# Patient Record
Sex: MALE | Race: WHITE | NOT HISPANIC OR LATINO | Employment: OTHER | URBAN - METROPOLITAN AREA
[De-identification: names, ages, dates, MRNs, and addresses within clinical notes are randomized per-mention and may not be internally consistent; named-entity substitution may affect disease eponyms.]

---

## 2017-01-01 ENCOUNTER — APPOINTMENT (INPATIENT)
Dept: RADIOLOGY | Facility: HOSPITAL | Age: 67
DRG: 853 | End: 2017-01-01
Attending: SURGERY
Payer: COMMERCIAL

## 2017-01-01 ENCOUNTER — APPOINTMENT (INPATIENT)
Dept: NON INVASIVE DIAGNOSTICS | Facility: HOSPITAL | Age: 67
DRG: 853 | End: 2017-01-01
Payer: COMMERCIAL

## 2017-01-01 PROCEDURE — 36147: CPT

## 2017-01-01 PROCEDURE — 36148: CPT

## 2017-01-01 PROCEDURE — C1894 INTRO/SHEATH, NON-LASER: HCPCS

## 2017-01-01 PROCEDURE — C1769 GUIDE WIRE: HCPCS

## 2017-01-01 PROCEDURE — 93306 TTE W/DOPPLER COMPLETE: CPT

## 2017-01-01 PROCEDURE — C1757 CATH, THROMBECTOMY/EMBOLECT: HCPCS

## 2017-01-01 PROCEDURE — 36870: CPT

## 2017-01-02 ENCOUNTER — APPOINTMENT (INPATIENT)
Dept: DIALYSIS | Facility: HOSPITAL | Age: 67
DRG: 853 | End: 2017-01-02
Attending: INTERNAL MEDICINE
Payer: COMMERCIAL

## 2017-01-04 ENCOUNTER — APPOINTMENT (INPATIENT)
Dept: DIALYSIS | Facility: HOSPITAL | Age: 67
DRG: 853 | End: 2017-01-04
Payer: COMMERCIAL

## 2017-01-20 ENCOUNTER — GENERIC CONVERSION - ENCOUNTER (OUTPATIENT)
Dept: OTHER | Facility: OTHER | Age: 67
End: 2017-01-20

## 2017-02-08 ENCOUNTER — GENERIC CONVERSION - ENCOUNTER (OUTPATIENT)
Dept: OTHER | Facility: OTHER | Age: 67
End: 2017-02-08

## 2017-02-09 ENCOUNTER — GENERIC CONVERSION - ENCOUNTER (OUTPATIENT)
Dept: OTHER | Facility: OTHER | Age: 67
End: 2017-02-09

## 2017-02-11 ENCOUNTER — APPOINTMENT (EMERGENCY)
Dept: CT IMAGING | Facility: HOSPITAL | Age: 67
End: 2017-02-11
Payer: COMMERCIAL

## 2017-02-11 ENCOUNTER — APPOINTMENT (EMERGENCY)
Dept: RADIOLOGY | Facility: HOSPITAL | Age: 67
End: 2017-02-11
Payer: COMMERCIAL

## 2017-02-11 ENCOUNTER — HOSPITAL ENCOUNTER (EMERGENCY)
Facility: HOSPITAL | Age: 67
Discharge: HOME | End: 2017-02-12
Attending: EMERGENCY MEDICINE
Payer: COMMERCIAL

## 2017-02-11 DIAGNOSIS — S20.229A BACK CONTUSION: Primary | ICD-10-CM

## 2017-02-11 PROCEDURE — 72125 CT NECK SPINE W/O DYE: CPT

## 2017-02-11 PROCEDURE — 72070 X-RAY EXAM THORAC SPINE 2VWS: CPT

## 2017-02-11 PROCEDURE — 70450 CT HEAD/BRAIN W/O DYE: CPT

## 2017-02-11 RX ORDER — ACETAMINOPHEN 325 MG/1
650 TABLET ORAL ONCE
Status: COMPLETED | OUTPATIENT
Start: 2017-02-11 | End: 2017-02-11

## 2017-02-11 RX ORDER — LEVOFLOXACIN 500 MG/1
500 TABLET, FILM COATED ORAL DAILY
COMMUNITY
End: 2017-02-18

## 2017-02-11 RX ORDER — GINSENG 100 MG
1 CAPSULE ORAL ONCE
Status: COMPLETED | OUTPATIENT
Start: 2017-02-11 | End: 2017-02-11

## 2017-02-11 RX ADMIN — ACETAMINOPHEN 650 MG: 325 TABLET ORAL at 23:05

## 2017-02-11 RX ADMIN — BACITRACIN ZINC 1 SMALL APPLICATION: 500 OINTMENT TOPICAL at 23:06

## 2017-02-12 VITALS
TEMPERATURE: 97.6 F | HEART RATE: 82 BPM | DIASTOLIC BLOOD PRESSURE: 86 MMHG | RESPIRATION RATE: 14 BRPM | WEIGHT: 150 LBS | BODY MASS INDEX: 24.21 KG/M2 | OXYGEN SATURATION: 96 % | SYSTOLIC BLOOD PRESSURE: 158 MMHG

## 2017-02-12 PROCEDURE — 99284 EMERGENCY DEPT VISIT MOD MDM: CPT

## 2017-02-18 ENCOUNTER — HOSPITAL ENCOUNTER (INPATIENT)
Facility: HOSPITAL | Age: 67
LOS: 6 days | Discharge: HOME HEALTH | DRG: 871 | End: 2017-02-24
Attending: EMERGENCY MEDICINE | Admitting: INTERNAL MEDICINE
Payer: COMMERCIAL

## 2017-02-18 ENCOUNTER — APPOINTMENT (EMERGENCY)
Dept: RADIOLOGY | Facility: HOSPITAL | Age: 67
DRG: 871 | End: 2017-02-18
Payer: COMMERCIAL

## 2017-02-18 ENCOUNTER — APPOINTMENT (INPATIENT)
Dept: CT IMAGING | Facility: HOSPITAL | Age: 67
DRG: 871 | End: 2017-02-18
Payer: COMMERCIAL

## 2017-02-18 DIAGNOSIS — Z86.19 H/O CLOSTRIDIUM DIFFICILE INFECTION: ICD-10-CM

## 2017-02-18 DIAGNOSIS — A41.9 SEPSIS, DUE TO UNSPECIFIED ORGANISM: Primary | ICD-10-CM

## 2017-02-18 DIAGNOSIS — L03.115 CELLULITIS OF RIGHT LOWER EXTREMITY: ICD-10-CM

## 2017-02-18 DIAGNOSIS — N18.6 ESRD (END STAGE RENAL DISEASE) (HCC): ICD-10-CM

## 2017-02-18 DIAGNOSIS — L97.513 FOOT ULCER, RIGHT, WITH NECROSIS OF MUSCLE (HCC): ICD-10-CM

## 2017-02-18 DIAGNOSIS — Z99.2 ESRD (END STAGE RENAL DISEASE) ON DIALYSIS (HCC): ICD-10-CM

## 2017-02-18 DIAGNOSIS — E10.21 TYPE 1 DIABETES MELLITUS WITH NEPHROPATHY (HCC): ICD-10-CM

## 2017-02-18 DIAGNOSIS — I48.91 ATRIAL FIBRILLATION (HCC): ICD-10-CM

## 2017-02-18 DIAGNOSIS — E10.621 DIABETIC ULCER OF RIGHT FOOT ASSOCIATED WITH TYPE 1 DIABETES MELLITUS (HCC): ICD-10-CM

## 2017-02-18 DIAGNOSIS — L97.519 DIABETIC ULCER OF RIGHT FOOT ASSOCIATED WITH TYPE 1 DIABETES MELLITUS (HCC): ICD-10-CM

## 2017-02-18 DIAGNOSIS — N18.6 ESRD (END STAGE RENAL DISEASE) ON DIALYSIS (HCC): ICD-10-CM

## 2017-02-18 DIAGNOSIS — L03.115 CELLULITIS OF RIGHT LEG: ICD-10-CM

## 2017-02-18 DIAGNOSIS — J18.9 PNEUMONIA OF RIGHT LOWER LOBE DUE TO INFECTIOUS ORGANISM: ICD-10-CM

## 2017-02-18 DIAGNOSIS — A04.72 C. DIFFICILE COLITIS: ICD-10-CM

## 2017-02-18 DIAGNOSIS — L03.90 CELLULITIS: ICD-10-CM

## 2017-02-18 DIAGNOSIS — J18.9 RIGHT LOWER LOBE PNEUMONIA: ICD-10-CM

## 2017-02-18 PROBLEM — E11.9 T2DM (TYPE 2 DIABETES MELLITUS) (HCC): Status: ACTIVE | Noted: 2017-02-18

## 2017-02-18 PROBLEM — L97.509 FOOT ULCER (HCC): Status: ACTIVE | Noted: 2017-02-18

## 2017-02-18 PROBLEM — G93.40 ACUTE ENCEPHALOPATHY: Status: ACTIVE | Noted: 2017-02-18

## 2017-02-18 PROBLEM — R53.83 LETHARGY: Status: ACTIVE | Noted: 2017-02-18

## 2017-02-18 LAB
ALBUMIN SERPL BCP-MCNC: 2.3 G/DL (ref 3.5–5)
ALP SERPL-CCNC: 240 U/L (ref 46–116)
ALT SERPL W P-5'-P-CCNC: 24 U/L (ref 12–78)
ANION GAP SERPL CALCULATED.3IONS-SCNC: 6 MMOL/L (ref 4–13)
APTT PPP: 65 SECONDS (ref 24–36)
AST SERPL W P-5'-P-CCNC: 33 U/L (ref 5–45)
BASOPHILS # BLD AUTO: 0.03 THOUSANDS/ΜL (ref 0–0.1)
BASOPHILS NFR BLD AUTO: 0 % (ref 0–1)
BILIRUB SERPL-MCNC: 0.8 MG/DL (ref 0.2–1)
BUN SERPL-MCNC: 31 MG/DL (ref 5–25)
CALCIUM SERPL-MCNC: 8.1 MG/DL (ref 8.3–10.1)
CHLORIDE SERPL-SCNC: 92 MMOL/L (ref 100–108)
CO2 SERPL-SCNC: 31 MMOL/L (ref 21–32)
CREAT SERPL-MCNC: 4.01 MG/DL (ref 0.6–1.3)
EOSINOPHIL # BLD AUTO: 0.08 THOUSAND/ΜL (ref 0–0.61)
EOSINOPHIL NFR BLD AUTO: 1 % (ref 0–6)
ERYTHROCYTE [DISTWIDTH] IN BLOOD BY AUTOMATED COUNT: 16.4 % (ref 11.6–15.1)
GFR SERPL CREATININE-BSD FRML MDRD: 15.1 ML/MIN/1.73SQ M
GLUCOSE SERPL-MCNC: 231 MG/DL (ref 65–140)
GLUCOSE SERPL-MCNC: 383 MG/DL (ref 65–140)
GLUCOSE SERPL-MCNC: 383 MG/DL (ref 65–140)
HCT VFR BLD AUTO: 33.6 % (ref 36.5–49.3)
HGB BLD-MCNC: 10.6 G/DL (ref 12–17)
INR PPP: 3.11 (ref 0.86–1.16)
LACTATE SERPL-SCNC: 1.9 MMOL/L (ref 0.5–2)
LYMPHOCYTES # BLD AUTO: 0.79 THOUSANDS/ΜL (ref 0.6–4.47)
LYMPHOCYTES NFR BLD AUTO: 6 % (ref 14–44)
MAGNESIUM SERPL-MCNC: 1.9 MG/DL (ref 1.6–2.6)
MCH RBC QN AUTO: 30.3 PG (ref 26.8–34.3)
MCHC RBC AUTO-ENTMCNC: 31.5 G/DL (ref 31.4–37.4)
MCV RBC AUTO: 96 FL (ref 82–98)
MONOCYTES # BLD AUTO: 1.43 THOUSAND/ΜL (ref 0.17–1.22)
MONOCYTES NFR BLD AUTO: 11 % (ref 4–12)
NEUTROPHILS # BLD AUTO: 11.07 THOUSANDS/ΜL (ref 1.85–7.62)
NEUTS SEG NFR BLD AUTO: 82 % (ref 43–75)
PLATELET # BLD AUTO: 203 THOUSANDS/UL (ref 149–390)
PMV BLD AUTO: 10.4 FL (ref 8.9–12.7)
POTASSIUM SERPL-SCNC: 4.6 MMOL/L (ref 3.5–5.3)
PROT SERPL-MCNC: 7.3 G/DL (ref 6.4–8.2)
PROTHROMBIN TIME: 30.7 SECONDS (ref 12–14.3)
RBC # BLD AUTO: 3.5 MILLION/UL (ref 3.88–5.62)
SODIUM SERPL-SCNC: 129 MMOL/L (ref 136–145)
TSH SERPL DL<=0.05 MIU/L-ACNC: 1.6 UIU/ML (ref 0.36–3.74)
WBC # BLD AUTO: 13.4 THOUSAND/UL (ref 4.31–10.16)

## 2017-02-18 PROCEDURE — 85610 PROTHROMBIN TIME: CPT | Performed by: EMERGENCY MEDICINE

## 2017-02-18 PROCEDURE — 87147 CULTURE TYPE IMMUNOLOGIC: CPT | Performed by: EMERGENCY MEDICINE

## 2017-02-18 PROCEDURE — 93005 ELECTROCARDIOGRAM TRACING: CPT | Performed by: INTERNAL MEDICINE

## 2017-02-18 PROCEDURE — 85025 COMPLETE CBC W/AUTO DIFF WBC: CPT | Performed by: EMERGENCY MEDICINE

## 2017-02-18 PROCEDURE — 80053 COMPREHEN METABOLIC PANEL: CPT | Performed by: EMERGENCY MEDICINE

## 2017-02-18 PROCEDURE — 36415 COLL VENOUS BLD VENIPUNCTURE: CPT | Performed by: EMERGENCY MEDICINE

## 2017-02-18 PROCEDURE — 87205 SMEAR GRAM STAIN: CPT | Performed by: EMERGENCY MEDICINE

## 2017-02-18 PROCEDURE — A9270 NON-COVERED ITEM OR SERVICE: HCPCS | Performed by: INTERNAL MEDICINE

## 2017-02-18 PROCEDURE — 84443 ASSAY THYROID STIM HORMONE: CPT | Performed by: INTERNAL MEDICINE

## 2017-02-18 PROCEDURE — A9270 NON-COVERED ITEM OR SERVICE: HCPCS | Performed by: EMERGENCY MEDICINE

## 2017-02-18 PROCEDURE — 94760 N-INVAS EAR/PLS OXIMETRY 1: CPT

## 2017-02-18 PROCEDURE — 85730 THROMBOPLASTIN TIME PARTIAL: CPT | Performed by: EMERGENCY MEDICINE

## 2017-02-18 PROCEDURE — 99285 EMERGENCY DEPT VISIT HI MDM: CPT

## 2017-02-18 PROCEDURE — 82948 REAGENT STRIP/BLOOD GLUCOSE: CPT

## 2017-02-18 PROCEDURE — 87070 CULTURE OTHR SPECIMN AEROBIC: CPT | Performed by: EMERGENCY MEDICINE

## 2017-02-18 PROCEDURE — 87040 BLOOD CULTURE FOR BACTERIA: CPT | Performed by: EMERGENCY MEDICINE

## 2017-02-18 PROCEDURE — 87186 SC STD MICRODIL/AGAR DIL: CPT | Performed by: EMERGENCY MEDICINE

## 2017-02-18 PROCEDURE — 71010 HB CHEST X-RAY 1 VIEW FRONTAL (PORTABLE): CPT

## 2017-02-18 PROCEDURE — 93005 ELECTROCARDIOGRAM TRACING: CPT | Performed by: EMERGENCY MEDICINE

## 2017-02-18 PROCEDURE — 83735 ASSAY OF MAGNESIUM: CPT | Performed by: EMERGENCY MEDICINE

## 2017-02-18 PROCEDURE — 70450 CT HEAD/BRAIN W/O DYE: CPT

## 2017-02-18 PROCEDURE — 94664 DEMO&/EVAL PT USE INHALER: CPT

## 2017-02-18 PROCEDURE — 83605 ASSAY OF LACTIC ACID: CPT | Performed by: EMERGENCY MEDICINE

## 2017-02-18 PROCEDURE — 96365 THER/PROPH/DIAG IV INF INIT: CPT

## 2017-02-18 RX ORDER — ONDANSETRON 2 MG/ML
4 INJECTION INTRAMUSCULAR; INTRAVENOUS EVERY 6 HOURS PRN
Status: DISCONTINUED | OUTPATIENT
Start: 2017-02-18 | End: 2017-02-24 | Stop reason: HOSPADM

## 2017-02-18 RX ORDER — HEPARIN SODIUM 5000 [USP'U]/ML
5000 INJECTION, SOLUTION INTRAVENOUS; SUBCUTANEOUS EVERY 8 HOURS SCHEDULED
Status: DISCONTINUED | OUTPATIENT
Start: 2017-02-18 | End: 2017-02-18

## 2017-02-18 RX ORDER — WARFARIN SODIUM 2.5 MG/1
5 TABLET ORAL
Status: DISCONTINUED | OUTPATIENT
Start: 2017-02-18 | End: 2017-02-18

## 2017-02-18 RX ORDER — LEVOTHYROXINE SODIUM 0.15 MG/1
150 TABLET ORAL EVERY OTHER DAY
Status: DISCONTINUED | OUTPATIENT
Start: 2017-02-18 | End: 2017-02-24 | Stop reason: HOSPADM

## 2017-02-18 RX ORDER — LEVOTHYROXINE SODIUM 175 UG/1
175 TABLET ORAL EVERY OTHER DAY
Status: DISCONTINUED | OUTPATIENT
Start: 2017-02-18 | End: 2017-02-18

## 2017-02-18 RX ORDER — PANTOPRAZOLE SODIUM 40 MG/1
40 TABLET, DELAYED RELEASE ORAL
Status: DISCONTINUED | OUTPATIENT
Start: 2017-02-19 | End: 2017-02-24 | Stop reason: HOSPADM

## 2017-02-18 RX ORDER — VANCOMYCIN HYDROCHLORIDE 1 G/200ML
15 INJECTION, SOLUTION INTRAVENOUS ONCE
Status: COMPLETED | OUTPATIENT
Start: 2017-02-18 | End: 2017-02-18

## 2017-02-18 RX ORDER — ACETAMINOPHEN 325 MG/1
650 TABLET ORAL ONCE
Status: COMPLETED | OUTPATIENT
Start: 2017-02-18 | End: 2017-02-18

## 2017-02-18 RX ORDER — LEVOTHYROXINE SODIUM 175 UG/1
175 TABLET ORAL EVERY OTHER DAY
Status: DISCONTINUED | OUTPATIENT
Start: 2017-02-19 | End: 2017-02-24 | Stop reason: HOSPADM

## 2017-02-18 RX ORDER — TRAMADOL HYDROCHLORIDE 50 MG/1
50 TABLET ORAL ONCE
Status: DISCONTINUED | OUTPATIENT
Start: 2017-02-18 | End: 2017-02-24 | Stop reason: HOSPADM

## 2017-02-18 RX ORDER — LISINOPRIL 5 MG/1
7.5 TABLET ORAL DAILY
COMMUNITY
End: 2017-04-21 | Stop reason: HOSPADM

## 2017-02-18 RX ORDER — GABAPENTIN 100 MG/1
100 CAPSULE ORAL 2 TIMES DAILY
Status: DISCONTINUED | OUTPATIENT
Start: 2017-02-18 | End: 2017-02-24 | Stop reason: HOSPADM

## 2017-02-18 RX ORDER — METOPROLOL TARTRATE 100 MG/1
100 TABLET ORAL 2 TIMES DAILY
Status: DISCONTINUED | OUTPATIENT
Start: 2017-02-18 | End: 2017-02-24 | Stop reason: HOSPADM

## 2017-02-18 RX ADMIN — VANCOMYCIN HYDROCHLORIDE 1000 MG: 1 INJECTION, SOLUTION INTRAVENOUS at 12:56

## 2017-02-18 RX ADMIN — METOPROLOL TARTRATE 100 MG: 100 TABLET ORAL at 18:13

## 2017-02-18 RX ADMIN — SODIUM CHLORIDE 500 ML: 0.9 INJECTION, SOLUTION INTRAVENOUS at 12:56

## 2017-02-18 RX ADMIN — PIPERACILLIN SODIUM,TAZOBACTAM SODIUM 2.25 G: 2; .25 INJECTION, POWDER, FOR SOLUTION INTRAVENOUS at 20:26

## 2017-02-18 RX ADMIN — INSULIN DETEMIR 10 UNITS: 100 INJECTION, SOLUTION SUBCUTANEOUS at 20:22

## 2017-02-18 RX ADMIN — GABAPENTIN 100 MG: 100 CAPSULE ORAL at 18:13

## 2017-02-18 RX ADMIN — INSULIN LISPRO 4 UNITS: 100 INJECTION, SOLUTION INTRAVENOUS; SUBCUTANEOUS at 20:23

## 2017-02-18 RX ADMIN — CEFEPIME HYDROCHLORIDE 1000 MG: 1 INJECTION, POWDER, FOR SOLUTION INTRAMUSCULAR; INTRAVENOUS at 13:00

## 2017-02-18 RX ADMIN — INSULIN LISPRO 3 UNITS: 100 INJECTION, SOLUTION INTRAVENOUS; SUBCUTANEOUS at 20:22

## 2017-02-18 RX ADMIN — ACETAMINOPHEN 650 MG: 325 TABLET ORAL at 12:51

## 2017-02-18 RX ADMIN — INSULIN LISPRO 4 UNITS: 100 INJECTION, SOLUTION INTRAVENOUS; SUBCUTANEOUS at 21:46

## 2017-02-19 LAB
ALBUMIN SERPL BCP-MCNC: 2 G/DL (ref 3.5–5)
ALP SERPL-CCNC: 211 U/L (ref 46–116)
ALT SERPL W P-5'-P-CCNC: 21 U/L (ref 12–78)
ANION GAP SERPL CALCULATED.3IONS-SCNC: 8 MMOL/L (ref 4–13)
AST SERPL W P-5'-P-CCNC: 38 U/L (ref 5–45)
BASOPHILS # BLD AUTO: 0.04 THOUSANDS/ΜL (ref 0–0.1)
BASOPHILS NFR BLD AUTO: 0 % (ref 0–1)
BILIRUB SERPL-MCNC: 0.6 MG/DL (ref 0.2–1)
BUN SERPL-MCNC: 40 MG/DL (ref 5–25)
CALCIUM SERPL-MCNC: 7.8 MG/DL (ref 8.3–10.1)
CHLORIDE SERPL-SCNC: 93 MMOL/L (ref 100–108)
CO2 SERPL-SCNC: 30 MMOL/L (ref 21–32)
CREAT SERPL-MCNC: 5.04 MG/DL (ref 0.6–1.3)
EOSINOPHIL # BLD AUTO: 0.21 THOUSAND/ΜL (ref 0–0.61)
EOSINOPHIL NFR BLD AUTO: 2 % (ref 0–6)
ERYTHROCYTE [DISTWIDTH] IN BLOOD BY AUTOMATED COUNT: 16.4 % (ref 11.6–15.1)
GFR SERPL CREATININE-BSD FRML MDRD: 11.6 ML/MIN/1.73SQ M
GLUCOSE SERPL-MCNC: 111 MG/DL (ref 65–140)
GLUCOSE SERPL-MCNC: 116 MG/DL (ref 65–140)
GLUCOSE SERPL-MCNC: 158 MG/DL (ref 65–140)
GLUCOSE SERPL-MCNC: 257 MG/DL (ref 65–140)
GLUCOSE SERPL-MCNC: 407 MG/DL (ref 65–140)
GLUCOSE SERPL-MCNC: 41 MG/DL (ref 65–140)
GLUCOSE SERPL-MCNC: 52 MG/DL (ref 65–140)
GLUCOSE SERPL-MCNC: 71 MG/DL (ref 65–140)
GLUCOSE SERPL-MCNC: 88 MG/DL (ref 65–140)
HCT VFR BLD AUTO: 33.5 % (ref 36.5–49.3)
HGB BLD-MCNC: 10.3 G/DL (ref 12–17)
LYMPHOCYTES # BLD AUTO: 1.04 THOUSANDS/ΜL (ref 0.6–4.47)
LYMPHOCYTES NFR BLD AUTO: 10 % (ref 14–44)
MCH RBC QN AUTO: 29.5 PG (ref 26.8–34.3)
MCHC RBC AUTO-ENTMCNC: 30.7 G/DL (ref 31.4–37.4)
MCV RBC AUTO: 96 FL (ref 82–98)
MONOCYTES # BLD AUTO: 0.87 THOUSAND/ΜL (ref 0.17–1.22)
MONOCYTES NFR BLD AUTO: 9 % (ref 4–12)
NEUTROPHILS # BLD AUTO: 7.87 THOUSANDS/ΜL (ref 1.85–7.62)
NEUTS SEG NFR BLD AUTO: 79 % (ref 43–75)
PLATELET # BLD AUTO: 203 THOUSANDS/UL (ref 149–390)
PMV BLD AUTO: 10.2 FL (ref 8.9–12.7)
POTASSIUM SERPL-SCNC: 4.3 MMOL/L (ref 3.5–5.3)
PROT SERPL-MCNC: 6.6 G/DL (ref 6.4–8.2)
RBC # BLD AUTO: 3.49 MILLION/UL (ref 3.88–5.62)
SODIUM SERPL-SCNC: 131 MMOL/L (ref 136–145)
WBC # BLD AUTO: 10.03 THOUSAND/UL (ref 4.31–10.16)

## 2017-02-19 PROCEDURE — A9270 NON-COVERED ITEM OR SERVICE: HCPCS | Performed by: INTERNAL MEDICINE

## 2017-02-19 PROCEDURE — 92610 EVALUATE SWALLOWING FUNCTION: CPT

## 2017-02-19 PROCEDURE — 80053 COMPREHEN METABOLIC PANEL: CPT | Performed by: INTERNAL MEDICINE

## 2017-02-19 PROCEDURE — 82948 REAGENT STRIP/BLOOD GLUCOSE: CPT

## 2017-02-19 PROCEDURE — 85025 COMPLETE CBC W/AUTO DIFF WBC: CPT | Performed by: INTERNAL MEDICINE

## 2017-02-19 RX ORDER — DEXTROSE MONOHYDRATE 25 G/50ML
INJECTION, SOLUTION INTRAVENOUS
Status: COMPLETED
Start: 2017-02-19 | End: 2017-02-19

## 2017-02-19 RX ADMIN — METOPROLOL TARTRATE 100 MG: 100 TABLET ORAL at 18:55

## 2017-02-19 RX ADMIN — LISINOPRIL 7.5 MG: 2.5 TABLET ORAL at 08:02

## 2017-02-19 RX ADMIN — DEXTROSE MONOHYDRATE 50 ML: 25 INJECTION, SOLUTION INTRAVENOUS at 11:07

## 2017-02-19 RX ADMIN — INSULIN DETEMIR 10 UNITS: 100 INJECTION, SOLUTION SUBCUTANEOUS at 05:15

## 2017-02-19 RX ADMIN — PIPERACILLIN SODIUM,TAZOBACTAM SODIUM 2.25 G: 2; .25 INJECTION, POWDER, FOR SOLUTION INTRAVENOUS at 05:14

## 2017-02-19 RX ADMIN — LEVOTHYROXINE SODIUM 175 MCG: 175 TABLET ORAL at 05:16

## 2017-02-19 RX ADMIN — CEFEPIME HYDROCHLORIDE 1000 MG: 1 INJECTION, POWDER, FOR SOLUTION INTRAMUSCULAR; INTRAVENOUS at 13:07

## 2017-02-19 RX ADMIN — PANTOPRAZOLE SODIUM 40 MG: 40 TABLET, DELAYED RELEASE ORAL at 05:20

## 2017-02-19 RX ADMIN — GABAPENTIN 100 MG: 100 CAPSULE ORAL at 18:56

## 2017-02-19 RX ADMIN — METOPROLOL TARTRATE 100 MG: 100 TABLET ORAL at 08:02

## 2017-02-19 RX ADMIN — GABAPENTIN 100 MG: 100 CAPSULE ORAL at 08:02

## 2017-02-19 RX ADMIN — DEXTROSE MONOHYDRATE 25 ML: 25 INJECTION, SOLUTION INTRAVENOUS at 08:03

## 2017-02-20 ENCOUNTER — APPOINTMENT (INPATIENT)
Dept: MRI IMAGING | Facility: HOSPITAL | Age: 67
DRG: 871 | End: 2017-02-20
Payer: COMMERCIAL

## 2017-02-20 ENCOUNTER — APPOINTMENT (INPATIENT)
Dept: DIALYSIS | Facility: HOSPITAL | Age: 67
DRG: 871 | End: 2017-02-20
Payer: COMMERCIAL

## 2017-02-20 ENCOUNTER — APPOINTMENT (INPATIENT)
Dept: RADIOLOGY | Facility: HOSPITAL | Age: 67
DRG: 871 | End: 2017-02-20
Payer: COMMERCIAL

## 2017-02-20 PROBLEM — J18.9 RIGHT LOWER LOBE PNEUMONIA: Status: ACTIVE | Noted: 2017-02-20

## 2017-02-20 LAB
ANION GAP SERPL CALCULATED.3IONS-SCNC: 12 MMOL/L (ref 4–13)
ATRIAL RATE: 78 BPM
BACTERIA WND AEROBE CULT: ABNORMAL
BACTERIA WND AEROBE CULT: ABNORMAL
BUN SERPL-MCNC: 54 MG/DL (ref 5–25)
CALCIUM SERPL-MCNC: 7.8 MG/DL (ref 8.3–10.1)
CHLORIDE SERPL-SCNC: 90 MMOL/L (ref 100–108)
CO2 SERPL-SCNC: 26 MMOL/L (ref 21–32)
CREAT SERPL-MCNC: 6.41 MG/DL (ref 0.6–1.3)
ERYTHROCYTE [DISTWIDTH] IN BLOOD BY AUTOMATED COUNT: 16 % (ref 11.6–15.1)
GFR SERPL CREATININE-BSD FRML MDRD: 8.8 ML/MIN/1.73SQ M
GLUCOSE SERPL-MCNC: 132 MG/DL (ref 65–140)
GLUCOSE SERPL-MCNC: 150 MG/DL (ref 65–140)
GLUCOSE SERPL-MCNC: 150 MG/DL (ref 65–140)
GLUCOSE SERPL-MCNC: 252 MG/DL (ref 65–140)
GLUCOSE SERPL-MCNC: 308 MG/DL (ref 65–140)
GLUCOSE SERPL-MCNC: 310 MG/DL (ref 65–140)
GRAM STN SPEC: ABNORMAL
GRAM STN SPEC: ABNORMAL
HCT VFR BLD AUTO: 31.6 % (ref 36.5–49.3)
HGB BLD-MCNC: 10 G/DL (ref 12–17)
INR PPP: 2.53 (ref 0.86–1.16)
MCH RBC QN AUTO: 29.9 PG (ref 26.8–34.3)
MCHC RBC AUTO-ENTMCNC: 31.6 G/DL (ref 31.4–37.4)
MCV RBC AUTO: 95 FL (ref 82–98)
P AXIS: 92 DEGREES
PLATELET # BLD AUTO: 191 THOUSANDS/UL (ref 149–390)
PMV BLD AUTO: 10.3 FL (ref 8.9–12.7)
POTASSIUM SERPL-SCNC: 4.6 MMOL/L (ref 3.5–5.3)
PR INTERVAL: 154 MS
PROTHROMBIN TIME: 26.3 SECONDS (ref 12–14.3)
QRS AXIS: -46 DEGREES
QRSD INTERVAL: 102 MS
QT INTERVAL: 386 MS
QTC INTERVAL: 440 MS
RBC # BLD AUTO: 3.34 MILLION/UL (ref 3.88–5.62)
SODIUM SERPL-SCNC: 128 MMOL/L (ref 136–145)
T WAVE AXIS: 95 DEGREES
VENTRICULAR RATE: 78 BPM
WBC # BLD AUTO: 11.9 THOUSAND/UL (ref 4.31–10.16)

## 2017-02-20 PROCEDURE — A9270 NON-COVERED ITEM OR SERVICE: HCPCS | Performed by: INTERNAL MEDICINE

## 2017-02-20 PROCEDURE — 85027 COMPLETE CBC AUTOMATED: CPT | Performed by: PHYSICIAN ASSISTANT

## 2017-02-20 PROCEDURE — 73630 X-RAY EXAM OF FOOT: CPT

## 2017-02-20 PROCEDURE — 80048 BASIC METABOLIC PNL TOTAL CA: CPT | Performed by: PHYSICIAN ASSISTANT

## 2017-02-20 PROCEDURE — 82948 REAGENT STRIP/BLOOD GLUCOSE: CPT

## 2017-02-20 PROCEDURE — 5A1D60Z PERFORMANCE OF URINARY FILTRATION, MULTIPLE: ICD-10-PCS | Performed by: INTERNAL MEDICINE

## 2017-02-20 PROCEDURE — 73718 MRI LOWER EXTREMITY W/O DYE: CPT

## 2017-02-20 PROCEDURE — 85610 PROTHROMBIN TIME: CPT | Performed by: NURSE PRACTITIONER

## 2017-02-20 RX ORDER — VANCOMYCIN HYDROCHLORIDE 1 G/200ML
15 INJECTION, SOLUTION INTRAVENOUS ONCE
Status: COMPLETED | OUTPATIENT
Start: 2017-02-20 | End: 2017-02-20

## 2017-02-20 RX ADMIN — INSULIN LISPRO 3 UNITS: 100 INJECTION, SOLUTION INTRAVENOUS; SUBCUTANEOUS at 08:08

## 2017-02-20 RX ADMIN — PANTOPRAZOLE SODIUM 40 MG: 40 TABLET, DELAYED RELEASE ORAL at 06:23

## 2017-02-20 RX ADMIN — INSULIN LISPRO 3 UNITS: 100 INJECTION, SOLUTION INTRAVENOUS; SUBCUTANEOUS at 17:39

## 2017-02-20 RX ADMIN — CEFEPIME 2000 MG: 2 INJECTION, POWDER, FOR SOLUTION INTRAVENOUS at 12:10

## 2017-02-20 RX ADMIN — INSULIN LISPRO 1 UNITS: 100 INJECTION, SOLUTION INTRAVENOUS; SUBCUTANEOUS at 14:08

## 2017-02-20 RX ADMIN — INSULIN DETEMIR 5 UNITS: 100 INJECTION, SOLUTION SUBCUTANEOUS at 19:15

## 2017-02-20 RX ADMIN — INSULIN LISPRO 1 UNITS: 100 INJECTION, SOLUTION INTRAVENOUS; SUBCUTANEOUS at 17:39

## 2017-02-20 RX ADMIN — INSULIN LISPRO 3 UNITS: 100 INJECTION, SOLUTION INTRAVENOUS; SUBCUTANEOUS at 14:07

## 2017-02-20 RX ADMIN — LISINOPRIL 7.5 MG: 2.5 TABLET ORAL at 14:07

## 2017-02-20 RX ADMIN — GABAPENTIN 100 MG: 100 CAPSULE ORAL at 17:37

## 2017-02-20 RX ADMIN — VANCOMYCIN HYDROCHLORIDE 1000 MG: 1 INJECTION, SOLUTION INTRAVENOUS at 13:12

## 2017-02-20 RX ADMIN — GABAPENTIN 100 MG: 100 CAPSULE ORAL at 08:07

## 2017-02-20 RX ADMIN — METOPROLOL TARTRATE 100 MG: 100 TABLET ORAL at 17:37

## 2017-02-20 RX ADMIN — INSULIN DETEMIR 10 UNITS: 100 INJECTION, SOLUTION SUBCUTANEOUS at 06:24

## 2017-02-20 RX ADMIN — LEVOTHYROXINE SODIUM 150 MCG: 150 TABLET ORAL at 06:23

## 2017-02-21 ENCOUNTER — APPOINTMENT (INPATIENT)
Dept: RADIOLOGY | Facility: HOSPITAL | Age: 67
DRG: 871 | End: 2017-02-21
Payer: COMMERCIAL

## 2017-02-21 LAB
GLUCOSE SERPL-MCNC: 200 MG/DL (ref 65–140)
GLUCOSE SERPL-MCNC: 201 MG/DL (ref 65–140)
GLUCOSE SERPL-MCNC: 340 MG/DL (ref 65–140)
GLUCOSE SERPL-MCNC: 54 MG/DL (ref 65–140)
GLUCOSE SERPL-MCNC: 76 MG/DL (ref 65–140)
GLUCOSE SERPL-MCNC: 95 MG/DL (ref 65–140)
INR PPP: 1.71 (ref 0.86–1.16)
PHOSPHATE SERPL-MCNC: 4.5 MG/DL (ref 2.3–4.1)
PROTHROMBIN TIME: 19.6 SECONDS (ref 12–14.3)
VANCOMYCIN SERPL-MCNC: 19.6 UG/ML

## 2017-02-21 PROCEDURE — 36246 INS CATH ABD/L-EXT ART 2ND: CPT

## 2017-02-21 PROCEDURE — G8979 MOBILITY GOAL STATUS: HCPCS

## 2017-02-21 PROCEDURE — A9270 NON-COVERED ITEM OR SERVICE: HCPCS | Performed by: INTERNAL MEDICINE

## 2017-02-21 PROCEDURE — 85610 PROTHROMBIN TIME: CPT | Performed by: NURSE PRACTITIONER

## 2017-02-21 PROCEDURE — C1769 GUIDE WIRE: HCPCS

## 2017-02-21 PROCEDURE — 75710 ARTERY X-RAYS ARM/LEG: CPT

## 2017-02-21 PROCEDURE — 99152 MOD SED SAME PHYS/QHP 5/>YRS: CPT

## 2017-02-21 PROCEDURE — 75625 CONTRAST EXAM ABDOMINL AORTA: CPT

## 2017-02-21 PROCEDURE — C1760 CLOSURE DEV, VASC: HCPCS

## 2017-02-21 PROCEDURE — 80202 ASSAY OF VANCOMYCIN: CPT | Performed by: INTERNAL MEDICINE

## 2017-02-21 PROCEDURE — 97163 PT EVAL HIGH COMPLEX 45 MIN: CPT

## 2017-02-21 PROCEDURE — 82948 REAGENT STRIP/BLOOD GLUCOSE: CPT

## 2017-02-21 PROCEDURE — 76937 US GUIDE VASCULAR ACCESS: CPT

## 2017-02-21 PROCEDURE — 99153 MOD SED SAME PHYS/QHP EA: CPT

## 2017-02-21 PROCEDURE — C1894 INTRO/SHEATH, NON-LASER: HCPCS

## 2017-02-21 PROCEDURE — 87493 C DIFF AMPLIFIED PROBE: CPT | Performed by: INTERNAL MEDICINE

## 2017-02-21 PROCEDURE — 84100 ASSAY OF PHOSPHORUS: CPT | Performed by: INTERNAL MEDICINE

## 2017-02-21 PROCEDURE — G8978 MOBILITY CURRENT STATUS: HCPCS

## 2017-02-21 PROCEDURE — B410YZZ FLUOROSCOPY OF ABDOMINAL AORTA USING OTHER CONTRAST: ICD-10-PCS | Performed by: RADIOLOGY

## 2017-02-21 PROCEDURE — B41FYZZ FLUOROSCOPY OF RIGHT LOWER EXTREMITY ARTERIES USING OTHER CONTRAST: ICD-10-PCS | Performed by: RADIOLOGY

## 2017-02-21 RX ORDER — MIDAZOLAM HYDROCHLORIDE 1 MG/ML
INJECTION INTRAMUSCULAR; INTRAVENOUS CODE/TRAUMA/SEDATION MEDICATION
Status: COMPLETED | OUTPATIENT
Start: 2017-02-21 | End: 2017-02-21

## 2017-02-21 RX ORDER — FENTANYL CITRATE 50 UG/ML
INJECTION, SOLUTION INTRAMUSCULAR; INTRAVENOUS CODE/TRAUMA/SEDATION MEDICATION
Status: COMPLETED | OUTPATIENT
Start: 2017-02-21 | End: 2017-02-21

## 2017-02-21 RX ADMIN — GABAPENTIN 100 MG: 100 CAPSULE ORAL at 18:54

## 2017-02-21 RX ADMIN — MIDAZOLAM HYDROCHLORIDE 1 MG: 1 INJECTION, SOLUTION INTRAMUSCULAR; INTRAVENOUS at 15:48

## 2017-02-21 RX ADMIN — INSULIN DETEMIR 5 UNITS: 100 INJECTION, SOLUTION SUBCUTANEOUS at 19:05

## 2017-02-21 RX ADMIN — METOPROLOL TARTRATE 100 MG: 100 TABLET ORAL at 18:54

## 2017-02-21 RX ADMIN — FENTANYL CITRATE 50 MCG: 50 INJECTION, SOLUTION INTRAMUSCULAR; INTRAVENOUS at 15:48

## 2017-02-21 RX ADMIN — INSULIN LISPRO 3 UNITS: 100 INJECTION, SOLUTION INTRAVENOUS; SUBCUTANEOUS at 21:50

## 2017-02-21 RX ADMIN — INSULIN DETEMIR 10 UNITS: 100 INJECTION, SOLUTION SUBCUTANEOUS at 10:19

## 2017-02-21 RX ADMIN — IODIXANOL 63 ML: 320 INJECTION, SOLUTION INTRAVASCULAR at 16:32

## 2017-02-22 ENCOUNTER — APPOINTMENT (INPATIENT)
Dept: DIALYSIS | Facility: HOSPITAL | Age: 67
DRG: 871 | End: 2017-02-22
Attending: INTERNAL MEDICINE
Payer: COMMERCIAL

## 2017-02-22 PROBLEM — A04.72 C. DIFFICILE COLITIS: Status: ACTIVE | Noted: 2017-02-22

## 2017-02-22 LAB
ANION GAP SERPL CALCULATED.3IONS-SCNC: 10 MMOL/L (ref 4–13)
BUN SERPL-MCNC: 45 MG/DL (ref 5–25)
C DIFF TOX GENS STL QL NAA+PROBE: ABNORMAL
CALCIUM SERPL-MCNC: 8.2 MG/DL (ref 8.3–10.1)
CHLORIDE SERPL-SCNC: 94 MMOL/L (ref 100–108)
CO2 SERPL-SCNC: 26 MMOL/L (ref 21–32)
CREAT SERPL-MCNC: 5.58 MG/DL (ref 0.6–1.3)
ERYTHROCYTE [DISTWIDTH] IN BLOOD BY AUTOMATED COUNT: 15.7 % (ref 11.6–15.1)
GFR SERPL CREATININE-BSD FRML MDRD: 10.3 ML/MIN/1.73SQ M
GLUCOSE SERPL-MCNC: 131 MG/DL (ref 65–140)
GLUCOSE SERPL-MCNC: 192 MG/DL (ref 65–140)
GLUCOSE SERPL-MCNC: 198 MG/DL (ref 65–140)
GLUCOSE SERPL-MCNC: 234 MG/DL (ref 65–140)
GLUCOSE SERPL-MCNC: 300 MG/DL (ref 65–140)
HCT VFR BLD AUTO: 29.9 % (ref 36.5–49.3)
HGB BLD-MCNC: 9.5 G/DL (ref 12–17)
MCH RBC QN AUTO: 30.2 PG (ref 26.8–34.3)
MCHC RBC AUTO-ENTMCNC: 31.8 G/DL (ref 31.4–37.4)
MCV RBC AUTO: 95 FL (ref 82–98)
PLATELET # BLD AUTO: 223 THOUSANDS/UL (ref 149–390)
PMV BLD AUTO: 10.3 FL (ref 8.9–12.7)
POTASSIUM SERPL-SCNC: 3.9 MMOL/L (ref 3.5–5.3)
RBC # BLD AUTO: 3.15 MILLION/UL (ref 3.88–5.62)
SODIUM SERPL-SCNC: 130 MMOL/L (ref 136–145)
WBC # BLD AUTO: 9.54 THOUSAND/UL (ref 4.31–10.16)

## 2017-02-22 PROCEDURE — A9270 NON-COVERED ITEM OR SERVICE: HCPCS | Performed by: INTERNAL MEDICINE

## 2017-02-22 PROCEDURE — 82948 REAGENT STRIP/BLOOD GLUCOSE: CPT

## 2017-02-22 PROCEDURE — 80048 BASIC METABOLIC PNL TOTAL CA: CPT | Performed by: INTERNAL MEDICINE

## 2017-02-22 PROCEDURE — A9270 NON-COVERED ITEM OR SERVICE: HCPCS | Performed by: PHYSICIAN ASSISTANT

## 2017-02-22 PROCEDURE — 85027 COMPLETE CBC AUTOMATED: CPT | Performed by: INTERNAL MEDICINE

## 2017-02-22 RX ORDER — METRONIDAZOLE 500 MG/1
500 TABLET ORAL EVERY 8 HOURS SCHEDULED
Status: DISCONTINUED | OUTPATIENT
Start: 2017-02-22 | End: 2017-02-22

## 2017-02-22 RX ORDER — WARFARIN SODIUM 5 MG/1
5 TABLET ORAL
Status: DISCONTINUED | OUTPATIENT
Start: 2017-02-22 | End: 2017-02-24 | Stop reason: HOSPADM

## 2017-02-22 RX ADMIN — INSULIN LISPRO 2 UNITS: 100 INJECTION, SOLUTION INTRAVENOUS; SUBCUTANEOUS at 12:05

## 2017-02-22 RX ADMIN — LEVOTHYROXINE SODIUM 150 MCG: 150 TABLET ORAL at 06:32

## 2017-02-22 RX ADMIN — INSULIN DETEMIR 5 UNITS: 100 INJECTION, SOLUTION SUBCUTANEOUS at 22:58

## 2017-02-22 RX ADMIN — INSULIN LISPRO 3 UNITS: 100 INJECTION, SOLUTION INTRAVENOUS; SUBCUTANEOUS at 22:59

## 2017-02-22 RX ADMIN — CEFEPIME 2000 MG: 2 INJECTION, POWDER, FOR SOLUTION INTRAVENOUS at 18:16

## 2017-02-22 RX ADMIN — VANCOMYCIN HYDROCHLORIDE 125 MG: 500 INJECTION, POWDER, LYOPHILIZED, FOR SOLUTION INTRAVENOUS at 12:05

## 2017-02-22 RX ADMIN — VANCOMYCIN HYDROCHLORIDE 125 MG: 500 INJECTION, POWDER, LYOPHILIZED, FOR SOLUTION INTRAVENOUS at 18:01

## 2017-02-22 RX ADMIN — PANTOPRAZOLE SODIUM 40 MG: 40 TABLET, DELAYED RELEASE ORAL at 06:32

## 2017-02-22 RX ADMIN — WARFARIN SODIUM 5 MG: 5 TABLET ORAL at 18:01

## 2017-02-22 RX ADMIN — INSULIN DETEMIR 10 UNITS: 100 INJECTION, SOLUTION SUBCUTANEOUS at 06:31

## 2017-02-22 RX ADMIN — GABAPENTIN 100 MG: 100 CAPSULE ORAL at 18:01

## 2017-02-22 RX ADMIN — VANCOMYCIN HYDROCHLORIDE 125 MG: 500 INJECTION, POWDER, LYOPHILIZED, FOR SOLUTION INTRAVENOUS at 23:06

## 2017-02-22 RX ADMIN — INSULIN LISPRO 1 UNITS: 100 INJECTION, SOLUTION INTRAVENOUS; SUBCUTANEOUS at 07:42

## 2017-02-22 RX ADMIN — INSULIN LISPRO 3 UNITS: 100 INJECTION, SOLUTION INTRAVENOUS; SUBCUTANEOUS at 07:42

## 2017-02-22 RX ADMIN — VANCOMYCIN HYDROCHLORIDE 750 MG: 750 INJECTION, SOLUTION INTRAVENOUS at 17:01

## 2017-02-22 RX ADMIN — INSULIN LISPRO 3 UNITS: 100 INJECTION, SOLUTION INTRAVENOUS; SUBCUTANEOUS at 12:08

## 2017-02-22 RX ADMIN — GABAPENTIN 100 MG: 100 CAPSULE ORAL at 12:04

## 2017-02-23 LAB
ANION GAP SERPL CALCULATED.3IONS-SCNC: 5 MMOL/L (ref 4–13)
BACTERIA BLD CULT: NORMAL
BACTERIA BLD CULT: NORMAL
BUN SERPL-MCNC: 20 MG/DL (ref 5–25)
CALCIUM SERPL-MCNC: 7.9 MG/DL (ref 8.3–10.1)
CHLORIDE SERPL-SCNC: 95 MMOL/L (ref 100–108)
CO2 SERPL-SCNC: 30 MMOL/L (ref 21–32)
CREAT SERPL-MCNC: 3.56 MG/DL (ref 0.6–1.3)
ERYTHROCYTE [DISTWIDTH] IN BLOOD BY AUTOMATED COUNT: 15.4 % (ref 11.6–15.1)
GFR SERPL CREATININE-BSD FRML MDRD: 17.4 ML/MIN/1.73SQ M
GLUCOSE SERPL-MCNC: 222 MG/DL (ref 65–140)
GLUCOSE SERPL-MCNC: 254 MG/DL (ref 65–140)
GLUCOSE SERPL-MCNC: 280 MG/DL (ref 65–140)
GLUCOSE SERPL-MCNC: 292 MG/DL (ref 65–140)
GLUCOSE SERPL-MCNC: 309 MG/DL (ref 65–140)
GLUCOSE SERPL-MCNC: 76 MG/DL (ref 65–140)
HCT VFR BLD AUTO: 31.4 % (ref 36.5–49.3)
HGB BLD-MCNC: 9.8 G/DL (ref 12–17)
INR PPP: 1.41 (ref 0.86–1.16)
MCH RBC QN AUTO: 29.7 PG (ref 26.8–34.3)
MCHC RBC AUTO-ENTMCNC: 31.2 G/DL (ref 31.4–37.4)
MCV RBC AUTO: 95 FL (ref 82–98)
PLATELET # BLD AUTO: 212 THOUSANDS/UL (ref 149–390)
PMV BLD AUTO: 10 FL (ref 8.9–12.7)
POTASSIUM SERPL-SCNC: 4 MMOL/L (ref 3.5–5.3)
PROTHROMBIN TIME: 16.9 SECONDS (ref 12–14.3)
RBC # BLD AUTO: 3.3 MILLION/UL (ref 3.88–5.62)
SODIUM SERPL-SCNC: 130 MMOL/L (ref 136–145)
WBC # BLD AUTO: 10.93 THOUSAND/UL (ref 4.31–10.16)

## 2017-02-23 PROCEDURE — 82948 REAGENT STRIP/BLOOD GLUCOSE: CPT

## 2017-02-23 PROCEDURE — A9270 NON-COVERED ITEM OR SERVICE: HCPCS | Performed by: INTERNAL MEDICINE

## 2017-02-23 PROCEDURE — 85610 PROTHROMBIN TIME: CPT | Performed by: PHYSICIAN ASSISTANT

## 2017-02-23 PROCEDURE — 85027 COMPLETE CBC AUTOMATED: CPT | Performed by: PHYSICIAN ASSISTANT

## 2017-02-23 PROCEDURE — A9270 NON-COVERED ITEM OR SERVICE: HCPCS | Performed by: PHYSICIAN ASSISTANT

## 2017-02-23 PROCEDURE — 80048 BASIC METABOLIC PNL TOTAL CA: CPT | Performed by: PHYSICIAN ASSISTANT

## 2017-02-23 RX ORDER — DEXTROSE MONOHYDRATE 25 G/50ML
INJECTION, SOLUTION INTRAVENOUS
Status: COMPLETED
Start: 2017-02-23 | End: 2017-02-24

## 2017-02-23 RX ADMIN — INSULIN LISPRO 3 UNITS: 100 INJECTION, SOLUTION INTRAVENOUS; SUBCUTANEOUS at 08:43

## 2017-02-23 RX ADMIN — WARFARIN SODIUM 5 MG: 5 TABLET ORAL at 17:28

## 2017-02-23 RX ADMIN — METOPROLOL TARTRATE 100 MG: 100 TABLET ORAL at 08:47

## 2017-02-23 RX ADMIN — INSULIN LISPRO 3 UNITS: 100 INJECTION, SOLUTION INTRAVENOUS; SUBCUTANEOUS at 18:32

## 2017-02-23 RX ADMIN — INSULIN LISPRO 1 UNITS: 100 INJECTION, SOLUTION INTRAVENOUS; SUBCUTANEOUS at 08:44

## 2017-02-23 RX ADMIN — INSULIN DETEMIR 7 UNITS: 100 INJECTION, SOLUTION SUBCUTANEOUS at 21:53

## 2017-02-23 RX ADMIN — LEVOTHYROXINE SODIUM 175 MCG: 175 TABLET ORAL at 08:50

## 2017-02-23 RX ADMIN — INSULIN LISPRO 2 UNITS: 100 INJECTION, SOLUTION INTRAVENOUS; SUBCUTANEOUS at 16:48

## 2017-02-23 RX ADMIN — VANCOMYCIN HYDROCHLORIDE 125 MG: 500 INJECTION, POWDER, LYOPHILIZED, FOR SOLUTION INTRAVENOUS at 05:56

## 2017-02-23 RX ADMIN — INSULIN LISPRO 3 UNITS: 100 INJECTION, SOLUTION INTRAVENOUS; SUBCUTANEOUS at 14:05

## 2017-02-23 RX ADMIN — LEVOTHYROXINE SODIUM 150 MCG: 150 TABLET ORAL at 05:56

## 2017-02-23 RX ADMIN — VANCOMYCIN HYDROCHLORIDE 125 MG: 500 INJECTION, POWDER, LYOPHILIZED, FOR SOLUTION INTRAVENOUS at 17:28

## 2017-02-23 RX ADMIN — LISINOPRIL 7.5 MG: 2.5 TABLET ORAL at 08:48

## 2017-02-23 RX ADMIN — GABAPENTIN 100 MG: 100 CAPSULE ORAL at 08:49

## 2017-02-23 RX ADMIN — INSULIN DETEMIR 10 UNITS: 100 INJECTION, SOLUTION SUBCUTANEOUS at 08:50

## 2017-02-23 RX ADMIN — PANTOPRAZOLE SODIUM 40 MG: 40 TABLET, DELAYED RELEASE ORAL at 06:03

## 2017-02-23 RX ADMIN — METOPROLOL TARTRATE 100 MG: 100 TABLET ORAL at 17:28

## 2017-02-23 RX ADMIN — GABAPENTIN 100 MG: 100 CAPSULE ORAL at 17:28

## 2017-02-23 RX ADMIN — VANCOMYCIN HYDROCHLORIDE 125 MG: 500 INJECTION, POWDER, LYOPHILIZED, FOR SOLUTION INTRAVENOUS at 12:14

## 2017-02-24 ENCOUNTER — APPOINTMENT (INPATIENT)
Dept: CT IMAGING | Facility: HOSPITAL | Age: 67
DRG: 871 | End: 2017-02-24
Payer: COMMERCIAL

## 2017-02-24 ENCOUNTER — APPOINTMENT (INPATIENT)
Dept: DIALYSIS | Facility: HOSPITAL | Age: 67
DRG: 871 | End: 2017-02-24
Attending: INTERNAL MEDICINE
Payer: COMMERCIAL

## 2017-02-24 VITALS
WEIGHT: 149.91 LBS | HEART RATE: 76 BPM | SYSTOLIC BLOOD PRESSURE: 126 MMHG | DIASTOLIC BLOOD PRESSURE: 69 MMHG | RESPIRATION RATE: 18 BRPM | TEMPERATURE: 98.3 F | OXYGEN SATURATION: 94 % | BODY MASS INDEX: 24.09 KG/M2 | HEIGHT: 66 IN

## 2017-02-24 LAB
ALBUMIN SERPL BCP-MCNC: 1.8 G/DL (ref 3.5–5)
ALP SERPL-CCNC: 165 U/L (ref 46–116)
ALT SERPL W P-5'-P-CCNC: 15 U/L (ref 12–78)
AMMONIA PLAS-SCNC: <10 UMOL/L (ref 11–35)
ANION GAP SERPL CALCULATED.3IONS-SCNC: 10 MMOL/L (ref 4–13)
ANION GAP SERPL CALCULATED.3IONS-SCNC: 7 MMOL/L (ref 4–13)
ARTERIAL PATENCY WRIST A: ABNORMAL
AST SERPL W P-5'-P-CCNC: 13 U/L (ref 5–45)
ATRIAL RATE: 89 BPM
BASE EXCESS BLDA CALC-SCNC: 2 MMOL/L (ref -2–3)
BILIRUB SERPL-MCNC: 0.5 MG/DL (ref 0.2–1)
BUN SERPL-MCNC: 27 MG/DL (ref 5–25)
BUN SERPL-MCNC: 33 MG/DL (ref 5–25)
CA-I BLD-SCNC: 1.15 MMOL/L (ref 1.12–1.32)
CALCIUM SERPL-MCNC: 8.3 MG/DL (ref 8.3–10.1)
CALCIUM SERPL-MCNC: 8.5 MG/DL (ref 8.3–10.1)
CHLORIDE SERPL-SCNC: 92 MMOL/L (ref 100–108)
CHLORIDE SERPL-SCNC: 95 MMOL/L (ref 100–108)
CO2 SERPL-SCNC: 25 MMOL/L (ref 21–32)
CO2 SERPL-SCNC: 27 MMOL/L (ref 21–32)
CREAT SERPL-MCNC: 4.29 MG/DL (ref 0.6–1.3)
CREAT SERPL-MCNC: 4.96 MG/DL (ref 0.6–1.3)
ERYTHROCYTE [DISTWIDTH] IN BLOOD BY AUTOMATED COUNT: 15.3 % (ref 11.6–15.1)
FIO2 GAS DIL.REBREATH: 21 L
GFR SERPL CREATININE-BSD FRML MDRD: 11.9 ML/MIN/1.73SQ M
GFR SERPL CREATININE-BSD FRML MDRD: 14 ML/MIN/1.73SQ M
GLUCOSE SERPL-MCNC: 113 MG/DL (ref 65–140)
GLUCOSE SERPL-MCNC: 127 MG/DL (ref 65–140)
GLUCOSE SERPL-MCNC: 158 MG/DL (ref 65–140)
GLUCOSE SERPL-MCNC: 226 MG/DL (ref 65–140)
GLUCOSE SERPL-MCNC: 268 MG/DL (ref 65–140)
GLUCOSE SERPL-MCNC: 306 MG/DL (ref 65–140)
GLUCOSE SERPL-MCNC: 36 MG/DL (ref 65–140)
GLUCOSE SERPL-MCNC: 48 MG/DL (ref 65–140)
GLUCOSE SERPL-MCNC: 92 MG/DL (ref 65–140)
HCO3 BLDA-SCNC: 27.3 MMOL/L (ref 22–28)
HCT VFR BLD AUTO: 28 % (ref 36.5–49.3)
HCT VFR BLD CALC: 28 % (ref 36.5–49.3)
HGB BLD-MCNC: 8.9 G/DL (ref 12–17)
HGB BLDA-MCNC: 9.5 G/DL (ref 12–17)
INR PPP: 1.5 (ref 0.86–1.16)
INR PPP: 1.51 (ref 0.86–1.16)
MAGNESIUM SERPL-MCNC: 1.7 MG/DL (ref 1.6–2.6)
MCH RBC QN AUTO: 30.5 PG (ref 26.8–34.3)
MCHC RBC AUTO-ENTMCNC: 31.8 G/DL (ref 31.4–37.4)
MCV RBC AUTO: 96 FL (ref 82–98)
P AXIS: 90 DEGREES
PCO2 BLD: 29 MMOL/L (ref 21–32)
PCO2 BLD: 42.9 MM HG (ref 36–44)
PH BLD: 7.41 [PH] (ref 7.35–7.45)
PLATELET # BLD AUTO: 218 THOUSANDS/UL (ref 149–390)
PMV BLD AUTO: 9.6 FL (ref 8.9–12.7)
PO2 BLD: 62 MM HG (ref 75–129)
POTASSIUM BLD-SCNC: 3.6 MMOL/L (ref 3.5–5.3)
POTASSIUM SERPL-SCNC: 3.8 MMOL/L (ref 3.5–5.3)
POTASSIUM SERPL-SCNC: 4.2 MMOL/L (ref 3.5–5.3)
PR INTERVAL: 168 MS
PROT SERPL-MCNC: 6.5 G/DL (ref 6.4–8.2)
PROTHROMBIN TIME: 17.7 SECONDS (ref 12–14.3)
PROTHROMBIN TIME: 17.8 SECONDS (ref 12–14.3)
QRS AXIS: 93 DEGREES
QRSD INTERVAL: 102 MS
QT INTERVAL: 394 MS
QTC INTERVAL: 479 MS
RBC # BLD AUTO: 2.92 MILLION/UL (ref 3.88–5.62)
SAMPLE SITE: ABNORMAL
SAO2 % BLD FROM PO2: 91 % (ref 95–98)
SODIUM BLD-SCNC: 129 MMOL/L (ref 136–145)
SODIUM SERPL-SCNC: 127 MMOL/L (ref 136–145)
SODIUM SERPL-SCNC: 129 MMOL/L (ref 136–145)
SPECIMEN SOURCE: ABNORMAL
T WAVE AXIS: 100 DEGREES
VENTRICULAR RATE: 89 BPM
WBC # BLD AUTO: 10.28 THOUSAND/UL (ref 4.31–10.16)

## 2017-02-24 PROCEDURE — 97167 OT EVAL HIGH COMPLEX 60 MIN: CPT

## 2017-02-24 PROCEDURE — 82140 ASSAY OF AMMONIA: CPT | Performed by: NURSE PRACTITIONER

## 2017-02-24 PROCEDURE — A9270 NON-COVERED ITEM OR SERVICE: HCPCS | Performed by: INTERNAL MEDICINE

## 2017-02-24 PROCEDURE — 80048 BASIC METABOLIC PNL TOTAL CA: CPT | Performed by: PHYSICIAN ASSISTANT

## 2017-02-24 PROCEDURE — 84132 ASSAY OF SERUM POTASSIUM: CPT

## 2017-02-24 PROCEDURE — 82803 BLOOD GASES ANY COMBINATION: CPT

## 2017-02-24 PROCEDURE — G8987 SELF CARE CURRENT STATUS: HCPCS

## 2017-02-24 PROCEDURE — 82947 ASSAY GLUCOSE BLOOD QUANT: CPT

## 2017-02-24 PROCEDURE — 85027 COMPLETE CBC AUTOMATED: CPT | Performed by: NURSE PRACTITIONER

## 2017-02-24 PROCEDURE — 82330 ASSAY OF CALCIUM: CPT

## 2017-02-24 PROCEDURE — 85610 PROTHROMBIN TIME: CPT | Performed by: NURSE PRACTITIONER

## 2017-02-24 PROCEDURE — 80053 COMPREHEN METABOLIC PANEL: CPT | Performed by: NURSE PRACTITIONER

## 2017-02-24 PROCEDURE — 85014 HEMATOCRIT: CPT

## 2017-02-24 PROCEDURE — A9270 NON-COVERED ITEM OR SERVICE: HCPCS | Performed by: PHYSICIAN ASSISTANT

## 2017-02-24 PROCEDURE — 82948 REAGENT STRIP/BLOOD GLUCOSE: CPT

## 2017-02-24 PROCEDURE — 70450 CT HEAD/BRAIN W/O DYE: CPT

## 2017-02-24 PROCEDURE — 85610 PROTHROMBIN TIME: CPT | Performed by: PHYSICIAN ASSISTANT

## 2017-02-24 PROCEDURE — 83735 ASSAY OF MAGNESIUM: CPT | Performed by: PHYSICIAN ASSISTANT

## 2017-02-24 PROCEDURE — 84295 ASSAY OF SERUM SODIUM: CPT

## 2017-02-24 PROCEDURE — G8988 SELF CARE GOAL STATUS: HCPCS

## 2017-02-24 RX ORDER — DEXTROSE MONOHYDRATE 25 G/50ML
25 INJECTION, SOLUTION INTRAVENOUS ONCE
Status: COMPLETED | OUTPATIENT
Start: 2017-02-24 | End: 2017-02-24

## 2017-02-24 RX ORDER — DOXYCYCLINE 100 MG/1
100 CAPSULE ORAL 2 TIMES DAILY
Qty: 10 CAPSULE | Refills: 0 | Status: SHIPPED | OUTPATIENT
Start: 2017-02-24 | End: 2017-03-01

## 2017-02-24 RX ORDER — VANCOMYCIN HYDROCHLORIDE 125 MG/1
125 CAPSULE ORAL 4 TIMES DAILY
Qty: 44 CAPSULE | Refills: 0 | Status: SHIPPED | OUTPATIENT
Start: 2017-02-24 | End: 2017-03-07

## 2017-02-24 RX ADMIN — VANCOMYCIN HYDROCHLORIDE 125 MG: 500 INJECTION, POWDER, LYOPHILIZED, FOR SOLUTION INTRAVENOUS at 12:10

## 2017-02-24 RX ADMIN — INSULIN LISPRO 3 UNITS: 100 INJECTION, SOLUTION INTRAVENOUS; SUBCUTANEOUS at 18:16

## 2017-02-24 RX ADMIN — LISINOPRIL 7.5 MG: 2.5 TABLET ORAL at 09:06

## 2017-02-24 RX ADMIN — LEVOTHYROXINE SODIUM 150 MCG: 150 TABLET ORAL at 06:42

## 2017-02-24 RX ADMIN — WARFARIN SODIUM 5 MG: 5 TABLET ORAL at 18:15

## 2017-02-24 RX ADMIN — INSULIN LISPRO 3 UNITS: 100 INJECTION, SOLUTION INTRAVENOUS; SUBCUTANEOUS at 12:10

## 2017-02-24 RX ADMIN — DEXTROSE MONOHYDRATE 25 ML: 500 INJECTION PARENTERAL at 02:16

## 2017-02-24 RX ADMIN — VANCOMYCIN HYDROCHLORIDE 125 MG: 500 INJECTION, POWDER, LYOPHILIZED, FOR SOLUTION INTRAVENOUS at 06:42

## 2017-02-24 RX ADMIN — METOPROLOL TARTRATE 100 MG: 100 TABLET ORAL at 09:06

## 2017-02-24 RX ADMIN — VANCOMYCIN HYDROCHLORIDE 125 MG: 500 INJECTION, POWDER, LYOPHILIZED, FOR SOLUTION INTRAVENOUS at 02:16

## 2017-02-24 RX ADMIN — PANTOPRAZOLE SODIUM 40 MG: 40 TABLET, DELAYED RELEASE ORAL at 06:42

## 2017-02-24 RX ADMIN — INSULIN LISPRO 2 UNITS: 100 INJECTION, SOLUTION INTRAVENOUS; SUBCUTANEOUS at 18:16

## 2017-02-24 RX ADMIN — GABAPENTIN 100 MG: 100 CAPSULE ORAL at 18:15

## 2017-02-24 RX ADMIN — METOPROLOL TARTRATE 100 MG: 100 TABLET ORAL at 18:15

## 2017-02-24 RX ADMIN — CEFEPIME 2000 MG: 2 INJECTION, POWDER, FOR SOLUTION INTRAVENOUS at 17:57

## 2017-02-24 RX ADMIN — DEXTROSE MONOHYDRATE 25 ML: 25 INJECTION, SOLUTION INTRAVENOUS at 02:16

## 2017-02-24 RX ADMIN — GABAPENTIN 100 MG: 100 CAPSULE ORAL at 09:07

## 2017-02-24 RX ADMIN — INSULIN LISPRO 2 UNITS: 100 INJECTION, SOLUTION INTRAVENOUS; SUBCUTANEOUS at 12:10

## 2017-03-31 ENCOUNTER — APPOINTMENT (EMERGENCY)
Dept: RADIOLOGY | Facility: HOSPITAL | Age: 67
DRG: 166 | End: 2017-03-31
Payer: COMMERCIAL

## 2017-03-31 ENCOUNTER — HOSPITAL ENCOUNTER (INPATIENT)
Facility: HOSPITAL | Age: 67
LOS: 2 days | DRG: 166 | End: 2017-04-02
Attending: EMERGENCY MEDICINE | Admitting: HOSPITALIST
Payer: COMMERCIAL

## 2017-03-31 ENCOUNTER — APPOINTMENT (EMERGENCY)
Dept: CT IMAGING | Facility: HOSPITAL | Age: 67
DRG: 166 | End: 2017-03-31
Payer: COMMERCIAL

## 2017-03-31 DIAGNOSIS — A41.9 SEPSIS, DUE TO UNSPECIFIED ORGANISM: ICD-10-CM

## 2017-03-31 DIAGNOSIS — R41.82 ALTERED MENTAL STATUS, UNSPECIFIED ALTERED MENTAL STATUS TYPE: ICD-10-CM

## 2017-03-31 DIAGNOSIS — L97.519 DIABETIC ULCER OF RIGHT FOOT ASSOCIATED WITH TYPE 1 DIABETES MELLITUS (HCC): ICD-10-CM

## 2017-03-31 DIAGNOSIS — J18.9 PNEUMONIA OF RIGHT LOWER LOBE DUE TO INFECTIOUS ORGANISM: Primary | ICD-10-CM

## 2017-03-31 DIAGNOSIS — L97.529 BILATERAL DIABETIC FOOT ULCER ASSOCIATED WITH SECONDARY DIABETES MELLITUS (HCC): ICD-10-CM

## 2017-03-31 DIAGNOSIS — E08.621 BILATERAL DIABETIC FOOT ULCER ASSOCIATED WITH SECONDARY DIABETES MELLITUS (HCC): ICD-10-CM

## 2017-03-31 DIAGNOSIS — N18.6 ESRD (END STAGE RENAL DISEASE) (HCC): ICD-10-CM

## 2017-03-31 DIAGNOSIS — L97.519 BILATERAL DIABETIC FOOT ULCER ASSOCIATED WITH SECONDARY DIABETES MELLITUS (HCC): ICD-10-CM

## 2017-03-31 DIAGNOSIS — I46.9 CARDIAC ARREST (HCC): ICD-10-CM

## 2017-03-31 DIAGNOSIS — J96.01 ACUTE RESPIRATORY FAILURE WITH HYPOXIA (HCC): ICD-10-CM

## 2017-03-31 DIAGNOSIS — G93.40 ACUTE ENCEPHALOPATHY: ICD-10-CM

## 2017-03-31 DIAGNOSIS — J18.9 HCAP (HEALTHCARE-ASSOCIATED PNEUMONIA): ICD-10-CM

## 2017-03-31 DIAGNOSIS — E10.621 DIABETIC ULCER OF RIGHT FOOT ASSOCIATED WITH TYPE 1 DIABETES MELLITUS (HCC): ICD-10-CM

## 2017-03-31 LAB
ALBUMIN SERPL BCP-MCNC: 2.4 G/DL (ref 3.5–5)
ALP SERPL-CCNC: 193 U/L (ref 46–116)
ALT SERPL W P-5'-P-CCNC: 19 U/L (ref 12–78)
ANION GAP SERPL CALCULATED.3IONS-SCNC: 12 MMOL/L (ref 4–13)
APTT PPP: 42 SECONDS (ref 24–36)
AST SERPL W P-5'-P-CCNC: 24 U/L (ref 5–45)
ATRIAL RATE: 88 BPM
BASOPHILS # BLD MANUAL: 0 THOUSAND/UL (ref 0–0.1)
BASOPHILS NFR MAR MANUAL: 0 % (ref 0–1)
BILIRUB SERPL-MCNC: 0.7 MG/DL (ref 0.2–1)
BUN SERPL-MCNC: 15 MG/DL (ref 5–25)
CALCIUM SERPL-MCNC: 9.5 MG/DL (ref 8.3–10.1)
CHLORIDE SERPL-SCNC: 100 MMOL/L (ref 100–108)
CO2 SERPL-SCNC: 25 MMOL/L (ref 21–32)
CREAT SERPL-MCNC: 3.21 MG/DL (ref 0.6–1.3)
EOSINOPHIL # BLD MANUAL: 0 THOUSAND/UL (ref 0–0.4)
EOSINOPHIL NFR BLD MANUAL: 0 % (ref 0–6)
ERYTHROCYTE [DISTWIDTH] IN BLOOD BY AUTOMATED COUNT: 16 % (ref 11.6–15.1)
GFR SERPL CREATININE-BSD FRML MDRD: 19.6 ML/MIN/1.73SQ M
GLUCOSE SERPL-MCNC: 227 MG/DL (ref 65–140)
GLUCOSE SERPL-MCNC: 238 MG/DL (ref 65–140)
HCT VFR BLD AUTO: 37.6 % (ref 36.5–49.3)
HGB BLD-MCNC: 11.8 G/DL (ref 12–17)
INR PPP: 2.28 (ref 0.86–1.16)
LACTATE SERPL-SCNC: 1.5 MMOL/L (ref 0.5–2)
LYMPHOCYTES # BLD AUTO: 0.22 THOUSAND/UL (ref 0.6–4.47)
LYMPHOCYTES # BLD AUTO: 2 % (ref 14–44)
MCH RBC QN AUTO: 30.6 PG (ref 26.8–34.3)
MCHC RBC AUTO-ENTMCNC: 31.4 G/DL (ref 31.4–37.4)
MCV RBC AUTO: 98 FL (ref 82–98)
MONOCYTES # BLD AUTO: 0.22 THOUSAND/UL (ref 0–1.22)
MONOCYTES NFR BLD: 2 % (ref 4–12)
NEUTROPHILS # BLD MANUAL: 10.48 THOUSAND/UL (ref 1.85–7.62)
NEUTS BAND NFR BLD MANUAL: 7 % (ref 0–8)
NEUTS SEG NFR BLD AUTO: 89 % (ref 43–75)
NT-PROBNP SERPL-MCNC: ABNORMAL PG/ML
P AXIS: 106 DEGREES
PLATELET # BLD AUTO: 226 THOUSANDS/UL (ref 149–390)
PLATELET BLD QL SMEAR: ADEQUATE
PMV BLD AUTO: 10 FL (ref 8.9–12.7)
POTASSIUM SERPL-SCNC: 4.9 MMOL/L (ref 3.5–5.3)
PR INTERVAL: 154 MS
PROT SERPL-MCNC: 8.3 G/DL (ref 6.4–8.2)
PROTHROMBIN TIME: 24.3 SECONDS (ref 12–14.3)
QRS AXIS: -45 DEGREES
QRSD INTERVAL: 94 MS
QT INTERVAL: 346 MS
QTC INTERVAL: 418 MS
RBC # BLD AUTO: 3.85 MILLION/UL (ref 3.88–5.62)
SODIUM SERPL-SCNC: 137 MMOL/L (ref 136–145)
T WAVE AXIS: 94 DEGREES
TOTAL CELLS COUNTED SPEC: 100
TROPONIN I SERPL-MCNC: <0.02 NG/ML
TSH SERPL DL<=0.05 MIU/L-ACNC: 4.9 UIU/ML (ref 0.36–3.74)
VENTRICULAR RATE: 88 BPM
WBC # BLD AUTO: 10.92 THOUSAND/UL (ref 4.31–10.16)

## 2017-03-31 PROCEDURE — 96365 THER/PROPH/DIAG IV INF INIT: CPT

## 2017-03-31 PROCEDURE — 85610 PROTHROMBIN TIME: CPT | Performed by: EMERGENCY MEDICINE

## 2017-03-31 PROCEDURE — 82948 REAGENT STRIP/BLOOD GLUCOSE: CPT

## 2017-03-31 PROCEDURE — 84443 ASSAY THYROID STIM HORMONE: CPT | Performed by: EMERGENCY MEDICINE

## 2017-03-31 PROCEDURE — A9270 NON-COVERED ITEM OR SERVICE: HCPCS | Performed by: EMERGENCY MEDICINE

## 2017-03-31 PROCEDURE — 93005 ELECTROCARDIOGRAM TRACING: CPT | Performed by: EMERGENCY MEDICINE

## 2017-03-31 PROCEDURE — 83880 ASSAY OF NATRIURETIC PEPTIDE: CPT | Performed by: EMERGENCY MEDICINE

## 2017-03-31 PROCEDURE — 80053 COMPREHEN METABOLIC PANEL: CPT | Performed by: EMERGENCY MEDICINE

## 2017-03-31 PROCEDURE — 87077 CULTURE AEROBIC IDENTIFY: CPT | Performed by: EMERGENCY MEDICINE

## 2017-03-31 PROCEDURE — 84484 ASSAY OF TROPONIN QUANT: CPT | Performed by: EMERGENCY MEDICINE

## 2017-03-31 PROCEDURE — 36415 COLL VENOUS BLD VENIPUNCTURE: CPT | Performed by: EMERGENCY MEDICINE

## 2017-03-31 PROCEDURE — 87147 CULTURE TYPE IMMUNOLOGIC: CPT | Performed by: EMERGENCY MEDICINE

## 2017-03-31 PROCEDURE — 87040 BLOOD CULTURE FOR BACTERIA: CPT | Performed by: EMERGENCY MEDICINE

## 2017-03-31 PROCEDURE — 71010 HB CHEST X-RAY 1 VIEW FRONTAL (PORTABLE): CPT

## 2017-03-31 PROCEDURE — 85730 THROMBOPLASTIN TIME PARTIAL: CPT | Performed by: EMERGENCY MEDICINE

## 2017-03-31 PROCEDURE — 70450 CT HEAD/BRAIN W/O DYE: CPT

## 2017-03-31 PROCEDURE — 85027 COMPLETE CBC AUTOMATED: CPT | Performed by: EMERGENCY MEDICINE

## 2017-03-31 PROCEDURE — 99285 EMERGENCY DEPT VISIT HI MDM: CPT

## 2017-03-31 PROCEDURE — 83605 ASSAY OF LACTIC ACID: CPT | Performed by: EMERGENCY MEDICINE

## 2017-03-31 PROCEDURE — 85007 BL SMEAR W/DIFF WBC COUNT: CPT | Performed by: EMERGENCY MEDICINE

## 2017-03-31 PROCEDURE — 87186 SC STD MICRODIL/AGAR DIL: CPT | Performed by: EMERGENCY MEDICINE

## 2017-03-31 RX ORDER — LEVALBUTEROL 1.25 MG/.5ML
1.25 SOLUTION, CONCENTRATE RESPIRATORY (INHALATION)
Status: DISCONTINUED | OUTPATIENT
Start: 2017-04-01 | End: 2017-04-01

## 2017-03-31 RX ORDER — GUAIFENESIN 600 MG
600 TABLET, EXTENDED RELEASE 12 HR ORAL EVERY 12 HOURS SCHEDULED
Status: DISCONTINUED | OUTPATIENT
Start: 2017-04-01 | End: 2017-04-02

## 2017-03-31 RX ORDER — ACETAMINOPHEN 325 MG/1
650 TABLET ORAL EVERY 6 HOURS PRN
Status: DISCONTINUED | OUTPATIENT
Start: 2017-03-31 | End: 2017-04-02

## 2017-03-31 RX ORDER — WARFARIN SODIUM 7.5 MG/1
7.5 TABLET ORAL
COMMUNITY
End: 2017-04-21 | Stop reason: HOSPADM

## 2017-03-31 RX ORDER — VANCOMYCIN HYDROCHLORIDE 1 G/200ML
15 INJECTION, SOLUTION INTRAVENOUS ONCE
Status: COMPLETED | OUTPATIENT
Start: 2017-03-31 | End: 2017-03-31

## 2017-03-31 RX ORDER — ONDANSETRON HYDROCHLORIDE 8 MG/1
TABLET, FILM COATED ORAL EVERY 8 HOURS PRN
COMMUNITY
End: 2017-11-27

## 2017-03-31 RX ORDER — CHOLECALCIFEROL (VITAMIN D3) 50 MCG
2000 TABLET ORAL DAILY
COMMUNITY
End: 2017-04-21 | Stop reason: HOSPADM

## 2017-03-31 RX ORDER — CITALOPRAM 10 MG/1
10 TABLET ORAL DAILY
Status: ON HOLD | COMMUNITY
End: 2018-02-09

## 2017-03-31 RX ORDER — ACETAMINOPHEN 650 MG/1
650 SUPPOSITORY RECTAL ONCE
Status: COMPLETED | OUTPATIENT
Start: 2017-03-31 | End: 2017-03-31

## 2017-03-31 RX ORDER — CINACALCET 30 MG/1
30 TABLET, FILM COATED ORAL DAILY
COMMUNITY
End: 2017-04-21 | Stop reason: HOSPADM

## 2017-03-31 RX ADMIN — ACETAMINOPHEN 650 MG: 650 SUPPOSITORY RECTAL at 23:15

## 2017-03-31 RX ADMIN — CEFEPIME HYDROCHLORIDE 1000 MG: 1 INJECTION, POWDER, FOR SOLUTION INTRAMUSCULAR; INTRAVENOUS at 21:35

## 2017-03-31 RX ADMIN — VANCOMYCIN HYDROCHLORIDE 1000 MG: 1 INJECTION, SOLUTION INTRAVENOUS at 22:13

## 2017-04-01 ENCOUNTER — APPOINTMENT (INPATIENT)
Dept: RADIOLOGY | Facility: HOSPITAL | Age: 67
DRG: 166 | End: 2017-04-01
Payer: COMMERCIAL

## 2017-04-01 LAB
ALBUMIN SERPL BCP-MCNC: 1.7 G/DL (ref 3.5–5)
ALP SERPL-CCNC: 216 U/L (ref 46–116)
ALT SERPL W P-5'-P-CCNC: 17 U/L (ref 12–78)
ANION GAP SERPL CALCULATED.3IONS-SCNC: 10 MMOL/L (ref 4–13)
ANISOCYTOSIS BLD QL SMEAR: PRESENT
AST SERPL W P-5'-P-CCNC: 34 U/L (ref 5–45)
BASOPHILS # BLD MANUAL: 0 THOUSAND/UL (ref 0–0.1)
BASOPHILS NFR MAR MANUAL: 0 % (ref 0–1)
BILIRUB SERPL-MCNC: 0.6 MG/DL (ref 0.2–1)
BUN SERPL-MCNC: 19 MG/DL (ref 5–25)
CALCIUM SERPL-MCNC: 7.5 MG/DL (ref 8.3–10.1)
CHLORIDE SERPL-SCNC: 105 MMOL/L (ref 100–108)
CO2 SERPL-SCNC: 23 MMOL/L (ref 21–32)
CREAT SERPL-MCNC: 3.22 MG/DL (ref 0.6–1.3)
EOSINOPHIL # BLD MANUAL: 0.11 THOUSAND/UL (ref 0–0.4)
EOSINOPHIL NFR BLD MANUAL: 1 % (ref 0–6)
ERYTHROCYTE [DISTWIDTH] IN BLOOD BY AUTOMATED COUNT: 16 % (ref 11.6–15.1)
EST. AVERAGE GLUCOSE BLD GHB EST-MCNC: 220 MG/DL
FLUAV AG SPEC QL: NORMAL
FLUBV AG SPEC QL: NORMAL
GFR SERPL CREATININE-BSD FRML MDRD: 19.5 ML/MIN/1.73SQ M
GLUCOSE SERPL-MCNC: 147 MG/DL (ref 65–140)
GLUCOSE SERPL-MCNC: 197 MG/DL (ref 65–140)
GLUCOSE SERPL-MCNC: 253 MG/DL (ref 65–140)
GLUCOSE SERPL-MCNC: 269 MG/DL (ref 65–140)
GLUCOSE SERPL-MCNC: 273 MG/DL (ref 65–140)
GLUCOSE SERPL-MCNC: 293 MG/DL (ref 65–140)
HBA1C MFR BLD: 9.3 % (ref 4.2–6.3)
HCT VFR BLD AUTO: 33 % (ref 36.5–49.3)
HGB BLD-MCNC: 10.1 G/DL (ref 12–17)
INR PPP: 2.74 (ref 0.86–1.16)
LYMPHOCYTES # BLD AUTO: 0.46 THOUSAND/UL (ref 0.6–4.47)
LYMPHOCYTES # BLD AUTO: 4 % (ref 14–44)
MAGNESIUM SERPL-MCNC: 1.6 MG/DL (ref 1.6–2.6)
MCH RBC QN AUTO: 30.2 PG (ref 26.8–34.3)
MCHC RBC AUTO-ENTMCNC: 30.6 G/DL (ref 31.4–37.4)
MCV RBC AUTO: 99 FL (ref 82–98)
MONOCYTES # BLD AUTO: 0.46 THOUSAND/UL (ref 0–1.22)
MONOCYTES NFR BLD: 4 % (ref 4–12)
NEUTROPHILS # BLD MANUAL: 10.38 THOUSAND/UL (ref 1.85–7.62)
NEUTS SEG NFR BLD AUTO: 91 % (ref 43–75)
PHOSPHATE SERPL-MCNC: 3.8 MG/DL (ref 2.3–4.1)
PLATELET # BLD AUTO: 184 THOUSANDS/UL (ref 149–390)
PLATELET BLD QL SMEAR: ADEQUATE
PMV BLD AUTO: 10.2 FL (ref 8.9–12.7)
POTASSIUM SERPL-SCNC: 4.4 MMOL/L (ref 3.5–5.3)
PROT SERPL-MCNC: 6.1 G/DL (ref 6.4–8.2)
PROTHROMBIN TIME: 27.9 SECONDS (ref 12–14.3)
RBC # BLD AUTO: 3.34 MILLION/UL (ref 3.88–5.62)
RSV B RNA SPEC QL NAA+PROBE: NORMAL
SODIUM SERPL-SCNC: 138 MMOL/L (ref 136–145)
T4 FREE SERPL-MCNC: 1.13 NG/DL (ref 0.76–1.46)
TOTAL CELLS COUNTED SPEC: 100
WBC # BLD AUTO: 11.41 THOUSAND/UL (ref 4.31–10.16)

## 2017-04-01 PROCEDURE — A9270 NON-COVERED ITEM OR SERVICE: HCPCS | Performed by: PHYSICIAN ASSISTANT

## 2017-04-01 PROCEDURE — 87186 SC STD MICRODIL/AGAR DIL: CPT | Performed by: PODIATRIST

## 2017-04-01 PROCEDURE — 94664 DEMO&/EVAL PT USE INHALER: CPT

## 2017-04-01 PROCEDURE — 85007 BL SMEAR W/DIFF WBC COUNT: CPT | Performed by: PHYSICIAN ASSISTANT

## 2017-04-01 PROCEDURE — 87205 SMEAR GRAM STAIN: CPT | Performed by: PODIATRIST

## 2017-04-01 PROCEDURE — 87205 SMEAR GRAM STAIN: CPT | Performed by: HOSPITALIST

## 2017-04-01 PROCEDURE — 85027 COMPLETE CBC AUTOMATED: CPT | Performed by: PHYSICIAN ASSISTANT

## 2017-04-01 PROCEDURE — 83735 ASSAY OF MAGNESIUM: CPT | Performed by: PHYSICIAN ASSISTANT

## 2017-04-01 PROCEDURE — 84439 ASSAY OF FREE THYROXINE: CPT | Performed by: PHYSICIAN ASSISTANT

## 2017-04-01 PROCEDURE — 87798 DETECT AGENT NOS DNA AMP: CPT | Performed by: PHYSICIAN ASSISTANT

## 2017-04-01 PROCEDURE — 87147 CULTURE TYPE IMMUNOLOGIC: CPT | Performed by: PODIATRIST

## 2017-04-01 PROCEDURE — 73630 X-RAY EXAM OF FOOT: CPT

## 2017-04-01 PROCEDURE — 87070 CULTURE OTHR SPECIMN AEROBIC: CPT | Performed by: PODIATRIST

## 2017-04-01 PROCEDURE — 94760 N-INVAS EAR/PLS OXIMETRY 1: CPT

## 2017-04-01 PROCEDURE — 87077 CULTURE AEROBIC IDENTIFY: CPT | Performed by: PODIATRIST

## 2017-04-01 PROCEDURE — 94640 AIRWAY INHALATION TREATMENT: CPT

## 2017-04-01 PROCEDURE — 83036 HEMOGLOBIN GLYCOSYLATED A1C: CPT | Performed by: PHYSICIAN ASSISTANT

## 2017-04-01 PROCEDURE — 0JBQ0ZZ EXCISION OF RIGHT FOOT SUBCUTANEOUS TISSUE AND FASCIA, OPEN APPROACH: ICD-10-PCS | Performed by: PODIATRIST

## 2017-04-01 PROCEDURE — 82948 REAGENT STRIP/BLOOD GLUCOSE: CPT

## 2017-04-01 PROCEDURE — 84100 ASSAY OF PHOSPHORUS: CPT | Performed by: PHYSICIAN ASSISTANT

## 2017-04-01 PROCEDURE — 0JBR0ZZ EXCISION OF LEFT FOOT SUBCUTANEOUS TISSUE AND FASCIA, OPEN APPROACH: ICD-10-PCS | Performed by: PODIATRIST

## 2017-04-01 PROCEDURE — 80053 COMPREHEN METABOLIC PANEL: CPT | Performed by: PHYSICIAN ASSISTANT

## 2017-04-01 PROCEDURE — 87070 CULTURE OTHR SPECIMN AEROBIC: CPT | Performed by: HOSPITALIST

## 2017-04-01 PROCEDURE — 85610 PROTHROMBIN TIME: CPT | Performed by: PHYSICIAN ASSISTANT

## 2017-04-01 RX ORDER — CINACALCET 30 MG/1
30 TABLET, FILM COATED ORAL DAILY
Status: DISCONTINUED | OUTPATIENT
Start: 2017-04-01 | End: 2017-04-02

## 2017-04-01 RX ORDER — ACETAMINOPHEN 650 MG/1
325 SUPPOSITORY RECTAL EVERY 4 HOURS PRN
Status: DISCONTINUED | OUTPATIENT
Start: 2017-04-01 | End: 2017-04-02 | Stop reason: HOSPADM

## 2017-04-01 RX ORDER — WARFARIN SODIUM 7.5 MG/1
7.5 TABLET ORAL
Status: DISCONTINUED | OUTPATIENT
Start: 2017-04-01 | End: 2017-04-02

## 2017-04-01 RX ORDER — ALBUTEROL SULFATE 2.5 MG/3ML
2.5 SOLUTION RESPIRATORY (INHALATION) EVERY 4 HOURS PRN
Status: DISCONTINUED | OUTPATIENT
Start: 2017-04-01 | End: 2017-04-02 | Stop reason: HOSPADM

## 2017-04-01 RX ORDER — CITALOPRAM 20 MG/1
10 TABLET ORAL DAILY
Status: DISCONTINUED | OUTPATIENT
Start: 2017-04-01 | End: 2017-04-02

## 2017-04-01 RX ORDER — CHOLECALCIFEROL (VITAMIN D3) 10 MCG
1 TABLET ORAL
Status: DISCONTINUED | OUTPATIENT
Start: 2017-04-01 | End: 2017-04-02

## 2017-04-01 RX ORDER — ONDANSETRON 2 MG/ML
4 INJECTION INTRAMUSCULAR; INTRAVENOUS EVERY 6 HOURS PRN
Status: DISCONTINUED | OUTPATIENT
Start: 2017-04-01 | End: 2017-04-02

## 2017-04-01 RX ORDER — SODIUM CHLORIDE 9 MG/ML
50 INJECTION, SOLUTION INTRAVENOUS CONTINUOUS
Status: DISPENSED | OUTPATIENT
Start: 2017-04-01 | End: 2017-04-01

## 2017-04-01 RX ORDER — MAGNESIUM HYDROXIDE/ALUMINUM HYDROXICE/SIMETHICONE 120; 1200; 1200 MG/30ML; MG/30ML; MG/30ML
30 SUSPENSION ORAL EVERY 6 HOURS PRN
Status: DISCONTINUED | OUTPATIENT
Start: 2017-04-01 | End: 2017-04-02

## 2017-04-01 RX ORDER — PANTOPRAZOLE SODIUM 40 MG/1
40 TABLET, DELAYED RELEASE ORAL
Status: DISCONTINUED | OUTPATIENT
Start: 2017-04-01 | End: 2017-04-02

## 2017-04-01 RX ORDER — GABAPENTIN 100 MG/1
100 CAPSULE ORAL 2 TIMES DAILY
Status: DISCONTINUED | OUTPATIENT
Start: 2017-04-01 | End: 2017-04-02

## 2017-04-01 RX ORDER — SODIUM CHLORIDE 9 MG/ML
50 INJECTION, SOLUTION INTRAVENOUS CONTINUOUS
Status: DISCONTINUED | OUTPATIENT
Start: 2017-04-01 | End: 2017-04-02

## 2017-04-01 RX ORDER — WARFARIN SODIUM 5 MG/1
5 TABLET ORAL
Status: DISCONTINUED | OUTPATIENT
Start: 2017-04-02 | End: 2017-04-02

## 2017-04-01 RX ORDER — ASCORBIC ACID, THIAMINE MONONITRATE,RIBOFLAVIN, NIACINAMIDE, PYRIDOXINE HYDROCHLORIDE, FOLIC ACID, CYANOCOBALAMIN, BIOTIN, CALCIUM PANTOTHENATE, 100; 1.5; 1.7; 20; 10; 1; 6000; 150000; 5 MG/1; MG/1; MG/1; MG/1; MG/1; MG/1; UG/1; UG/1; MG/1
1 CAPSULE, LIQUID FILLED ORAL DAILY
Status: DISCONTINUED | OUTPATIENT
Start: 2017-04-01 | End: 2017-04-01 | Stop reason: SDUPTHER

## 2017-04-01 RX ORDER — CALCITRIOL 0.25 UG/1
0.25 CAPSULE, LIQUID FILLED ORAL
Status: DISCONTINUED | OUTPATIENT
Start: 2017-04-01 | End: 2017-04-02

## 2017-04-01 RX ORDER — MELATONIN
2000 DAILY
Status: DISCONTINUED | OUTPATIENT
Start: 2017-04-01 | End: 2017-04-02

## 2017-04-01 RX ORDER — METOPROLOL TARTRATE 100 MG/1
100 TABLET ORAL 2 TIMES DAILY
Status: DISCONTINUED | OUTPATIENT
Start: 2017-04-01 | End: 2017-04-02

## 2017-04-01 RX ORDER — LEVOTHYROXINE SODIUM 175 UG/1
175 TABLET ORAL EVERY OTHER DAY
Status: DISCONTINUED | OUTPATIENT
Start: 2017-04-01 | End: 2017-04-02 | Stop reason: HOSPADM

## 2017-04-01 RX ADMIN — Medication 1 CAPSULE: at 18:01

## 2017-04-01 RX ADMIN — WARFARIN SODIUM 7.5 MG: 7.5 TABLET ORAL at 18:01

## 2017-04-01 RX ADMIN — CHOLECALCIFEROL TAB 25 MCG (1000 UNIT) 2000 UNITS: 25 TAB at 09:58

## 2017-04-01 RX ADMIN — ACETAMINOPHEN 650 MG: 325 TABLET ORAL at 22:31

## 2017-04-01 RX ADMIN — GUAIFENESIN 600 MG: 600 TABLET, EXTENDED RELEASE ORAL at 09:58

## 2017-04-01 RX ADMIN — METOPROLOL TARTRATE 100 MG: 100 TABLET ORAL at 09:59

## 2017-04-01 RX ADMIN — GUAIFENESIN 600 MG: 600 TABLET, EXTENDED RELEASE ORAL at 03:16

## 2017-04-01 RX ADMIN — SODIUM CHLORIDE 50 ML/HR: 0.9 INJECTION, SOLUTION INTRAVENOUS at 15:58

## 2017-04-01 RX ADMIN — CITALOPRAM HYDROBROMIDE 10 MG: 20 TABLET ORAL at 09:58

## 2017-04-01 RX ADMIN — SODIUM CHLORIDE 50 ML/HR: 0.9 INJECTION, SOLUTION INTRAVENOUS at 22:30

## 2017-04-01 RX ADMIN — INSULIN LISPRO 3 UNITS: 100 INJECTION, SOLUTION INTRAVENOUS; SUBCUTANEOUS at 18:03

## 2017-04-01 RX ADMIN — ALBUTEROL SULFATE 2.5 MG: 2.5 SOLUTION RESPIRATORY (INHALATION) at 16:22

## 2017-04-01 RX ADMIN — INSULIN DETEMIR 4 UNITS: 100 INJECTION, SOLUTION SUBCUTANEOUS at 09:59

## 2017-04-01 RX ADMIN — GABAPENTIN 100 MG: 100 CAPSULE ORAL at 09:59

## 2017-04-01 RX ADMIN — ANORECTAL OINTMENT 1 APPLICATION: 15.7; .44; 24; 20.6 OINTMENT TOPICAL at 10:02

## 2017-04-01 RX ADMIN — SODIUM CHLORIDE 50 ML/HR: 0.9 INJECTION, SOLUTION INTRAVENOUS at 01:39

## 2017-04-01 RX ADMIN — LEVOTHYROXINE SODIUM 175 MCG: 175 TABLET ORAL at 06:05

## 2017-04-01 RX ADMIN — LISINOPRIL 7.5 MG: 2.5 TABLET ORAL at 09:58

## 2017-04-01 RX ADMIN — CINACALCET HYDROCHLORIDE 30 MG: 30 TABLET, COATED ORAL at 09:59

## 2017-04-01 RX ADMIN — CALCIUM ACETATE 667 MG: 667 CAPSULE ORAL at 18:00

## 2017-04-01 RX ADMIN — ANORECTAL OINTMENT 1 APPLICATION: 15.7; .44; 24; 20.6 OINTMENT TOPICAL at 22:00

## 2017-04-01 RX ADMIN — INSULIN LISPRO 2 UNITS: 100 INJECTION, SOLUTION INTRAVENOUS; SUBCUTANEOUS at 02:10

## 2017-04-01 RX ADMIN — CALCIUM ACETATE 667 MG: 667 CAPSULE ORAL at 09:58

## 2017-04-01 RX ADMIN — CEFEPIME 2000 MG: 2 INJECTION, POWDER, FOR SOLUTION INTRAVENOUS at 22:01

## 2017-04-01 RX ADMIN — GABAPENTIN 100 MG: 100 CAPSULE ORAL at 18:01

## 2017-04-01 RX ADMIN — CEFEPIME 2000 MG: 2 INJECTION, POWDER, FOR SOLUTION INTRAVENOUS at 11:08

## 2017-04-01 RX ADMIN — INSULIN LISPRO 3 UNITS: 100 INJECTION, SOLUTION INTRAVENOUS; SUBCUTANEOUS at 09:43

## 2017-04-01 RX ADMIN — AZITHROMYCIN MONOHYDRATE 500 MG: 500 INJECTION, POWDER, LYOPHILIZED, FOR SOLUTION INTRAVENOUS at 01:58

## 2017-04-01 RX ADMIN — METOPROLOL TARTRATE 100 MG: 100 TABLET ORAL at 18:01

## 2017-04-01 RX ADMIN — GUAIFENESIN 600 MG: 600 TABLET, EXTENDED RELEASE ORAL at 22:00

## 2017-04-01 RX ADMIN — ANORECTAL OINTMENT 1 APPLICATION: 15.7; .44; 24; 20.6 OINTMENT TOPICAL at 18:11

## 2017-04-01 RX ADMIN — INSULIN DETEMIR 4 UNITS: 100 INJECTION, SOLUTION SUBCUTANEOUS at 18:00

## 2017-04-01 RX ADMIN — PANTOPRAZOLE SODIUM 40 MG: 40 TABLET, DELAYED RELEASE ORAL at 06:05

## 2017-04-01 RX ADMIN — INSULIN LISPRO 2 UNITS: 100 INJECTION, SOLUTION INTRAVENOUS; SUBCUTANEOUS at 09:43

## 2017-04-01 NOTE — RESPIRATORY THERAPY NOTE
Assessed the pt per respiratory protocol  Upon assessment pt had an SPO2 of 98% on 3L NC with bilaterally diminished breath sounds  Pt has no pulmonary hx and does not currently take any at home respiratory medications  Discontinue respiratory protocol

## 2017-04-01 NOTE — SEPSIS NOTE
Sepsis Note   Vera Serna 61 y o  male MRN: [id number]  Unit/Bed#: [facility] 10 Encounter: [id number]    Initial Sepsis Assessment    Is the patient's history suggestive of a new infection?:  Yes[LD1 1]   Suspected source of infection:  pneumonia[LD1 1]   Are two or more of the following signs & symptoms of infection both present and new to the patient?:  Yes[LD1 1]   SIRS Criteria:  hyperthermia, altered mental status[LD1 1]   If the answer is yes to both questions, suspicion of infection is present:     Obtain: Coagulation profile, lactic acid, blood cultures, CBC with differential, CMP:     At the physician's discretion obtain: UA, chest x-ray, amylase, lipase, ABG, CRP, CT scan:     Are any of the following organ dysfunction criteria present within 6 hours of suspected infection and SIRS criteria that are NOT considered to be chronic conditions?:  Yes[LD1 1]   Organ dysfunction:  creatinine >2 0mg/dL[LD1 1]   If suspicion of infection is present AND organ dysfunction is present, the patient meets the criteria for SEVERE SEPSIS:     Time of presentation of severe sepsis:  3/31/2017 2134[LD1 1]   Tissue hypoperfusion persists in the hour after crystalloid fluid administration, evidenced, by either:     OR   If severe sepsis is present AND tissue hypoperfusion perists in the hour after fluid resuscitation or lactate > 4, the patient meets criteria for SEPTIC SHOCK:        Attribution     LD1  110 [facility] 03/31/17 21:34

## 2017-04-01 NOTE — ED PROVIDER NOTES
History  Chief Complaint   Patient presents with    Altered Mental Status     Pt coming from home  D/c today for [facility]  Per family, pt has diabetes and is on dialysis  Has been more confused and weak today since being discharged  Family states he was c/o SOB  HPI Comments: The patient has a pmhx of esrd on HD m/w/f, DM, htn, afib on coumadin  He was admitted this week to [facility] with right leg cellulitis  She states he did receive iv abx, vanco, but was not discharged home with any medications  He did have dialysis today before dc home  Since he was home, he c/o sob, weakness and was unable to ambulate with his cane  No fever noted at home  No fall, he is on coumadin  He does not make urine  Mild cough  No chest pain  Patient is a 61 y o  male presenting with altered mental status  History provided by:  Spouse and patient  Altered Mental Status   Presenting symptoms: confusion    Associated symptoms: fever and rash    Associated symptoms: no abdominal pain, no headaches, no palpitations and no vomiting        Prior to Admission Medications   Prescriptions Last Dose Informant Patient Reported? Taking? Eluxadoline (VIBERZI PO)   Yes Yes   Sig: Take 100 mg by mouth 2 (two) times a day     b complex-vitamin C-folic acid (RENAL) 1 mg   Yes No   Sig: Take by mouth daily       calcitriol (ROCALTROL) 0 25 mcg capsule   No No   Sig: Take 1 capsule by mouth After Dialysis (secondary hyperparathyroidsim) for up to 30 days   calcium acetate (PHOSLO) 667 mg capsule   Yes Yes   Sig: Take 667 mg by mouth 3 (three) times a day with meals   cinacalcet (SENSIPAR) 30 mg tablet   Yes Yes   Sig: Take 30 mg by mouth daily   citalopram (CeleXA) 10 mg tablet   Yes Yes   Sig: Take 10 mg by mouth daily   diphenoxylate-atropine (LOMOTIL) 2 5-0 025 mg per tablet   Yes No   Sig: Take 1 tablet by mouth 4 (four) times a day   gabapentin (NEURONTIN) 100 mg capsule   No Yes   Sig: Take 1 capsule by mouth 2 (two) times a day for 30 days   Patient taking differently: Take 100 mg by mouth 3 (three) times a day     insulin detemir (LEVEMIR) 100 units/mL subcutaneous injection   No Yes   Sig: Inject 10 Units under the skin daily for 30 days   insulin lispro (HumaLOG) 100 units/mL injection   No Yes   Sig: Inject 3 Units under the skin 3 (three) times a day with meals for 30 days   levothyroxine 150 mcg tablet   Yes Yes   Sig: Take 150 mcg by mouth every other day  levothyroxine 175 mcg tablet   Yes Yes   Sig: Take 175 mcg by mouth every other day  lisinopril (ZESTRIL) 5 mg tablet   Yes Yes   Sig: Take 7 5 mg by mouth daily   menthol-zinc oxide (CALMOSEPTINE) 0 44-20 6 % OINT   No No   Sig: Apply 1 application topically 3 (three) times a day for 30 days   metoprolol tartrate (LOPRESSOR) 25 mg tablet   Yes Yes   Sig: Take 100 mg by mouth 2 (two) times a day  ondansetron (ZOFRAN) 8 mg tablet   Yes Yes   Sig: Take by mouth every 8 (eight) hours as needed for nausea or vomiting   pantoprazole (PROTONIX) 40 mg tablet   Yes Yes   Sig: Take 40 mg by mouth daily  warfarin (COUMADIN) 5 mg tablet   Yes Yes   Sig: Take 5 mg by mouth daily        Facility-Administered Medications: None       Past Medical History:   Diagnosis Date    Diabetes mellitus     Diarrhea     ESRD (end stage renal disease) on dialysis     Hypertension     Hypothyroidism     Neuropathy     Right lower lobe pneumonia 2/20/2017    Thyroid disease     underactive       Past Surgical History:   Procedure Laterality Date    CATARACT EXTRACTION      CHG GI ENDOSCOPIC ULTRASOUND N/A 3/10/2016    Procedure: LINEAR ENDOSCOPIC U/S;  Surgeon: Nadia Mckeon MD;  Location: [facility]; Service: Gastroenterology    CHOLECYSTECTOMY      TOE AMPUTATION  2000       Family History   Problem Relation Age of Onset    Diabetes Father     Cancer Other     Lupus Mother      I have reviewed and agree with the history as documented      Social History   Substance Use Topics    Smoking status: Current Every Day Smoker     Packs/day: 1 00     Years: 46 00     Types: Cigarettes     Start date: 5    Smokeless tobacco: Never Used    Alcohol use No        Review of Systems   Constitutional: Positive for fatigue and fever  Negative for chills  Respiratory: Positive for cough and shortness of breath  Cardiovascular: Negative for chest pain and palpitations  Gastrointestinal: Negative for abdominal pain and vomiting  Musculoskeletal: Negative for back pain and myalgias  Skin: Positive for rash and wound  Neurological: Negative for dizziness and headaches  Psychiatric/Behavioral: Positive for confusion  All other systems reviewed and are negative  Physical Exam    ED Triage Vitals   Temperature Pulse Respirations Blood Pressure SpO2   03/31/17 2028 03/31/17 2028 03/31/17 2028 03/31/17 2028 03/31/17 2028   98 1 °F (36 7 °C) 88 12 154/78 85 %      Temp Source Heart Rate Source Patient Position BP Location FiO2 (%)   03/31/17 2028 03/31/17 2028 03/31/17 2028 03/31/17 2028 --   Oral Monitor Lying Left arm       Pain Score       03/31/17 2028       No Pain           Physical Exam   Constitutional: He is oriented to person, place, and time  He appears well-developed and well-nourished  HENT:   Head: Normocephalic and atraumatic  Mouth/Throat: Oropharynx is clear and moist    Eyes: Conjunctivae and EOM are normal  Pupils are equal, round, and reactive to light  Neck: Normal range of motion  Neck supple  Cardiovascular: Normal rate, regular rhythm, normal heart sounds and intact distal pulses  Pulmonary/Chest: Effort normal    rll diminished BS   Abdominal: Soft  Bowel sounds are normal  There is no tenderness  Musculoskeletal: He exhibits no edema  Generally weak, chronic wounds on b/l feet  None with purulent drainage  Blister on left lower leg  Mild erythema right lower leg   Right upper arm dialysis fistula with bruit and thrill   Lymphadenopathy:     He has no cervical adenopathy  Neurological: He is alert and oriented to person, place, and time  Tired, but alert and answers questions appropriately  GCS = 15   Skin: Skin is warm and dry  Psychiatric: He has a normal mood and affect  Nursing note and vitals reviewed  ED Medications  Medications   cefepime (MAXIPIME) 1,000 mg in dextrose 5 % 50 mL IVPB (1,000 mg Intravenous New Bag 3/31/17 6815)   vancomycin (VANCOCIN) IVPB (premix) 1,000 mg (not administered)       Diagnostic Studies  Labs Reviewed   CBC AND DIFFERENTIAL - Abnormal        Result Value Ref Range Status    WBC 10 92 (*) 4 31 - 10 16 Thousand/uL Final    RBC 3 85 (*) 3 88 - 5 62 Million/uL Final    Hemoglobin 11 8 (*) 12 0 - 17 0 g/dL Final    RDW 16 0 (*) 11 6 - 15 1 % Final    Hematocrit 37 6  36 5 - 49 3 % Final    MCV 98  82 - 98 fL Final    MCH 30 6  26 8 - 34 3 pg Final    MCHC 31 4  31 4 - 37 4 g/dL Final    MPV 10 0  8 9 - 12 7 fL Final    Platelets 423  167 - 390 Thousands/uL Final    Narrative: This is an appended report  These results have been appended to a previously verified report  PROTIME-INR - Abnormal     Protime 24 3 (*) 12 0 - 14 3 seconds Final    INR 2 28 (*) 0 86 - 1 16 Final   APTT - Abnormal     PTT 42 (*) 24 - 36 seconds Final    Narrative: Therapeutic Heparin Range = 60-90 seconds   COMPREHENSIVE METABOLIC PANEL - Abnormal     Creatinine 3 21 (*) 0 60 - 1 30 mg/dL Final    Comment: Standardized to IDMS reference method    Glucose 227 (*) 65 - 140 mg/dL Final    Comment: If the patient is fasting, the ADA then defines impaired fasting glucose as > 100 mg/dL and diabetes as > or equal to 123 mg/dL      Alkaline Phosphatase 193 (*) 46 - 116 U/L Final    Total Protein 8 3 (*) 6 4 - 8 2 g/dL Final    Albumin 2 4 (*) 3 5 - 5 0 g/dL Final    Sodium 137  136 - 145 mmol/L Final    Potassium 4 9  3 5 - 5 3 mmol/L Final    Chloride 100  100 - 108 mmol/L Final    CO2 25  21 - 32 mmol/L Final    Anion Gap 12  4 - 13 mmol/L Final    BUN 15  5 - 25 mg/dL Final    Calcium 9 5  8 3 - 10 1 mg/dL Final    AST 24  5 - 45 U/L Final    ALT 19  12 - 78 U/L Final    Total Bilirubin 0 70  0 20 - 1 00 mg/dL Final    eGFR 19 6  ml/min/1 73sq m Final    Narrative:     National Kidney Disease Education Program recommendations are as follows:  GFR calculation is accurate only with a steady state creatinine  Chronic Kidney disease less than 60 ml/min/1 73 sq  meters  Kidney failure less than 15 ml/min/1 73 sq  meters  TSH, 3RD GENERATION - Abnormal     TSH 3RD GENERATON 4 902 (*) 0 358 - 3 740 uIU/mL Final    Narrative:     Patients undergoing fluorescein dye angiography may retain small amounts of fluorescein in the body for 48-72 hours post procedure  Samples containing fluorescein can produce falsely depressed TSH values  If the patient had this procedure,a specimen should be resubmitted post fluorescein clearance  POCT GLUCOSE - Abnormal     POC Glucose 238 (*) 65 - 140 mg/dl Final   MANUAL DIFFERENTIAL(PHLEBS DO NOT ORDER) - Abnormal     Segmented % 89 (*) 43 - 75 % Final    Lymphocytes % 2 (*) 14 - 44 % Final    Monocytes % 2 (*) 4 - 12 % Final    Absolute Neutrophils 10 48 (*) 1 85 - 7 62 Thousand/uL Final    Lymphocytes Absolute 0 22 (*) 0 60 - 4 47 Thousand/uL Final    Bands % 7  0 - 8 % Final    Eosinophils % 0  0 - 6 % Final    Basophils % 0  0 - 1 % Final    Monocytes Absolute 0 22  0 00 - 1 22 Thousand/uL Final    Eosinophils Absolute 0 00  0 00 - 0 40 Thousand/uL Final    Basophils Absolute 0 00  0 00 - 0 10 Thousand/uL Final    Total Counted 100   Final    Platelet Estimate Adequate  Adequate Final   LACTIC ACID, PLASMA - Normal    LACTIC ACID 1 5  0 5 - 2 0 mmol/L Final    Narrative:     Result may be elevated if tourniquet was used during collection     TROPONIN I - Normal    Troponin I <0 02  <0 04 ng/mL Final    Narrative:     [employer] Chemistry analyzer 99% cutoff is > 0 04 ng/mL in network labs    o cTnI 99% cutoff is useful only when applied to patients in the clinical setting of myocardial ischemia  o cTnI 99% cutoff should be interpreted in the context of clinical history, ECG findings and possibly cardiac imaging to establish correct diagnosis  o cTnI 99% cutoff may be suggestive but clearly not indicative of a coronary event without the clinical setting of myocardial ischemia  BLOOD CULTURE   BLOOD CULTURE   NT-BNP PRO (BRAIN NATRIURETIC PEPTIDE)       CT head without contrast   Final Result      No acute intracranial abnormality  Microangiopathic changes  Workstation performed: [id number]         XR chest 1 view portable    (Results Pending)       Procedures  ECG 12 Lead Documentation  Date/Time: 3/31/2017 9:37 PM  Performed by: Carlene Jo  Authorized by: Carlene Jo     ECG reviewed by me, the ED Provider: yes    Patient location:  ED  Comments:      Hr = 88  Nsr, left anterior fascicular block, no st chagnes       CriticalCare Time  Performed by: Carlene Jo  Authorized by: Carlene Jo     Critical care provider statement:     Critical care time (minutes):  30    Critical care time was exclusive of:  Separately billable procedures and treating other patients    Critical care was necessary to treat or prevent imminent or life-threatening deterioration of the following conditions:  Circulatory failure, renal failure, sepsis and respiratory failure    Critical care was time spent personally by me on the following activities:  Blood draw for specimens, obtaining history from patient or surrogate, development of treatment plan with patient or surrogate, discussions with consultants, evaluation of patient's response to treatment, examination of patient, interpretation of cardiac output measurements, ordering and performing treatments and interventions, ordering and review of laboratory studies, ordering and review of radiographic studies and re-evaluation of patient's condition    I assumed direction of critical care for this patient from another provider in my specialty: no            Phone Contacts  ED Phone Contact    ED Course  ED Course                 Initial Sepsis Assessment    Is the patient's history suggestive of a new infection?:  Yes[LD1 1]   Suspected source of infection:  pneumonia[LD1 1]   Are two or more of the following signs & symptoms of infection both present and new to the patient?:  Yes[LD1 1]   SIRS Criteria:  hyperthermia, altered mental status[LD1 1]   If the answer is yes to both questions, suspicion of infection is present:     Obtain: Coagulation profile, lactic acid, blood cultures, CBC with differential, CMP:     At the physician's discretion obtain: UA, chest x-ray, amylase, lipase, ABG, CRP, CT scan:     Are any of the following organ dysfunction criteria present within 6 hours of suspected infection and SIRS criteria that are NOT considered to be chronic conditions?:  Yes[LD1 1]   Organ dysfunction:  creatinine >2 0mg/dL[LD1 1]   If suspicion of infection is present AND organ dysfunction is present, the patient meets the criteria for SEVERE SEPSIS:     Time of presentation of severe sepsis:  3/31/2017 2134[LD1 1]   Tissue hypoperfusion persists in the hour after crystalloid fluid administration, evidenced, by either:     OR   Was hypotension present within one hour of the conclusion of crystalloid fluid administration?:  No[LD1 2]   If severe sepsis is present AND tissue hypoperfusion perists in the hour after fluid resuscitation or lactate > 4, the patient meets criteria for SEPTIC SHOCK:        Attribution     LD1  1 Roopa Strickland, DO 03/31/17 21:34    LD1 2 Roopa Strickland, DO 03/31/17 21:35            +right lower lobe pna noted on cxr  Hypoxia improved with oxygen  Concern for sepsis, broad spectrum abx given  Plan to admit to [facility]         [facility]  Number of Diagnoses or Management Options  Altered mental status, unspecified altered mental status type:   ESRD (end stage renal disease):   Pneumonia of right lower lobe due to infectious organism:   Sepsis, due to unspecified organism:   Diagnosis management comments: Ddx: cellulitis, fever, infection, sepsis, pneumonia, confusion, encephalopathy, renal failure, chf, pericardial effusion    The patient presented with a condition in which there was a high probability of imminent or life-threatening deterioration, and critical care services (excluding separately billable procedures) totalled 30-74 minutes (30 mins)  Disposition  Final diagnoses:   Pneumonia of right lower lobe due to infectious organism   Altered mental status, unspecified altered mental status type   ESRD (end stage renal disease)   Sepsis, due to unspecified organism     ED Disposition     ED Disposition Condition Comment    Admit  Case was discussed with devon and the patient's admission status was agreed to be Admission Status: inpatient status to the service of Dr Vi Penn   Follow-up Information     None        Patient's Medications   Discharge Prescriptions    No medications on file     No discharge procedures on file      ED Provider  Electronically Signed by       Jeannette Tierney DO  03/31/17 2154

## 2017-04-01 NOTE — ED NOTES
Mouth care completed with wet wash cloth and sponges  Patient able to speak a little more coherently at this time       [id number] [facility] 28, RN  03/31/17 4682

## 2017-04-01 NOTE — CONSULTS
Consult - Podiatry   Jackson Farfan 61 y o  male MRN: [id number]  Unit/Bed#: -01 Encounter: [id number]    Assessment/Plan     Assessment:  1  Right 1st ray Darby 3 ulcer secondary to #3,4,5  Suspect chronic osteomyelitis  2  Left foot darby 3 ulcer secondary to 3,4,5, suspect chronic osteomyelitis  3  DM neuropathy  4  PVD  5  ESRD  6  Left leg bulla- drained  Plan:  1  Patients B/L foot wounds have deteriorated since previous hospital stay however there is no severe infection  Small amount of purulence was expressed from the right foot which was cultured  He has history of MRSA and would expect this culture to be MRSA as well  2  Wounds were excisionally debrided to subQ with #15 blade and forcep with removal of necrotic and nonviable soft tissue  Wounds are 50% fibrotic 30% red/granular and 20% necrotic  No deep abscess discovered however both wound probe to bone  Wound #1 right foot 5 x 4 x 0 5cm  Wound #2 left foot 5 x 5 x 0 3cm  Total of 20 square cm were debrided today  Wounds were dressed with maxsorbAG, DSD    3  I spoke very frankly with patient and his wife  He has been off his feet since his last stay and the wounds continue to deteriorate  I have low expectations that these wounds will heal given his fragile DM, ESRD and PVD  I did order new XRays however I am holding any advanced imaging as currently they do not wish for surgery  I feel the best course of action is palliative care to his feet, betadyne dressing  If the feet were to become infected, I would recommend BKA  4  F/U wound culture, XRays  Will follow closely while in house  He sees a wound doctor at [facility] which he can follow with as outpatient  History of Present Illness     HPI:  Jackson Farfan is a 61 y o  male who presents with CAP  Podiatry consulted for foot wound care  He was inhouse last February with the same wounds  He has been going to [facility] wound care with [facility] at home   According to his wife, the wounds have not gotten any better  He has not been walking and they are being cared for very closely without any improvement  Of note, Kt Tirado is currently very tired and somnolent  His wife provided history       Consults  Review of Systems   Constitutional: tired, weak  HENT: Negative  Eyes: Negative  Respiratory: cough  Cardiovascular: Negative  Gastrointestinal: Negative  Musculoskeletal: negative   Skin:foot wounds  Neurological: neuropathy  Psych: negative      Historical Information   Past Medical History:   Diagnosis Date    Diabetes mellitus     Diarrhea     ESRD (end stage renal disease) on dialysis     Hypertension     Hypothyroidism     Neuropathy     Right lower lobe pneumonia 2/20/2017    Thyroid disease     underactive     Past Surgical History:   Procedure Laterality Date    CATARACT EXTRACTION      CHG GI ENDOSCOPIC ULTRASOUND N/A 3/10/2016    Procedure: LINEAR ENDOSCOPIC U/S;  Surgeon: Keyonna Zambrano MD;  Location: [facility];   Service: Gastroenterology    CHOLECYSTECTOMY      TOE AMPUTATION  2000     Social History   History   Alcohol Use No     History   Drug Use No     History   Smoking Status    Current Every Day Smoker    Packs/day: 1 00    Years: 46 00    Types: Cigarettes    Start date: 1970   Smokeless Tobacco    Never Used     Family History:   Family History   Problem Relation Age of Onset    Diabetes Father     Cancer Other     Lupus Mother        Meds/Allergies   Prescriptions Prior to Admission   Medication    calcium acetate (PHOSLO) 667 mg capsule    Cholecalciferol (VITAMIN D) 2000 units tablet    cinacalcet (SENSIPAR) 30 mg tablet    citalopram (CeleXA) 10 mg tablet    Eluxadoline (VIBERZI PO)    gabapentin (NEURONTIN) 100 mg capsule    insulin detemir (LEVEMIR) 100 units/mL subcutaneous injection    insulin lispro (HumaLOG) 100 units/mL injection    levothyroxine 175 mcg tablet    lisinopril (ZESTRIL) 5 mg tablet    metoprolol tartrate (LOPRESSOR) 25 mg tablet    ondansetron (ZOFRAN) 8 mg tablet    pantoprazole (PROTONIX) 40 mg tablet    warfarin (COUMADIN) 5 mg tablet    warfarin (COUMADIN) 7 5 mg tablet    b complex-vitamin C-folic acid (RENAL) 1 mg    calcitriol (ROCALTROL) 0 25 mcg capsule    diphenoxylate-atropine (LOMOTIL) 2 5-0 025 mg per tablet    menthol-zinc oxide (CALMOSEPTINE) 0 44-20 6 % OINT     No Known Allergies    Objective   First Vitals:   Blood Pressure: 154/78 (03/31/17 2028)  Pulse: 88 (03/31/17 2028)  Temperature: 98 1 °F (36 7 °C) (Simultaneous filing  User may not have seen previous data ) (03/31/17 2028)  Temp Source: Oral (Simultaneous filing  User may not have seen previous data ) (03/31/17 2028)  Respirations: 12 (03/31/17 2028)  Weight - Scale: 64 2 kg (141 lb 8 6 oz) (03/31/17 2028)  SpO2: (!) 85 % (03/31/17 2028)    Current Vitals:   Blood Pressure: 146/76 (04/01/17 0716)  Pulse: 64 (04/01/17 0716)  Temperature: 98 1 °F (36 7 °C) (04/01/17 0607)  Temp Source: Oral (04/01/17 8141)  Respirations: 12 (04/01/17 0716)  Weight - Scale: 64 2 kg (141 lb 8 6 oz) (03/31/17 2028)  SpO2: 95 % (04/01/17 0716)        /76  Pulse 64  Temp 98 1 °F (36 7 °C) (Oral)   Resp 12  Wt 64 2 kg (141 lb 8 6 oz)  SpO2 95%  BMI 22 84 kg/m2  Physical Exam:  General:    Somnolent, weak, gaunt   Head:    Normocephalic, without obvious abnormality, atraumatic   Eyes:    EOM's intact            Nose:   Moist mucous membranes, no drainage or sinus tenderness   Throat:   No tenderness, no exudates   Neck:   Supple, symmetrical, trachea midline, no JVD   Back:     Symmetric, no CVA tenderness   Lungs:     Diminished breath sounds   Chest wall:    No tenderness or deformity   Heart:    Regular rate and rhythm, S1 and S2 normal   Abdomen:     Soft, non-tender, bowel sounds active all four quadrants           Extremities:   Nonpalpable DP right, faint PT  Faint left pedal pulses  No erythema or edema  Left leg drained bulla with serous drainage  Right sub metatarsal 1 ulcer with connecting sinus from plantar to medial wounds  Mild purulent drainage  No malodor  Positive probe to bone  Left sub5 metatarsal ulcer, 100% black  Eschar debrided off to nonviable fibrous slough and bone  No purulence or malodor noted  Neurologic:   CNII-XII intact       Lab Results:   Admission on 03/31/2017   Component Date Value    POC Glucose 03/31/2017 238*    WBC 03/31/2017 10 92*    RBC 03/31/2017 3 85*    Hemoglobin 03/31/2017 11 8*    Hematocrit 03/31/2017 37 6     MCV 03/31/2017 98     MCH 03/31/2017 30 6     MCHC 03/31/2017 31 4     RDW 03/31/2017 16 0*    MPV 03/31/2017 10 0     Platelets 99/82/8255 226     Protime 03/31/2017 24 3*    INR 03/31/2017 2 28*    PTT 03/31/2017 42*    Sodium 03/31/2017 137     Potassium 03/31/2017 4 9     Chloride 03/31/2017 100     CO2 03/31/2017 25     Anion Gap 03/31/2017 12     BUN 03/31/2017 15     Creatinine 03/31/2017 3 21*    Glucose 03/31/2017 227*    Calcium 03/31/2017 9 5     AST 03/31/2017 24     ALT 03/31/2017 19     Alkaline Phosphatase 03/31/2017 193*    Total Protein 03/31/2017 8 3*    Albumin 03/31/2017 2 4*    Total Bilirubin 03/31/2017 0 70     eGFR 03/31/2017 19 6     TSH 3RD GENERATON 03/31/2017 4 902*    LACTIC ACID 03/31/2017 1 5     Troponin I 03/31/2017 <0 02     NT-proBNP 03/31/2017 797243*    Ventricular Rate 03/31/2017 88     Atrial Rate 03/31/2017 88     IL Interval 03/31/2017 154     QRSD Interval 03/31/2017 94     QT Interval 03/31/2017 346     QTC Interval 03/31/2017 418     P Axis 03/31/2017 106     QRS Axis 03/31/2017 -45     T Wave Axis 03/31/2017 94     Segmented % 03/31/2017 89*    Bands % 03/31/2017 7     Lymphocytes % 03/31/2017 2*    Monocytes % 03/31/2017 2*    Eosinophils % 03/31/2017 0     Basophils % 03/31/2017 0     Absolute Neutrophils 03/31/2017 10 48*    Lymphocytes Absolute 03/31/2017 0 22*    Monocytes Absolute 03/31/2017 0 22     Eosinophils Absolute 03/31/2017 0 00     Basophils Absolute 03/31/2017 0 00     Total Counted 03/31/2017 100     Platelet Estimate 83/40/9247 Adequate     POC Glucose 04/01/2017 269*    Sodium 04/01/2017 138     Potassium 04/01/2017 4 4     Chloride 04/01/2017 105     CO2 04/01/2017 23     Anion Gap 04/01/2017 10     BUN 04/01/2017 19     Creatinine 04/01/2017 3 22*    Glucose 04/01/2017 273*    Calcium 04/01/2017 7 5*    AST 04/01/2017 34     ALT 04/01/2017 17     Alkaline Phosphatase 04/01/2017 216*    Total Protein 04/01/2017 6 1*    Albumin 04/01/2017 1 7*    Total Bilirubin 04/01/2017 0 60     eGFR 04/01/2017 19 5     WBC 04/01/2017 11 41*    RBC 04/01/2017 3 34*    Hemoglobin 04/01/2017 10 1*    Hematocrit 04/01/2017 33 0*    MCV 04/01/2017 99*    MCH 04/01/2017 30 2     MCHC 04/01/2017 30 6*    RDW 04/01/2017 16 0*    MPV 04/01/2017 10 2     Platelets 46/78/9461 184     Magnesium 04/01/2017 1 6     Phosphorus 04/01/2017 3 8     Protime 04/01/2017 27 9*    INR 04/01/2017 2 74*    POC Glucose 04/01/2017 253*    Segmented % 04/01/2017 91*    Lymphocytes % 04/01/2017 4*    Monocytes % 04/01/2017 4     Eosinophils % 04/01/2017 1     Basophils % 04/01/2017 0     Absolute Neutrophils 04/01/2017 10 38*    Lymphocytes Absolute 04/01/2017 0 46*    Monocytes Absolute 04/01/2017 0 46     Eosinophils Absolute 04/01/2017 0 11     Basophils Absolute 04/01/2017 0 00     Total Counted 04/01/2017 100     Anisocytosis 04/01/2017 Present     Platelet Estimate 29/68/0075 Adequate      Imaging: I have personally reviewed pertinent films in PACS  EKG, Pathology, and Other Studies: I have personally reviewed pertinent reports  Angiogram 2/21/2017:  IMPRESSION:  Impression:   1  Widely patent inflow, outflow vessels and patent three-vessel runoff to the right foot  2  The dorsalis pedis artery is occluded in the forefoot   However the plantar artery and most of the pedal loop are patent  3  No intervention was performed  Code Status: Level 1 - Full Code  Advance Directive and Living Will:      Power of :    POLST:        Portions of the record may have been created with voice recognition software  Occasional wrong word or "sound a like" substitutions may have occurred due to the inherent limitations of voice recognition software  Read the chart carefully and recognize, using context, where substitutions have occurred

## 2017-04-01 NOTE — PROGRESS NOTES
[facility] Progress Note  Patient: Jocelyn Franklin 61 y o  male   MRN: [id number]  PCP: Lucien Ferris DO  Unit/Bed#: -Yuli Encounter: [id number]  Date Of Visit: 04/01/17    Assessment:    Principal Problem:    HCAP (healthcare-associated pneumonia)  Active Problems:    Hypertension    ESRD (end stage renal disease) on dialysis    Sepsis    Diabetic ulcer of right foot associated with type 1 diabetes mellitus    Acute encephalopathy    Atrial fibrillation      Plan:    · Sepsis likely due to HCAP (healthcare-associated pneumonia)- e/b tachycardia, fever, hypoxia 85% on room air;   ¨ chest x-ray  RLL opacity  ¨ Cont cefepime and azithromycin (received 1 dose of vanco)  ¨ Respiratory protocol, incentive spirometer, continue oxygen nasal cannula attempt to wean to goal oxygen saturation of greater than 92%   ¨ blood culture: pending  ¨ sputum culture: pending  ¨ strep/Legionella urine  ¨ influenza PCR pending  ¨ Continue supportive treatment   ¨ Xopenex   ¨ Tylenol  · Acute encephalopathy- lethargy, but oriented x3 (suspect due above)  ¨ head CT- negative for acute intracranial pathologies   ¨ telemetry  · Diabetic ulcer of right foot associated with type 1 diabetes mellitus-   ¨ recently hospitalized for bilateral lower extremity diabetic ulcerations seen by wound care  ¨ follow-up with outpatient wound care specialists at [facility] podiatry consulted  appreciate recommendations  ¨ XR pending  ¨ Recommending palliative care to feet  ¨ Patient not interested in surgery at this time   ¨ continue home regimen  Lantus 4 units b i d ,lispro 3 units TID with meals, hypoglycemic protocol, insulin sliding scale, Accu checks Q ID  ¨ recheck hemoglobin A-1 C  · Atrial fibrillation-controlled rate and irregular rhythm  ¨ continue home medication   ¨ warfarin 7 5 mg Thursdays and Saturdays,   ¨ Warfarin 5 mg Sunday, Monday, Tuesday, Wednesday, Friday  ¨ recheck INR in the a m    · Hypertension: stable  ¨ continue home medication  lisinopril, metoprolol  · ESRD (end stage renal disease) on dialysis Monday, Wednesday, Friday   ¨ hemodialysis yesterday following discharge at [facility]  ¨ Out patient [facility]  BUN/creatinine stable 15/3 21  ¨ will need follow up with outpatient nephrologist  Consider nephrology consultation if patient is still hospitalized by Monday  Recheck BMP  · Goals of Care:  ¨ Discussed pt's gradual decline at length with pt's wife  She feels he is not getting any quality of life from his frequent hospitalizations with infections, frequent episodes of confusion and encephalopathy  ¨ They interested in speaking with palliative care about possible options given he is ESRD  VTE Pharmacologic Prophylaxis:   Pharmacologic: Warfarin (Coumadin)  Mechanical VTE Prophylaxis in Place: No    Patient Centered Rounds: I have performed bedside rounds with nursing staff today  Discussions with Specialists or Other Care Team Provider: none    Education and Discussions with Family / Patient: wife    Time Spent for Care: 30 minutes  More than 50% of total time spent on counseling and coordination of care as described above  Current Length of Stay: 1 day(s)    Current Patient Status: Inpatient   Certification Statement: The patient will continue to require additional inpatient hospital stay due to HCAP in pt with ESRD    Discharge Plan: TBD based on clinical course; no clear discharge plan as of yet  Code Status: Level 1 - Full Code      Subjective:   Pt was sleeping  No discomfort noted  No respiratory distress  Discussed pt's overall condition and general decline with wife at bedside  No     Objective:     Vitals:   Temp (24hrs), Av 4 °F (37 4 °C), Min:98 1 °F (36 7 °C), Max:102 1 °F (38 9 °C)    HR:  [64-91] 64  Resp:  [12-19] 12  BP: (146-180)/() 146/76  SpO2:  [85 %-100 %] 95 %  Body mass index is 22 84 kg/(m^2)       Input and Output Summary (last 24 hours):     No intake or output data in the 24 hours ending 04/01/17 1152    Physical Exam:     Physical Exam   Constitutional: No distress  Chronically ill appearing male; resting comfortably   Cardiovascular: Normal rate and regular rhythm  No murmur heard  Pulmonary/Chest: Effort normal and breath sounds normal  No respiratory distress  He has no wheezes  Abdominal: Soft  Bowel sounds are normal  He exhibits no distension  There is no tenderness  There is no rebound and no guarding  Neurological:   sleeping   Skin: Skin is warm and dry  He is not diaphoretic  No erythema  Nursing note and vitals reviewed  Additional Data:     Labs:      Results from last 7 days  Lab Units 04/01/17  0535   WBC Thousand/uL 11 41*   HEMOGLOBIN g/dL 10 1*   HEMATOCRIT % 33 0*   PLATELETS Thousands/uL 184   LYMPHO PCT % 4*   MONO PCT MAN % 4   EOSINO PCT MANUAL % 1       Results from last 7 days  Lab Units 04/01/17  0535   SODIUM mmol/L 138   POTASSIUM mmol/L 4 4   CHLORIDE mmol/L 105   CO2 mmol/L 23   BUN mg/dL 19   CREATININE mg/dL 3 22*   CALCIUM mg/dL 7 5*   TOTAL PROTEIN g/dL 6 1*   BILIRUBIN TOTAL mg/dL 0 60   ALK PHOS U/L 216*   ALT U/L 17   AST U/L 34   GLUCOSE RANDOM mg/dL 273*       Results from last 7 days  Lab Units 04/01/17  0535   INR  2 74*       * I Have Reviewed All Lab Data Listed Above  * Additional Pertinent Lab Tests Reviewed:  All Labs Within Last 24 Hours Reviewed    Imaging:    Imaging Reports Reviewed Today Include: CXR   Imaging Personally Reviewed by Myself Includes:  CXR with increased opacification in R base c/w infiltrate    Recent Cultures (last 7 days):           Last 24 Hours Medication List:     cefepime 2,000 mg Intravenous Q12H   And      azithromycin 500 mg Intravenous Q24H   b complex-vitamin C-folic acid 1 capsule Oral Daily With Dinner   calcium acetate 667 mg Oral TID With Meals   cholecalciferol 2,000 Units Oral Daily   cinacalcet 30 mg Oral Daily   citalopram 10 mg Oral Daily   Eluxadoline 100 mg Oral BID   gabapentin 100 mg Oral BID   guaiFENesin 600 mg Oral Q12H CELIA   insulin detemir 4 Units Subcutaneous BID   insulin lispro 1-5 Units Subcutaneous 4 times day   insulin lispro 1-5 Units Subcutaneous HS   insulin lispro 3 Units Subcutaneous TID With Meals   levothyroxine 175 mcg Oral Every Other Day   lisinopril 7 5 mg Oral Daily   menthol-zinc oxide 1 application Topical TID   metoprolol tartrate 100 mg Oral BID   pantoprazole 40 mg Oral Early Morning   [START ON 4/2/2017] warfarin 5 mg Oral Once per day on Sun Mon Tue Wed Fri   warfarin 7 5 mg Oral Once per day on Thu Sat        Today, Patient Was Seen By: Lauren Kumar MD    ** Please Note: Dragon 360 Dictation voice to text software may have been used in the creation of this document   **     [email or web address]

## 2017-04-01 NOTE — PROGRESS NOTES
RN received a call from lab Nellie Pallas)  Pt has 1 set of positive blood cx's (Gram positive cocci in pairs and chains)  RN paged SLIM Theora Collet) re: situation

## 2017-04-01 NOTE — H&P
History and Physical - [facility]    Patient Information: Jocelyn Franklin 61 y o  male MRN: [id number]  Unit/Bed#: MI Encounter: [id number]  Admitting Physician: Sohail Mi PA-C  PCP: Lucien Ferris DO  Date of Admission:  03/31/17    Assessment/Plan:    Hospital Problem List:     Principal Problem:    HCAP (healthcare-associated pneumonia)  Active Problems:    Hypertension    ESRD (end stage renal disease) on dialysis    Sepsis    Diabetic ulcer of right foot associated with type 1 diabetes mellitus    Acute encephalopathy    Atrial fibrillation      Plan for the Primary Problem(s):  · Sepsis likely due to HCAP (healthcare-associated pneumonia)-upon admission tachycardia 91, febrile rectal temperature 102 1, hypoxic 85% on room air; worsening shortness of breath with increase oxygen use currently on 4 L nasal cannula oxygen saturation at 97% without cough  · chest x-ray  preliminary read right lower lobe consolidation, recent hospitalization at [facility] for possible bilateral lower extremity cellulitis ruled to be chronic ulcerations of bilateral extremity  · start cefepime and azithromycin  given cefepime in one dose of vancomycin in the ED  · Respiratory protocol, incentive spirometer, continue oxygen nasal cannula attempt to wean to goal oxygen saturation of greater than 92%   · blood culture, sputum culture, strep/Legionella urine, influenza PCR pending  · Continue supportive treatment  IVF normal saline at 75 ML/HR ,Xopenex, Tylenol,     ·   Acute encephalopathy-lethargy, alert and oriented X3 slow to response I could due to sepsis secondary to healthcare associated pneumonia  · head CT- negative for acute intracranial pathologies   · telemetry, neurochecks    Plan for Additional Problems:   ·  Diabetic ulcer of right foot associated with type 1 diabetes mellitus-most recent hemoglobin A-1 C 10 2 December 2016 mild erythema and edema right worse than left recently hospitalized for bilateral lower extremity diabetic ulcerations seen by wound care  · follow-up with outpatient wound care specialists at [facility]   · podiatry consulted  appreciate recommendations  Continue local wound care, serial exams  · continue home regimen  Lantus 4 units b i d ,lispro 3 units TID with meals, hypoglycemic protocol, insulin sliding scale, Accu checks Q ID  · recheck hemoglobin A-1 C    ·   Atrial fibrillation-controlled rate and irregular rhythm  · continue home medication  warfarin 75 mg Thursdays and Saturdays, 5 mg Sunday, Monday, Tuesday, Wednesday, Friday  · recheck INR in the a m  ·   Hypertension-stable BPs initially elevated 180/101 is now resolved  · continue home medication  lisinopril, metoprolol  ·   ESRD (end stage renal disease) on dialysis Monday, Wednesday, Friday  hemodialysis today following discharge at [facility], [facility]  Out patient [employer]  BUN/creatinine stable 15/3 21  · will need follow up with outpatient nephrologist  Consider nephrology consultation if patient is still hospitalized by Monday  Recheck BMP    VTE Prophylaxis: Warfarin (Coumadin)  / sequential compression device   Code Status: full code  discussed with patient and patient's wife via phone  POLST: POLST information provided but not completed    Anticipated Length of Stay:  Patient will be admitted on an Inpatient basis with an anticipated length of stay of  at least 2 midnights  Justification for Hospital Stay: sepsis likely due to Hospital acquired pneumonia    Total Time for Visit, including Counseling / Coordination of Care: 1 hour  Greater than 50% of this total time spent on direct patient counseling and coordination of care      Chief Complaint:   "I couldn't breathe very well"    History of Present Illness:    Yoli Manriquez is a 61 y o  male past medical history of type II diabetes mellitus, ES RD on hemodialysis Monday, Wednesday, Friday, hypertension, chronic atrial fibrillation on warfarin, chronic bilateral diabetic ulcerations lower extremities who presents with worsening shortness of breath and confusion earlier upon admission  Patient was a relatively poor historian given slight confusion and lethargy alert to touch orientated to self, location and time slowly responding   Following sequence of events were obtained by medical record review and patient's wife via phone POA  Patient was recently discharge from [facility] for a 3 day stay for possible bilateral lower extremity cellulitis given one dose of vancomycin upon admission and was evaluated by infectious disease and podiatry who recommended his continuation of IV antibiotic therapy given bilateral lower extremity wounds or chronic and no active acute infection  Patient was then deemed medically appropriate for hemodialysis prior to discharge and was sent home without changes to home medication and follow up with Dr Camacho  Patient attempted to eat dinner and was unable to appropriately hold utensils with incoherent speech with accessory must use noted  Patient's wife called EMS who brought patient to the emergency department for further evaluation of confusion and shortness of breath  Patient's wife denies recent illness, cough, chest pain, nausea vomiting or diarrhea changes to bowel movements or changes to urination  In the ED, patient was found to be hypoxic at 85% on room air placed on 4 L nasal cannula with a oxygen saturation at 97%, febrile at 102 1 with a mild leukocytosis at 10 92 with a stable BUN/creatinine and 15/3 21 TSH was slightly elevated at 4 902 and pro-BNP was severely elevated out 138,898  Chest x-ray noted preliminary reading of right is similar consolidation with head CT negative at acute intracranial pathologies   Patient was started on cefepime and vancomycin in the ED and was admitted to the medical surgical floor for sepsis likely due to hospital acquired pneumonia and acute encephalopathy  Review of Systems:    Review of Systems   Constitutional: Positive for activity change, appetite change, fatigue and fever  Negative for chills, diaphoresis and unexpected weight change  HENT: Negative for congestion, ear discharge, ear pain, nosebleeds, postnasal drip, rhinorrhea, sinus pressure, sore throat, tinnitus and trouble swallowing  Eyes: Negative for photophobia, redness and visual disturbance  Respiratory: Positive for chest tightness and shortness of breath  Negative for apnea, cough, choking, wheezing and stridor  Gastrointestinal: Negative  Endocrine: Negative for heat intolerance, polydipsia, polyphagia and polyuria  Genitourinary: Negative for dysuria, genital sores and urgency  Musculoskeletal: Negative for joint swelling, myalgias and neck pain  Skin: Positive for pallor  Negative for color change  Neurological: Positive for dizziness, weakness and light-headedness  Negative for tremors, seizures, syncope, facial asymmetry, speech difficulty, numbness and headaches  Psychiatric/Behavioral: Positive for confusion  The patient is not nervous/anxious  Past Medical and Surgical History:     Past Medical History:   Diagnosis Date    Diabetes mellitus     Diarrhea     ESRD (end stage renal disease) on dialysis     Hypertension     Hypothyroidism     Neuropathy     Right lower lobe pneumonia 2/20/2017    Thyroid disease     underactive       Past Surgical History:   Procedure Laterality Date    CATARACT EXTRACTION      CHG GI ENDOSCOPIC ULTRASOUND N/A 3/10/2016    Procedure: LINEAR ENDOSCOPIC U/S;  Surgeon: Edy Valdez MD;  Location: [facility]; Service: Gastroenterology    CHOLECYSTECTOMY      TOE AMPUTATION  2000       Meds/Allergies:    Prior to Admission medications    Medication Sig Start Date End Date Taking?  Authorizing Provider   calcium acetate (PHOSLO) 667 mg capsule Take 667 mg by mouth 3 (three) times a day with meals   Yes Historical Provider, MD   Cholecalciferol (VITAMIN D) 2000 units tablet Take 2,000 Units by mouth daily   Yes Historical Provider, MD   cinacalcet (SENSIPAR) 30 mg tablet Take 30 mg by mouth daily   Yes Historical Provider, MD   citalopram (CeleXA) 10 mg tablet Take 10 mg by mouth daily   Yes Historical Provider, MD   Eluxadoline (VIBERZI PO) Take 100 mg by mouth 2 (two) times a day     Yes Historical Provider, MD   gabapentin (NEURONTIN) 100 mg capsule Take 1 capsule by mouth 2 (two) times a day for 30 days  Patient taking differently: Take 100 mg by mouth 3 (three) times a day   12/16/16 3/31/17 Yes Lars Andrade MD   insulin detemir (LEVEMIR) 100 units/mL subcutaneous injection Inject 10 Units under the skin daily for 30 days  Patient taking differently: Inject 4 Units under the skin 2 (two) times a day   2/24/17 3/31/17 Yes ALIE Paige   insulin lispro (HumaLOG) 100 units/mL injection Inject 3 Units under the skin 3 (three) times a day with meals for 30 days 12/16/16 3/31/17 Yes Lars Andrade MD   levothyroxine 175 mcg tablet Take 175 mcg by mouth every other day  Yes Historical Provider, MD   lisinopril (ZESTRIL) 5 mg tablet Take 7 5 mg by mouth daily   Yes Historical Provider, MD   metoprolol tartrate (LOPRESSOR) 25 mg tablet Take 100 mg by mouth 2 (two) times a day  Yes Historical Provider, MD   ondansetron (ZOFRAN) 8 mg tablet Take by mouth every 8 (eight) hours as needed for nausea or vomiting   Yes Historical Provider, MD   pantoprazole (PROTONIX) 40 mg tablet Take 40 mg by mouth daily  Yes Historical Provider, MD   warfarin (COUMADIN) 5 mg tablet Take 5 mg by mouth daily Please give Sunday, Monday, Tuesday, Wednesday, Friday    Yes Historical Provider, MD   warfarin (COUMADIN) 7 5 mg tablet Take 7 5 mg by mouth daily Please give Thursdays and Saturdays   Yes Historical Provider, MD   levothyroxine 150 mcg tablet Take 150 mcg by mouth every other day  3/31/17 Yes Historical Provider, MD gonzalez complex-vitamin C-folic acid (RENAL) 1 mg Take by mouth daily  Historical Provider, MD   calcitriol (ROCALTROL) 0 25 mcg capsule Take 1 capsule by mouth After Dialysis (secondary hyperparathyroidsim) for up to 30 days 1/4/17 2/3/17  Stefania , MD   diphenoxylate-atropine (LOMOTIL) 2 5-0 025 mg per tablet Take 1 tablet by mouth 4 (four) times a day    Historical Provider, MD   menthol-zinc oxide (CALMOSEPTINE) 0 44-20 6 % OINT Apply 1 application topically 3 (three) times a day for 30 days 12/16/16 1/15/17  Lynn Mcgrath MD     I have reviewed home medications with a medical source (PCP, Pharmacy, other)  Allergies: No Known Allergies    Social History:     Marital Status: /Civil Union   Occupation: unemployed  Patient Pre-hospital Living Situation: lives with wife at home nursing staff comes by 2  2 times a week for wound care  Patient Pre-hospital Level of Mobility: wheelchair/bed bound due to bilateral lower extremity diabetic ulcerations previously ambulatory with rolling walker  Patient Pre-hospital Diet Restrictions: none  Substance Use History:   History   Alcohol Use No     History   Smoking Status    Current Every Day Smoker    Packs/day: 1 00    Years: 46 00    Types: Cigarettes    Start date: 5   Smokeless Tobacco    Never Used     History   Drug Use No       Family History:    Family History   Problem Relation Age of Onset    Diabetes Father     Cancer Other     Lupus Mother        Physical Exam:     Vitals:   Blood Pressure: 166/90 (03/31/17 2319)  Pulse: 90 (03/31/17 2319)  Temperature: (!) 102 1 °F (38 9 °C) (03/31/17 2054)  Temp Source: Rectal (03/31/17 2054)  Respirations: 14 (03/31/17 2319)  Weight - Scale: 64 2 kg (141 lb 8 6 oz) (03/31/17 2028)  SpO2: 97 % (03/31/17 2319)    Physical Exam   Constitutional: He is oriented to person, place, and time  He appears well-developed  No distress     Lying in bed and no acute distress lethargic/frail, alert and oriented X3   HENT:   Head: Normocephalic and atraumatic  Dry mucous membranes   Eyes: Conjunctivae and EOM are normal  Pupils are equal, round, and reactive to light  Right eye exhibits no discharge  Left eye exhibits no discharge  Neck: Normal range of motion  Neck supple  No JVD present  No tracheal deviation present  No thyromegaly present  Cardiovascular: Normal rate and regular rhythm  Exam reveals no gallop and no friction rub  No murmur heard  DP pulses present bilaterally, no bilateral lower extremity edema noted   Pulmonary/Chest: Effort normal  No stridor  No respiratory distress  He has wheezes  He has rales  He exhibits no tenderness  Diminished breath sounds at the base bilaterally, right worse than left, respiratory wheezes with moderate crackles   Abdominal: Soft  Bowel sounds are normal  He exhibits no distension and no mass  There is no tenderness  There is no rebound  Musculoskeletal: He exhibits edema and deformity  He exhibits no tenderness  Lymphadenopathy:     He has no cervical adenopathy  Neurological: He is alert and oriented to person, place, and time  He displays normal reflexes  No cranial nerve deficit  Coordination normal    Slow to response follows commands with ease alert to touch   Skin: Skin is warm  Rash noted  He is not diaphoretic  There is erythema  No pallor  Psychiatric: His behavior is normal  Thought content normal    Vitals reviewed  Additional Data:     Lab Results: I have personally reviewed pertinent reports          Results from last 7 days  Lab Units 03/31/17 2055   WBC Thousand/uL 10 92*   HEMOGLOBIN g/dL 11 8*   HEMATOCRIT % 37 6   PLATELETS Thousands/uL 226   LYMPHO PCT % 2*   MONO PCT MAN % 2*   EOSINO PCT MANUAL % 0       Results from last 7 days  Lab Units 03/31/17 2054   SODIUM mmol/L 137   POTASSIUM mmol/L 4 9   CHLORIDE mmol/L 100   CO2 mmol/L 25   BUN mg/dL 15   CREATININE mg/dL 3 21*   CALCIUM mg/dL 9  5   TOTAL PROTEIN g/dL 8 3*   BILIRUBIN TOTAL mg/dL 0 70   ALK PHOS U/L 193*   ALT U/L 19   AST U/L 24   GLUCOSE RANDOM mg/dL 227*       Results from last 7 days  Lab Units 03/31/17 2054   INR  2 28*       Imaging: I have personally reviewed pertinent films in PACS    Ct Head Without Contrast    Result Date: 3/31/2017  Narrative: CT BRAIN - WITHOUT CONTRAST INDICATION:  Altered mental status COMPARISON:  February 24, 2017 TECHNIQUE:  CT examination of the brain was performed  In addition to axial images, coronal reformatted images were created and submitted for interpretation  Radiation dose length product (DLP) for this visit:  1225 mGy-cm   This examination, like all CT scans performed in the [facility], was performed utilizing techniques to minimize radiation dose exposure, including the use of iterative reconstruction and automated exposure control  IMAGE QUALITY:  Diagnostic  FINDINGS:  PARENCHYMA:  Decreased attenuation is noted in the supratentorial white matter demonstrating an appearance most consistent with mild microangiopathic change  No intracranial mass, mass effect or midline shift  No CT signs of acute infarction  There is no parenchymal hemorrhage  VENTRICLES AND EXTRA-AXIAL SPACES:  Enlargement of ventricles and extra-axial CSF spaces consistent with cerebral and cerebellar atrophy  VISUALIZED ORBITS AND PARANASAL SINUSES:  Unremarkable  CALVARIUM AND EXTRACRANIAL SOFT TISSUES:   Normal      Impression: No acute intracranial abnormality  Microangiopathic changes  Workstation performed: [id number]     Radiology Results    Result Date: 3/25/2017  Narrative: Ordered by an unspecified provider  EKG, Pathology, and Other Studies Reviewed on Admission:   · EKG: sinus rhythm HR T wave inversion and lateral leads, HR 88    [employer] Records Reviewed: Yes     ** Please Note: Dragon 360 Dictation voice to text software may have been used in the creation of this document  **

## 2017-04-01 NOTE — PLAN OF CARE
Problem: Potential for Falls  Goal: Patient will remain free of falls  INTERVENTIONS:  - Assess patient frequently for physical needs  - Identify cognitive and physical deficits and behaviors that affect risk of falls    - [place] fall precautions as indicated by assessment   - Educate patient/family on patient safety including physical limitations  - Instruct patient to call for assistance with activity based on assessment  - Modify environment to reduce risk of injury  - Consider OT/PT consult to assist with strengthening/mobility   Outcome: Progressing    Problem: Prexisting or High Potential for Compromised Skin Integrity  Goal: Skin integrity is maintained or improved  INTERVENTIONS:  - Identify patients at risk for skin breakdown  - Assess and monitor skin integrity  - Assess and monitor nutrition and hydration status  - Monitor labs (i e  albumin)  - Assess for incontinence   - Turn and reposition patient  - Assist with mobility/ambulation  - Relieve pressure over bony prominences  - Avoid friction and shearing  - Provide appropriate hygiene as needed including keeping skin clean and dry  - Evaluate need for skin moisturizer/barrier cream  - Collaborate with interdisciplinary team (i e  Nutrition, Rehabilitation, etc )   - Patient/family teaching   Outcome: Progressing

## 2017-04-01 NOTE — PROGRESS NOTES
Patient asleep in bed  No visible signs of distress noted at this time  Bed low and locked; side rails up; call bell within reach  Will continue to monitor

## 2017-04-02 ENCOUNTER — HOSPITAL ENCOUNTER (INPATIENT)
Facility: HOSPITAL | Age: 67
LOS: 19 days | Discharge: NURSING, REHAB OR LONG-TERM CARE | DRG: 853 | End: 2017-04-21
Attending: ANESTHESIOLOGY | Admitting: ANESTHESIOLOGY
Payer: COMMERCIAL

## 2017-04-02 ENCOUNTER — APPOINTMENT (INPATIENT)
Dept: RADIOLOGY | Facility: HOSPITAL | Age: 67
DRG: 853 | End: 2017-04-02
Payer: COMMERCIAL

## 2017-04-02 ENCOUNTER — APPOINTMENT (INPATIENT)
Dept: RADIOLOGY | Facility: HOSPITAL | Age: 67
DRG: 166 | End: 2017-04-02
Payer: COMMERCIAL

## 2017-04-02 ENCOUNTER — APPOINTMENT (INPATIENT)
Dept: ULTRASOUND IMAGING | Facility: HOSPITAL | Age: 67
DRG: 166 | End: 2017-04-02
Payer: COMMERCIAL

## 2017-04-02 ENCOUNTER — APPOINTMENT (INPATIENT)
Dept: NEUROLOGY | Facility: AMBULATORY SURGERY CENTER | Age: 67
DRG: 853 | End: 2017-04-02
Payer: COMMERCIAL

## 2017-04-02 ENCOUNTER — GENERIC CONVERSION - ENCOUNTER (OUTPATIENT)
Dept: OTHER | Facility: OTHER | Age: 67
End: 2017-04-02

## 2017-04-02 ENCOUNTER — APPOINTMENT (INPATIENT)
Dept: CT IMAGING | Facility: HOSPITAL | Age: 67
DRG: 166 | End: 2017-04-02
Payer: COMMERCIAL

## 2017-04-02 VITALS
SYSTOLIC BLOOD PRESSURE: 142 MMHG | HEART RATE: 68 BPM | WEIGHT: 141.54 LBS | DIASTOLIC BLOOD PRESSURE: 75 MMHG | TEMPERATURE: 96.4 F | RESPIRATION RATE: 12 BRPM | OXYGEN SATURATION: 99 % | BODY MASS INDEX: 22.84 KG/M2

## 2017-04-02 DIAGNOSIS — R13.10 DYSPHAGIA, UNSPECIFIED TYPE: ICD-10-CM

## 2017-04-02 DIAGNOSIS — J18.9 HCAP (HEALTHCARE-ASSOCIATED PNEUMONIA): ICD-10-CM

## 2017-04-02 DIAGNOSIS — E10.621 DIABETIC ULCER OF RIGHT FOOT ASSOCIATED WITH TYPE 1 DIABETES MELLITUS (HCC): Primary | ICD-10-CM

## 2017-04-02 DIAGNOSIS — L97.513 FOOT ULCER, RIGHT, WITH NECROSIS OF MUSCLE (HCC): ICD-10-CM

## 2017-04-02 DIAGNOSIS — G93.40 ACUTE ENCEPHALOPATHY: ICD-10-CM

## 2017-04-02 DIAGNOSIS — J96.01 ACUTE RESPIRATORY FAILURE WITH HYPOXIA (HCC): ICD-10-CM

## 2017-04-02 DIAGNOSIS — R41.82 ALTERED MENTAL STATUS, UNSPECIFIED ALTERED MENTAL STATUS TYPE: ICD-10-CM

## 2017-04-02 DIAGNOSIS — J18.9 PNEUMONIA OF RIGHT LOWER LOBE DUE TO INFECTIOUS ORGANISM: ICD-10-CM

## 2017-04-02 DIAGNOSIS — L03.115 CELLULITIS OF RIGHT LEG: ICD-10-CM

## 2017-04-02 DIAGNOSIS — N18.6 ESRD (END STAGE RENAL DISEASE) ON DIALYSIS (HCC): ICD-10-CM

## 2017-04-02 DIAGNOSIS — N18.6 ESRD (END STAGE RENAL DISEASE) (HCC): ICD-10-CM

## 2017-04-02 DIAGNOSIS — A41.9 SEPSIS, DUE TO UNSPECIFIED ORGANISM: ICD-10-CM

## 2017-04-02 DIAGNOSIS — Z99.2 ESRD (END STAGE RENAL DISEASE) ON DIALYSIS (HCC): ICD-10-CM

## 2017-04-02 DIAGNOSIS — L97.519 DIABETIC ULCER OF RIGHT FOOT ASSOCIATED WITH TYPE 1 DIABETES MELLITUS (HCC): Primary | ICD-10-CM

## 2017-04-02 DIAGNOSIS — E10.21 TYPE 1 DIABETES MELLITUS WITH NEPHROPATHY (HCC): ICD-10-CM

## 2017-04-02 PROBLEM — I46.9 CARDIAC ARREST (HCC): Status: ACTIVE | Noted: 2017-04-02

## 2017-04-02 PROBLEM — R78.81 BACTEREMIA: Status: ACTIVE | Noted: 2017-04-02

## 2017-04-02 LAB
ALBUMIN SERPL BCP-MCNC: 1.6 G/DL (ref 3.5–5)
ALBUMIN SERPL BCP-MCNC: 1.7 G/DL (ref 3.5–5)
ALP SERPL-CCNC: 228 U/L (ref 46–116)
ALP SERPL-CCNC: 253 U/L (ref 46–116)
ALT SERPL W P-5'-P-CCNC: 18 U/L (ref 12–78)
ALT SERPL W P-5'-P-CCNC: 20 U/L (ref 12–78)
AMMONIA PLAS-SCNC: 29 UMOL/L (ref 11–35)
ANION GAP SERPL CALCULATED.3IONS-SCNC: 10 MMOL/L (ref 4–13)
ANION GAP SERPL CALCULATED.3IONS-SCNC: 12 MMOL/L (ref 4–13)
ARTERIAL PATENCY WRIST A: ABNORMAL
ARTERIAL PATENCY WRIST A: YES
AST SERPL W P-5'-P-CCNC: 28 U/L (ref 5–45)
AST SERPL W P-5'-P-CCNC: 38 U/L (ref 5–45)
ATRIAL RATE: 68 BPM
ATRIAL RATE: 70 BPM
ATRIAL RATE: 78 BPM
BASE EXCESS BLDA CALC-SCNC: -1 MMOL/L (ref -2–3)
BASE EXCESS BLDA CALC-SCNC: -1.3 MMOL/L
BASE EXCESS BLDA CALC-SCNC: -6 MMOL/L (ref -2–3)
BASOPHILS # BLD AUTO: 0.02 THOUSANDS/ΜL (ref 0–0.1)
BASOPHILS # BLD AUTO: 0.02 THOUSANDS/ΜL (ref 0–0.1)
BASOPHILS NFR BLD AUTO: 0 % (ref 0–1)
BASOPHILS NFR BLD AUTO: 0 % (ref 0–1)
BILIRUB SERPL-MCNC: 0.6 MG/DL (ref 0.2–1)
BILIRUB SERPL-MCNC: 1.27 MG/DL (ref 0.2–1)
BUN SERPL-MCNC: 28 MG/DL (ref 5–25)
BUN SERPL-MCNC: 34 MG/DL (ref 5–25)
C DIFF TOX GENS STL QL NAA+PROBE: ABNORMAL
CA-I BLD-SCNC: 1.19 MMOL/L (ref 1.12–1.32)
CA-I BLD-SCNC: 1.21 MMOL/L (ref 1.12–1.32)
CALCIUM SERPL-MCNC: 8 MG/DL (ref 8.3–10.1)
CALCIUM SERPL-MCNC: 8.1 MG/DL (ref 8.3–10.1)
CHLORIDE SERPL-SCNC: 104 MMOL/L (ref 100–108)
CHLORIDE SERPL-SCNC: 106 MMOL/L (ref 100–108)
CO2 SERPL-SCNC: 21 MMOL/L (ref 21–32)
CO2 SERPL-SCNC: 24 MMOL/L (ref 21–32)
CREAT SERPL-MCNC: 4.67 MG/DL (ref 0.6–1.3)
CREAT SERPL-MCNC: 5.06 MG/DL (ref 0.6–1.3)
EOSINOPHIL # BLD AUTO: 0.1 THOUSAND/ΜL (ref 0–0.61)
EOSINOPHIL # BLD AUTO: 0.13 THOUSAND/ΜL (ref 0–0.61)
EOSINOPHIL NFR BLD AUTO: 1 % (ref 0–6)
EOSINOPHIL NFR BLD AUTO: 1 % (ref 0–6)
ERYTHROCYTE [DISTWIDTH] IN BLOOD BY AUTOMATED COUNT: 15.9 % (ref 11.6–15.1)
ERYTHROCYTE [DISTWIDTH] IN BLOOD BY AUTOMATED COUNT: 16.1 % (ref 11.6–15.1)
FIO2 GAS DIL.REBREATH: 60 L
GFR SERPL CREATININE-BSD FRML MDRD: 11.6 ML/MIN/1.73SQ M
GFR SERPL CREATININE-BSD FRML MDRD: 12.7 ML/MIN/1.73SQ M
GLUCOSE SERPL-MCNC: 100 MG/DL (ref 65–140)
GLUCOSE SERPL-MCNC: 100 MG/DL (ref 65–140)
GLUCOSE SERPL-MCNC: 102 MG/DL (ref 65–140)
GLUCOSE SERPL-MCNC: 114 MG/DL (ref 65–140)
GLUCOSE SERPL-MCNC: 117 MG/DL (ref 65–140)
GLUCOSE SERPL-MCNC: 121 MG/DL (ref 65–140)
GLUCOSE SERPL-MCNC: 122 MG/DL (ref 65–140)
GLUCOSE SERPL-MCNC: 123 MG/DL (ref 65–140)
GLUCOSE SERPL-MCNC: 124 MG/DL (ref 65–140)
GLUCOSE SERPL-MCNC: 131 MG/DL (ref 65–140)
GLUCOSE SERPL-MCNC: 138 MG/DL (ref 65–140)
GLUCOSE SERPL-MCNC: 143 MG/DL (ref 65–140)
GLUCOSE SERPL-MCNC: 181 MG/DL (ref 65–140)
GLUCOSE SERPL-MCNC: 30 MG/DL (ref 65–140)
GLUCOSE SERPL-MCNC: 43 MG/DL (ref 65–140)
GLUCOSE SERPL-MCNC: 78 MG/DL (ref 65–140)
GLUCOSE SERPL-MCNC: 79 MG/DL (ref 65–140)
GLUCOSE SERPL-MCNC: 79 MG/DL (ref 65–140)
GLUCOSE SERPL-MCNC: 85 MG/DL (ref 65–140)
GLUCOSE SERPL-MCNC: 85 MG/DL (ref 65–140)
HCO3 BLDA-SCNC: 20.4 MMOL/L (ref 22–28)
HCO3 BLDA-SCNC: 23.6 MMOL/L (ref 22–28)
HCO3 BLDA-SCNC: 24.1 MMOL/L (ref 22–28)
HCT VFR BLD AUTO: 32.2 % (ref 36.5–49.3)
HCT VFR BLD AUTO: 33.5 % (ref 36.5–49.3)
HCT VFR BLD CALC: 30 % (ref 36.5–49.3)
HCT VFR BLD CALC: 32 % (ref 36.5–49.3)
HGB BLD-MCNC: 10 G/DL (ref 12–17)
HGB BLD-MCNC: 10.2 G/DL (ref 12–17)
HGB BLDA-MCNC: 10.2 G/DL (ref 12–17)
HGB BLDA-MCNC: 10.9 G/DL (ref 12–17)
HOROWITZ INDEX BLDA+IHG-RTO: 40 MM[HG]
INR PPP: 4.15 (ref 0.86–1.16)
LACTATE SERPL-SCNC: 3.1 MMOL/L (ref 0.5–2)
LYMPHOCYTES # BLD AUTO: 0.65 THOUSANDS/ΜL (ref 0.6–4.47)
LYMPHOCYTES # BLD AUTO: 1.32 THOUSANDS/ΜL (ref 0.6–4.47)
LYMPHOCYTES NFR BLD AUTO: 11 % (ref 14–44)
LYMPHOCYTES NFR BLD AUTO: 7 % (ref 14–44)
MAGNESIUM SERPL-MCNC: 2 MG/DL (ref 1.6–2.6)
MCH RBC QN AUTO: 30.1 PG (ref 26.8–34.3)
MCH RBC QN AUTO: 30.6 PG (ref 26.8–34.3)
MCHC RBC AUTO-ENTMCNC: 29.9 G/DL (ref 31.4–37.4)
MCHC RBC AUTO-ENTMCNC: 31.7 G/DL (ref 31.4–37.4)
MCV RBC AUTO: 101 FL (ref 82–98)
MCV RBC AUTO: 97 FL (ref 82–98)
MONOCYTES # BLD AUTO: 0.52 THOUSAND/ΜL (ref 0.17–1.22)
MONOCYTES # BLD AUTO: 0.68 THOUSAND/ΜL (ref 0.17–1.22)
MONOCYTES NFR BLD AUTO: 5 % (ref 4–12)
MONOCYTES NFR BLD AUTO: 6 % (ref 4–12)
NEUTROPHILS # BLD AUTO: 10.47 THOUSANDS/ΜL (ref 1.85–7.62)
NEUTROPHILS # BLD AUTO: 8.21 THOUSANDS/ΜL (ref 1.85–7.62)
NEUTS SEG NFR BLD AUTO: 83 % (ref 43–75)
NEUTS SEG NFR BLD AUTO: 86 % (ref 43–75)
NRBC BLD AUTO-RTO: 0 /100 WBCS
O2 CT BLDA-SCNC: 14.5 ML/DL (ref 16–23)
OXYHGB MFR BLDA: 96.8 % (ref 94–97)
P AXIS: 67 DEGREES
P AXIS: 73 DEGREES
P AXIS: 73 DEGREES
PCO2 BLD: 22 MMOL/L (ref 21–32)
PCO2 BLD: 25 MMOL/L (ref 21–32)
PCO2 BLD: 42.3 MM HG (ref 36–44)
PCO2 BLD: 42.6 MM HG (ref 36–44)
PCO2 BLDA: 40.1 MM HG (ref 36–44)
PEEP RESPIRATORY: 5 CM[H2O]
PH BLD: 7.29 [PH] (ref 7.35–7.45)
PH BLD: 7.36 [PH] (ref 7.35–7.45)
PH BLDA: 7.39 [PH] (ref 7.35–7.45)
PHOSPHATE SERPL-MCNC: 4.1 MG/DL (ref 2.3–4.1)
PLATELET # BLD AUTO: 190 THOUSANDS/UL (ref 149–390)
PLATELET # BLD AUTO: 198 THOUSANDS/UL (ref 149–390)
PMV BLD AUTO: 10.2 FL (ref 8.9–12.7)
PMV BLD AUTO: 10.3 FL (ref 8.9–12.7)
PO2 BLD: 160 MM HG (ref 75–129)
PO2 BLD: 276 MM HG (ref 75–129)
PO2 BLDA: 111.1 MM HG (ref 75–129)
POTASSIUM BLD-SCNC: 3.9 MMOL/L (ref 3.5–5.3)
POTASSIUM BLD-SCNC: 4 MMOL/L (ref 3.5–5.3)
POTASSIUM SERPL-SCNC: 4.2 MMOL/L (ref 3.5–5.3)
POTASSIUM SERPL-SCNC: 5 MMOL/L (ref 3.5–5.3)
PR INTERVAL: 150 MS
PR INTERVAL: 156 MS
PR INTERVAL: 158 MS
PROT SERPL-MCNC: 6.5 G/DL (ref 6.4–8.2)
PROT SERPL-MCNC: 6.5 G/DL (ref 6.4–8.2)
PROTHROMBIN TIME: 38.2 SECONDS (ref 12–14.3)
QRS AXIS: -23 DEGREES
QRS AXIS: 2 DEGREES
QRS AXIS: 7 DEGREES
QRSD INTERVAL: 100 MS
QRSD INTERVAL: 100 MS
QRSD INTERVAL: 106 MS
QT INTERVAL: 425 MS
QT INTERVAL: 434 MS
QT INTERVAL: 450 MS
QTC INTERVAL: 459 MS
QTC INTERVAL: 479 MS
QTC INTERVAL: 494 MS
RBC # BLD AUTO: 3.32 MILLION/UL (ref 3.88–5.62)
RBC # BLD AUTO: 3.33 MILLION/UL (ref 3.88–5.62)
SAMPLE SITE: 4
SAO2 % BLD FROM PO2: 100 % (ref 95–98)
SAO2 % BLD FROM PO2: 99 % (ref 95–98)
SODIUM BLD-SCNC: 136 MMOL/L (ref 136–145)
SODIUM BLD-SCNC: 137 MMOL/L (ref 136–145)
SODIUM SERPL-SCNC: 137 MMOL/L (ref 136–145)
SODIUM SERPL-SCNC: 140 MMOL/L (ref 136–145)
SPECIMEN SOURCE: ABNORMAL
T WAVE AXIS: 101 DEGREES
T WAVE AXIS: 87 DEGREES
T WAVE AXIS: 97 DEGREES
TROPONIN I SERPL-MCNC: 0.02 NG/ML
TROPONIN I SERPL-MCNC: 0.04 NG/ML
TROPONIN I SERPL-MCNC: 0.04 NG/ML
VANCOMYCIN SERPL-MCNC: 18.9 UG/ML
VENT AC: 14
VENT- AC: AC
VENTRICULAR RATE: 68 BPM
VENTRICULAR RATE: 70 BPM
VENTRICULAR RATE: 78 BPM
VT SETTING VENT: 375 ML
WBC # BLD AUTO: 12.59 THOUSAND/UL (ref 4.31–10.16)
WBC # BLD AUTO: 9.54 THOUSAND/UL (ref 4.31–10.16)

## 2017-04-02 PROCEDURE — A9270 NON-COVERED ITEM OR SERVICE: HCPCS | Performed by: PHYSICIAN ASSISTANT

## 2017-04-02 PROCEDURE — 5A12012 PERFORMANCE OF CARDIAC OUTPUT, SINGLE, MANUAL: ICD-10-PCS | Performed by: INTERNAL MEDICINE

## 2017-04-02 PROCEDURE — 82948 REAGENT STRIP/BLOOD GLUCOSE: CPT

## 2017-04-02 PROCEDURE — 83605 ASSAY OF LACTIC ACID: CPT | Performed by: EMERGENCY MEDICINE

## 2017-04-02 PROCEDURE — 83605 ASSAY OF LACTIC ACID: CPT | Performed by: PHYSICIAN ASSISTANT

## 2017-04-02 PROCEDURE — 87206 SMEAR FLUORESCENT/ACID STAI: CPT | Performed by: INTERNAL MEDICINE

## 2017-04-02 PROCEDURE — 84295 ASSAY OF SERUM SODIUM: CPT

## 2017-04-02 PROCEDURE — 87102 FUNGUS ISOLATION CULTURE: CPT | Performed by: INTERNAL MEDICINE

## 2017-04-02 PROCEDURE — 82805 BLOOD GASES W/O2 SATURATION: CPT | Performed by: EMERGENCY MEDICINE

## 2017-04-02 PROCEDURE — 5A1955Z RESPIRATORY VENTILATION, GREATER THAN 96 CONSECUTIVE HOURS: ICD-10-PCS | Performed by: ANESTHESIOLOGY

## 2017-04-02 PROCEDURE — 80053 COMPREHEN METABOLIC PANEL: CPT | Performed by: EMERGENCY MEDICINE

## 2017-04-02 PROCEDURE — 82330 ASSAY OF CALCIUM: CPT

## 2017-04-02 PROCEDURE — 71010 HB CHEST X-RAY 1 VIEW FRONTAL (PORTABLE): CPT

## 2017-04-02 PROCEDURE — 82140 ASSAY OF AMMONIA: CPT | Performed by: EMERGENCY MEDICINE

## 2017-04-02 PROCEDURE — 82803 BLOOD GASES ANY COMBINATION: CPT

## 2017-04-02 PROCEDURE — 87252 VIRUS INOCULATION TISSUE: CPT | Performed by: INTERNAL MEDICINE

## 2017-04-02 PROCEDURE — 92950 HEART/LUNG RESUSCITATION CPR: CPT

## 2017-04-02 PROCEDURE — 94760 N-INVAS EAR/PLS OXIMETRY 1: CPT

## 2017-04-02 PROCEDURE — 85025 COMPLETE CBC W/AUTO DIFF WBC: CPT | Performed by: PHYSICIAN ASSISTANT

## 2017-04-02 PROCEDURE — 84484 ASSAY OF TROPONIN QUANT: CPT | Performed by: PHYSICIAN ASSISTANT

## 2017-04-02 PROCEDURE — 88305 TISSUE EXAM BY PATHOLOGIST: CPT | Performed by: INTERNAL MEDICINE

## 2017-04-02 PROCEDURE — 0BH17EZ INSERTION OF ENDOTRACHEAL AIRWAY INTO TRACHEA, VIA NATURAL OR ARTIFICIAL OPENING: ICD-10-PCS | Performed by: INTERNAL MEDICINE

## 2017-04-02 PROCEDURE — 84100 ASSAY OF PHOSPHORUS: CPT | Performed by: PHYSICIAN ASSISTANT

## 2017-04-02 PROCEDURE — A9270 NON-COVERED ITEM OR SERVICE: HCPCS | Performed by: INTERNAL MEDICINE

## 2017-04-02 PROCEDURE — 85014 HEMATOCRIT: CPT

## 2017-04-02 PROCEDURE — 93005 ELECTROCARDIOGRAM TRACING: CPT

## 2017-04-02 PROCEDURE — 83735 ASSAY OF MAGNESIUM: CPT | Performed by: PHYSICIAN ASSISTANT

## 2017-04-02 PROCEDURE — 84132 ASSAY OF SERUM POTASSIUM: CPT

## 2017-04-02 PROCEDURE — 87106 FUNGI IDENTIFICATION YEAST: CPT | Performed by: INTERNAL MEDICINE

## 2017-04-02 PROCEDURE — 82947 ASSAY GLUCOSE BLOOD QUANT: CPT

## 2017-04-02 PROCEDURE — 87116 MYCOBACTERIA CULTURE: CPT | Performed by: INTERNAL MEDICINE

## 2017-04-02 PROCEDURE — 36600 WITHDRAWAL OF ARTERIAL BLOOD: CPT

## 2017-04-02 PROCEDURE — 87493 C DIFF AMPLIFIED PROBE: CPT | Performed by: PHYSICIAN ASSISTANT

## 2017-04-02 PROCEDURE — 94002 VENT MGMT INPAT INIT DAY: CPT

## 2017-04-02 PROCEDURE — 85025 COMPLETE CBC W/AUTO DIFF WBC: CPT | Performed by: EMERGENCY MEDICINE

## 2017-04-02 PROCEDURE — C9113 INJ PANTOPRAZOLE SODIUM, VIA: HCPCS | Performed by: PHYSICIAN ASSISTANT

## 2017-04-02 PROCEDURE — 87015 SPECIMEN INFECT AGNT CONCNTJ: CPT | Performed by: INTERNAL MEDICINE

## 2017-04-02 PROCEDURE — 70450 CT HEAD/BRAIN W/O DYE: CPT

## 2017-04-02 PROCEDURE — 95951 HB EEG MONITORING/VIDEORECORD: CPT

## 2017-04-02 PROCEDURE — 93923 UPR/LXTR ART STDY 3+ LVLS: CPT

## 2017-04-02 PROCEDURE — 02HV33Z INSERTION OF INFUSION DEVICE INTO SUPERIOR VENA CAVA, PERCUTANEOUS APPROACH: ICD-10-PCS | Performed by: ANESTHESIOLOGY

## 2017-04-02 PROCEDURE — 0BB68ZX EXCISION OF RIGHT LOWER LOBE BRONCHUS, VIA NATURAL OR ARTIFICIAL OPENING ENDOSCOPIC, DIAGNOSTIC: ICD-10-PCS | Performed by: INTERNAL MEDICINE

## 2017-04-02 PROCEDURE — 88112 CYTOPATH CELL ENHANCE TECH: CPT | Performed by: INTERNAL MEDICINE

## 2017-04-02 PROCEDURE — 5A1945Z RESPIRATORY VENTILATION, 24-96 CONSECUTIVE HOURS: ICD-10-PCS | Performed by: INTERNAL MEDICINE

## 2017-04-02 PROCEDURE — 80202 ASSAY OF VANCOMYCIN: CPT | Performed by: PHYSICIAN ASSISTANT

## 2017-04-02 PROCEDURE — 85610 PROTHROMBIN TIME: CPT | Performed by: PHYSICIAN ASSISTANT

## 2017-04-02 PROCEDURE — 89051 BODY FLUID CELL COUNT: CPT | Performed by: INTERNAL MEDICINE

## 2017-04-02 PROCEDURE — 80053 COMPREHEN METABOLIC PANEL: CPT | Performed by: PHYSICIAN ASSISTANT

## 2017-04-02 PROCEDURE — 87070 CULTURE OTHR SPECIMN AEROBIC: CPT | Performed by: INTERNAL MEDICINE

## 2017-04-02 RX ORDER — PANTOPRAZOLE SODIUM 40 MG/1
40 INJECTION, POWDER, FOR SOLUTION INTRAVENOUS
Status: CANCELLED | OUTPATIENT
Start: 2017-04-03

## 2017-04-02 RX ORDER — CHLORHEXIDINE GLUCONATE 0.12 MG/ML
15 RINSE ORAL EVERY 12 HOURS SCHEDULED
Status: DISCONTINUED | OUTPATIENT
Start: 2017-04-02 | End: 2017-04-02 | Stop reason: HOSPADM

## 2017-04-02 RX ORDER — PANTOPRAZOLE SODIUM 40 MG/1
40 INJECTION, POWDER, FOR SOLUTION INTRAVENOUS
Status: DISCONTINUED | OUTPATIENT
Start: 2017-04-02 | End: 2017-04-02 | Stop reason: HOSPADM

## 2017-04-02 RX ORDER — LORAZEPAM 2 MG/ML
1 INJECTION INTRAMUSCULAR ONCE
Status: COMPLETED | OUTPATIENT
Start: 2017-04-02 | End: 2017-04-02

## 2017-04-02 RX ORDER — LEVOTHYROXINE SODIUM 175 UG/1
175 TABLET ORAL EVERY OTHER DAY
Status: CANCELLED | OUTPATIENT
Start: 2017-04-03

## 2017-04-02 RX ORDER — LORAZEPAM 2 MG/ML
INJECTION INTRAMUSCULAR
Status: COMPLETED
Start: 2017-04-02 | End: 2017-04-02

## 2017-04-02 RX ORDER — LIDOCAINE HYDROCHLORIDE 40 MG/ML
5 INJECTION, SOLUTION RETROBULBAR; TOPICAL ONCE
Status: DISCONTINUED | OUTPATIENT
Start: 2017-04-02 | End: 2017-04-02 | Stop reason: HOSPADM

## 2017-04-02 RX ORDER — SODIUM CHLORIDE 9 MG/ML
60 INJECTION, SOLUTION INTRAVENOUS CONTINUOUS
Status: DISCONTINUED | OUTPATIENT
Start: 2017-04-02 | End: 2017-04-03

## 2017-04-02 RX ORDER — FENTANYL CITRATE 50 UG/ML
50 INJECTION, SOLUTION INTRAMUSCULAR; INTRAVENOUS ONCE
Status: COMPLETED | OUTPATIENT
Start: 2017-04-02 | End: 2017-04-02

## 2017-04-02 RX ORDER — DEXTROSE MONOHYDRATE 25 G/50ML
INJECTION, SOLUTION INTRAVENOUS
Status: COMPLETED
Start: 2017-04-02 | End: 2017-04-02

## 2017-04-02 RX ORDER — LEVETIRACETAM 500 MG/1
500 TABLET ORAL EVERY 12 HOURS SCHEDULED
Status: DISCONTINUED | OUTPATIENT
Start: 2017-04-02 | End: 2017-04-02 | Stop reason: HOSPADM

## 2017-04-02 RX ORDER — FENTANYL CITRATE 50 UG/ML
INJECTION, SOLUTION INTRAMUSCULAR; INTRAVENOUS
Status: COMPLETED
Start: 2017-04-02 | End: 2017-04-02

## 2017-04-02 RX ORDER — ATROPINE SULFATE 1 MG/ML
INJECTION, SOLUTION INTRAMUSCULAR; INTRAVENOUS; SUBCUTANEOUS
Status: DISCONTINUED
Start: 2017-04-02 | End: 2017-04-02 | Stop reason: HOSPADM

## 2017-04-02 RX ORDER — LEVETIRACETAM 500 MG/1
500 TABLET ORAL EVERY 12 HOURS SCHEDULED
Status: CANCELLED | OUTPATIENT
Start: 2017-04-02

## 2017-04-02 RX ORDER — LORAZEPAM 2 MG/ML
INJECTION INTRAMUSCULAR
Status: DISCONTINUED
Start: 2017-04-02 | End: 2017-04-02 | Stop reason: HOSPADM

## 2017-04-02 RX ORDER — LEVETIRACETAM 500 MG/1
500 TABLET ORAL EVERY 12 HOURS SCHEDULED
Status: DISCONTINUED | OUTPATIENT
Start: 2017-04-02 | End: 2017-04-03

## 2017-04-02 RX ORDER — PANTOPRAZOLE SODIUM 40 MG/1
40 INJECTION, POWDER, FOR SOLUTION INTRAVENOUS
Status: DISCONTINUED | OUTPATIENT
Start: 2017-04-03 | End: 2017-04-21 | Stop reason: HOSPADM

## 2017-04-02 RX ORDER — ACETAMINOPHEN 650 MG/1
325 SUPPOSITORY RECTAL EVERY 4 HOURS PRN
Status: CANCELLED | OUTPATIENT
Start: 2017-04-02

## 2017-04-02 RX ORDER — ALBUTEROL SULFATE 2.5 MG/3ML
2.5 SOLUTION RESPIRATORY (INHALATION) EVERY 4 HOURS PRN
Status: DISCONTINUED | OUTPATIENT
Start: 2017-04-02 | End: 2017-04-18

## 2017-04-02 RX ORDER — DEXTROSE MONOHYDRATE 25 G/50ML
50 INJECTION, SOLUTION INTRAVENOUS ONCE
Status: DISCONTINUED | OUTPATIENT
Start: 2017-04-02 | End: 2017-04-02

## 2017-04-02 RX ORDER — ACETAMINOPHEN 650 MG/1
325 SUPPOSITORY RECTAL EVERY 4 HOURS PRN
Status: DISCONTINUED | OUTPATIENT
Start: 2017-04-02 | End: 2017-04-15

## 2017-04-02 RX ORDER — CHLORHEXIDINE GLUCONATE 0.12 MG/ML
15 RINSE ORAL EVERY 12 HOURS SCHEDULED
Status: DISCONTINUED | OUTPATIENT
Start: 2017-04-02 | End: 2017-04-21 | Stop reason: HOSPADM

## 2017-04-02 RX ORDER — CHLORHEXIDINE GLUCONATE 0.12 MG/ML
15 RINSE ORAL EVERY 12 HOURS SCHEDULED
Status: CANCELLED | OUTPATIENT
Start: 2017-04-02

## 2017-04-02 RX ORDER — LIDOCAINE HYDROCHLORIDE 10 MG/ML
INJECTION, SOLUTION EPIDURAL; INFILTRATION; INTRACAUDAL; PERINEURAL
Status: COMPLETED
Start: 2017-04-02 | End: 2017-04-02

## 2017-04-02 RX ORDER — ALBUTEROL SULFATE 2.5 MG/3ML
2.5 SOLUTION RESPIRATORY (INHALATION) EVERY 4 HOURS PRN
Status: CANCELLED | OUTPATIENT
Start: 2017-04-02

## 2017-04-02 RX ADMIN — LORAZEPAM 1 MG: 2 INJECTION, SOLUTION INTRAMUSCULAR; INTRAVENOUS at 15:16

## 2017-04-02 RX ADMIN — DEXTROSE MONOHYDRATE 50 ML: 25 INJECTION, SOLUTION INTRAVENOUS at 07:17

## 2017-04-02 RX ADMIN — AZITHROMYCIN MONOHYDRATE 500 MG: 500 INJECTION, POWDER, LYOPHILIZED, FOR SOLUTION INTRAVENOUS at 00:47

## 2017-04-02 RX ADMIN — FENTANYL CITRATE 50 MCG: 50 INJECTION INTRAMUSCULAR; INTRAVENOUS at 23:55

## 2017-04-02 RX ADMIN — ANORECTAL OINTMENT 1 APPLICATION: 15.7; .44; 24; 20.6 OINTMENT TOPICAL at 20:52

## 2017-04-02 RX ADMIN — SODIUM CHLORIDE 60 ML/HR: 0.9 INJECTION, SOLUTION INTRAVENOUS at 18:44

## 2017-04-02 RX ADMIN — PANTOPRAZOLE SODIUM 40 MG: 40 INJECTION, POWDER, FOR SOLUTION INTRAVENOUS at 09:49

## 2017-04-02 RX ADMIN — LIDOCAINE HYDROCHLORIDE 2.5 ML: 20 JELLY TOPICAL at 11:55

## 2017-04-02 RX ADMIN — CALCIUM ACETATE 667 MG: 667 CAPSULE ORAL at 09:48

## 2017-04-02 RX ADMIN — DEXTROSE MONOHYDRATE 50 ML: 25 INJECTION, SOLUTION INTRAVENOUS at 02:09

## 2017-04-02 RX ADMIN — CEFEPIME 2000 MG: 2 INJECTION, POWDER, FOR SOLUTION INTRAVENOUS at 09:49

## 2017-04-02 RX ADMIN — LORAZEPAM 1 MG: 2 INJECTION INTRAMUSCULAR at 15:16

## 2017-04-02 RX ADMIN — ANORECTAL OINTMENT 1 APPLICATION: 15.7; .44; 24; 20.6 OINTMENT TOPICAL at 09:50

## 2017-04-02 RX ADMIN — CHLORHEXIDINE GLUCONATE 15 ML: 1.2 RINSE ORAL at 20:51

## 2017-04-02 RX ADMIN — CHLORHEXIDINE GLUCONATE 15 ML: 1.2 RINSE ORAL at 09:48

## 2017-04-02 RX ADMIN — LEVETIRACETAM 1000 MG: 100 INJECTION, SOLUTION INTRAVENOUS at 12:56

## 2017-04-02 RX ADMIN — FENTANYL CITRATE 50 MCG: 50 INJECTION, SOLUTION INTRAMUSCULAR; INTRAVENOUS at 23:55

## 2017-04-02 RX ADMIN — LIDOCAINE HYDROCHLORIDE 100 MG: 10 INJECTION, SOLUTION EPIDURAL; INFILTRATION; INTRACAUDAL; PERINEURAL at 11:55

## 2017-04-02 RX ADMIN — LEVETIRACETAM 500 MG: 500 TABLET ORAL at 20:51

## 2017-04-02 NOTE — PROCEDURES
Bronchoscopy  Date/Time: 4/2/2017 12:02 PM  Performed by: Barb England by: Sophie Reinoso     Patient location:  ED  Consent:     Consent obtained:  Written    Consent given by:  Spouse    Alternatives discussed:  No treatment, delayed treatment and observation  Universal protocol:     Procedure explained and questions answered to patient or proxy's satisfaction: yes      Relevant documents present and verified: yes      Test results available and properly labeled: yes      Imaging studies available: yes      Patient identity confirmed:  Arm band and provided demographic data  Indications:     Procedure Purpose: diagnostic      Indications: pneumonia/infiltrate    Anesthesia (see MAR for exact dosages):      Anesthesia method:  None  Airway:     Airway:  Minimal bloody yellow secretions throughout right lower lobe washings

## 2017-04-02 NOTE — CONSULTS
Consultation - Neurology   Natty Menon 61 y o  male MRN: [id number]  Unit/Bed#:  Encounter: [id number]      Assessment/Plan   Assessment/Plan:  60 yo man with PEA arrest and subsequent seizure-like activity  - Currently comatose with component of sedation   - Planning to transfer to \[facility]\"" for long term VEEG monitoring   - Pt loaded with Keppra 1 gram  Recommend 500 mg q12, with additional 500 mg after HD   - MRI would be helpful at some point, however bed is available at [place] and need to r/u subclinical SE takes priority over imaging at this time  - ativan IV 1-2mg for seizure-like activity until VEEG up and running  History of Present Illness     Physician Requesting Consult: Felipe Villa MD    HPI:(as per chart verbatim, and staff) HPI: Natty Menon is a 61y o  year old male with ESRD, hypothyroid, HTN, DM, recurrent PNA and recent C  Diff colitis  With admitted to the hospital for pneumonia treated with vancomycin cefepime and azithromycin, per family was today more confused and lethargic, was barely talking to them  Around 9pm 4/01 Accucheck was 178  Around 2 am 4/02 Accucheck was 30  -patient received dextrose  Around 3 am, RN found patient gurgling and foaming at the mouth, but without any clear seizure-like activity  Shortly after, while still in the room the RN noticed the patient became apneic, with bradycardic electrical activity however no pulses (PEA arrest)  Pt was subsequently intubated 7 CPR started  - ROSC was obtained after 2 rounds of CPR & epi ( estimate < 5min)  - 4-4:30 am, he was noted to be  Clenching on ET tube with eye deviation upward --> pt got 2 mg ativan  - During Broncoscopy, again pt eyes opened and deviated upward  Blood cultures showed gram-positive cocci   Patient is bacteremic         Inpatient consult to Neurology  Consult performed by: Gloria Torres ordered by: Arley Corona          Review of Systems   Unable to perform ROS: Patient unresponsive       Historical Information   Past Medical History:   Diagnosis Date    Diabetes mellitus     Diarrhea     ESRD (end stage renal disease) on dialysis     Hypertension     Hypothyroidism     Neuropathy     Right lower lobe pneumonia 2/20/2017    Thyroid disease     underactive     Past Surgical History:   Procedure Laterality Date    CATARACT EXTRACTION      CHG GI ENDOSCOPIC ULTRASOUND N/A 3/10/2016    Procedure: LINEAR ENDOSCOPIC U/S;  Surgeon: Teri Raya MD;  Location:  GI LAB;   Service: Gastroenterology    CHOLECYSTECTOMY      TOE AMPUTATION  2000     Social History   History   Alcohol Use No     History   Drug Use No     History   Smoking Status    Current Every Day Smoker    Packs/day: 1 00    Years: 46 00    Types: Cigarettes    Start date: 1970   Smokeless Tobacco    Never Used     Family History:   Family History   Problem Relation Age of Onset    Diabetes Father     Cancer Other     Lupus Mother        Meds/Allergies   current meds:   Current Facility-Administered Medications   Medication Dose Route Frequency    acetaminophen (TYLENOL) rectal suppository 325 mg  325 mg Rectal Q4H PRN    albuterol inhalation solution 2 5 mg  2 5 mg Nebulization Q4H PRN    atropine 1 mg/mL injection **AcuDose Override Pull**        calcium acetate (PHOSLO) capsule 667 mg  667 mg Oral TID With Meals    [START ON 4/3/2017] cefepime (MAXIPIME) 1,000 mg in dextrose 5 % 50 mL IVPB  1,000 mg Intravenous Q24H    chlorhexidine (PERIDEX) 0 12 % oral rinse 15 mL  15 mL Swish & Spit Q12H [facility]    DAPTOmycin (CUBICIN) 650 mg in sodium chloride 0 9 % 50 mL IVPB  10 mg/kg Intravenous Q48H    levETIRAcetam (KEPPRA) tablet 500 mg  500 mg Oral Q12H [facility]    levothyroxine tablet 175 mcg  175 mcg Oral Every Other Day    lidocaine (XYLOCAINE,URO-JET) 2 % topical gel **AcuDose Override Pull**        lidocaine HCl (PF) (XYLOCAINE) 4 % injection 5 mL  5 mL Inhalation Once    LORazepam (ATIVAN) 2 mg/mL injection **AcuDose Override Pull**        menthol-zinc oxide (CALMOSEPTINE) 0 44-20 6 % ointment 1 application  1 application Topical TID    pantoprazole (PROTONIX) injection 40 mg  40 mg Intravenous Q24H CELIA       No Known Allergies    Objective   Vitals:Blood pressure 142/76, pulse 68, temperature (!) 96 4 °F (35 8 °C), temperature source Axillary, resp  rate 12, weight 64 2 kg (141 lb 8 6 oz), SpO2 100 %  ,Body mass index is 22 84 kg/(m^2)  Intake/Output Summary (Last 24 hours) at 04/02/17 1506  Last data filed at 04/02/17 0342   Gross per 24 hour   Intake              420 ml   Output                0 ml   Net              420 ml       Invasive Devices: Invasive Devices     Peripheral Intravenous Line            Peripheral IV 04/01/17 Left Arm 1 day    Peripheral IV 04/02/17 Left;Upper Arm less than 1 day          Line            Hemodialysis AV Fistula  Right Forearm -- days    Hemodialysis AV Fistula 12/30/16 Right Forearm 93 days          Drain            NG/OG Tube Orogastric Center mouth less than 1 day          Airway            ETT  Hi-Lo; Cuffed 7 5 mm less than 1 day                Physical Exam   Constitutional: Vital signs are normal  He appears well-developed and well-nourished  Afebrile  HENT:   Head: Normocephalic and atraumatic  Cardiovascular: Normal rate, regular rhythm and normal heart sounds  No murmur heard  Pulmonary/Chest:   VDRF     Neurologic Exam     Mental Status        -Comatose  - no spont motor       Cranial Nerves        -Conjugate gaze deviation upward and slightly to the right (15 degrees from vertical)    -Face appears symmetric with respect to motor  Motor Exam        - no spont motor  - normal bulk throughout  - slightly increased tone throughout (symmetric)     Sensory Exam        No acknowledgment of noxious Stim in any extremity        Gait, Coordination, and Reflexes        - DTR's in all four extremities are intact and symmetric   - Toes neutral bilaterally  Lab Results: I have personally reviewed pertinent reports  Imaging Studies: I have personally reviewed pertinent films in PACS  EKG, Pathology, and Other Studies: I have personally reviewed pertinent reports  VTE Prophylaxis: Sequential compression device (Venodyne)      Counseling/Coordination of Care  Total time spent today 25 minutes  Greater than 50% of total time was spent with the patient and / or family counseling and / or coordination of care

## 2017-04-02 NOTE — PROCEDURES
Intubation  Date/Time: 4/2/2017 4:56 AM  Performed by: Luiza Hernandez by: Kaya Rayo     Patient location:  Bedside  Other Assisting Provider: Yes (comment)    Consent:     Consent obtained:  Emergent situation  Universal protocol:     Procedure explained and questions answered to patient or proxy's satisfaction: no      Relevant documents present and verified: no      Test results available and properly labeled: no      Imaging studies available: no      Required blood products, implants, devices, and special equipment available: no      Site/side marked: no      Immediately prior to procedure, a time out was called: no      Patient identity confirmed:  Hospital-assigned identification number, arm band and anonymous protocol, patient vented/unresponsive  Pre-procedure details:     Patient status:  Unresponsive    Mallampati score:  3    Pretreatment medications:  None    Paralytics:  None  Indications:     Indications for intubation: respiratory failure    Procedure details:     Preoxygenation:  Bag valve mask    CPR in progress: yes      Intubation method:  Oral    Oral intubation technique:  Direct    Laryngoscope blade: Mac 4    Tube size (mm):  7 5    Tube type:  Cuffed    Number of attempts:  1    Ventilation between attempts: no      Cricoid pressure: yes      Tube visualized through cords: yes    Placement assessment:     ETT to lip:  23    Tube secured with:  ETT harper    Breath sounds:  Equal    Placement verification: chest rise, condensation, CXR verification, equal breath sounds, ETCO2 detector and tube exhalation      CXR findings:  ETT in proper place  Post-procedure details:     Patient tolerance of procedure:   Tolerated well, no immediate complications

## 2017-04-02 NOTE — PROGRESS NOTES
Appears to be a respiratory arrest, on arrival CPR was in progress, monitor showed heart rate of 30 with no pulse and PE A arrest  Patient had's large amount of foamy sputum in his airways and was not breathing  Started immediate back valve ventilation, intubated with 7 5 tube, received 2 rounds of epi and had return of circulation  Patient claimed down on his tube, gave 2 mg of IV Ativan  After return of circulation blood pressure was 150/80, heart rate was 130s  Patient has been in poor health last couple month, multiple admission for recurrent pneumonias, uncontrolled type I diabetic, end-stage renal disease on dialysis, multiple ulcers and areas of nonhealing wounds to his bilateral lower extremities  Patient was released from [facility] for pneumonia 3  3 hours later admitted to [address] [place] [address] 2 days ago  Historical Information   Past Medical History:   Diagnosis Date    Diabetes mellitus     Diarrhea     ESRD (end stage renal disease) on dialysis     Hypertension     Hypothyroidism     Neuropathy     Right lower lobe pneumonia 2/20/2017    Thyroid disease     underactive     Past Surgical History:   Procedure Laterality Date    CATARACT EXTRACTION      CHG GI ENDOSCOPIC ULTRASOUND N/A 3/10/2016    Procedure: LINEAR ENDOSCOPIC U/S;  Surgeon: Demetris Rose MD;  [facility];   Service: Gastroenterology    CHOLECYSTECTOMY      TOE AMPUTATION  2000     Social History   History   Alcohol Use No     History   Drug Use No     History   Smoking Status    Current Every Day Smoker    Packs/day: 1 00    Years: 46 00    Types: Cigarettes    Start date: 5   Smokeless Tobacco    Never Used     Family History: non-contributory    Meds/Allergies   all current active meds have been reviewed  Vitals:   Blood Pressure: 154/92 (04/02/17 0346), Pulse: 90 (04/02/17 0346), Temperature: (!) 96 9 °F (36 1 °C) (04/02/17 0346), Temp Source: Axillary (04/02/17 0346), Respirations: 18 (04/02/17 0346) Height and Weight Weight - Scale: 64 2 kg (141 lb 8 6 oz) (03/31/17 2028), Weight Method: Bed scale (03/31/17 2028), IBW: -88 kg (03/31/17 2028)  No Known Allergies    Review of Systems unable to pain patient is unresponsive     PHYSICAL EXAM (Assessment): /92  Pulse 90  Temp (!) 96 9 °F (36 1 °C) (Axillary)   Resp 18  Wt 64 2 kg (141 lb 8 6 oz)  SpO2 100%  BMI 22 84 kg/m2    Hemodynamic Monitoring: N/A    General Appearance:    intubated , unresponsive    Head:    Normocephalic, without obvious abnormality, atraumatic     Eyes:  bilateral eyes approximately 2 mm nonreactive due to eye surgery   Ears:    Normal TM's and external ear canals, both ears   Neck:   Supple, symmetrical, trachea midline,   bruit or JVD   Lungs:   diminishedo auscultation bilaterally right greater than left mild rhonchi in the right, respirations unlabored   Chest wall:    No  deformity   Heart:    Regular rate and rhythm, S1 and S2 normal, no murmur, rub   or gallop   Abdomen:    soft nondistended   Genitalia:    Normal male without lesion, discharge or tenderness   Extremities:   Extremities normal, atraumatic,   Pulses:   2+ and symmetric all extremities   Skin:   Skin color, pale          Intake/Output Summary (Last 24 hours) at 04/02/17 0502  Last data filed at 04/01/17 1838   Gross per 24 hour   Intake              395 ml   Output                0 ml   Net              395 ml       Invasive Devices     Peripheral Intravenous Line            Peripheral IV 04/01/17 Left Arm less than 1 day          Line            Hemodialysis AV Fistula  Right Forearm -- days    Hemodialysis AV Fistula 12/30/16 Right Forearm 93 days          Airway            ETT  Hi-Lo; Cuffed 7 5 mm less than 1 day                DIAGNOSTIC DATA:    Lab Results: I have personally reviewed pertinent lab results  Imaging Studies: I have personally reviewed pertinent reports        EKG, Pathology, and Other Studies: I have personally reviewed pertinent reports  OUTCOME:   Transferred to Critical Care Unit  Code Status: Level 1 - Full Code  Advance Directive and Living Will:     Power of :    Counseling / Coordination of Care  Total Critical Care time spent 60 minutes excluding procedures, teaching and family updates

## 2017-04-02 NOTE — DISCHARGE SUMMARY
Discharge Summary - Elaina Ji 61 y o  male MRN: [id number]    Unit/Bed#:  Encounter: [id number]    Admission Date: 3/31/2017     Admitting Diagnosis:  1  Acute Encephalopathy  2  HCAP  3  Bacteremia  4  ESRD  5  DM 1  6  Atrial Fibrillation    HPI:  Mr Kapil Santoyo is a 61year old male with an extensive past medical history of end-stage renal disease on dialysis Monday, Wednesday and Friday, diabetes mellitus, atrial fibrillation on Coumadin, bilateral lower extremity diabetic ulcers, as well as most recently C  difficile colitis with completed treatment course as well as recurrent pneumonia  Patient presented to [address] [place] [address] emergency department on March 31  He presented at that time via EMS and wife whom reports that the patient has been more confused as well as short of breath  Patient had a recent hospital admission at [facility] for 3 days being discharged this past Friday secondarily to the possibility of lower extremity cellulitis  Patient during that visit was provided one dose of vancomycin upon admission and was evaluated by podiatry as well as infectious disease  According to the patient's wife they did not believe this was an acute infection, therefore antibiotics were discontinued and patient was discharged to home on Friday  Patient was found to be hypoxic 85% on room air and was subsequently placed on 4L via NC  He was febrile @ 102 1 with a mild leukocytosis with a normal lactate  Patient was placed on cefepime as well as vancomycin  Additional work up in our ED included [address] for Encephalopathy, which was negative, Chest Xray revealed RLL Opacification / infiltrate  Patient was admitted to [place] [address]  In review of chart, noted patient provided Levemir at 1800 hours  Accu-checks were being obtained  Repeat accu-check at 2058 hours revealed 147mg/dl  Upon subsequent recheck at 0158 blood sugar was noted to be 30mg/dl    Patient was provided 1amp of D50 at that time  Accu-check was repeated a short time later revealing 181mg/dl  Subsequently CODE BLUE called @ 2291  Nursing reported in room patient "foaming at the mouth, and subsequently stopped breathing"  CODE was called at that time  Upon arrival of the [facility] [place], patient noted to be in PEA arrest and CPR was initiated  Repeat accu-check at this time was 78 mg/dl  Patient was provided Epi X2 with ROSC within 5 mins of initial resuscitation  Patient intubated during event as noted thick copious secretions, however no vomit  Patient transferred to the ICU  Within the ICU patient completely clenched on ETT without noted rigidity or focal seizure activity  Patient was provided 2mg of Ativan IV  Repeat Head CT requested as patient with noted INR of 4 15  Repeat head CT Negative  Patient placed on q1h accu-checks with additional episode of hypoglycemia for which he was provided an additional AMP of D50  Patient continued to remain off of sedation, hemodynamically stable  GCS 3 at 0620 upon my initial assessment in taking over patient care  Later in the am, patient would open eyes to loud verbal stimuli as well as follow simple commands, such as "squeezing hand"  Additionally:  Blood culture data reveals Enterococcus as of 4/2 on cultures obtained on admission - ID (Dr Lisa Andrea) consulted and tailored antibiotic regimen to include cefepime / Daptomycin  Later, CC Attending performing bedside bronch, no sedation and noted patients eyes rolled up towards head with some rigidity which appeared consistent with seizure  Episode lasted approximately 1min and ceased without medication  Neurology consulted, discussion via telephone, felt patient would benefit from EMU as well as addition of [address]  Keppra 1g initial load provided and subsequently ordered 500mg IV BID  Transfer to [facility] arranged by Dr Za Young, communication with Dr Ke Sanchez and PACS    Patient will be transferred to the ICU at [facility]  Neurology arrived to evaluate patient prior to transfer  While evaluating, patient clamped on ETT with conjugate gaze deviation and slightly to the right  Additional 1mg ativan provided  Family updates provided frequently  Although they were considering Palliative care on Monday, they would like to remain Level 1 Full Code and continue with current treatment course  Procedures Performed:   Orders Placed This Encounter   Procedures    Bronchoscopy    Critical Care    ED ECG Documentation Only   [facility] Course:   SEE ABOVE    Significant Findings, Care, Treatment and Services Provided:   SEE ABOVE    Complications: As noted above    Discharge Diagnosis:   1  S/P Cardiac Arrest with ROSC  2  Acute Encephalopathy  3  Bacteremia  4  HCAP  5  Hypoglycemia  6  Probable Seizures  7  ESRD  8  DM 1  9  Atrial Fibrillation    Condition at Discharge: critical     Discharge instructions/Information to patient and family:   See after visit summary for information provided to patient and family  Provisions for Follow-Up Care:  See after visit summary for information related to follow-up care and any pertinent home health orders  Disposition: Transfer to ICU at [facility] for EMU    Planned Readmission: Yes     Discharge Statement   I spent 45 minutes discharging the patient  This time was spent on the day of discharge  I had direct contact with the patient on the day of discharge  Additional documentation is required if more than 30 minutes were spent on discharge  Additional time communicating with family, consulted services, as well as transfer teams  Discharge Medications:  See after visit summary for reconciled discharge medications provided to patient and family

## 2017-04-03 ENCOUNTER — APPOINTMENT (INPATIENT)
Dept: NEUROLOGY | Facility: AMBULATORY SURGERY CENTER | Age: 67
DRG: 853 | End: 2017-04-03
Payer: COMMERCIAL

## 2017-04-03 ENCOUNTER — GENERIC CONVERSION - ENCOUNTER (OUTPATIENT)
Dept: OTHER | Facility: OTHER | Age: 67
End: 2017-04-03

## 2017-04-03 ENCOUNTER — APPOINTMENT (INPATIENT)
Dept: RADIOLOGY | Facility: HOSPITAL | Age: 67
DRG: 853 | End: 2017-04-03
Payer: COMMERCIAL

## 2017-04-03 ENCOUNTER — APPOINTMENT (INPATIENT)
Dept: NON INVASIVE DIAGNOSTICS | Facility: HOSPITAL | Age: 67
DRG: 853 | End: 2017-04-03
Payer: COMMERCIAL

## 2017-04-03 ENCOUNTER — APPOINTMENT (INPATIENT)
Dept: DIALYSIS | Facility: HOSPITAL | Age: 67
DRG: 853 | End: 2017-04-03
Payer: COMMERCIAL

## 2017-04-03 LAB
ANION GAP SERPL CALCULATED.3IONS-SCNC: 8 MMOL/L (ref 4–13)
BACTERIA BLD CULT: ABNORMAL
BACTERIA BLD CULT: ABNORMAL
BACTERIA BLD CULT: NORMAL
BASOPHILS # BLD AUTO: 0.05 THOUSANDS/ΜL (ref 0–0.1)
BASOPHILS NFR BLD AUTO: 1 % (ref 0–1)
BUN SERPL-MCNC: 38 MG/DL (ref 5–25)
CALCIUM SERPL-MCNC: 8.1 MG/DL (ref 8.3–10.1)
CHLORIDE SERPL-SCNC: 106 MMOL/L (ref 100–108)
CO2 SERPL-SCNC: 24 MMOL/L (ref 21–32)
CREAT SERPL-MCNC: 5.37 MG/DL (ref 0.6–1.3)
EOSINOPHIL # BLD AUTO: 0.31 THOUSAND/ΜL (ref 0–0.61)
EOSINOPHIL NFR BLD AUTO: 3 % (ref 0–6)
ERYTHROCYTE [DISTWIDTH] IN BLOOD BY AUTOMATED COUNT: 16.1 % (ref 11.6–15.1)
GFR SERPL CREATININE-BSD FRML MDRD: 10.8 ML/MIN/1.73SQ M
GLUCOSE SERPL-MCNC: 108 MG/DL (ref 65–140)
GLUCOSE SERPL-MCNC: 109 MG/DL (ref 65–140)
GLUCOSE SERPL-MCNC: 115 MG/DL (ref 65–140)
GLUCOSE SERPL-MCNC: 121 MG/DL (ref 65–140)
GLUCOSE SERPL-MCNC: 121 MG/DL (ref 65–140)
GLUCOSE SERPL-MCNC: 128 MG/DL (ref 65–140)
GLUCOSE SERPL-MCNC: 142 MG/DL (ref 65–140)
GLUCOSE SERPL-MCNC: 143 MG/DL (ref 65–140)
GLUCOSE SERPL-MCNC: 149 MG/DL (ref 65–140)
GLUCOSE SERPL-MCNC: 183 MG/DL (ref 65–140)
GLUCOSE SERPL-MCNC: 186 MG/DL (ref 65–140)
GLUCOSE SERPL-MCNC: 212 MG/DL (ref 65–140)
GLUCOSE SERPL-MCNC: 218 MG/DL (ref 65–140)
GLUCOSE SERPL-MCNC: 236 MG/DL (ref 65–140)
GLUCOSE SERPL-MCNC: 282 MG/DL (ref 65–140)
GLUCOSE SERPL-MCNC: 79 MG/DL (ref 65–140)
GRAM STN SPEC: ABNORMAL
GRAM STN SPEC: ABNORMAL
GRAM STN SPEC: NORMAL
HCT VFR BLD AUTO: 31.1 % (ref 36.5–49.3)
HGB BLD-MCNC: 9.8 G/DL (ref 12–17)
INR PPP: 8.15 (ref 0.86–1.16)
INR PPP: 8.24 (ref 0.86–1.16)
LACTATE SERPL-SCNC: 0.8 MMOL/L (ref 0.5–2)
LACTATE SERPL-SCNC: 0.8 MMOL/L (ref 0.5–2)
LYMPHOCYTES # BLD AUTO: 0.72 THOUSANDS/ΜL (ref 0.6–4.47)
LYMPHOCYTES NFR BLD AUTO: 7 % (ref 14–44)
MAGNESIUM SERPL-MCNC: 1.9 MG/DL (ref 1.6–2.6)
MCH RBC QN AUTO: 30.6 PG (ref 26.8–34.3)
MCHC RBC AUTO-ENTMCNC: 31.5 G/DL (ref 31.4–37.4)
MCV RBC AUTO: 97 FL (ref 82–98)
MONOCYTES # BLD AUTO: 0.51 THOUSAND/ΜL (ref 0.17–1.22)
MONOCYTES NFR BLD AUTO: 5 % (ref 4–12)
NEUTROPHILS # BLD AUTO: 8.67 THOUSANDS/ΜL (ref 1.85–7.62)
NEUTS SEG NFR BLD AUTO: 84 % (ref 43–75)
NRBC BLD AUTO-RTO: 0 /100 WBCS
P JIROVECII AG SPEC QL IF: NORMAL
PHOSPHATE SERPL-MCNC: 3.5 MG/DL (ref 2.3–4.1)
PLATELET # BLD AUTO: 209 THOUSANDS/UL (ref 149–390)
PMV BLD AUTO: 10.3 FL (ref 8.9–12.7)
POTASSIUM SERPL-SCNC: 4.5 MMOL/L (ref 3.5–5.3)
PROTHROMBIN TIME: 65.2 SECONDS (ref 12–14.3)
PROTHROMBIN TIME: 65.7 SECONDS (ref 12–14.3)
RBC # BLD AUTO: 3.2 MILLION/UL (ref 3.88–5.62)
SODIUM SERPL-SCNC: 138 MMOL/L (ref 136–145)
TSH SERPL DL<=0.05 MIU/L-ACNC: 4.87 UIU/ML (ref 0.36–3.74)
WBC # BLD AUTO: 10.27 THOUSAND/UL (ref 4.31–10.16)

## 2017-04-03 PROCEDURE — 82948 REAGENT STRIP/BLOOD GLUCOSE: CPT

## 2017-04-03 PROCEDURE — 83735 ASSAY OF MAGNESIUM: CPT | Performed by: EMERGENCY MEDICINE

## 2017-04-03 PROCEDURE — 93306 TTE W/DOPPLER COMPLETE: CPT

## 2017-04-03 PROCEDURE — A9270 NON-COVERED ITEM OR SERVICE: HCPCS | Performed by: PHYSICIAN ASSISTANT

## 2017-04-03 PROCEDURE — A9270 NON-COVERED ITEM OR SERVICE: HCPCS | Performed by: EMERGENCY MEDICINE

## 2017-04-03 PROCEDURE — A9270 NON-COVERED ITEM OR SERVICE: HCPCS | Performed by: INTERNAL MEDICINE

## 2017-04-03 PROCEDURE — 83605 ASSAY OF LACTIC ACID: CPT | Performed by: EMERGENCY MEDICINE

## 2017-04-03 PROCEDURE — 84100 ASSAY OF PHOSPHORUS: CPT | Performed by: EMERGENCY MEDICINE

## 2017-04-03 PROCEDURE — 5A1D60Z PERFORMANCE OF URINARY FILTRATION, MULTIPLE: ICD-10-PCS | Performed by: INTERNAL MEDICINE

## 2017-04-03 PROCEDURE — 95951 HB EEG MONITORING/VIDEORECORD: CPT

## 2017-04-03 PROCEDURE — 85025 COMPLETE CBC W/AUTO DIFF WBC: CPT | Performed by: EMERGENCY MEDICINE

## 2017-04-03 PROCEDURE — 84443 ASSAY THYROID STIM HORMONE: CPT | Performed by: EMERGENCY MEDICINE

## 2017-04-03 PROCEDURE — 93923 UPR/LXTR ART STDY 3+ LVLS: CPT

## 2017-04-03 PROCEDURE — C9113 INJ PANTOPRAZOLE SODIUM, VIA: HCPCS | Performed by: PHYSICIAN ASSISTANT

## 2017-04-03 PROCEDURE — 80048 BASIC METABOLIC PNL TOTAL CA: CPT | Performed by: EMERGENCY MEDICINE

## 2017-04-03 PROCEDURE — 85610 PROTHROMBIN TIME: CPT | Performed by: EMERGENCY MEDICINE

## 2017-04-03 PROCEDURE — 93925 LOWER EXTREMITY STUDY: CPT

## 2017-04-03 PROCEDURE — 94003 VENT MGMT INPAT SUBQ DAY: CPT

## 2017-04-03 PROCEDURE — 0HBMXZZ EXCISION OF RIGHT FOOT SKIN, EXTERNAL APPROACH: ICD-10-PCS | Performed by: PODIATRIST

## 2017-04-03 PROCEDURE — 94760 N-INVAS EAR/PLS OXIMETRY 1: CPT

## 2017-04-03 RX ORDER — LEVETIRACETAM 500 MG/1
500 TABLET ORAL DAILY
Status: DISCONTINUED | OUTPATIENT
Start: 2017-04-04 | End: 2017-04-06

## 2017-04-03 RX ORDER — CHOLECALCIFEROL (VITAMIN D3) 10 MCG
1 TABLET ORAL DAILY
Status: DISCONTINUED | OUTPATIENT
Start: 2017-04-03 | End: 2017-04-15

## 2017-04-03 RX ORDER — LEVETIRACETAM 500 MG/1
500 TABLET ORAL DAILY
Status: DISCONTINUED | OUTPATIENT
Start: 2017-04-03 | End: 2017-04-03

## 2017-04-03 RX ADMIN — ANORECTAL OINTMENT 1 APPLICATION: 15.7; .44; 24; 20.6 OINTMENT TOPICAL at 09:50

## 2017-04-03 RX ADMIN — DAPTOMYCIN 650 MG: 500 INJECTION, POWDER, LYOPHILIZED, FOR SOLUTION INTRAVENOUS at 13:33

## 2017-04-03 RX ADMIN — SODIUM CHLORIDE 60 ML/HR: 0.9 INJECTION, SOLUTION INTRAVENOUS at 06:20

## 2017-04-03 RX ADMIN — CEFEPIME HYDROCHLORIDE 1000 MG: 1 INJECTION, POWDER, FOR SOLUTION INTRAMUSCULAR; INTRAVENOUS at 17:59

## 2017-04-03 RX ADMIN — SODIUM CHLORIDE 3 UNITS/HR: 9 INJECTION, SOLUTION INTRAVENOUS at 12:23

## 2017-04-03 RX ADMIN — VANCOMYCIN HYDROCHLORIDE 125 MG: 500 INJECTION, POWDER, LYOPHILIZED, FOR SOLUTION INTRAVENOUS at 18:00

## 2017-04-03 RX ADMIN — CHLORHEXIDINE GLUCONATE 15 ML: 1.2 RINSE ORAL at 21:52

## 2017-04-03 RX ADMIN — Medication 1 CAPSULE: at 15:28

## 2017-04-03 RX ADMIN — PANTOPRAZOLE SODIUM 40 MG: 40 INJECTION, POWDER, FOR SOLUTION INTRAVENOUS at 09:48

## 2017-04-03 RX ADMIN — ANORECTAL OINTMENT 1 APPLICATION: 15.7; .44; 24; 20.6 OINTMENT TOPICAL at 21:52

## 2017-04-03 RX ADMIN — LEVOTHYROXINE SODIUM 175 MCG: 125 TABLET ORAL at 05:57

## 2017-04-03 RX ADMIN — LEVETIRACETAM 500 MG: 500 TABLET ORAL at 09:49

## 2017-04-03 RX ADMIN — CHLORHEXIDINE GLUCONATE 15 ML: 1.2 RINSE ORAL at 09:48

## 2017-04-03 RX ADMIN — ANORECTAL OINTMENT 1 APPLICATION: 15.7; .44; 24; 20.6 OINTMENT TOPICAL at 16:06

## 2017-04-03 NOTE — CASE MANAGEMENT
Initial Clinical Review    Admission: Date/Time/Statement: 3/31/17 @ 2155     Orders Placed This Encounter   Procedures    Inpatient Admission (expected length of stay for this patient is greater than two midnights)     Standing Status:   Standing     Number of Occurrences:   1     Order Specific Question:   Admitting Physician     Answer:   Alondra Bartlett [[id number]]     Order Specific Question:   Level of Care     Answer:   Med Surg [16]     Order Specific Question:   Estimated length of stay     Answer:   More than 2 Midnights     Order Specific Question:   Certification     Answer:   I certify that inpatient services are medically necessary for this patient for a duration of greater than two midnights  See H&P and MD Progress Notes for additional information about the patient's course of treatment  ED: Date/Time/Mode of Arrival:   ED Arrival Information     Expected Arrival Acuity Means of Arrival Escorted By Service Admission Type    - 3/31/2017 20:18 Emergent Wheelchair Spouse Pulmonology Emergency    Arrival Complaint    SOB          Chief Complaint:   Chief Complaint   Patient presents with    Altered Mental Status     Pt coming from home  D/c today for [facility]  Per family, pt has diabetes and is on dialysis  Has been more confused and weak today since being discharged  Family states he was c/o SOB  History of Illness: The patient has a pmhx of esrd on HD m/w/f, DM, htn, afib on coumadin  He was admitted this week to [facility] with right leg cellulitis  She states he did receive iv abx, vanco, but was not discharged home with any medications  He did have dialysis today before dc home  Since he was home, he c/o sob, weakness and was unable to ambulate with his cane  No fever noted at home  No fall, he is on coumadin  He does not make urine  Mild cough  No chest pain       In the ED, patient was found to be hypoxic at 85% on room air placed on 4 L nasal cannula with a oxygen saturation at 97%, febrile at 102 1 with a mild leukocytosis at 10 92 with a stable BUN/creatinine and 15/3 21 TSH was slightly elevated at 4 902 and pro-BNP was severely elevated out 138,898  Chest x-ray noted preliminary reading of right is similar consolidation with head CT negative at acute intracranial pathologies  Patient was started on cefepime and vancomycin in the ED and was admitted to the medical surgical floor for sepsis likely due to hospital acquired pneumonia and acute encephalopathy            ED Vital Signs:   ED Triage Vitals   Temperature Pulse Respirations Blood Pressure SpO2   03/31/17 2028 03/31/17 2028 03/31/17 2028 03/31/17 2028 03/31/17 2028   98 1 °F (36 7 °C) 88 12 154/78 85 %      Temp Source Heart Rate Source Patient Position BP Location FiO2 (%)   03/31/17 2028 03/31/17 2028 03/31/17 2028 03/31/17 2028 04/02/17 0402   Oral Monitor Lying Left arm 100      Pain Score       03/31/17 2028       No Pain        Wt Readings from Last 1 Encounters:   04/02/17 64 2 kg (141 lb 8 6 oz)       Vital Signs (abnormal):   03/31/17 2226  --  91  95 %  19   180/101   03/31/17 2136  --  91  96 %  18   180/101   03/31/17 2054   102 1 °F (38 9 °C)  --  --  --  --         Abnormal Labs/Diagnostic Test Results: NT-pro BNP 370191, WBC 10 92, Hemoglobin 11 8, Creat 3 21, Alk Phos 193, Total Protein 8 3, Albumin 2 4, PT/INR 24 3/2 28    CXR: Increased right basal opacity suspicious for infiltrate and pleural effusion         ED Treatment:   Medication Administration from 03/31/2017 2018 to 03/31/2017 2356       Date/Time Order Dose Route Action Action by Comments     03/31/2017 2212 cefepime (MAXIPIME) 1,000 mg in dextrose 5 % 50 mL IVPB 0 mg Intravenous Stopped Clint Salas RN      03/31/2017 2135 cefepime (MAXIPIME) 1,000 mg in dextrose 5 % 50 mL IVPB 1,000 mg Intravenous Anthony 37 Clint Salas RN      03/31/2017 2318 vancomycin (VANCOCIN) IVPB (premix) 1,000 mg 0 mg/kg Intravenous Stopped Bev Ecodionte, RN 03/31/2017 2213 vancomycin (VANCOCIN) IVPB (premix) 1,000 mg 1,000 mg Intravenous Jeannget 37 Rey Hernandez RN      03/31/2017 2315 acetaminophen (TYLENOL) rectal suppository 650 mg 650 mg Rectal Given Tita Mcdowell RN for fever          Past Medical/Surgical History:    Active Ambulatory Problems     Diagnosis Date Noted    Hypertension     Type 1 diabetes mellitus with nephropathy     ESRD (end stage renal disease) on dialysis     Hypothyroidism     Neuropathy     Sepsis 09/10/2016    Encephalopathy 09/10/2016    H/O Clostridium difficile infection 09/13/2016    Cellulitis of left thigh 11/10/2016    Cellulitis of right lower extremity 12/11/2016    Hyperglycemia 12/11/2016    Leg pain 12/11/2016    Stage 2 skin ulcer of sacral region 12/12/2016    Hyponatremia 12/12/2016    Tobacco abuse 12/12/2016    Ambulatory dysfunction 12/12/2016    Diabetic ulcer of right foot associated with type 1 diabetes mellitus 12/12/2016    Hemoptysis 12/13/2016    Acute respiratory failure with hypoxia 12/13/2016    Bursitis of left elbow 12/30/2016    Hypomagnesemia 12/30/2016    Lactic acid acidosis 12/30/2016    Acute encephalopathy 02/18/2017    Lethargy 02/18/2017    Cellulitis of right leg 02/18/2017    Atrial fibrillation 02/18/2017    Foot ulcer 02/18/2017    HCAP (healthcare-associated pneumonia) 02/20/2017    C  difficile colitis 02/22/2017     Resolved Ambulatory Problems     Diagnosis Date Noted    No Resolved Ambulatory Problems     Past Medical History:   Diagnosis Date    Diabetes mellitus     Diarrhea     ESRD (end stage renal disease) on dialysis     Hypertension     Hypothyroidism     Neuropathy     Right lower lobe pneumonia 2/20/2017    Thyroid disease        Admitting Diagnosis: Altered mental status [R41 82]  ESRD (end stage renal disease) [N18 6]  Diabetic ulcer of right foot associated with type 1 diabetes mellitus [E10 621, L97 519]  Bilateral diabetic foot ulcer associated with secondary diabetes mellitus [E08 621, L97 529, L97 519]  Sepsis, due to unspecified organism [A41 9]  Altered mental status, unspecified altered mental status type [R41 82]  Pneumonia of right lower lobe due to infectious organism [J18 1]    Age/Sex: 61 y o  male    Assessment/Plan:   Hospital Problem List:      Principal Problem:  HCAP (healthcare-associated pneumonia)  Active Problems:  Hypertension  ESRD (end stage renal disease) on dialysis  Sepsis  Diabetic ulcer of right foot associated with type 1 diabetes mellitus  Acute encephalopathy  Atrial fibrillation        Plan for the Primary Problem(s):  · Sepsis likely due to HCAP (healthcare-associated pneumonia)-upon admission tachycardia 91, febrile rectal temperature 102 1, hypoxic 85% on room air; worsening shortness of breath with increase oxygen use currently on 4 L nasal cannula oxygen saturation at 97% without cough  ¨ chest x-ray  preliminary read right lower lobe consolidation, recent hospitalization at [facility] for possible bilateral lower extremity cellulitis ruled to be chronic ulcerations of bilateral extremity  ¨ start cefepime and azithromycin  given cefepime in one dose of vancomycin in the ED  ¨ Respiratory protocol, incentive spirometer, continue oxygen nasal cannula attempt to wean to goal oxygen saturation of greater than 92%   ¨ blood culture, sputum culture, strep/Legionella urine, influenza PCR pending  ¨ Continue supportive treatment  IVF normal saline at 75 ML/HR ,Xopenex, Tylenol,      · Acute encephalopathy-lethargy, alert and oriented X3 slow to response I could due to sepsis secondary to healthcare associated pneumonia  ¨ head CT- negative for acute intracranial pathologies   ¨ telemetry, neurochecks     Plan for Additional Problems:   · Diabetic ulcer of right foot associated with type 1 diabetes mellitus-most recent hemoglobin A-1 C 10 2 December 2016 mild erythema and edema right worse than left recently hospitalized for bilateral lower extremity diabetic ulcerations seen by wound care  ¨ follow-up with outpatient wound care specialists at [facility] podiatry consulted  appreciate recommendations  Continue local wound care, serial exams  ¨ continue home regimen  Lantus 4 units b i d ,lispro 3 units TID with meals, hypoglycemic protocol, insulin sliding scale, Accu checks Q ID  ¨ recheck hemoglobin A-1 C     · Atrial fibrillation-controlled rate and irregular rhythm  ¨ continue home medication  warfarin 75 mg Thursdays and Saturdays, 5 mg Sunday, Monday, Tuesday, Wednesday, Friday  ¨ recheck INR in the a m      · Hypertension-stable BPs initially elevated 180/101 is now resolved  ¨ continue home medication  lisinopril, metoprolol  · ESRD (end stage renal disease) on dialysis Monday, Wednesday, Friday  hemodialysis today following discharge at [facility]  Out patient [employer]  BUN/creatinine stable 15/3 21  ¨ will need follow up with outpatient nephrologist  Consider nephrology consultation if patient is still hospitalized by Monday  Recheck BMP     VTE Prophylaxis: Warfarin (Coumadin) / sequential compression device   Code Status: full code  discussed with patient and patient's wife via phone  POLST: POLST information provided but not completed     Anticipated Length of Stay: Patient will be admitted on an Inpatient basis with an anticipated length of stay of at least 2 midnights  Justification for Hospital Stay: sepsis likely due to Hospital acquired pneumonia     Admission Orders:    Michelle Bolton PA-C Physician Assistant Addendum Critical Care/ICU Discharge Summaries Date of Service: 4/2/2017  2:11 PM         []Hide copied text  []Sarah for attribution information     Discharge Summary - Kim Mehta 61 y o  male MRN: [id number]     Unit/Bed#:  Encounter: [id number]     Admission Date: 3/31/2017      Admitting Diagnosis:  1   Acute Encephalopathy  2  HCAP  3  Bacteremia  4  ESRD  5  DM 1  6  Atrial Fibrillation     HPI:  Mr Shawnee Delvalel is a 61year old male with an extensive past medical history of end-stage renal disease on dialysis Monday, Wednesday and Friday, diabetes mellitus, atrial fibrillation on Coumadin, bilateral lower extremity diabetic ulcers, as well as most recently C  difficile colitis with completed treatment course as well as recurrent pneumonia  Patient presented to [facility] emergency department on March 31  He presented at that time via EMS and wife whom reports that the patient has been more confused as well as short of breath       Patient had a recent hospital admission at [facility] for 3 days being discharged this past Friday secondarily to the possibility of lower extremity cellulitis  Patient during that visit was provided one dose of vancomycin upon admission and was evaluated by podiatry as well as infectious disease  According to the patient's wife they did not believe this was an acute infection, therefore antibiotics were discontinued and patient was discharged to home on Friday      Patient was found to be hypoxic 85% on room air and was subsequently placed on 4L via NC  He was febrile @ 102 1 with a mild leukocytosis with a normal lactate  Patient was placed on cefepime as well as vancomycin  Additional work up in our ED included [address] for Encephalopathy, which was negative, Chest Xray revealed RLL Opacification / infiltrate  Patient was admitted to [place] [address]       In review of chart, noted patient provided Levemir at 1800 hours  Accu-checks were being obtained  Repeat accu-check at 2058 hours revealed 147mg/dl  Upon subsequent recheck at 0158 blood sugar was noted to be 30mg/dl  Patient was provided 1amp of D50 at that time  Accu-check was repeated a short time later revealing 181mg/dl       Subsequently CODE BLUE called @ 0316   Nursing reported in room patient "foaming at the mouth, and subsequently stopped breathing" CODE was called at that time  Upon arrival of the [facility], patient noted to be in PEA arrest and CPR was initiated  Repeat accu-check at this time was 78 mg/dl  Patient was provided Epi X2 with ROSC within 5 mins of initial resuscitation  Patient intubated during event as noted thick copious secretions, however no vomit       Patient transferred to the ICU  Within the ICU patient completely clenched on ETT without noted rigidity or focal seizure activity  Patient was provided 2mg of Ativan IV  Repeat Head CT requested as patient with noted INR of 4 15  Repeat head CT Negative  Patient placed on q1h accu-checks with additional episode of hypoglycemia for which he was provided an additional AMP of D50       Patient continued to remain off of sedation, hemodynamically stable  GCS 3 at 0620 upon my initial assessment in taking over patient care  Later in the am, patient would open eyes to loud verbal stimuli as well as follow simple commands, such as "squeezing hand"       Additionally: Blood culture data reveals Enterococcus as of 4/2 on cultures obtained on admission - ID (Dr Aamir Farfan) consulted and tailored antibiotic regimen to include cefepime / Daptomycin       Later, CC Attending performing bedside bronch, no sedation and noted patients eyes rolled up towards head with some rigidity which appeared consistent with seizure  Episode lasted approximately 1min and ceased without medication  Neurology consulted, discussion via telephone, felt patient would benefit from EMU as well as addition of [address]  Keppra 1g initial load provided and subsequently ordered 500mg IV BID       Transfer to \[facility]\"" arranged by Dr Matheus Rossi, communication with Dr Aleksandr Quan and PACS  Patient will be transferred to the ICU at [facility]      Neurology arrived to evaluate patient prior to transfer  While evaluating, patient clamped on ETT with conjugate gaze deviation and slightly to the right   Additional 1mg ativan provided       Family updates provided frequently  Although they were considering Palliative care on Monday, they would like to remain Level 1 Full Code and continue with current treatment course             Procedures Performed:       Orders Placed This Encounter   Procedures    Bronchoscopy    Critical Care    ED ECG Documentation Only    INTUBATION       Complications: As noted above     Discharge Diagnosis:   1  S/P Cardiac Arrest with ROSC  2  Acute Encephalopathy  3  Bacteremia  4  HCAP  5  Hypoglycemia  6  Probable Seizures  7  ESRD  8  DM 1  9  Atrial Fibrillation     Condition at Discharge: critical      Discharge instructions/Information to patient and family:   See after visit summary for information provided to patient and family       Provisions for Follow-Up Care:  See after visit summary for information related to follow-up care and any pertinent home health orders       [facility]     Planned Readmission: Yes      Discharge Statement   I spent 45 minutes discharging the patient  This time was spent on the day of discharge  I had direct contact with the patient on the day of discharge  Additional documentation is required if more than 30 minutes were spent on discharge   Additional time communicating with family, consulted services, as well as transfer teams      Discharge Medications:  See after visit summary for reconciled discharge medications provided to patient and family                     Cosigned by: Dorothea Cain DO at 4/2/2017  4:28 PM   Revision History       Date/Time User Provider Type Action     4/2/2017  4:28 PM Dorothea Cain DO Physician Cosign     4/2/2017  3:56 PM Kristin Garcia PA-C Physician Assistant Addend     4/2/2017  3:55 PM Kristin Garcia PA-C Physician Assistant Sign     View Details Report

## 2017-04-04 ENCOUNTER — APPOINTMENT (INPATIENT)
Dept: RADIOLOGY | Facility: HOSPITAL | Age: 67
DRG: 853 | End: 2017-04-04
Payer: COMMERCIAL

## 2017-04-04 ENCOUNTER — GENERIC CONVERSION - ENCOUNTER (OUTPATIENT)
Dept: OTHER | Facility: OTHER | Age: 67
End: 2017-04-04

## 2017-04-04 ENCOUNTER — APPOINTMENT (INPATIENT)
Dept: NEUROLOGY | Facility: AMBULATORY SURGERY CENTER | Age: 67
DRG: 853 | End: 2017-04-04
Payer: COMMERCIAL

## 2017-04-04 LAB
ANION GAP SERPL CALCULATED.3IONS-SCNC: 7 MMOL/L (ref 4–13)
BACTERIA SPT RESP CULT: NORMAL
BACTERIA SPT RESP CULT: NORMAL
BACTERIA WND AEROBE CULT: ABNORMAL
BACTERIA WND AEROBE CULT: ABNORMAL
BUN SERPL-MCNC: 30 MG/DL (ref 5–25)
CA-I BLD-SCNC: 1.14 MMOL/L (ref 1.12–1.32)
CALCIUM SERPL-MCNC: 8.5 MG/DL (ref 8.3–10.1)
CHLORIDE SERPL-SCNC: 105 MMOL/L (ref 100–108)
CO2 SERPL-SCNC: 28 MMOL/L (ref 21–32)
CREAT SERPL-MCNC: 4.58 MG/DL (ref 0.6–1.3)
ERYTHROCYTE [DISTWIDTH] IN BLOOD BY AUTOMATED COUNT: 15.9 % (ref 11.6–15.1)
GFR SERPL CREATININE-BSD FRML MDRD: 13 ML/MIN/1.73SQ M
GLUCOSE SERPL-MCNC: 132 MG/DL (ref 65–140)
GLUCOSE SERPL-MCNC: 144 MG/DL (ref 65–140)
GLUCOSE SERPL-MCNC: 148 MG/DL (ref 65–140)
GLUCOSE SERPL-MCNC: 151 MG/DL (ref 65–140)
GLUCOSE SERPL-MCNC: 154 MG/DL (ref 65–140)
GLUCOSE SERPL-MCNC: 158 MG/DL (ref 65–140)
GLUCOSE SERPL-MCNC: 162 MG/DL (ref 65–140)
GLUCOSE SERPL-MCNC: 168 MG/DL (ref 65–140)
GLUCOSE SERPL-MCNC: 168 MG/DL (ref 65–140)
GLUCOSE SERPL-MCNC: 169 MG/DL (ref 65–140)
GLUCOSE SERPL-MCNC: 173 MG/DL (ref 65–140)
GLUCOSE SERPL-MCNC: 176 MG/DL (ref 65–140)
GLUCOSE SERPL-MCNC: 196 MG/DL (ref 65–140)
GLUCOSE SERPL-MCNC: 215 MG/DL (ref 65–140)
GLUCOSE SERPL-MCNC: 385 MG/DL (ref 65–140)
GLUCOSE SERPL-MCNC: 83 MG/DL (ref 65–140)
GLUCOSE SERPL-MCNC: 91 MG/DL (ref 65–140)
GRAM STN SPEC: ABNORMAL
GRAM STN SPEC: NORMAL
HCT VFR BLD AUTO: 32.7 % (ref 36.5–49.3)
HGB BLD-MCNC: 10.3 G/DL (ref 12–17)
INR PPP: 6.4 (ref 0.86–1.16)
MAGNESIUM SERPL-MCNC: 2.1 MG/DL (ref 1.6–2.6)
MCH RBC QN AUTO: 30.3 PG (ref 26.8–34.3)
MCHC RBC AUTO-ENTMCNC: 31.5 G/DL (ref 31.4–37.4)
MCV RBC AUTO: 96 FL (ref 82–98)
P JIROVECII AG SPEC QL IF: NORMAL
PHOSPHATE SERPL-MCNC: 1.7 MG/DL (ref 2.3–4.1)
PLATELET # BLD AUTO: 236 THOUSANDS/UL (ref 149–390)
PMV BLD AUTO: 10.4 FL (ref 8.9–12.7)
POTASSIUM SERPL-SCNC: 3.4 MMOL/L (ref 3.5–5.3)
PROTHROMBIN TIME: 54.3 SECONDS (ref 12–14.3)
RBC # BLD AUTO: 3.4 MILLION/UL (ref 3.88–5.62)
SODIUM SERPL-SCNC: 140 MMOL/L (ref 136–145)
WBC # BLD AUTO: 13.94 THOUSAND/UL (ref 4.31–10.16)

## 2017-04-04 PROCEDURE — 84100 ASSAY OF PHOSPHORUS: CPT | Performed by: FAMILY MEDICINE

## 2017-04-04 PROCEDURE — 85610 PROTHROMBIN TIME: CPT | Performed by: EMERGENCY MEDICINE

## 2017-04-04 PROCEDURE — 95951 HB EEG MONITORING/VIDEORECORD: CPT

## 2017-04-04 PROCEDURE — 80048 BASIC METABOLIC PNL TOTAL CA: CPT | Performed by: FAMILY MEDICINE

## 2017-04-04 PROCEDURE — A9270 NON-COVERED ITEM OR SERVICE: HCPCS | Performed by: INTERNAL MEDICINE

## 2017-04-04 PROCEDURE — 82330 ASSAY OF CALCIUM: CPT | Performed by: FAMILY MEDICINE

## 2017-04-04 PROCEDURE — 82948 REAGENT STRIP/BLOOD GLUCOSE: CPT

## 2017-04-04 PROCEDURE — A9270 NON-COVERED ITEM OR SERVICE: HCPCS | Performed by: FAMILY MEDICINE

## 2017-04-04 PROCEDURE — A9270 NON-COVERED ITEM OR SERVICE: HCPCS | Performed by: EMERGENCY MEDICINE

## 2017-04-04 PROCEDURE — C9113 INJ PANTOPRAZOLE SODIUM, VIA: HCPCS | Performed by: PHYSICIAN ASSISTANT

## 2017-04-04 PROCEDURE — A9270 NON-COVERED ITEM OR SERVICE: HCPCS | Performed by: PHYSICIAN ASSISTANT

## 2017-04-04 PROCEDURE — 94003 VENT MGMT INPAT SUBQ DAY: CPT

## 2017-04-04 PROCEDURE — 94760 N-INVAS EAR/PLS OXIMETRY 1: CPT

## 2017-04-04 PROCEDURE — 70551 MRI BRAIN STEM W/O DYE: CPT

## 2017-04-04 PROCEDURE — 83735 ASSAY OF MAGNESIUM: CPT | Performed by: FAMILY MEDICINE

## 2017-04-04 PROCEDURE — 85027 COMPLETE CBC AUTOMATED: CPT | Performed by: FAMILY MEDICINE

## 2017-04-04 RX ORDER — FENTANYL CITRATE 50 UG/ML
25 INJECTION, SOLUTION INTRAMUSCULAR; INTRAVENOUS EVERY 2 HOUR PRN
Status: DISCONTINUED | OUTPATIENT
Start: 2017-04-04 | End: 2017-04-05

## 2017-04-04 RX ORDER — MIDAZOLAM HYDROCHLORIDE 1 MG/ML
1 INJECTION INTRAMUSCULAR; INTRAVENOUS ONCE
Status: COMPLETED | OUTPATIENT
Start: 2017-04-04 | End: 2017-04-04

## 2017-04-04 RX ORDER — FENTANYL CITRATE 50 UG/ML
50 INJECTION, SOLUTION INTRAMUSCULAR; INTRAVENOUS ONCE
Status: COMPLETED | OUTPATIENT
Start: 2017-04-04 | End: 2017-04-04

## 2017-04-04 RX ADMIN — FENTANYL CITRATE 25 MCG: 50 INJECTION INTRAMUSCULAR; INTRAVENOUS at 17:28

## 2017-04-04 RX ADMIN — DEXMEDETOMIDINE HYDROCHLORIDE 0.3 MCG/KG/HR: 100 INJECTION, SOLUTION INTRAVENOUS at 12:32

## 2017-04-04 RX ADMIN — ANORECTAL OINTMENT 1 APPLICATION: 15.7; .44; 24; 20.6 OINTMENT TOPICAL at 22:39

## 2017-04-04 RX ADMIN — FENTANYL CITRATE 25 MCG: 50 INJECTION INTRAMUSCULAR; INTRAVENOUS at 23:55

## 2017-04-04 RX ADMIN — FENTANYL CITRATE 50 MCG: 50 INJECTION INTRAMUSCULAR; INTRAVENOUS at 20:53

## 2017-04-04 RX ADMIN — CHLORHEXIDINE GLUCONATE 15 ML: 1.2 RINSE ORAL at 09:17

## 2017-04-04 RX ADMIN — ANORECTAL OINTMENT 1 APPLICATION: 15.7; .44; 24; 20.6 OINTMENT TOPICAL at 16:28

## 2017-04-04 RX ADMIN — PANTOPRAZOLE SODIUM 40 MG: 40 INJECTION, POWDER, FOR SOLUTION INTRAVENOUS at 09:18

## 2017-04-04 RX ADMIN — Medication 1 CAPSULE: at 10:14

## 2017-04-04 RX ADMIN — VANCOMYCIN HYDROCHLORIDE 125 MG: 500 INJECTION, POWDER, LYOPHILIZED, FOR SOLUTION INTRAVENOUS at 13:13

## 2017-04-04 RX ADMIN — CHLORHEXIDINE GLUCONATE 15 ML: 1.2 RINSE ORAL at 22:38

## 2017-04-04 RX ADMIN — FENTANYL CITRATE 25 MCG: 50 INJECTION INTRAMUSCULAR; INTRAVENOUS at 19:56

## 2017-04-04 RX ADMIN — CEFEPIME HYDROCHLORIDE 1000 MG: 1 INJECTION, POWDER, FOR SOLUTION INTRAMUSCULAR; INTRAVENOUS at 16:27

## 2017-04-04 RX ADMIN — LEVETIRACETAM 500 MG: 500 TABLET ORAL at 16:01

## 2017-04-04 RX ADMIN — FENTANYL CITRATE 25 MCG: 50 INJECTION INTRAMUSCULAR; INTRAVENOUS at 13:43

## 2017-04-04 RX ADMIN — VANCOMYCIN HYDROCHLORIDE 125 MG: 500 INJECTION, POWDER, LYOPHILIZED, FOR SOLUTION INTRAVENOUS at 06:01

## 2017-04-04 RX ADMIN — VANCOMYCIN HYDROCHLORIDE 125 MG: 500 INJECTION, POWDER, LYOPHILIZED, FOR SOLUTION INTRAVENOUS at 18:33

## 2017-04-04 RX ADMIN — ANORECTAL OINTMENT 1 APPLICATION: 15.7; .44; 24; 20.6 OINTMENT TOPICAL at 09:19

## 2017-04-04 RX ADMIN — POTASSIUM PHOSPHATE, MONOBASIC AND POTASSIUM PHOSPHATE, DIBASIC 21 MMOL: 224; 236 INJECTION, SOLUTION INTRAVENOUS at 09:18

## 2017-04-04 RX ADMIN — FENTANYL CITRATE 25 MCG: 50 INJECTION INTRAMUSCULAR; INTRAVENOUS at 20:50

## 2017-04-04 RX ADMIN — MIDAZOLAM HYDROCHLORIDE 1 MG: 1 INJECTION, SOLUTION INTRAMUSCULAR; INTRAVENOUS at 20:53

## 2017-04-04 RX ADMIN — VANCOMYCIN HYDROCHLORIDE 125 MG: 500 INJECTION, POWDER, LYOPHILIZED, FOR SOLUTION INTRAVENOUS at 00:03

## 2017-04-04 RX ADMIN — FENTANYL CITRATE 25 MCG: 50 INJECTION INTRAMUSCULAR; INTRAVENOUS at 10:32

## 2017-04-05 ENCOUNTER — GENERIC CONVERSION - ENCOUNTER (OUTPATIENT)
Dept: OTHER | Facility: OTHER | Age: 67
End: 2017-04-05

## 2017-04-05 ENCOUNTER — APPOINTMENT (INPATIENT)
Dept: DIALYSIS | Facility: HOSPITAL | Age: 67
DRG: 853 | End: 2017-04-05
Attending: INTERNAL MEDICINE
Payer: COMMERCIAL

## 2017-04-05 LAB
ABO GROUP BLD: NORMAL
ANION GAP SERPL CALCULATED.3IONS-SCNC: 8 MMOL/L (ref 4–13)
BACTERIA BRONCH AEROBE CULT: NO GROWTH
BACTERIA BRONCH AEROBE CULT: NORMAL
BASOPHILS # BLD AUTO: 0.03 THOUSANDS/ΜL (ref 0–0.1)
BASOPHILS NFR BLD AUTO: 0 % (ref 0–1)
BLD GP AB SCN SERPL QL: NEGATIVE
BUN SERPL-MCNC: 42 MG/DL (ref 5–25)
CALCIUM SERPL-MCNC: 8.5 MG/DL (ref 8.3–10.1)
CHLORIDE SERPL-SCNC: 106 MMOL/L (ref 100–108)
CO2 SERPL-SCNC: 28 MMOL/L (ref 21–32)
CREAT SERPL-MCNC: 5.35 MG/DL (ref 0.6–1.3)
EOSINOPHIL # BLD AUTO: 0.39 THOUSAND/ΜL (ref 0–0.61)
EOSINOPHIL NFR BLD AUTO: 3 % (ref 0–6)
ERYTHROCYTE [DISTWIDTH] IN BLOOD BY AUTOMATED COUNT: 15.9 % (ref 11.6–15.1)
GFR SERPL CREATININE-BSD FRML MDRD: 10.9 ML/MIN/1.73SQ M
GLUCOSE SERPL-MCNC: 117 MG/DL (ref 65–140)
GLUCOSE SERPL-MCNC: 118 MG/DL (ref 65–140)
GLUCOSE SERPL-MCNC: 123 MG/DL (ref 65–140)
GLUCOSE SERPL-MCNC: 128 MG/DL (ref 65–140)
GLUCOSE SERPL-MCNC: 129 MG/DL (ref 65–140)
GLUCOSE SERPL-MCNC: 130 MG/DL (ref 65–140)
GLUCOSE SERPL-MCNC: 144 MG/DL (ref 65–140)
GLUCOSE SERPL-MCNC: 144 MG/DL (ref 65–140)
GLUCOSE SERPL-MCNC: 188 MG/DL (ref 65–140)
GLUCOSE SERPL-MCNC: 190 MG/DL (ref 65–140)
GLUCOSE SERPL-MCNC: 227 MG/DL (ref 65–140)
GLUCOSE SERPL-MCNC: 81 MG/DL (ref 65–140)
GLUCOSE SERPL-MCNC: 89 MG/DL (ref 65–140)
GRAM STN SPEC: NORMAL
HCT VFR BLD AUTO: 30.8 % (ref 36.5–49.3)
HGB BLD-MCNC: 9.7 G/DL (ref 12–17)
INR PPP: 3.75 (ref 0.86–1.16)
LYMPHOCYTES # BLD AUTO: 0.6 THOUSANDS/ΜL (ref 0.6–4.47)
LYMPHOCYTES NFR BLD AUTO: 5 % (ref 14–44)
MCH RBC QN AUTO: 30.5 PG (ref 26.8–34.3)
MCHC RBC AUTO-ENTMCNC: 31.5 G/DL (ref 31.4–37.4)
MCV RBC AUTO: 97 FL (ref 82–98)
MONOCYTES # BLD AUTO: 0.76 THOUSAND/ΜL (ref 0.17–1.22)
MONOCYTES NFR BLD AUTO: 6 % (ref 4–12)
NEUTROPHILS # BLD AUTO: 10.58 THOUSANDS/ΜL (ref 1.85–7.62)
NEUTS SEG NFR BLD AUTO: 86 % (ref 43–75)
NRBC BLD AUTO-RTO: 0 /100 WBCS
PLATELET # BLD AUTO: 198 THOUSANDS/UL (ref 149–390)
PMV BLD AUTO: 10.3 FL (ref 8.9–12.7)
POTASSIUM SERPL-SCNC: 3.6 MMOL/L (ref 3.5–5.3)
PROTHROMBIN TIME: 36.2 SECONDS (ref 12–14.3)
RBC # BLD AUTO: 3.18 MILLION/UL (ref 3.88–5.62)
RH BLD: POSITIVE
SODIUM SERPL-SCNC: 142 MMOL/L (ref 136–145)
WBC # BLD AUTO: 12.38 THOUSAND/UL (ref 4.31–10.16)

## 2017-04-05 PROCEDURE — 80048 BASIC METABOLIC PNL TOTAL CA: CPT | Performed by: NURSE PRACTITIONER

## 2017-04-05 PROCEDURE — 86850 RBC ANTIBODY SCREEN: CPT | Performed by: SURGERY

## 2017-04-05 PROCEDURE — 86901 BLOOD TYPING SEROLOGIC RH(D): CPT | Performed by: SURGERY

## 2017-04-05 PROCEDURE — 94760 N-INVAS EAR/PLS OXIMETRY 1: CPT

## 2017-04-05 PROCEDURE — 82948 REAGENT STRIP/BLOOD GLUCOSE: CPT

## 2017-04-05 PROCEDURE — 85610 PROTHROMBIN TIME: CPT | Performed by: NURSE PRACTITIONER

## 2017-04-05 PROCEDURE — 87040 BLOOD CULTURE FOR BACTERIA: CPT | Performed by: INTERNAL MEDICINE

## 2017-04-05 PROCEDURE — A9270 NON-COVERED ITEM OR SERVICE: HCPCS | Performed by: PHYSICIAN ASSISTANT

## 2017-04-05 PROCEDURE — 94003 VENT MGMT INPAT SUBQ DAY: CPT

## 2017-04-05 PROCEDURE — 86900 BLOOD TYPING SEROLOGIC ABO: CPT | Performed by: SURGERY

## 2017-04-05 PROCEDURE — 85025 COMPLETE CBC W/AUTO DIFF WBC: CPT | Performed by: NURSE PRACTITIONER

## 2017-04-05 PROCEDURE — A9270 NON-COVERED ITEM OR SERVICE: HCPCS | Performed by: FAMILY MEDICINE

## 2017-04-05 PROCEDURE — C9113 INJ PANTOPRAZOLE SODIUM, VIA: HCPCS | Performed by: PHYSICIAN ASSISTANT

## 2017-04-05 PROCEDURE — A9270 NON-COVERED ITEM OR SERVICE: HCPCS | Performed by: INTERNAL MEDICINE

## 2017-04-05 PROCEDURE — A9270 NON-COVERED ITEM OR SERVICE: HCPCS | Performed by: EMERGENCY MEDICINE

## 2017-04-05 RX ORDER — SODIUM CHLORIDE 9 MG/ML
50 INJECTION, SOLUTION INTRAVENOUS CONTINUOUS
Status: DISCONTINUED | OUTPATIENT
Start: 2017-04-05 | End: 2017-04-05

## 2017-04-05 RX ORDER — FENTANYL CITRATE 50 UG/ML
50 INJECTION, SOLUTION INTRAMUSCULAR; INTRAVENOUS ONCE
Status: DISCONTINUED | OUTPATIENT
Start: 2017-04-05 | End: 2017-04-06

## 2017-04-05 RX ORDER — SODIUM CHLORIDE 9 MG/ML
50 INJECTION, SOLUTION INTRAVENOUS CONTINUOUS
Status: DISCONTINUED | OUTPATIENT
Start: 2017-04-06 | End: 2017-04-07

## 2017-04-05 RX ORDER — FENTANYL CITRATE 50 UG/ML
50 INJECTION, SOLUTION INTRAMUSCULAR; INTRAVENOUS EVERY 2 HOUR PRN
Status: DISCONTINUED | OUTPATIENT
Start: 2017-04-05 | End: 2017-04-08

## 2017-04-05 RX ORDER — LORAZEPAM 2 MG/ML
INJECTION INTRAMUSCULAR
Status: COMPLETED
Start: 2017-04-05 | End: 2017-04-05

## 2017-04-05 RX ORDER — LORAZEPAM 2 MG/ML
1 INJECTION INTRAMUSCULAR ONCE
Status: COMPLETED | OUTPATIENT
Start: 2017-04-05 | End: 2017-04-05

## 2017-04-05 RX ORDER — POTASSIUM CHLORIDE 29.8 MG/ML
40 INJECTION INTRAVENOUS ONCE
Status: DISCONTINUED | OUTPATIENT
Start: 2017-04-05 | End: 2017-04-05

## 2017-04-05 RX ORDER — PROPOFOL 10 MG/ML
5-50 INJECTION, EMULSION INTRAVENOUS
Status: DISCONTINUED | OUTPATIENT
Start: 2017-04-05 | End: 2017-04-07

## 2017-04-05 RX ORDER — PROPOFOL 10 MG/ML
INJECTION, EMULSION INTRAVENOUS
Status: DISPENSED
Start: 2017-04-05 | End: 2017-04-06

## 2017-04-05 RX ADMIN — DEXMEDETOMIDINE HYDROCHLORIDE 1 MCG/KG/HR: 100 INJECTION, SOLUTION INTRAVENOUS at 14:47

## 2017-04-05 RX ADMIN — CEFEPIME HYDROCHLORIDE 1000 MG: 1 INJECTION, POWDER, FOR SOLUTION INTRAMUSCULAR; INTRAVENOUS at 17:11

## 2017-04-05 RX ADMIN — DAPTOMYCIN 650 MG: 500 INJECTION, POWDER, LYOPHILIZED, FOR SOLUTION INTRAVENOUS at 13:12

## 2017-04-05 RX ADMIN — CHLORHEXIDINE GLUCONATE 15 ML: 1.2 RINSE ORAL at 12:39

## 2017-04-05 RX ADMIN — PANTOPRAZOLE SODIUM 40 MG: 40 INJECTION, POWDER, FOR SOLUTION INTRAVENOUS at 12:39

## 2017-04-05 RX ADMIN — PROPOFOL 25.96 MCG/KG/MIN: 10 INJECTION, EMULSION INTRAVENOUS at 16:51

## 2017-04-05 RX ADMIN — SODIUM CHLORIDE 50 ML/HR: 0.9 INJECTION, SOLUTION INTRAVENOUS at 23:15

## 2017-04-05 RX ADMIN — DEXMEDETOMIDINE HYDROCHLORIDE 1 MCG/KG/HR: 100 INJECTION, SOLUTION INTRAVENOUS at 03:59

## 2017-04-05 RX ADMIN — ANORECTAL OINTMENT 1 APPLICATION: 15.7; .44; 24; 20.6 OINTMENT TOPICAL at 16:57

## 2017-04-05 RX ADMIN — DEXMEDETOMIDINE HYDROCHLORIDE 1 MCG/KG/HR: 100 INJECTION, SOLUTION INTRAVENOUS at 01:14

## 2017-04-05 RX ADMIN — VANCOMYCIN HYDROCHLORIDE 125 MG: 500 INJECTION, POWDER, LYOPHILIZED, FOR SOLUTION INTRAVENOUS at 12:52

## 2017-04-05 RX ADMIN — FENTANYL CITRATE 50 MCG: 50 INJECTION INTRAMUSCULAR; INTRAVENOUS at 09:50

## 2017-04-05 RX ADMIN — VANCOMYCIN HYDROCHLORIDE 125 MG: 500 INJECTION, POWDER, LYOPHILIZED, FOR SOLUTION INTRAVENOUS at 17:11

## 2017-04-05 RX ADMIN — PROPOFOL 30 MCG/KG/MIN: 10 INJECTION, EMULSION INTRAVENOUS at 23:05

## 2017-04-05 RX ADMIN — DEXMEDETOMIDINE HYDROCHLORIDE 1 MCG/KG/HR: 100 INJECTION, SOLUTION INTRAVENOUS at 12:12

## 2017-04-05 RX ADMIN — LEVETIRACETAM 500 MG: 500 TABLET ORAL at 17:11

## 2017-04-05 RX ADMIN — VANCOMYCIN HYDROCHLORIDE 125 MG: 500 INJECTION, POWDER, LYOPHILIZED, FOR SOLUTION INTRAVENOUS at 05:57

## 2017-04-05 RX ADMIN — DEXMEDETOMIDINE HYDROCHLORIDE 1 MCG/KG/HR: 100 INJECTION, SOLUTION INTRAVENOUS at 09:55

## 2017-04-05 RX ADMIN — LORAZEPAM 1 MG: 2 INJECTION, SOLUTION INTRAMUSCULAR; INTRAVENOUS at 10:45

## 2017-04-05 RX ADMIN — LORAZEPAM 1 MG: 2 INJECTION INTRAMUSCULAR at 10:45

## 2017-04-05 RX ADMIN — LEVOTHYROXINE SODIUM 175 MCG: 125 TABLET ORAL at 05:57

## 2017-04-05 RX ADMIN — ANORECTAL OINTMENT 1 APPLICATION: 15.7; .44; 24; 20.6 OINTMENT TOPICAL at 08:58

## 2017-04-05 RX ADMIN — SODIUM CHLORIDE 3 UNITS/HR: 9 INJECTION, SOLUTION INTRAVENOUS at 18:26

## 2017-04-05 RX ADMIN — CHLORHEXIDINE GLUCONATE 15 ML: 1.2 RINSE ORAL at 20:01

## 2017-04-05 RX ADMIN — Medication 1 CAPSULE: at 16:59

## 2017-04-05 RX ADMIN — ANORECTAL OINTMENT 1 APPLICATION: 15.7; .44; 24; 20.6 OINTMENT TOPICAL at 20:01

## 2017-04-05 RX ADMIN — VANCOMYCIN HYDROCHLORIDE 125 MG: 500 INJECTION, POWDER, LYOPHILIZED, FOR SOLUTION INTRAVENOUS at 00:21

## 2017-04-05 RX ADMIN — FENTANYL CITRATE 50 MCG: 50 INJECTION INTRAMUSCULAR; INTRAVENOUS at 12:40

## 2017-04-06 ENCOUNTER — ANESTHESIA (INPATIENT)
Dept: PERIOP | Facility: HOSPITAL | Age: 67
DRG: 853 | End: 2017-04-06
Payer: COMMERCIAL

## 2017-04-06 ENCOUNTER — ANESTHESIA EVENT (INPATIENT)
Dept: PERIOP | Facility: HOSPITAL | Age: 67
DRG: 853 | End: 2017-04-06
Payer: COMMERCIAL

## 2017-04-06 ENCOUNTER — APPOINTMENT (INPATIENT)
Dept: NON INVASIVE DIAGNOSTICS | Facility: HOSPITAL | Age: 67
DRG: 853 | End: 2017-04-06
Payer: COMMERCIAL

## 2017-04-06 LAB
ABO GROUP BLD BPU: NORMAL
ABO GROUP BLD BPU: NORMAL
ALBUMIN SERPL BCP-MCNC: 1.5 G/DL (ref 3.5–5)
ALP SERPL-CCNC: 173 U/L (ref 46–116)
ALT SERPL W P-5'-P-CCNC: 11 U/L (ref 12–78)
ANION GAP SERPL CALCULATED.3IONS-SCNC: 5 MMOL/L (ref 4–13)
APTT PPP: 57 SECONDS (ref 24–36)
AST SERPL W P-5'-P-CCNC: 9 U/L (ref 5–45)
BASE EX.OXY STD BLDV CALC-SCNC: 76.8 % (ref 60–80)
BASE EXCESS BLDV CALC-SCNC: 2.6 MMOL/L
BASOPHILS # BLD AUTO: 0.02 THOUSANDS/ΜL (ref 0–0.1)
BASOPHILS NFR BLD AUTO: 0 % (ref 0–1)
BILIRUB SERPL-MCNC: 0.45 MG/DL (ref 0.2–1)
BPU ID: NORMAL
BPU ID: NORMAL
BUN SERPL-MCNC: 25 MG/DL (ref 5–25)
CALCIUM SERPL-MCNC: 8.2 MG/DL (ref 8.3–10.1)
CHLORIDE SERPL-SCNC: 103 MMOL/L (ref 100–108)
CO2 SERPL-SCNC: 29 MMOL/L (ref 21–32)
CREAT SERPL-MCNC: 3.47 MG/DL (ref 0.6–1.3)
EOSINOPHIL # BLD AUTO: 0.16 THOUSAND/ΜL (ref 0–0.61)
EOSINOPHIL NFR BLD AUTO: 2 % (ref 0–6)
ERYTHROCYTE [DISTWIDTH] IN BLOOD BY AUTOMATED COUNT: 15.7 % (ref 11.6–15.1)
ERYTHROCYTE [DISTWIDTH] IN BLOOD BY AUTOMATED COUNT: 16 % (ref 11.6–15.1)
GFR SERPL CREATININE-BSD FRML MDRD: 17.9 ML/MIN/1.73SQ M
GLUCOSE SERPL-MCNC: 111 MG/DL (ref 65–140)
GLUCOSE SERPL-MCNC: 117 MG/DL (ref 65–140)
GLUCOSE SERPL-MCNC: 119 MG/DL (ref 65–140)
GLUCOSE SERPL-MCNC: 120 MG/DL (ref 65–140)
GLUCOSE SERPL-MCNC: 122 MG/DL (ref 65–140)
GLUCOSE SERPL-MCNC: 129 MG/DL (ref 65–140)
GLUCOSE SERPL-MCNC: 131 MG/DL (ref 65–140)
GLUCOSE SERPL-MCNC: 181 MG/DL (ref 65–140)
GLUCOSE SERPL-MCNC: 192 MG/DL (ref 65–140)
GLUCOSE SERPL-MCNC: 60 MG/DL (ref 65–140)
GLUCOSE SERPL-MCNC: 63 MG/DL (ref 65–140)
GLUCOSE SERPL-MCNC: 79 MG/DL (ref 65–140)
GLUCOSE SERPL-MCNC: 81 MG/DL (ref 65–140)
HCO3 BLDV-SCNC: 27.7 MMOL/L (ref 24–30)
HCT VFR BLD AUTO: 26.6 % (ref 36.5–49.3)
HCT VFR BLD AUTO: 32.4 % (ref 36.5–49.3)
HGB BLD-MCNC: 10.1 G/DL (ref 12–17)
HGB BLD-MCNC: 8.3 G/DL (ref 12–17)
HOROWITZ INDEX BLDA+IHG-RTO: 40 MM[HG]
INR PPP: 1.65 (ref 0.86–1.16)
INR PPP: 2.37 (ref 0.86–1.16)
LACTATE SERPL-SCNC: 1.6 MMOL/L (ref 0.5–2)
LYMPHOCYTES # BLD AUTO: 0.72 THOUSANDS/ΜL (ref 0.6–4.47)
LYMPHOCYTES NFR BLD AUTO: 7 % (ref 14–44)
MAGNESIUM SERPL-MCNC: 2 MG/DL (ref 1.6–2.6)
MCH RBC QN AUTO: 30.3 PG (ref 26.8–34.3)
MCH RBC QN AUTO: 30.3 PG (ref 26.8–34.3)
MCHC RBC AUTO-ENTMCNC: 31.2 G/DL (ref 31.4–37.4)
MCHC RBC AUTO-ENTMCNC: 31.2 G/DL (ref 31.4–37.4)
MCV RBC AUTO: 97 FL (ref 82–98)
MCV RBC AUTO: 97 FL (ref 82–98)
MONOCYTES # BLD AUTO: 0.58 THOUSAND/ΜL (ref 0.17–1.22)
MONOCYTES NFR BLD AUTO: 6 % (ref 4–12)
NEUTROPHILS # BLD AUTO: 8.37 THOUSANDS/ΜL (ref 1.85–7.62)
NEUTS SEG NFR BLD AUTO: 85 % (ref 43–75)
NRBC BLD AUTO-RTO: 0 /100 WBCS
O2 CT BLDV-SCNC: 10 ML/DL
PCO2 BLDV: 45.2 MM HG (ref 42–50)
PEEP RESPIRATORY: 5 CM[H2O]
PH BLDV: 7.41 [PH] (ref 7.3–7.4)
PHOSPHATE SERPL-MCNC: 1.8 MG/DL (ref 2.3–4.1)
PLATELET # BLD AUTO: 168 THOUSANDS/UL (ref 149–390)
PLATELET # BLD AUTO: 201 THOUSANDS/UL (ref 149–390)
PMV BLD AUTO: 10.7 FL (ref 8.9–12.7)
PMV BLD AUTO: 9.7 FL (ref 8.9–12.7)
PO2 BLDV: 45.3 MM HG (ref 35–45)
POTASSIUM SERPL-SCNC: 4.1 MMOL/L (ref 3.5–5.3)
PROT SERPL-MCNC: 6.4 G/DL (ref 6.4–8.2)
PROTHROMBIN TIME: 19.4 SECONDS (ref 12–14.3)
PROTHROMBIN TIME: 25.6 SECONDS (ref 12–14.3)
RBC # BLD AUTO: 2.74 MILLION/UL (ref 3.88–5.62)
RBC # BLD AUTO: 3.33 MILLION/UL (ref 3.88–5.62)
SODIUM SERPL-SCNC: 137 MMOL/L (ref 136–145)
UNIT DISPENSE STATUS: NORMAL
UNIT DISPENSE STATUS: NORMAL
UNIT PRODUCT CODE: NORMAL
UNIT PRODUCT CODE: NORMAL
UNIT RH: NORMAL
UNIT RH: NORMAL
VENT AC: 14
VENT- AC: AC
VT SETTING VENT: 375 ML
WBC # BLD AUTO: 11.64 THOUSAND/UL (ref 4.31–10.16)
WBC # BLD AUTO: 9.88 THOUSAND/UL (ref 4.31–10.16)

## 2017-04-06 PROCEDURE — 85730 THROMBOPLASTIN TIME PARTIAL: CPT | Performed by: FAMILY MEDICINE

## 2017-04-06 PROCEDURE — 82948 REAGENT STRIP/BLOOD GLUCOSE: CPT

## 2017-04-06 PROCEDURE — 94003 VENT MGMT INPAT SUBQ DAY: CPT

## 2017-04-06 PROCEDURE — A9270 NON-COVERED ITEM OR SERVICE: HCPCS | Performed by: NURSE PRACTITIONER

## 2017-04-06 PROCEDURE — 85025 COMPLETE CBC W/AUTO DIFF WBC: CPT | Performed by: EMERGENCY MEDICINE

## 2017-04-06 PROCEDURE — C9113 INJ PANTOPRAZOLE SODIUM, VIA: HCPCS | Performed by: PHYSICIAN ASSISTANT

## 2017-04-06 PROCEDURE — 85610 PROTHROMBIN TIME: CPT | Performed by: NURSE PRACTITIONER

## 2017-04-06 PROCEDURE — A9270 NON-COVERED ITEM OR SERVICE: HCPCS | Performed by: PHYSICIAN ASSISTANT

## 2017-04-06 PROCEDURE — A9270 NON-COVERED ITEM OR SERVICE: HCPCS | Performed by: EMERGENCY MEDICINE

## 2017-04-06 PROCEDURE — 94760 N-INVAS EAR/PLS OXIMETRY 1: CPT

## 2017-04-06 PROCEDURE — 80053 COMPREHEN METABOLIC PANEL: CPT | Performed by: FAMILY MEDICINE

## 2017-04-06 PROCEDURE — 84100 ASSAY OF PHOSPHORUS: CPT | Performed by: FAMILY MEDICINE

## 2017-04-06 PROCEDURE — 83605 ASSAY OF LACTIC ACID: CPT | Performed by: EMERGENCY MEDICINE

## 2017-04-06 PROCEDURE — 82805 BLOOD GASES W/O2 SATURATION: CPT | Performed by: EMERGENCY MEDICINE

## 2017-04-06 PROCEDURE — 88307 TISSUE EXAM BY PATHOLOGIST: CPT | Performed by: SURGERY

## 2017-04-06 PROCEDURE — 85610 PROTHROMBIN TIME: CPT | Performed by: FAMILY MEDICINE

## 2017-04-06 PROCEDURE — 85027 COMPLETE CBC AUTOMATED: CPT | Performed by: FAMILY MEDICINE

## 2017-04-06 PROCEDURE — A9270 NON-COVERED ITEM OR SERVICE: HCPCS | Performed by: INTERNAL MEDICINE

## 2017-04-06 PROCEDURE — 88311 DECALCIFY TISSUE: CPT | Performed by: SURGERY

## 2017-04-06 PROCEDURE — A9270 NON-COVERED ITEM OR SERVICE: HCPCS | Performed by: SURGERY

## 2017-04-06 PROCEDURE — 30243K1 TRANSFUSION OF NONAUTOLOGOUS FROZEN PLASMA INTO CENTRAL VEIN, PERCUTANEOUS APPROACH: ICD-10-PCS | Performed by: INTERNAL MEDICINE

## 2017-04-06 PROCEDURE — P9017 PLASMA 1 DONOR FRZ W/IN 8 HR: HCPCS

## 2017-04-06 PROCEDURE — 86927 PLASMA FRESH FROZEN: CPT

## 2017-04-06 PROCEDURE — 83735 ASSAY OF MAGNESIUM: CPT | Performed by: FAMILY MEDICINE

## 2017-04-06 RX ORDER — LEVETIRACETAM 100 MG/ML
250 SOLUTION ORAL DAILY
Status: COMPLETED | OUTPATIENT
Start: 2017-04-07 | End: 2017-04-09

## 2017-04-06 RX ORDER — MAGNESIUM HYDROXIDE 1200 MG/15ML
LIQUID ORAL AS NEEDED
Status: DISCONTINUED | OUTPATIENT
Start: 2017-04-06 | End: 2017-04-06 | Stop reason: HOSPADM

## 2017-04-06 RX ORDER — DEXTROSE MONOHYDRATE 25 G/50ML
INJECTION, SOLUTION INTRAVENOUS
Status: DISPENSED
Start: 2017-04-06 | End: 2017-04-06

## 2017-04-06 RX ORDER — DEXTROSE AND SODIUM CHLORIDE 5; .9 G/100ML; G/100ML
50 INJECTION, SOLUTION INTRAVENOUS CONTINUOUS
Status: DISCONTINUED | OUTPATIENT
Start: 2017-04-06 | End: 2017-04-13

## 2017-04-06 RX ORDER — CEFAZOLIN SODIUM 1 G/3ML
INJECTION, POWDER, FOR SOLUTION INTRAMUSCULAR; INTRAVENOUS AS NEEDED
Status: DISCONTINUED | OUTPATIENT
Start: 2017-04-06 | End: 2017-04-06 | Stop reason: SURG

## 2017-04-06 RX ORDER — DEXTROSE 25 % IN WATER 25 %
2.5 SYRINGE (ML) INTRAVENOUS ONCE
Status: COMPLETED | OUTPATIENT
Start: 2017-04-06 | End: 2017-04-06

## 2017-04-06 RX ORDER — ROCURONIUM BROMIDE 10 MG/ML
INJECTION, SOLUTION INTRAVENOUS AS NEEDED
Status: DISCONTINUED | OUTPATIENT
Start: 2017-04-06 | End: 2017-04-06 | Stop reason: SURG

## 2017-04-06 RX ORDER — FENTANYL CITRATE 50 UG/ML
INJECTION, SOLUTION INTRAMUSCULAR; INTRAVENOUS AS NEEDED
Status: DISCONTINUED | OUTPATIENT
Start: 2017-04-06 | End: 2017-04-06 | Stop reason: SURG

## 2017-04-06 RX ORDER — GINSENG 100 MG
CAPSULE ORAL AS NEEDED
Status: DISCONTINUED | OUTPATIENT
Start: 2017-04-06 | End: 2017-04-06 | Stop reason: HOSPADM

## 2017-04-06 RX ORDER — LEVETIRACETAM 500 MG/1
500 TABLET ORAL DAILY
Status: COMPLETED | OUTPATIENT
Start: 2017-04-06 | End: 2017-04-06

## 2017-04-06 RX ADMIN — ROCURONIUM BROMIDE 20 MG: 10 INJECTION, SOLUTION INTRAVENOUS at 14:20

## 2017-04-06 RX ADMIN — VANCOMYCIN HYDROCHLORIDE 125 MG: 500 INJECTION, POWDER, LYOPHILIZED, FOR SOLUTION INTRAVENOUS at 05:19

## 2017-04-06 RX ADMIN — FENTANYL CITRATE 50 MCG: 50 INJECTION INTRAMUSCULAR; INTRAVENOUS at 22:16

## 2017-04-06 RX ADMIN — DEXTROSE MONOHYDRATE 2.5 G: 250 INJECTION, SOLUTION INTRAVENOUS at 05:20

## 2017-04-06 RX ADMIN — ROCURONIUM BROMIDE 30 MG: 10 INJECTION, SOLUTION INTRAVENOUS at 14:03

## 2017-04-06 RX ADMIN — SODIUM CHLORIDE 0.5 UNITS/HR: 9 INJECTION, SOLUTION INTRAVENOUS at 18:20

## 2017-04-06 RX ADMIN — VANCOMYCIN HYDROCHLORIDE 125 MG: 500 INJECTION, POWDER, LYOPHILIZED, FOR SOLUTION INTRAVENOUS at 17:19

## 2017-04-06 RX ADMIN — FENTANYL CITRATE 50 MCG: 50 INJECTION INTRAMUSCULAR; INTRAVENOUS at 23:56

## 2017-04-06 RX ADMIN — PROPOFOL 30 MCG/KG/MIN: 10 INJECTION, EMULSION INTRAVENOUS at 08:11

## 2017-04-06 RX ADMIN — Medication 1 CAPSULE: at 08:00

## 2017-04-06 RX ADMIN — VANCOMYCIN HYDROCHLORIDE 125 MG: 500 INJECTION, POWDER, LYOPHILIZED, FOR SOLUTION INTRAVENOUS at 23:18

## 2017-04-06 RX ADMIN — CEFAZOLIN 1000 MG: 1 INJECTION, POWDER, FOR SOLUTION INTRAVENOUS at 14:08

## 2017-04-06 RX ADMIN — VANCOMYCIN HYDROCHLORIDE 125 MG: 500 INJECTION, POWDER, LYOPHILIZED, FOR SOLUTION INTRAVENOUS at 00:49

## 2017-04-06 RX ADMIN — VANCOMYCIN HYDROCHLORIDE 125 MG: 500 INJECTION, POWDER, LYOPHILIZED, FOR SOLUTION INTRAVENOUS at 11:40

## 2017-04-06 RX ADMIN — DEXTROSE AND SODIUM CHLORIDE 50 ML/HR: 5; .9 INJECTION, SOLUTION INTRAVENOUS at 09:42

## 2017-04-06 RX ADMIN — LEVETIRACETAM 500 MG: 500 TABLET ORAL at 17:19

## 2017-04-06 RX ADMIN — PANTOPRAZOLE SODIUM 40 MG: 40 INJECTION, POWDER, FOR SOLUTION INTRAVENOUS at 08:00

## 2017-04-06 RX ADMIN — ANORECTAL OINTMENT 1 APPLICATION: 15.7; .44; 24; 20.6 OINTMENT TOPICAL at 08:00

## 2017-04-06 RX ADMIN — FENTANYL CITRATE 50 MCG: 50 INJECTION, SOLUTION INTRAMUSCULAR; INTRAVENOUS at 16:14

## 2017-04-06 RX ADMIN — ROCURONIUM BROMIDE 30 MG: 10 INJECTION, SOLUTION INTRAVENOUS at 14:40

## 2017-04-06 RX ADMIN — SODIUM PHOSPHATE, MONOBASIC, MONOHYDRATE AND SODIUM PHOSPHATE, DIBASIC, ANHYDROUS 9 MMOL: 276; 142 INJECTION, SOLUTION INTRAVENOUS at 11:40

## 2017-04-06 RX ADMIN — FENTANYL CITRATE 50 MCG: 50 INJECTION, SOLUTION INTRAMUSCULAR; INTRAVENOUS at 14:18

## 2017-04-06 RX ADMIN — PROPOFOL 30 MCG/KG/MIN: 10 INJECTION, EMULSION INTRAVENOUS at 13:25

## 2017-04-06 RX ADMIN — PROPOFOL 30 MCG/KG/MIN: 10 INJECTION, EMULSION INTRAVENOUS at 20:16

## 2017-04-06 RX ADMIN — CHLORHEXIDINE GLUCONATE 15 ML: 1.2 RINSE ORAL at 20:20

## 2017-04-06 RX ADMIN — ANORECTAL OINTMENT 1 APPLICATION: 15.7; .44; 24; 20.6 OINTMENT TOPICAL at 20:21

## 2017-04-06 RX ADMIN — CHLORHEXIDINE GLUCONATE 15 ML: 1.2 RINSE ORAL at 08:00

## 2017-04-07 ENCOUNTER — APPOINTMENT (INPATIENT)
Dept: RADIOLOGY | Facility: HOSPITAL | Age: 67
DRG: 853 | End: 2017-04-07
Payer: COMMERCIAL

## 2017-04-07 ENCOUNTER — GENERIC CONVERSION - ENCOUNTER (OUTPATIENT)
Dept: OTHER | Facility: OTHER | Age: 67
End: 2017-04-07

## 2017-04-07 ENCOUNTER — APPOINTMENT (INPATIENT)
Dept: DIALYSIS | Facility: HOSPITAL | Age: 67
DRG: 853 | End: 2017-04-07
Attending: INTERNAL MEDICINE
Payer: COMMERCIAL

## 2017-04-07 ENCOUNTER — APPOINTMENT (INPATIENT)
Dept: NON INVASIVE DIAGNOSTICS | Facility: HOSPITAL | Age: 67
DRG: 853 | End: 2017-04-07
Payer: COMMERCIAL

## 2017-04-07 LAB
ALBUMIN SERPL BCP-MCNC: 1.5 G/DL (ref 3.5–5)
ALP SERPL-CCNC: 158 U/L (ref 46–116)
ALT SERPL W P-5'-P-CCNC: 12 U/L (ref 12–78)
ANION GAP SERPL CALCULATED.3IONS-SCNC: 6 MMOL/L (ref 4–13)
ANION GAP SERPL CALCULATED.3IONS-SCNC: 8 MMOL/L (ref 4–13)
AST SERPL W P-5'-P-CCNC: 33 U/L (ref 5–45)
BASOPHILS # BLD AUTO: 0.02 THOUSANDS/ΜL (ref 0–0.1)
BASOPHILS NFR BLD AUTO: 0 % (ref 0–1)
BILIRUB SERPL-MCNC: 0.38 MG/DL (ref 0.2–1)
BUN SERPL-MCNC: 17 MG/DL (ref 5–25)
BUN SERPL-MCNC: 34 MG/DL (ref 5–25)
CALCIUM SERPL-MCNC: 8 MG/DL (ref 8.3–10.1)
CALCIUM SERPL-MCNC: 8.1 MG/DL (ref 8.3–10.1)
CHLORIDE SERPL-SCNC: 102 MMOL/L (ref 100–108)
CHLORIDE SERPL-SCNC: 103 MMOL/L (ref 100–108)
CK MB SERPL-MCNC: 16.4 NG/ML (ref 0–5)
CK MB SERPL-MCNC: 3.2 % (ref 0–2.5)
CK SERPL-CCNC: 507 U/L (ref 39–308)
CO2 SERPL-SCNC: 29 MMOL/L (ref 21–32)
CO2 SERPL-SCNC: 31 MMOL/L (ref 21–32)
CREAT SERPL-MCNC: 2.29 MG/DL (ref 0.6–1.3)
CREAT SERPL-MCNC: 4.39 MG/DL (ref 0.6–1.3)
EOSINOPHIL # BLD AUTO: 0.2 THOUSAND/ΜL (ref 0–0.61)
EOSINOPHIL NFR BLD AUTO: 2 % (ref 0–6)
ERYTHROCYTE [DISTWIDTH] IN BLOOD BY AUTOMATED COUNT: 15.8 % (ref 11.6–15.1)
GFR SERPL CREATININE-BSD FRML MDRD: 13.6 ML/MIN/1.73SQ M
GFR SERPL CREATININE-BSD FRML MDRD: 28.9 ML/MIN/1.73SQ M
GLUCOSE SERPL-MCNC: 104 MG/DL (ref 65–140)
GLUCOSE SERPL-MCNC: 105 MG/DL (ref 65–140)
GLUCOSE SERPL-MCNC: 114 MG/DL (ref 65–140)
GLUCOSE SERPL-MCNC: 123 MG/DL (ref 65–140)
GLUCOSE SERPL-MCNC: 134 MG/DL (ref 65–140)
GLUCOSE SERPL-MCNC: 136 MG/DL (ref 65–140)
GLUCOSE SERPL-MCNC: 149 MG/DL (ref 65–140)
GLUCOSE SERPL-MCNC: 153 MG/DL (ref 65–140)
GLUCOSE SERPL-MCNC: 154 MG/DL (ref 65–140)
GLUCOSE SERPL-MCNC: 193 MG/DL (ref 65–140)
GLUCOSE SERPL-MCNC: 194 MG/DL (ref 65–140)
GLUCOSE SERPL-MCNC: 203 MG/DL (ref 65–140)
GLUCOSE SERPL-MCNC: 79 MG/DL (ref 65–140)
GLUCOSE SERPL-MCNC: 90 MG/DL (ref 65–140)
GLUCOSE SERPL-MCNC: 91 MG/DL (ref 65–140)
HCT VFR BLD AUTO: 26.1 % (ref 36.5–49.3)
HGB BLD-MCNC: 8.2 G/DL (ref 12–17)
INR PPP: 1.59 (ref 0.86–1.16)
LYMPHOCYTES # BLD AUTO: 0.59 THOUSANDS/ΜL (ref 0.6–4.47)
LYMPHOCYTES NFR BLD AUTO: 5 % (ref 14–44)
MAGNESIUM SERPL-MCNC: 1.9 MG/DL (ref 1.6–2.6)
MCH RBC QN AUTO: 30.4 PG (ref 26.8–34.3)
MCHC RBC AUTO-ENTMCNC: 31.4 G/DL (ref 31.4–37.4)
MCV RBC AUTO: 97 FL (ref 82–98)
MONOCYTES # BLD AUTO: 0.76 THOUSAND/ΜL (ref 0.17–1.22)
MONOCYTES NFR BLD AUTO: 7 % (ref 4–12)
NEUTROPHILS # BLD AUTO: 9.38 THOUSANDS/ΜL (ref 1.85–7.62)
NEUTS SEG NFR BLD AUTO: 86 % (ref 43–75)
NRBC BLD AUTO-RTO: 0 /100 WBCS
PHOSPHATE SERPL-MCNC: 3.4 MG/DL (ref 2.3–4.1)
PLATELET # BLD AUTO: 181 THOUSANDS/UL (ref 149–390)
PMV BLD AUTO: 10.2 FL (ref 8.9–12.7)
POTASSIUM SERPL-SCNC: 3.7 MMOL/L (ref 3.5–5.3)
POTASSIUM SERPL-SCNC: 4.1 MMOL/L (ref 3.5–5.3)
PROT SERPL-MCNC: 6.4 G/DL (ref 6.4–8.2)
PROTHROMBIN TIME: 18.9 SECONDS (ref 12–14.3)
RBC # BLD AUTO: 2.7 MILLION/UL (ref 3.88–5.62)
SODIUM SERPL-SCNC: 139 MMOL/L (ref 136–145)
SODIUM SERPL-SCNC: 140 MMOL/L (ref 136–145)
WBC # BLD AUTO: 10.97 THOUSAND/UL (ref 4.31–10.16)

## 2017-04-07 PROCEDURE — 80053 COMPREHEN METABOLIC PANEL: CPT | Performed by: PHYSICIAN ASSISTANT

## 2017-04-07 PROCEDURE — 76376 3D RENDER W/INTRP POSTPROCES: CPT

## 2017-04-07 PROCEDURE — 71010 HB CHEST X-RAY 1 VIEW FRONTAL (PORTABLE): CPT

## 2017-04-07 PROCEDURE — A9270 NON-COVERED ITEM OR SERVICE: HCPCS | Performed by: INTERNAL MEDICINE

## 2017-04-07 PROCEDURE — 80048 BASIC METABOLIC PNL TOTAL CA: CPT | Performed by: EMERGENCY MEDICINE

## 2017-04-07 PROCEDURE — 84100 ASSAY OF PHOSPHORUS: CPT | Performed by: EMERGENCY MEDICINE

## 2017-04-07 PROCEDURE — 82553 CREATINE MB FRACTION: CPT | Performed by: PHYSICIAN ASSISTANT

## 2017-04-07 PROCEDURE — 85610 PROTHROMBIN TIME: CPT | Performed by: EMERGENCY MEDICINE

## 2017-04-07 PROCEDURE — A9270 NON-COVERED ITEM OR SERVICE: HCPCS | Performed by: EMERGENCY MEDICINE

## 2017-04-07 PROCEDURE — 94003 VENT MGMT INPAT SUBQ DAY: CPT

## 2017-04-07 PROCEDURE — 83735 ASSAY OF MAGNESIUM: CPT | Performed by: EMERGENCY MEDICINE

## 2017-04-07 PROCEDURE — 82550 ASSAY OF CK (CPK): CPT | Performed by: PHYSICIAN ASSISTANT

## 2017-04-07 PROCEDURE — A9270 NON-COVERED ITEM OR SERVICE: HCPCS | Performed by: PHYSICIAN ASSISTANT

## 2017-04-07 PROCEDURE — 85025 COMPLETE CBC W/AUTO DIFF WBC: CPT | Performed by: EMERGENCY MEDICINE

## 2017-04-07 PROCEDURE — C9113 INJ PANTOPRAZOLE SODIUM, VIA: HCPCS | Performed by: PHYSICIAN ASSISTANT

## 2017-04-07 PROCEDURE — 94760 N-INVAS EAR/PLS OXIMETRY 1: CPT

## 2017-04-07 PROCEDURE — 93312 ECHO TRANSESOPHAGEAL: CPT

## 2017-04-07 PROCEDURE — 82948 REAGENT STRIP/BLOOD GLUCOSE: CPT

## 2017-04-07 PROCEDURE — A9270 NON-COVERED ITEM OR SERVICE: HCPCS | Performed by: NURSE PRACTITIONER

## 2017-04-07 RX ORDER — HEPARIN SODIUM 5000 [USP'U]/ML
5000 INJECTION, SOLUTION INTRAVENOUS; SUBCUTANEOUS EVERY 8 HOURS SCHEDULED
Status: DISCONTINUED | OUTPATIENT
Start: 2017-04-07 | End: 2017-04-09

## 2017-04-07 RX ORDER — PROPOFOL 10 MG/ML
50 INJECTION, EMULSION INTRAVENOUS ONCE
Status: COMPLETED | OUTPATIENT
Start: 2017-04-07 | End: 2017-04-07

## 2017-04-07 RX ORDER — FENTANYL CITRATE 50 UG/ML
25 INJECTION, SOLUTION INTRAMUSCULAR; INTRAVENOUS ONCE
Status: DISCONTINUED | OUTPATIENT
Start: 2017-04-07 | End: 2017-04-08

## 2017-04-07 RX ADMIN — PROPOFOL 30 MCG/KG/MIN: 10 INJECTION, EMULSION INTRAVENOUS at 14:38

## 2017-04-07 RX ADMIN — ANORECTAL OINTMENT 1 APPLICATION: 15.7; .44; 24; 20.6 OINTMENT TOPICAL at 08:25

## 2017-04-07 RX ADMIN — VANCOMYCIN HYDROCHLORIDE 125 MG: 500 INJECTION, POWDER, LYOPHILIZED, FOR SOLUTION INTRAVENOUS at 06:31

## 2017-04-07 RX ADMIN — FENTANYL CITRATE 50 MCG: 50 INJECTION INTRAMUSCULAR; INTRAVENOUS at 23:19

## 2017-04-07 RX ADMIN — HEPARIN SODIUM 5000 UNITS: 5000 INJECTION, SOLUTION INTRAVENOUS; SUBCUTANEOUS at 22:13

## 2017-04-07 RX ADMIN — Medication 1 CAPSULE: at 08:25

## 2017-04-07 RX ADMIN — PROPOFOL 50 MG: 10 INJECTION, EMULSION INTRAVENOUS at 13:56

## 2017-04-07 RX ADMIN — PROPOFOL 30 MCG/KG/MIN: 10 INJECTION, EMULSION INTRAVENOUS at 08:38

## 2017-04-07 RX ADMIN — CHLORHEXIDINE GLUCONATE 15 ML: 1.2 RINSE ORAL at 20:19

## 2017-04-07 RX ADMIN — DEXMEDETOMIDINE HYDROCHLORIDE 0.7 MCG/KG/HR: 100 INJECTION, SOLUTION INTRAVENOUS at 21:21

## 2017-04-07 RX ADMIN — FENTANYL CITRATE 50 MCG: 50 INJECTION INTRAMUSCULAR; INTRAVENOUS at 13:53

## 2017-04-07 RX ADMIN — FENTANYL CITRATE 50 MCG: 50 INJECTION INTRAMUSCULAR; INTRAVENOUS at 21:14

## 2017-04-07 RX ADMIN — VANCOMYCIN HYDROCHLORIDE 125 MG: 500 INJECTION, POWDER, LYOPHILIZED, FOR SOLUTION INTRAVENOUS at 17:45

## 2017-04-07 RX ADMIN — ANORECTAL OINTMENT 1 APPLICATION: 15.7; .44; 24; 20.6 OINTMENT TOPICAL at 20:19

## 2017-04-07 RX ADMIN — LEVETIRACETAM 250 MG: 100 SOLUTION ORAL at 14:25

## 2017-04-07 RX ADMIN — VANCOMYCIN HYDROCHLORIDE 125 MG: 500 INJECTION, POWDER, LYOPHILIZED, FOR SOLUTION INTRAVENOUS at 11:28

## 2017-04-07 RX ADMIN — DAPTOMYCIN 650 MG: 500 INJECTION, POWDER, LYOPHILIZED, FOR SOLUTION INTRAVENOUS at 20:19

## 2017-04-07 RX ADMIN — CHLORHEXIDINE GLUCONATE 15 ML: 1.2 RINSE ORAL at 08:25

## 2017-04-07 RX ADMIN — FENTANYL CITRATE 50 MCG: 50 INJECTION INTRAMUSCULAR; INTRAVENOUS at 02:06

## 2017-04-07 RX ADMIN — ANORECTAL OINTMENT 1 APPLICATION: 15.7; .44; 24; 20.6 OINTMENT TOPICAL at 15:14

## 2017-04-07 RX ADMIN — Medication 175 MCG: at 11:27

## 2017-04-07 RX ADMIN — PROPOFOL 20 MCG/KG/MIN: 10 INJECTION, EMULSION INTRAVENOUS at 02:47

## 2017-04-07 RX ADMIN — PANTOPRAZOLE SODIUM 40 MG: 40 INJECTION, POWDER, FOR SOLUTION INTRAVENOUS at 08:25

## 2017-04-07 RX ADMIN — SODIUM CHLORIDE 0.5 UNITS/HR: 9 INJECTION, SOLUTION INTRAVENOUS at 04:29

## 2017-04-08 LAB
ANION GAP SERPL CALCULATED.3IONS-SCNC: 6 MMOL/L (ref 4–13)
BASOPHILS # BLD AUTO: 0.03 THOUSANDS/ΜL (ref 0–0.1)
BASOPHILS NFR BLD AUTO: 0 % (ref 0–1)
BUN SERPL-MCNC: 21 MG/DL (ref 5–25)
CALCIUM SERPL-MCNC: 8 MG/DL (ref 8.3–10.1)
CHLORIDE SERPL-SCNC: 101 MMOL/L (ref 100–108)
CO2 SERPL-SCNC: 32 MMOL/L (ref 21–32)
CREAT SERPL-MCNC: 2.92 MG/DL (ref 0.6–1.3)
EOSINOPHIL # BLD AUTO: 0.2 THOUSAND/ΜL (ref 0–0.61)
EOSINOPHIL NFR BLD AUTO: 2 % (ref 0–6)
ERYTHROCYTE [DISTWIDTH] IN BLOOD BY AUTOMATED COUNT: 15.9 % (ref 11.6–15.1)
GFR SERPL CREATININE-BSD FRML MDRD: 21.8 ML/MIN/1.73SQ M
GLUCOSE SERPL-MCNC: 103 MG/DL (ref 65–140)
GLUCOSE SERPL-MCNC: 103 MG/DL (ref 65–140)
GLUCOSE SERPL-MCNC: 117 MG/DL (ref 65–140)
GLUCOSE SERPL-MCNC: 127 MG/DL (ref 65–140)
GLUCOSE SERPL-MCNC: 153 MG/DL (ref 65–140)
GLUCOSE SERPL-MCNC: 178 MG/DL (ref 65–140)
GLUCOSE SERPL-MCNC: 183 MG/DL (ref 65–140)
GLUCOSE SERPL-MCNC: 199 MG/DL (ref 65–140)
GLUCOSE SERPL-MCNC: 53 MG/DL (ref 65–140)
GLUCOSE SERPL-MCNC: 84 MG/DL (ref 65–140)
GLUCOSE SERPL-MCNC: 84 MG/DL (ref 65–140)
GLUCOSE SERPL-MCNC: 89 MG/DL (ref 65–140)
GLUCOSE SERPL-MCNC: 97 MG/DL (ref 65–140)
HCT VFR BLD AUTO: 25.2 % (ref 36.5–49.3)
HGB BLD-MCNC: 8 G/DL (ref 12–17)
LYMPHOCYTES # BLD AUTO: 1.02 THOUSANDS/ΜL (ref 0.6–4.47)
LYMPHOCYTES NFR BLD AUTO: 8 % (ref 14–44)
MCH RBC QN AUTO: 30.8 PG (ref 26.8–34.3)
MCHC RBC AUTO-ENTMCNC: 31.7 G/DL (ref 31.4–37.4)
MCV RBC AUTO: 97 FL (ref 82–98)
MONOCYTES # BLD AUTO: 0.81 THOUSAND/ΜL (ref 0.17–1.22)
MONOCYTES NFR BLD AUTO: 7 % (ref 4–12)
NEUTROPHILS # BLD AUTO: 10.12 THOUSANDS/ΜL (ref 1.85–7.62)
NEUTS SEG NFR BLD AUTO: 83 % (ref 43–75)
NRBC BLD AUTO-RTO: 0 /100 WBCS
PLATELET # BLD AUTO: 188 THOUSANDS/UL (ref 149–390)
PMV BLD AUTO: 10.8 FL (ref 8.9–12.7)
POTASSIUM SERPL-SCNC: 3.8 MMOL/L (ref 3.5–5.3)
RBC # BLD AUTO: 2.6 MILLION/UL (ref 3.88–5.62)
SODIUM SERPL-SCNC: 139 MMOL/L (ref 136–145)
WBC # BLD AUTO: 12.21 THOUSAND/UL (ref 4.31–10.16)

## 2017-04-08 PROCEDURE — A9270 NON-COVERED ITEM OR SERVICE: HCPCS | Performed by: PHYSICIAN ASSISTANT

## 2017-04-08 PROCEDURE — 82948 REAGENT STRIP/BLOOD GLUCOSE: CPT

## 2017-04-08 PROCEDURE — 94003 VENT MGMT INPAT SUBQ DAY: CPT

## 2017-04-08 PROCEDURE — 85025 COMPLETE CBC W/AUTO DIFF WBC: CPT | Performed by: EMERGENCY MEDICINE

## 2017-04-08 PROCEDURE — C9113 INJ PANTOPRAZOLE SODIUM, VIA: HCPCS | Performed by: PHYSICIAN ASSISTANT

## 2017-04-08 PROCEDURE — 80048 BASIC METABOLIC PNL TOTAL CA: CPT | Performed by: PHYSICIAN ASSISTANT

## 2017-04-08 PROCEDURE — 94150 VITAL CAPACITY TEST: CPT

## 2017-04-08 PROCEDURE — 94760 N-INVAS EAR/PLS OXIMETRY 1: CPT

## 2017-04-08 PROCEDURE — A9270 NON-COVERED ITEM OR SERVICE: HCPCS | Performed by: EMERGENCY MEDICINE

## 2017-04-08 PROCEDURE — 0Y6H0Z2 DETACHMENT AT RIGHT LOWER LEG, MID, OPEN APPROACH: ICD-10-PCS | Performed by: SURGERY

## 2017-04-08 PROCEDURE — A9270 NON-COVERED ITEM OR SERVICE: HCPCS | Performed by: NURSE PRACTITIONER

## 2017-04-08 PROCEDURE — A9270 NON-COVERED ITEM OR SERVICE: HCPCS | Performed by: FAMILY MEDICINE

## 2017-04-08 RX ORDER — FENTANYL CITRATE 50 UG/ML
50 INJECTION, SOLUTION INTRAMUSCULAR; INTRAVENOUS EVERY 2 HOUR PRN
Status: DISCONTINUED | OUTPATIENT
Start: 2017-04-08 | End: 2017-04-21 | Stop reason: HOSPADM

## 2017-04-08 RX ORDER — OXYCODONE HYDROCHLORIDE 5 MG/1
5 TABLET ORAL EVERY 4 HOURS PRN
Status: DISPENSED | OUTPATIENT
Start: 2017-04-08 | End: 2017-04-09

## 2017-04-08 RX ORDER — MORPHINE SULFATE 2 MG/ML
2 INJECTION, SOLUTION INTRAMUSCULAR; INTRAVENOUS EVERY 4 HOURS PRN
Status: DISCONTINUED | OUTPATIENT
Start: 2017-04-08 | End: 2017-04-08

## 2017-04-08 RX ADMIN — FENTANYL CITRATE 50 MCG: 50 INJECTION INTRAMUSCULAR; INTRAVENOUS at 11:22

## 2017-04-08 RX ADMIN — OXYCODONE HYDROCHLORIDE 5 MG: 5 TABLET ORAL at 18:32

## 2017-04-08 RX ADMIN — SODIUM CHLORIDE 0.5 UNITS/HR: 9 INJECTION, SOLUTION INTRAVENOUS at 00:05

## 2017-04-08 RX ADMIN — HEPARIN SODIUM 5000 UNITS: 5000 INJECTION, SOLUTION INTRAVENOUS; SUBCUTANEOUS at 22:56

## 2017-04-08 RX ADMIN — HEPARIN SODIUM 5000 UNITS: 5000 INJECTION, SOLUTION INTRAVENOUS; SUBCUTANEOUS at 14:19

## 2017-04-08 RX ADMIN — VANCOMYCIN HYDROCHLORIDE 125 MG: 500 INJECTION, POWDER, LYOPHILIZED, FOR SOLUTION INTRAVENOUS at 23:15

## 2017-04-08 RX ADMIN — DEXMEDETOMIDINE HYDROCHLORIDE 0.7 MCG/KG/HR: 100 INJECTION, SOLUTION INTRAVENOUS at 05:07

## 2017-04-08 RX ADMIN — HYDROMORPHONE HYDROCHLORIDE 0.5 MG: 1 INJECTION, SOLUTION INTRAMUSCULAR; INTRAVENOUS; SUBCUTANEOUS at 13:02

## 2017-04-08 RX ADMIN — VANCOMYCIN HYDROCHLORIDE 125 MG: 500 INJECTION, POWDER, LYOPHILIZED, FOR SOLUTION INTRAVENOUS at 00:10

## 2017-04-08 RX ADMIN — VANCOMYCIN HYDROCHLORIDE 125 MG: 500 INJECTION, POWDER, LYOPHILIZED, FOR SOLUTION INTRAVENOUS at 05:08

## 2017-04-08 RX ADMIN — HYDROMORPHONE HYDROCHLORIDE 0.5 MG: 1 INJECTION, SOLUTION INTRAMUSCULAR; INTRAVENOUS; SUBCUTANEOUS at 16:23

## 2017-04-08 RX ADMIN — ANORECTAL OINTMENT 1 APPLICATION: 15.7; .44; 24; 20.6 OINTMENT TOPICAL at 08:41

## 2017-04-08 RX ADMIN — FENTANYL CITRATE 50 MCG: 50 INJECTION INTRAMUSCULAR; INTRAVENOUS at 05:07

## 2017-04-08 RX ADMIN — ANORECTAL OINTMENT 1 APPLICATION: 15.7; .44; 24; 20.6 OINTMENT TOPICAL at 17:55

## 2017-04-08 RX ADMIN — ANORECTAL OINTMENT 1 APPLICATION: 15.7; .44; 24; 20.6 OINTMENT TOPICAL at 20:19

## 2017-04-08 RX ADMIN — LEVETIRACETAM 250 MG: 100 SOLUTION ORAL at 17:27

## 2017-04-08 RX ADMIN — VANCOMYCIN HYDROCHLORIDE 125 MG: 500 INJECTION, POWDER, LYOPHILIZED, FOR SOLUTION INTRAVENOUS at 18:32

## 2017-04-08 RX ADMIN — FENTANYL CITRATE 50 MCG: 50 INJECTION INTRAMUSCULAR; INTRAVENOUS at 17:53

## 2017-04-08 RX ADMIN — DEXMEDETOMIDINE HYDROCHLORIDE 0.7 MCG/KG/HR: 100 INJECTION, SOLUTION INTRAVENOUS at 00:20

## 2017-04-08 RX ADMIN — FENTANYL CITRATE 50 MCG: 50 INJECTION INTRAMUSCULAR; INTRAVENOUS at 08:37

## 2017-04-08 RX ADMIN — PANTOPRAZOLE SODIUM 40 MG: 40 INJECTION, POWDER, FOR SOLUTION INTRAVENOUS at 08:36

## 2017-04-08 RX ADMIN — CHLORHEXIDINE GLUCONATE 15 ML: 1.2 RINSE ORAL at 20:12

## 2017-04-08 RX ADMIN — HEPARIN SODIUM 5000 UNITS: 5000 INJECTION, SOLUTION INTRAVENOUS; SUBCUTANEOUS at 05:07

## 2017-04-08 RX ADMIN — FENTANYL CITRATE 50 MCG: 50 INJECTION INTRAMUSCULAR; INTRAVENOUS at 02:51

## 2017-04-08 RX ADMIN — CHLORHEXIDINE GLUCONATE 15 ML: 1.2 RINSE ORAL at 08:36

## 2017-04-08 RX ADMIN — SODIUM CHLORIDE 0.5 UNITS/HR: 9 INJECTION, SOLUTION INTRAVENOUS at 17:26

## 2017-04-08 RX ADMIN — VANCOMYCIN HYDROCHLORIDE 125 MG: 500 INJECTION, POWDER, LYOPHILIZED, FOR SOLUTION INTRAVENOUS at 12:00

## 2017-04-08 RX ADMIN — FENTANYL CITRATE 50 MCG: 50 INJECTION INTRAMUSCULAR; INTRAVENOUS at 14:30

## 2017-04-09 ENCOUNTER — APPOINTMENT (INPATIENT)
Dept: RADIOLOGY | Facility: HOSPITAL | Age: 67
DRG: 853 | End: 2017-04-09
Payer: COMMERCIAL

## 2017-04-09 ENCOUNTER — APPOINTMENT (INPATIENT)
Dept: SPEECH THERAPY | Facility: HOSPITAL | Age: 67
DRG: 853 | End: 2017-04-09
Payer: COMMERCIAL

## 2017-04-09 LAB
ANION GAP SERPL CALCULATED.3IONS-SCNC: 7 MMOL/L (ref 4–13)
ANION GAP SERPL CALCULATED.3IONS-SCNC: 7 MMOL/L (ref 4–13)
BUN SERPL-MCNC: 33 MG/DL (ref 5–25)
BUN SERPL-MCNC: 33 MG/DL (ref 5–25)
CALCIUM SERPL-MCNC: 8.6 MG/DL (ref 8.3–10.1)
CALCIUM SERPL-MCNC: 8.7 MG/DL (ref 8.3–10.1)
CHLORIDE SERPL-SCNC: 100 MMOL/L (ref 100–108)
CHLORIDE SERPL-SCNC: 99 MMOL/L (ref 100–108)
CO2 SERPL-SCNC: 29 MMOL/L (ref 21–32)
CO2 SERPL-SCNC: 31 MMOL/L (ref 21–32)
CREAT SERPL-MCNC: 4.1 MG/DL (ref 0.6–1.3)
CREAT SERPL-MCNC: 4.11 MG/DL (ref 0.6–1.3)
ERYTHROCYTE [DISTWIDTH] IN BLOOD BY AUTOMATED COUNT: 15.7 % (ref 11.6–15.1)
GFR SERPL CREATININE-BSD FRML MDRD: 14.7 ML/MIN/1.73SQ M
GFR SERPL CREATININE-BSD FRML MDRD: 14.8 ML/MIN/1.73SQ M
GLUCOSE SERPL-MCNC: 101 MG/DL (ref 65–140)
GLUCOSE SERPL-MCNC: 105 MG/DL (ref 65–140)
GLUCOSE SERPL-MCNC: 110 MG/DL (ref 65–140)
GLUCOSE SERPL-MCNC: 110 MG/DL (ref 65–140)
GLUCOSE SERPL-MCNC: 113 MG/DL (ref 65–140)
GLUCOSE SERPL-MCNC: 114 MG/DL (ref 65–140)
GLUCOSE SERPL-MCNC: 125 MG/DL (ref 65–140)
GLUCOSE SERPL-MCNC: 133 MG/DL (ref 65–140)
GLUCOSE SERPL-MCNC: 140 MG/DL (ref 65–140)
GLUCOSE SERPL-MCNC: 152 MG/DL (ref 65–140)
GLUCOSE SERPL-MCNC: 158 MG/DL (ref 65–140)
GLUCOSE SERPL-MCNC: 166 MG/DL (ref 65–140)
GLUCOSE SERPL-MCNC: 92 MG/DL (ref 65–140)
GLUCOSE SERPL-MCNC: 97 MG/DL (ref 65–140)
HCT VFR BLD AUTO: 25.7 % (ref 36.5–49.3)
HGB BLD-MCNC: 8.1 G/DL (ref 12–17)
INR PPP: 1.46 (ref 0.86–1.16)
MAGNESIUM SERPL-MCNC: 2.3 MG/DL (ref 1.6–2.6)
MCH RBC QN AUTO: 30.8 PG (ref 26.8–34.3)
MCHC RBC AUTO-ENTMCNC: 31.5 G/DL (ref 31.4–37.4)
MCV RBC AUTO: 98 FL (ref 82–98)
PHOSPHATE SERPL-MCNC: 4.5 MG/DL (ref 2.3–4.1)
PLATELET # BLD AUTO: 215 THOUSANDS/UL (ref 149–390)
PMV BLD AUTO: 10.8 FL (ref 8.9–12.7)
POTASSIUM SERPL-SCNC: 4.1 MMOL/L (ref 3.5–5.3)
POTASSIUM SERPL-SCNC: 6 MMOL/L (ref 3.5–5.3)
PROTHROMBIN TIME: 17.7 SECONDS (ref 12–14.3)
RBC # BLD AUTO: 2.63 MILLION/UL (ref 3.88–5.62)
SODIUM SERPL-SCNC: 135 MMOL/L (ref 136–145)
SODIUM SERPL-SCNC: 138 MMOL/L (ref 136–145)
WBC # BLD AUTO: 10 THOUSAND/UL (ref 4.31–10.16)

## 2017-04-09 PROCEDURE — A9270 NON-COVERED ITEM OR SERVICE: HCPCS | Performed by: EMERGENCY MEDICINE

## 2017-04-09 PROCEDURE — A9270 NON-COVERED ITEM OR SERVICE: HCPCS | Performed by: PHYSICIAN ASSISTANT

## 2017-04-09 PROCEDURE — 85027 COMPLETE CBC AUTOMATED: CPT | Performed by: PHYSICIAN ASSISTANT

## 2017-04-09 PROCEDURE — C9113 INJ PANTOPRAZOLE SODIUM, VIA: HCPCS | Performed by: PHYSICIAN ASSISTANT

## 2017-04-09 PROCEDURE — 80048 BASIC METABOLIC PNL TOTAL CA: CPT | Performed by: PHYSICIAN ASSISTANT

## 2017-04-09 PROCEDURE — 80048 BASIC METABOLIC PNL TOTAL CA: CPT | Performed by: PSYCHIATRY & NEUROLOGY

## 2017-04-09 PROCEDURE — 85610 PROTHROMBIN TIME: CPT | Performed by: PHYSICIAN ASSISTANT

## 2017-04-09 PROCEDURE — 74000 HB X-RAY EXAM OF ABDOMEN (SINGLE ANTEROPOSTERIOR VIEW): CPT

## 2017-04-09 PROCEDURE — 92610 EVALUATE SWALLOWING FUNCTION: CPT

## 2017-04-09 PROCEDURE — A9270 NON-COVERED ITEM OR SERVICE: HCPCS | Performed by: INTERNAL MEDICINE

## 2017-04-09 PROCEDURE — 83735 ASSAY OF MAGNESIUM: CPT | Performed by: PHYSICIAN ASSISTANT

## 2017-04-09 PROCEDURE — 84100 ASSAY OF PHOSPHORUS: CPT | Performed by: PHYSICIAN ASSISTANT

## 2017-04-09 PROCEDURE — 82948 REAGENT STRIP/BLOOD GLUCOSE: CPT

## 2017-04-09 PROCEDURE — 74000 HB X-RAY EXAM OF ABDOMEN (SINGLE ANTEROPOSTERIOR VIEW) (PORTABLE): CPT

## 2017-04-09 PROCEDURE — A9270 NON-COVERED ITEM OR SERVICE: HCPCS | Performed by: NURSE PRACTITIONER

## 2017-04-09 RX ORDER — WARFARIN SODIUM 5 MG/1
5 TABLET ORAL
Status: DISCONTINUED | OUTPATIENT
Start: 2017-04-09 | End: 2017-04-11

## 2017-04-09 RX ORDER — QUETIAPINE FUMARATE 25 MG/1
25 TABLET, FILM COATED ORAL
Status: DISCONTINUED | OUTPATIENT
Start: 2017-04-09 | End: 2017-04-11

## 2017-04-09 RX ORDER — HALOPERIDOL 5 MG/ML
2 INJECTION INTRAMUSCULAR EVERY 6 HOURS PRN
Status: DISCONTINUED | OUTPATIENT
Start: 2017-04-09 | End: 2017-04-21 | Stop reason: HOSPADM

## 2017-04-09 RX ORDER — HEPARIN SODIUM 5000 [USP'U]/ML
5000 INJECTION, SOLUTION INTRAVENOUS; SUBCUTANEOUS EVERY 8 HOURS SCHEDULED
Status: DISCONTINUED | OUTPATIENT
Start: 2017-04-09 | End: 2017-04-11

## 2017-04-09 RX ADMIN — HEPARIN SODIUM 5000 UNITS: 5000 INJECTION, SOLUTION INTRAVENOUS; SUBCUTANEOUS at 05:37

## 2017-04-09 RX ADMIN — VANCOMYCIN HYDROCHLORIDE 125 MG: 500 INJECTION, POWDER, LYOPHILIZED, FOR SOLUTION INTRAVENOUS at 17:22

## 2017-04-09 RX ADMIN — CHLORHEXIDINE GLUCONATE 15 ML: 1.2 RINSE ORAL at 09:05

## 2017-04-09 RX ADMIN — CHLORHEXIDINE GLUCONATE 15 ML: 1.2 RINSE ORAL at 21:09

## 2017-04-09 RX ADMIN — SODIUM CHLORIDE 0.5 UNITS/HR: 9 INJECTION, SOLUTION INTRAVENOUS at 16:11

## 2017-04-09 RX ADMIN — VANCOMYCIN HYDROCHLORIDE 125 MG: 500 INJECTION, POWDER, LYOPHILIZED, FOR SOLUTION INTRAVENOUS at 05:37

## 2017-04-09 RX ADMIN — Medication 1 CAPSULE: at 09:05

## 2017-04-09 RX ADMIN — LEVETIRACETAM 250 MG: 100 SOLUTION ORAL at 17:46

## 2017-04-09 RX ADMIN — FENTANYL CITRATE 50 MCG: 50 INJECTION INTRAMUSCULAR; INTRAVENOUS at 21:23

## 2017-04-09 RX ADMIN — FENTANYL CITRATE 50 MCG: 50 INJECTION INTRAMUSCULAR; INTRAVENOUS at 04:13

## 2017-04-09 RX ADMIN — HEPARIN SODIUM 5000 UNITS: 5000 INJECTION, SOLUTION INTRAVENOUS; SUBCUTANEOUS at 14:16

## 2017-04-09 RX ADMIN — VANCOMYCIN HYDROCHLORIDE 125 MG: 500 INJECTION, POWDER, LYOPHILIZED, FOR SOLUTION INTRAVENOUS at 12:22

## 2017-04-09 RX ADMIN — PANTOPRAZOLE SODIUM 40 MG: 40 INJECTION, POWDER, FOR SOLUTION INTRAVENOUS at 09:05

## 2017-04-09 RX ADMIN — OXYCODONE HYDROCHLORIDE 5 MG: 5 TABLET ORAL at 12:53

## 2017-04-09 RX ADMIN — ANORECTAL OINTMENT 1 APPLICATION: 15.7; .44; 24; 20.6 OINTMENT TOPICAL at 21:09

## 2017-04-09 RX ADMIN — Medication 175 MCG: at 05:36

## 2017-04-09 RX ADMIN — WARFARIN SODIUM 5 MG: 5 TABLET ORAL at 17:22

## 2017-04-09 RX ADMIN — QUETIAPINE FUMARATE 25 MG: 25 TABLET, FILM COATED ORAL at 21:09

## 2017-04-09 RX ADMIN — HALOPERIDOL LACTATE 2 MG: 5 INJECTION, SOLUTION INTRAMUSCULAR at 14:59

## 2017-04-09 RX ADMIN — ANORECTAL OINTMENT 1 APPLICATION: 15.7; .44; 24; 20.6 OINTMENT TOPICAL at 16:17

## 2017-04-09 RX ADMIN — HEPARIN SODIUM 5000 UNITS: 5000 INJECTION, SOLUTION INTRAVENOUS; SUBCUTANEOUS at 21:08

## 2017-04-09 RX ADMIN — ANORECTAL OINTMENT 1 APPLICATION: 15.7; .44; 24; 20.6 OINTMENT TOPICAL at 09:06

## 2017-04-10 ENCOUNTER — APPOINTMENT (INPATIENT)
Dept: RADIOLOGY | Facility: HOSPITAL | Age: 67
DRG: 853 | End: 2017-04-10
Payer: COMMERCIAL

## 2017-04-10 ENCOUNTER — APPOINTMENT (INPATIENT)
Dept: DIALYSIS | Facility: HOSPITAL | Age: 67
DRG: 853 | End: 2017-04-10
Payer: COMMERCIAL

## 2017-04-10 ENCOUNTER — GENERIC CONVERSION - ENCOUNTER (OUTPATIENT)
Dept: OTHER | Facility: OTHER | Age: 67
End: 2017-04-10

## 2017-04-10 LAB
ANION GAP SERPL CALCULATED.3IONS-SCNC: 9 MMOL/L (ref 4–13)
BACTERIA BLD CULT: NORMAL
BACTERIA BLD CULT: NORMAL
BASOPHILS # BLD AUTO: 0.05 THOUSANDS/ΜL (ref 0–0.1)
BASOPHILS NFR BLD AUTO: 1 % (ref 0–1)
BUN SERPL-MCNC: 39 MG/DL (ref 5–25)
CALCIUM SERPL-MCNC: 8.8 MG/DL (ref 8.3–10.1)
CHLORIDE SERPL-SCNC: 101 MMOL/L (ref 100–108)
CO2 SERPL-SCNC: 28 MMOL/L (ref 21–32)
CREAT SERPL-MCNC: 5.12 MG/DL (ref 0.6–1.3)
EOSINOPHIL # BLD AUTO: 0.21 THOUSAND/ΜL (ref 0–0.61)
EOSINOPHIL NFR BLD AUTO: 3 % (ref 0–6)
ERYTHROCYTE [DISTWIDTH] IN BLOOD BY AUTOMATED COUNT: 15.6 % (ref 11.6–15.1)
GFR SERPL CREATININE-BSD FRML MDRD: 11.4 ML/MIN/1.73SQ M
GLUCOSE SERPL-MCNC: 104 MG/DL (ref 65–140)
GLUCOSE SERPL-MCNC: 125 MG/DL (ref 65–140)
GLUCOSE SERPL-MCNC: 125 MG/DL (ref 65–140)
GLUCOSE SERPL-MCNC: 127 MG/DL (ref 65–140)
GLUCOSE SERPL-MCNC: 131 MG/DL (ref 65–140)
GLUCOSE SERPL-MCNC: 131 MG/DL (ref 65–140)
GLUCOSE SERPL-MCNC: 145 MG/DL (ref 65–140)
GLUCOSE SERPL-MCNC: 149 MG/DL (ref 65–140)
GLUCOSE SERPL-MCNC: 157 MG/DL (ref 65–140)
GLUCOSE SERPL-MCNC: 166 MG/DL (ref 65–140)
GLUCOSE SERPL-MCNC: 195 MG/DL (ref 65–140)
GLUCOSE SERPL-MCNC: 213 MG/DL (ref 65–140)
GLUCOSE SERPL-MCNC: 269 MG/DL (ref 65–140)
HCT VFR BLD AUTO: 24.4 % (ref 36.5–49.3)
HEMOCCULT STL QL IA: NEGATIVE
HGB BLD-MCNC: 7.6 G/DL (ref 12–17)
INR PPP: 1.71 (ref 0.86–1.16)
LYMPHOCYTES # BLD AUTO: 0.87 THOUSANDS/ΜL (ref 0.6–4.47)
LYMPHOCYTES NFR BLD AUTO: 13 % (ref 14–44)
MAGNESIUM SERPL-MCNC: 2.3 MG/DL (ref 1.6–2.6)
MCH RBC QN AUTO: 30.2 PG (ref 26.8–34.3)
MCHC RBC AUTO-ENTMCNC: 31.1 G/DL (ref 31.4–37.4)
MCV RBC AUTO: 97 FL (ref 82–98)
MONOCYTES # BLD AUTO: 0.59 THOUSAND/ΜL (ref 0.17–1.22)
MONOCYTES NFR BLD AUTO: 9 % (ref 4–12)
NEUTROPHILS # BLD AUTO: 5.02 THOUSANDS/ΜL (ref 1.85–7.62)
NEUTS SEG NFR BLD AUTO: 74 % (ref 43–75)
NRBC BLD AUTO-RTO: 0 /100 WBCS
PHOSPHATE SERPL-MCNC: 4.7 MG/DL (ref 2.3–4.1)
PLATELET # BLD AUTO: 188 THOUSANDS/UL (ref 149–390)
PMV BLD AUTO: 10.6 FL (ref 8.9–12.7)
POTASSIUM SERPL-SCNC: 4.1 MMOL/L (ref 3.5–5.3)
PROTHROMBIN TIME: 20 SECONDS (ref 12–14.3)
RBC # BLD AUTO: 2.52 MILLION/UL (ref 3.88–5.62)
SODIUM SERPL-SCNC: 138 MMOL/L (ref 136–145)
WBC # BLD AUTO: 6.76 THOUSAND/UL (ref 4.31–10.16)

## 2017-04-10 PROCEDURE — 71010 HB CHEST X-RAY 1 VIEW FRONTAL (PORTABLE): CPT

## 2017-04-10 PROCEDURE — 85610 PROTHROMBIN TIME: CPT | Performed by: EMERGENCY MEDICINE

## 2017-04-10 PROCEDURE — 92526 ORAL FUNCTION THERAPY: CPT

## 2017-04-10 PROCEDURE — C9113 INJ PANTOPRAZOLE SODIUM, VIA: HCPCS | Performed by: PHYSICIAN ASSISTANT

## 2017-04-10 PROCEDURE — 84100 ASSAY OF PHOSPHORUS: CPT | Performed by: EMERGENCY MEDICINE

## 2017-04-10 PROCEDURE — 36569 INSJ PICC 5 YR+ W/O IMAGING: CPT

## 2017-04-10 PROCEDURE — G0328 FECAL BLOOD SCRN IMMUNOASSAY: HCPCS | Performed by: INTERNAL MEDICINE

## 2017-04-10 PROCEDURE — A9270 NON-COVERED ITEM OR SERVICE: HCPCS | Performed by: FAMILY MEDICINE

## 2017-04-10 PROCEDURE — 83735 ASSAY OF MAGNESIUM: CPT | Performed by: EMERGENCY MEDICINE

## 2017-04-10 PROCEDURE — A9270 NON-COVERED ITEM OR SERVICE: HCPCS | Performed by: PHYSICIAN ASSISTANT

## 2017-04-10 PROCEDURE — C1751 CATH, INF, PER/CENT/MIDLINE: HCPCS

## 2017-04-10 PROCEDURE — A9270 NON-COVERED ITEM OR SERVICE: HCPCS | Performed by: INTERNAL MEDICINE

## 2017-04-10 PROCEDURE — 94760 N-INVAS EAR/PLS OXIMETRY 1: CPT

## 2017-04-10 PROCEDURE — 82948 REAGENT STRIP/BLOOD GLUCOSE: CPT

## 2017-04-10 PROCEDURE — 80048 BASIC METABOLIC PNL TOTAL CA: CPT | Performed by: EMERGENCY MEDICINE

## 2017-04-10 PROCEDURE — A9270 NON-COVERED ITEM OR SERVICE: HCPCS | Performed by: EMERGENCY MEDICINE

## 2017-04-10 PROCEDURE — 02HV33Z INSERTION OF INFUSION DEVICE INTO SUPERIOR VENA CAVA, PERCUTANEOUS APPROACH: ICD-10-PCS | Performed by: EMERGENCY MEDICINE

## 2017-04-10 PROCEDURE — 85025 COMPLETE CBC W/AUTO DIFF WBC: CPT | Performed by: EMERGENCY MEDICINE

## 2017-04-10 RX ADMIN — VANCOMYCIN HYDROCHLORIDE 125 MG: 500 INJECTION, POWDER, LYOPHILIZED, FOR SOLUTION INTRAVENOUS at 18:46

## 2017-04-10 RX ADMIN — METOPROLOL TARTRATE 25 MG: 25 TABLET ORAL at 21:13

## 2017-04-10 RX ADMIN — HEPARIN SODIUM 5000 UNITS: 5000 INJECTION, SOLUTION INTRAVENOUS; SUBCUTANEOUS at 21:12

## 2017-04-10 RX ADMIN — HALOPERIDOL LACTATE 2 MG: 5 INJECTION, SOLUTION INTRAMUSCULAR at 14:45

## 2017-04-10 RX ADMIN — HEPARIN SODIUM 5000 UNITS: 5000 INJECTION, SOLUTION INTRAVENOUS; SUBCUTANEOUS at 14:55

## 2017-04-10 RX ADMIN — ANORECTAL OINTMENT 1 APPLICATION: 15.7; .44; 24; 20.6 OINTMENT TOPICAL at 21:13

## 2017-04-10 RX ADMIN — HYDROMORPHONE HYDROCHLORIDE 0.5 MG: 1 INJECTION, SOLUTION INTRAMUSCULAR; INTRAVENOUS; SUBCUTANEOUS at 23:50

## 2017-04-10 RX ADMIN — SODIUM CHLORIDE 4 UNITS/HR: 9 INJECTION, SOLUTION INTRAVENOUS at 14:30

## 2017-04-10 RX ADMIN — Medication 1 CAPSULE: at 08:49

## 2017-04-10 RX ADMIN — METOPROLOL TARTRATE 25 MG: 25 TABLET ORAL at 11:55

## 2017-04-10 RX ADMIN — VANCOMYCIN HYDROCHLORIDE 125 MG: 500 INJECTION, POWDER, LYOPHILIZED, FOR SOLUTION INTRAVENOUS at 11:55

## 2017-04-10 RX ADMIN — CHLORHEXIDINE GLUCONATE 15 ML: 1.2 RINSE ORAL at 21:12

## 2017-04-10 RX ADMIN — ANORECTAL OINTMENT 1 APPLICATION: 15.7; .44; 24; 20.6 OINTMENT TOPICAL at 18:46

## 2017-04-10 RX ADMIN — PANTOPRAZOLE SODIUM 40 MG: 40 INJECTION, POWDER, FOR SOLUTION INTRAVENOUS at 08:49

## 2017-04-10 RX ADMIN — DAPTOMYCIN 650 MG: 500 INJECTION, POWDER, LYOPHILIZED, FOR SOLUTION INTRAVENOUS at 18:46

## 2017-04-10 RX ADMIN — FENTANYL CITRATE 50 MCG: 50 INJECTION INTRAMUSCULAR; INTRAVENOUS at 09:56

## 2017-04-10 RX ADMIN — ANORECTAL OINTMENT 1 APPLICATION: 15.7; .44; 24; 20.6 OINTMENT TOPICAL at 08:50

## 2017-04-10 RX ADMIN — CHLORHEXIDINE GLUCONATE 15 ML: 1.2 RINSE ORAL at 08:49

## 2017-04-10 RX ADMIN — VANCOMYCIN HYDROCHLORIDE 125 MG: 500 INJECTION, POWDER, LYOPHILIZED, FOR SOLUTION INTRAVENOUS at 05:25

## 2017-04-10 RX ADMIN — HALOPERIDOL LACTATE 2 MG: 5 INJECTION, SOLUTION INTRAMUSCULAR at 18:47

## 2017-04-10 RX ADMIN — VANCOMYCIN HYDROCHLORIDE 125 MG: 500 INJECTION, POWDER, LYOPHILIZED, FOR SOLUTION INTRAVENOUS at 00:04

## 2017-04-10 RX ADMIN — QUETIAPINE FUMARATE 25 MG: 25 TABLET, FILM COATED ORAL at 21:13

## 2017-04-10 RX ADMIN — HEPARIN SODIUM 5000 UNITS: 5000 INJECTION, SOLUTION INTRAVENOUS; SUBCUTANEOUS at 05:25

## 2017-04-10 RX ADMIN — WARFARIN SODIUM 5 MG: 5 TABLET ORAL at 18:46

## 2017-04-11 ENCOUNTER — APPOINTMENT (INPATIENT)
Dept: PHYSICAL THERAPY | Facility: HOSPITAL | Age: 67
DRG: 853 | End: 2017-04-11
Payer: COMMERCIAL

## 2017-04-11 PROBLEM — L03.115 CELLULITIS OF RIGHT LEG: Status: RESOLVED | Noted: 2017-02-18 | Resolved: 2017-04-11

## 2017-04-11 LAB
ABO GROUP BLD BPU: NORMAL
ABO GROUP BLD: NORMAL
ANION GAP SERPL CALCULATED.3IONS-SCNC: 6 MMOL/L (ref 4–13)
APTT PPP: 44 SECONDS (ref 24–36)
APTT PPP: 56 SECONDS (ref 24–36)
ATRIAL RATE: 104 BPM
BASOPHILS # BLD AUTO: 0.07 THOUSANDS/ΜL (ref 0–0.1)
BASOPHILS NFR BLD AUTO: 1 % (ref 0–1)
BLD GP AB SCN SERPL QL: NEGATIVE
BPU ID: NORMAL
BUN SERPL-MCNC: 25 MG/DL (ref 5–25)
CALCIUM SERPL-MCNC: 8.6 MG/DL (ref 8.3–10.1)
CHLORIDE SERPL-SCNC: 100 MMOL/L (ref 100–108)
CK MB SERPL-MCNC: 1.7 % (ref 0–2.5)
CK MB SERPL-MCNC: 2.7 NG/ML (ref 0–5)
CK SERPL-CCNC: 161 U/L (ref 39–308)
CO2 SERPL-SCNC: 31 MMOL/L (ref 21–32)
CREAT SERPL-MCNC: 3.48 MG/DL (ref 0.6–1.3)
EOSINOPHIL # BLD AUTO: 0.16 THOUSAND/ΜL (ref 0–0.61)
EOSINOPHIL NFR BLD AUTO: 3 % (ref 0–6)
ERYTHROCYTE [DISTWIDTH] IN BLOOD BY AUTOMATED COUNT: 15.6 % (ref 11.6–15.1)
GFR SERPL CREATININE-BSD FRML MDRD: 17.8 ML/MIN/1.73SQ M
GLUCOSE SERPL-MCNC: 111 MG/DL (ref 65–140)
GLUCOSE SERPL-MCNC: 122 MG/DL (ref 65–140)
GLUCOSE SERPL-MCNC: 131 MG/DL (ref 65–140)
GLUCOSE SERPL-MCNC: 134 MG/DL (ref 65–140)
GLUCOSE SERPL-MCNC: 141 MG/DL (ref 65–140)
GLUCOSE SERPL-MCNC: 149 MG/DL (ref 65–140)
GLUCOSE SERPL-MCNC: 156 MG/DL (ref 65–140)
GLUCOSE SERPL-MCNC: 168 MG/DL (ref 65–140)
GLUCOSE SERPL-MCNC: 173 MG/DL (ref 65–140)
GLUCOSE SERPL-MCNC: 186 MG/DL (ref 65–140)
GLUCOSE SERPL-MCNC: 220 MG/DL (ref 65–140)
GLUCOSE SERPL-MCNC: 267 MG/DL (ref 65–140)
GLUCOSE SERPL-MCNC: 94 MG/DL (ref 65–140)
HCT VFR BLD AUTO: 23.1 % (ref 36.5–49.3)
HCT VFR BLD AUTO: 23.2 % (ref 36.5–49.3)
HCT VFR BLD AUTO: 26.1 % (ref 36.5–49.3)
HGB BLD-MCNC: 7.3 G/DL (ref 12–17)
HGB BLD-MCNC: 7.3 G/DL (ref 12–17)
HGB BLD-MCNC: 8.1 G/DL (ref 12–17)
INR PPP: 1.5 (ref 0.86–1.16)
INR PPP: 1.52 (ref 0.86–1.16)
LYMPHOCYTES # BLD AUTO: 0.89 THOUSANDS/ΜL (ref 0.6–4.47)
LYMPHOCYTES NFR BLD AUTO: 15 % (ref 14–44)
MAGNESIUM SERPL-MCNC: 2.2 MG/DL (ref 1.6–2.6)
MCH RBC QN AUTO: 30.5 PG (ref 26.8–34.3)
MCHC RBC AUTO-ENTMCNC: 31.5 G/DL (ref 31.4–37.4)
MCV RBC AUTO: 97 FL (ref 82–98)
MONOCYTES # BLD AUTO: 0.68 THOUSAND/ΜL (ref 0.17–1.22)
MONOCYTES NFR BLD AUTO: 11 % (ref 4–12)
NEUTROPHILS # BLD AUTO: 4.25 THOUSANDS/ΜL (ref 1.85–7.62)
NEUTS SEG NFR BLD AUTO: 70 % (ref 43–75)
NRBC BLD AUTO-RTO: 0 /100 WBCS
PHOSPHATE SERPL-MCNC: 2.7 MG/DL (ref 2.3–4.1)
PLATELET # BLD AUTO: 186 THOUSANDS/UL (ref 149–390)
PMV BLD AUTO: 10.7 FL (ref 8.9–12.7)
POTASSIUM SERPL-SCNC: 3.9 MMOL/L (ref 3.5–5.3)
PR INTERVAL: 607 MS
PROTHROMBIN TIME: 18.1 SECONDS (ref 12–14.3)
PROTHROMBIN TIME: 18.3 SECONDS (ref 12–14.3)
QRS AXIS: 2 DEGREES
QRSD INTERVAL: 100 MS
QT INTERVAL: 354 MS
QTC INTERVAL: 466 MS
RBC # BLD AUTO: 2.39 MILLION/UL (ref 3.88–5.62)
RH BLD: POSITIVE
SODIUM SERPL-SCNC: 137 MMOL/L (ref 136–145)
T WAVE AXIS: 94 DEGREES
UNIT DISPENSE STATUS: NORMAL
UNIT PRODUCT CODE: NORMAL
UNIT RH: NORMAL
VENTRICULAR RATE: 104 BPM
WBC # BLD AUTO: 6.06 THOUSAND/UL (ref 4.31–10.16)

## 2017-04-11 PROCEDURE — 94668 MNPJ CHEST WALL SBSQ: CPT

## 2017-04-11 PROCEDURE — G8987 SELF CARE CURRENT STATUS: HCPCS

## 2017-04-11 PROCEDURE — 85610 PROTHROMBIN TIME: CPT | Performed by: EMERGENCY MEDICINE

## 2017-04-11 PROCEDURE — 84100 ASSAY OF PHOSPHORUS: CPT | Performed by: EMERGENCY MEDICINE

## 2017-04-11 PROCEDURE — 82550 ASSAY OF CK (CPK): CPT | Performed by: INTERNAL MEDICINE

## 2017-04-11 PROCEDURE — 86901 BLOOD TYPING SEROLOGIC RH(D): CPT | Performed by: FAMILY MEDICINE

## 2017-04-11 PROCEDURE — A9270 NON-COVERED ITEM OR SERVICE: HCPCS | Performed by: EMERGENCY MEDICINE

## 2017-04-11 PROCEDURE — 86923 COMPATIBILITY TEST ELECTRIC: CPT

## 2017-04-11 PROCEDURE — 85014 HEMATOCRIT: CPT | Performed by: FAMILY MEDICINE

## 2017-04-11 PROCEDURE — A9270 NON-COVERED ITEM OR SERVICE: HCPCS | Performed by: INTERNAL MEDICINE

## 2017-04-11 PROCEDURE — 80048 BASIC METABOLIC PNL TOTAL CA: CPT | Performed by: NURSE PRACTITIONER

## 2017-04-11 PROCEDURE — A9270 NON-COVERED ITEM OR SERVICE: HCPCS | Performed by: FAMILY MEDICINE

## 2017-04-11 PROCEDURE — 85018 HEMOGLOBIN: CPT | Performed by: EMERGENCY MEDICINE

## 2017-04-11 PROCEDURE — G8982 BODY POS GOAL STATUS: HCPCS

## 2017-04-11 PROCEDURE — 94760 N-INVAS EAR/PLS OXIMETRY 1: CPT

## 2017-04-11 PROCEDURE — 82553 CREATINE MB FRACTION: CPT | Performed by: INTERNAL MEDICINE

## 2017-04-11 PROCEDURE — 82948 REAGENT STRIP/BLOOD GLUCOSE: CPT

## 2017-04-11 PROCEDURE — 94669 MECHANICAL CHEST WALL OSCILL: CPT

## 2017-04-11 PROCEDURE — 97167 OT EVAL HIGH COMPLEX 60 MIN: CPT

## 2017-04-11 PROCEDURE — 85014 HEMATOCRIT: CPT | Performed by: EMERGENCY MEDICINE

## 2017-04-11 PROCEDURE — 86900 BLOOD TYPING SEROLOGIC ABO: CPT | Performed by: FAMILY MEDICINE

## 2017-04-11 PROCEDURE — C9113 INJ PANTOPRAZOLE SODIUM, VIA: HCPCS | Performed by: PHYSICIAN ASSISTANT

## 2017-04-11 PROCEDURE — G8981 BODY POS CURRENT STATUS: HCPCS

## 2017-04-11 PROCEDURE — 30243N1 TRANSFUSION OF NONAUTOLOGOUS RED BLOOD CELLS INTO CENTRAL VEIN, PERCUTANEOUS APPROACH: ICD-10-PCS | Performed by: INTERNAL MEDICINE

## 2017-04-11 PROCEDURE — 85025 COMPLETE CBC W/AUTO DIFF WBC: CPT | Performed by: EMERGENCY MEDICINE

## 2017-04-11 PROCEDURE — 83735 ASSAY OF MAGNESIUM: CPT | Performed by: EMERGENCY MEDICINE

## 2017-04-11 PROCEDURE — 97163 PT EVAL HIGH COMPLEX 45 MIN: CPT

## 2017-04-11 PROCEDURE — 85018 HEMOGLOBIN: CPT | Performed by: FAMILY MEDICINE

## 2017-04-11 PROCEDURE — 85730 THROMBOPLASTIN TIME PARTIAL: CPT | Performed by: EMERGENCY MEDICINE

## 2017-04-11 PROCEDURE — 86850 RBC ANTIBODY SCREEN: CPT | Performed by: FAMILY MEDICINE

## 2017-04-11 PROCEDURE — G8988 SELF CARE GOAL STATUS: HCPCS

## 2017-04-11 PROCEDURE — A9270 NON-COVERED ITEM OR SERVICE: HCPCS | Performed by: PHYSICIAN ASSISTANT

## 2017-04-11 PROCEDURE — 93005 ELECTROCARDIOGRAM TRACING: CPT | Performed by: FAMILY MEDICINE

## 2017-04-11 PROCEDURE — P9016 RBC LEUKOCYTES REDUCED: HCPCS

## 2017-04-11 RX ORDER — HEPARIN SODIUM 1000 [USP'U]/ML
3300 INJECTION, SOLUTION INTRAVENOUS; SUBCUTANEOUS AS NEEDED
Status: DISCONTINUED | OUTPATIENT
Start: 2017-04-11 | End: 2017-04-12 | Stop reason: ALTCHOICE

## 2017-04-11 RX ORDER — SACCHAROMYCES BOULARDII 250 MG
250 CAPSULE ORAL 2 TIMES DAILY
Status: DISCONTINUED | OUTPATIENT
Start: 2017-04-11 | End: 2017-04-21 | Stop reason: HOSPADM

## 2017-04-11 RX ORDER — HEPARIN SODIUM 1000 [USP'U]/ML
1650 INJECTION, SOLUTION INTRAVENOUS; SUBCUTANEOUS AS NEEDED
Status: DISCONTINUED | OUTPATIENT
Start: 2017-04-11 | End: 2017-04-12 | Stop reason: ALTCHOICE

## 2017-04-11 RX ORDER — DEXTROSE MONOHYDRATE 50 MG/ML
50 INJECTION, SOLUTION INTRAVENOUS CONTINUOUS
Status: DISCONTINUED | OUTPATIENT
Start: 2017-04-12 | End: 2017-04-13

## 2017-04-11 RX ORDER — HEPARIN SODIUM 10000 [USP'U]/100ML
3-20 INJECTION, SOLUTION INTRAVENOUS
Status: DISPENSED | OUTPATIENT
Start: 2017-04-11 | End: 2017-04-12

## 2017-04-11 RX ORDER — QUETIAPINE FUMARATE 25 MG/1
12.5 TABLET, FILM COATED ORAL
Status: DISCONTINUED | OUTPATIENT
Start: 2017-04-11 | End: 2017-04-11

## 2017-04-11 RX ADMIN — PANTOPRAZOLE SODIUM 40 MG: 40 INJECTION, POWDER, FOR SOLUTION INTRAVENOUS at 08:41

## 2017-04-11 RX ADMIN — CHLORHEXIDINE GLUCONATE 15 ML: 1.2 RINSE ORAL at 20:09

## 2017-04-11 RX ADMIN — HEPARIN SODIUM 5000 UNITS: 5000 INJECTION, SOLUTION INTRAVENOUS; SUBCUTANEOUS at 13:59

## 2017-04-11 RX ADMIN — Medication 1 CAPSULE: at 08:43

## 2017-04-11 RX ADMIN — Medication 175 MCG: at 05:09

## 2017-04-11 RX ADMIN — ANORECTAL OINTMENT 1 APPLICATION: 15.7; .44; 24; 20.6 OINTMENT TOPICAL at 08:41

## 2017-04-11 RX ADMIN — VANCOMYCIN HYDROCHLORIDE 125 MG: 500 INJECTION, POWDER, LYOPHILIZED, FOR SOLUTION INTRAVENOUS at 00:02

## 2017-04-11 RX ADMIN — HALOPERIDOL LACTATE 2 MG: 5 INJECTION, SOLUTION INTRAMUSCULAR at 08:41

## 2017-04-11 RX ADMIN — ANORECTAL OINTMENT 1 APPLICATION: 15.7; .44; 24; 20.6 OINTMENT TOPICAL at 15:49

## 2017-04-11 RX ADMIN — METOPROLOL TARTRATE 25 MG: 25 TABLET ORAL at 20:09

## 2017-04-11 RX ADMIN — HEPARIN SODIUM 5000 UNITS: 5000 INJECTION, SOLUTION INTRAVENOUS; SUBCUTANEOUS at 05:09

## 2017-04-11 RX ADMIN — VANCOMYCIN HYDROCHLORIDE 125 MG: 500 INJECTION, POWDER, LYOPHILIZED, FOR SOLUTION INTRAVENOUS at 18:16

## 2017-04-11 RX ADMIN — SODIUM CHLORIDE 1 UNITS/HR: 9 INJECTION, SOLUTION INTRAVENOUS at 06:00

## 2017-04-11 RX ADMIN — HEPARIN SODIUM 12 UNITS/KG/HR: 10000 INJECTION, SOLUTION INTRAVENOUS at 15:50

## 2017-04-11 RX ADMIN — VANCOMYCIN HYDROCHLORIDE 125 MG: 500 INJECTION, POWDER, LYOPHILIZED, FOR SOLUTION INTRAVENOUS at 05:09

## 2017-04-11 RX ADMIN — VANCOMYCIN HYDROCHLORIDE 125 MG: 500 INJECTION, POWDER, LYOPHILIZED, FOR SOLUTION INTRAVENOUS at 12:20

## 2017-04-11 RX ADMIN — CHLORHEXIDINE GLUCONATE 15 ML: 1.2 RINSE ORAL at 08:41

## 2017-04-11 RX ADMIN — METOPROLOL TARTRATE 25 MG: 25 TABLET ORAL at 08:41

## 2017-04-11 RX ADMIN — ANORECTAL OINTMENT 1 APPLICATION: 15.7; .44; 24; 20.6 OINTMENT TOPICAL at 20:10

## 2017-04-12 ENCOUNTER — APPOINTMENT (INPATIENT)
Dept: DIALYSIS | Facility: HOSPITAL | Age: 67
DRG: 853 | End: 2017-04-12
Payer: COMMERCIAL

## 2017-04-12 ENCOUNTER — ANESTHESIA (INPATIENT)
Dept: PERIOP | Facility: HOSPITAL | Age: 67
DRG: 853 | End: 2017-04-12
Payer: COMMERCIAL

## 2017-04-12 ENCOUNTER — ANESTHESIA EVENT (INPATIENT)
Dept: PERIOP | Facility: HOSPITAL | Age: 67
DRG: 853 | End: 2017-04-12
Payer: COMMERCIAL

## 2017-04-12 LAB
ANION GAP SERPL CALCULATED.3IONS-SCNC: 8 MMOL/L (ref 4–13)
APTT PPP: 67 SECONDS (ref 24–36)
BUN SERPL-MCNC: 48 MG/DL (ref 5–25)
CA-I BLD-SCNC: 1.16 MMOL/L (ref 1.12–1.32)
CALCIUM SERPL-MCNC: 8.9 MG/DL (ref 8.3–10.1)
CHLORIDE SERPL-SCNC: 100 MMOL/L (ref 100–108)
CO2 SERPL-SCNC: 30 MMOL/L (ref 21–32)
CREAT SERPL-MCNC: 4.61 MG/DL (ref 0.6–1.3)
ERYTHROCYTE [DISTWIDTH] IN BLOOD BY AUTOMATED COUNT: 16.6 % (ref 11.6–15.1)
GFR SERPL CREATININE-BSD FRML MDRD: 12.9 ML/MIN/1.73SQ M
GLUCOSE SERPL-MCNC: 103 MG/DL (ref 65–140)
GLUCOSE SERPL-MCNC: 107 MG/DL (ref 65–140)
GLUCOSE SERPL-MCNC: 109 MG/DL (ref 65–140)
GLUCOSE SERPL-MCNC: 110 MG/DL (ref 65–140)
GLUCOSE SERPL-MCNC: 111 MG/DL (ref 65–140)
GLUCOSE SERPL-MCNC: 112 MG/DL (ref 65–140)
GLUCOSE SERPL-MCNC: 128 MG/DL (ref 65–140)
GLUCOSE SERPL-MCNC: 132 MG/DL (ref 65–140)
GLUCOSE SERPL-MCNC: 188 MG/DL (ref 65–140)
GLUCOSE SERPL-MCNC: 90 MG/DL (ref 65–140)
GLUCOSE SERPL-MCNC: 94 MG/DL (ref 65–140)
GLUCOSE SERPL-MCNC: 97 MG/DL (ref 65–140)
HCT VFR BLD AUTO: 25.6 % (ref 36.5–49.3)
HGB BLD-MCNC: 8.1 G/DL (ref 12–17)
INR PPP: 1.39 (ref 0.86–1.16)
MAGNESIUM SERPL-MCNC: 2.3 MG/DL (ref 1.6–2.6)
MCH RBC QN AUTO: 30 PG (ref 26.8–34.3)
MCHC RBC AUTO-ENTMCNC: 31.6 G/DL (ref 31.4–37.4)
MCV RBC AUTO: 95 FL (ref 82–98)
PHOSPHATE SERPL-MCNC: 3.9 MG/DL (ref 2.3–4.1)
PLATELET # BLD AUTO: 201 THOUSANDS/UL (ref 149–390)
PMV BLD AUTO: 11 FL (ref 8.9–12.7)
POTASSIUM SERPL-SCNC: 4.2 MMOL/L (ref 3.5–5.3)
PROTHROMBIN TIME: 17.1 SECONDS (ref 12–14.3)
RBC # BLD AUTO: 2.7 MILLION/UL (ref 3.88–5.62)
SODIUM SERPL-SCNC: 138 MMOL/L (ref 136–145)
VIRUS SPEC CULT: NORMAL
VIRUS SPEC CULT: NORMAL
WBC # BLD AUTO: 6.49 THOUSAND/UL (ref 4.31–10.16)

## 2017-04-12 PROCEDURE — A9270 NON-COVERED ITEM OR SERVICE: HCPCS | Performed by: FAMILY MEDICINE

## 2017-04-12 PROCEDURE — 82330 ASSAY OF CALCIUM: CPT | Performed by: FAMILY MEDICINE

## 2017-04-12 PROCEDURE — C9113 INJ PANTOPRAZOLE SODIUM, VIA: HCPCS | Performed by: PHYSICIAN ASSISTANT

## 2017-04-12 PROCEDURE — A9270 NON-COVERED ITEM OR SERVICE: HCPCS | Performed by: INTERNAL MEDICINE

## 2017-04-12 PROCEDURE — A9270 NON-COVERED ITEM OR SERVICE: HCPCS | Performed by: EMERGENCY MEDICINE

## 2017-04-12 PROCEDURE — 80048 BASIC METABOLIC PNL TOTAL CA: CPT | Performed by: FAMILY MEDICINE

## 2017-04-12 PROCEDURE — 0DH63UZ INSERTION OF FEEDING DEVICE INTO STOMACH, PERCUTANEOUS APPROACH: ICD-10-PCS | Performed by: SURGERY

## 2017-04-12 PROCEDURE — 94669 MECHANICAL CHEST WALL OSCILL: CPT

## 2017-04-12 PROCEDURE — 85730 THROMBOPLASTIN TIME PARTIAL: CPT | Performed by: EMERGENCY MEDICINE

## 2017-04-12 PROCEDURE — 82948 REAGENT STRIP/BLOOD GLUCOSE: CPT

## 2017-04-12 PROCEDURE — 85027 COMPLETE CBC AUTOMATED: CPT | Performed by: FAMILY MEDICINE

## 2017-04-12 PROCEDURE — 84100 ASSAY OF PHOSPHORUS: CPT | Performed by: FAMILY MEDICINE

## 2017-04-12 PROCEDURE — 94760 N-INVAS EAR/PLS OXIMETRY 1: CPT

## 2017-04-12 PROCEDURE — A9270 NON-COVERED ITEM OR SERVICE: HCPCS | Performed by: PHYSICIAN ASSISTANT

## 2017-04-12 PROCEDURE — 85730 THROMBOPLASTIN TIME PARTIAL: CPT | Performed by: PHYSICAL MEDICINE & REHABILITATION

## 2017-04-12 PROCEDURE — 85610 PROTHROMBIN TIME: CPT | Performed by: SURGERY

## 2017-04-12 PROCEDURE — 83735 ASSAY OF MAGNESIUM: CPT | Performed by: FAMILY MEDICINE

## 2017-04-12 RX ORDER — LIDOCAINE HYDROCHLORIDE 10 MG/ML
INJECTION, SOLUTION INFILTRATION; PERINEURAL AS NEEDED
Status: DISCONTINUED | OUTPATIENT
Start: 2017-04-12 | End: 2017-04-12 | Stop reason: HOSPADM

## 2017-04-12 RX ORDER — SODIUM CHLORIDE, SODIUM LACTATE, POTASSIUM CHLORIDE, CALCIUM CHLORIDE 600; 310; 30; 20 MG/100ML; MG/100ML; MG/100ML; MG/100ML
INJECTION, SOLUTION INTRAVENOUS CONTINUOUS PRN
Status: DISCONTINUED | OUTPATIENT
Start: 2017-04-12 | End: 2017-04-12

## 2017-04-12 RX ORDER — SODIUM CHLORIDE 9 MG/ML
20 INJECTION, SOLUTION INTRAVENOUS CONTINUOUS
Status: DISCONTINUED | OUTPATIENT
Start: 2017-04-12 | End: 2017-04-13

## 2017-04-12 RX ORDER — HEPARIN SODIUM 10000 [USP'U]/100ML
3-20 INJECTION, SOLUTION INTRAVENOUS
Status: DISCONTINUED | OUTPATIENT
Start: 2017-04-12 | End: 2017-04-12

## 2017-04-12 RX ORDER — SODIUM CHLORIDE 9 MG/ML
INJECTION, SOLUTION INTRAVENOUS CONTINUOUS PRN
Status: DISCONTINUED | OUTPATIENT
Start: 2017-04-12 | End: 2017-04-12 | Stop reason: SURG

## 2017-04-12 RX ORDER — METOPROLOL TARTRATE 50 MG/1
50 TABLET, FILM COATED ORAL EVERY 12 HOURS SCHEDULED
Status: DISCONTINUED | OUTPATIENT
Start: 2017-04-12 | End: 2017-04-13

## 2017-04-12 RX ORDER — KETAMINE HYDROCHLORIDE 50 MG/ML
INJECTION, SOLUTION, CONCENTRATE INTRAMUSCULAR; INTRAVENOUS AS NEEDED
Status: DISCONTINUED | OUTPATIENT
Start: 2017-04-12 | End: 2017-04-12 | Stop reason: SURG

## 2017-04-12 RX ORDER — CITALOPRAM 10 MG/1
10 TABLET ORAL DAILY
Status: DISCONTINUED | OUTPATIENT
Start: 2017-04-12 | End: 2017-04-21 | Stop reason: HOSPADM

## 2017-04-12 RX ORDER — FENTANYL CITRATE/PF 50 MCG/ML
25 SYRINGE (ML) INJECTION
Status: DISCONTINUED | OUTPATIENT
Start: 2017-04-12 | End: 2017-04-12 | Stop reason: HOSPADM

## 2017-04-12 RX ORDER — MODAFINIL 100 MG/1
100 TABLET ORAL DAILY
Status: DISCONTINUED | OUTPATIENT
Start: 2017-04-13 | End: 2017-04-13

## 2017-04-12 RX ORDER — HEPARIN SODIUM 5000 [USP'U]/ML
5000 INJECTION, SOLUTION INTRAVENOUS; SUBCUTANEOUS EVERY 8 HOURS SCHEDULED
Status: DISCONTINUED | OUTPATIENT
Start: 2017-04-12 | End: 2017-04-12

## 2017-04-12 RX ORDER — HEPARIN SODIUM 10000 [USP'U]/100ML
3-20 INJECTION, SOLUTION INTRAVENOUS
Status: DISPENSED | OUTPATIENT
Start: 2017-04-12 | End: 2017-04-18

## 2017-04-12 RX ORDER — FENTANYL CITRATE 50 UG/ML
INJECTION, SOLUTION INTRAMUSCULAR; INTRAVENOUS AS NEEDED
Status: DISCONTINUED | OUTPATIENT
Start: 2017-04-12 | End: 2017-04-12 | Stop reason: SURG

## 2017-04-12 RX ORDER — ONDANSETRON 2 MG/ML
4 INJECTION INTRAMUSCULAR; INTRAVENOUS ONCE AS NEEDED
Status: DISCONTINUED | OUTPATIENT
Start: 2017-04-12 | End: 2017-04-12 | Stop reason: HOSPADM

## 2017-04-12 RX ADMIN — ANORECTAL OINTMENT 1 APPLICATION: 15.7; .44; 24; 20.6 OINTMENT TOPICAL at 09:04

## 2017-04-12 RX ADMIN — FENTANYL CITRATE 25 MCG: 50 INJECTION, SOLUTION INTRAMUSCULAR; INTRAVENOUS at 14:03

## 2017-04-12 RX ADMIN — Medication 1 CAPSULE: at 09:04

## 2017-04-12 RX ADMIN — ANORECTAL OINTMENT 1 APPLICATION: 15.7; .44; 24; 20.6 OINTMENT TOPICAL at 21:35

## 2017-04-12 RX ADMIN — Medication 250 MG: at 18:20

## 2017-04-12 RX ADMIN — KETAMINE HYDROCHLORIDE 25 MG: 50 INJECTION, SOLUTION INTRAMUSCULAR; INTRAVENOUS at 13:46

## 2017-04-12 RX ADMIN — VANCOMYCIN HYDROCHLORIDE 125 MG: 500 INJECTION, POWDER, LYOPHILIZED, FOR SOLUTION INTRAVENOUS at 00:10

## 2017-04-12 RX ADMIN — SODIUM CHLORIDE: 0.9 INJECTION, SOLUTION INTRAVENOUS at 13:39

## 2017-04-12 RX ADMIN — VANCOMYCIN HYDROCHLORIDE 125 MG: 500 INJECTION, POWDER, LYOPHILIZED, FOR SOLUTION INTRAVENOUS at 05:26

## 2017-04-12 RX ADMIN — CHLORHEXIDINE GLUCONATE 15 ML: 1.2 RINSE ORAL at 09:03

## 2017-04-12 RX ADMIN — CHLORHEXIDINE GLUCONATE 15 ML: 1.2 RINSE ORAL at 21:34

## 2017-04-12 RX ADMIN — METOPROLOL TARTRATE 50 MG: 50 TABLET ORAL at 09:03

## 2017-04-12 RX ADMIN — ALTEPLASE 2 MG: 2.2 INJECTION, POWDER, LYOPHILIZED, FOR SOLUTION INTRAVENOUS at 16:40

## 2017-04-12 RX ADMIN — VANCOMYCIN HYDROCHLORIDE 125 MG: 500 INJECTION, POWDER, LYOPHILIZED, FOR SOLUTION INTRAVENOUS at 23:46

## 2017-04-12 RX ADMIN — HEPARIN SODIUM 1650 UNITS: 1000 INJECTION, SOLUTION INTRAVENOUS; SUBCUTANEOUS at 00:11

## 2017-04-12 RX ADMIN — VANCOMYCIN HYDROCHLORIDE 125 MG: 500 INJECTION, POWDER, LYOPHILIZED, FOR SOLUTION INTRAVENOUS at 12:00

## 2017-04-12 RX ADMIN — PANTOPRAZOLE SODIUM 40 MG: 40 INJECTION, POWDER, FOR SOLUTION INTRAVENOUS at 09:03

## 2017-04-12 RX ADMIN — ANORECTAL OINTMENT 1 APPLICATION: 15.7; .44; 24; 20.6 OINTMENT TOPICAL at 16:11

## 2017-04-12 RX ADMIN — Medication 250 MG: at 09:03

## 2017-04-12 RX ADMIN — HEPARIN SODIUM 14 UNITS/KG/HR: 10000 INJECTION, SOLUTION INTRAVENOUS at 18:18

## 2017-04-12 RX ADMIN — DEXTROSE 50 ML/HR: 5 SOLUTION INTRAVENOUS at 00:02

## 2017-04-12 RX ADMIN — VANCOMYCIN HYDROCHLORIDE 125 MG: 500 INJECTION, POWDER, LYOPHILIZED, FOR SOLUTION INTRAVENOUS at 18:25

## 2017-04-12 RX ADMIN — FENTANYL CITRATE 25 MCG: 50 INJECTION, SOLUTION INTRAMUSCULAR; INTRAVENOUS at 13:55

## 2017-04-12 RX ADMIN — METOPROLOL TARTRATE 50 MG: 50 TABLET ORAL at 21:34

## 2017-04-12 RX ADMIN — FENTANYL CITRATE 50 MCG: 50 INJECTION, SOLUTION INTRAMUSCULAR; INTRAVENOUS at 13:47

## 2017-04-12 RX ADMIN — DAPTOMYCIN 650 MG: 500 INJECTION, POWDER, LYOPHILIZED, FOR SOLUTION INTRAVENOUS at 17:15

## 2017-04-13 PROBLEM — R78.81 BACTEREMIA: Status: RESOLVED | Noted: 2017-04-02 | Resolved: 2017-04-13

## 2017-04-13 LAB
ALBUMIN SERPL BCP-MCNC: 1.7 G/DL (ref 3.5–5)
ALP SERPL-CCNC: 142 U/L (ref 46–116)
ALT SERPL W P-5'-P-CCNC: 11 U/L (ref 12–78)
ANION GAP SERPL CALCULATED.3IONS-SCNC: 7 MMOL/L (ref 4–13)
APTT PPP: 43 SECONDS (ref 24–36)
APTT PPP: 45 SECONDS (ref 24–36)
APTT PPP: 47 SECONDS (ref 24–36)
APTT PPP: 65 SECONDS (ref 24–36)
AST SERPL W P-5'-P-CCNC: 20 U/L (ref 5–45)
BASOPHILS # BLD AUTO: 0.08 THOUSANDS/ΜL (ref 0–0.1)
BASOPHILS NFR BLD AUTO: 1 % (ref 0–1)
BILIRUB SERPL-MCNC: 0.54 MG/DL (ref 0.2–1)
BUN SERPL-MCNC: 25 MG/DL (ref 5–25)
CALCIUM SERPL-MCNC: 8.6 MG/DL (ref 8.3–10.1)
CHLORIDE SERPL-SCNC: 96 MMOL/L (ref 100–108)
CK SERPL-CCNC: 92 U/L (ref 39–308)
CO2 SERPL-SCNC: 31 MMOL/L (ref 21–32)
CREAT SERPL-MCNC: 3.06 MG/DL (ref 0.6–1.3)
EOSINOPHIL # BLD AUTO: 0.17 THOUSAND/ΜL (ref 0–0.61)
EOSINOPHIL NFR BLD AUTO: 2 % (ref 0–6)
ERYTHROCYTE [DISTWIDTH] IN BLOOD BY AUTOMATED COUNT: 16 % (ref 11.6–15.1)
GFR SERPL CREATININE-BSD FRML MDRD: 20.7 ML/MIN/1.73SQ M
GLUCOSE SERPL-MCNC: 119 MG/DL (ref 65–140)
GLUCOSE SERPL-MCNC: 124 MG/DL (ref 65–140)
GLUCOSE SERPL-MCNC: 136 MG/DL (ref 65–140)
GLUCOSE SERPL-MCNC: 166 MG/DL (ref 65–140)
GLUCOSE SERPL-MCNC: 170 MG/DL (ref 65–140)
GLUCOSE SERPL-MCNC: 170 MG/DL (ref 65–140)
GLUCOSE SERPL-MCNC: 183 MG/DL (ref 65–140)
GLUCOSE SERPL-MCNC: 195 MG/DL (ref 65–140)
GLUCOSE SERPL-MCNC: 197 MG/DL (ref 65–140)
GLUCOSE SERPL-MCNC: 199 MG/DL (ref 65–140)
GLUCOSE SERPL-MCNC: 207 MG/DL (ref 65–140)
GLUCOSE SERPL-MCNC: 76 MG/DL (ref 65–140)
GLUCOSE SERPL-MCNC: 94 MG/DL (ref 65–140)
GLUCOSE SERPL-MCNC: 96 MG/DL (ref 65–140)
HCT VFR BLD AUTO: 26.9 % (ref 36.5–49.3)
HGB BLD-MCNC: 8.5 G/DL (ref 12–17)
LYMPHOCYTES # BLD AUTO: 0.95 THOUSANDS/ΜL (ref 0.6–4.47)
LYMPHOCYTES NFR BLD AUTO: 12 % (ref 14–44)
MAGNESIUM SERPL-MCNC: 2.2 MG/DL (ref 1.6–2.6)
MCH RBC QN AUTO: 30.2 PG (ref 26.8–34.3)
MCHC RBC AUTO-ENTMCNC: 31.6 G/DL (ref 31.4–37.4)
MCV RBC AUTO: 96 FL (ref 82–98)
MONOCYTES # BLD AUTO: 0.65 THOUSAND/ΜL (ref 0.17–1.22)
MONOCYTES NFR BLD AUTO: 8 % (ref 4–12)
NEUTROPHILS # BLD AUTO: 6.3 THOUSANDS/ΜL (ref 1.85–7.62)
NEUTS SEG NFR BLD AUTO: 77 % (ref 43–75)
NRBC BLD AUTO-RTO: 0 /100 WBCS
PHOSPHATE SERPL-MCNC: 2.8 MG/DL (ref 2.3–4.1)
PLATELET # BLD AUTO: 221 THOUSANDS/UL (ref 149–390)
PMV BLD AUTO: 10.6 FL (ref 8.9–12.7)
POTASSIUM SERPL-SCNC: 3.9 MMOL/L (ref 3.5–5.3)
PROT SERPL-MCNC: 7.2 G/DL (ref 6.4–8.2)
RBC # BLD AUTO: 2.81 MILLION/UL (ref 3.88–5.62)
SODIUM SERPL-SCNC: 134 MMOL/L (ref 136–145)
WBC # BLD AUTO: 8.17 THOUSAND/UL (ref 4.31–10.16)

## 2017-04-13 PROCEDURE — A9270 NON-COVERED ITEM OR SERVICE: HCPCS | Performed by: FAMILY MEDICINE

## 2017-04-13 PROCEDURE — 82550 ASSAY OF CK (CPK): CPT | Performed by: EMERGENCY MEDICINE

## 2017-04-13 PROCEDURE — A9270 NON-COVERED ITEM OR SERVICE: HCPCS | Performed by: EMERGENCY MEDICINE

## 2017-04-13 PROCEDURE — 85025 COMPLETE CBC W/AUTO DIFF WBC: CPT | Performed by: EMERGENCY MEDICINE

## 2017-04-13 PROCEDURE — 97112 NEUROMUSCULAR REEDUCATION: CPT

## 2017-04-13 PROCEDURE — 97535 SELF CARE MNGMENT TRAINING: CPT

## 2017-04-13 PROCEDURE — 83735 ASSAY OF MAGNESIUM: CPT | Performed by: EMERGENCY MEDICINE

## 2017-04-13 PROCEDURE — 85730 THROMBOPLASTIN TIME PARTIAL: CPT | Performed by: ANESTHESIOLOGY

## 2017-04-13 PROCEDURE — 94669 MECHANICAL CHEST WALL OSCILL: CPT

## 2017-04-13 PROCEDURE — 82948 REAGENT STRIP/BLOOD GLUCOSE: CPT

## 2017-04-13 PROCEDURE — 97530 THERAPEUTIC ACTIVITIES: CPT

## 2017-04-13 PROCEDURE — 80053 COMPREHEN METABOLIC PANEL: CPT | Performed by: EMERGENCY MEDICINE

## 2017-04-13 PROCEDURE — 84100 ASSAY OF PHOSPHORUS: CPT | Performed by: EMERGENCY MEDICINE

## 2017-04-13 PROCEDURE — 97110 THERAPEUTIC EXERCISES: CPT

## 2017-04-13 PROCEDURE — A9270 NON-COVERED ITEM OR SERVICE: HCPCS | Performed by: INTERNAL MEDICINE

## 2017-04-13 PROCEDURE — 94668 MNPJ CHEST WALL SBSQ: CPT

## 2017-04-13 PROCEDURE — 97532 HB COGNITIVE SKILLS DEVELOPMENT: CPT

## 2017-04-13 PROCEDURE — A9270 NON-COVERED ITEM OR SERVICE: HCPCS | Performed by: PHYSICIAN ASSISTANT

## 2017-04-13 PROCEDURE — C9113 INJ PANTOPRAZOLE SODIUM, VIA: HCPCS | Performed by: PHYSICIAN ASSISTANT

## 2017-04-13 RX ORDER — WARFARIN SODIUM 5 MG/1
5 TABLET ORAL
Status: DISCONTINUED | OUTPATIENT
Start: 2017-04-13 | End: 2017-04-21 | Stop reason: HOSPADM

## 2017-04-13 RX ORDER — MODAFINIL 100 MG/1
200 TABLET ORAL DAILY
Status: DISCONTINUED | OUTPATIENT
Start: 2017-04-14 | End: 2017-04-21 | Stop reason: HOSPADM

## 2017-04-13 RX ORDER — METOPROLOL TARTRATE 50 MG/1
100 TABLET, FILM COATED ORAL EVERY 12 HOURS SCHEDULED
Status: DISCONTINUED | OUTPATIENT
Start: 2017-04-13 | End: 2017-04-21 | Stop reason: HOSPADM

## 2017-04-13 RX ORDER — LANOLIN ALCOHOL/MO/W.PET/CERES
3 CREAM (GRAM) TOPICAL
Status: DISCONTINUED | OUTPATIENT
Start: 2017-04-13 | End: 2017-04-21 | Stop reason: HOSPADM

## 2017-04-13 RX ADMIN — CHLORHEXIDINE GLUCONATE 15 ML: 1.2 RINSE ORAL at 21:35

## 2017-04-13 RX ADMIN — Medication 250 MG: at 09:03

## 2017-04-13 RX ADMIN — ANORECTAL OINTMENT 1 APPLICATION: 15.7; .44; 24; 20.6 OINTMENT TOPICAL at 09:04

## 2017-04-13 RX ADMIN — WARFARIN SODIUM 5 MG: 5 TABLET ORAL at 18:26

## 2017-04-13 RX ADMIN — VANCOMYCIN HYDROCHLORIDE 125 MG: 500 INJECTION, POWDER, LYOPHILIZED, FOR SOLUTION INTRAVENOUS at 18:25

## 2017-04-13 RX ADMIN — Medication 250 MG: at 18:26

## 2017-04-13 RX ADMIN — PANTOPRAZOLE SODIUM 40 MG: 40 INJECTION, POWDER, FOR SOLUTION INTRAVENOUS at 09:04

## 2017-04-13 RX ADMIN — HEPARIN SODIUM 16 UNITS/KG/HR: 10000 INJECTION, SOLUTION INTRAVENOUS at 13:52

## 2017-04-13 RX ADMIN — MELATONIN TAB 3 MG 3 MG: 3 TAB at 21:36

## 2017-04-13 RX ADMIN — Medication 1 CAPSULE: at 09:03

## 2017-04-13 RX ADMIN — SODIUM CHLORIDE 2 UNITS/HR: 9 INJECTION, SOLUTION INTRAVENOUS at 13:57

## 2017-04-13 RX ADMIN — CHLORHEXIDINE GLUCONATE 15 ML: 1.2 RINSE ORAL at 09:03

## 2017-04-13 RX ADMIN — ANORECTAL OINTMENT 1 APPLICATION: 15.7; .44; 24; 20.6 OINTMENT TOPICAL at 16:33

## 2017-04-13 RX ADMIN — Medication 175 MCG: at 05:10

## 2017-04-13 RX ADMIN — VANCOMYCIN HYDROCHLORIDE 125 MG: 500 INJECTION, POWDER, LYOPHILIZED, FOR SOLUTION INTRAVENOUS at 05:10

## 2017-04-13 RX ADMIN — CITALOPRAM HYDROBROMIDE 10 MG: 10 TABLET ORAL at 09:03

## 2017-04-13 RX ADMIN — METOPROLOL TARTRATE 100 MG: 50 TABLET ORAL at 09:03

## 2017-04-13 RX ADMIN — SODIUM CHLORIDE 2 UNITS/HR: 9 INJECTION, SOLUTION INTRAVENOUS at 22:00

## 2017-04-13 RX ADMIN — MODAFINIL 100 MG: 100 TABLET ORAL at 09:09

## 2017-04-13 RX ADMIN — HEPARIN SODIUM 20 UNITS/KG/HR: 10000 INJECTION, SOLUTION INTRAVENOUS at 23:19

## 2017-04-13 RX ADMIN — VANCOMYCIN HYDROCHLORIDE 125 MG: 500 INJECTION, POWDER, LYOPHILIZED, FOR SOLUTION INTRAVENOUS at 12:31

## 2017-04-13 RX ADMIN — METOPROLOL TARTRATE 100 MG: 50 TABLET ORAL at 21:36

## 2017-04-14 ENCOUNTER — APPOINTMENT (INPATIENT)
Dept: DIALYSIS | Facility: HOSPITAL | Age: 67
DRG: 853 | End: 2017-04-14
Payer: COMMERCIAL

## 2017-04-14 LAB
APTT PPP: 42 SECONDS (ref 24–36)
APTT PPP: 43 SECONDS (ref 24–36)
APTT PPP: 56 SECONDS (ref 24–36)
GLUCOSE SERPL-MCNC: 104 MG/DL (ref 65–140)
GLUCOSE SERPL-MCNC: 105 MG/DL (ref 65–140)
GLUCOSE SERPL-MCNC: 128 MG/DL (ref 65–140)
GLUCOSE SERPL-MCNC: 135 MG/DL (ref 65–140)
GLUCOSE SERPL-MCNC: 137 MG/DL (ref 65–140)
GLUCOSE SERPL-MCNC: 142 MG/DL (ref 65–140)
GLUCOSE SERPL-MCNC: 170 MG/DL (ref 65–140)
GLUCOSE SERPL-MCNC: 179 MG/DL (ref 65–140)
GLUCOSE SERPL-MCNC: 196 MG/DL (ref 65–140)
GLUCOSE SERPL-MCNC: 236 MG/DL (ref 65–140)
GLUCOSE SERPL-MCNC: 43 MG/DL (ref 65–140)
GLUCOSE SERPL-MCNC: 51 MG/DL (ref 65–140)
GLUCOSE SERPL-MCNC: 91 MG/DL (ref 65–140)

## 2017-04-14 PROCEDURE — A9270 NON-COVERED ITEM OR SERVICE: HCPCS | Performed by: EMERGENCY MEDICINE

## 2017-04-14 PROCEDURE — A9270 NON-COVERED ITEM OR SERVICE: HCPCS | Performed by: PHYSICIAN ASSISTANT

## 2017-04-14 PROCEDURE — 82948 REAGENT STRIP/BLOOD GLUCOSE: CPT

## 2017-04-14 PROCEDURE — A9270 NON-COVERED ITEM OR SERVICE: HCPCS | Performed by: INTERNAL MEDICINE

## 2017-04-14 PROCEDURE — 94669 MECHANICAL CHEST WALL OSCILL: CPT

## 2017-04-14 PROCEDURE — 94760 N-INVAS EAR/PLS OXIMETRY 1: CPT

## 2017-04-14 PROCEDURE — C9113 INJ PANTOPRAZOLE SODIUM, VIA: HCPCS | Performed by: PHYSICIAN ASSISTANT

## 2017-04-14 PROCEDURE — 85730 THROMBOPLASTIN TIME PARTIAL: CPT | Performed by: SURGERY

## 2017-04-14 PROCEDURE — A9270 NON-COVERED ITEM OR SERVICE: HCPCS | Performed by: FAMILY MEDICINE

## 2017-04-14 PROCEDURE — 85730 THROMBOPLASTIN TIME PARTIAL: CPT | Performed by: INTERNAL MEDICINE

## 2017-04-14 RX ORDER — DEXTROSE MONOHYDRATE 25 G/50ML
INJECTION, SOLUTION INTRAVENOUS
Status: COMPLETED
Start: 2017-04-14 | End: 2017-04-14

## 2017-04-14 RX ORDER — DEXTROSE MONOHYDRATE 25 G/50ML
25 INJECTION, SOLUTION INTRAVENOUS ONCE
Status: COMPLETED | OUTPATIENT
Start: 2017-04-14 | End: 2017-04-14

## 2017-04-14 RX ADMIN — CHLORHEXIDINE GLUCONATE 15 ML: 1.2 RINSE ORAL at 21:12

## 2017-04-14 RX ADMIN — DEXTROSE MONOHYDRATE 25 ML: 25 INJECTION, SOLUTION INTRAVENOUS at 22:40

## 2017-04-14 RX ADMIN — MELATONIN TAB 3 MG 3 MG: 3 TAB at 21:12

## 2017-04-14 RX ADMIN — DEXTROSE MONOHYDRATE 50 ML: 25 INJECTION, SOLUTION INTRAVENOUS at 04:50

## 2017-04-14 RX ADMIN — ANORECTAL OINTMENT 1 APPLICATION: 15.7; .44; 24; 20.6 OINTMENT TOPICAL at 00:14

## 2017-04-14 RX ADMIN — INSULIN DETEMIR 6 UNITS: 100 INJECTION, SOLUTION SUBCUTANEOUS at 21:12

## 2017-04-14 RX ADMIN — Medication 1 CAPSULE: at 08:26

## 2017-04-14 RX ADMIN — VANCOMYCIN HYDROCHLORIDE 125 MG: 500 INJECTION, POWDER, LYOPHILIZED, FOR SOLUTION INTRAVENOUS at 06:00

## 2017-04-14 RX ADMIN — Medication 250 MG: at 18:35

## 2017-04-14 RX ADMIN — SODIUM CHLORIDE 3 UNITS/HR: 9 INJECTION, SOLUTION INTRAVENOUS at 02:10

## 2017-04-14 RX ADMIN — ANORECTAL OINTMENT 1 APPLICATION: 15.7; .44; 24; 20.6 OINTMENT TOPICAL at 21:12

## 2017-04-14 RX ADMIN — ANORECTAL OINTMENT 1 APPLICATION: 15.7; .44; 24; 20.6 OINTMENT TOPICAL at 08:27

## 2017-04-14 RX ADMIN — METOPROLOL TARTRATE 100 MG: 50 TABLET ORAL at 08:26

## 2017-04-14 RX ADMIN — VANCOMYCIN HYDROCHLORIDE 125 MG: 500 INJECTION, POWDER, LYOPHILIZED, FOR SOLUTION INTRAVENOUS at 18:35

## 2017-04-14 RX ADMIN — PANTOPRAZOLE SODIUM 40 MG: 40 INJECTION, POWDER, FOR SOLUTION INTRAVENOUS at 08:27

## 2017-04-14 RX ADMIN — METOPROLOL TARTRATE 100 MG: 50 TABLET ORAL at 21:12

## 2017-04-14 RX ADMIN — MODAFINIL 200 MG: 100 TABLET ORAL at 08:27

## 2017-04-14 RX ADMIN — ANORECTAL OINTMENT 1 APPLICATION: 15.7; .44; 24; 20.6 OINTMENT TOPICAL at 17:11

## 2017-04-14 RX ADMIN — SODIUM CHLORIDE 0.5 UNITS/HR: 9 INJECTION, SOLUTION INTRAVENOUS at 20:45

## 2017-04-14 RX ADMIN — CHLORHEXIDINE GLUCONATE 15 ML: 1.2 RINSE ORAL at 08:26

## 2017-04-14 RX ADMIN — WARFARIN SODIUM 5 MG: 5 TABLET ORAL at 18:35

## 2017-04-14 RX ADMIN — VANCOMYCIN HYDROCHLORIDE 125 MG: 500 INJECTION, POWDER, LYOPHILIZED, FOR SOLUTION INTRAVENOUS at 00:05

## 2017-04-14 RX ADMIN — DAPTOMYCIN 650 MG: 500 INJECTION, POWDER, LYOPHILIZED, FOR SOLUTION INTRAVENOUS at 11:43

## 2017-04-14 RX ADMIN — HEPARIN SODIUM 24 UNITS/KG/HR: 10000 INJECTION, SOLUTION INTRAVENOUS at 16:03

## 2017-04-14 RX ADMIN — CITALOPRAM HYDROBROMIDE 10 MG: 10 TABLET ORAL at 08:26

## 2017-04-14 RX ADMIN — Medication 250 MG: at 08:26

## 2017-04-15 LAB
APTT PPP: 44 SECONDS (ref 24–36)
APTT PPP: 78 SECONDS (ref 24–36)
APTT PPP: 79 SECONDS (ref 24–36)
GLUCOSE SERPL-MCNC: 111 MG/DL (ref 65–140)
GLUCOSE SERPL-MCNC: 121 MG/DL (ref 65–140)
GLUCOSE SERPL-MCNC: 134 MG/DL (ref 65–140)
GLUCOSE SERPL-MCNC: 154 MG/DL (ref 65–140)
GLUCOSE SERPL-MCNC: 157 MG/DL (ref 65–140)
GLUCOSE SERPL-MCNC: 180 MG/DL (ref 65–140)
GLUCOSE SERPL-MCNC: 244 MG/DL (ref 65–140)
GLUCOSE SERPL-MCNC: 265 MG/DL (ref 65–140)
GLUCOSE SERPL-MCNC: 299 MG/DL (ref 65–140)
GLUCOSE SERPL-MCNC: 322 MG/DL (ref 65–140)
GLUCOSE SERPL-MCNC: 83 MG/DL (ref 65–140)
INR PPP: 1.33 (ref 0.86–1.16)
PROTHROMBIN TIME: 16.5 SECONDS (ref 12–14.3)

## 2017-04-15 PROCEDURE — A9270 NON-COVERED ITEM OR SERVICE: HCPCS | Performed by: FAMILY MEDICINE

## 2017-04-15 PROCEDURE — A9270 NON-COVERED ITEM OR SERVICE: HCPCS | Performed by: INTERNAL MEDICINE

## 2017-04-15 PROCEDURE — A9270 NON-COVERED ITEM OR SERVICE: HCPCS | Performed by: EMERGENCY MEDICINE

## 2017-04-15 PROCEDURE — 94760 N-INVAS EAR/PLS OXIMETRY 1: CPT

## 2017-04-15 PROCEDURE — 82948 REAGENT STRIP/BLOOD GLUCOSE: CPT

## 2017-04-15 PROCEDURE — C9113 INJ PANTOPRAZOLE SODIUM, VIA: HCPCS | Performed by: PHYSICIAN ASSISTANT

## 2017-04-15 PROCEDURE — A9270 NON-COVERED ITEM OR SERVICE: HCPCS | Performed by: PHYSICIAN ASSISTANT

## 2017-04-15 PROCEDURE — 85610 PROTHROMBIN TIME: CPT | Performed by: INTERNAL MEDICINE

## 2017-04-15 PROCEDURE — 85730 THROMBOPLASTIN TIME PARTIAL: CPT | Performed by: INTERNAL MEDICINE

## 2017-04-15 RX ORDER — LOPERAMIDE HYDROCHLORIDE 2 MG/1
2 CAPSULE ORAL EVERY 4 HOURS PRN
Status: DISCONTINUED | OUTPATIENT
Start: 2017-04-15 | End: 2017-04-15

## 2017-04-15 RX ORDER — ACETAMINOPHEN 325 MG/1
650 TABLET ORAL EVERY 6 HOURS PRN
Status: DISCONTINUED | OUTPATIENT
Start: 2017-04-15 | End: 2017-04-15

## 2017-04-15 RX ORDER — PEDIATRIC MULTIPLE VITAMIN LIQ
5 LIQUID ORAL DAILY
Status: DISCONTINUED | OUTPATIENT
Start: 2017-04-15 | End: 2017-04-21 | Stop reason: HOSPADM

## 2017-04-15 RX ORDER — ACETAMINOPHEN 160 MG/5ML
650 SUSPENSION, ORAL (FINAL DOSE FORM) ORAL EVERY 4 HOURS PRN
Status: DISCONTINUED | OUTPATIENT
Start: 2017-04-15 | End: 2017-04-21 | Stop reason: HOSPADM

## 2017-04-15 RX ORDER — DEXTROSE MONOHYDRATE 25 G/50ML
50 INJECTION, SOLUTION INTRAVENOUS AS NEEDED
Status: DISCONTINUED | OUTPATIENT
Start: 2017-04-15 | End: 2017-04-21 | Stop reason: HOSPADM

## 2017-04-15 RX ORDER — WARFARIN SODIUM 1 MG/1
3 TABLET ORAL
Status: COMPLETED | OUTPATIENT
Start: 2017-04-15 | End: 2017-04-15

## 2017-04-15 RX ADMIN — Medication 175 MCG: at 05:49

## 2017-04-15 RX ADMIN — WARFARIN SODIUM 3 MG: 1 TABLET ORAL at 18:47

## 2017-04-15 RX ADMIN — PANTOPRAZOLE SODIUM 40 MG: 40 INJECTION, POWDER, FOR SOLUTION INTRAVENOUS at 10:06

## 2017-04-15 RX ADMIN — INSULIN DETEMIR 6 UNITS: 100 INJECTION, SOLUTION SUBCUTANEOUS at 10:16

## 2017-04-15 RX ADMIN — ANORECTAL OINTMENT 1 APPLICATION: 15.7; .44; 24; 20.6 OINTMENT TOPICAL at 18:49

## 2017-04-15 RX ADMIN — MELATONIN TAB 3 MG 3 MG: 3 TAB at 21:46

## 2017-04-15 RX ADMIN — METOPROLOL TARTRATE 100 MG: 50 TABLET ORAL at 21:46

## 2017-04-15 RX ADMIN — WARFARIN SODIUM 5 MG: 5 TABLET ORAL at 18:47

## 2017-04-15 RX ADMIN — VANCOMYCIN HYDROCHLORIDE 125 MG: 500 INJECTION, POWDER, LYOPHILIZED, FOR SOLUTION INTRAVENOUS at 18:48

## 2017-04-15 RX ADMIN — MODAFINIL 200 MG: 100 TABLET ORAL at 10:05

## 2017-04-15 RX ADMIN — LOPERAMIDE HYDROCHLORIDE 2 MG: 1 SOLUTION ORAL at 13:36

## 2017-04-15 RX ADMIN — INSULIN LISPRO 1 UNITS: 100 INJECTION, SOLUTION INTRAVENOUS; SUBCUTANEOUS at 18:52

## 2017-04-15 RX ADMIN — Medication 5 ML: at 13:37

## 2017-04-15 RX ADMIN — VANCOMYCIN HYDROCHLORIDE 125 MG: 500 INJECTION, POWDER, LYOPHILIZED, FOR SOLUTION INTRAVENOUS at 23:42

## 2017-04-15 RX ADMIN — Medication 250 MG: at 10:05

## 2017-04-15 RX ADMIN — INSULIN LISPRO 3 UNITS: 100 INJECTION, SOLUTION INTRAVENOUS; SUBCUTANEOUS at 12:27

## 2017-04-15 RX ADMIN — HEPARIN SODIUM 26 UNITS/KG/HR: 10000 INJECTION, SOLUTION INTRAVENOUS at 10:08

## 2017-04-15 RX ADMIN — CHLORHEXIDINE GLUCONATE 15 ML: 1.2 RINSE ORAL at 10:05

## 2017-04-15 RX ADMIN — CITALOPRAM HYDROBROMIDE 10 MG: 10 TABLET ORAL at 10:07

## 2017-04-15 RX ADMIN — VANCOMYCIN HYDROCHLORIDE 125 MG: 500 INJECTION, POWDER, LYOPHILIZED, FOR SOLUTION INTRAVENOUS at 05:49

## 2017-04-15 RX ADMIN — Medication 250 MG: at 18:48

## 2017-04-15 RX ADMIN — CHLORHEXIDINE GLUCONATE 15 ML: 1.2 RINSE ORAL at 21:46

## 2017-04-15 RX ADMIN — INSULIN HUMAN 3 UNITS: 100 INJECTION, SOLUTION PARENTERAL at 13:44

## 2017-04-15 RX ADMIN — Medication 1 CAPSULE: at 10:07

## 2017-04-15 RX ADMIN — METOPROLOL TARTRATE 100 MG: 50 TABLET ORAL at 10:06

## 2017-04-15 RX ADMIN — INSULIN DETEMIR 4 UNITS: 100 INJECTION, SOLUTION SUBCUTANEOUS at 21:45

## 2017-04-15 RX ADMIN — VANCOMYCIN HYDROCHLORIDE 125 MG: 500 INJECTION, POWDER, LYOPHILIZED, FOR SOLUTION INTRAVENOUS at 01:50

## 2017-04-15 RX ADMIN — ANORECTAL OINTMENT 1 APPLICATION: 15.7; .44; 24; 20.6 OINTMENT TOPICAL at 21:47

## 2017-04-15 RX ADMIN — VANCOMYCIN HYDROCHLORIDE 125 MG: 500 INJECTION, POWDER, LYOPHILIZED, FOR SOLUTION INTRAVENOUS at 12:40

## 2017-04-15 RX ADMIN — ANORECTAL OINTMENT 1 APPLICATION: 15.7; .44; 24; 20.6 OINTMENT TOPICAL at 10:08

## 2017-04-15 RX ADMIN — ACETAMINOPHEN 650 MG: 650 SUSPENSION ORAL at 13:36

## 2017-04-15 RX ADMIN — INSULIN HUMAN 3 UNITS: 100 INJECTION, SOLUTION PARENTERAL at 18:50

## 2017-04-16 LAB
ANION GAP SERPL CALCULATED.3IONS-SCNC: 6 MMOL/L (ref 4–13)
APTT PPP: 75 SECONDS (ref 24–36)
BUN SERPL-MCNC: 42 MG/DL (ref 5–25)
CALCIUM SERPL-MCNC: 8.9 MG/DL (ref 8.3–10.1)
CHLORIDE SERPL-SCNC: 98 MMOL/L (ref 100–108)
CO2 SERPL-SCNC: 30 MMOL/L (ref 21–32)
CREAT SERPL-MCNC: 4.18 MG/DL (ref 0.6–1.3)
GFR SERPL CREATININE-BSD FRML MDRD: 14.4 ML/MIN/1.73SQ M
GLUCOSE SERPL-MCNC: 130 MG/DL (ref 65–140)
GLUCOSE SERPL-MCNC: 168 MG/DL (ref 65–140)
GLUCOSE SERPL-MCNC: 179 MG/DL (ref 65–140)
GLUCOSE SERPL-MCNC: 233 MG/DL (ref 65–140)
GLUCOSE SERPL-MCNC: 267 MG/DL (ref 65–140)
GLUCOSE SERPL-MCNC: 302 MG/DL (ref 65–140)
GLUCOSE SERPL-MCNC: 77 MG/DL (ref 65–140)
GLUCOSE SERPL-MCNC: 81 MG/DL (ref 65–140)
GLUCOSE SERPL-MCNC: 81 MG/DL (ref 65–140)
GLUCOSE SERPL-MCNC: 84 MG/DL (ref 65–140)
GLUCOSE SERPL-MCNC: 86 MG/DL (ref 65–140)
INR PPP: 1.3 (ref 0.86–1.16)
POTASSIUM SERPL-SCNC: 4.4 MMOL/L (ref 3.5–5.3)
PROTHROMBIN TIME: 16.2 SECONDS (ref 12–14.3)
SODIUM SERPL-SCNC: 134 MMOL/L (ref 136–145)

## 2017-04-16 PROCEDURE — 80048 BASIC METABOLIC PNL TOTAL CA: CPT | Performed by: NURSE PRACTITIONER

## 2017-04-16 PROCEDURE — A9270 NON-COVERED ITEM OR SERVICE: HCPCS | Performed by: EMERGENCY MEDICINE

## 2017-04-16 PROCEDURE — C9113 INJ PANTOPRAZOLE SODIUM, VIA: HCPCS | Performed by: PHYSICIAN ASSISTANT

## 2017-04-16 PROCEDURE — A9270 NON-COVERED ITEM OR SERVICE: HCPCS | Performed by: INTERNAL MEDICINE

## 2017-04-16 PROCEDURE — 85730 THROMBOPLASTIN TIME PARTIAL: CPT | Performed by: INTERNAL MEDICINE

## 2017-04-16 PROCEDURE — 82948 REAGENT STRIP/BLOOD GLUCOSE: CPT

## 2017-04-16 PROCEDURE — A9270 NON-COVERED ITEM OR SERVICE: HCPCS | Performed by: PHYSICIAN ASSISTANT

## 2017-04-16 PROCEDURE — A9270 NON-COVERED ITEM OR SERVICE: HCPCS | Performed by: FAMILY MEDICINE

## 2017-04-16 PROCEDURE — 94760 N-INVAS EAR/PLS OXIMETRY 1: CPT

## 2017-04-16 PROCEDURE — 85610 PROTHROMBIN TIME: CPT | Performed by: INTERNAL MEDICINE

## 2017-04-16 RX ORDER — WARFARIN SODIUM 5 MG/1
5 TABLET ORAL
Status: COMPLETED | OUTPATIENT
Start: 2017-04-16 | End: 2017-04-16

## 2017-04-16 RX ADMIN — Medication 250 MG: at 09:15

## 2017-04-16 RX ADMIN — PANTOPRAZOLE SODIUM 40 MG: 40 INJECTION, POWDER, FOR SOLUTION INTRAVENOUS at 09:16

## 2017-04-16 RX ADMIN — CITALOPRAM HYDROBROMIDE 10 MG: 10 TABLET ORAL at 09:16

## 2017-04-16 RX ADMIN — INSULIN LISPRO 2 UNITS: 100 INJECTION, SOLUTION INTRAVENOUS; SUBCUTANEOUS at 13:07

## 2017-04-16 RX ADMIN — VANCOMYCIN HYDROCHLORIDE 125 MG: 500 INJECTION, POWDER, LYOPHILIZED, FOR SOLUTION INTRAVENOUS at 05:03

## 2017-04-16 RX ADMIN — Medication 5 ML: at 09:18

## 2017-04-16 RX ADMIN — HEPARIN SODIUM 28 UNITS/KG/HR: 10000 INJECTION, SOLUTION INTRAVENOUS at 01:47

## 2017-04-16 RX ADMIN — ANORECTAL OINTMENT 1 APPLICATION: 15.7; .44; 24; 20.6 OINTMENT TOPICAL at 17:33

## 2017-04-16 RX ADMIN — WARFARIN SODIUM 5 MG: 5 TABLET ORAL at 17:31

## 2017-04-16 RX ADMIN — WARFARIN SODIUM 5 MG: 5 TABLET ORAL at 17:30

## 2017-04-16 RX ADMIN — INSULIN DETEMIR 4 UNITS: 100 INJECTION, SOLUTION SUBCUTANEOUS at 09:16

## 2017-04-16 RX ADMIN — CHLORHEXIDINE GLUCONATE 15 ML: 1.2 RINSE ORAL at 22:00

## 2017-04-16 RX ADMIN — MODAFINIL 200 MG: 100 TABLET ORAL at 09:16

## 2017-04-16 RX ADMIN — LOPERAMIDE HYDROCHLORIDE 2 MG: 1 SOLUTION ORAL at 22:03

## 2017-04-16 RX ADMIN — VANCOMYCIN HYDROCHLORIDE 125 MG: 500 INJECTION, POWDER, LYOPHILIZED, FOR SOLUTION INTRAVENOUS at 11:27

## 2017-04-16 RX ADMIN — INSULIN HUMAN 3 UNITS: 100 INJECTION, SOLUTION PARENTERAL at 09:17

## 2017-04-16 RX ADMIN — HEPARIN SODIUM 28 UNITS/KG/HR: 10000 INJECTION, SOLUTION INTRAVENOUS at 18:54

## 2017-04-16 RX ADMIN — VANCOMYCIN HYDROCHLORIDE 125 MG: 500 INJECTION, POWDER, LYOPHILIZED, FOR SOLUTION INTRAVENOUS at 17:30

## 2017-04-16 RX ADMIN — ANORECTAL OINTMENT 1 APPLICATION: 15.7; .44; 24; 20.6 OINTMENT TOPICAL at 09:19

## 2017-04-16 RX ADMIN — Medication 250 MG: at 17:31

## 2017-04-16 RX ADMIN — ACETAMINOPHEN 650 MG: 650 SUSPENSION ORAL at 09:35

## 2017-04-16 RX ADMIN — INSULIN LISPRO 1 UNITS: 100 INJECTION, SOLUTION INTRAVENOUS; SUBCUTANEOUS at 18:07

## 2017-04-16 RX ADMIN — CHLORHEXIDINE GLUCONATE 15 ML: 1.2 RINSE ORAL at 09:16

## 2017-04-16 RX ADMIN — INSULIN HUMAN 4 UNITS: 100 INJECTION, SOLUTION PARENTERAL at 17:32

## 2017-04-16 RX ADMIN — METOPROLOL TARTRATE 100 MG: 50 TABLET ORAL at 09:15

## 2017-04-16 RX ADMIN — MELATONIN TAB 3 MG 3 MG: 3 TAB at 22:02

## 2017-04-16 RX ADMIN — METOPROLOL TARTRATE 100 MG: 50 TABLET ORAL at 22:00

## 2017-04-17 ENCOUNTER — APPOINTMENT (INPATIENT)
Dept: DIALYSIS | Facility: HOSPITAL | Age: 67
DRG: 853 | End: 2017-04-17
Payer: COMMERCIAL

## 2017-04-17 ENCOUNTER — APPOINTMENT (INPATIENT)
Dept: SPEECH THERAPY | Facility: HOSPITAL | Age: 67
DRG: 853 | End: 2017-04-17
Payer: COMMERCIAL

## 2017-04-17 LAB
ANION GAP SERPL CALCULATED.3IONS-SCNC: 10 MMOL/L (ref 4–13)
APTT PPP: 116 SECONDS (ref 24–36)
APTT PPP: 139 SECONDS (ref 24–36)
APTT PPP: 51 SECONDS (ref 24–36)
BUN SERPL-MCNC: 66 MG/DL (ref 5–25)
CALCIUM SERPL-MCNC: 9.1 MG/DL (ref 8.3–10.1)
CHLORIDE SERPL-SCNC: 96 MMOL/L (ref 100–108)
CO2 SERPL-SCNC: 28 MMOL/L (ref 21–32)
CREAT SERPL-MCNC: 5.36 MG/DL (ref 0.6–1.3)
FUNGUS SPEC CULT: NORMAL
GFR SERPL CREATININE-BSD FRML MDRD: 10.8 ML/MIN/1.73SQ M
GLUCOSE SERPL-MCNC: 136 MG/DL (ref 65–140)
GLUCOSE SERPL-MCNC: 153 MG/DL (ref 65–140)
GLUCOSE SERPL-MCNC: 159 MG/DL (ref 65–140)
GLUCOSE SERPL-MCNC: 160 MG/DL (ref 65–140)
GLUCOSE SERPL-MCNC: 178 MG/DL (ref 65–140)
GLUCOSE SERPL-MCNC: 180 MG/DL (ref 65–140)
GLUCOSE SERPL-MCNC: 209 MG/DL (ref 65–140)
GLUCOSE SERPL-MCNC: 230 MG/DL (ref 65–140)
GLUCOSE SERPL-MCNC: 244 MG/DL (ref 65–140)
GLUCOSE SERPL-MCNC: 333 MG/DL (ref 65–140)
GLUCOSE SERPL-MCNC: 372 MG/DL (ref 65–140)
GLUCOSE SERPL-MCNC: 92 MG/DL (ref 65–140)
INR PPP: 1.55 (ref 0.86–1.16)
PLATELET # BLD AUTO: 288 THOUSANDS/UL (ref 149–390)
PMV BLD AUTO: 10.4 FL (ref 8.9–12.7)
POTASSIUM SERPL-SCNC: 4.5 MMOL/L (ref 3.5–5.3)
PROTHROMBIN TIME: 18.5 SECONDS (ref 12–14.3)
SODIUM SERPL-SCNC: 134 MMOL/L (ref 136–145)

## 2017-04-17 PROCEDURE — 94760 N-INVAS EAR/PLS OXIMETRY 1: CPT

## 2017-04-17 PROCEDURE — 92526 ORAL FUNCTION THERAPY: CPT

## 2017-04-17 PROCEDURE — A9270 NON-COVERED ITEM OR SERVICE: HCPCS | Performed by: FAMILY MEDICINE

## 2017-04-17 PROCEDURE — A9270 NON-COVERED ITEM OR SERVICE: HCPCS | Performed by: PHYSICIAN ASSISTANT

## 2017-04-17 PROCEDURE — A9270 NON-COVERED ITEM OR SERVICE: HCPCS | Performed by: INTERNAL MEDICINE

## 2017-04-17 PROCEDURE — 85730 THROMBOPLASTIN TIME PARTIAL: CPT | Performed by: INTERNAL MEDICINE

## 2017-04-17 PROCEDURE — A9270 NON-COVERED ITEM OR SERVICE: HCPCS | Performed by: EMERGENCY MEDICINE

## 2017-04-17 PROCEDURE — 82948 REAGENT STRIP/BLOOD GLUCOSE: CPT

## 2017-04-17 PROCEDURE — 85049 AUTOMATED PLATELET COUNT: CPT | Performed by: INTERNAL MEDICINE

## 2017-04-17 PROCEDURE — 80048 BASIC METABOLIC PNL TOTAL CA: CPT | Performed by: NURSE PRACTITIONER

## 2017-04-17 PROCEDURE — C9113 INJ PANTOPRAZOLE SODIUM, VIA: HCPCS | Performed by: PHYSICIAN ASSISTANT

## 2017-04-17 PROCEDURE — 85610 PROTHROMBIN TIME: CPT | Performed by: INTERNAL MEDICINE

## 2017-04-17 RX ORDER — LOPERAMIDE HYDROCHLORIDE 2 MG/1
2 CAPSULE ORAL 4 TIMES DAILY
Status: DISCONTINUED | OUTPATIENT
Start: 2017-04-17 | End: 2017-04-20

## 2017-04-17 RX ADMIN — Medication 250 MG: at 17:18

## 2017-04-17 RX ADMIN — INSULIN HUMAN 4 UNITS: 100 INJECTION, SOLUTION PARENTERAL at 12:57

## 2017-04-17 RX ADMIN — ANORECTAL OINTMENT 1 APPLICATION: 15.7; .44; 24; 20.6 OINTMENT TOPICAL at 12:59

## 2017-04-17 RX ADMIN — MODAFINIL 200 MG: 100 TABLET ORAL at 12:56

## 2017-04-17 RX ADMIN — WARFARIN SODIUM 5 MG: 5 TABLET ORAL at 17:18

## 2017-04-17 RX ADMIN — CITALOPRAM HYDROBROMIDE 10 MG: 10 TABLET ORAL at 12:56

## 2017-04-17 RX ADMIN — METOPROLOL TARTRATE 100 MG: 50 TABLET ORAL at 21:23

## 2017-04-17 RX ADMIN — INSULIN LISPRO 1 UNITS: 100 INJECTION, SOLUTION INTRAVENOUS; SUBCUTANEOUS at 12:57

## 2017-04-17 RX ADMIN — INSULIN HUMAN 4 UNITS: 100 INJECTION, SOLUTION PARENTERAL at 16:21

## 2017-04-17 RX ADMIN — VANCOMYCIN HYDROCHLORIDE 125 MG: 500 INJECTION, POWDER, LYOPHILIZED, FOR SOLUTION INTRAVENOUS at 06:00

## 2017-04-17 RX ADMIN — DAPTOMYCIN 650 MG: 500 INJECTION, POWDER, LYOPHILIZED, FOR SOLUTION INTRAVENOUS at 11:35

## 2017-04-17 RX ADMIN — INSULIN DETEMIR 4 UNITS: 100 INJECTION, SOLUTION SUBCUTANEOUS at 12:57

## 2017-04-17 RX ADMIN — INSULIN HUMAN 4 UNITS: 100 INJECTION, SOLUTION PARENTERAL at 21:25

## 2017-04-17 RX ADMIN — CHLORHEXIDINE GLUCONATE 15 ML: 1.2 RINSE ORAL at 12:58

## 2017-04-17 RX ADMIN — CHLORHEXIDINE GLUCONATE 15 ML: 1.2 RINSE ORAL at 21:25

## 2017-04-17 RX ADMIN — LOPERAMIDE HYDROCHLORIDE 2 MG: 2 CAPSULE ORAL at 12:56

## 2017-04-17 RX ADMIN — INSULIN LISPRO 1 UNITS: 100 INJECTION, SOLUTION INTRAVENOUS; SUBCUTANEOUS at 06:23

## 2017-04-17 RX ADMIN — ANORECTAL OINTMENT 1 APPLICATION: 15.7; .44; 24; 20.6 OINTMENT TOPICAL at 00:00

## 2017-04-17 RX ADMIN — ANORECTAL OINTMENT 1 APPLICATION: 15.7; .44; 24; 20.6 OINTMENT TOPICAL at 21:30

## 2017-04-17 RX ADMIN — VANCOMYCIN HYDROCHLORIDE 125 MG: 500 INJECTION, POWDER, LYOPHILIZED, FOR SOLUTION INTRAVENOUS at 00:04

## 2017-04-17 RX ADMIN — INSULIN HUMAN 4 UNITS: 100 INJECTION, SOLUTION PARENTERAL at 02:32

## 2017-04-17 RX ADMIN — Medication 175 MCG: at 06:00

## 2017-04-17 RX ADMIN — PANTOPRAZOLE SODIUM 40 MG: 40 INJECTION, POWDER, FOR SOLUTION INTRAVENOUS at 12:57

## 2017-04-17 RX ADMIN — Medication 250 MG: at 12:56

## 2017-04-17 RX ADMIN — ANORECTAL OINTMENT 1 APPLICATION: 15.7; .44; 24; 20.6 OINTMENT TOPICAL at 17:18

## 2017-04-17 RX ADMIN — HEPARIN SODIUM 25 UNITS/KG/HR: 10000 INJECTION, SOLUTION INTRAVENOUS at 12:59

## 2017-04-17 RX ADMIN — INSULIN LISPRO 3 UNITS: 100 INJECTION, SOLUTION INTRAVENOUS; SUBCUTANEOUS at 18:16

## 2017-04-17 RX ADMIN — LOPERAMIDE HYDROCHLORIDE 2 MG: 2 CAPSULE ORAL at 21:23

## 2017-04-17 RX ADMIN — METOPROLOL TARTRATE 100 MG: 50 TABLET ORAL at 12:56

## 2017-04-17 RX ADMIN — Medication 5 ML: at 12:58

## 2017-04-17 RX ADMIN — LOPERAMIDE HYDROCHLORIDE 2 MG: 2 CAPSULE ORAL at 17:18

## 2017-04-17 RX ADMIN — MELATONIN TAB 3 MG 3 MG: 3 TAB at 21:23

## 2017-04-17 RX ADMIN — VANCOMYCIN HYDROCHLORIDE 125 MG: 500 INJECTION, POWDER, LYOPHILIZED, FOR SOLUTION INTRAVENOUS at 12:57

## 2017-04-18 LAB
GLUCOSE SERPL-MCNC: 102 MG/DL (ref 65–140)
GLUCOSE SERPL-MCNC: 116 MG/DL (ref 65–140)
GLUCOSE SERPL-MCNC: 142 MG/DL (ref 65–140)
GLUCOSE SERPL-MCNC: 145 MG/DL (ref 65–140)
GLUCOSE SERPL-MCNC: 152 MG/DL (ref 65–140)
GLUCOSE SERPL-MCNC: 187 MG/DL (ref 65–140)
GLUCOSE SERPL-MCNC: 191 MG/DL (ref 65–140)
GLUCOSE SERPL-MCNC: 229 MG/DL (ref 65–140)
INR PPP: 1.56 (ref 0.86–1.16)
LEGIONELLA SPEC CULT: NORMAL
LEGIONELLA SPEC CULT: NORMAL
PROTHROMBIN TIME: 18.6 SECONDS (ref 12–14.3)

## 2017-04-18 PROCEDURE — A9270 NON-COVERED ITEM OR SERVICE: HCPCS | Performed by: FAMILY MEDICINE

## 2017-04-18 PROCEDURE — A9270 NON-COVERED ITEM OR SERVICE: HCPCS | Performed by: EMERGENCY MEDICINE

## 2017-04-18 PROCEDURE — 85610 PROTHROMBIN TIME: CPT | Performed by: INTERNAL MEDICINE

## 2017-04-18 PROCEDURE — A9270 NON-COVERED ITEM OR SERVICE: HCPCS | Performed by: INTERNAL MEDICINE

## 2017-04-18 PROCEDURE — 82948 REAGENT STRIP/BLOOD GLUCOSE: CPT

## 2017-04-18 PROCEDURE — A9270 NON-COVERED ITEM OR SERVICE: HCPCS | Performed by: PHYSICIAN ASSISTANT

## 2017-04-18 PROCEDURE — 97530 THERAPEUTIC ACTIVITIES: CPT

## 2017-04-18 PROCEDURE — C9113 INJ PANTOPRAZOLE SODIUM, VIA: HCPCS | Performed by: PHYSICIAN ASSISTANT

## 2017-04-18 RX ADMIN — ANORECTAL OINTMENT 1 APPLICATION: 15.7; .44; 24; 20.6 OINTMENT TOPICAL at 21:11

## 2017-04-18 RX ADMIN — WARFARIN SODIUM 5 MG: 5 TABLET ORAL at 17:44

## 2017-04-18 RX ADMIN — INSULIN HUMAN 4 UNITS: 100 INJECTION, SOLUTION PARENTERAL at 08:46

## 2017-04-18 RX ADMIN — VANCOMYCIN HYDROCHLORIDE 250 MG: 500 INJECTION, POWDER, LYOPHILIZED, FOR SOLUTION INTRAVENOUS at 21:10

## 2017-04-18 RX ADMIN — ACETAMINOPHEN 650 MG: 650 SUSPENSION ORAL at 11:09

## 2017-04-18 RX ADMIN — LOPERAMIDE HYDROCHLORIDE 2 MG: 2 CAPSULE ORAL at 21:10

## 2017-04-18 RX ADMIN — CHLORHEXIDINE GLUCONATE 15 ML: 1.2 RINSE ORAL at 08:45

## 2017-04-18 RX ADMIN — Medication 250 MG: at 08:45

## 2017-04-18 RX ADMIN — ANORECTAL OINTMENT 1 APPLICATION: 15.7; .44; 24; 20.6 OINTMENT TOPICAL at 15:05

## 2017-04-18 RX ADMIN — INSULIN LISPRO 2 UNITS: 100 INJECTION, SOLUTION INTRAVENOUS; SUBCUTANEOUS at 06:25

## 2017-04-18 RX ADMIN — CITALOPRAM HYDROBROMIDE 10 MG: 10 TABLET ORAL at 08:45

## 2017-04-18 RX ADMIN — Medication 5 ML: at 08:47

## 2017-04-18 RX ADMIN — METOPROLOL TARTRATE 100 MG: 50 TABLET ORAL at 08:45

## 2017-04-18 RX ADMIN — ANORECTAL OINTMENT 1 APPLICATION: 15.7; .44; 24; 20.6 OINTMENT TOPICAL at 08:48

## 2017-04-18 RX ADMIN — MODAFINIL 200 MG: 100 TABLET ORAL at 08:45

## 2017-04-18 RX ADMIN — METOPROLOL TARTRATE 100 MG: 50 TABLET ORAL at 21:10

## 2017-04-18 RX ADMIN — INSULIN DETEMIR 4 UNITS: 100 INJECTION, SOLUTION SUBCUTANEOUS at 08:45

## 2017-04-18 RX ADMIN — Medication 250 MG: at 17:44

## 2017-04-18 RX ADMIN — LOPERAMIDE HYDROCHLORIDE 2 MG: 2 CAPSULE ORAL at 12:57

## 2017-04-18 RX ADMIN — LOPERAMIDE HYDROCHLORIDE 2 MG: 2 CAPSULE ORAL at 08:45

## 2017-04-18 RX ADMIN — PANTOPRAZOLE SODIUM 40 MG: 40 INJECTION, POWDER, FOR SOLUTION INTRAVENOUS at 08:45

## 2017-04-18 RX ADMIN — MELATONIN TAB 3 MG 3 MG: 3 TAB at 21:10

## 2017-04-18 RX ADMIN — CHLORHEXIDINE GLUCONATE 15 ML: 1.2 RINSE ORAL at 21:11

## 2017-04-18 RX ADMIN — LOPERAMIDE HYDROCHLORIDE 2 MG: 2 CAPSULE ORAL at 17:44

## 2017-04-19 ENCOUNTER — APPOINTMENT (INPATIENT)
Dept: DIALYSIS | Facility: HOSPITAL | Age: 67
DRG: 853 | End: 2017-04-19
Payer: COMMERCIAL

## 2017-04-19 LAB
ERYTHROCYTE [DISTWIDTH] IN BLOOD BY AUTOMATED COUNT: 16.8 % (ref 11.6–15.1)
GLUCOSE SERPL-MCNC: 140 MG/DL (ref 65–140)
GLUCOSE SERPL-MCNC: 194 MG/DL (ref 65–140)
GLUCOSE SERPL-MCNC: 204 MG/DL (ref 65–140)
GLUCOSE SERPL-MCNC: 298 MG/DL (ref 65–140)
GLUCOSE SERPL-MCNC: 65 MG/DL (ref 65–140)
HCT VFR BLD AUTO: 25.4 % (ref 36.5–49.3)
HGB BLD-MCNC: 8.3 G/DL (ref 12–17)
INR PPP: 1.66 (ref 0.86–1.16)
MCH RBC QN AUTO: 31.2 PG (ref 26.8–34.3)
MCHC RBC AUTO-ENTMCNC: 32.7 G/DL (ref 31.4–37.4)
MCV RBC AUTO: 96 FL (ref 82–98)
PLATELET # BLD AUTO: 335 THOUSANDS/UL (ref 149–390)
PMV BLD AUTO: 10.2 FL (ref 8.9–12.7)
PROTHROMBIN TIME: 19.5 SECONDS (ref 12–14.3)
RBC # BLD AUTO: 2.66 MILLION/UL (ref 3.88–5.62)
WBC # BLD AUTO: 7.98 THOUSAND/UL (ref 4.31–10.16)

## 2017-04-19 PROCEDURE — A9270 NON-COVERED ITEM OR SERVICE: HCPCS | Performed by: INTERNAL MEDICINE

## 2017-04-19 PROCEDURE — A9270 NON-COVERED ITEM OR SERVICE: HCPCS | Performed by: FAMILY MEDICINE

## 2017-04-19 PROCEDURE — A9270 NON-COVERED ITEM OR SERVICE: HCPCS | Performed by: EMERGENCY MEDICINE

## 2017-04-19 PROCEDURE — C9113 INJ PANTOPRAZOLE SODIUM, VIA: HCPCS | Performed by: PHYSICIAN ASSISTANT

## 2017-04-19 PROCEDURE — 85610 PROTHROMBIN TIME: CPT | Performed by: INTERNAL MEDICINE

## 2017-04-19 PROCEDURE — 82948 REAGENT STRIP/BLOOD GLUCOSE: CPT

## 2017-04-19 PROCEDURE — 85027 COMPLETE CBC AUTOMATED: CPT | Performed by: INTERNAL MEDICINE

## 2017-04-19 PROCEDURE — A9270 NON-COVERED ITEM OR SERVICE: HCPCS | Performed by: PHYSICIAN ASSISTANT

## 2017-04-19 RX ADMIN — LOPERAMIDE HYDROCHLORIDE 2 MG: 2 CAPSULE ORAL at 17:26

## 2017-04-19 RX ADMIN — LOPERAMIDE HYDROCHLORIDE 2 MG: 2 CAPSULE ORAL at 14:28

## 2017-04-19 RX ADMIN — MELATONIN TAB 3 MG 3 MG: 3 TAB at 22:00

## 2017-04-19 RX ADMIN — DEXTROSE MONOHYDRATE 50 ML: 25 INJECTION, SOLUTION INTRAVENOUS at 17:30

## 2017-04-19 RX ADMIN — Medication 250 MG: at 08:27

## 2017-04-19 RX ADMIN — INSULIN HUMAN 4 UNITS: 100 INJECTION, SOLUTION PARENTERAL at 00:37

## 2017-04-19 RX ADMIN — EPOETIN ALFA 3000 UNITS: 3000 SOLUTION INTRAVENOUS; SUBCUTANEOUS at 09:06

## 2017-04-19 RX ADMIN — ANORECTAL OINTMENT 1 APPLICATION: 15.7; .44; 24; 20.6 OINTMENT TOPICAL at 08:37

## 2017-04-19 RX ADMIN — DAPTOMYCIN 575 MG: 500 INJECTION, POWDER, LYOPHILIZED, FOR SOLUTION INTRAVENOUS at 12:21

## 2017-04-19 RX ADMIN — VANCOMYCIN HYDROCHLORIDE 250 MG: 500 INJECTION, POWDER, LYOPHILIZED, FOR SOLUTION INTRAVENOUS at 05:00

## 2017-04-19 RX ADMIN — CHLORHEXIDINE GLUCONATE 15 ML: 1.2 RINSE ORAL at 22:00

## 2017-04-19 RX ADMIN — CITALOPRAM HYDROBROMIDE 10 MG: 10 TABLET ORAL at 14:22

## 2017-04-19 RX ADMIN — Medication 175 MCG: at 05:00

## 2017-04-19 RX ADMIN — ANORECTAL OINTMENT 1 APPLICATION: 15.7; .44; 24; 20.6 OINTMENT TOPICAL at 22:01

## 2017-04-19 RX ADMIN — CHLORHEXIDINE GLUCONATE 15 ML: 1.2 RINSE ORAL at 08:33

## 2017-04-19 RX ADMIN — VANCOMYCIN HYDROCHLORIDE 250 MG: 500 INJECTION, POWDER, LYOPHILIZED, FOR SOLUTION INTRAVENOUS at 00:24

## 2017-04-19 RX ADMIN — ALTEPLASE 2 MG: 2.2 INJECTION, POWDER, LYOPHILIZED, FOR SOLUTION INTRAVENOUS at 22:00

## 2017-04-19 RX ADMIN — PANTOPRAZOLE SODIUM 40 MG: 40 INJECTION, POWDER, FOR SOLUTION INTRAVENOUS at 08:27

## 2017-04-19 RX ADMIN — MODAFINIL 200 MG: 100 TABLET ORAL at 08:26

## 2017-04-19 RX ADMIN — VANCOMYCIN HYDROCHLORIDE 250 MG: 500 INJECTION, POWDER, LYOPHILIZED, FOR SOLUTION INTRAVENOUS at 14:30

## 2017-04-19 RX ADMIN — METOPROLOL TARTRATE 100 MG: 50 TABLET ORAL at 22:01

## 2017-04-19 RX ADMIN — INSULIN DETEMIR 4 UNITS: 100 INJECTION, SOLUTION SUBCUTANEOUS at 08:26

## 2017-04-19 RX ADMIN — LOPERAMIDE HYDROCHLORIDE 2 MG: 2 CAPSULE ORAL at 08:27

## 2017-04-19 RX ADMIN — WARFARIN SODIUM 5 MG: 5 TABLET ORAL at 17:26

## 2017-04-19 RX ADMIN — VANCOMYCIN HYDROCHLORIDE 250 MG: 500 INJECTION, POWDER, LYOPHILIZED, FOR SOLUTION INTRAVENOUS at 17:25

## 2017-04-19 RX ADMIN — Medication 5 ML: at 14:23

## 2017-04-19 RX ADMIN — ANORECTAL OINTMENT 1 APPLICATION: 15.7; .44; 24; 20.6 OINTMENT TOPICAL at 15:12

## 2017-04-19 RX ADMIN — LOPERAMIDE HYDROCHLORIDE 2 MG: 2 CAPSULE ORAL at 22:00

## 2017-04-19 RX ADMIN — INSULIN HUMAN 4 UNITS: 100 INJECTION, SOLUTION PARENTERAL at 05:00

## 2017-04-19 RX ADMIN — Medication 250 MG: at 17:25

## 2017-04-19 RX ADMIN — INSULIN HUMAN 4 UNITS: 100 INJECTION, SOLUTION PARENTERAL at 14:21

## 2017-04-20 ENCOUNTER — APPOINTMENT (INPATIENT)
Dept: OCCUPATIONAL THERAPY | Facility: HOSPITAL | Age: 67
DRG: 853 | End: 2017-04-20
Payer: COMMERCIAL

## 2017-04-20 LAB
ALBUMIN SERPL BCP-MCNC: 1.8 G/DL (ref 3.5–5)
ALP SERPL-CCNC: 442 U/L (ref 46–116)
ALT SERPL W P-5'-P-CCNC: 84 U/L (ref 12–78)
AMMONIA PLAS-SCNC: 14 UMOL/L (ref 11–35)
ANION GAP SERPL CALCULATED.3IONS-SCNC: 7 MMOL/L (ref 4–13)
APTT PPP: 39 SECONDS (ref 24–36)
AST SERPL W P-5'-P-CCNC: 305 U/L (ref 5–45)
BILIRUB SERPL-MCNC: 0.49 MG/DL (ref 0.2–1)
BUN SERPL-MCNC: 48 MG/DL (ref 5–25)
CALCIUM SERPL-MCNC: 8.6 MG/DL (ref 8.3–10.1)
CHLORIDE SERPL-SCNC: 97 MMOL/L (ref 100–108)
CO2 SERPL-SCNC: 32 MMOL/L (ref 21–32)
CREAT SERPL-MCNC: 3.54 MG/DL (ref 0.6–1.3)
ERYTHROCYTE [DISTWIDTH] IN BLOOD BY AUTOMATED COUNT: 17 % (ref 11.6–15.1)
GFR SERPL CREATININE-BSD FRML MDRD: 17.5 ML/MIN/1.73SQ M
GLUCOSE SERPL-MCNC: 110 MG/DL (ref 65–140)
GLUCOSE SERPL-MCNC: 195 MG/DL (ref 65–140)
GLUCOSE SERPL-MCNC: 253 MG/DL (ref 65–140)
GLUCOSE SERPL-MCNC: 299 MG/DL (ref 65–140)
GLUCOSE SERPL-MCNC: 41 MG/DL (ref 65–140)
HCT VFR BLD AUTO: 28 % (ref 36.5–49.3)
HGB BLD-MCNC: 8.9 G/DL (ref 12–17)
INR PPP: 1.59 (ref 0.86–1.16)
MCH RBC QN AUTO: 30.8 PG (ref 26.8–34.3)
MCHC RBC AUTO-ENTMCNC: 31.8 G/DL (ref 31.4–37.4)
MCV RBC AUTO: 97 FL (ref 82–98)
PLATELET # BLD AUTO: 356 THOUSANDS/UL (ref 149–390)
PMV BLD AUTO: 9.7 FL (ref 8.9–12.7)
POTASSIUM SERPL-SCNC: 3.7 MMOL/L (ref 3.5–5.3)
PROT SERPL-MCNC: 7 G/DL (ref 6.4–8.2)
PROTHROMBIN TIME: 18.9 SECONDS (ref 12–14.3)
RBC # BLD AUTO: 2.89 MILLION/UL (ref 3.88–5.62)
SODIUM SERPL-SCNC: 136 MMOL/L (ref 136–145)
TSH SERPL DL<=0.05 MIU/L-ACNC: 26.5 UIU/ML (ref 0.36–3.74)
WBC # BLD AUTO: 6.56 THOUSAND/UL (ref 4.31–10.16)

## 2017-04-20 PROCEDURE — 97530 THERAPEUTIC ACTIVITIES: CPT

## 2017-04-20 PROCEDURE — 82948 REAGENT STRIP/BLOOD GLUCOSE: CPT

## 2017-04-20 PROCEDURE — A9270 NON-COVERED ITEM OR SERVICE: HCPCS | Performed by: INTERNAL MEDICINE

## 2017-04-20 PROCEDURE — A9270 NON-COVERED ITEM OR SERVICE: HCPCS | Performed by: FAMILY MEDICINE

## 2017-04-20 PROCEDURE — 93005 ELECTROCARDIOGRAM TRACING: CPT

## 2017-04-20 PROCEDURE — 97110 THERAPEUTIC EXERCISES: CPT

## 2017-04-20 PROCEDURE — A9270 NON-COVERED ITEM OR SERVICE: HCPCS | Performed by: EMERGENCY MEDICINE

## 2017-04-20 PROCEDURE — 84443 ASSAY THYROID STIM HORMONE: CPT | Performed by: PHYSICIAN ASSISTANT

## 2017-04-20 PROCEDURE — 80053 COMPREHEN METABOLIC PANEL: CPT | Performed by: PHYSICIAN ASSISTANT

## 2017-04-20 PROCEDURE — 97535 SELF CARE MNGMENT TRAINING: CPT

## 2017-04-20 PROCEDURE — 97532 HB COGNITIVE SKILLS DEVELOPMENT: CPT

## 2017-04-20 PROCEDURE — 84439 ASSAY OF FREE THYROXINE: CPT | Performed by: PHYSICIAN ASSISTANT

## 2017-04-20 PROCEDURE — 85027 COMPLETE CBC AUTOMATED: CPT | Performed by: PHYSICIAN ASSISTANT

## 2017-04-20 PROCEDURE — C9113 INJ PANTOPRAZOLE SODIUM, VIA: HCPCS | Performed by: PHYSICIAN ASSISTANT

## 2017-04-20 PROCEDURE — 82140 ASSAY OF AMMONIA: CPT | Performed by: PHYSICIAN ASSISTANT

## 2017-04-20 PROCEDURE — 85610 PROTHROMBIN TIME: CPT | Performed by: INTERNAL MEDICINE

## 2017-04-20 PROCEDURE — A9270 NON-COVERED ITEM OR SERVICE: HCPCS | Performed by: PHYSICIAN ASSISTANT

## 2017-04-20 PROCEDURE — 85730 THROMBOPLASTIN TIME PARTIAL: CPT | Performed by: INTERNAL MEDICINE

## 2017-04-20 RX ORDER — DIPHENOXYLATE HYDROCHLORIDE AND ATROPINE SULFATE 2.5; .025 MG/1; MG/1
1 TABLET ORAL EVERY 4 HOURS PRN
Status: DISCONTINUED | OUTPATIENT
Start: 2017-04-20 | End: 2017-04-21 | Stop reason: HOSPADM

## 2017-04-20 RX ORDER — SODIUM CHLORIDE FOR INHALATION 0.9 %
3 VIAL, NEBULIZER (ML) INHALATION EVERY 4 HOURS PRN
Status: DISCONTINUED | OUTPATIENT
Start: 2017-04-20 | End: 2017-04-21 | Stop reason: HOSPADM

## 2017-04-20 RX ORDER — ALBUTEROL SULFATE 2.5 MG/3ML
2.5 SOLUTION RESPIRATORY (INHALATION) EVERY 6 HOURS PRN
Status: DISCONTINUED | OUTPATIENT
Start: 2017-04-20 | End: 2017-04-20

## 2017-04-20 RX ORDER — ALBUTEROL SULFATE 2.5 MG/3ML
2.5 SOLUTION RESPIRATORY (INHALATION) EVERY 4 HOURS PRN
Status: DISCONTINUED | OUTPATIENT
Start: 2017-04-20 | End: 2017-04-21 | Stop reason: HOSPADM

## 2017-04-20 RX ADMIN — WARFARIN SODIUM 5 MG: 5 TABLET ORAL at 18:47

## 2017-04-20 RX ADMIN — CHLORHEXIDINE GLUCONATE 15 ML: 1.2 RINSE ORAL at 10:40

## 2017-04-20 RX ADMIN — METOPROLOL TARTRATE 100 MG: 50 TABLET ORAL at 21:46

## 2017-04-20 RX ADMIN — DIPHENOXYLATE HYDROCHLORIDE AND ATROPINE SULFATE 1 TABLET: 2.5; .025 TABLET ORAL at 21:46

## 2017-04-20 RX ADMIN — VANCOMYCIN HYDROCHLORIDE 250 MG: 500 INJECTION, POWDER, LYOPHILIZED, FOR SOLUTION INTRAVENOUS at 13:47

## 2017-04-20 RX ADMIN — MODAFINIL 200 MG: 100 TABLET ORAL at 10:40

## 2017-04-20 RX ADMIN — INSULIN DETEMIR 4 UNITS: 100 INJECTION, SOLUTION SUBCUTANEOUS at 10:40

## 2017-04-20 RX ADMIN — ANORECTAL OINTMENT 1 APPLICATION: 15.7; .44; 24; 20.6 OINTMENT TOPICAL at 10:43

## 2017-04-20 RX ADMIN — INSULIN HUMAN 4 UNITS: 100 INJECTION, SOLUTION PARENTERAL at 13:45

## 2017-04-20 RX ADMIN — PANTOPRAZOLE SODIUM 40 MG: 40 INJECTION, POWDER, FOR SOLUTION INTRAVENOUS at 10:40

## 2017-04-20 RX ADMIN — ANORECTAL OINTMENT 1 APPLICATION: 15.7; .44; 24; 20.6 OINTMENT TOPICAL at 16:44

## 2017-04-20 RX ADMIN — INSULIN HUMAN 4 UNITS: 100 INJECTION, SOLUTION PARENTERAL at 00:23

## 2017-04-20 RX ADMIN — METOPROLOL TARTRATE 100 MG: 50 TABLET ORAL at 10:41

## 2017-04-20 RX ADMIN — ANORECTAL OINTMENT 1 APPLICATION: 15.7; .44; 24; 20.6 OINTMENT TOPICAL at 21:49

## 2017-04-20 RX ADMIN — Medication 250 MG: at 10:40

## 2017-04-20 RX ADMIN — VANCOMYCIN HYDROCHLORIDE 250 MG: 500 INJECTION, POWDER, LYOPHILIZED, FOR SOLUTION INTRAVENOUS at 00:23

## 2017-04-20 RX ADMIN — MELATONIN TAB 3 MG 3 MG: 3 TAB at 21:46

## 2017-04-20 RX ADMIN — CITALOPRAM HYDROBROMIDE 10 MG: 10 TABLET ORAL at 10:40

## 2017-04-20 RX ADMIN — Medication 5 ML: at 10:42

## 2017-04-20 RX ADMIN — VANCOMYCIN HYDROCHLORIDE 250 MG: 500 INJECTION, POWDER, LYOPHILIZED, FOR SOLUTION INTRAVENOUS at 05:21

## 2017-04-20 RX ADMIN — INSULIN HUMAN 4 UNITS: 100 INJECTION, SOLUTION PARENTERAL at 05:21

## 2017-04-20 RX ADMIN — Medication 250 MG: at 18:46

## 2017-04-20 RX ADMIN — INSULIN HUMAN 4 UNITS: 100 INJECTION, SOLUTION PARENTERAL at 18:46

## 2017-04-20 RX ADMIN — LOPERAMIDE HYDROCHLORIDE 2 MG: 2 CAPSULE ORAL at 13:46

## 2017-04-20 RX ADMIN — LOPERAMIDE HYDROCHLORIDE 2 MG: 2 CAPSULE ORAL at 10:42

## 2017-04-21 ENCOUNTER — APPOINTMENT (INPATIENT)
Dept: DIALYSIS | Facility: HOSPITAL | Age: 67
DRG: 853 | End: 2017-04-21
Payer: COMMERCIAL

## 2017-04-21 VITALS
HEART RATE: 71 BPM | SYSTOLIC BLOOD PRESSURE: 128 MMHG | HEIGHT: 65 IN | WEIGHT: 127.4 LBS | DIASTOLIC BLOOD PRESSURE: 62 MMHG | TEMPERATURE: 97.4 F | OXYGEN SATURATION: 96 % | RESPIRATION RATE: 18 BRPM | BODY MASS INDEX: 21.23 KG/M2

## 2017-04-21 PROBLEM — I46.9 CARDIAC ARREST (HCC): Status: RESOLVED | Noted: 2017-04-02 | Resolved: 2017-04-21

## 2017-04-21 PROBLEM — J18.9 HCAP (HEALTHCARE-ASSOCIATED PNEUMONIA): Status: RESOLVED | Noted: 2017-02-20 | Resolved: 2017-04-21

## 2017-04-21 PROBLEM — I48.91 ATRIAL FIBRILLATION (HCC): Status: RESOLVED | Noted: 2017-02-18 | Resolved: 2017-04-21

## 2017-04-21 PROBLEM — G93.40 ACUTE ENCEPHALOPATHY: Status: RESOLVED | Noted: 2017-02-18 | Resolved: 2017-04-21

## 2017-04-21 LAB
ATRIAL RATE: 69 BPM
ERYTHROCYTE [DISTWIDTH] IN BLOOD BY AUTOMATED COUNT: 16.9 % (ref 11.6–15.1)
GLUCOSE SERPL-MCNC: 118 MG/DL (ref 65–140)
GLUCOSE SERPL-MCNC: 227 MG/DL (ref 65–140)
GLUCOSE SERPL-MCNC: 312 MG/DL (ref 65–140)
GLUCOSE SERPL-MCNC: 342 MG/DL (ref 65–140)
GLUCOSE SERPL-MCNC: 389 MG/DL (ref 65–140)
GLUCOSE SERPL-MCNC: 76 MG/DL (ref 65–140)
HCT VFR BLD AUTO: 27.9 % (ref 36.5–49.3)
HGB BLD-MCNC: 8.9 G/DL (ref 12–17)
INR PPP: 1.63 (ref 0.86–1.16)
MCH RBC QN AUTO: 30.7 PG (ref 26.8–34.3)
MCHC RBC AUTO-ENTMCNC: 31.9 G/DL (ref 31.4–37.4)
MCV RBC AUTO: 96 FL (ref 82–98)
P AXIS: 89 DEGREES
PLATELET # BLD AUTO: 337 THOUSANDS/UL (ref 149–390)
PMV BLD AUTO: 9.7 FL (ref 8.9–12.7)
PR INTERVAL: 158 MS
PROTHROMBIN TIME: 19.3 SECONDS (ref 12–14.3)
QRS AXIS: -30 DEGREES
QRSD INTERVAL: 102 MS
QT INTERVAL: 444 MS
QTC INTERVAL: 475 MS
RBC # BLD AUTO: 2.9 MILLION/UL (ref 3.88–5.62)
T WAVE AXIS: 144 DEGREES
T4 FREE SERPL-MCNC: 0.34 NG/DL (ref 0.76–1.46)
VENTRICULAR RATE: 69 BPM
WBC # BLD AUTO: 6.28 THOUSAND/UL (ref 4.31–10.16)

## 2017-04-21 PROCEDURE — A9270 NON-COVERED ITEM OR SERVICE: HCPCS | Performed by: INTERNAL MEDICINE

## 2017-04-21 PROCEDURE — C9113 INJ PANTOPRAZOLE SODIUM, VIA: HCPCS | Performed by: PHYSICIAN ASSISTANT

## 2017-04-21 PROCEDURE — A9270 NON-COVERED ITEM OR SERVICE: HCPCS | Performed by: FAMILY MEDICINE

## 2017-04-21 PROCEDURE — A9270 NON-COVERED ITEM OR SERVICE: HCPCS | Performed by: PHYSICIAN ASSISTANT

## 2017-04-21 PROCEDURE — A9270 NON-COVERED ITEM OR SERVICE: HCPCS | Performed by: EMERGENCY MEDICINE

## 2017-04-21 PROCEDURE — 85610 PROTHROMBIN TIME: CPT | Performed by: INTERNAL MEDICINE

## 2017-04-21 PROCEDURE — 85027 COMPLETE CBC AUTOMATED: CPT | Performed by: INTERNAL MEDICINE

## 2017-04-21 PROCEDURE — 82948 REAGENT STRIP/BLOOD GLUCOSE: CPT

## 2017-04-21 RX ORDER — LANOLIN ALCOHOL/MO/W.PET/CERES
3 CREAM (GRAM) TOPICAL
Qty: 30 TABLET | Refills: 0 | Status: SHIPPED | OUTPATIENT
Start: 2017-04-21 | End: 2017-05-21

## 2017-04-21 RX ORDER — LEVOTHYROXINE SODIUM 0.2 MG/1
200 TABLET ORAL EVERY OTHER DAY
Qty: 15 TABLET | Refills: 0 | Status: SHIPPED | OUTPATIENT
Start: 2017-04-21 | End: 2017-08-12 | Stop reason: HOSPADM

## 2017-04-21 RX ORDER — SACCHAROMYCES BOULARDII 250 MG
250 CAPSULE ORAL 2 TIMES DAILY
Qty: 60 CAPSULE | Refills: 0 | Status: SHIPPED | OUTPATIENT
Start: 2017-04-21 | End: 2017-05-21

## 2017-04-21 RX ORDER — DEXTROSE 25 % IN WATER 25 %
25 SYRINGE (ML) INTRAVENOUS ONCE
Status: COMPLETED | OUTPATIENT
Start: 2017-04-21 | End: 2017-04-21

## 2017-04-21 RX ORDER — MODAFINIL 200 MG/1
200 TABLET ORAL DAILY
Qty: 10 TABLET | Refills: 0 | Status: SHIPPED | OUTPATIENT
Start: 2017-04-21 | End: 2017-06-16

## 2017-04-21 RX ORDER — ACETAMINOPHEN 160 MG/5ML
650 SUSPENSION, ORAL (FINAL DOSE FORM) ORAL EVERY 4 HOURS PRN
Qty: 118 ML | Refills: 0 | Status: SHIPPED | OUTPATIENT
Start: 2017-04-21 | End: 2017-05-21

## 2017-04-21 RX ORDER — WARFARIN SODIUM 5 MG/1
5 TABLET ORAL
Qty: 30 TABLET | Refills: 0 | Status: SHIPPED | OUTPATIENT
Start: 2017-04-21 | End: 2017-08-12 | Stop reason: HOSPADM

## 2017-04-21 RX ORDER — ALBUTEROL SULFATE 2.5 MG/3ML
2.5 SOLUTION RESPIRATORY (INHALATION) EVERY 4 HOURS PRN
Qty: 75 ML | Refills: 0 | Status: SHIPPED | OUTPATIENT
Start: 2017-04-21 | End: 2017-05-21

## 2017-04-21 RX ADMIN — Medication 250 MG: at 17:46

## 2017-04-21 RX ADMIN — METOPROLOL TARTRATE 100 MG: 50 TABLET ORAL at 14:20

## 2017-04-21 RX ADMIN — INSULIN HUMAN 3 UNITS: 100 INJECTION, SOLUTION PARENTERAL at 17:43

## 2017-04-21 RX ADMIN — DAPTOMYCIN 575 MG: 500 INJECTION, POWDER, LYOPHILIZED, FOR SOLUTION INTRAVENOUS at 11:52

## 2017-04-21 RX ADMIN — INSULIN HUMAN 4 UNITS: 100 INJECTION, SOLUTION PARENTERAL at 06:22

## 2017-04-21 RX ADMIN — EPOETIN ALFA 3000 UNITS: 3000 SOLUTION INTRAVENOUS; SUBCUTANEOUS at 09:23

## 2017-04-21 RX ADMIN — ANORECTAL OINTMENT 1 APPLICATION: 15.7; .44; 24; 20.6 OINTMENT TOPICAL at 14:27

## 2017-04-21 RX ADMIN — Medication 200 MCG: at 14:36

## 2017-04-21 RX ADMIN — Medication 250 MG: at 14:20

## 2017-04-21 RX ADMIN — MODAFINIL 200 MG: 100 TABLET ORAL at 14:20

## 2017-04-21 RX ADMIN — CHLORHEXIDINE GLUCONATE 15 ML: 1.2 RINSE ORAL at 14:19

## 2017-04-21 RX ADMIN — PANTOPRAZOLE SODIUM 40 MG: 40 INJECTION, POWDER, FOR SOLUTION INTRAVENOUS at 14:19

## 2017-04-21 RX ADMIN — WARFARIN SODIUM 5 MG: 5 TABLET ORAL at 17:46

## 2017-04-21 RX ADMIN — VANCOMYCIN HYDROCHLORIDE 125 MG: 500 INJECTION, POWDER, LYOPHILIZED, FOR SOLUTION INTRAVENOUS at 14:36

## 2017-04-21 RX ADMIN — INSULIN LISPRO 3 UNITS: 100 INJECTION, SOLUTION INTRAVENOUS; SUBCUTANEOUS at 17:43

## 2017-04-21 RX ADMIN — DEXTROSE MONOHYDRATE 25 ML: 250 INJECTION, SOLUTION INTRAVENOUS at 00:49

## 2017-04-21 RX ADMIN — Medication 5 ML: at 14:30

## 2017-04-21 RX ADMIN — CITALOPRAM HYDROBROMIDE 10 MG: 10 TABLET ORAL at 14:21

## 2017-04-25 ENCOUNTER — TRANSCRIBE ORDERS (OUTPATIENT)
Dept: ADMINISTRATIVE | Facility: HOSPITAL | Age: 67
End: 2017-04-25

## 2017-04-25 DIAGNOSIS — R13.19 OTHER DYSPHAGIA: Primary | ICD-10-CM

## 2017-04-28 ENCOUNTER — ALLSCRIPTS OFFICE VISIT (OUTPATIENT)
Dept: OTHER | Facility: OTHER | Age: 67
End: 2017-04-28

## 2017-05-01 ENCOUNTER — HOSPITAL ENCOUNTER (OUTPATIENT)
Dept: RADIOLOGY | Facility: HOSPITAL | Age: 67
Discharge: HOME | End: 2017-05-01
Attending: FAMILY MEDICINE
Payer: COMMERCIAL

## 2017-05-01 DIAGNOSIS — R13.19 OTHER DYSPHAGIA: ICD-10-CM

## 2017-05-01 PROCEDURE — G8997 SWALLOW GOAL STATUS: HCPCS

## 2017-05-01 PROCEDURE — 92611 MOTION FLUOROSCOPY/SWALLOW: CPT

## 2017-05-01 PROCEDURE — G8998 SWALLOW D/C STATUS: HCPCS

## 2017-05-01 PROCEDURE — 74230 X-RAY XM SWLNG FUNCJ C+: CPT

## 2017-05-01 PROCEDURE — G8996 SWALLOW CURRENT STATUS: HCPCS

## 2017-05-04 ENCOUNTER — ALLSCRIPTS OFFICE VISIT (OUTPATIENT)
Dept: OTHER | Facility: OTHER | Age: 67
End: 2017-05-04

## 2017-05-05 ENCOUNTER — GENERIC CONVERSION - ENCOUNTER (OUTPATIENT)
Dept: OTHER | Facility: OTHER | Age: 67
End: 2017-05-05

## 2017-05-10 ENCOUNTER — GENERIC CONVERSION - ENCOUNTER (OUTPATIENT)
Dept: OTHER | Facility: OTHER | Age: 67
End: 2017-05-10

## 2017-05-12 ENCOUNTER — ALLSCRIPTS OFFICE VISIT (OUTPATIENT)
Dept: OTHER | Facility: OTHER | Age: 67
End: 2017-05-12

## 2017-05-12 ENCOUNTER — GENERIC CONVERSION - ENCOUNTER (OUTPATIENT)
Dept: OTHER | Facility: OTHER | Age: 67
End: 2017-05-12

## 2017-05-15 ENCOUNTER — ALLSCRIPTS OFFICE VISIT (OUTPATIENT)
Dept: OTHER | Facility: OTHER | Age: 67
End: 2017-05-15

## 2017-05-23 LAB
MYCOBACTERIUM SPEC CULT: NORMAL
MYCOBACTERIUM SPEC CULT: NORMAL
RHODAMINE-AURAMINE STN SPEC: NORMAL
RHODAMINE-AURAMINE STN SPEC: NORMAL

## 2017-05-25 ENCOUNTER — ALLSCRIPTS OFFICE VISIT (OUTPATIENT)
Dept: OTHER | Facility: OTHER | Age: 67
End: 2017-05-25

## 2017-06-08 ENCOUNTER — ALLSCRIPTS OFFICE VISIT (OUTPATIENT)
Dept: OTHER | Facility: OTHER | Age: 67
End: 2017-06-08

## 2017-06-16 ENCOUNTER — APPOINTMENT (EMERGENCY)
Dept: RADIOLOGY | Facility: HOSPITAL | Age: 67
DRG: 871 | End: 2017-06-16
Payer: COMMERCIAL

## 2017-06-16 ENCOUNTER — APPOINTMENT (INPATIENT)
Dept: RADIOLOGY | Facility: HOSPITAL | Age: 67
DRG: 871 | End: 2017-06-16
Payer: COMMERCIAL

## 2017-06-16 ENCOUNTER — APPOINTMENT (INPATIENT)
Dept: DIALYSIS | Facility: HOSPITAL | Age: 67
DRG: 871 | End: 2017-06-16
Payer: COMMERCIAL

## 2017-06-16 ENCOUNTER — HOSPITAL ENCOUNTER (INPATIENT)
Facility: HOSPITAL | Age: 67
LOS: 12 days | Discharge: NURSING, REHAB OR LONG-TERM CARE | DRG: 871 | End: 2017-06-28
Attending: EMERGENCY MEDICINE | Admitting: INTERNAL MEDICINE
Payer: COMMERCIAL

## 2017-06-16 DIAGNOSIS — G93.40 ENCEPHALOPATHY: ICD-10-CM

## 2017-06-16 DIAGNOSIS — J90 PLEURAL EFFUSION ON RIGHT: ICD-10-CM

## 2017-06-16 DIAGNOSIS — Z99.2 ESRD (END STAGE RENAL DISEASE) ON DIALYSIS (HCC): ICD-10-CM

## 2017-06-16 DIAGNOSIS — IMO0001 INFECTIOUS SYSTEMIC INFLAMMATORY RESPONSE SYNDROME (SIRS): ICD-10-CM

## 2017-06-16 DIAGNOSIS — E03.9 HYPOTHYROIDISM, UNSPECIFIED TYPE: ICD-10-CM

## 2017-06-16 DIAGNOSIS — Z86.19 H/O CLOSTRIDIUM DIFFICILE INFECTION: ICD-10-CM

## 2017-06-16 DIAGNOSIS — E10.21 TYPE 1 DIABETES MELLITUS WITH NEPHROPATHY (HCC): ICD-10-CM

## 2017-06-16 DIAGNOSIS — S82.409A FIBULA FRACTURE: ICD-10-CM

## 2017-06-16 DIAGNOSIS — G93.40 ENCEPHALOPATHY ACUTE: ICD-10-CM

## 2017-06-16 DIAGNOSIS — R13.19 OTHER DYSPHAGIA: Primary | ICD-10-CM

## 2017-06-16 DIAGNOSIS — J90 PLEURAL EFFUSION, BILATERAL: ICD-10-CM

## 2017-06-16 DIAGNOSIS — R06.03 ACUTE RESPIRATORY DISTRESS: ICD-10-CM

## 2017-06-16 DIAGNOSIS — N18.6 ESRD (END STAGE RENAL DISEASE) ON DIALYSIS (HCC): ICD-10-CM

## 2017-06-16 DIAGNOSIS — S81.801D WOUND OF RIGHT LEG, SUBSEQUENT ENCOUNTER: ICD-10-CM

## 2017-06-16 PROBLEM — R74.01 TRANSAMINITIS: Status: ACTIVE | Noted: 2017-06-16

## 2017-06-16 PROBLEM — S81.801A WOUND OF RIGHT LEG: Status: ACTIVE | Noted: 2017-06-16

## 2017-06-16 PROBLEM — I50.42 SYSTOLIC AND DIASTOLIC CHF, CHRONIC (HCC): Status: ACTIVE | Noted: 2017-06-16

## 2017-06-16 PROBLEM — R13.10 DYSPHAGIA: Status: ACTIVE | Noted: 2017-06-16

## 2017-06-16 PROBLEM — Z93.1 S/P PERCUTANEOUS ENDOSCOPIC GASTROSTOMY (PEG) TUBE PLACEMENT (HCC): Status: ACTIVE | Noted: 2017-06-16

## 2017-06-16 LAB
ALBUMIN SERPL BCP-MCNC: 2.6 G/DL (ref 3.5–5)
ALP SERPL-CCNC: 373 U/L (ref 46–116)
ALT SERPL W P-5'-P-CCNC: 99 U/L (ref 12–78)
AMMONIA PLAS-SCNC: 29 UMOL/L (ref 11–35)
ANION GAP SERPL CALCULATED.3IONS-SCNC: 10 MMOL/L (ref 4–13)
APAP SERPL-MCNC: <2 UG/ML (ref 10–30)
APTT PPP: 34 SECONDS (ref 23–35)
AST SERPL W P-5'-P-CCNC: 247 U/L (ref 5–45)
ATRIAL RATE: 59 BPM
BACTERIA UR QL AUTO: ABNORMAL /HPF
BASE EX.OXY STD BLDV CALC-SCNC: 75.5 % (ref 60–80)
BASE EXCESS BLDA CALC-SCNC: -1.2 MMOL/L
BASE EXCESS BLDA CALC-SCNC: 1 MMOL/L (ref -2–3)
BASE EXCESS BLDV CALC-SCNC: -1.3 MMOL/L
BASOPHILS # BLD AUTO: 0.13 THOUSANDS/ΜL (ref 0–0.1)
BASOPHILS NFR BLD AUTO: 3 % (ref 0–1)
BILIRUB SERPL-MCNC: 0.47 MG/DL (ref 0.2–1)
BILIRUB UR QL STRIP: ABNORMAL
BUN SERPL-MCNC: 72 MG/DL (ref 5–25)
CA-I BLD-SCNC: 1.16 MMOL/L (ref 1.12–1.32)
CALCIUM SERPL-MCNC: 8.8 MG/DL (ref 8.3–10.1)
CHLORIDE SERPL-SCNC: 92 MMOL/L (ref 100–108)
CLARITY UR: ABNORMAL
CLARITY, POC: NORMAL
CO2 SERPL-SCNC: 28 MMOL/L (ref 21–32)
COLOR UR: ABNORMAL
COLOR, POC: NORMAL
CREAT SERPL-MCNC: 6.07 MG/DL (ref 0.6–1.3)
EOSINOPHIL # BLD AUTO: 0.31 THOUSAND/ΜL (ref 0–0.61)
EOSINOPHIL NFR BLD AUTO: 6 % (ref 0–6)
ERYTHROCYTE [DISTWIDTH] IN BLOOD BY AUTOMATED COUNT: 15.4 % (ref 11.6–15.1)
ETHANOL SERPL-MCNC: <3 MG/DL (ref 0–3)
GFR SERPL CREATININE-BSD FRML MDRD: 9.4 ML/MIN/1.73SQ M
GLUCOSE FLD-MCNC: 300 MG/DL
GLUCOSE SERPL-MCNC: 115 MG/DL (ref 65–140)
GLUCOSE SERPL-MCNC: 140 MG/DL (ref 65–140)
GLUCOSE SERPL-MCNC: 256 MG/DL (ref 65–140)
GLUCOSE SERPL-MCNC: 329 MG/DL (ref 65–140)
GLUCOSE SERPL-MCNC: 99 MG/DL (ref 65–140)
GLUCOSE UR STRIP-MCNC: ABNORMAL MG/DL
GRAM STN SPEC: NORMAL
GRAM STN SPEC: NORMAL
HCO3 BLDA-SCNC: 24.5 MMOL/L (ref 22–28)
HCO3 BLDA-SCNC: 28.1 MMOL/L (ref 24–30)
HCO3 BLDV-SCNC: 26 MMOL/L (ref 24–30)
HCT VFR BLD AUTO: 31.1 % (ref 36.5–49.3)
HCT VFR BLD CALC: 30 % (ref 36.5–49.3)
HGB BLD-MCNC: 9.8 G/DL (ref 12–17)
HGB BLDA-MCNC: 10.2 G/DL (ref 12–17)
HGB UR QL STRIP.AUTO: ABNORMAL
INR PPP: 1.21 (ref 0.86–1.16)
IPAP: 16
KETONES UR STRIP-MCNC: NEGATIVE MG/DL
LACTATE SERPL-SCNC: 1 MMOL/L (ref 0.5–2)
LACTATE SERPL-SCNC: 3.1 MMOL/L (ref 0.5–2)
LDH FLD L TO P-CCNC: 100 U/L
LDH SERPL-CCNC: 250 U/L (ref 81–234)
LEUKOCYTE ESTERASE UR QL STRIP: ABNORMAL
LYMPHOCYTES # BLD AUTO: 0.97 THOUSANDS/ΜL (ref 0.6–4.47)
LYMPHOCYTES NFR BLD AUTO: 19 % (ref 14–44)
MCH RBC QN AUTO: 32.2 PG (ref 26.8–34.3)
MCHC RBC AUTO-ENTMCNC: 31.5 G/DL (ref 31.4–37.4)
MCV RBC AUTO: 102 FL (ref 82–98)
MONOCYTES # BLD AUTO: 0.53 THOUSAND/ΜL (ref 0.17–1.22)
MONOCYTES NFR BLD AUTO: 10 % (ref 4–12)
NEUTROPHILS # BLD AUTO: 3.25 THOUSANDS/ΜL (ref 1.85–7.62)
NEUTS SEG NFR BLD AUTO: 62 % (ref 43–75)
NITRITE UR QL STRIP: NEGATIVE
NON VENT- BIPAP: ABNORMAL
NON-SQ EPI CELLS URNS QL MICRO: ABNORMAL /HPF
NRBC BLD AUTO-RTO: 0 /100 WBCS
O2 CT BLDA-SCNC: 14.3 ML/DL (ref 16–23)
O2 CT BLDV-SCNC: 10.8 ML/DL
OXYHGB MFR BLDA: 97.3 % (ref 94–97)
P AXIS: 69 DEGREES
PCO2 BLD: 30 MMOL/L (ref 21–32)
PCO2 BLD: 55.5 MM HG (ref 42–50)
PCO2 BLDA: 45.1 MM HG (ref 36–44)
PCO2 BLDV: 57.2 MM HG (ref 42–50)
PEEP MAX SETTING VENT: 5 CM[H2O]
PH BLD: 7.31 [PH] (ref 7.3–7.4)
PH BLDA: 7.35 [PH] (ref 7.35–7.45)
PH BLDV: 7.28 [PH] (ref 7.3–7.4)
PH BODY FLUID: 7.8
PH UR STRIP.AUTO: 6 [PH] (ref 4.5–8)
PLATELET # BLD AUTO: 182 THOUSANDS/UL (ref 149–390)
PLATELET # BLD AUTO: 200 THOUSANDS/UL (ref 149–390)
PMV BLD AUTO: 10.1 FL (ref 8.9–12.7)
PMV BLD AUTO: 9.8 FL (ref 8.9–12.7)
PO2 BLD: 40 MM HG (ref 35–45)
PO2 BLDA: 116.9 MM HG (ref 75–129)
PO2 BLDV: 51.5 MM HG (ref 35–45)
POTASSIUM BLD-SCNC: 5.6 MMOL/L (ref 3.5–5.3)
POTASSIUM SERPL-SCNC: 4.8 MMOL/L (ref 3.5–5.3)
PR INTERVAL: 176 MS
PROT FLD-MCNC: 3.1 G/DL
PROT SERPL-MCNC: 7.3 G/DL (ref 6.4–8.2)
PROT SERPL-MCNC: 7.7 G/DL (ref 6.4–8.2)
PROT UR STRIP-MCNC: >=300 MG/DL
PROTHROMBIN TIME: 15.4 SECONDS (ref 12.1–14.4)
QRS AXIS: -84 DEGREES
QRSD INTERVAL: 96 MS
QT INTERVAL: 414 MS
QTC INTERVAL: 409 MS
RBC # BLD AUTO: 3.04 MILLION/UL (ref 3.88–5.62)
RBC # FLD MANUAL: 45 /UL
RBC #/AREA URNS AUTO: ABNORMAL /HPF
SALICYLATES SERPL-MCNC: <3 MG/DL (ref 3–20)
SAO2 % BLD FROM PO2: 69 % (ref 95–98)
SODIUM BLD-SCNC: 129 MMOL/L (ref 136–145)
SODIUM SERPL-SCNC: 130 MMOL/L (ref 136–145)
SP GR UR STRIP.AUTO: 1.02 (ref 1–1.03)
SPECIMEN SOURCE: ABNORMAL
SPECIMEN SOURCE: ABNORMAL
SPECIMEN SOURCE: NORMAL
T WAVE AXIS: 91 DEGREES
T4 FREE SERPL-MCNC: 0.76 NG/DL (ref 0.76–1.46)
TROPONIN I BLD-MCNC: 0 NG/ML (ref 0–0.08)
TSH SERPL DL<=0.05 MIU/L-ACNC: 32.2 UIU/ML (ref 0.36–3.74)
TSH SERPL DL<=0.05 MIU/L-ACNC: 37 UIU/ML (ref 0.36–3.74)
UROBILINOGEN UR QL STRIP.AUTO: 0.2 E.U./DL
VENT BIPAP FIO2: 50 %
VENTRICULAR RATE: 59 BPM
WBC # BLD AUTO: 5.2 THOUSAND/UL (ref 4.31–10.16)
WBC # FLD MANUAL: 95 /UL
WBC #/AREA URNS AUTO: ABNORMAL /HPF

## 2017-06-16 PROCEDURE — 84132 ASSAY OF SERUM POTASSIUM: CPT

## 2017-06-16 PROCEDURE — 87077 CULTURE AEROBIC IDENTIFY: CPT

## 2017-06-16 PROCEDURE — 81002 URINALYSIS NONAUTO W/O SCOPE: CPT | Performed by: EMERGENCY MEDICINE

## 2017-06-16 PROCEDURE — 85049 AUTOMATED PLATELET COUNT: CPT | Performed by: NURSE PRACTITIONER

## 2017-06-16 PROCEDURE — 74177 CT ABD & PELVIS W/CONTRAST: CPT

## 2017-06-16 PROCEDURE — 71010 HB CHEST X-RAY 1 VIEW FRONTAL (PORTABLE): CPT

## 2017-06-16 PROCEDURE — 96360 HYDRATION IV INFUSION INIT: CPT

## 2017-06-16 PROCEDURE — 88112 CYTOPATH CELL ENHANCE TECH: CPT | Performed by: NURSE PRACTITIONER

## 2017-06-16 PROCEDURE — 82330 ASSAY OF CALCIUM: CPT

## 2017-06-16 PROCEDURE — G0480 DRUG TEST DEF 1-7 CLASSES: HCPCS | Performed by: EMERGENCY MEDICINE

## 2017-06-16 PROCEDURE — 82140 ASSAY OF AMMONIA: CPT | Performed by: EMERGENCY MEDICINE

## 2017-06-16 PROCEDURE — 93005 ELECTROCARDIOGRAM TRACING: CPT | Performed by: EMERGENCY MEDICINE

## 2017-06-16 PROCEDURE — 82805 BLOOD GASES W/O2 SATURATION: CPT | Performed by: NURSE PRACTITIONER

## 2017-06-16 PROCEDURE — 5A1D60Z PERFORMANCE OF URINARY FILTRATION, MULTIPLE: ICD-10-PCS | Performed by: INTERNAL MEDICINE

## 2017-06-16 PROCEDURE — 85025 COMPLETE CBC W/AUTO DIFF WBC: CPT | Performed by: EMERGENCY MEDICINE

## 2017-06-16 PROCEDURE — 80320 DRUG SCREEN QUANTALCOHOLS: CPT | Performed by: EMERGENCY MEDICINE

## 2017-06-16 PROCEDURE — 81001 URINALYSIS AUTO W/SCOPE: CPT

## 2017-06-16 PROCEDURE — 87086 URINE CULTURE/COLONY COUNT: CPT

## 2017-06-16 PROCEDURE — 84295 ASSAY OF SERUM SODIUM: CPT

## 2017-06-16 PROCEDURE — 84439 ASSAY OF FREE THYROXINE: CPT | Performed by: NURSE PRACTITIONER

## 2017-06-16 PROCEDURE — 87205 SMEAR GRAM STAIN: CPT | Performed by: NURSE PRACTITIONER

## 2017-06-16 PROCEDURE — 89051 BODY FLUID CELL COUNT: CPT | Performed by: NURSE PRACTITIONER

## 2017-06-16 PROCEDURE — 85730 THROMBOPLASTIN TIME PARTIAL: CPT | Performed by: EMERGENCY MEDICINE

## 2017-06-16 PROCEDURE — 85014 HEMATOCRIT: CPT

## 2017-06-16 PROCEDURE — 82947 ASSAY GLUCOSE BLOOD QUANT: CPT

## 2017-06-16 PROCEDURE — 83615 LACTATE (LD) (LDH) ENZYME: CPT | Performed by: NURSE PRACTITIONER

## 2017-06-16 PROCEDURE — 88305 TISSUE EXAM BY PATHOLOGIST: CPT | Performed by: NURSE PRACTITIONER

## 2017-06-16 PROCEDURE — 83605 ASSAY OF LACTIC ACID: CPT | Performed by: EMERGENCY MEDICINE

## 2017-06-16 PROCEDURE — 87040 BLOOD CULTURE FOR BACTERIA: CPT | Performed by: EMERGENCY MEDICINE

## 2017-06-16 PROCEDURE — 83605 ASSAY OF LACTIC ACID: CPT | Performed by: NURSE PRACTITIONER

## 2017-06-16 PROCEDURE — 87186 SC STD MICRODIL/AGAR DIL: CPT

## 2017-06-16 PROCEDURE — 89050 BODY FLUID CELL COUNT: CPT | Performed by: NURSE PRACTITIONER

## 2017-06-16 PROCEDURE — 84157 ASSAY OF PROTEIN OTHER: CPT | Performed by: NURSE PRACTITIONER

## 2017-06-16 PROCEDURE — 71275 CT ANGIOGRAPHY CHEST: CPT

## 2017-06-16 PROCEDURE — 70450 CT HEAD/BRAIN W/O DYE: CPT

## 2017-06-16 PROCEDURE — 84155 ASSAY OF PROTEIN SERUM: CPT | Performed by: NURSE PRACTITIONER

## 2017-06-16 PROCEDURE — 85610 PROTHROMBIN TIME: CPT | Performed by: EMERGENCY MEDICINE

## 2017-06-16 PROCEDURE — 84443 ASSAY THYROID STIM HORMONE: CPT | Performed by: NURSE PRACTITIONER

## 2017-06-16 PROCEDURE — 82803 BLOOD GASES ANY COMBINATION: CPT

## 2017-06-16 PROCEDURE — 83986 ASSAY PH BODY FLUID NOS: CPT | Performed by: NURSE PRACTITIONER

## 2017-06-16 PROCEDURE — 99285 EMERGENCY DEPT VISIT HI MDM: CPT

## 2017-06-16 PROCEDURE — 94660 CPAP INITIATION&MGMT: CPT

## 2017-06-16 PROCEDURE — 02H633Z INSERTION OF INFUSION DEVICE INTO RIGHT ATRIUM, PERCUTANEOUS APPROACH: ICD-10-PCS | Performed by: RADIOLOGY

## 2017-06-16 PROCEDURE — 84484 ASSAY OF TROPONIN QUANT: CPT

## 2017-06-16 PROCEDURE — 36415 COLL VENOUS BLD VENIPUNCTURE: CPT | Performed by: EMERGENCY MEDICINE

## 2017-06-16 PROCEDURE — 94640 AIRWAY INHALATION TREATMENT: CPT

## 2017-06-16 PROCEDURE — 82805 BLOOD GASES W/O2 SATURATION: CPT | Performed by: EMERGENCY MEDICINE

## 2017-06-16 PROCEDURE — 94760 N-INVAS EAR/PLS OXIMETRY 1: CPT

## 2017-06-16 PROCEDURE — 87070 CULTURE OTHR SPECIMN AEROBIC: CPT | Performed by: NURSE PRACTITIONER

## 2017-06-16 PROCEDURE — 80053 COMPREHEN METABOLIC PANEL: CPT | Performed by: EMERGENCY MEDICINE

## 2017-06-16 PROCEDURE — 80329 ANALGESICS NON-OPIOID 1 OR 2: CPT | Performed by: EMERGENCY MEDICINE

## 2017-06-16 PROCEDURE — 82945 GLUCOSE OTHER FLUID: CPT | Performed by: NURSE PRACTITIONER

## 2017-06-16 PROCEDURE — 84443 ASSAY THYROID STIM HORMONE: CPT | Performed by: EMERGENCY MEDICINE

## 2017-06-16 PROCEDURE — 82948 REAGENT STRIP/BLOOD GLUCOSE: CPT

## 2017-06-16 RX ORDER — RANITIDINE HYDROCHLORIDE 15 MG/ML
75 SOLUTION ORAL 2 TIMES DAILY
COMMUNITY
End: 2017-08-12 | Stop reason: HOSPADM

## 2017-06-16 RX ORDER — HEPARIN SODIUM 5000 [USP'U]/ML
5000 INJECTION, SOLUTION INTRAVENOUS; SUBCUTANEOUS EVERY 8 HOURS SCHEDULED
Status: DISCONTINUED | OUTPATIENT
Start: 2017-06-16 | End: 2017-06-17

## 2017-06-16 RX ORDER — NICOTINE 21 MG/24HR
1 PATCH, TRANSDERMAL 24 HOURS TRANSDERMAL EVERY 24 HOURS
COMMUNITY
End: 2017-11-27

## 2017-06-16 RX ORDER — TEMAZEPAM 15 MG/1
15 CAPSULE ORAL
Status: ON HOLD | COMMUNITY
End: 2017-08-03

## 2017-06-16 RX ORDER — CITALOPRAM 10 MG/1
10 TABLET ORAL DAILY
Status: DISCONTINUED | OUTPATIENT
Start: 2017-06-17 | End: 2017-06-28 | Stop reason: HOSPADM

## 2017-06-16 RX ORDER — OXYCODONE HYDROCHLORIDE 15 MG/1
5 TABLET ORAL EVERY 6 HOURS PRN
COMMUNITY
End: 2017-06-28 | Stop reason: HOSPADM

## 2017-06-16 RX ORDER — LEVOTHYROXINE SODIUM 0.1 MG/1
200 TABLET ORAL EVERY OTHER DAY
Status: DISCONTINUED | OUTPATIENT
Start: 2017-06-16 | End: 2017-06-16

## 2017-06-16 RX ORDER — MENTHOL AND ZINC OXIDE .44; 20.625 G/100G; G/100G
1 OINTMENT TOPICAL 3 TIMES DAILY
COMMUNITY
End: 2017-11-27

## 2017-06-16 RX ORDER — GABAPENTIN 100 MG/1
100 CAPSULE ORAL
COMMUNITY
End: 2017-08-12 | Stop reason: HOSPADM

## 2017-06-16 RX ORDER — LISINOPRIL 2.5 MG/1
2.5 TABLET ORAL DAILY
COMMUNITY
End: 2017-09-27 | Stop reason: HOSPADM

## 2017-06-16 RX ORDER — MELATONIN
2000 DAILY
Status: ON HOLD | COMMUNITY
End: 2017-09-18 | Stop reason: CLARIF

## 2017-06-16 RX ORDER — ALBUTEROL SULFATE 2.5 MG/3ML
2.5 SOLUTION RESPIRATORY (INHALATION) ONCE
Status: COMPLETED | OUTPATIENT
Start: 2017-06-16 | End: 2017-06-16

## 2017-06-16 RX ORDER — SUCRALFATE ORAL 1 G/10ML
1 SUSPENSION ORAL 4 TIMES DAILY
COMMUNITY
End: 2017-08-12 | Stop reason: HOSPADM

## 2017-06-16 RX ORDER — SACCHAROMYCES BOULARDII 250 MG
250 CAPSULE ORAL 2 TIMES DAILY
COMMUNITY
End: 2017-11-27

## 2017-06-16 RX ORDER — METRONIDAZOLE 500 MG/1
500 TABLET ORAL EVERY 8 HOURS
Status: DISCONTINUED | OUTPATIENT
Start: 2017-06-16 | End: 2017-06-18

## 2017-06-16 RX ORDER — SODIUM CHLORIDE FOR INHALATION 0.9 %
3 VIAL, NEBULIZER (ML) INHALATION EVERY 6 HOURS PRN
Status: DISCONTINUED | OUTPATIENT
Start: 2017-06-16 | End: 2017-06-24

## 2017-06-16 RX ORDER — OMEPRAZOLE 40 MG/1
40 CAPSULE, DELAYED RELEASE ORAL 2 TIMES DAILY
COMMUNITY
End: 2017-11-27

## 2017-06-16 RX ORDER — LEVALBUTEROL 1.25 MG/.5ML
1.25 SOLUTION, CONCENTRATE RESPIRATORY (INHALATION) EVERY 6 HOURS PRN
Status: DISCONTINUED | OUTPATIENT
Start: 2017-06-16 | End: 2017-06-24

## 2017-06-16 RX ADMIN — SODIUM CHLORIDE 250 ML: 0.9 INJECTION, SOLUTION INTRAVENOUS at 11:30

## 2017-06-16 RX ADMIN — HEPARIN SODIUM 5000 UNITS: 5000 INJECTION, SOLUTION INTRAVENOUS; SUBCUTANEOUS at 21:49

## 2017-06-16 RX ADMIN — VANCOMYCIN HYDROCHLORIDE 125 MG: 5 INJECTION, POWDER, LYOPHILIZED, FOR SOLUTION INTRAVENOUS at 21:49

## 2017-06-16 RX ADMIN — METRONIDAZOLE 500 MG: 500 TABLET ORAL at 19:45

## 2017-06-16 RX ADMIN — CEFEPIME HYDROCHLORIDE 2000 MG: 2 INJECTION, POWDER, FOR SOLUTION INTRAVENOUS at 21:48

## 2017-06-16 RX ADMIN — IODIXANOL 100 ML: 320 INJECTION, SOLUTION INTRAVASCULAR at 12:05

## 2017-06-16 RX ADMIN — DAPTOMYCIN 425 MG: 500 INJECTION, POWDER, LYOPHILIZED, FOR SOLUTION INTRAVENOUS at 21:48

## 2017-06-16 RX ADMIN — IPRATROPIUM BROMIDE 0.5 MG: 0.5 SOLUTION RESPIRATORY (INHALATION) at 09:58

## 2017-06-16 RX ADMIN — ALBUTEROL SULFATE 2.5 MG: 2.5 SOLUTION RESPIRATORY (INHALATION) at 09:57

## 2017-06-17 ENCOUNTER — GENERIC CONVERSION - ENCOUNTER (OUTPATIENT)
Dept: OTHER | Facility: OTHER | Age: 67
End: 2017-06-17

## 2017-06-17 ENCOUNTER — APPOINTMENT (INPATIENT)
Dept: RADIOLOGY | Facility: HOSPITAL | Age: 67
DRG: 871 | End: 2017-06-17
Payer: COMMERCIAL

## 2017-06-17 ENCOUNTER — APPOINTMENT (INPATIENT)
Dept: DIALYSIS | Facility: HOSPITAL | Age: 67
DRG: 871 | End: 2017-06-17
Payer: COMMERCIAL

## 2017-06-17 PROBLEM — S82.409A FIBULA FRACTURE: Status: ACTIVE | Noted: 2017-06-17

## 2017-06-17 LAB
ALBUMIN SERPL BCP-MCNC: 2.3 G/DL (ref 3.5–5)
ALP SERPL-CCNC: 288 U/L (ref 46–116)
ALT SERPL W P-5'-P-CCNC: 66 U/L (ref 12–78)
ANION GAP SERPL CALCULATED.3IONS-SCNC: 9 MMOL/L (ref 4–13)
APTT PPP: 35 SECONDS (ref 23–35)
APTT PPP: 45 SECONDS (ref 23–35)
AST SERPL W P-5'-P-CCNC: 82 U/L (ref 5–45)
BASOPHILS # BLD AUTO: 0.06 THOUSANDS/ΜL (ref 0–0.1)
BASOPHILS NFR BLD AUTO: 1 % (ref 0–1)
BILIRUB DIRECT SERPL-MCNC: 0.21 MG/DL (ref 0–0.2)
BILIRUB SERPL-MCNC: 0.46 MG/DL (ref 0.2–1)
BUN SERPL-MCNC: 44 MG/DL (ref 5–25)
CALCIUM SERPL-MCNC: 8.5 MG/DL (ref 8.3–10.1)
CHLORIDE SERPL-SCNC: 92 MMOL/L (ref 100–108)
CO2 SERPL-SCNC: 29 MMOL/L (ref 21–32)
CREAT SERPL-MCNC: 4.56 MG/DL (ref 0.6–1.3)
EOSINOPHIL # BLD AUTO: 0.21 THOUSAND/ΜL (ref 0–0.61)
EOSINOPHIL NFR BLD AUTO: 2 % (ref 0–6)
ERYTHROCYTE [DISTWIDTH] IN BLOOD BY AUTOMATED COUNT: 15.3 % (ref 11.6–15.1)
ERYTHROCYTE [DISTWIDTH] IN BLOOD BY AUTOMATED COUNT: 15.5 % (ref 11.6–15.1)
GFR SERPL CREATININE-BSD FRML MDRD: 13.1 ML/MIN/1.73SQ M
GLUCOSE SERPL-MCNC: 101 MG/DL (ref 65–140)
GLUCOSE SERPL-MCNC: 103 MG/DL (ref 65–140)
GLUCOSE SERPL-MCNC: 105 MG/DL (ref 65–140)
GLUCOSE SERPL-MCNC: 113 MG/DL (ref 65–140)
GLUCOSE SERPL-MCNC: 121 MG/DL (ref 65–140)
GLUCOSE SERPL-MCNC: 139 MG/DL (ref 65–140)
GLUCOSE SERPL-MCNC: 146 MG/DL (ref 65–140)
GLUCOSE SERPL-MCNC: 164 MG/DL (ref 65–140)
GLUCOSE SERPL-MCNC: 173 MG/DL (ref 65–140)
GLUCOSE SERPL-MCNC: 179 MG/DL (ref 65–140)
GLUCOSE SERPL-MCNC: 179 MG/DL (ref 65–140)
GLUCOSE SERPL-MCNC: 91 MG/DL (ref 65–140)
GLUCOSE SERPL-MCNC: 93 MG/DL (ref 65–140)
GLUCOSE SERPL-MCNC: 95 MG/DL (ref 65–140)
HCT VFR BLD AUTO: 28 % (ref 36.5–49.3)
HCT VFR BLD AUTO: 28.4 % (ref 36.5–49.3)
HGB BLD-MCNC: 8.8 G/DL (ref 12–17)
HGB BLD-MCNC: 9.3 G/DL (ref 12–17)
INR PPP: 1.16 (ref 0.86–1.16)
LYMPHOCYTES # BLD AUTO: 0.69 THOUSANDS/ΜL (ref 0.6–4.47)
LYMPHOCYTES NFR BLD AUTO: 8 % (ref 14–44)
MCH RBC QN AUTO: 31.9 PG (ref 26.8–34.3)
MCH RBC QN AUTO: 33 PG (ref 26.8–34.3)
MCHC RBC AUTO-ENTMCNC: 31.4 G/DL (ref 31.4–37.4)
MCHC RBC AUTO-ENTMCNC: 32.7 G/DL (ref 31.4–37.4)
MCV RBC AUTO: 101 FL (ref 82–98)
MCV RBC AUTO: 101 FL (ref 82–98)
MONOCYTES # BLD AUTO: 0.62 THOUSAND/ΜL (ref 0.17–1.22)
MONOCYTES NFR BLD AUTO: 7 % (ref 4–12)
NEUTROPHILS # BLD AUTO: 7.62 THOUSANDS/ΜL (ref 1.85–7.62)
NEUTS SEG NFR BLD AUTO: 82 % (ref 43–75)
NRBC BLD AUTO-RTO: 0 /100 WBCS
PLATELET # BLD AUTO: 163 THOUSANDS/UL (ref 149–390)
PLATELET # BLD AUTO: 168 THOUSANDS/UL (ref 149–390)
PMV BLD AUTO: 9.3 FL (ref 8.9–12.7)
PMV BLD AUTO: 9.8 FL (ref 8.9–12.7)
POTASSIUM SERPL-SCNC: 4.3 MMOL/L (ref 3.5–5.3)
PROT SERPL-MCNC: 7.3 G/DL (ref 6.4–8.2)
PROTHROMBIN TIME: 14.9 SECONDS (ref 12.1–14.4)
RBC # BLD AUTO: 2.76 MILLION/UL (ref 3.88–5.62)
RBC # BLD AUTO: 2.82 MILLION/UL (ref 3.88–5.62)
SODIUM SERPL-SCNC: 130 MMOL/L (ref 136–145)
WBC # BLD AUTO: 8.45 THOUSAND/UL (ref 4.31–10.16)
WBC # BLD AUTO: 9.21 THOUSAND/UL (ref 4.31–10.16)

## 2017-06-17 PROCEDURE — 85730 THROMBOPLASTIN TIME PARTIAL: CPT | Performed by: NURSE PRACTITIONER

## 2017-06-17 PROCEDURE — 0W993ZX DRAINAGE OF RIGHT PLEURAL CAVITY, PERCUTANEOUS APPROACH, DIAGNOSTIC: ICD-10-PCS | Performed by: INTERNAL MEDICINE

## 2017-06-17 PROCEDURE — 82948 REAGENT STRIP/BLOOD GLUCOSE: CPT

## 2017-06-17 PROCEDURE — 85730 THROMBOPLASTIN TIME PARTIAL: CPT | Performed by: INTERNAL MEDICINE

## 2017-06-17 PROCEDURE — 85025 COMPLETE CBC W/AUTO DIFF WBC: CPT | Performed by: INTERNAL MEDICINE

## 2017-06-17 PROCEDURE — 73701 CT LOWER EXTREMITY W/DYE: CPT

## 2017-06-17 PROCEDURE — 85610 PROTHROMBIN TIME: CPT | Performed by: NURSE PRACTITIONER

## 2017-06-17 PROCEDURE — 94760 N-INVAS EAR/PLS OXIMETRY 1: CPT

## 2017-06-17 PROCEDURE — 94660 CPAP INITIATION&MGMT: CPT

## 2017-06-17 PROCEDURE — 80048 BASIC METABOLIC PNL TOTAL CA: CPT | Performed by: INTERNAL MEDICINE

## 2017-06-17 PROCEDURE — 80076 HEPATIC FUNCTION PANEL: CPT | Performed by: NURSE PRACTITIONER

## 2017-06-17 PROCEDURE — 85027 COMPLETE CBC AUTOMATED: CPT | Performed by: NURSE PRACTITIONER

## 2017-06-17 RX ORDER — LEVOTHYROXINE SODIUM 0.12 MG/1
250 TABLET ORAL EVERY OTHER DAY
Status: DISCONTINUED | OUTPATIENT
Start: 2017-06-18 | End: 2017-06-27

## 2017-06-17 RX ORDER — NICOTINE POLACRILEX 4 MG
15 LOZENGE BUCCAL ONCE AS NEEDED
Status: ON HOLD | COMMUNITY
End: 2017-09-18 | Stop reason: CLARIF

## 2017-06-17 RX ORDER — ECHINACEA PURPUREA EXTRACT 125 MG
1 TABLET ORAL AS NEEDED
COMMUNITY
End: 2017-11-27

## 2017-06-17 RX ORDER — MULTIVITAMIN WITH IRON
1 TABLET ORAL DAILY
COMMUNITY
End: 2017-11-27

## 2017-06-17 RX ORDER — LANOLIN ALCOHOL/MO/W.PET/CERES
1 CREAM (GRAM) TOPICAL
COMMUNITY
End: 2017-11-27

## 2017-06-17 RX ORDER — HEPARIN SODIUM 10000 [USP'U]/100ML
3-20 INJECTION, SOLUTION INTRAVENOUS
Status: DISCONTINUED | OUTPATIENT
Start: 2017-06-17 | End: 2017-06-23

## 2017-06-17 RX ADMIN — HEPARIN SODIUM 12 UNITS/KG/HR: 10000 INJECTION, SOLUTION INTRAVENOUS at 12:45

## 2017-06-17 RX ADMIN — METRONIDAZOLE 500 MG: 500 TABLET ORAL at 18:27

## 2017-06-17 RX ADMIN — METRONIDAZOLE 500 MG: 500 TABLET ORAL at 12:49

## 2017-06-17 RX ADMIN — METRONIDAZOLE 500 MG: 500 TABLET ORAL at 03:53

## 2017-06-17 RX ADMIN — VANCOMYCIN HYDROCHLORIDE 125 MG: 5 INJECTION, POWDER, LYOPHILIZED, FOR SOLUTION INTRAVENOUS at 20:47

## 2017-06-17 RX ADMIN — SODIUM CHLORIDE 1 UNITS/HR: 9 INJECTION, SOLUTION INTRAVENOUS at 04:00

## 2017-06-17 RX ADMIN — IODIXANOL 100 ML: 320 INJECTION, SOLUTION INTRAVASCULAR at 08:56

## 2017-06-17 RX ADMIN — CEFEPIME HYDROCHLORIDE 1000 MG: 1 INJECTION, POWDER, FOR SOLUTION INTRAMUSCULAR; INTRAVENOUS at 18:27

## 2017-06-17 RX ADMIN — VANCOMYCIN HYDROCHLORIDE 125 MG: 5 INJECTION, POWDER, LYOPHILIZED, FOR SOLUTION INTRAVENOUS at 09:56

## 2017-06-17 RX ADMIN — CITALOPRAM HYDROBROMIDE 10 MG: 10 TABLET ORAL at 09:56

## 2017-06-17 RX ADMIN — HEPARIN SODIUM 5000 UNITS: 5000 INJECTION, SOLUTION INTRAVENOUS; SUBCUTANEOUS at 05:42

## 2017-06-17 RX ADMIN — OMEPRAZOLE MAGNESIUM 20 MG: 20 CAPSULE, DELAYED RELEASE ORAL at 09:56

## 2017-06-18 ENCOUNTER — APPOINTMENT (INPATIENT)
Dept: RADIOLOGY | Facility: HOSPITAL | Age: 67
DRG: 871 | End: 2017-06-18
Payer: COMMERCIAL

## 2017-06-18 LAB
APTT PPP: 103 SECONDS (ref 23–35)
APTT PPP: 42 SECONDS (ref 23–35)
APTT PPP: 45 SECONDS (ref 23–35)
APTT PPP: 52 SECONDS (ref 23–35)
BACTERIA UR CULT: NORMAL
C DIFF TOX GENS STL QL NAA+PROBE: ABNORMAL
GLUCOSE SERPL-MCNC: 103 MG/DL (ref 65–140)
GLUCOSE SERPL-MCNC: 105 MG/DL (ref 65–140)
GLUCOSE SERPL-MCNC: 108 MG/DL (ref 65–140)
GLUCOSE SERPL-MCNC: 114 MG/DL (ref 65–140)
GLUCOSE SERPL-MCNC: 115 MG/DL (ref 65–140)
GLUCOSE SERPL-MCNC: 116 MG/DL (ref 65–140)
GLUCOSE SERPL-MCNC: 117 MG/DL (ref 65–140)
GLUCOSE SERPL-MCNC: 118 MG/DL (ref 65–140)
GLUCOSE SERPL-MCNC: 118 MG/DL (ref 65–140)
GLUCOSE SERPL-MCNC: 167 MG/DL (ref 65–140)
GLUCOSE SERPL-MCNC: 74 MG/DL (ref 65–140)
GLUCOSE SERPL-MCNC: 95 MG/DL (ref 65–140)
GLUCOSE SERPL-MCNC: 97 MG/DL (ref 65–140)

## 2017-06-18 PROCEDURE — 82948 REAGENT STRIP/BLOOD GLUCOSE: CPT

## 2017-06-18 PROCEDURE — 87493 C DIFF AMPLIFIED PROBE: CPT | Performed by: NURSE PRACTITIONER

## 2017-06-18 PROCEDURE — 85730 THROMBOPLASTIN TIME PARTIAL: CPT | Performed by: INTERNAL MEDICINE

## 2017-06-18 PROCEDURE — 3E0G76Z INTRODUCTION OF NUTRITIONAL SUBSTANCE INTO UPPER GI, VIA NATURAL OR ARTIFICIAL OPENING: ICD-10-PCS | Performed by: INTERNAL MEDICINE

## 2017-06-18 PROCEDURE — 76705 ECHO EXAM OF ABDOMEN: CPT

## 2017-06-18 PROCEDURE — 85730 THROMBOPLASTIN TIME PARTIAL: CPT | Performed by: FAMILY MEDICINE

## 2017-06-18 RX ORDER — CIPROFLOXACIN 500 MG/1
500 TABLET, FILM COATED ORAL DAILY
Status: DISCONTINUED | OUTPATIENT
Start: 2017-06-18 | End: 2017-06-25

## 2017-06-18 RX ADMIN — SODIUM CHLORIDE 0.5 UNITS/HR: 9 INJECTION, SOLUTION INTRAVENOUS at 20:47

## 2017-06-18 RX ADMIN — CITALOPRAM HYDROBROMIDE 10 MG: 10 TABLET ORAL at 09:06

## 2017-06-18 RX ADMIN — OMEPRAZOLE MAGNESIUM 20 MG: 20 CAPSULE, DELAYED RELEASE ORAL at 09:06

## 2017-06-18 RX ADMIN — CIPROFLOXACIN 500 MG: 500 TABLET, FILM COATED ORAL at 20:48

## 2017-06-18 RX ADMIN — METRONIDAZOLE 500 MG: 500 TABLET ORAL at 18:07

## 2017-06-18 RX ADMIN — HEPARIN SODIUM 18 UNITS/KG/HR: 10000 INJECTION, SOLUTION INTRAVENOUS at 12:40

## 2017-06-18 RX ADMIN — METRONIDAZOLE 500 MG: 500 TABLET ORAL at 10:31

## 2017-06-18 RX ADMIN — VANCOMYCIN HYDROCHLORIDE 125 MG: 5 INJECTION, POWDER, LYOPHILIZED, FOR SOLUTION INTRAVENOUS at 20:49

## 2017-06-18 RX ADMIN — LEVOTHYROXINE SODIUM 250 MCG: 125 TABLET ORAL at 09:06

## 2017-06-18 RX ADMIN — VANCOMYCIN HYDROCHLORIDE 125 MG: 5 INJECTION, POWDER, LYOPHILIZED, FOR SOLUTION INTRAVENOUS at 09:06

## 2017-06-18 RX ADMIN — CEFEPIME HYDROCHLORIDE 1000 MG: 1 INJECTION, POWDER, FOR SOLUTION INTRAMUSCULAR; INTRAVENOUS at 18:07

## 2017-06-18 RX ADMIN — METRONIDAZOLE 500 MG: 500 TABLET ORAL at 03:11

## 2017-06-19 ENCOUNTER — APPOINTMENT (INPATIENT)
Dept: DIALYSIS | Facility: HOSPITAL | Age: 67
DRG: 871 | End: 2017-06-19
Payer: COMMERCIAL

## 2017-06-19 PROBLEM — N39.0 URINARY TRACT INFECTION: Status: ACTIVE | Noted: 2017-06-19

## 2017-06-19 PROBLEM — I50.43 ACUTE ON CHRONIC COMBINED SYSTOLIC AND DIASTOLIC CHF (CONGESTIVE HEART FAILURE) (HCC): Status: ACTIVE | Noted: 2017-06-16

## 2017-06-19 LAB
ANION GAP SERPL CALCULATED.3IONS-SCNC: 12 MMOL/L (ref 4–13)
APTT PPP: 66 SECONDS (ref 23–35)
APTT PPP: 80 SECONDS (ref 23–35)
BACTERIA SPEC BFLD CULT: NO GROWTH
BUN SERPL-MCNC: 35 MG/DL (ref 5–25)
CALCIUM SERPL-MCNC: 9.2 MG/DL (ref 8.3–10.1)
CHLORIDE SERPL-SCNC: 94 MMOL/L (ref 100–108)
CO2 SERPL-SCNC: 28 MMOL/L (ref 21–32)
CREAT SERPL-MCNC: 5.12 MG/DL (ref 0.6–1.3)
ERYTHROCYTE [DISTWIDTH] IN BLOOD BY AUTOMATED COUNT: 15.5 % (ref 11.6–15.1)
GFR SERPL CREATININE-BSD FRML MDRD: 11.4 ML/MIN/1.73SQ M
GLUCOSE SERPL-MCNC: 103 MG/DL (ref 65–140)
GLUCOSE SERPL-MCNC: 112 MG/DL (ref 65–140)
GLUCOSE SERPL-MCNC: 132 MG/DL (ref 65–140)
GLUCOSE SERPL-MCNC: 136 MG/DL (ref 65–140)
GLUCOSE SERPL-MCNC: 142 MG/DL (ref 65–140)
GLUCOSE SERPL-MCNC: 148 MG/DL (ref 65–140)
GLUCOSE SERPL-MCNC: 149 MG/DL (ref 65–140)
GLUCOSE SERPL-MCNC: 167 MG/DL (ref 65–140)
GLUCOSE SERPL-MCNC: 172 MG/DL (ref 65–140)
GLUCOSE SERPL-MCNC: 172 MG/DL (ref 65–140)
GLUCOSE SERPL-MCNC: 175 MG/DL (ref 65–140)
GLUCOSE SERPL-MCNC: 186 MG/DL (ref 65–140)
GRAM STN SPEC: NORMAL
GRAM STN SPEC: NORMAL
HCT VFR BLD AUTO: 29.4 % (ref 36.5–49.3)
HGB BLD-MCNC: 9.2 G/DL (ref 12–17)
MCH RBC QN AUTO: 31.8 PG (ref 26.8–34.3)
MCHC RBC AUTO-ENTMCNC: 31.3 G/DL (ref 31.4–37.4)
MCV RBC AUTO: 102 FL (ref 82–98)
PLATELET # BLD AUTO: 158 THOUSANDS/UL (ref 149–390)
PMV BLD AUTO: 10.1 FL (ref 8.9–12.7)
POTASSIUM SERPL-SCNC: 4.1 MMOL/L (ref 3.5–5.3)
RBC # BLD AUTO: 2.89 MILLION/UL (ref 3.88–5.62)
SCAN RESULT: NORMAL
SODIUM SERPL-SCNC: 134 MMOL/L (ref 136–145)
WBC # BLD AUTO: 8.66 THOUSAND/UL (ref 4.31–10.16)

## 2017-06-19 PROCEDURE — 85730 THROMBOPLASTIN TIME PARTIAL: CPT | Performed by: FAMILY MEDICINE

## 2017-06-19 PROCEDURE — 80048 BASIC METABOLIC PNL TOTAL CA: CPT | Performed by: FAMILY MEDICINE

## 2017-06-19 PROCEDURE — 82948 REAGENT STRIP/BLOOD GLUCOSE: CPT

## 2017-06-19 PROCEDURE — 85027 COMPLETE CBC AUTOMATED: CPT | Performed by: FAMILY MEDICINE

## 2017-06-19 PROCEDURE — 94760 N-INVAS EAR/PLS OXIMETRY 1: CPT

## 2017-06-19 RX ORDER — ONDANSETRON 2 MG/ML
4 INJECTION INTRAMUSCULAR; INTRAVENOUS EVERY 6 HOURS PRN
Status: DISCONTINUED | OUTPATIENT
Start: 2017-06-19 | End: 2017-06-28 | Stop reason: HOSPADM

## 2017-06-19 RX ORDER — OLANZAPINE 10 MG/1
2.5 INJECTION, POWDER, LYOPHILIZED, FOR SOLUTION INTRAMUSCULAR ONCE
Status: COMPLETED | OUTPATIENT
Start: 2017-06-19 | End: 2017-06-19

## 2017-06-19 RX ORDER — WARFARIN SODIUM 5 MG/1
5 TABLET ORAL
Status: DISCONTINUED | OUTPATIENT
Start: 2017-06-19 | End: 2017-06-21

## 2017-06-19 RX ADMIN — WATER 10 ML: 1 INJECTION INTRAMUSCULAR; INTRAVENOUS; SUBCUTANEOUS at 22:48

## 2017-06-19 RX ADMIN — HEPARIN SODIUM 20 UNITS/KG/HR: 10000 INJECTION, SOLUTION INTRAVENOUS at 19:34

## 2017-06-19 RX ADMIN — CIPROFLOXACIN 500 MG: 500 TABLET, FILM COATED ORAL at 19:52

## 2017-06-19 RX ADMIN — SODIUM CHLORIDE 0.5 UNITS/HR: 9 INJECTION, SOLUTION INTRAVENOUS at 19:49

## 2017-06-19 RX ADMIN — ONDANSETRON 4 MG: 2 INJECTION INTRAMUSCULAR; INTRAVENOUS at 13:49

## 2017-06-19 RX ADMIN — CITALOPRAM HYDROBROMIDE 10 MG: 10 TABLET ORAL at 08:27

## 2017-06-19 RX ADMIN — OLANZAPINE 2.5 MG: 10 INJECTION, POWDER, LYOPHILIZED, FOR SOLUTION INTRAMUSCULAR at 22:47

## 2017-06-19 RX ADMIN — WARFARIN SODIUM 5 MG: 5 TABLET ORAL at 18:20

## 2017-06-19 RX ADMIN — OMEPRAZOLE MAGNESIUM 20 MG: 20 CAPSULE, DELAYED RELEASE ORAL at 08:22

## 2017-06-19 RX ADMIN — HEPARIN SODIUM 20 UNITS/KG/HR: 10000 INJECTION, SOLUTION INTRAVENOUS at 08:28

## 2017-06-19 RX ADMIN — OLANZAPINE 2.5 MG: 10 INJECTION, POWDER, LYOPHILIZED, FOR SOLUTION INTRAMUSCULAR at 19:46

## 2017-06-19 RX ADMIN — VANCOMYCIN HYDROCHLORIDE 125 MG: 5 INJECTION, POWDER, LYOPHILIZED, FOR SOLUTION INTRAVENOUS at 08:22

## 2017-06-19 RX ADMIN — VANCOMYCIN HYDROCHLORIDE 125 MG: 5 INJECTION, POWDER, LYOPHILIZED, FOR SOLUTION INTRAVENOUS at 18:20

## 2017-06-19 RX ADMIN — VANCOMYCIN HYDROCHLORIDE 125 MG: 5 INJECTION, POWDER, LYOPHILIZED, FOR SOLUTION INTRAVENOUS at 23:14

## 2017-06-20 ENCOUNTER — APPOINTMENT (INPATIENT)
Dept: RADIOLOGY | Facility: HOSPITAL | Age: 67
DRG: 871 | End: 2017-06-20
Payer: COMMERCIAL

## 2017-06-20 LAB
ANION GAP SERPL CALCULATED.3IONS-SCNC: 11 MMOL/L (ref 4–13)
APTT PPP: 71 SECONDS (ref 23–35)
BUN SERPL-MCNC: 22 MG/DL (ref 5–25)
CALCIUM SERPL-MCNC: 9.6 MG/DL (ref 8.3–10.1)
CHLORIDE SERPL-SCNC: 96 MMOL/L (ref 100–108)
CO2 SERPL-SCNC: 30 MMOL/L (ref 21–32)
CREAT SERPL-MCNC: 3.64 MG/DL (ref 0.6–1.3)
ERYTHROCYTE [DISTWIDTH] IN BLOOD BY AUTOMATED COUNT: 15.4 % (ref 11.6–15.1)
GFR SERPL CREATININE-BSD FRML MDRD: 16.9 ML/MIN/1.73SQ M
GLUCOSE SERPL-MCNC: 125 MG/DL (ref 65–140)
GLUCOSE SERPL-MCNC: 134 MG/DL (ref 65–140)
GLUCOSE SERPL-MCNC: 138 MG/DL (ref 65–140)
GLUCOSE SERPL-MCNC: 150 MG/DL (ref 65–140)
GLUCOSE SERPL-MCNC: 150 MG/DL (ref 65–140)
GLUCOSE SERPL-MCNC: 151 MG/DL (ref 65–140)
GLUCOSE SERPL-MCNC: 152 MG/DL (ref 65–140)
GLUCOSE SERPL-MCNC: 154 MG/DL (ref 65–140)
GLUCOSE SERPL-MCNC: 157 MG/DL (ref 65–140)
GLUCOSE SERPL-MCNC: 160 MG/DL (ref 65–140)
GLUCOSE SERPL-MCNC: 174 MG/DL (ref 65–140)
GLUCOSE SERPL-MCNC: 194 MG/DL (ref 65–140)
GLUCOSE SERPL-MCNC: 212 MG/DL (ref 65–140)
GLUCOSE SERPL-MCNC: 265 MG/DL (ref 65–140)
HCT VFR BLD AUTO: 29.3 % (ref 36.5–49.3)
HGB BLD-MCNC: 9.1 G/DL (ref 12–17)
INR PPP: 1.32 (ref 0.86–1.16)
MCH RBC QN AUTO: 31.8 PG (ref 26.8–34.3)
MCHC RBC AUTO-ENTMCNC: 31.1 G/DL (ref 31.4–37.4)
MCV RBC AUTO: 102 FL (ref 82–98)
PLATELET # BLD AUTO: 147 THOUSANDS/UL (ref 149–390)
PMV BLD AUTO: 10.3 FL (ref 8.9–12.7)
POTASSIUM SERPL-SCNC: 3.6 MMOL/L (ref 3.5–5.3)
PROTHROMBIN TIME: 16.5 SECONDS (ref 12.1–14.4)
RBC # BLD AUTO: 2.86 MILLION/UL (ref 3.88–5.62)
SODIUM SERPL-SCNC: 137 MMOL/L (ref 136–145)
WBC # BLD AUTO: 5.97 THOUSAND/UL (ref 4.31–10.16)

## 2017-06-20 PROCEDURE — 82948 REAGENT STRIP/BLOOD GLUCOSE: CPT

## 2017-06-20 PROCEDURE — 85610 PROTHROMBIN TIME: CPT | Performed by: FAMILY MEDICINE

## 2017-06-20 PROCEDURE — 80048 BASIC METABOLIC PNL TOTAL CA: CPT | Performed by: FAMILY MEDICINE

## 2017-06-20 PROCEDURE — 74181 MRI ABDOMEN W/O CONTRAST: CPT

## 2017-06-20 PROCEDURE — 85027 COMPLETE CBC AUTOMATED: CPT | Performed by: FAMILY MEDICINE

## 2017-06-20 PROCEDURE — 85730 THROMBOPLASTIN TIME PARTIAL: CPT | Performed by: FAMILY MEDICINE

## 2017-06-20 RX ORDER — OLANZAPINE 10 MG/1
2.5 INJECTION, POWDER, LYOPHILIZED, FOR SOLUTION INTRAMUSCULAR ONCE
Status: COMPLETED | OUTPATIENT
Start: 2017-06-20 | End: 2017-06-20

## 2017-06-20 RX ADMIN — VANCOMYCIN HYDROCHLORIDE 125 MG: 5 INJECTION, POWDER, LYOPHILIZED, FOR SOLUTION INTRAVENOUS at 23:00

## 2017-06-20 RX ADMIN — VANCOMYCIN HYDROCHLORIDE 125 MG: 5 INJECTION, POWDER, LYOPHILIZED, FOR SOLUTION INTRAVENOUS at 17:57

## 2017-06-20 RX ADMIN — VANCOMYCIN HYDROCHLORIDE 125 MG: 5 INJECTION, POWDER, LYOPHILIZED, FOR SOLUTION INTRAVENOUS at 12:30

## 2017-06-20 RX ADMIN — VANCOMYCIN HYDROCHLORIDE 125 MG: 5 INJECTION, POWDER, LYOPHILIZED, FOR SOLUTION INTRAVENOUS at 05:33

## 2017-06-20 RX ADMIN — HEPARIN SODIUM 20 UNITS/KG/HR: 10000 INJECTION, SOLUTION INTRAVENOUS at 15:15

## 2017-06-20 RX ADMIN — CITALOPRAM HYDROBROMIDE 10 MG: 10 TABLET ORAL at 09:48

## 2017-06-20 RX ADMIN — WARFARIN SODIUM 5 MG: 5 TABLET ORAL at 17:57

## 2017-06-20 RX ADMIN — CIPROFLOXACIN 500 MG: 500 TABLET, FILM COATED ORAL at 20:18

## 2017-06-20 RX ADMIN — ANORECTAL OINTMENT: 15.7; .44; 24; 20.6 OINTMENT TOPICAL at 12:30

## 2017-06-20 RX ADMIN — OLANZAPINE 2.5 MG: 10 INJECTION, POWDER, LYOPHILIZED, FOR SOLUTION INTRAMUSCULAR at 02:32

## 2017-06-20 RX ADMIN — OMEPRAZOLE MAGNESIUM 20 MG: 20 CAPSULE, DELAYED RELEASE ORAL at 09:49

## 2017-06-20 RX ADMIN — LEVOTHYROXINE SODIUM 250 MCG: 125 TABLET ORAL at 09:48

## 2017-06-20 RX ADMIN — ANORECTAL OINTMENT: 15.7; .44; 24; 20.6 OINTMENT TOPICAL at 18:07

## 2017-06-21 ENCOUNTER — APPOINTMENT (INPATIENT)
Dept: DIALYSIS | Facility: HOSPITAL | Age: 67
DRG: 871 | End: 2017-06-21
Payer: COMMERCIAL

## 2017-06-21 LAB
ALBUMIN SERPL BCP-MCNC: 2.2 G/DL (ref 3.5–5)
ALP SERPL-CCNC: 213 U/L (ref 46–116)
ALT SERPL W P-5'-P-CCNC: 29 U/L (ref 12–78)
ANION GAP SERPL CALCULATED.3IONS-SCNC: 11 MMOL/L (ref 4–13)
APTT PPP: 54 SECONDS (ref 23–35)
APTT PPP: 76 SECONDS (ref 23–35)
APTT PPP: 86 SECONDS (ref 23–35)
AST SERPL W P-5'-P-CCNC: 15 U/L (ref 5–45)
BACTERIA BLD CULT: NORMAL
BACTERIA BLD CULT: NORMAL
BASOPHILS # BLD AUTO: 0.09 THOUSANDS/ΜL (ref 0–0.1)
BASOPHILS NFR BLD AUTO: 2 % (ref 0–1)
BILIRUB DIRECT SERPL-MCNC: 0.21 MG/DL (ref 0–0.2)
BILIRUB SERPL-MCNC: 0.52 MG/DL (ref 0.2–1)
BUN SERPL-MCNC: 38 MG/DL (ref 5–25)
CALCIUM SERPL-MCNC: 9.6 MG/DL (ref 8.3–10.1)
CHLORIDE SERPL-SCNC: 96 MMOL/L (ref 100–108)
CO2 SERPL-SCNC: 29 MMOL/L (ref 21–32)
CREAT SERPL-MCNC: 4.92 MG/DL (ref 0.6–1.3)
EOSINOPHIL # BLD AUTO: 0.37 THOUSAND/ΜL (ref 0–0.61)
EOSINOPHIL NFR BLD AUTO: 6 % (ref 0–6)
ERYTHROCYTE [DISTWIDTH] IN BLOOD BY AUTOMATED COUNT: 15.2 % (ref 11.6–15.1)
GFR SERPL CREATININE-BSD FRML MDRD: 12 ML/MIN/1.73SQ M
GLUCOSE SERPL-MCNC: 114 MG/DL (ref 65–140)
GLUCOSE SERPL-MCNC: 142 MG/DL (ref 65–140)
GLUCOSE SERPL-MCNC: 145 MG/DL (ref 65–140)
GLUCOSE SERPL-MCNC: 151 MG/DL (ref 65–140)
GLUCOSE SERPL-MCNC: 155 MG/DL (ref 65–140)
GLUCOSE SERPL-MCNC: 156 MG/DL (ref 65–140)
GLUCOSE SERPL-MCNC: 158 MG/DL (ref 65–140)
GLUCOSE SERPL-MCNC: 159 MG/DL (ref 65–140)
GLUCOSE SERPL-MCNC: 162 MG/DL (ref 65–140)
GLUCOSE SERPL-MCNC: 167 MG/DL (ref 65–140)
GLUCOSE SERPL-MCNC: 173 MG/DL (ref 65–140)
GLUCOSE SERPL-MCNC: 176 MG/DL (ref 65–140)
GLUCOSE SERPL-MCNC: 183 MG/DL (ref 65–140)
HCT VFR BLD AUTO: 28.4 % (ref 36.5–49.3)
HGB BLD-MCNC: 8.9 G/DL (ref 12–17)
INR PPP: 1.33 (ref 0.86–1.16)
LYMPHOCYTES # BLD AUTO: 1.02 THOUSANDS/ΜL (ref 0.6–4.47)
LYMPHOCYTES NFR BLD AUTO: 17 % (ref 14–44)
MCH RBC QN AUTO: 32.1 PG (ref 26.8–34.3)
MCHC RBC AUTO-ENTMCNC: 31.3 G/DL (ref 31.4–37.4)
MCV RBC AUTO: 103 FL (ref 82–98)
MONOCYTES # BLD AUTO: 0.76 THOUSAND/ΜL (ref 0.17–1.22)
MONOCYTES NFR BLD AUTO: 13 % (ref 4–12)
NEUTROPHILS # BLD AUTO: 3.73 THOUSANDS/ΜL (ref 1.85–7.62)
NEUTS SEG NFR BLD AUTO: 62 % (ref 43–75)
NRBC BLD AUTO-RTO: 0 /100 WBCS
PLATELET # BLD AUTO: 132 THOUSANDS/UL (ref 149–390)
PMV BLD AUTO: 10 FL (ref 8.9–12.7)
POTASSIUM SERPL-SCNC: 3.7 MMOL/L (ref 3.5–5.3)
PROT SERPL-MCNC: 7.2 G/DL (ref 6.4–8.2)
PROTHROMBIN TIME: 16.6 SECONDS (ref 12.1–14.4)
RBC # BLD AUTO: 2.77 MILLION/UL (ref 3.88–5.62)
SODIUM SERPL-SCNC: 136 MMOL/L (ref 136–145)
WBC # BLD AUTO: 5.98 THOUSAND/UL (ref 4.31–10.16)

## 2017-06-21 PROCEDURE — 80076 HEPATIC FUNCTION PANEL: CPT | Performed by: PHYSICIAN ASSISTANT

## 2017-06-21 PROCEDURE — 82948 REAGENT STRIP/BLOOD GLUCOSE: CPT

## 2017-06-21 PROCEDURE — 80048 BASIC METABOLIC PNL TOTAL CA: CPT | Performed by: HOSPITALIST

## 2017-06-21 PROCEDURE — 85730 THROMBOPLASTIN TIME PARTIAL: CPT | Performed by: HOSPITALIST

## 2017-06-21 PROCEDURE — 85025 COMPLETE CBC W/AUTO DIFF WBC: CPT | Performed by: HOSPITALIST

## 2017-06-21 PROCEDURE — 85610 PROTHROMBIN TIME: CPT | Performed by: HOSPITALIST

## 2017-06-21 RX ORDER — OXYCODONE HYDROCHLORIDE 5 MG/1
5 TABLET ORAL EVERY 6 HOURS PRN
Status: DISCONTINUED | OUTPATIENT
Start: 2017-06-21 | End: 2017-06-28 | Stop reason: HOSPADM

## 2017-06-21 RX ADMIN — VANCOMYCIN HYDROCHLORIDE 125 MG: 5 INJECTION, POWDER, LYOPHILIZED, FOR SOLUTION INTRAVENOUS at 13:00

## 2017-06-21 RX ADMIN — HEPARIN SODIUM 22 UNITS/KG/HR: 10000 INJECTION, SOLUTION INTRAVENOUS at 22:24

## 2017-06-21 RX ADMIN — VANCOMYCIN HYDROCHLORIDE 125 MG: 5 INJECTION, POWDER, LYOPHILIZED, FOR SOLUTION INTRAVENOUS at 23:10

## 2017-06-21 RX ADMIN — ANORECTAL OINTMENT 1 APPLICATION: 15.7; .44; 24; 20.6 OINTMENT TOPICAL at 09:28

## 2017-06-21 RX ADMIN — OXYCODONE HYDROCHLORIDE 5 MG: 5 TABLET ORAL at 10:45

## 2017-06-21 RX ADMIN — SODIUM CHLORIDE 1 UNITS/HR: 9 INJECTION, SOLUTION INTRAVENOUS at 09:27

## 2017-06-21 RX ADMIN — CIPROFLOXACIN 500 MG: 500 TABLET, FILM COATED ORAL at 20:33

## 2017-06-21 RX ADMIN — CITALOPRAM HYDROBROMIDE 10 MG: 10 TABLET ORAL at 09:26

## 2017-06-21 RX ADMIN — VANCOMYCIN HYDROCHLORIDE 125 MG: 5 INJECTION, POWDER, LYOPHILIZED, FOR SOLUTION INTRAVENOUS at 18:26

## 2017-06-21 RX ADMIN — HEPARIN SODIUM 20 UNITS/KG/HR: 10000 INJECTION, SOLUTION INTRAVENOUS at 05:41

## 2017-06-21 RX ADMIN — OXYCODONE HYDROCHLORIDE 5 MG: 5 TABLET ORAL at 22:24

## 2017-06-21 RX ADMIN — VANCOMYCIN HYDROCHLORIDE 125 MG: 5 INJECTION, POWDER, LYOPHILIZED, FOR SOLUTION INTRAVENOUS at 05:41

## 2017-06-21 RX ADMIN — ALTEPLASE 2 MG: 2.2 INJECTION, POWDER, LYOPHILIZED, FOR SOLUTION INTRAVENOUS at 20:42

## 2017-06-21 RX ADMIN — OMEPRAZOLE MAGNESIUM 20 MG: 20 CAPSULE, DELAYED RELEASE ORAL at 09:27

## 2017-06-21 RX ADMIN — ANORECTAL OINTMENT: 15.7; .44; 24; 20.6 OINTMENT TOPICAL at 18:26

## 2017-06-22 LAB
ANION GAP SERPL CALCULATED.3IONS-SCNC: 8 MMOL/L (ref 4–13)
APTT PPP: 64 SECONDS (ref 23–35)
BASOPHILS # BLD AUTO: 0.1 THOUSANDS/ΜL (ref 0–0.1)
BASOPHILS NFR BLD AUTO: 2 % (ref 0–1)
BUN SERPL-MCNC: 21 MG/DL (ref 5–25)
CALCIUM SERPL-MCNC: 9.1 MG/DL (ref 8.3–10.1)
CHLORIDE SERPL-SCNC: 95 MMOL/L (ref 100–108)
CO2 SERPL-SCNC: 34 MMOL/L (ref 21–32)
CREAT SERPL-MCNC: 3.43 MG/DL (ref 0.6–1.3)
EOSINOPHIL # BLD AUTO: 0.42 THOUSAND/ΜL (ref 0–0.61)
EOSINOPHIL NFR BLD AUTO: 8 % (ref 0–6)
ERYTHROCYTE [DISTWIDTH] IN BLOOD BY AUTOMATED COUNT: 15.2 % (ref 11.6–15.1)
GFR SERPL CREATININE-BSD FRML MDRD: 18.1 ML/MIN/1.73SQ M
GLUCOSE SERPL-MCNC: 114 MG/DL (ref 65–140)
GLUCOSE SERPL-MCNC: 144 MG/DL (ref 65–140)
GLUCOSE SERPL-MCNC: 156 MG/DL (ref 65–140)
GLUCOSE SERPL-MCNC: 161 MG/DL (ref 65–140)
GLUCOSE SERPL-MCNC: 168 MG/DL (ref 65–140)
GLUCOSE SERPL-MCNC: 171 MG/DL (ref 65–140)
GLUCOSE SERPL-MCNC: 391 MG/DL (ref 65–140)
GLUCOSE SERPL-MCNC: 399 MG/DL (ref 65–140)
HCT VFR BLD AUTO: 29.1 % (ref 36.5–49.3)
HGB BLD-MCNC: 9.1 G/DL (ref 12–17)
INR PPP: 1.23 (ref 0.86–1.16)
LYMPHOCYTES # BLD AUTO: 1.14 THOUSANDS/ΜL (ref 0.6–4.47)
LYMPHOCYTES NFR BLD AUTO: 22 % (ref 14–44)
MCH RBC QN AUTO: 32 PG (ref 26.8–34.3)
MCHC RBC AUTO-ENTMCNC: 31.3 G/DL (ref 31.4–37.4)
MCV RBC AUTO: 103 FL (ref 82–98)
MONOCYTES # BLD AUTO: 0.58 THOUSAND/ΜL (ref 0.17–1.22)
MONOCYTES NFR BLD AUTO: 11 % (ref 4–12)
NEUTROPHILS # BLD AUTO: 2.85 THOUSANDS/ΜL (ref 1.85–7.62)
NEUTS SEG NFR BLD AUTO: 57 % (ref 43–75)
NRBC BLD AUTO-RTO: 0 /100 WBCS
PLATELET # BLD AUTO: 149 THOUSANDS/UL (ref 149–390)
PMV BLD AUTO: 10.2 FL (ref 8.9–12.7)
POTASSIUM SERPL-SCNC: 3.5 MMOL/L (ref 3.5–5.3)
PROTHROMBIN TIME: 15.6 SECONDS (ref 12.1–14.4)
RBC # BLD AUTO: 2.84 MILLION/UL (ref 3.88–5.62)
SODIUM SERPL-SCNC: 137 MMOL/L (ref 136–145)
WBC # BLD AUTO: 5.1 THOUSAND/UL (ref 4.31–10.16)

## 2017-06-22 PROCEDURE — 92610 EVALUATE SWALLOWING FUNCTION: CPT

## 2017-06-22 PROCEDURE — 85610 PROTHROMBIN TIME: CPT | Performed by: HOSPITALIST

## 2017-06-22 PROCEDURE — 82948 REAGENT STRIP/BLOOD GLUCOSE: CPT

## 2017-06-22 PROCEDURE — 80048 BASIC METABOLIC PNL TOTAL CA: CPT | Performed by: HOSPITALIST

## 2017-06-22 PROCEDURE — 85025 COMPLETE CBC W/AUTO DIFF WBC: CPT | Performed by: HOSPITALIST

## 2017-06-22 PROCEDURE — 85730 THROMBOPLASTIN TIME PARTIAL: CPT | Performed by: HOSPITALIST

## 2017-06-22 RX ORDER — WARFARIN SODIUM 5 MG/1
5 TABLET ORAL
Status: DISCONTINUED | OUTPATIENT
Start: 2017-06-22 | End: 2017-06-26

## 2017-06-22 RX ADMIN — OXYCODONE HYDROCHLORIDE 5 MG: 5 TABLET ORAL at 13:48

## 2017-06-22 RX ADMIN — CITALOPRAM HYDROBROMIDE 10 MG: 10 TABLET ORAL at 09:58

## 2017-06-22 RX ADMIN — ANORECTAL OINTMENT: 15.7; .44; 24; 20.6 OINTMENT TOPICAL at 18:12

## 2017-06-22 RX ADMIN — INSULIN LISPRO 1 UNITS: 100 INJECTION, SOLUTION INTRAVENOUS; SUBCUTANEOUS at 06:14

## 2017-06-22 RX ADMIN — INSULIN DETEMIR 8 UNITS: 100 INJECTION, SOLUTION SUBCUTANEOUS at 05:09

## 2017-06-22 RX ADMIN — VANCOMYCIN HYDROCHLORIDE 125 MG: 5 INJECTION, POWDER, LYOPHILIZED, FOR SOLUTION INTRAVENOUS at 23:13

## 2017-06-22 RX ADMIN — WARFARIN SODIUM 5 MG: 5 TABLET ORAL at 18:12

## 2017-06-22 RX ADMIN — VANCOMYCIN HYDROCHLORIDE 125 MG: 5 INJECTION, POWDER, LYOPHILIZED, FOR SOLUTION INTRAVENOUS at 13:48

## 2017-06-22 RX ADMIN — VANCOMYCIN HYDROCHLORIDE 125 MG: 5 INJECTION, POWDER, LYOPHILIZED, FOR SOLUTION INTRAVENOUS at 05:08

## 2017-06-22 RX ADMIN — VANCOMYCIN HYDROCHLORIDE 125 MG: 5 INJECTION, POWDER, LYOPHILIZED, FOR SOLUTION INTRAVENOUS at 18:12

## 2017-06-22 RX ADMIN — HEPARIN SODIUM 22 UNITS/KG/HR: 10000 INJECTION, SOLUTION INTRAVENOUS at 16:44

## 2017-06-22 RX ADMIN — CIPROFLOXACIN 500 MG: 500 TABLET, FILM COATED ORAL at 20:42

## 2017-06-22 RX ADMIN — LEVOTHYROXINE SODIUM 250 MCG: 125 TABLET ORAL at 09:58

## 2017-06-22 RX ADMIN — INSULIN DETEMIR 8 UNITS: 100 INJECTION, SOLUTION SUBCUTANEOUS at 18:12

## 2017-06-22 RX ADMIN — INSULIN LISPRO 5 UNITS: 100 INJECTION, SOLUTION INTRAVENOUS; SUBCUTANEOUS at 16:55

## 2017-06-22 RX ADMIN — ANORECTAL OINTMENT: 15.7; .44; 24; 20.6 OINTMENT TOPICAL at 10:00

## 2017-06-22 RX ADMIN — OMEPRAZOLE MAGNESIUM 20 MG: 20 CAPSULE, DELAYED RELEASE ORAL at 09:58

## 2017-06-22 RX ADMIN — OXYCODONE HYDROCHLORIDE 5 MG: 5 TABLET ORAL at 20:42

## 2017-06-23 ENCOUNTER — APPOINTMENT (INPATIENT)
Dept: DIALYSIS | Facility: HOSPITAL | Age: 67
DRG: 871 | End: 2017-06-23
Attending: INTERNAL MEDICINE
Payer: COMMERCIAL

## 2017-06-23 LAB
ANION GAP SERPL CALCULATED.3IONS-SCNC: 11 MMOL/L (ref 4–13)
APTT PPP: 82 SECONDS (ref 23–35)
BASOPHILS # BLD AUTO: 0.1 THOUSANDS/ΜL (ref 0–0.1)
BASOPHILS NFR BLD AUTO: 2 % (ref 0–1)
BUN SERPL-MCNC: 44 MG/DL (ref 5–25)
CALCIUM SERPL-MCNC: 9.8 MG/DL (ref 8.3–10.1)
CHLORIDE SERPL-SCNC: 90 MMOL/L (ref 100–108)
CO2 SERPL-SCNC: 32 MMOL/L (ref 21–32)
CREAT SERPL-MCNC: 5.14 MG/DL (ref 0.6–1.3)
EOSINOPHIL # BLD AUTO: 0.37 THOUSAND/ΜL (ref 0–0.61)
EOSINOPHIL NFR BLD AUTO: 6 % (ref 0–6)
ERYTHROCYTE [DISTWIDTH] IN BLOOD BY AUTOMATED COUNT: 15.2 % (ref 11.6–15.1)
GFR SERPL CREATININE-BSD FRML MDRD: 11.4 ML/MIN/1.73SQ M
GLUCOSE SERPL-MCNC: 140 MG/DL (ref 65–140)
GLUCOSE SERPL-MCNC: 209 MG/DL (ref 65–140)
GLUCOSE SERPL-MCNC: 383 MG/DL (ref 65–140)
GLUCOSE SERPL-MCNC: 449 MG/DL (ref 65–140)
GLUCOSE SERPL-MCNC: 486 MG/DL (ref 65–140)
GLUCOSE SERPL-MCNC: 84 MG/DL (ref 65–140)
GLUCOSE SERPL-MCNC: 94 MG/DL (ref 65–140)
HCT VFR BLD AUTO: 30 % (ref 36.5–49.3)
HGB BLD-MCNC: 9.4 G/DL (ref 12–17)
INR PPP: 1.27 (ref 0.86–1.16)
LYMPHOCYTES # BLD AUTO: 1.08 THOUSANDS/ΜL (ref 0.6–4.47)
LYMPHOCYTES NFR BLD AUTO: 16 % (ref 14–44)
MCH RBC QN AUTO: 32.1 PG (ref 26.8–34.3)
MCHC RBC AUTO-ENTMCNC: 31.3 G/DL (ref 31.4–37.4)
MCV RBC AUTO: 102 FL (ref 82–98)
MONOCYTES # BLD AUTO: 0.62 THOUSAND/ΜL (ref 0.17–1.22)
MONOCYTES NFR BLD AUTO: 9 % (ref 4–12)
NEUTROPHILS # BLD AUTO: 4.5 THOUSANDS/ΜL (ref 1.85–7.62)
NEUTS SEG NFR BLD AUTO: 67 % (ref 43–75)
NRBC BLD AUTO-RTO: 0 /100 WBCS
PLATELET # BLD AUTO: 139 THOUSANDS/UL (ref 149–390)
PMV BLD AUTO: 10.4 FL (ref 8.9–12.7)
POTASSIUM SERPL-SCNC: 4.2 MMOL/L (ref 3.5–5.3)
PROTHROMBIN TIME: 16 SECONDS (ref 12.1–14.4)
RBC # BLD AUTO: 2.93 MILLION/UL (ref 3.88–5.62)
SODIUM SERPL-SCNC: 133 MMOL/L (ref 136–145)
WBC # BLD AUTO: 6.68 THOUSAND/UL (ref 4.31–10.16)

## 2017-06-23 PROCEDURE — 80048 BASIC METABOLIC PNL TOTAL CA: CPT | Performed by: HOSPITALIST

## 2017-06-23 PROCEDURE — 82948 REAGENT STRIP/BLOOD GLUCOSE: CPT

## 2017-06-23 PROCEDURE — 85025 COMPLETE CBC W/AUTO DIFF WBC: CPT | Performed by: HOSPITALIST

## 2017-06-23 PROCEDURE — 85730 THROMBOPLASTIN TIME PARTIAL: CPT | Performed by: HOSPITALIST

## 2017-06-23 PROCEDURE — 85610 PROTHROMBIN TIME: CPT | Performed by: HOSPITALIST

## 2017-06-23 PROCEDURE — 94760 N-INVAS EAR/PLS OXIMETRY 1: CPT

## 2017-06-23 RX ORDER — OLANZAPINE 10 MG/1
2.5 INJECTION, POWDER, LYOPHILIZED, FOR SOLUTION INTRAMUSCULAR ONCE
Status: COMPLETED | OUTPATIENT
Start: 2017-06-23 | End: 2017-06-23

## 2017-06-23 RX ORDER — QUETIAPINE FUMARATE 25 MG/1
25 TABLET, FILM COATED ORAL EVERY 8 HOURS PRN
Status: DISCONTINUED | OUTPATIENT
Start: 2017-06-23 | End: 2017-06-28 | Stop reason: HOSPADM

## 2017-06-23 RX ORDER — HALOPERIDOL 5 MG/ML
2 INJECTION INTRAMUSCULAR EVERY 6 HOURS PRN
Status: DISCONTINUED | OUTPATIENT
Start: 2017-06-23 | End: 2017-06-24

## 2017-06-23 RX ORDER — NICOTINE 21 MG/24HR
14 PATCH, TRANSDERMAL 24 HOURS TRANSDERMAL DAILY
Status: DISCONTINUED | OUTPATIENT
Start: 2017-06-24 | End: 2017-06-23

## 2017-06-23 RX ORDER — NICOTINE 21 MG/24HR
14 PATCH, TRANSDERMAL 24 HOURS TRANSDERMAL DAILY
Status: DISCONTINUED | OUTPATIENT
Start: 2017-06-23 | End: 2017-06-28 | Stop reason: HOSPADM

## 2017-06-23 RX ADMIN — HEPARIN SODIUM 22 UNITS/KG/HR: 10000 INJECTION, SOLUTION INTRAVENOUS at 11:49

## 2017-06-23 RX ADMIN — WARFARIN SODIUM 5 MG: 5 TABLET ORAL at 17:07

## 2017-06-23 RX ADMIN — INSULIN DETEMIR 12 UNITS: 100 INJECTION, SOLUTION SUBCUTANEOUS at 18:14

## 2017-06-23 RX ADMIN — INSULIN HUMAN 8 UNITS: 100 INJECTION, SOLUTION PARENTERAL at 07:40

## 2017-06-23 RX ADMIN — VANCOMYCIN HYDROCHLORIDE 125 MG: 5 INJECTION, POWDER, LYOPHILIZED, FOR SOLUTION INTRAVENOUS at 23:22

## 2017-06-23 RX ADMIN — CITALOPRAM HYDROBROMIDE 10 MG: 10 TABLET ORAL at 13:31

## 2017-06-23 RX ADMIN — VANCOMYCIN HYDROCHLORIDE 125 MG: 5 INJECTION, POWDER, LYOPHILIZED, FOR SOLUTION INTRAVENOUS at 05:30

## 2017-06-23 RX ADMIN — QUETIAPINE FUMARATE 25 MG: 25 TABLET, FILM COATED ORAL at 16:55

## 2017-06-23 RX ADMIN — VANCOMYCIN HYDROCHLORIDE 125 MG: 5 INJECTION, POWDER, LYOPHILIZED, FOR SOLUTION INTRAVENOUS at 13:34

## 2017-06-23 RX ADMIN — HALOPERIDOL LACTATE 2 MG: 5 INJECTION, SOLUTION INTRAMUSCULAR at 21:17

## 2017-06-23 RX ADMIN — CIPROFLOXACIN 500 MG: 500 TABLET, FILM COATED ORAL at 23:24

## 2017-06-23 RX ADMIN — OLANZAPINE 2.5 MG: 10 INJECTION, POWDER, LYOPHILIZED, FOR SOLUTION INTRAMUSCULAR at 22:32

## 2017-06-23 RX ADMIN — INSULIN DETEMIR 8 UNITS: 100 INJECTION, SOLUTION SUBCUTANEOUS at 05:30

## 2017-06-23 RX ADMIN — OXYCODONE HYDROCHLORIDE 5 MG: 5 TABLET ORAL at 02:42

## 2017-06-23 RX ADMIN — NICOTINE 14 MG: 14 PATCH, EXTENDED RELEASE TRANSDERMAL at 20:16

## 2017-06-23 RX ADMIN — VANCOMYCIN HYDROCHLORIDE 125 MG: 5 INJECTION, POWDER, LYOPHILIZED, FOR SOLUTION INTRAVENOUS at 17:04

## 2017-06-23 RX ADMIN — OMEPRAZOLE MAGNESIUM 20 MG: 20 CAPSULE, DELAYED RELEASE ORAL at 13:34

## 2017-06-23 RX ADMIN — ANORECTAL OINTMENT 1 APPLICATION: 15.7; .44; 24; 20.6 OINTMENT TOPICAL at 17:04

## 2017-06-23 RX ADMIN — ANORECTAL OINTMENT: 15.7; .44; 24; 20.6 OINTMENT TOPICAL at 09:23

## 2017-06-23 RX ADMIN — INSULIN LISPRO 6 UNITS: 100 INJECTION, SOLUTION INTRAVENOUS; SUBCUTANEOUS at 07:36

## 2017-06-24 LAB
GLUCOSE SERPL-MCNC: 128 MG/DL (ref 65–140)
GLUCOSE SERPL-MCNC: 137 MG/DL (ref 65–140)
GLUCOSE SERPL-MCNC: 146 MG/DL (ref 65–140)
GLUCOSE SERPL-MCNC: 184 MG/DL (ref 65–140)
GLUCOSE SERPL-MCNC: 189 MG/DL (ref 65–140)
GLUCOSE SERPL-MCNC: 223 MG/DL (ref 65–140)

## 2017-06-24 PROCEDURE — 82948 REAGENT STRIP/BLOOD GLUCOSE: CPT

## 2017-06-24 RX ADMIN — CITALOPRAM HYDROBROMIDE 10 MG: 10 TABLET ORAL at 08:40

## 2017-06-24 RX ADMIN — WARFARIN SODIUM 5 MG: 5 TABLET ORAL at 17:13

## 2017-06-24 RX ADMIN — OXYCODONE HYDROCHLORIDE 5 MG: 5 TABLET ORAL at 00:33

## 2017-06-24 RX ADMIN — INSULIN DETEMIR 12 UNITS: 100 INJECTION, SOLUTION SUBCUTANEOUS at 17:14

## 2017-06-24 RX ADMIN — NICOTINE 14 MG: 14 PATCH, EXTENDED RELEASE TRANSDERMAL at 20:02

## 2017-06-24 RX ADMIN — VANCOMYCIN HYDROCHLORIDE 125 MG: 5 INJECTION, POWDER, LYOPHILIZED, FOR SOLUTION INTRAVENOUS at 23:43

## 2017-06-24 RX ADMIN — INSULIN DETEMIR 12 UNITS: 100 INJECTION, SOLUTION SUBCUTANEOUS at 05:20

## 2017-06-24 RX ADMIN — ANORECTAL OINTMENT: 15.7; .44; 24; 20.6 OINTMENT TOPICAL at 08:43

## 2017-06-24 RX ADMIN — LEVOTHYROXINE SODIUM 250 MCG: 125 TABLET ORAL at 08:41

## 2017-06-24 RX ADMIN — OXYCODONE HYDROCHLORIDE 5 MG: 5 TABLET ORAL at 08:40

## 2017-06-24 RX ADMIN — ANORECTAL OINTMENT: 15.7; .44; 24; 20.6 OINTMENT TOPICAL at 17:20

## 2017-06-24 RX ADMIN — VANCOMYCIN HYDROCHLORIDE 125 MG: 5 INJECTION, POWDER, LYOPHILIZED, FOR SOLUTION INTRAVENOUS at 11:20

## 2017-06-24 RX ADMIN — VANCOMYCIN HYDROCHLORIDE 125 MG: 5 INJECTION, POWDER, LYOPHILIZED, FOR SOLUTION INTRAVENOUS at 05:16

## 2017-06-24 RX ADMIN — CIPROFLOXACIN 500 MG: 500 TABLET, FILM COATED ORAL at 20:02

## 2017-06-24 RX ADMIN — VANCOMYCIN HYDROCHLORIDE 125 MG: 5 INJECTION, POWDER, LYOPHILIZED, FOR SOLUTION INTRAVENOUS at 17:14

## 2017-06-24 RX ADMIN — OMEPRAZOLE MAGNESIUM 20 MG: 20 CAPSULE, DELAYED RELEASE ORAL at 08:41

## 2017-06-25 LAB
ANION GAP SERPL CALCULATED.3IONS-SCNC: 10 MMOL/L (ref 4–13)
ANION GAP SERPL CALCULATED.3IONS-SCNC: 8 MMOL/L (ref 4–13)
BASOPHILS # BLD AUTO: 0.1 THOUSANDS/ΜL (ref 0–0.1)
BASOPHILS NFR BLD AUTO: 2 % (ref 0–1)
BUN SERPL-MCNC: 46 MG/DL (ref 5–25)
BUN SERPL-MCNC: 51 MG/DL (ref 5–25)
CALCIUM SERPL-MCNC: 9.1 MG/DL (ref 8.3–10.1)
CALCIUM SERPL-MCNC: 9.4 MG/DL (ref 8.3–10.1)
CHLORIDE SERPL-SCNC: 89 MMOL/L (ref 100–108)
CHLORIDE SERPL-SCNC: 89 MMOL/L (ref 100–108)
CO2 SERPL-SCNC: 30 MMOL/L (ref 21–32)
CO2 SERPL-SCNC: 31 MMOL/L (ref 21–32)
CREAT SERPL-MCNC: 4.82 MG/DL (ref 0.6–1.3)
CREAT SERPL-MCNC: 5.12 MG/DL (ref 0.6–1.3)
EOSINOPHIL # BLD AUTO: 0.42 THOUSAND/ΜL (ref 0–0.61)
EOSINOPHIL NFR BLD AUTO: 7 % (ref 0–6)
ERYTHROCYTE [DISTWIDTH] IN BLOOD BY AUTOMATED COUNT: 14.8 % (ref 11.6–15.1)
GFR SERPL CREATININE-BSD FRML MDRD: 11.4 ML/MIN/1.73SQ M
GFR SERPL CREATININE-BSD FRML MDRD: 12.3 ML/MIN/1.73SQ M
GLUCOSE SERPL-MCNC: 118 MG/DL (ref 65–140)
GLUCOSE SERPL-MCNC: 139 MG/DL (ref 65–140)
GLUCOSE SERPL-MCNC: 143 MG/DL (ref 65–140)
GLUCOSE SERPL-MCNC: 233 MG/DL (ref 65–140)
GLUCOSE SERPL-MCNC: 240 MG/DL (ref 65–140)
GLUCOSE SERPL-MCNC: 281 MG/DL (ref 65–140)
GLUCOSE SERPL-MCNC: 287 MG/DL (ref 65–140)
HCT VFR BLD AUTO: 30.8 % (ref 36.5–49.3)
HGB BLD-MCNC: 9.8 G/DL (ref 12–17)
INR PPP: 1.21 (ref 0.86–1.16)
LYMPHOCYTES # BLD AUTO: 1.26 THOUSANDS/ΜL (ref 0.6–4.47)
LYMPHOCYTES NFR BLD AUTO: 20 % (ref 14–44)
MCH RBC QN AUTO: 32 PG (ref 26.8–34.3)
MCHC RBC AUTO-ENTMCNC: 31.8 G/DL (ref 31.4–37.4)
MCV RBC AUTO: 101 FL (ref 82–98)
MONOCYTES # BLD AUTO: 0.69 THOUSAND/ΜL (ref 0.17–1.22)
MONOCYTES NFR BLD AUTO: 11 % (ref 4–12)
NEUTROPHILS # BLD AUTO: 3.98 THOUSANDS/ΜL (ref 1.85–7.62)
NEUTS SEG NFR BLD AUTO: 60 % (ref 43–75)
NRBC BLD AUTO-RTO: 0 /100 WBCS
PLATELET # BLD AUTO: 162 THOUSANDS/UL (ref 149–390)
PMV BLD AUTO: 10.2 FL (ref 8.9–12.7)
POTASSIUM SERPL-SCNC: 5.2 MMOL/L (ref 3.5–5.3)
POTASSIUM SERPL-SCNC: 5.3 MMOL/L (ref 3.5–5.3)
PROTHROMBIN TIME: 15.4 SECONDS (ref 12.1–14.4)
RBC # BLD AUTO: 3.06 MILLION/UL (ref 3.88–5.62)
SODIUM SERPL-SCNC: 128 MMOL/L (ref 136–145)
SODIUM SERPL-SCNC: 129 MMOL/L (ref 136–145)
WBC # BLD AUTO: 6.46 THOUSAND/UL (ref 4.31–10.16)

## 2017-06-25 PROCEDURE — 85610 PROTHROMBIN TIME: CPT | Performed by: HOSPITALIST

## 2017-06-25 PROCEDURE — 97167 OT EVAL HIGH COMPLEX 60 MIN: CPT

## 2017-06-25 PROCEDURE — 97163 PT EVAL HIGH COMPLEX 45 MIN: CPT

## 2017-06-25 PROCEDURE — 85025 COMPLETE CBC W/AUTO DIFF WBC: CPT | Performed by: HOSPITALIST

## 2017-06-25 PROCEDURE — G8978 MOBILITY CURRENT STATUS: HCPCS

## 2017-06-25 PROCEDURE — G8979 MOBILITY GOAL STATUS: HCPCS

## 2017-06-25 PROCEDURE — 82948 REAGENT STRIP/BLOOD GLUCOSE: CPT

## 2017-06-25 PROCEDURE — G8988 SELF CARE GOAL STATUS: HCPCS

## 2017-06-25 PROCEDURE — G8987 SELF CARE CURRENT STATUS: HCPCS

## 2017-06-25 PROCEDURE — 80048 BASIC METABOLIC PNL TOTAL CA: CPT | Performed by: HOSPITALIST

## 2017-06-25 RX ORDER — CIPROFLOXACIN 250 MG/1
250 TABLET, FILM COATED ORAL DAILY
Status: DISCONTINUED | OUTPATIENT
Start: 2017-06-25 | End: 2017-06-28 | Stop reason: HOSPADM

## 2017-06-25 RX ADMIN — WARFARIN SODIUM 5 MG: 5 TABLET ORAL at 17:33

## 2017-06-25 RX ADMIN — CITALOPRAM HYDROBROMIDE 10 MG: 10 TABLET ORAL at 08:05

## 2017-06-25 RX ADMIN — ANORECTAL OINTMENT: 15.7; .44; 24; 20.6 OINTMENT TOPICAL at 08:07

## 2017-06-25 RX ADMIN — INSULIN DETEMIR 12 UNITS: 100 INJECTION, SOLUTION SUBCUTANEOUS at 17:31

## 2017-06-25 RX ADMIN — INSULIN DETEMIR 12 UNITS: 100 INJECTION, SOLUTION SUBCUTANEOUS at 06:04

## 2017-06-25 RX ADMIN — VANCOMYCIN HYDROCHLORIDE 125 MG: 5 INJECTION, POWDER, LYOPHILIZED, FOR SOLUTION INTRAVENOUS at 18:33

## 2017-06-25 RX ADMIN — VANCOMYCIN HYDROCHLORIDE 125 MG: 5 INJECTION, POWDER, LYOPHILIZED, FOR SOLUTION INTRAVENOUS at 14:27

## 2017-06-25 RX ADMIN — NICOTINE 14 MG: 14 PATCH, EXTENDED RELEASE TRANSDERMAL at 19:36

## 2017-06-25 RX ADMIN — VANCOMYCIN HYDROCHLORIDE 125 MG: 5 INJECTION, POWDER, LYOPHILIZED, FOR SOLUTION INTRAVENOUS at 06:04

## 2017-06-25 RX ADMIN — ANORECTAL OINTMENT: 15.7; .44; 24; 20.6 OINTMENT TOPICAL at 17:36

## 2017-06-25 RX ADMIN — CIPROFLOXACIN HYDROCHLORIDE 250 MG: 250 TABLET, FILM COATED ORAL at 19:36

## 2017-06-25 RX ADMIN — VANCOMYCIN HYDROCHLORIDE 125 MG: 5 INJECTION, POWDER, LYOPHILIZED, FOR SOLUTION INTRAVENOUS at 23:30

## 2017-06-25 RX ADMIN — OMEPRAZOLE MAGNESIUM 20 MG: 20 CAPSULE, DELAYED RELEASE ORAL at 08:05

## 2017-06-26 ENCOUNTER — APPOINTMENT (INPATIENT)
Dept: DIALYSIS | Facility: HOSPITAL | Age: 67
DRG: 871 | End: 2017-06-26
Attending: INTERNAL MEDICINE
Payer: COMMERCIAL

## 2017-06-26 LAB
ANION GAP SERPL CALCULATED.3IONS-SCNC: 6 MMOL/L (ref 4–13)
BASOPHILS # BLD AUTO: 0.08 THOUSANDS/ΜL (ref 0–0.1)
BASOPHILS NFR BLD AUTO: 2 % (ref 0–1)
BUN SERPL-MCNC: 23 MG/DL (ref 5–25)
CALCIUM SERPL-MCNC: 8.7 MG/DL (ref 8.3–10.1)
CHLORIDE SERPL-SCNC: 92 MMOL/L (ref 100–108)
CO2 SERPL-SCNC: 33 MMOL/L (ref 21–32)
CREAT SERPL-MCNC: 2.91 MG/DL (ref 0.6–1.3)
EOSINOPHIL # BLD AUTO: 0.36 THOUSAND/ΜL (ref 0–0.61)
EOSINOPHIL NFR BLD AUTO: 9 % (ref 0–6)
ERYTHROCYTE [DISTWIDTH] IN BLOOD BY AUTOMATED COUNT: 14.8 % (ref 11.6–15.1)
GFR SERPL CREATININE-BSD FRML MDRD: 21.9 ML/MIN/1.73SQ M
GLUCOSE SERPL-MCNC: 103 MG/DL (ref 65–140)
GLUCOSE SERPL-MCNC: 154 MG/DL (ref 65–140)
GLUCOSE SERPL-MCNC: 164 MG/DL (ref 65–140)
GLUCOSE SERPL-MCNC: 186 MG/DL (ref 65–140)
GLUCOSE SERPL-MCNC: 220 MG/DL (ref 65–140)
GLUCOSE SERPL-MCNC: 98 MG/DL (ref 65–140)
HCT VFR BLD AUTO: 31.8 % (ref 36.5–49.3)
HGB BLD-MCNC: 10.2 G/DL (ref 12–17)
INR PPP: 1.17 (ref 0.86–1.16)
LYMPHOCYTES # BLD AUTO: 0.92 THOUSANDS/ΜL (ref 0.6–4.47)
LYMPHOCYTES NFR BLD AUTO: 22 % (ref 14–44)
MCH RBC QN AUTO: 31.7 PG (ref 26.8–34.3)
MCHC RBC AUTO-ENTMCNC: 32.1 G/DL (ref 31.4–37.4)
MCV RBC AUTO: 99 FL (ref 82–98)
MONOCYTES # BLD AUTO: 0.43 THOUSAND/ΜL (ref 0.17–1.22)
MONOCYTES NFR BLD AUTO: 10 % (ref 4–12)
NEUTROPHILS # BLD AUTO: 2.39 THOUSANDS/ΜL (ref 1.85–7.62)
NEUTS SEG NFR BLD AUTO: 57 % (ref 43–75)
NRBC BLD AUTO-RTO: 0 /100 WBCS
PLATELET # BLD AUTO: 161 THOUSANDS/UL (ref 149–390)
PMV BLD AUTO: 9.9 FL (ref 8.9–12.7)
POTASSIUM SERPL-SCNC: 3.8 MMOL/L (ref 3.5–5.3)
PROTHROMBIN TIME: 15 SECONDS (ref 12.1–14.4)
RBC # BLD AUTO: 3.22 MILLION/UL (ref 3.88–5.62)
SODIUM SERPL-SCNC: 131 MMOL/L (ref 136–145)
WBC # BLD AUTO: 4.19 THOUSAND/UL (ref 4.31–10.16)

## 2017-06-26 PROCEDURE — 80048 BASIC METABOLIC PNL TOTAL CA: CPT | Performed by: HOSPITALIST

## 2017-06-26 PROCEDURE — 85025 COMPLETE CBC W/AUTO DIFF WBC: CPT | Performed by: HOSPITALIST

## 2017-06-26 PROCEDURE — 82948 REAGENT STRIP/BLOOD GLUCOSE: CPT

## 2017-06-26 PROCEDURE — 85610 PROTHROMBIN TIME: CPT | Performed by: HOSPITALIST

## 2017-06-26 RX ORDER — WARFARIN SODIUM 7.5 MG/1
7.5 TABLET ORAL
Status: DISCONTINUED | OUTPATIENT
Start: 2017-06-26 | End: 2017-06-28 | Stop reason: HOSPADM

## 2017-06-26 RX ADMIN — WARFARIN SODIUM 7.5 MG: 7.5 TABLET ORAL at 18:18

## 2017-06-26 RX ADMIN — ANORECTAL OINTMENT 1 APPLICATION: 15.7; .44; 24; 20.6 OINTMENT TOPICAL at 18:21

## 2017-06-26 RX ADMIN — INSULIN DETEMIR 12 UNITS: 100 INJECTION, SOLUTION SUBCUTANEOUS at 07:13

## 2017-06-26 RX ADMIN — VANCOMYCIN HYDROCHLORIDE 250 MG: 5 INJECTION, POWDER, LYOPHILIZED, FOR SOLUTION INTRAVENOUS at 23:03

## 2017-06-26 RX ADMIN — OMEPRAZOLE MAGNESIUM 20 MG: 20 CAPSULE, DELAYED RELEASE ORAL at 14:40

## 2017-06-26 RX ADMIN — VANCOMYCIN HYDROCHLORIDE 250 MG: 5 INJECTION, POWDER, LYOPHILIZED, FOR SOLUTION INTRAVENOUS at 18:18

## 2017-06-26 RX ADMIN — INSULIN DETEMIR 12 UNITS: 100 INJECTION, SOLUTION SUBCUTANEOUS at 18:18

## 2017-06-26 RX ADMIN — VANCOMYCIN HYDROCHLORIDE 250 MG: 5 INJECTION, POWDER, LYOPHILIZED, FOR SOLUTION INTRAVENOUS at 14:40

## 2017-06-26 RX ADMIN — NICOTINE 14 MG: 14 PATCH, EXTENDED RELEASE TRANSDERMAL at 20:02

## 2017-06-26 RX ADMIN — LEVOTHYROXINE SODIUM 250 MCG: 125 TABLET ORAL at 14:28

## 2017-06-26 RX ADMIN — CIPROFLOXACIN HYDROCHLORIDE 250 MG: 250 TABLET, FILM COATED ORAL at 21:41

## 2017-06-26 RX ADMIN — CITALOPRAM HYDROBROMIDE 10 MG: 10 TABLET ORAL at 14:28

## 2017-06-26 RX ADMIN — VANCOMYCIN HYDROCHLORIDE 125 MG: 5 INJECTION, POWDER, LYOPHILIZED, FOR SOLUTION INTRAVENOUS at 05:40

## 2017-06-27 LAB
ANION GAP SERPL CALCULATED.3IONS-SCNC: 10 MMOL/L (ref 4–13)
ANION GAP SERPL CALCULATED.3IONS-SCNC: 8 MMOL/L (ref 4–13)
BASOPHILS # BLD AUTO: 0.11 THOUSANDS/ΜL (ref 0–0.1)
BASOPHILS NFR BLD AUTO: 2 % (ref 0–1)
BUN SERPL-MCNC: 42 MG/DL (ref 5–25)
BUN SERPL-MCNC: 44 MG/DL (ref 5–25)
CALCIUM SERPL-MCNC: 8.9 MG/DL (ref 8.3–10.1)
CALCIUM SERPL-MCNC: 9 MG/DL (ref 8.3–10.1)
CHLORIDE SERPL-SCNC: 87 MMOL/L (ref 100–108)
CHLORIDE SERPL-SCNC: 88 MMOL/L (ref 100–108)
CO2 SERPL-SCNC: 29 MMOL/L (ref 21–32)
CO2 SERPL-SCNC: 30 MMOL/L (ref 21–32)
CREAT SERPL-MCNC: 4 MG/DL (ref 0.6–1.3)
CREAT SERPL-MCNC: 4.26 MG/DL (ref 0.6–1.3)
EOSINOPHIL # BLD AUTO: 0.41 THOUSAND/ΜL (ref 0–0.61)
EOSINOPHIL NFR BLD AUTO: 9 % (ref 0–6)
ERYTHROCYTE [DISTWIDTH] IN BLOOD BY AUTOMATED COUNT: 14.9 % (ref 11.6–15.1)
GFR SERPL CREATININE-BSD FRML MDRD: 14.1 ML/MIN/1.73SQ M
GFR SERPL CREATININE-BSD FRML MDRD: 15.2 ML/MIN/1.73SQ M
GLUCOSE SERPL-MCNC: 162 MG/DL (ref 65–140)
GLUCOSE SERPL-MCNC: 256 MG/DL (ref 65–140)
GLUCOSE SERPL-MCNC: 257 MG/DL (ref 65–140)
GLUCOSE SERPL-MCNC: 321 MG/DL (ref 65–140)
GLUCOSE SERPL-MCNC: 380 MG/DL (ref 65–140)
GLUCOSE SERPL-MCNC: 429 MG/DL (ref 65–140)
HCT VFR BLD AUTO: 31.5 % (ref 36.5–49.3)
HGB BLD-MCNC: 10 G/DL (ref 12–17)
INR PPP: 1.16 (ref 0.86–1.16)
LYMPHOCYTES # BLD AUTO: 0.79 THOUSANDS/ΜL (ref 0.6–4.47)
LYMPHOCYTES NFR BLD AUTO: 17 % (ref 14–44)
MCH RBC QN AUTO: 31.8 PG (ref 26.8–34.3)
MCHC RBC AUTO-ENTMCNC: 31.7 G/DL (ref 31.4–37.4)
MCV RBC AUTO: 100 FL (ref 82–98)
MONOCYTES # BLD AUTO: 0.58 THOUSAND/ΜL (ref 0.17–1.22)
MONOCYTES NFR BLD AUTO: 13 % (ref 4–12)
NEUTROPHILS # BLD AUTO: 2.7 THOUSANDS/ΜL (ref 1.85–7.62)
NEUTS SEG NFR BLD AUTO: 59 % (ref 43–75)
NRBC BLD AUTO-RTO: 0 /100 WBCS
PLATELET # BLD AUTO: 218 THOUSANDS/UL (ref 149–390)
PMV BLD AUTO: 10.8 FL (ref 8.9–12.7)
POTASSIUM SERPL-SCNC: 4.9 MMOL/L (ref 3.5–5.3)
POTASSIUM SERPL-SCNC: 5.2 MMOL/L (ref 3.5–5.3)
PROTHROMBIN TIME: 14.8 SECONDS (ref 12.1–14.4)
RBC # BLD AUTO: 3.14 MILLION/UL (ref 3.88–5.62)
SODIUM SERPL-SCNC: 126 MMOL/L (ref 136–145)
SODIUM SERPL-SCNC: 126 MMOL/L (ref 136–145)
WBC # BLD AUTO: 4.6 THOUSAND/UL (ref 4.31–10.16)

## 2017-06-27 PROCEDURE — 97530 THERAPEUTIC ACTIVITIES: CPT | Performed by: STUDENT IN AN ORGANIZED HEALTH CARE EDUCATION/TRAINING PROGRAM

## 2017-06-27 PROCEDURE — 85610 PROTHROMBIN TIME: CPT | Performed by: HOSPITALIST

## 2017-06-27 PROCEDURE — 97110 THERAPEUTIC EXERCISES: CPT

## 2017-06-27 PROCEDURE — 97535 SELF CARE MNGMENT TRAINING: CPT | Performed by: STUDENT IN AN ORGANIZED HEALTH CARE EDUCATION/TRAINING PROGRAM

## 2017-06-27 PROCEDURE — 97532 HB COGNITIVE SKILLS DEVELOPMENT: CPT | Performed by: STUDENT IN AN ORGANIZED HEALTH CARE EDUCATION/TRAINING PROGRAM

## 2017-06-27 PROCEDURE — 85025 COMPLETE CBC W/AUTO DIFF WBC: CPT | Performed by: HOSPITALIST

## 2017-06-27 PROCEDURE — 97530 THERAPEUTIC ACTIVITIES: CPT

## 2017-06-27 PROCEDURE — 80048 BASIC METABOLIC PNL TOTAL CA: CPT | Performed by: INTERNAL MEDICINE

## 2017-06-27 PROCEDURE — 97112 NEUROMUSCULAR REEDUCATION: CPT

## 2017-06-27 PROCEDURE — 82948 REAGENT STRIP/BLOOD GLUCOSE: CPT

## 2017-06-27 PROCEDURE — 80048 BASIC METABOLIC PNL TOTAL CA: CPT | Performed by: HOSPITALIST

## 2017-06-27 RX ORDER — LEVOTHYROXINE SODIUM 0.12 MG/1
250 TABLET ORAL DAILY
Status: DISCONTINUED | OUTPATIENT
Start: 2017-06-28 | End: 2017-06-27

## 2017-06-27 RX ORDER — LEVOTHYROXINE SODIUM 0.1 MG/1
200 TABLET ORAL
Status: DISCONTINUED | OUTPATIENT
Start: 2017-06-28 | End: 2017-06-28 | Stop reason: HOSPADM

## 2017-06-27 RX ADMIN — NICOTINE 14 MG: 14 PATCH, EXTENDED RELEASE TRANSDERMAL at 20:25

## 2017-06-27 RX ADMIN — INSULIN DETEMIR 8 UNITS: 100 INJECTION, SOLUTION SUBCUTANEOUS at 18:06

## 2017-06-27 RX ADMIN — INSULIN DETEMIR 12 UNITS: 100 INJECTION, SOLUTION SUBCUTANEOUS at 08:18

## 2017-06-27 RX ADMIN — WARFARIN SODIUM 7.5 MG: 7.5 TABLET ORAL at 18:07

## 2017-06-27 RX ADMIN — INSULIN LISPRO 4 UNITS: 100 INJECTION, SOLUTION INTRAVENOUS; SUBCUTANEOUS at 18:07

## 2017-06-27 RX ADMIN — VANCOMYCIN HYDROCHLORIDE 250 MG: 5 INJECTION, POWDER, LYOPHILIZED, FOR SOLUTION INTRAVENOUS at 12:32

## 2017-06-27 RX ADMIN — CIPROFLOXACIN HYDROCHLORIDE 250 MG: 250 TABLET, FILM COATED ORAL at 20:25

## 2017-06-27 RX ADMIN — VANCOMYCIN HYDROCHLORIDE 250 MG: 5 INJECTION, POWDER, LYOPHILIZED, FOR SOLUTION INTRAVENOUS at 18:06

## 2017-06-27 RX ADMIN — VANCOMYCIN HYDROCHLORIDE 250 MG: 5 INJECTION, POWDER, LYOPHILIZED, FOR SOLUTION INTRAVENOUS at 23:55

## 2017-06-27 RX ADMIN — CITALOPRAM HYDROBROMIDE 10 MG: 10 TABLET ORAL at 08:20

## 2017-06-27 RX ADMIN — VANCOMYCIN HYDROCHLORIDE 250 MG: 5 INJECTION, POWDER, LYOPHILIZED, FOR SOLUTION INTRAVENOUS at 08:20

## 2017-06-28 ENCOUNTER — APPOINTMENT (INPATIENT)
Dept: DIALYSIS | Facility: HOSPITAL | Age: 67
DRG: 871 | End: 2017-06-28
Attending: INTERNAL MEDICINE
Payer: COMMERCIAL

## 2017-06-28 VITALS
OXYGEN SATURATION: 95 % | BODY MASS INDEX: 25.43 KG/M2 | HEIGHT: 62 IN | TEMPERATURE: 97.9 F | RESPIRATION RATE: 18 BRPM | HEART RATE: 102 BPM | DIASTOLIC BLOOD PRESSURE: 68 MMHG | SYSTOLIC BLOOD PRESSURE: 120 MMHG | WEIGHT: 138.2 LBS

## 2017-06-28 PROBLEM — N39.0 URINARY TRACT INFECTION: Status: RESOLVED | Noted: 2017-06-19 | Resolved: 2017-06-28

## 2017-06-28 LAB
ANION GAP SERPL CALCULATED.3IONS-SCNC: 11 MMOL/L (ref 4–13)
BASOPHILS # BLD AUTO: 0.13 THOUSANDS/ΜL (ref 0–0.1)
BASOPHILS NFR BLD AUTO: 2 % (ref 0–1)
BUN SERPL-MCNC: 61 MG/DL (ref 5–25)
CALCIUM SERPL-MCNC: 9.2 MG/DL (ref 8.3–10.1)
CHLORIDE SERPL-SCNC: 90 MMOL/L (ref 100–108)
CO2 SERPL-SCNC: 27 MMOL/L (ref 21–32)
CREAT SERPL-MCNC: 5.04 MG/DL (ref 0.6–1.3)
EOSINOPHIL # BLD AUTO: 0.54 THOUSAND/ΜL (ref 0–0.61)
EOSINOPHIL NFR BLD AUTO: 9 % (ref 0–6)
ERYTHROCYTE [DISTWIDTH] IN BLOOD BY AUTOMATED COUNT: 14.5 % (ref 11.6–15.1)
GFR SERPL CREATININE-BSD FRML MDRD: 11.6 ML/MIN/1.73SQ M
GLUCOSE SERPL-MCNC: 107 MG/DL (ref 65–140)
GLUCOSE SERPL-MCNC: 132 MG/DL (ref 65–140)
GLUCOSE SERPL-MCNC: 141 MG/DL (ref 65–140)
GLUCOSE SERPL-MCNC: 146 MG/DL (ref 65–140)
GLUCOSE SERPL-MCNC: 147 MG/DL (ref 65–140)
GLUCOSE SERPL-MCNC: 57 MG/DL (ref 65–140)
GLUCOSE SERPL-MCNC: 63 MG/DL (ref 65–140)
HCT VFR BLD AUTO: 28.4 % (ref 36.5–49.3)
HGB BLD-MCNC: 9.2 G/DL (ref 12–17)
INR PPP: 1.33 (ref 0.86–1.16)
LYMPHOCYTES # BLD AUTO: 1.28 THOUSANDS/ΜL (ref 0.6–4.47)
LYMPHOCYTES NFR BLD AUTO: 21 % (ref 14–44)
MCH RBC QN AUTO: 32.1 PG (ref 26.8–34.3)
MCHC RBC AUTO-ENTMCNC: 32.4 G/DL (ref 31.4–37.4)
MCV RBC AUTO: 99 FL (ref 82–98)
MONOCYTES # BLD AUTO: 0.68 THOUSAND/ΜL (ref 0.17–1.22)
MONOCYTES NFR BLD AUTO: 11 % (ref 4–12)
NEUTROPHILS # BLD AUTO: 3.37 THOUSANDS/ΜL (ref 1.85–7.62)
NEUTS SEG NFR BLD AUTO: 57 % (ref 43–75)
NRBC BLD AUTO-RTO: 0 /100 WBCS
PLATELET # BLD AUTO: 224 THOUSANDS/UL (ref 149–390)
PMV BLD AUTO: 10.2 FL (ref 8.9–12.7)
POTASSIUM SERPL-SCNC: 5.3 MMOL/L (ref 3.5–5.3)
PROTHROMBIN TIME: 16.6 SECONDS (ref 12.1–14.4)
RBC # BLD AUTO: 2.87 MILLION/UL (ref 3.88–5.62)
SODIUM SERPL-SCNC: 128 MMOL/L (ref 136–145)
T4 FREE SERPL-MCNC: 0.81 NG/DL (ref 0.76–1.46)
TSH SERPL DL<=0.05 MIU/L-ACNC: 21.3 UIU/ML (ref 0.36–3.74)
WBC # BLD AUTO: 6.01 THOUSAND/UL (ref 4.31–10.16)

## 2017-06-28 PROCEDURE — 84443 ASSAY THYROID STIM HORMONE: CPT | Performed by: INTERNAL MEDICINE

## 2017-06-28 PROCEDURE — 84439 ASSAY OF FREE THYROXINE: CPT | Performed by: INTERNAL MEDICINE

## 2017-06-28 PROCEDURE — 85610 PROTHROMBIN TIME: CPT | Performed by: HOSPITALIST

## 2017-06-28 PROCEDURE — 82948 REAGENT STRIP/BLOOD GLUCOSE: CPT

## 2017-06-28 PROCEDURE — 85025 COMPLETE CBC W/AUTO DIFF WBC: CPT | Performed by: INTERNAL MEDICINE

## 2017-06-28 PROCEDURE — 80048 BASIC METABOLIC PNL TOTAL CA: CPT | Performed by: INTERNAL MEDICINE

## 2017-06-28 RX ORDER — OXYCODONE HYDROCHLORIDE 5 MG/1
5 TABLET ORAL EVERY 6 HOURS PRN
Qty: 4 TABLET | Refills: 0 | Status: ON HOLD | OUTPATIENT
Start: 2017-06-28 | End: 2017-07-12

## 2017-06-28 RX ORDER — CIPROFLOXACIN 250 MG/1
250 TABLET, FILM COATED ORAL DAILY
Qty: 2 TABLET | Refills: 0
Start: 2017-06-28 | End: 2017-06-29

## 2017-06-28 RX ORDER — DEXTROSE MONOHYDRATE 25 G/50ML
INJECTION, SOLUTION INTRAVENOUS
Status: COMPLETED
Start: 2017-06-28 | End: 2017-06-28

## 2017-06-28 RX ADMIN — WARFARIN SODIUM 7.5 MG: 7.5 TABLET ORAL at 17:53

## 2017-06-28 RX ADMIN — LEVOTHYROXINE SODIUM 200 MCG: 100 TABLET ORAL at 05:11

## 2017-06-28 RX ADMIN — VANCOMYCIN HYDROCHLORIDE 250 MG: 5 INJECTION, POWDER, LYOPHILIZED, FOR SOLUTION INTRAVENOUS at 05:11

## 2017-06-28 RX ADMIN — CITALOPRAM HYDROBROMIDE 10 MG: 10 TABLET ORAL at 08:43

## 2017-06-28 RX ADMIN — INSULIN LISPRO 4 UNITS: 100 INJECTION, SOLUTION INTRAVENOUS; SUBCUTANEOUS at 08:07

## 2017-06-28 RX ADMIN — ANORECTAL OINTMENT: 15.7; .44; 24; 20.6 OINTMENT TOPICAL at 17:52

## 2017-06-28 RX ADMIN — DEXTROSE MONOHYDRATE 25 ML: 500 INJECTION PARENTERAL at 15:43

## 2017-06-28 RX ADMIN — VANCOMYCIN HYDROCHLORIDE 250 MG: 5 INJECTION, POWDER, LYOPHILIZED, FOR SOLUTION INTRAVENOUS at 17:52

## 2017-06-28 RX ADMIN — OXYCODONE HYDROCHLORIDE 5 MG: 5 TABLET ORAL at 15:45

## 2017-06-28 RX ADMIN — VANCOMYCIN HYDROCHLORIDE 250 MG: 5 INJECTION, POWDER, LYOPHILIZED, FOR SOLUTION INTRAVENOUS at 15:08

## 2017-06-28 RX ADMIN — INSULIN DETEMIR 8 UNITS: 100 INJECTION, SOLUTION SUBCUTANEOUS at 17:52

## 2017-06-28 RX ADMIN — OMEPRAZOLE MAGNESIUM 20 MG: 20 CAPSULE, DELAYED RELEASE ORAL at 08:43

## 2017-06-28 RX ADMIN — INSULIN DETEMIR 8 UNITS: 100 INJECTION, SOLUTION SUBCUTANEOUS at 05:11

## 2017-07-07 ENCOUNTER — APPOINTMENT (INPATIENT)
Dept: RADIOLOGY | Facility: HOSPITAL | Age: 67
DRG: 070 | End: 2017-07-07
Payer: COMMERCIAL

## 2017-07-07 ENCOUNTER — HOSPITAL ENCOUNTER (INPATIENT)
Facility: HOSPITAL | Age: 67
LOS: 5 days | Discharge: NURSING, REHAB OR LONG-TERM CARE | DRG: 070 | End: 2017-07-12
Attending: EMERGENCY MEDICINE | Admitting: INTERNAL MEDICINE
Payer: COMMERCIAL

## 2017-07-07 ENCOUNTER — APPOINTMENT (EMERGENCY)
Dept: CT IMAGING | Facility: HOSPITAL | Age: 67
DRG: 070 | End: 2017-07-07
Payer: COMMERCIAL

## 2017-07-07 DIAGNOSIS — S81.801D WOUND OF RIGHT LEG, SUBSEQUENT ENCOUNTER: ICD-10-CM

## 2017-07-07 DIAGNOSIS — R41.89 UNRESPONSIVE EPISODE: Primary | ICD-10-CM

## 2017-07-07 DIAGNOSIS — R10.13 EPIGASTRIC PAIN: Chronic | ICD-10-CM

## 2017-07-07 DIAGNOSIS — S82.409A FIBULA FRACTURE: ICD-10-CM

## 2017-07-07 DIAGNOSIS — A04.72 C. DIFFICILE COLITIS: ICD-10-CM

## 2017-07-07 DIAGNOSIS — Z99.2 ESRD (END STAGE RENAL DISEASE) ON DIALYSIS (HCC): ICD-10-CM

## 2017-07-07 DIAGNOSIS — R74.01 TRANSAMINITIS: ICD-10-CM

## 2017-07-07 DIAGNOSIS — N18.6 ESRD (END STAGE RENAL DISEASE) ON DIALYSIS (HCC): ICD-10-CM

## 2017-07-07 PROBLEM — R55 SYNCOPE: Status: ACTIVE | Noted: 2017-07-07

## 2017-07-07 LAB
ALBUMIN SERPL BCP-MCNC: 2.7 G/DL (ref 3.5–5)
ALP SERPL-CCNC: 311 U/L (ref 46–116)
ALT SERPL W P-5'-P-CCNC: 47 U/L (ref 12–78)
AMMONIA PLAS-SCNC: <10 UMOL/L (ref 11–35)
ANION GAP SERPL CALCULATED.3IONS-SCNC: 10 MMOL/L (ref 4–13)
APTT PPP: 109 SECONDS (ref 23–35)
ARTERIAL PATENCY WRIST A: ABNORMAL
AST SERPL W P-5'-P-CCNC: 73 U/L (ref 5–45)
ATRIAL RATE: 63 BPM
BASE EXCESS BLDA CALC-SCNC: 5 MMOL/L (ref -2–3)
BASOPHILS # BLD AUTO: 0.13 THOUSANDS/ΜL (ref 0–0.1)
BASOPHILS NFR BLD AUTO: 1 % (ref 0–1)
BILIRUB DIRECT SERPL-MCNC: 0.22 MG/DL (ref 0–0.2)
BILIRUB SERPL-MCNC: 0.5 MG/DL (ref 0.2–1)
BUN SERPL-MCNC: 67 MG/DL (ref 5–25)
CA-I BLD-SCNC: 1.23 MMOL/L (ref 1.12–1.32)
CALCIUM SERPL-MCNC: 9.6 MG/DL (ref 8.3–10.1)
CHLORIDE SERPL-SCNC: 90 MMOL/L (ref 100–108)
CO2 SERPL-SCNC: 29 MMOL/L (ref 21–32)
CREAT SERPL-MCNC: 4.88 MG/DL (ref 0.6–1.3)
EOSINOPHIL # BLD AUTO: 0.17 THOUSAND/ΜL (ref 0–0.61)
EOSINOPHIL NFR BLD AUTO: 2 % (ref 0–6)
ERYTHROCYTE [DISTWIDTH] IN BLOOD BY AUTOMATED COUNT: 14 % (ref 11.6–15.1)
FIO2 GAS DIL.REBREATH: 35 L
GFR SERPL CREATININE-BSD FRML MDRD: 12.1 ML/MIN/1.73SQ M
GLUCOSE SERPL-MCNC: 147 MG/DL (ref 65–140)
GLUCOSE SERPL-MCNC: 207 MG/DL (ref 65–140)
GLUCOSE SERPL-MCNC: 79 MG/DL (ref 65–140)
HCO3 BLDA-SCNC: 30.6 MMOL/L (ref 22–28)
HCT VFR BLD AUTO: 26.1 % (ref 36.5–49.3)
HCT VFR BLD CALC: 25 % (ref 36.5–49.3)
HGB BLD-MCNC: 8.2 G/DL (ref 12–17)
HGB BLDA-MCNC: 8.5 G/DL (ref 12–17)
INR PPP: 2.06 (ref 0.86–1.16)
LACTATE SERPL-SCNC: 2.3 MMOL/L (ref 0.5–2)
LIPASE SERPL-CCNC: 63 U/L (ref 73–393)
LYMPHOCYTES # BLD AUTO: 1.09 THOUSANDS/ΜL (ref 0.6–4.47)
LYMPHOCYTES NFR BLD AUTO: 11 % (ref 14–44)
MCH RBC QN AUTO: 31.2 PG (ref 26.8–34.3)
MCHC RBC AUTO-ENTMCNC: 31.4 G/DL (ref 31.4–37.4)
MCV RBC AUTO: 99 FL (ref 82–98)
MONOCYTES # BLD AUTO: 1.13 THOUSAND/ΜL (ref 0.17–1.22)
MONOCYTES NFR BLD AUTO: 11 % (ref 4–12)
NEUTROPHILS # BLD AUTO: 7.44 THOUSANDS/ΜL (ref 1.85–7.62)
NEUTS SEG NFR BLD AUTO: 75 % (ref 43–75)
P AXIS: -67 DEGREES
PCO2 BLD: 32 MMOL/L (ref 21–32)
PCO2 BLD: 50.1 MM HG (ref 36–44)
PH BLD: 7.39 [PH] (ref 7.35–7.45)
PLATELET # BLD AUTO: 261 THOUSANDS/UL (ref 149–390)
PMV BLD AUTO: 9.7 FL (ref 8.9–12.7)
PO2 BLD: 78 MM HG (ref 75–129)
POTASSIUM BLD-SCNC: 4.1 MMOL/L (ref 3.5–5.3)
POTASSIUM SERPL-SCNC: 4 MMOL/L (ref 3.5–5.3)
PR INTERVAL: 184 MS
PROT SERPL-MCNC: 8.4 G/DL (ref 6.4–8.2)
PROTHROMBIN TIME: 24 SECONDS (ref 12.1–14.4)
QRS AXIS: -10 DEGREES
QRSD INTERVAL: 96 MS
QT INTERVAL: 406 MS
QTC INTERVAL: 415 MS
RBC # BLD AUTO: 2.63 MILLION/UL (ref 3.88–5.62)
SAMPLE SITE: 4
SAO2 % BLD FROM PO2: 95 % (ref 95–98)
SODIUM BLD-SCNC: 127 MMOL/L (ref 136–145)
SODIUM SERPL-SCNC: 129 MMOL/L (ref 136–145)
SPECIMEN SOURCE: ABNORMAL
T WAVE AXIS: 255 DEGREES
TROPONIN I SERPL-MCNC: 0.04 NG/ML
VENTRICULAR RATE: 63 BPM
WBC # BLD AUTO: 9.96 THOUSAND/UL (ref 4.31–10.16)

## 2017-07-07 PROCEDURE — 85025 COMPLETE CBC W/AUTO DIFF WBC: CPT | Performed by: EMERGENCY MEDICINE

## 2017-07-07 PROCEDURE — 82330 ASSAY OF CALCIUM: CPT

## 2017-07-07 PROCEDURE — 84132 ASSAY OF SERUM POTASSIUM: CPT

## 2017-07-07 PROCEDURE — 84295 ASSAY OF SERUM SODIUM: CPT

## 2017-07-07 PROCEDURE — 82803 BLOOD GASES ANY COMBINATION: CPT

## 2017-07-07 PROCEDURE — 85730 THROMBOPLASTIN TIME PARTIAL: CPT | Performed by: EMERGENCY MEDICINE

## 2017-07-07 PROCEDURE — 71010 HB CHEST X-RAY 1 VIEW FRONTAL (PORTABLE): CPT

## 2017-07-07 PROCEDURE — 82947 ASSAY GLUCOSE BLOOD QUANT: CPT

## 2017-07-07 PROCEDURE — 36600 WITHDRAWAL OF ARTERIAL BLOOD: CPT

## 2017-07-07 PROCEDURE — 99285 EMERGENCY DEPT VISIT HI MDM: CPT

## 2017-07-07 PROCEDURE — 36415 COLL VENOUS BLD VENIPUNCTURE: CPT | Performed by: EMERGENCY MEDICINE

## 2017-07-07 PROCEDURE — 80076 HEPATIC FUNCTION PANEL: CPT | Performed by: EMERGENCY MEDICINE

## 2017-07-07 PROCEDURE — 82140 ASSAY OF AMMONIA: CPT | Performed by: EMERGENCY MEDICINE

## 2017-07-07 PROCEDURE — 85014 HEMATOCRIT: CPT

## 2017-07-07 PROCEDURE — 70450 CT HEAD/BRAIN W/O DYE: CPT

## 2017-07-07 PROCEDURE — 83605 ASSAY OF LACTIC ACID: CPT | Performed by: EMERGENCY MEDICINE

## 2017-07-07 PROCEDURE — 84484 ASSAY OF TROPONIN QUANT: CPT | Performed by: EMERGENCY MEDICINE

## 2017-07-07 PROCEDURE — 80048 BASIC METABOLIC PNL TOTAL CA: CPT | Performed by: EMERGENCY MEDICINE

## 2017-07-07 PROCEDURE — 83690 ASSAY OF LIPASE: CPT | Performed by: EMERGENCY MEDICINE

## 2017-07-07 PROCEDURE — 85610 PROTHROMBIN TIME: CPT | Performed by: EMERGENCY MEDICINE

## 2017-07-07 PROCEDURE — 82948 REAGENT STRIP/BLOOD GLUCOSE: CPT

## 2017-07-07 PROCEDURE — 93005 ELECTROCARDIOGRAM TRACING: CPT | Performed by: EMERGENCY MEDICINE

## 2017-07-07 RX ORDER — LANOLIN ALCOHOL/MO/W.PET/CERES
CREAM (GRAM) TOPICAL 2 TIMES DAILY
Status: DISCONTINUED | OUTPATIENT
Start: 2017-07-07 | End: 2017-07-12 | Stop reason: HOSPADM

## 2017-07-07 RX ORDER — WARFARIN SODIUM 5 MG/1
5 TABLET ORAL
Status: DISCONTINUED | OUTPATIENT
Start: 2017-07-07 | End: 2017-07-12 | Stop reason: HOSPADM

## 2017-07-07 RX ORDER — GABAPENTIN 100 MG/1
100 CAPSULE ORAL
Status: DISCONTINUED | OUTPATIENT
Start: 2017-07-08 | End: 2017-07-08

## 2017-07-07 RX ORDER — TEMAZEPAM 15 MG/1
15 CAPSULE ORAL
Status: DISCONTINUED | OUTPATIENT
Start: 2017-07-07 | End: 2017-07-09

## 2017-07-07 RX ORDER — LISINOPRIL 2.5 MG/1
2.5 TABLET ORAL DAILY
Status: DISCONTINUED | OUTPATIENT
Start: 2017-07-08 | End: 2017-07-08

## 2017-07-07 RX ORDER — METOPROLOL TARTRATE 100 MG/1
100 TABLET ORAL 2 TIMES DAILY
Status: DISCONTINUED | OUTPATIENT
Start: 2017-07-07 | End: 2017-07-10

## 2017-07-07 RX ORDER — INSULIN GLARGINE 100 [IU]/ML
5 INJECTION, SOLUTION SUBCUTANEOUS EVERY 12 HOURS SCHEDULED
Status: DISCONTINUED | OUTPATIENT
Start: 2017-07-07 | End: 2017-07-12 | Stop reason: HOSPADM

## 2017-07-07 RX ORDER — ONDANSETRON 2 MG/ML
INJECTION INTRAMUSCULAR; INTRAVENOUS
Status: COMPLETED
Start: 2017-07-07 | End: 2017-07-07

## 2017-07-07 RX ORDER — PANTOPRAZOLE SODIUM 40 MG/1
40 TABLET, DELAYED RELEASE ORAL
Status: DISCONTINUED | OUTPATIENT
Start: 2017-07-08 | End: 2017-07-12 | Stop reason: HOSPADM

## 2017-07-07 RX ORDER — DEXTROSE AND SODIUM CHLORIDE 5; .9 G/100ML; G/100ML
50 INJECTION, SOLUTION INTRAVENOUS ONCE
Status: COMPLETED | OUTPATIENT
Start: 2017-07-07 | End: 2017-07-07

## 2017-07-07 RX ORDER — OXYCODONE HYDROCHLORIDE 5 MG/1
5 TABLET ORAL EVERY 6 HOURS PRN
Status: ACTIVE | OUTPATIENT
Start: 2017-07-07 | End: 2017-07-08

## 2017-07-07 RX ORDER — HEPARIN SODIUM 5000 [USP'U]/ML
5000 INJECTION, SOLUTION INTRAVENOUS; SUBCUTANEOUS EVERY 8 HOURS SCHEDULED
Status: DISCONTINUED | OUTPATIENT
Start: 2017-07-07 | End: 2017-07-10

## 2017-07-07 RX ORDER — ONDANSETRON 2 MG/ML
4 INJECTION INTRAMUSCULAR; INTRAVENOUS EVERY 6 HOURS PRN
Status: DISCONTINUED | OUTPATIENT
Start: 2017-07-07 | End: 2017-07-12 | Stop reason: HOSPADM

## 2017-07-07 RX ORDER — ALBUTEROL SULFATE 2.5 MG/3ML
2.5 SOLUTION RESPIRATORY (INHALATION) EVERY 4 HOURS PRN
Status: DISCONTINUED | OUTPATIENT
Start: 2017-07-07 | End: 2017-07-10

## 2017-07-07 RX ORDER — SUCRALFATE ORAL 1 G/10ML
1000 SUSPENSION ORAL 4 TIMES DAILY
Status: DISCONTINUED | OUTPATIENT
Start: 2017-07-07 | End: 2017-07-12 | Stop reason: HOSPADM

## 2017-07-07 RX ORDER — LEVOTHYROXINE SODIUM 0.1 MG/1
200 TABLET ORAL EVERY OTHER DAY
Status: DISCONTINUED | OUTPATIENT
Start: 2017-07-08 | End: 2017-07-12 | Stop reason: HOSPADM

## 2017-07-07 RX ORDER — NICOTINE 21 MG/24HR
1 PATCH, TRANSDERMAL 24 HOURS TRANSDERMAL EVERY 24 HOURS
Status: DISCONTINUED | OUTPATIENT
Start: 2017-07-07 | End: 2017-07-12 | Stop reason: HOSPADM

## 2017-07-07 RX ORDER — DIPHENOXYLATE HYDROCHLORIDE AND ATROPINE SULFATE 2.5; .025 MG/1; MG/1
1 TABLET ORAL EVERY 6 HOURS PRN
Status: DISCONTINUED | OUTPATIENT
Start: 2017-07-07 | End: 2017-07-12 | Stop reason: HOSPADM

## 2017-07-07 RX ORDER — ECHINACEA PURPUREA EXTRACT 125 MG
1 TABLET ORAL AS NEEDED
Status: DISCONTINUED | OUTPATIENT
Start: 2017-07-07 | End: 2017-07-12 | Stop reason: HOSPADM

## 2017-07-07 RX ORDER — ONDANSETRON HYDROCHLORIDE 4 MG/5ML
4 SOLUTION ORAL EVERY 4 HOURS PRN
Status: DISCONTINUED | OUTPATIENT
Start: 2017-07-07 | End: 2017-07-12 | Stop reason: HOSPADM

## 2017-07-07 RX ORDER — CITALOPRAM 20 MG/1
10 TABLET ORAL DAILY
Status: DISCONTINUED | OUTPATIENT
Start: 2017-07-08 | End: 2017-07-12 | Stop reason: HOSPADM

## 2017-07-07 RX ADMIN — INSULIN GLARGINE 5 UNITS: 100 INJECTION, SOLUTION SUBCUTANEOUS at 23:56

## 2017-07-07 RX ADMIN — ANORECTAL OINTMENT: 15.7; .44; 24; 20.6 OINTMENT TOPICAL at 23:54

## 2017-07-07 RX ADMIN — WARFARIN SODIUM 5 MG: 5 TABLET ORAL at 23:54

## 2017-07-07 RX ADMIN — ONDANSETRON 4 MG: 2 INJECTION INTRAMUSCULAR; INTRAVENOUS at 20:21

## 2017-07-07 RX ADMIN — DEXTROSE AND SODIUM CHLORIDE 50 ML/HR: 5; .9 INJECTION, SOLUTION INTRAVENOUS at 23:55

## 2017-07-07 RX ADMIN — HEPARIN SODIUM 5000 UNITS: 5000 INJECTION, SOLUTION INTRAVENOUS; SUBCUTANEOUS at 23:54

## 2017-07-07 RX ADMIN — SUCRALFATE 1000 MG: 1 SUSPENSION ORAL at 23:53

## 2017-07-08 ENCOUNTER — APPOINTMENT (INPATIENT)
Dept: DIALYSIS | Facility: HOSPITAL | Age: 67
DRG: 070 | End: 2017-07-08
Payer: COMMERCIAL

## 2017-07-08 LAB
ALBUMIN SERPL BCP-MCNC: 2.1 G/DL (ref 3.5–5)
ALP SERPL-CCNC: 230 U/L (ref 46–116)
ALT SERPL W P-5'-P-CCNC: 34 U/L (ref 12–78)
ANION GAP SERPL CALCULATED.3IONS-SCNC: 11 MMOL/L (ref 4–13)
AST SERPL W P-5'-P-CCNC: 47 U/L (ref 5–45)
BILIRUB SERPL-MCNC: 0.5 MG/DL (ref 0.2–1)
BUN SERPL-MCNC: 74 MG/DL (ref 5–25)
CALCIUM SERPL-MCNC: 9.2 MG/DL (ref 8.3–10.1)
CHLORIDE SERPL-SCNC: 91 MMOL/L (ref 100–108)
CO2 SERPL-SCNC: 28 MMOL/L (ref 21–32)
CREAT SERPL-MCNC: 5.52 MG/DL (ref 0.6–1.3)
ERYTHROCYTE [DISTWIDTH] IN BLOOD BY AUTOMATED COUNT: 13.7 % (ref 11.6–15.1)
GFR SERPL CREATININE-BSD FRML MDRD: 10.5 ML/MIN/1.73SQ M
GLUCOSE SERPL-MCNC: 172 MG/DL (ref 65–140)
GLUCOSE SERPL-MCNC: 183 MG/DL (ref 65–140)
GLUCOSE SERPL-MCNC: 218 MG/DL (ref 65–140)
GLUCOSE SERPL-MCNC: 49 MG/DL (ref 65–140)
GLUCOSE SERPL-MCNC: 76 MG/DL (ref 65–140)
GLUCOSE SERPL-MCNC: 93 MG/DL (ref 65–140)
HCT VFR BLD AUTO: 25.9 % (ref 36.5–49.3)
HGB BLD-MCNC: 8 G/DL (ref 12–17)
MAGNESIUM SERPL-MCNC: 2.3 MG/DL (ref 1.6–2.6)
MCH RBC QN AUTO: 30.5 PG (ref 26.8–34.3)
MCHC RBC AUTO-ENTMCNC: 30.9 G/DL (ref 31.4–37.4)
MCV RBC AUTO: 99 FL (ref 82–98)
PHOSPHATE SERPL-MCNC: 6.3 MG/DL (ref 2.3–4.1)
PLATELET # BLD AUTO: 233 THOUSANDS/UL (ref 149–390)
PLATELET # BLD AUTO: 240 THOUSANDS/UL (ref 149–390)
PMV BLD AUTO: 9.6 FL (ref 8.9–12.7)
PMV BLD AUTO: 9.8 FL (ref 8.9–12.7)
POTASSIUM SERPL-SCNC: 5 MMOL/L (ref 3.5–5.3)
PROT SERPL-MCNC: 7.2 G/DL (ref 6.4–8.2)
RBC # BLD AUTO: 2.62 MILLION/UL (ref 3.88–5.62)
SODIUM SERPL-SCNC: 130 MMOL/L (ref 136–145)
TROPONIN I SERPL-MCNC: 0.04 NG/ML
TSH SERPL DL<=0.05 MIU/L-ACNC: 9.34 UIU/ML (ref 0.36–3.74)
WBC # BLD AUTO: 7.96 THOUSAND/UL (ref 4.31–10.16)

## 2017-07-08 PROCEDURE — 82948 REAGENT STRIP/BLOOD GLUCOSE: CPT

## 2017-07-08 PROCEDURE — 85049 AUTOMATED PLATELET COUNT: CPT | Performed by: INTERNAL MEDICINE

## 2017-07-08 PROCEDURE — 80053 COMPREHEN METABOLIC PANEL: CPT | Performed by: INTERNAL MEDICINE

## 2017-07-08 PROCEDURE — 85027 COMPLETE CBC AUTOMATED: CPT | Performed by: INTERNAL MEDICINE

## 2017-07-08 PROCEDURE — 82947 ASSAY GLUCOSE BLOOD QUANT: CPT

## 2017-07-08 PROCEDURE — 83735 ASSAY OF MAGNESIUM: CPT | Performed by: INTERNAL MEDICINE

## 2017-07-08 PROCEDURE — 85014 HEMATOCRIT: CPT

## 2017-07-08 PROCEDURE — 82803 BLOOD GASES ANY COMBINATION: CPT

## 2017-07-08 PROCEDURE — 84295 ASSAY OF SERUM SODIUM: CPT

## 2017-07-08 PROCEDURE — 84100 ASSAY OF PHOSPHORUS: CPT | Performed by: INTERNAL MEDICINE

## 2017-07-08 PROCEDURE — 82330 ASSAY OF CALCIUM: CPT

## 2017-07-08 PROCEDURE — 84484 ASSAY OF TROPONIN QUANT: CPT | Performed by: PHYSICIAN ASSISTANT

## 2017-07-08 PROCEDURE — 84132 ASSAY OF SERUM POTASSIUM: CPT

## 2017-07-08 PROCEDURE — 84443 ASSAY THYROID STIM HORMONE: CPT | Performed by: INTERNAL MEDICINE

## 2017-07-08 RX ORDER — MIDODRINE HYDROCHLORIDE 5 MG/1
5 TABLET ORAL
Status: DISCONTINUED | OUTPATIENT
Start: 2017-07-08 | End: 2017-07-12 | Stop reason: HOSPADM

## 2017-07-08 RX ORDER — DEXTROSE MONOHYDRATE 25 G/50ML
INJECTION, SOLUTION INTRAVENOUS
Status: COMPLETED
Start: 2017-07-08 | End: 2017-07-08

## 2017-07-08 RX ADMIN — INSULIN GLARGINE 5 UNITS: 100 INJECTION, SOLUTION SUBCUTANEOUS at 22:15

## 2017-07-08 RX ADMIN — VANCOMYCIN HYDROCHLORIDE 250 MG: 500 INJECTION, POWDER, LYOPHILIZED, FOR SOLUTION INTRAVENOUS at 01:11

## 2017-07-08 RX ADMIN — WARFARIN SODIUM 5 MG: 5 TABLET ORAL at 17:53

## 2017-07-08 RX ADMIN — INSULIN GLARGINE 5 UNITS: 100 INJECTION, SOLUTION SUBCUTANEOUS at 10:25

## 2017-07-08 RX ADMIN — METOPROLOL TARTRATE 100 MG: 100 TABLET ORAL at 10:24

## 2017-07-08 RX ADMIN — LEVOTHYROXINE SODIUM 200 MCG: 100 TABLET ORAL at 06:30

## 2017-07-08 RX ADMIN — Medication: at 17:55

## 2017-07-08 RX ADMIN — HEPARIN SODIUM 5000 UNITS: 5000 INJECTION, SOLUTION INTRAVENOUS; SUBCUTANEOUS at 22:15

## 2017-07-08 RX ADMIN — SUCRALFATE 1000 MG: 1 SUSPENSION ORAL at 17:48

## 2017-07-08 RX ADMIN — Medication: at 10:25

## 2017-07-08 RX ADMIN — ANORECTAL OINTMENT: 15.7; .44; 24; 20.6 OINTMENT TOPICAL at 22:16

## 2017-07-08 RX ADMIN — ANORECTAL OINTMENT: 15.7; .44; 24; 20.6 OINTMENT TOPICAL at 10:31

## 2017-07-08 RX ADMIN — HEPARIN SODIUM 5000 UNITS: 5000 INJECTION, SOLUTION INTRAVENOUS; SUBCUTANEOUS at 06:27

## 2017-07-08 RX ADMIN — VANCOMYCIN HYDROCHLORIDE 250 MG: 500 INJECTION, POWDER, LYOPHILIZED, FOR SOLUTION INTRAVENOUS at 13:02

## 2017-07-08 RX ADMIN — VANCOMYCIN HYDROCHLORIDE 250 MG: 500 INJECTION, POWDER, LYOPHILIZED, FOR SOLUTION INTRAVENOUS at 18:00

## 2017-07-08 RX ADMIN — INSULIN LISPRO 1 UNITS: 100 INJECTION, SOLUTION INTRAVENOUS; SUBCUTANEOUS at 10:31

## 2017-07-08 RX ADMIN — MIDODRINE HYDROCHLORIDE 5 MG: 5 TABLET ORAL at 14:36

## 2017-07-08 RX ADMIN — INSULIN LISPRO 1 UNITS: 100 INJECTION, SOLUTION INTRAVENOUS; SUBCUTANEOUS at 17:51

## 2017-07-08 RX ADMIN — CITALOPRAM HYDROBROMIDE: 20 TABLET ORAL at 10:24

## 2017-07-08 RX ADMIN — SUCRALFATE 1000 MG: 1 SUSPENSION ORAL at 22:15

## 2017-07-08 RX ADMIN — INSULIN LISPRO 1 UNITS: 100 INJECTION, SOLUTION INTRAVENOUS; SUBCUTANEOUS at 12:59

## 2017-07-08 RX ADMIN — SUCRALFATE 1000 MG: 1 SUSPENSION ORAL at 13:02

## 2017-07-08 RX ADMIN — DEXTROSE MONOHYDRATE 50 ML: 25 INJECTION, SOLUTION INTRAVENOUS at 21:49

## 2017-07-08 RX ADMIN — VANCOMYCIN HYDROCHLORIDE 250 MG: 500 INJECTION, POWDER, LYOPHILIZED, FOR SOLUTION INTRAVENOUS at 06:27

## 2017-07-08 RX ADMIN — SUCRALFATE 1000 MG: 1 SUSPENSION ORAL at 10:25

## 2017-07-08 RX ADMIN — NICOTINE 1 PATCH: 14 PATCH TRANSDERMAL at 22:15

## 2017-07-08 RX ADMIN — ANORECTAL OINTMENT: 15.7; .44; 24; 20.6 OINTMENT TOPICAL at 17:49

## 2017-07-08 RX ADMIN — HEPARIN SODIUM 5000 UNITS: 5000 INJECTION, SOLUTION INTRAVENOUS; SUBCUTANEOUS at 15:15

## 2017-07-08 RX ADMIN — METOPROLOL TARTRATE 100 MG: 100 TABLET ORAL at 17:49

## 2017-07-09 ENCOUNTER — APPOINTMENT (INPATIENT)
Dept: RADIOLOGY | Facility: HOSPITAL | Age: 67
DRG: 070 | End: 2017-07-09
Payer: COMMERCIAL

## 2017-07-09 LAB
AMMONIA PLAS-SCNC: 12 UMOL/L (ref 11–35)
ANION GAP SERPL CALCULATED.3IONS-SCNC: 9 MMOL/L (ref 4–13)
BASOPHILS # BLD AUTO: 0.05 THOUSANDS/ΜL (ref 0–0.1)
BASOPHILS NFR BLD AUTO: 1 % (ref 0–1)
BUN SERPL-MCNC: 41 MG/DL (ref 5–25)
CALCIUM SERPL-MCNC: 9 MG/DL (ref 8.3–10.1)
CHLORIDE SERPL-SCNC: 97 MMOL/L (ref 100–108)
CO2 SERPL-SCNC: 26 MMOL/L (ref 21–32)
CREAT SERPL-MCNC: 4.2 MG/DL (ref 0.6–1.3)
EOSINOPHIL # BLD AUTO: 0.12 THOUSAND/ΜL (ref 0–0.61)
EOSINOPHIL NFR BLD AUTO: 2 % (ref 0–6)
ERYTHROCYTE [DISTWIDTH] IN BLOOD BY AUTOMATED COUNT: 13.9 % (ref 11.6–15.1)
GFR SERPL CREATININE-BSD FRML MDRD: 14.4 ML/MIN/1.73SQ M
GLUCOSE SERPL-MCNC: 129 MG/DL (ref 65–140)
GLUCOSE SERPL-MCNC: 158 MG/DL (ref 65–140)
GLUCOSE SERPL-MCNC: 167 MG/DL (ref 65–140)
GLUCOSE SERPL-MCNC: 175 MG/DL (ref 65–140)
GLUCOSE SERPL-MCNC: 222 MG/DL (ref 65–140)
GLUCOSE SERPL-MCNC: 309 MG/DL (ref 65–140)
HCT VFR BLD AUTO: 27.8 % (ref 36.5–49.3)
HGB BLD-MCNC: 8.6 G/DL (ref 12–17)
LYMPHOCYTES # BLD AUTO: 0.79 THOUSANDS/ΜL (ref 0.6–4.47)
LYMPHOCYTES NFR BLD AUTO: 13 % (ref 14–44)
MCH RBC QN AUTO: 31.2 PG (ref 26.8–34.3)
MCHC RBC AUTO-ENTMCNC: 30.9 G/DL (ref 31.4–37.4)
MCV RBC AUTO: 101 FL (ref 82–98)
MONOCYTES # BLD AUTO: 0.55 THOUSAND/ΜL (ref 0.17–1.22)
MONOCYTES NFR BLD AUTO: 9 % (ref 4–12)
NEUTROPHILS # BLD AUTO: 4.65 THOUSANDS/ΜL (ref 1.85–7.62)
NEUTS SEG NFR BLD AUTO: 75 % (ref 43–75)
PLATELET # BLD AUTO: 266 THOUSANDS/UL (ref 149–390)
PMV BLD AUTO: 9.4 FL (ref 8.9–12.7)
POTASSIUM SERPL-SCNC: 4 MMOL/L (ref 3.5–5.3)
RBC # BLD AUTO: 2.76 MILLION/UL (ref 3.88–5.62)
SODIUM SERPL-SCNC: 132 MMOL/L (ref 136–145)
WBC # BLD AUTO: 6.16 THOUSAND/UL (ref 4.31–10.16)

## 2017-07-09 PROCEDURE — 82948 REAGENT STRIP/BLOOD GLUCOSE: CPT

## 2017-07-09 PROCEDURE — 80048 BASIC METABOLIC PNL TOTAL CA: CPT | Performed by: INTERNAL MEDICINE

## 2017-07-09 PROCEDURE — G8979 MOBILITY GOAL STATUS: HCPCS

## 2017-07-09 PROCEDURE — G8978 MOBILITY CURRENT STATUS: HCPCS

## 2017-07-09 PROCEDURE — 85025 COMPLETE CBC W/AUTO DIFF WBC: CPT | Performed by: INTERNAL MEDICINE

## 2017-07-09 PROCEDURE — 73560 X-RAY EXAM OF KNEE 1 OR 2: CPT

## 2017-07-09 PROCEDURE — 97163 PT EVAL HIGH COMPLEX 45 MIN: CPT

## 2017-07-09 PROCEDURE — 97110 THERAPEUTIC EXERCISES: CPT

## 2017-07-09 PROCEDURE — 82140 ASSAY OF AMMONIA: CPT | Performed by: INTERNAL MEDICINE

## 2017-07-09 RX ORDER — OXYCODONE HYDROCHLORIDE 5 MG/1
2.5 TABLET ORAL ONCE
Status: COMPLETED | OUTPATIENT
Start: 2017-07-09 | End: 2017-07-09

## 2017-07-09 RX ORDER — SEVELAMER HYDROCHLORIDE 800 MG/1
800 TABLET, FILM COATED ORAL
Status: DISCONTINUED | OUTPATIENT
Start: 2017-07-09 | End: 2017-07-12 | Stop reason: HOSPADM

## 2017-07-09 RX ORDER — QUETIAPINE FUMARATE 25 MG/1
12.5 TABLET, FILM COATED ORAL
Status: DISCONTINUED | OUTPATIENT
Start: 2017-07-09 | End: 2017-07-11

## 2017-07-09 RX ADMIN — RENAGEL 800 MG: 800 TABLET ORAL at 09:41

## 2017-07-09 RX ADMIN — HEPARIN SODIUM 5000 UNITS: 5000 INJECTION, SOLUTION INTRAVENOUS; SUBCUTANEOUS at 15:38

## 2017-07-09 RX ADMIN — QUETIAPINE FUMARATE 12.5 MG: 25 TABLET, FILM COATED ORAL at 21:47

## 2017-07-09 RX ADMIN — SUCRALFATE 1000 MG: 1 SUSPENSION ORAL at 17:19

## 2017-07-09 RX ADMIN — SUCRALFATE 1000 MG: 1 SUSPENSION ORAL at 12:02

## 2017-07-09 RX ADMIN — CITALOPRAM HYDROBROMIDE 10 MG: 20 TABLET ORAL at 09:23

## 2017-07-09 RX ADMIN — RENAGEL 800 MG: 800 TABLET ORAL at 17:19

## 2017-07-09 RX ADMIN — VANCOMYCIN HYDROCHLORIDE 250 MG: 500 INJECTION, POWDER, LYOPHILIZED, FOR SOLUTION INTRAVENOUS at 17:19

## 2017-07-09 RX ADMIN — SUCRALFATE 1000 MG: 1 SUSPENSION ORAL at 09:41

## 2017-07-09 RX ADMIN — INSULIN GLARGINE 5 UNITS: 100 INJECTION, SOLUTION SUBCUTANEOUS at 21:52

## 2017-07-09 RX ADMIN — ANORECTAL OINTMENT: 15.7; .44; 24; 20.6 OINTMENT TOPICAL at 21:52

## 2017-07-09 RX ADMIN — METOPROLOL TARTRATE 100 MG: 100 TABLET ORAL at 09:23

## 2017-07-09 RX ADMIN — HEPARIN SODIUM 5000 UNITS: 5000 INJECTION, SOLUTION INTRAVENOUS; SUBCUTANEOUS at 21:41

## 2017-07-09 RX ADMIN — Medication: at 09:27

## 2017-07-09 RX ADMIN — INSULIN LISPRO 1 UNITS: 100 INJECTION, SOLUTION INTRAVENOUS; SUBCUTANEOUS at 09:24

## 2017-07-09 RX ADMIN — ANORECTAL OINTMENT: 15.7; .44; 24; 20.6 OINTMENT TOPICAL at 17:19

## 2017-07-09 RX ADMIN — Medication: at 17:20

## 2017-07-09 RX ADMIN — ANORECTAL OINTMENT: 15.7; .44; 24; 20.6 OINTMENT TOPICAL at 09:27

## 2017-07-09 RX ADMIN — INSULIN GLARGINE 5 UNITS: 100 INJECTION, SOLUTION SUBCUTANEOUS at 09:24

## 2017-07-09 RX ADMIN — ONDANSETRON 4 MG: 2 INJECTION INTRAMUSCULAR; INTRAVENOUS at 21:41

## 2017-07-09 RX ADMIN — METOPROLOL TARTRATE 100 MG: 100 TABLET ORAL at 17:19

## 2017-07-09 RX ADMIN — RENAGEL 800 MG: 800 TABLET ORAL at 12:02

## 2017-07-09 RX ADMIN — VANCOMYCIN HYDROCHLORIDE 250 MG: 500 INJECTION, POWDER, LYOPHILIZED, FOR SOLUTION INTRAVENOUS at 23:55

## 2017-07-09 RX ADMIN — WARFARIN SODIUM 5 MG: 5 TABLET ORAL at 17:19

## 2017-07-09 RX ADMIN — OXYCODONE HYDROCHLORIDE 2.5 MG: 5 TABLET ORAL at 17:56

## 2017-07-09 RX ADMIN — VANCOMYCIN HYDROCHLORIDE 250 MG: 500 INJECTION, POWDER, LYOPHILIZED, FOR SOLUTION INTRAVENOUS at 12:04

## 2017-07-09 RX ADMIN — SUCRALFATE 1000 MG: 1 SUSPENSION ORAL at 21:41

## 2017-07-09 RX ADMIN — INSULIN LISPRO 1 UNITS: 100 INJECTION, SOLUTION INTRAVENOUS; SUBCUTANEOUS at 12:08

## 2017-07-09 RX ADMIN — NICOTINE 1 PATCH: 14 PATCH TRANSDERMAL at 21:41

## 2017-07-09 RX ADMIN — INSULIN LISPRO 1 UNITS: 100 INJECTION, SOLUTION INTRAVENOUS; SUBCUTANEOUS at 17:21

## 2017-07-09 RX ADMIN — HEPARIN SODIUM 5000 UNITS: 5000 INJECTION, SOLUTION INTRAVENOUS; SUBCUTANEOUS at 05:43

## 2017-07-10 ENCOUNTER — APPOINTMENT (INPATIENT)
Dept: DIALYSIS | Facility: HOSPITAL | Age: 67
DRG: 070 | End: 2017-07-10
Payer: COMMERCIAL

## 2017-07-10 ENCOUNTER — APPOINTMENT (INPATIENT)
Dept: PHYSICAL THERAPY | Facility: HOSPITAL | Age: 67
DRG: 070 | End: 2017-07-10
Payer: COMMERCIAL

## 2017-07-10 LAB
GLUCOSE SERPL-MCNC: 173 MG/DL (ref 65–140)
GLUCOSE SERPL-MCNC: 424 MG/DL (ref 65–140)
GLUCOSE SERPL-MCNC: 437 MG/DL (ref 65–140)
GLUCOSE SERPL-MCNC: 93 MG/DL (ref 65–140)

## 2017-07-10 PROCEDURE — 82948 REAGENT STRIP/BLOOD GLUCOSE: CPT

## 2017-07-10 RX ORDER — ACETAMINOPHEN 325 MG/1
650 TABLET ORAL EVERY 6 HOURS PRN
Status: DISCONTINUED | OUTPATIENT
Start: 2017-07-10 | End: 2017-07-12 | Stop reason: HOSPADM

## 2017-07-10 RX ORDER — LIDOCAINE 50 MG/G
1 PATCH TOPICAL DAILY
Status: DISCONTINUED | OUTPATIENT
Start: 2017-07-11 | End: 2017-07-10

## 2017-07-10 RX ORDER — LIDOCAINE 50 MG/G
1 PATCH TOPICAL DAILY
Status: DISCONTINUED | OUTPATIENT
Start: 2017-07-10 | End: 2017-07-12 | Stop reason: HOSPADM

## 2017-07-10 RX ADMIN — SUCRALFATE 1000 MG: 1 SUSPENSION ORAL at 08:26

## 2017-07-10 RX ADMIN — VANCOMYCIN HYDROCHLORIDE 125 MG: 500 INJECTION, POWDER, LYOPHILIZED, FOR SOLUTION INTRAVENOUS at 22:39

## 2017-07-10 RX ADMIN — ACETAMINOPHEN 650 MG: 325 TABLET ORAL at 17:36

## 2017-07-10 RX ADMIN — CITALOPRAM HYDROBROMIDE 10 MG: 20 TABLET ORAL at 08:27

## 2017-07-10 RX ADMIN — METOPROLOL TARTRATE 100 MG: 100 TABLET ORAL at 08:26

## 2017-07-10 RX ADMIN — NICOTINE 1 PATCH: 14 PATCH TRANSDERMAL at 22:39

## 2017-07-10 RX ADMIN — METOPROLOL TARTRATE 75 MG: 50 TABLET ORAL at 17:36

## 2017-07-10 RX ADMIN — SUCRALFATE 1000 MG: 1 SUSPENSION ORAL at 11:58

## 2017-07-10 RX ADMIN — INSULIN LISPRO 4 UNITS: 100 INJECTION, SOLUTION INTRAVENOUS; SUBCUTANEOUS at 11:58

## 2017-07-10 RX ADMIN — ANORECTAL OINTMENT: 15.7; .44; 24; 20.6 OINTMENT TOPICAL at 17:41

## 2017-07-10 RX ADMIN — LEVOTHYROXINE SODIUM 200 MCG: 100 TABLET ORAL at 05:41

## 2017-07-10 RX ADMIN — LIDOCAINE 1 PATCH: 50 PATCH CUTANEOUS at 22:55

## 2017-07-10 RX ADMIN — DIPHENOXYLATE HYDROCHLORIDE AND ATROPINE SULFATE 1 TABLET: 2.5; .025 TABLET ORAL at 08:27

## 2017-07-10 RX ADMIN — ANORECTAL OINTMENT: 15.7; .44; 24; 20.6 OINTMENT TOPICAL at 22:40

## 2017-07-10 RX ADMIN — PANTOPRAZOLE SODIUM 40 MG: 40 TABLET, DELAYED RELEASE ORAL at 05:39

## 2017-07-10 RX ADMIN — INSULIN GLARGINE 5 UNITS: 100 INJECTION, SOLUTION SUBCUTANEOUS at 08:27

## 2017-07-10 RX ADMIN — QUETIAPINE FUMARATE 12.5 MG: 25 TABLET, FILM COATED ORAL at 22:39

## 2017-07-10 RX ADMIN — RENAGEL 800 MG: 800 TABLET ORAL at 11:58

## 2017-07-10 RX ADMIN — SUCRALFATE 1000 MG: 1 SUSPENSION ORAL at 17:36

## 2017-07-10 RX ADMIN — ANORECTAL OINTMENT: 15.7; .44; 24; 20.6 OINTMENT TOPICAL at 08:29

## 2017-07-10 RX ADMIN — INSULIN LISPRO 1 UNITS: 100 INJECTION, SOLUTION INTRAVENOUS; SUBCUTANEOUS at 17:40

## 2017-07-10 RX ADMIN — WARFARIN SODIUM 5 MG: 5 TABLET ORAL at 17:36

## 2017-07-10 RX ADMIN — MIDODRINE HYDROCHLORIDE 5 MG: 5 TABLET ORAL at 14:10

## 2017-07-10 RX ADMIN — INSULIN LISPRO 4 UNITS: 100 INJECTION, SOLUTION INTRAVENOUS; SUBCUTANEOUS at 08:27

## 2017-07-10 RX ADMIN — RENAGEL 800 MG: 800 TABLET ORAL at 08:26

## 2017-07-10 RX ADMIN — Medication: at 17:38

## 2017-07-10 RX ADMIN — SUCRALFATE 1000 MG: 1 SUSPENSION ORAL at 22:39

## 2017-07-10 RX ADMIN — HEPARIN SODIUM 5000 UNITS: 5000 INJECTION, SOLUTION INTRAVENOUS; SUBCUTANEOUS at 05:38

## 2017-07-10 RX ADMIN — RENAGEL 800 MG: 800 TABLET ORAL at 17:36

## 2017-07-10 RX ADMIN — VANCOMYCIN HYDROCHLORIDE 250 MG: 500 INJECTION, POWDER, LYOPHILIZED, FOR SOLUTION INTRAVENOUS at 05:39

## 2017-07-10 RX ADMIN — INSULIN GLARGINE 5 UNITS: 100 INJECTION, SOLUTION SUBCUTANEOUS at 22:39

## 2017-07-11 ENCOUNTER — APPOINTMENT (INPATIENT)
Dept: PHYSICAL THERAPY | Facility: HOSPITAL | Age: 67
DRG: 070 | End: 2017-07-11
Payer: COMMERCIAL

## 2017-07-11 PROBLEM — R10.13 EPIGASTRIC PAIN: Chronic | Status: ACTIVE | Noted: 2017-07-11

## 2017-07-11 LAB
ALBUMIN SERPL BCP-MCNC: 2.5 G/DL (ref 3.5–5)
ALP SERPL-CCNC: 217 U/L (ref 46–116)
ALT SERPL W P-5'-P-CCNC: 38 U/L (ref 12–78)
ANION GAP SERPL CALCULATED.3IONS-SCNC: 13 MMOL/L (ref 4–13)
ARTERIAL PATENCY WRIST A: ABNORMAL
AST SERPL W P-5'-P-CCNC: 28 U/L (ref 5–45)
BASE EXCESS BLDA CALC-SCNC: 4 MMOL/L (ref -2–3)
BASOPHILS # BLD AUTO: 0.06 THOUSANDS/ΜL (ref 0–0.1)
BASOPHILS NFR BLD AUTO: 1 % (ref 0–1)
BILIRUB SERPL-MCNC: 0.4 MG/DL (ref 0.2–1)
BUN SERPL-MCNC: 35 MG/DL (ref 5–25)
CA-I BLD-SCNC: 1.16 MMOL/L (ref 1.12–1.32)
CALCIUM SERPL-MCNC: 9.2 MG/DL (ref 8.3–10.1)
CHLORIDE SERPL-SCNC: 95 MMOL/L (ref 100–108)
CO2 SERPL-SCNC: 26 MMOL/L (ref 21–32)
CREAT SERPL-MCNC: 3.64 MG/DL (ref 0.6–1.3)
EOSINOPHIL # BLD AUTO: 0.26 THOUSAND/ΜL (ref 0–0.61)
EOSINOPHIL NFR BLD AUTO: 5 % (ref 0–6)
ERYTHROCYTE [DISTWIDTH] IN BLOOD BY AUTOMATED COUNT: 13.8 % (ref 11.6–15.1)
FIO2 GAS DIL.REBREATH: 32 L
GFR SERPL CREATININE-BSD FRML MDRD: 16.9 ML/MIN/1.73SQ M
GLUCOSE SERPL-MCNC: 122 MG/DL (ref 65–140)
GLUCOSE SERPL-MCNC: 192 MG/DL (ref 65–140)
GLUCOSE SERPL-MCNC: 193 MG/DL (ref 65–140)
GLUCOSE SERPL-MCNC: 200 MG/DL (ref 65–140)
GLUCOSE SERPL-MCNC: 220 MG/DL (ref 65–140)
GLUCOSE SERPL-MCNC: 239 MG/DL (ref 65–140)
GLUCOSE SERPL-MCNC: 246 MG/DL (ref 65–140)
GLUCOSE SERPL-MCNC: 250 MG/DL (ref 65–140)
GLUCOSE SERPL-MCNC: 287 MG/DL (ref 65–140)
GLUCOSE SERPL-MCNC: 92 MG/DL (ref 65–140)
HCO3 BLDA-SCNC: 29.9 MMOL/L (ref 22–28)
HCT VFR BLD AUTO: 28 % (ref 36.5–49.3)
HCT VFR BLD CALC: 30 % (ref 36.5–49.3)
HGB BLD-MCNC: 8.5 G/DL (ref 12–17)
HGB BLDA-MCNC: 10.2 G/DL (ref 12–17)
INR PPP: 2.05 (ref 0.86–1.16)
LYMPHOCYTES # BLD AUTO: 0.77 THOUSANDS/ΜL (ref 0.6–4.47)
LYMPHOCYTES NFR BLD AUTO: 14 % (ref 14–44)
MCH RBC QN AUTO: 30.8 PG (ref 26.8–34.3)
MCHC RBC AUTO-ENTMCNC: 30.4 G/DL (ref 31.4–37.4)
MCV RBC AUTO: 101 FL (ref 82–98)
MONOCYTES # BLD AUTO: 0.58 THOUSAND/ΜL (ref 0.17–1.22)
MONOCYTES NFR BLD AUTO: 11 % (ref 4–12)
NEUTROPHILS # BLD AUTO: 3.78 THOUSANDS/ΜL (ref 1.85–7.62)
NEUTS SEG NFR BLD AUTO: 69 % (ref 43–75)
PCO2 BLD: 31 MMOL/L (ref 21–32)
PCO2 BLD: 49.5 MM HG (ref 36–44)
PH BLD: 7.39 [PH] (ref 7.35–7.45)
PLATELET # BLD AUTO: 218 THOUSANDS/UL (ref 149–390)
PMV BLD AUTO: 9.5 FL (ref 8.9–12.7)
PO2 BLD: 101 MM HG (ref 75–129)
POTASSIUM BLD-SCNC: 4.6 MMOL/L (ref 3.5–5.3)
POTASSIUM SERPL-SCNC: 4.3 MMOL/L (ref 3.5–5.3)
PROT SERPL-MCNC: 8.4 G/DL (ref 6.4–8.2)
PROTHROMBIN TIME: 23.9 SECONDS (ref 12.1–14.4)
RBC # BLD AUTO: 2.76 MILLION/UL (ref 3.88–5.62)
SAMPLE SITE: ABNORMAL
SAO2 % BLD FROM PO2: 98 % (ref 95–98)
SODIUM BLD-SCNC: 127 MMOL/L (ref 136–145)
SODIUM SERPL-SCNC: 134 MMOL/L (ref 136–145)
SPECIMEN SOURCE: ABNORMAL
WBC # BLD AUTO: 5.45 THOUSAND/UL (ref 4.31–10.16)

## 2017-07-11 PROCEDURE — 97112 NEUROMUSCULAR REEDUCATION: CPT

## 2017-07-11 PROCEDURE — 82948 REAGENT STRIP/BLOOD GLUCOSE: CPT

## 2017-07-11 PROCEDURE — 85025 COMPLETE CBC W/AUTO DIFF WBC: CPT | Performed by: INTERNAL MEDICINE

## 2017-07-11 PROCEDURE — 85610 PROTHROMBIN TIME: CPT | Performed by: INTERNAL MEDICINE

## 2017-07-11 PROCEDURE — G8988 SELF CARE GOAL STATUS: HCPCS

## 2017-07-11 PROCEDURE — 97167 OT EVAL HIGH COMPLEX 60 MIN: CPT

## 2017-07-11 PROCEDURE — G8987 SELF CARE CURRENT STATUS: HCPCS

## 2017-07-11 PROCEDURE — 97110 THERAPEUTIC EXERCISES: CPT

## 2017-07-11 PROCEDURE — 80053 COMPREHEN METABOLIC PANEL: CPT | Performed by: INTERNAL MEDICINE

## 2017-07-11 RX ORDER — QUETIAPINE FUMARATE 25 MG/1
25 TABLET, FILM COATED ORAL
Status: DISCONTINUED | OUTPATIENT
Start: 2017-07-11 | End: 2017-07-12 | Stop reason: HOSPADM

## 2017-07-11 RX ORDER — LANOLIN ALCOHOL/MO/W.PET/CERES
6 CREAM (GRAM) TOPICAL
Status: DISCONTINUED | OUTPATIENT
Start: 2017-07-11 | End: 2017-07-12 | Stop reason: HOSPADM

## 2017-07-11 RX ORDER — CHOLESTYRAMINE LIGHT 4 G/5.7G
4 POWDER, FOR SUSPENSION ORAL 3 TIMES DAILY
Status: DISCONTINUED | OUTPATIENT
Start: 2017-07-11 | End: 2017-07-12

## 2017-07-11 RX ADMIN — CHOLESTYRAMINE 4 G: 4 POWDER, FOR SUSPENSION ORAL at 16:59

## 2017-07-11 RX ADMIN — VANCOMYCIN HYDROCHLORIDE 125 MG: 500 INJECTION, POWDER, LYOPHILIZED, FOR SOLUTION INTRAVENOUS at 23:29

## 2017-07-11 RX ADMIN — LIDOCAINE 1 PATCH: 50 PATCH CUTANEOUS at 22:05

## 2017-07-11 RX ADMIN — INSULIN GLARGINE 5 UNITS: 100 INJECTION, SOLUTION SUBCUTANEOUS at 08:16

## 2017-07-11 RX ADMIN — VANCOMYCIN HYDROCHLORIDE 125 MG: 500 INJECTION, POWDER, LYOPHILIZED, FOR SOLUTION INTRAVENOUS at 00:11

## 2017-07-11 RX ADMIN — METOPROLOL TARTRATE 75 MG: 50 TABLET ORAL at 08:16

## 2017-07-11 RX ADMIN — RENAGEL 800 MG: 800 TABLET ORAL at 12:49

## 2017-07-11 RX ADMIN — PANTOPRAZOLE SODIUM 40 MG: 40 TABLET, DELAYED RELEASE ORAL at 06:21

## 2017-07-11 RX ADMIN — SUCRALFATE 1000 MG: 1 SUSPENSION ORAL at 12:48

## 2017-07-11 RX ADMIN — SUCRALFATE 1000 MG: 1 SUSPENSION ORAL at 17:02

## 2017-07-11 RX ADMIN — RENAGEL 800 MG: 800 TABLET ORAL at 17:02

## 2017-07-11 RX ADMIN — ONDANSETRON 4 MG: 2 INJECTION INTRAMUSCULAR; INTRAVENOUS at 01:07

## 2017-07-11 RX ADMIN — CHOLESTYRAMINE 4 G: 4 POWDER, FOR SUSPENSION ORAL at 23:29

## 2017-07-11 RX ADMIN — MELATONIN TAB 3 MG 6 MG: 3 TAB at 23:29

## 2017-07-11 RX ADMIN — ANORECTAL OINTMENT 1 APPLICATION: 15.7; .44; 24; 20.6 OINTMENT TOPICAL at 22:05

## 2017-07-11 RX ADMIN — VANCOMYCIN HYDROCHLORIDE 125 MG: 500 INJECTION, POWDER, LYOPHILIZED, FOR SOLUTION INTRAVENOUS at 12:49

## 2017-07-11 RX ADMIN — VANCOMYCIN HYDROCHLORIDE 125 MG: 500 INJECTION, POWDER, LYOPHILIZED, FOR SOLUTION INTRAVENOUS at 06:21

## 2017-07-11 RX ADMIN — INSULIN LISPRO 2 UNITS: 100 INJECTION, SOLUTION INTRAVENOUS; SUBCUTANEOUS at 16:59

## 2017-07-11 RX ADMIN — INSULIN LISPRO 1 UNITS: 100 INJECTION, SOLUTION INTRAVENOUS; SUBCUTANEOUS at 08:17

## 2017-07-11 RX ADMIN — VANCOMYCIN HYDROCHLORIDE 125 MG: 500 INJECTION, POWDER, LYOPHILIZED, FOR SOLUTION INTRAVENOUS at 17:05

## 2017-07-11 RX ADMIN — RENAGEL 800 MG: 800 TABLET ORAL at 08:16

## 2017-07-11 RX ADMIN — INSULIN LISPRO 2 UNITS: 100 INJECTION, SOLUTION INTRAVENOUS; SUBCUTANEOUS at 12:49

## 2017-07-11 RX ADMIN — ACETAMINOPHEN 650 MG: 325 TABLET ORAL at 20:33

## 2017-07-11 RX ADMIN — METOPROLOL TARTRATE 75 MG: 50 TABLET ORAL at 17:02

## 2017-07-11 RX ADMIN — WARFARIN SODIUM 5 MG: 5 TABLET ORAL at 17:02

## 2017-07-11 RX ADMIN — CITALOPRAM HYDROBROMIDE 10 MG: 20 TABLET ORAL at 08:16

## 2017-07-11 RX ADMIN — Medication: at 08:18

## 2017-07-11 RX ADMIN — NICOTINE 1 PATCH: 14 PATCH TRANSDERMAL at 20:33

## 2017-07-11 RX ADMIN — ANORECTAL OINTMENT: 15.7; .44; 24; 20.6 OINTMENT TOPICAL at 08:18

## 2017-07-11 RX ADMIN — QUETIAPINE FUMARATE 25 MG: 25 TABLET, FILM COATED ORAL at 22:05

## 2017-07-11 RX ADMIN — Medication: at 17:02

## 2017-07-11 RX ADMIN — INSULIN GLARGINE 5 UNITS: 100 INJECTION, SOLUTION SUBCUTANEOUS at 20:34

## 2017-07-11 RX ADMIN — SUCRALFATE 1000 MG: 1 SUSPENSION ORAL at 22:04

## 2017-07-11 RX ADMIN — ACETAMINOPHEN 650 MG: 325 TABLET ORAL at 00:04

## 2017-07-11 RX ADMIN — ANORECTAL OINTMENT: 15.7; .44; 24; 20.6 OINTMENT TOPICAL at 16:59

## 2017-07-11 RX ADMIN — SUCRALFATE 1000 MG: 1 SUSPENSION ORAL at 08:16

## 2017-07-12 ENCOUNTER — APPOINTMENT (INPATIENT)
Dept: DIALYSIS | Facility: HOSPITAL | Age: 67
DRG: 070 | End: 2017-07-12
Payer: COMMERCIAL

## 2017-07-12 VITALS
DIASTOLIC BLOOD PRESSURE: 78 MMHG | TEMPERATURE: 98.2 F | WEIGHT: 164.3 LBS | HEART RATE: 75 BPM | OXYGEN SATURATION: 96 % | SYSTOLIC BLOOD PRESSURE: 138 MMHG | BODY MASS INDEX: 30.24 KG/M2 | HEIGHT: 62 IN | RESPIRATION RATE: 18 BRPM

## 2017-07-12 LAB
ANION GAP SERPL CALCULATED.3IONS-SCNC: 10 MMOL/L (ref 4–13)
BASOPHILS # BLD AUTO: 0.07 THOUSANDS/ΜL (ref 0–0.1)
BASOPHILS NFR BLD AUTO: 1 % (ref 0–1)
BUN SERPL-MCNC: 48 MG/DL (ref 5–25)
CALCIUM SERPL-MCNC: 9.3 MG/DL (ref 8.3–10.1)
CHLORIDE SERPL-SCNC: 95 MMOL/L (ref 100–108)
CO2 SERPL-SCNC: 26 MMOL/L (ref 21–32)
CREAT SERPL-MCNC: 4.54 MG/DL (ref 0.6–1.3)
EOSINOPHIL # BLD AUTO: 0.31 THOUSAND/ΜL (ref 0–0.61)
EOSINOPHIL NFR BLD AUTO: 5 % (ref 0–6)
ERYTHROCYTE [DISTWIDTH] IN BLOOD BY AUTOMATED COUNT: 13.6 % (ref 11.6–15.1)
GFR SERPL CREATININE-BSD FRML MDRD: 13.1 ML/MIN/1.73SQ M
GGT SERPL-CCNC: 94 U/L (ref 5–85)
GLUCOSE SERPL-MCNC: 115 MG/DL (ref 65–140)
GLUCOSE SERPL-MCNC: 167 MG/DL (ref 65–140)
GLUCOSE SERPL-MCNC: 179 MG/DL (ref 65–140)
GLUCOSE SERPL-MCNC: 190 MG/DL (ref 65–140)
HCT VFR BLD AUTO: 25.8 % (ref 36.5–49.3)
HGB BLD-MCNC: 7.8 G/DL (ref 12–17)
INR PPP: 2.1 (ref 0.86–1.16)
LIPASE SERPL-CCNC: 142 U/L (ref 73–393)
LYMPHOCYTES # BLD AUTO: 0.83 THOUSANDS/ΜL (ref 0.6–4.47)
LYMPHOCYTES NFR BLD AUTO: 14 % (ref 14–44)
MCH RBC QN AUTO: 30.1 PG (ref 26.8–34.3)
MCHC RBC AUTO-ENTMCNC: 30.2 G/DL (ref 31.4–37.4)
MCV RBC AUTO: 100 FL (ref 82–98)
MONOCYTES # BLD AUTO: 0.7 THOUSAND/ΜL (ref 0.17–1.22)
MONOCYTES NFR BLD AUTO: 12 % (ref 4–12)
NEUTROPHILS # BLD AUTO: 3.95 THOUSANDS/ΜL (ref 1.85–7.62)
NEUTS SEG NFR BLD AUTO: 68 % (ref 43–75)
PLATELET # BLD AUTO: 210 THOUSANDS/UL (ref 149–390)
PMV BLD AUTO: 9.3 FL (ref 8.9–12.7)
POTASSIUM SERPL-SCNC: 4.4 MMOL/L (ref 3.5–5.3)
PROTHROMBIN TIME: 24.3 SECONDS (ref 12.1–14.4)
RBC # BLD AUTO: 2.59 MILLION/UL (ref 3.88–5.62)
SODIUM SERPL-SCNC: 131 MMOL/L (ref 136–145)
WBC # BLD AUTO: 5.86 THOUSAND/UL (ref 4.31–10.16)

## 2017-07-12 PROCEDURE — 80048 BASIC METABOLIC PNL TOTAL CA: CPT | Performed by: INTERNAL MEDICINE

## 2017-07-12 PROCEDURE — 85025 COMPLETE CBC W/AUTO DIFF WBC: CPT | Performed by: INTERNAL MEDICINE

## 2017-07-12 PROCEDURE — 84075 ASSAY ALKALINE PHOSPHATASE: CPT | Performed by: PHYSICIAN ASSISTANT

## 2017-07-12 PROCEDURE — 83690 ASSAY OF LIPASE: CPT | Performed by: INTERNAL MEDICINE

## 2017-07-12 PROCEDURE — 84080 ASSAY ALKALINE PHOSPHATASES: CPT | Performed by: PHYSICIAN ASSISTANT

## 2017-07-12 PROCEDURE — 82977 ASSAY OF GGT: CPT | Performed by: PHYSICIAN ASSISTANT

## 2017-07-12 PROCEDURE — 82948 REAGENT STRIP/BLOOD GLUCOSE: CPT

## 2017-07-12 PROCEDURE — 85610 PROTHROMBIN TIME: CPT | Performed by: INTERNAL MEDICINE

## 2017-07-12 RX ORDER — PROMETHAZINE HYDROCHLORIDE 25 MG/1
25 TABLET ORAL EVERY 6 HOURS PRN
Status: DISCONTINUED | OUTPATIENT
Start: 2017-07-12 | End: 2017-07-12 | Stop reason: HOSPADM

## 2017-07-12 RX ORDER — OXYCODONE HYDROCHLORIDE 5 MG/1
5 TABLET ORAL EVERY 6 HOURS PRN
Qty: 20 TABLET | Refills: 0 | Status: SHIPPED | OUTPATIENT
Start: 2017-07-12 | End: 2017-07-22

## 2017-07-12 RX ORDER — PROMETHAZINE HYDROCHLORIDE 25 MG/1
25 TABLET ORAL EVERY 6 HOURS PRN
Qty: 30 TABLET | Refills: 3 | Status: SHIPPED | OUTPATIENT
Start: 2017-07-12 | End: 2017-08-12 | Stop reason: HOSPADM

## 2017-07-12 RX ORDER — CHOLESTYRAMINE LIGHT 4 G/5.7G
4 POWDER, FOR SUSPENSION ORAL 2 TIMES DAILY
Status: DISCONTINUED | OUTPATIENT
Start: 2017-07-12 | End: 2017-07-12 | Stop reason: HOSPADM

## 2017-07-12 RX ADMIN — PANTOPRAZOLE SODIUM 40 MG: 40 TABLET, DELAYED RELEASE ORAL at 06:01

## 2017-07-12 RX ADMIN — METOPROLOL TARTRATE 75 MG: 50 TABLET ORAL at 17:14

## 2017-07-12 RX ADMIN — VANCOMYCIN HYDROCHLORIDE 125 MG: 500 INJECTION, POWDER, LYOPHILIZED, FOR SOLUTION INTRAVENOUS at 05:23

## 2017-07-12 RX ADMIN — ANORECTAL OINTMENT: 15.7; .44; 24; 20.6 OINTMENT TOPICAL at 10:26

## 2017-07-12 RX ADMIN — LEVOTHYROXINE SODIUM 200 MCG: 100 TABLET ORAL at 05:23

## 2017-07-12 RX ADMIN — Medication: at 10:26

## 2017-07-12 RX ADMIN — RENAGEL 800 MG: 800 TABLET ORAL at 12:37

## 2017-07-12 RX ADMIN — VANCOMYCIN HYDROCHLORIDE 125 MG: 500 INJECTION, POWDER, LYOPHILIZED, FOR SOLUTION INTRAVENOUS at 12:37

## 2017-07-12 RX ADMIN — ONDANSETRON 4 MG: 2 INJECTION INTRAMUSCULAR; INTRAVENOUS at 08:34

## 2017-07-12 RX ADMIN — ANORECTAL OINTMENT 1 APPLICATION: 15.7; .44; 24; 20.6 OINTMENT TOPICAL at 17:13

## 2017-07-12 RX ADMIN — SUCRALFATE 1000 MG: 1 SUSPENSION ORAL at 12:37

## 2017-07-12 RX ADMIN — SUCRALFATE 1000 MG: 1 SUSPENSION ORAL at 17:13

## 2017-07-12 RX ADMIN — RENAGEL 800 MG: 800 TABLET ORAL at 17:13

## 2017-07-12 RX ADMIN — WARFARIN SODIUM 5 MG: 5 TABLET ORAL at 17:13

## 2017-07-12 RX ADMIN — MIDODRINE HYDROCHLORIDE 5 MG: 5 TABLET ORAL at 08:33

## 2017-07-14 LAB
ALP BONE CFR SERPL: 14 % (ref 12–68)
ALP INTEST CFR SERPL: 9 % (ref 0–18)
ALP LIVER CFR SERPL: 77 % (ref 13–88)
ALP SERPL-CCNC: 192 IU/L (ref 39–117)

## 2017-07-16 ENCOUNTER — GENERIC CONVERSION - ENCOUNTER (OUTPATIENT)
Dept: OTHER | Facility: OTHER | Age: 67
End: 2017-07-16

## 2017-08-03 ENCOUNTER — ALLSCRIPTS OFFICE VISIT (OUTPATIENT)
Dept: OTHER | Facility: OTHER | Age: 67
End: 2017-08-03

## 2017-08-03 ENCOUNTER — APPOINTMENT (EMERGENCY)
Dept: RADIOLOGY | Facility: HOSPITAL | Age: 67
DRG: 070 | End: 2017-08-03
Payer: COMMERCIAL

## 2017-08-03 ENCOUNTER — HOSPITAL ENCOUNTER (INPATIENT)
Facility: HOSPITAL | Age: 67
LOS: 3 days | Discharge: NURSING, REHAB OR LONG-TERM CARE | DRG: 070 | End: 2017-08-12
Attending: EMERGENCY MEDICINE | Admitting: INTERNAL MEDICINE
Payer: COMMERCIAL

## 2017-08-03 DIAGNOSIS — N18.6 ESRD (END STAGE RENAL DISEASE) ON DIALYSIS (HCC): ICD-10-CM

## 2017-08-03 DIAGNOSIS — Z93.1 S/P PERCUTANEOUS ENDOSCOPIC GASTROSTOMY (PEG) TUBE PLACEMENT (HCC): ICD-10-CM

## 2017-08-03 DIAGNOSIS — Z99.2 ESRD (END STAGE RENAL DISEASE) ON DIALYSIS (HCC): ICD-10-CM

## 2017-08-03 DIAGNOSIS — R41.82 ALTERED MENTAL STATUS: ICD-10-CM

## 2017-08-03 DIAGNOSIS — R41.82 ALTERED MENTAL STATUS, UNSPECIFIED ALTERED MENTAL STATUS TYPE: ICD-10-CM

## 2017-08-03 DIAGNOSIS — R41.82 ALTERED MENTAL STATUS: Primary | ICD-10-CM

## 2017-08-03 DIAGNOSIS — J90 PLEURAL EFFUSION ON RIGHT: ICD-10-CM

## 2017-08-03 LAB
ALBUMIN SERPL BCP-MCNC: 2.6 G/DL (ref 3.5–5)
ALP SERPL-CCNC: 227 U/L (ref 46–116)
ALT SERPL W P-5'-P-CCNC: 25 U/L (ref 12–78)
AMMONIA PLAS-SCNC: 18 UMOL/L (ref 11–35)
ANION GAP SERPL CALCULATED.3IONS-SCNC: 8 MMOL/L (ref 4–13)
AST SERPL W P-5'-P-CCNC: 15 U/L (ref 5–45)
ATRIAL RATE: 67 BPM
BASOPHILS # BLD AUTO: 0.08 THOUSANDS/ΜL (ref 0–0.1)
BASOPHILS NFR BLD AUTO: 1 % (ref 0–1)
BILIRUB SERPL-MCNC: 0.51 MG/DL (ref 0.2–1)
BUN SERPL-MCNC: 29 MG/DL (ref 5–25)
CALCIUM SERPL-MCNC: 8.8 MG/DL (ref 8.3–10.1)
CHLORIDE SERPL-SCNC: 97 MMOL/L (ref 100–108)
CO2 SERPL-SCNC: 30 MMOL/L (ref 21–32)
CREAT SERPL-MCNC: 4.48 MG/DL (ref 0.6–1.3)
EOSINOPHIL # BLD AUTO: 0.23 THOUSAND/ΜL (ref 0–0.61)
EOSINOPHIL NFR BLD AUTO: 4 % (ref 0–6)
ERYTHROCYTE [DISTWIDTH] IN BLOOD BY AUTOMATED COUNT: 14.8 % (ref 11.6–15.1)
GFR SERPL CREATININE-BSD FRML MDRD: 13 ML/MIN/1.73SQ M
GLUCOSE SERPL-MCNC: 211 MG/DL (ref 65–140)
HCT VFR BLD AUTO: 33.5 % (ref 36.5–49.3)
HGB BLD-MCNC: 10.4 G/DL (ref 12–17)
INR PPP: 3.43 (ref 0.86–1.16)
LYMPHOCYTES # BLD AUTO: 1.01 THOUSANDS/ΜL (ref 0.6–4.47)
LYMPHOCYTES NFR BLD AUTO: 18 % (ref 14–44)
MCH RBC QN AUTO: 30.4 PG (ref 26.8–34.3)
MCHC RBC AUTO-ENTMCNC: 31 G/DL (ref 31.4–37.4)
MCV RBC AUTO: 98 FL (ref 82–98)
MONOCYTES # BLD AUTO: 0.85 THOUSAND/ΜL (ref 0.17–1.22)
MONOCYTES NFR BLD AUTO: 15 % (ref 4–12)
NEUTROPHILS # BLD AUTO: 3.52 THOUSANDS/ΜL (ref 1.85–7.62)
NEUTS SEG NFR BLD AUTO: 62 % (ref 43–75)
NRBC BLD AUTO-RTO: 0 /100 WBCS
P AXIS: 110 DEGREES
PLATELET # BLD AUTO: 151 THOUSANDS/UL (ref 149–390)
PMV BLD AUTO: 10.3 FL (ref 8.9–12.7)
POTASSIUM SERPL-SCNC: 4.5 MMOL/L (ref 3.5–5.3)
PR INTERVAL: 164 MS
PROT SERPL-MCNC: 8.3 G/DL (ref 6.4–8.2)
PROTHROMBIN TIME: 35.1 SECONDS (ref 12.1–14.4)
QRS AXIS: -61 DEGREES
QRSD INTERVAL: 96 MS
QT INTERVAL: 434 MS
QTC INTERVAL: 458 MS
RBC # BLD AUTO: 3.42 MILLION/UL (ref 3.88–5.62)
SODIUM SERPL-SCNC: 135 MMOL/L (ref 136–145)
T WAVE AXIS: 134 DEGREES
VENTRICULAR RATE: 67 BPM
WBC # BLD AUTO: 5.7 THOUSAND/UL (ref 4.31–10.16)

## 2017-08-03 PROCEDURE — 93005 ELECTROCARDIOGRAM TRACING: CPT | Performed by: EMERGENCY MEDICINE

## 2017-08-03 PROCEDURE — 93005 ELECTROCARDIOGRAM TRACING: CPT

## 2017-08-03 PROCEDURE — 82140 ASSAY OF AMMONIA: CPT | Performed by: EMERGENCY MEDICINE

## 2017-08-03 PROCEDURE — 70450 CT HEAD/BRAIN W/O DYE: CPT

## 2017-08-03 PROCEDURE — 80053 COMPREHEN METABOLIC PANEL: CPT | Performed by: EMERGENCY MEDICINE

## 2017-08-03 PROCEDURE — 85025 COMPLETE CBC W/AUTO DIFF WBC: CPT | Performed by: EMERGENCY MEDICINE

## 2017-08-03 PROCEDURE — 36415 COLL VENOUS BLD VENIPUNCTURE: CPT | Performed by: EMERGENCY MEDICINE

## 2017-08-03 PROCEDURE — 99285 EMERGENCY DEPT VISIT HI MDM: CPT

## 2017-08-03 PROCEDURE — 87040 BLOOD CULTURE FOR BACTERIA: CPT | Performed by: EMERGENCY MEDICINE

## 2017-08-03 PROCEDURE — 85610 PROTHROMBIN TIME: CPT | Performed by: EMERGENCY MEDICINE

## 2017-08-03 PROCEDURE — 71020 HB CHEST X-RAY 2VW FRONTAL&LATL: CPT

## 2017-08-03 RX ORDER — DEXTROSE AND SODIUM CHLORIDE 5; .9 G/100ML; G/100ML
50 INJECTION, SOLUTION INTRAVENOUS CONTINUOUS
Status: DISCONTINUED | OUTPATIENT
Start: 2017-08-03 | End: 2017-08-04

## 2017-08-03 RX ORDER — LORAZEPAM 0.5 MG/1
0.5 TABLET ORAL EVERY 6 HOURS PRN
Status: ON HOLD | COMMUNITY
End: 2017-08-12

## 2017-08-03 RX ORDER — PANTOPRAZOLE SODIUM 40 MG/1
40 TABLET, DELAYED RELEASE ORAL
Status: DISCONTINUED | OUTPATIENT
Start: 2017-08-04 | End: 2017-08-12 | Stop reason: HOSPADM

## 2017-08-03 RX ORDER — ACETAMINOPHEN 325 MG/1
650 TABLET ORAL EVERY 6 HOURS PRN
Status: DISCONTINUED | OUTPATIENT
Start: 2017-08-03 | End: 2017-08-12 | Stop reason: HOSPADM

## 2017-08-03 RX ORDER — LISINOPRIL 2.5 MG/1
2.5 TABLET ORAL DAILY
Status: DISCONTINUED | OUTPATIENT
Start: 2017-08-04 | End: 2017-08-12 | Stop reason: HOSPADM

## 2017-08-03 RX ORDER — WARFARIN SODIUM 5 MG/1
5 TABLET ORAL
Status: CANCELLED | OUTPATIENT
Start: 2017-08-03

## 2017-08-03 RX ORDER — METOPROLOL TARTRATE 50 MG/1
100 TABLET, FILM COATED ORAL EVERY 12 HOURS SCHEDULED
Status: DISCONTINUED | OUTPATIENT
Start: 2017-08-03 | End: 2017-08-12 | Stop reason: HOSPADM

## 2017-08-03 RX ORDER — CITALOPRAM 10 MG/1
10 TABLET ORAL DAILY
Status: DISCONTINUED | OUTPATIENT
Start: 2017-08-04 | End: 2017-08-12 | Stop reason: HOSPADM

## 2017-08-03 RX ORDER — NICOTINE 21 MG/24HR
1 PATCH, TRANSDERMAL 24 HOURS TRANSDERMAL EVERY 24 HOURS
Status: DISCONTINUED | OUTPATIENT
Start: 2017-08-03 | End: 2017-08-12 | Stop reason: HOSPADM

## 2017-08-03 RX ORDER — INSULIN GLARGINE 100 [IU]/ML
5 INJECTION, SOLUTION SUBCUTANEOUS EVERY 12 HOURS SCHEDULED
Status: DISCONTINUED | OUTPATIENT
Start: 2017-08-03 | End: 2017-08-05

## 2017-08-03 RX ORDER — GABAPENTIN 100 MG/1
100 CAPSULE ORAL
Status: DISCONTINUED | OUTPATIENT
Start: 2017-08-04 | End: 2017-08-08

## 2017-08-03 RX ORDER — LEVOTHYROXINE SODIUM 0.1 MG/1
200 TABLET ORAL EVERY OTHER DAY
Status: DISCONTINUED | OUTPATIENT
Start: 2017-08-04 | End: 2017-08-12 | Stop reason: HOSPADM

## 2017-08-03 RX ORDER — MELATONIN
2000 DAILY
Status: DISCONTINUED | OUTPATIENT
Start: 2017-08-04 | End: 2017-08-12 | Stop reason: HOSPADM

## 2017-08-03 RX ORDER — ECHINACEA PURPUREA EXTRACT 125 MG
1 TABLET ORAL EVERY 2 HOUR PRN
Status: DISCONTINUED | OUTPATIENT
Start: 2017-08-03 | End: 2017-08-12 | Stop reason: HOSPADM

## 2017-08-03 RX ORDER — MAGNESIUM HYDROXIDE/ALUMINUM HYDROXICE/SIMETHICONE 120; 1200; 1200 MG/30ML; MG/30ML; MG/30ML
30 SUSPENSION ORAL EVERY 6 HOURS PRN
Status: DISCONTINUED | OUTPATIENT
Start: 2017-08-03 | End: 2017-08-04

## 2017-08-03 RX ORDER — SACCHAROMYCES BOULARDII 250 MG
250 CAPSULE ORAL 2 TIMES DAILY
Status: DISCONTINUED | OUTPATIENT
Start: 2017-08-03 | End: 2017-08-12 | Stop reason: HOSPADM

## 2017-08-03 RX ORDER — TEMAZEPAM 15 MG/1
15 CAPSULE ORAL
Status: DISCONTINUED | OUTPATIENT
Start: 2017-08-03 | End: 2017-08-12 | Stop reason: HOSPADM

## 2017-08-03 RX ORDER — ONDANSETRON 2 MG/ML
4 INJECTION INTRAMUSCULAR; INTRAVENOUS EVERY 6 HOURS PRN
Status: DISCONTINUED | OUTPATIENT
Start: 2017-08-03 | End: 2017-08-12 | Stop reason: HOSPADM

## 2017-08-03 RX ADMIN — METOPROLOL TARTRATE 100 MG: 50 TABLET ORAL at 23:56

## 2017-08-03 RX ADMIN — NICOTINE 1 PATCH: 14 PATCH, EXTENDED RELEASE TRANSDERMAL at 23:58

## 2017-08-03 RX ADMIN — INSULIN GLARGINE 5 UNITS: 100 INJECTION, SOLUTION SUBCUTANEOUS at 23:57

## 2017-08-03 RX ADMIN — Medication 250 MG: at 23:56

## 2017-08-03 RX ADMIN — TEMAZEPAM 15 MG: 15 CAPSULE ORAL at 23:56

## 2017-08-03 RX ADMIN — DEXTROSE AND SODIUM CHLORIDE 50 ML/HR: 5; .9 INJECTION, SOLUTION INTRAVENOUS at 22:43

## 2017-08-04 ENCOUNTER — APPOINTMENT (OUTPATIENT)
Dept: PHYSICAL THERAPY | Facility: HOSPITAL | Age: 67
DRG: 070 | End: 2017-08-04
Payer: COMMERCIAL

## 2017-08-04 ENCOUNTER — APPOINTMENT (OUTPATIENT)
Dept: DIALYSIS | Facility: HOSPITAL | Age: 67
DRG: 070 | End: 2017-08-04
Payer: COMMERCIAL

## 2017-08-04 ENCOUNTER — APPOINTMENT (OUTPATIENT)
Dept: RADIOLOGY | Facility: HOSPITAL | Age: 67
DRG: 070 | End: 2017-08-04
Payer: COMMERCIAL

## 2017-08-04 LAB
ALBUMIN SERPL BCP-MCNC: 2.4 G/DL (ref 3.5–5)
ALP SERPL-CCNC: 203 U/L (ref 46–116)
ALT SERPL W P-5'-P-CCNC: 21 U/L (ref 12–78)
ANION GAP SERPL CALCULATED.3IONS-SCNC: 8 MMOL/L (ref 4–13)
AST SERPL W P-5'-P-CCNC: 16 U/L (ref 5–45)
BILIRUB SERPL-MCNC: 0.45 MG/DL (ref 0.2–1)
BUN SERPL-MCNC: 35 MG/DL (ref 5–25)
CALCIUM SERPL-MCNC: 8.8 MG/DL (ref 8.3–10.1)
CHLORIDE SERPL-SCNC: 100 MMOL/L (ref 100–108)
CO2 SERPL-SCNC: 27 MMOL/L (ref 21–32)
CREAT SERPL-MCNC: 5.07 MG/DL (ref 0.6–1.3)
ERYTHROCYTE [DISTWIDTH] IN BLOOD BY AUTOMATED COUNT: 14.9 % (ref 11.6–15.1)
GFR SERPL CREATININE-BSD FRML MDRD: 11 ML/MIN/1.73SQ M
GLUCOSE SERPL-MCNC: 109 MG/DL (ref 65–140)
GLUCOSE SERPL-MCNC: 112 MG/DL (ref 65–140)
GLUCOSE SERPL-MCNC: 175 MG/DL (ref 65–140)
GLUCOSE SERPL-MCNC: 185 MG/DL (ref 65–140)
GLUCOSE SERPL-MCNC: 246 MG/DL (ref 65–140)
GLUCOSE SERPL-MCNC: 284 MG/DL (ref 65–140)
GLUCOSE SERPL-MCNC: 465 MG/DL (ref 65–140)
GLUCOSE SERPL-MCNC: 87 MG/DL (ref 65–140)
HCT VFR BLD AUTO: 33.5 % (ref 36.5–49.3)
HGB BLD-MCNC: 10.2 G/DL (ref 12–17)
INR PPP: 3.38 (ref 0.86–1.16)
MAGNESIUM SERPL-MCNC: 2.3 MG/DL (ref 1.6–2.6)
MCH RBC QN AUTO: 29.8 PG (ref 26.8–34.3)
MCHC RBC AUTO-ENTMCNC: 30.4 G/DL (ref 31.4–37.4)
MCV RBC AUTO: 98 FL (ref 82–98)
PHOSPHATE SERPL-MCNC: 2.4 MG/DL (ref 2.3–4.1)
PLATELET # BLD AUTO: 157 THOUSANDS/UL (ref 149–390)
PMV BLD AUTO: 10.2 FL (ref 8.9–12.7)
POTASSIUM SERPL-SCNC: 4.1 MMOL/L (ref 3.5–5.3)
PROT SERPL-MCNC: 7.7 G/DL (ref 6.4–8.2)
PROTHROMBIN TIME: 34.7 SECONDS (ref 12.1–14.4)
RBC # BLD AUTO: 3.42 MILLION/UL (ref 3.88–5.62)
SODIUM SERPL-SCNC: 135 MMOL/L (ref 136–145)
TSH SERPL DL<=0.05 MIU/L-ACNC: 4.86 UIU/ML (ref 0.36–3.74)
WBC # BLD AUTO: 4.93 THOUSAND/UL (ref 4.31–10.16)

## 2017-08-04 PROCEDURE — 83735 ASSAY OF MAGNESIUM: CPT | Performed by: PHYSICIAN ASSISTANT

## 2017-08-04 PROCEDURE — 84100 ASSAY OF PHOSPHORUS: CPT | Performed by: PHYSICIAN ASSISTANT

## 2017-08-04 PROCEDURE — 80053 COMPREHEN METABOLIC PANEL: CPT | Performed by: PHYSICIAN ASSISTANT

## 2017-08-04 PROCEDURE — 85027 COMPLETE CBC AUTOMATED: CPT | Performed by: PHYSICIAN ASSISTANT

## 2017-08-04 PROCEDURE — 84443 ASSAY THYROID STIM HORMONE: CPT | Performed by: PHYSICIAN ASSISTANT

## 2017-08-04 PROCEDURE — 82948 REAGENT STRIP/BLOOD GLUCOSE: CPT

## 2017-08-04 PROCEDURE — 97167 OT EVAL HIGH COMPLEX 60 MIN: CPT

## 2017-08-04 PROCEDURE — 85610 PROTHROMBIN TIME: CPT | Performed by: PHYSICIAN ASSISTANT

## 2017-08-04 PROCEDURE — G8978 MOBILITY CURRENT STATUS: HCPCS

## 2017-08-04 PROCEDURE — G8979 MOBILITY GOAL STATUS: HCPCS

## 2017-08-04 PROCEDURE — G0257 UNSCHED DIALYSIS ESRD PT HOS: HCPCS

## 2017-08-04 PROCEDURE — 74000 HB X-RAY EXAM OF ABDOMEN (SINGLE ANTEROPOSTERIOR VIEW): CPT

## 2017-08-04 PROCEDURE — G8987 SELF CARE CURRENT STATUS: HCPCS

## 2017-08-04 PROCEDURE — 92610 EVALUATE SWALLOWING FUNCTION: CPT

## 2017-08-04 PROCEDURE — G8988 SELF CARE GOAL STATUS: HCPCS

## 2017-08-04 PROCEDURE — 97163 PT EVAL HIGH COMPLEX 45 MIN: CPT

## 2017-08-04 RX ORDER — OXYCODONE HYDROCHLORIDE AND ACETAMINOPHEN 5; 325 MG/1; MG/1
1 TABLET ORAL EVERY 4 HOURS PRN
Status: DISCONTINUED | OUTPATIENT
Start: 2017-08-04 | End: 2017-08-08

## 2017-08-04 RX ADMIN — INSULIN GLARGINE 5 UNITS: 100 INJECTION, SOLUTION SUBCUTANEOUS at 09:03

## 2017-08-04 RX ADMIN — VITAMIN D, TAB 1000IU (100/BT) 2000 UNITS: 25 TAB at 08:51

## 2017-08-04 RX ADMIN — TEMAZEPAM 15 MG: 15 CAPSULE ORAL at 21:41

## 2017-08-04 RX ADMIN — INSULIN GLARGINE 5 UNITS: 100 INJECTION, SOLUTION SUBCUTANEOUS at 21:44

## 2017-08-04 RX ADMIN — INSULIN LISPRO 6 UNITS: 100 INJECTION, SOLUTION INTRAVENOUS; SUBCUTANEOUS at 22:12

## 2017-08-04 RX ADMIN — DEXTROSE AND SODIUM CHLORIDE 50 ML/HR: 5; .9 INJECTION, SOLUTION INTRAVENOUS at 18:47

## 2017-08-04 RX ADMIN — Medication 250 MG: at 17:08

## 2017-08-04 RX ADMIN — INSULIN LISPRO 1 UNITS: 100 INJECTION, SOLUTION INTRAVENOUS; SUBCUTANEOUS at 06:03

## 2017-08-04 RX ADMIN — Medication 250 MG: at 08:51

## 2017-08-04 RX ADMIN — INSULIN LISPRO 2 UNITS: 100 INJECTION, SOLUTION INTRAVENOUS; SUBCUTANEOUS at 02:42

## 2017-08-04 RX ADMIN — IOHEXOL 30 ML: 350 INJECTION, SOLUTION INTRAVENOUS at 22:30

## 2017-08-04 RX ADMIN — LISINOPRIL 2.5 MG: 2.5 TABLET ORAL at 08:51

## 2017-08-04 RX ADMIN — METOPROLOL TARTRATE 100 MG: 50 TABLET ORAL at 21:43

## 2017-08-04 RX ADMIN — GABAPENTIN 100 MG: 100 CAPSULE ORAL at 08:52

## 2017-08-04 RX ADMIN — ACETAMINOPHEN 650 MG: 325 TABLET, FILM COATED ORAL at 05:39

## 2017-08-04 RX ADMIN — CITALOPRAM HYDROBROMIDE 10 MG: 10 TABLET ORAL at 08:51

## 2017-08-04 RX ADMIN — GABAPENTIN 100 MG: 100 CAPSULE ORAL at 17:08

## 2017-08-04 RX ADMIN — METOPROLOL TARTRATE 100 MG: 50 TABLET ORAL at 08:52

## 2017-08-04 RX ADMIN — LEVOTHYROXINE SODIUM 200 MCG: 100 TABLET ORAL at 05:39

## 2017-08-04 RX ADMIN — OXYCODONE HYDROCHLORIDE AND ACETAMINOPHEN 1 TABLET: 5; 325 TABLET ORAL at 23:57

## 2017-08-04 RX ADMIN — NICOTINE 1 PATCH: 14 PATCH, EXTENDED RELEASE TRANSDERMAL at 21:40

## 2017-08-05 ENCOUNTER — APPOINTMENT (OUTPATIENT)
Dept: RADIOLOGY | Facility: HOSPITAL | Age: 67
DRG: 070 | End: 2017-08-05
Payer: COMMERCIAL

## 2017-08-05 LAB
AMMONIA PLAS-SCNC: 30 UMOL/L (ref 11–35)
BASOPHILS # BLD AUTO: 0.08 THOUSANDS/ΜL (ref 0–0.1)
BASOPHILS NFR BLD AUTO: 2 % (ref 0–1)
EOSINOPHIL # BLD AUTO: 0.39 THOUSAND/ΜL (ref 0–0.61)
EOSINOPHIL NFR BLD AUTO: 7 % (ref 0–6)
ERYTHROCYTE [DISTWIDTH] IN BLOOD BY AUTOMATED COUNT: 14.8 % (ref 11.6–15.1)
GLUCOSE SERPL-MCNC: 162 MG/DL (ref 65–140)
GLUCOSE SERPL-MCNC: 241 MG/DL (ref 65–140)
GLUCOSE SERPL-MCNC: 273 MG/DL (ref 65–140)
GLUCOSE SERPL-MCNC: 292 MG/DL (ref 65–140)
HCT VFR BLD AUTO: 33.2 % (ref 36.5–49.3)
HGB BLD-MCNC: 10.4 G/DL (ref 12–17)
INR PPP: 2.78 (ref 0.86–1.16)
LYMPHOCYTES # BLD AUTO: 1.27 THOUSANDS/ΜL (ref 0.6–4.47)
LYMPHOCYTES NFR BLD AUTO: 24 % (ref 14–44)
MCH RBC QN AUTO: 30.3 PG (ref 26.8–34.3)
MCHC RBC AUTO-ENTMCNC: 31.3 G/DL (ref 31.4–37.4)
MCV RBC AUTO: 97 FL (ref 82–98)
MONOCYTES # BLD AUTO: 0.46 THOUSAND/ΜL (ref 0.17–1.22)
MONOCYTES NFR BLD AUTO: 9 % (ref 4–12)
NEUTROPHILS # BLD AUTO: 3.05 THOUSANDS/ΜL (ref 1.85–7.62)
NEUTS SEG NFR BLD AUTO: 58 % (ref 43–75)
NRBC BLD AUTO-RTO: 0 /100 WBCS
PLATELET # BLD AUTO: 177 THOUSANDS/UL (ref 149–390)
PMV BLD AUTO: 10 FL (ref 8.9–12.7)
PROTHROMBIN TIME: 29.7 SECONDS (ref 12.1–14.4)
RBC # BLD AUTO: 3.43 MILLION/UL (ref 3.88–5.62)
TSH SERPL DL<=0.05 MIU/L-ACNC: 3.67 UIU/ML (ref 0.36–3.74)
WBC # BLD AUTO: 5.26 THOUSAND/UL (ref 4.31–10.16)

## 2017-08-05 PROCEDURE — 82948 REAGENT STRIP/BLOOD GLUCOSE: CPT

## 2017-08-05 PROCEDURE — 82140 ASSAY OF AMMONIA: CPT | Performed by: NURSE PRACTITIONER

## 2017-08-05 PROCEDURE — 74000 HB X-RAY EXAM OF ABDOMEN (SINGLE ANTEROPOSTERIOR VIEW): CPT

## 2017-08-05 PROCEDURE — 85610 PROTHROMBIN TIME: CPT | Performed by: NURSE PRACTITIONER

## 2017-08-05 PROCEDURE — 85025 COMPLETE CBC W/AUTO DIFF WBC: CPT | Performed by: NURSE PRACTITIONER

## 2017-08-05 PROCEDURE — 84443 ASSAY THYROID STIM HORMONE: CPT | Performed by: NURSE PRACTITIONER

## 2017-08-05 RX ORDER — INSULIN GLARGINE 100 [IU]/ML
8 INJECTION, SOLUTION SUBCUTANEOUS EVERY 12 HOURS SCHEDULED
Status: DISCONTINUED | OUTPATIENT
Start: 2017-08-05 | End: 2017-08-06

## 2017-08-05 RX ORDER — LORAZEPAM 0.5 MG/1
0.5 TABLET ORAL EVERY 6 HOURS PRN
Status: DISCONTINUED | OUTPATIENT
Start: 2017-08-05 | End: 2017-08-06

## 2017-08-05 RX ADMIN — INSULIN GLARGINE 8 UNITS: 100 INJECTION, SOLUTION SUBCUTANEOUS at 21:52

## 2017-08-05 RX ADMIN — VITAMIN D, TAB 1000IU (100/BT) 2000 UNITS: 25 TAB at 09:40

## 2017-08-05 RX ADMIN — METOPROLOL TARTRATE 100 MG: 50 TABLET ORAL at 09:40

## 2017-08-05 RX ADMIN — GABAPENTIN 100 MG: 100 CAPSULE ORAL at 18:28

## 2017-08-05 RX ADMIN — OXYCODONE HYDROCHLORIDE AND ACETAMINOPHEN 1 TABLET: 5; 325 TABLET ORAL at 05:35

## 2017-08-05 RX ADMIN — CITALOPRAM HYDROBROMIDE 10 MG: 10 TABLET ORAL at 09:41

## 2017-08-05 RX ADMIN — INSULIN GLARGINE 5 UNITS: 100 INJECTION, SOLUTION SUBCUTANEOUS at 09:56

## 2017-08-05 RX ADMIN — Medication 250 MG: at 09:41

## 2017-08-05 RX ADMIN — GABAPENTIN 100 MG: 100 CAPSULE ORAL at 09:41

## 2017-08-05 RX ADMIN — TEMAZEPAM 15 MG: 15 CAPSULE ORAL at 21:52

## 2017-08-05 RX ADMIN — LISINOPRIL 2.5 MG: 2.5 TABLET ORAL at 09:40

## 2017-08-05 RX ADMIN — NICOTINE 1 PATCH: 14 PATCH, EXTENDED RELEASE TRANSDERMAL at 21:56

## 2017-08-05 RX ADMIN — PANTOPRAZOLE SODIUM 40 MG: 40 TABLET, DELAYED RELEASE ORAL at 05:36

## 2017-08-05 RX ADMIN — Medication 250 MG: at 18:28

## 2017-08-05 RX ADMIN — METOPROLOL TARTRATE 100 MG: 50 TABLET ORAL at 21:51

## 2017-08-05 RX ADMIN — INSULIN LISPRO 4 UNITS: 100 INJECTION, SOLUTION INTRAVENOUS; SUBCUTANEOUS at 21:52

## 2017-08-05 RX ADMIN — NICOTINE 1 PATCH: 14 PATCH, EXTENDED RELEASE TRANSDERMAL at 09:45

## 2017-08-05 RX ADMIN — LORAZEPAM 0.5 MG: 0.5 TABLET ORAL at 23:00

## 2017-08-06 LAB
ANION GAP SERPL CALCULATED.3IONS-SCNC: 8 MMOL/L (ref 4–13)
BASOPHILS # BLD AUTO: 0.08 THOUSANDS/ΜL (ref 0–0.1)
BASOPHILS NFR BLD AUTO: 2 % (ref 0–1)
BUN SERPL-MCNC: 32 MG/DL (ref 5–25)
CALCIUM SERPL-MCNC: 8.5 MG/DL (ref 8.3–10.1)
CHLORIDE SERPL-SCNC: 92 MMOL/L (ref 100–108)
CO2 SERPL-SCNC: 28 MMOL/L (ref 21–32)
CREAT SERPL-MCNC: 4.4 MG/DL (ref 0.6–1.3)
EOSINOPHIL # BLD AUTO: 0.25 THOUSAND/ΜL (ref 0–0.61)
EOSINOPHIL NFR BLD AUTO: 5 % (ref 0–6)
ERYTHROCYTE [DISTWIDTH] IN BLOOD BY AUTOMATED COUNT: 14.7 % (ref 11.6–15.1)
GFR SERPL CREATININE-BSD FRML MDRD: 13 ML/MIN/1.73SQ M
GLUCOSE SERPL-MCNC: 114 MG/DL (ref 65–140)
GLUCOSE SERPL-MCNC: 156 MG/DL (ref 65–140)
GLUCOSE SERPL-MCNC: 259 MG/DL (ref 65–140)
GLUCOSE SERPL-MCNC: 378 MG/DL (ref 65–140)
GLUCOSE SERPL-MCNC: 61 MG/DL (ref 65–140)
GLUCOSE SERPL-MCNC: 75 MG/DL (ref 65–140)
HCT VFR BLD AUTO: 32.6 % (ref 36.5–49.3)
HGB BLD-MCNC: 10.4 G/DL (ref 12–17)
LYMPHOCYTES # BLD AUTO: 1.38 THOUSANDS/ΜL (ref 0.6–4.47)
LYMPHOCYTES NFR BLD AUTO: 28 % (ref 14–44)
MCH RBC QN AUTO: 30.4 PG (ref 26.8–34.3)
MCHC RBC AUTO-ENTMCNC: 31.9 G/DL (ref 31.4–37.4)
MCV RBC AUTO: 95 FL (ref 82–98)
MONOCYTES # BLD AUTO: 0.57 THOUSAND/ΜL (ref 0.17–1.22)
MONOCYTES NFR BLD AUTO: 12 % (ref 4–12)
NEUTROPHILS # BLD AUTO: 2.6 THOUSANDS/ΜL (ref 1.85–7.62)
NEUTS SEG NFR BLD AUTO: 53 % (ref 43–75)
NRBC BLD AUTO-RTO: 0 /100 WBCS
PLATELET # BLD AUTO: 178 THOUSANDS/UL (ref 149–390)
PMV BLD AUTO: 10.2 FL (ref 8.9–12.7)
POTASSIUM SERPL-SCNC: 4.6 MMOL/L (ref 3.5–5.3)
RBC # BLD AUTO: 3.42 MILLION/UL (ref 3.88–5.62)
SODIUM SERPL-SCNC: 128 MMOL/L (ref 136–145)
WBC # BLD AUTO: 4.89 THOUSAND/UL (ref 4.31–10.16)

## 2017-08-06 PROCEDURE — 82948 REAGENT STRIP/BLOOD GLUCOSE: CPT

## 2017-08-06 PROCEDURE — 80048 BASIC METABOLIC PNL TOTAL CA: CPT | Performed by: INTERNAL MEDICINE

## 2017-08-06 PROCEDURE — 85025 COMPLETE CBC W/AUTO DIFF WBC: CPT | Performed by: INTERNAL MEDICINE

## 2017-08-06 RX ORDER — INSULIN GLARGINE 100 [IU]/ML
5 INJECTION, SOLUTION SUBCUTANEOUS EVERY 12 HOURS SCHEDULED
Status: DISCONTINUED | OUTPATIENT
Start: 2017-08-06 | End: 2017-08-12 | Stop reason: HOSPADM

## 2017-08-06 RX ORDER — WARFARIN SODIUM 2 MG/1
2 TABLET ORAL
Status: DISCONTINUED | OUTPATIENT
Start: 2017-08-06 | End: 2017-08-07

## 2017-08-06 RX ADMIN — LISINOPRIL 2.5 MG: 2.5 TABLET ORAL at 09:08

## 2017-08-06 RX ADMIN — METOPROLOL TARTRATE 100 MG: 50 TABLET ORAL at 21:26

## 2017-08-06 RX ADMIN — NICOTINE 1 PATCH: 14 PATCH, EXTENDED RELEASE TRANSDERMAL at 09:09

## 2017-08-06 RX ADMIN — GABAPENTIN 100 MG: 100 CAPSULE ORAL at 09:07

## 2017-08-06 RX ADMIN — Medication 250 MG: at 17:33

## 2017-08-06 RX ADMIN — PANTOPRAZOLE SODIUM 40 MG: 40 TABLET, DELAYED RELEASE ORAL at 05:36

## 2017-08-06 RX ADMIN — INSULIN LISPRO 3 UNITS: 100 INJECTION, SOLUTION INTRAVENOUS; SUBCUTANEOUS at 21:27

## 2017-08-06 RX ADMIN — ACETAMINOPHEN 650 MG: 325 TABLET, FILM COATED ORAL at 20:01

## 2017-08-06 RX ADMIN — INSULIN GLARGINE 5 UNITS: 100 INJECTION, SOLUTION SUBCUTANEOUS at 21:26

## 2017-08-06 RX ADMIN — CITALOPRAM HYDROBROMIDE 10 MG: 10 TABLET ORAL at 09:07

## 2017-08-06 RX ADMIN — GABAPENTIN 100 MG: 100 CAPSULE ORAL at 16:42

## 2017-08-06 RX ADMIN — VITAMIN D, TAB 1000IU (100/BT) 2000 UNITS: 25 TAB at 09:07

## 2017-08-06 RX ADMIN — WARFARIN SODIUM 2 MG: 2 TABLET ORAL at 17:44

## 2017-08-06 RX ADMIN — TEMAZEPAM 15 MG: 15 CAPSULE ORAL at 21:26

## 2017-08-06 RX ADMIN — ACETAMINOPHEN 650 MG: 325 TABLET, FILM COATED ORAL at 09:08

## 2017-08-06 RX ADMIN — Medication 250 MG: at 09:07

## 2017-08-06 RX ADMIN — METOPROLOL TARTRATE 100 MG: 50 TABLET ORAL at 09:08

## 2017-08-06 RX ADMIN — NICOTINE 1 PATCH: 14 PATCH, EXTENDED RELEASE TRANSDERMAL at 21:28

## 2017-08-06 RX ADMIN — INSULIN GLARGINE 5 UNITS: 100 INJECTION, SOLUTION SUBCUTANEOUS at 09:26

## 2017-08-06 RX ADMIN — LEVOTHYROXINE SODIUM 200 MCG: 100 TABLET ORAL at 05:35

## 2017-08-07 ENCOUNTER — APPOINTMENT (OUTPATIENT)
Dept: DIALYSIS | Facility: HOSPITAL | Age: 67
DRG: 070 | End: 2017-08-07
Payer: COMMERCIAL

## 2017-08-07 LAB
ANION GAP SERPL CALCULATED.3IONS-SCNC: 12 MMOL/L (ref 4–13)
APTT PPP: 37 SECONDS (ref 23–35)
APTT PPP: 60 SECONDS (ref 23–35)
BASOPHILS # BLD AUTO: 0.09 THOUSANDS/ΜL (ref 0–0.1)
BASOPHILS NFR BLD AUTO: 2 % (ref 0–1)
BUN SERPL-MCNC: 53 MG/DL (ref 5–25)
CALCIUM SERPL-MCNC: 8.4 MG/DL (ref 8.3–10.1)
CHLORIDE SERPL-SCNC: 90 MMOL/L (ref 100–108)
CO2 SERPL-SCNC: 23 MMOL/L (ref 21–32)
CREAT SERPL-MCNC: 5.61 MG/DL (ref 0.6–1.3)
EOSINOPHIL # BLD AUTO: 0.39 THOUSAND/ΜL (ref 0–0.61)
EOSINOPHIL NFR BLD AUTO: 6 % (ref 0–6)
ERYTHROCYTE [DISTWIDTH] IN BLOOD BY AUTOMATED COUNT: 14.6 % (ref 11.6–15.1)
ERYTHROCYTE [DISTWIDTH] IN BLOOD BY AUTOMATED COUNT: 14.6 % (ref 11.6–15.1)
GFR SERPL CREATININE-BSD FRML MDRD: 10 ML/MIN/1.73SQ M
GLUCOSE SERPL-MCNC: 120 MG/DL (ref 65–140)
GLUCOSE SERPL-MCNC: 168 MG/DL (ref 65–140)
GLUCOSE SERPL-MCNC: 182 MG/DL (ref 65–140)
GLUCOSE SERPL-MCNC: 193 MG/DL (ref 65–140)
GLUCOSE SERPL-MCNC: 221 MG/DL (ref 65–140)
HCT VFR BLD AUTO: 28.3 % (ref 36.5–49.3)
HCT VFR BLD AUTO: 31 % (ref 36.5–49.3)
HGB BLD-MCNC: 9.3 G/DL (ref 12–17)
HGB BLD-MCNC: 9.8 G/DL (ref 12–17)
INR PPP: 1.66 (ref 0.86–1.16)
INR PPP: 1.66 (ref 0.86–1.16)
LYMPHOCYTES # BLD AUTO: 1.03 THOUSANDS/ΜL (ref 0.6–4.47)
LYMPHOCYTES NFR BLD AUTO: 17 % (ref 14–44)
MCH RBC QN AUTO: 29.9 PG (ref 26.8–34.3)
MCH RBC QN AUTO: 30.6 PG (ref 26.8–34.3)
MCHC RBC AUTO-ENTMCNC: 31.6 G/DL (ref 31.4–37.4)
MCHC RBC AUTO-ENTMCNC: 32.9 G/DL (ref 31.4–37.4)
MCV RBC AUTO: 93 FL (ref 82–98)
MCV RBC AUTO: 95 FL (ref 82–98)
MONOCYTES # BLD AUTO: 0.46 THOUSAND/ΜL (ref 0.17–1.22)
MONOCYTES NFR BLD AUTO: 8 % (ref 4–12)
NEUTROPHILS # BLD AUTO: 4.19 THOUSANDS/ΜL (ref 1.85–7.62)
NEUTS SEG NFR BLD AUTO: 67 % (ref 43–75)
NRBC BLD AUTO-RTO: 0 /100 WBCS
PLATELET # BLD AUTO: 127 THOUSANDS/UL (ref 149–390)
PLATELET # BLD AUTO: 181 THOUSANDS/UL (ref 149–390)
PMV BLD AUTO: 10.2 FL (ref 8.9–12.7)
PMV BLD AUTO: 9.7 FL (ref 8.9–12.7)
POTASSIUM SERPL-SCNC: 4.8 MMOL/L (ref 3.5–5.3)
PROTHROMBIN TIME: 19.7 SECONDS (ref 12.1–14.4)
PROTHROMBIN TIME: 19.7 SECONDS (ref 12.1–14.4)
RBC # BLD AUTO: 3.04 MILLION/UL (ref 3.88–5.62)
RBC # BLD AUTO: 3.28 MILLION/UL (ref 3.88–5.62)
SODIUM SERPL-SCNC: 125 MMOL/L (ref 136–145)
WBC # BLD AUTO: 5.52 THOUSAND/UL (ref 4.31–10.16)
WBC # BLD AUTO: 6.17 THOUSAND/UL (ref 4.31–10.16)

## 2017-08-07 PROCEDURE — 97535 SELF CARE MNGMENT TRAINING: CPT

## 2017-08-07 PROCEDURE — 85025 COMPLETE CBC W/AUTO DIFF WBC: CPT | Performed by: INTERNAL MEDICINE

## 2017-08-07 PROCEDURE — 80048 BASIC METABOLIC PNL TOTAL CA: CPT | Performed by: INTERNAL MEDICINE

## 2017-08-07 PROCEDURE — 97110 THERAPEUTIC EXERCISES: CPT

## 2017-08-07 PROCEDURE — 85610 PROTHROMBIN TIME: CPT | Performed by: NURSE PRACTITIONER

## 2017-08-07 PROCEDURE — 97530 THERAPEUTIC ACTIVITIES: CPT

## 2017-08-07 PROCEDURE — 85730 THROMBOPLASTIN TIME PARTIAL: CPT | Performed by: NURSE PRACTITIONER

## 2017-08-07 PROCEDURE — 82948 REAGENT STRIP/BLOOD GLUCOSE: CPT

## 2017-08-07 PROCEDURE — G0257 UNSCHED DIALYSIS ESRD PT HOS: HCPCS

## 2017-08-07 PROCEDURE — 85027 COMPLETE CBC AUTOMATED: CPT | Performed by: NURSE PRACTITIONER

## 2017-08-07 RX ORDER — HEPARIN SODIUM 1000 [USP'U]/ML
2200 INJECTION, SOLUTION INTRAVENOUS; SUBCUTANEOUS AS NEEDED
Status: DISCONTINUED | OUTPATIENT
Start: 2017-08-07 | End: 2017-08-10

## 2017-08-07 RX ORDER — HEPARIN SODIUM 1000 [USP'U]/ML
4400 INJECTION, SOLUTION INTRAVENOUS; SUBCUTANEOUS AS NEEDED
Status: DISCONTINUED | OUTPATIENT
Start: 2017-08-07 | End: 2017-08-10

## 2017-08-07 RX ORDER — WARFARIN SODIUM 5 MG/1
5 TABLET ORAL
Status: DISCONTINUED | OUTPATIENT
Start: 2017-08-07 | End: 2017-08-08

## 2017-08-07 RX ORDER — HEPARIN SODIUM 10000 [USP'U]/100ML
3-30 INJECTION, SOLUTION INTRAVENOUS
Status: DISCONTINUED | OUTPATIENT
Start: 2017-08-07 | End: 2017-08-10

## 2017-08-07 RX ADMIN — TEMAZEPAM 15 MG: 15 CAPSULE ORAL at 21:36

## 2017-08-07 RX ADMIN — NICOTINE 1 PATCH: 14 PATCH, EXTENDED RELEASE TRANSDERMAL at 21:35

## 2017-08-07 RX ADMIN — INSULIN GLARGINE 5 UNITS: 100 INJECTION, SOLUTION SUBCUTANEOUS at 21:36

## 2017-08-07 RX ADMIN — PANTOPRAZOLE SODIUM 40 MG: 40 TABLET, DELAYED RELEASE ORAL at 05:17

## 2017-08-07 RX ADMIN — ONDANSETRON 4 MG: 2 INJECTION INTRAMUSCULAR; INTRAVENOUS at 16:43

## 2017-08-07 RX ADMIN — INSULIN GLARGINE 5 UNITS: 100 INJECTION, SOLUTION SUBCUTANEOUS at 07:21

## 2017-08-07 RX ADMIN — LISINOPRIL 2.5 MG: 2.5 TABLET ORAL at 07:39

## 2017-08-07 RX ADMIN — HEPARIN SODIUM 18 UNITS/KG/HR: 10000 INJECTION, SOLUTION INTRAVENOUS at 14:18

## 2017-08-07 RX ADMIN — INSULIN LISPRO 2 UNITS: 100 INJECTION, SOLUTION INTRAVENOUS; SUBCUTANEOUS at 21:37

## 2017-08-07 RX ADMIN — GABAPENTIN 100 MG: 100 CAPSULE ORAL at 17:48

## 2017-08-07 RX ADMIN — WARFARIN SODIUM 5 MG: 5 TABLET ORAL at 17:48

## 2017-08-07 RX ADMIN — METOPROLOL TARTRATE 100 MG: 50 TABLET ORAL at 07:39

## 2017-08-07 RX ADMIN — GABAPENTIN 100 MG: 100 CAPSULE ORAL at 07:21

## 2017-08-07 RX ADMIN — CITALOPRAM HYDROBROMIDE 10 MG: 10 TABLET ORAL at 07:23

## 2017-08-07 RX ADMIN — VITAMIN D, TAB 1000IU (100/BT) 2000 UNITS: 25 TAB at 07:22

## 2017-08-07 RX ADMIN — METOPROLOL TARTRATE 100 MG: 50 TABLET ORAL at 21:35

## 2017-08-07 RX ADMIN — Medication 250 MG: at 17:48

## 2017-08-07 RX ADMIN — Medication 250 MG: at 07:22

## 2017-08-08 PROBLEM — R53.83 LETHARGY: Status: RESOLVED | Noted: 2017-02-18 | Resolved: 2017-08-08

## 2017-08-08 PROBLEM — I10 HTN (HYPERTENSION): Status: ACTIVE | Noted: 2017-08-08

## 2017-08-08 PROBLEM — E87.1 HYPONATREMIA: Status: ACTIVE | Noted: 2017-08-08

## 2017-08-08 PROBLEM — I82.409 DVT, LOWER EXTREMITY (HCC): Status: ACTIVE | Noted: 2017-08-08

## 2017-08-08 PROBLEM — Z71.89 GOALS OF CARE, COUNSELING/DISCUSSION: Status: ACTIVE | Noted: 2017-08-08

## 2017-08-08 LAB
APTT PPP: 48 SECONDS (ref 23–35)
APTT PPP: 53 SECONDS (ref 23–35)
APTT PPP: 64 SECONDS (ref 23–35)
BACTERIA BLD CULT: NORMAL
BACTERIA BLD CULT: NORMAL
GLUCOSE SERPL-MCNC: 175 MG/DL (ref 65–140)
GLUCOSE SERPL-MCNC: 226 MG/DL (ref 65–140)
GLUCOSE SERPL-MCNC: 326 MG/DL (ref 65–140)
GLUCOSE SERPL-MCNC: 390 MG/DL (ref 65–140)
INR PPP: 1.5 (ref 0.86–1.16)
PROTHROMBIN TIME: 18.2 SECONDS (ref 12.1–14.4)

## 2017-08-08 PROCEDURE — 84295 ASSAY OF SERUM SODIUM: CPT

## 2017-08-08 PROCEDURE — 82947 ASSAY GLUCOSE BLOOD QUANT: CPT

## 2017-08-08 PROCEDURE — 85610 PROTHROMBIN TIME: CPT | Performed by: NURSE PRACTITIONER

## 2017-08-08 PROCEDURE — 85014 HEMATOCRIT: CPT

## 2017-08-08 PROCEDURE — 82948 REAGENT STRIP/BLOOD GLUCOSE: CPT

## 2017-08-08 PROCEDURE — 85730 THROMBOPLASTIN TIME PARTIAL: CPT | Performed by: INTERNAL MEDICINE

## 2017-08-08 PROCEDURE — 82803 BLOOD GASES ANY COMBINATION: CPT

## 2017-08-08 PROCEDURE — 82330 ASSAY OF CALCIUM: CPT

## 2017-08-08 PROCEDURE — 84132 ASSAY OF SERUM POTASSIUM: CPT

## 2017-08-08 PROCEDURE — 85730 THROMBOPLASTIN TIME PARTIAL: CPT | Performed by: NURSE PRACTITIONER

## 2017-08-08 RX ORDER — WARFARIN SODIUM 7.5 MG/1
7.5 TABLET ORAL
Status: DISCONTINUED | OUTPATIENT
Start: 2017-08-08 | End: 2017-08-10

## 2017-08-08 RX ADMIN — INSULIN GLARGINE 5 UNITS: 100 INJECTION, SOLUTION SUBCUTANEOUS at 09:13

## 2017-08-08 RX ADMIN — INSULIN LISPRO 5 UNITS: 100 INJECTION, SOLUTION INTRAVENOUS; SUBCUTANEOUS at 21:39

## 2017-08-08 RX ADMIN — METOPROLOL TARTRATE 100 MG: 50 TABLET ORAL at 09:01

## 2017-08-08 RX ADMIN — HEPARIN SODIUM 2200 UNITS: 1000 INJECTION, SOLUTION INTRAVENOUS; SUBCUTANEOUS at 18:01

## 2017-08-08 RX ADMIN — NICOTINE 1 PATCH: 14 PATCH, EXTENDED RELEASE TRANSDERMAL at 21:40

## 2017-08-08 RX ADMIN — PANTOPRAZOLE SODIUM 40 MG: 40 TABLET, DELAYED RELEASE ORAL at 05:21

## 2017-08-08 RX ADMIN — INSULIN GLARGINE 5 UNITS: 100 INJECTION, SOLUTION SUBCUTANEOUS at 21:40

## 2017-08-08 RX ADMIN — GABAPENTIN 100 MG: 100 CAPSULE ORAL at 09:01

## 2017-08-08 RX ADMIN — CITALOPRAM HYDROBROMIDE 10 MG: 10 TABLET ORAL at 09:01

## 2017-08-08 RX ADMIN — WARFARIN SODIUM 7.5 MG: 7.5 TABLET ORAL at 17:01

## 2017-08-08 RX ADMIN — LEVOTHYROXINE SODIUM 200 MCG: 100 TABLET ORAL at 05:21

## 2017-08-08 RX ADMIN — LISINOPRIL 2.5 MG: 2.5 TABLET ORAL at 09:01

## 2017-08-08 RX ADMIN — GABAPENTIN 100 MG: 100 CAPSULE ORAL at 16:56

## 2017-08-08 RX ADMIN — Medication 250 MG: at 09:01

## 2017-08-08 RX ADMIN — OXYCODONE HYDROCHLORIDE AND ACETAMINOPHEN 1 TABLET: 5; 325 TABLET ORAL at 20:20

## 2017-08-08 RX ADMIN — METOPROLOL TARTRATE 100 MG: 50 TABLET ORAL at 21:38

## 2017-08-08 RX ADMIN — VITAMIN D, TAB 1000IU (100/BT) 2000 UNITS: 25 TAB at 09:01

## 2017-08-08 RX ADMIN — HEPARIN SODIUM 20 UNITS/KG/HR: 10000 INJECTION, SOLUTION INTRAVENOUS at 09:14

## 2017-08-08 RX ADMIN — TEMAZEPAM 15 MG: 15 CAPSULE ORAL at 21:44

## 2017-08-08 RX ADMIN — HEPARIN SODIUM 2200 UNITS: 1000 INJECTION, SOLUTION INTRAVENOUS; SUBCUTANEOUS at 04:17

## 2017-08-08 RX ADMIN — Medication 250 MG: at 17:01

## 2017-08-09 ENCOUNTER — APPOINTMENT (OUTPATIENT)
Dept: DIALYSIS | Facility: HOSPITAL | Age: 67
DRG: 070 | End: 2017-08-09
Payer: COMMERCIAL

## 2017-08-09 ENCOUNTER — APPOINTMENT (OUTPATIENT)
Dept: RADIOLOGY | Facility: HOSPITAL | Age: 67
DRG: 070 | End: 2017-08-09
Payer: COMMERCIAL

## 2017-08-09 PROBLEM — R13.10 DYSPHAGIA: Status: RESOLVED | Noted: 2017-06-16 | Resolved: 2017-08-09

## 2017-08-09 LAB
ALBUMIN SERPL BCP-MCNC: 2.3 G/DL (ref 3.5–5)
ALP SERPL-CCNC: 189 U/L (ref 46–116)
ALT SERPL W P-5'-P-CCNC: 16 U/L (ref 12–78)
AMMONIA PLAS-SCNC: 26 UMOL/L (ref 11–35)
ANION GAP SERPL CALCULATED.3IONS-SCNC: 10 MMOL/L (ref 4–13)
ANION GAP SERPL CALCULATED.3IONS-SCNC: 11 MMOL/L (ref 4–13)
APTT PPP: 135 SECONDS (ref 23–35)
APTT PPP: 152 SECONDS (ref 23–35)
APTT PPP: 41 SECONDS (ref 23–35)
AST SERPL W P-5'-P-CCNC: 11 U/L (ref 5–45)
BASE EXCESS BLDA CALC-SCNC: 1 MMOL/L (ref -2–3)
BASOPHILS # BLD AUTO: 0.06 THOUSANDS/ΜL (ref 0–0.1)
BASOPHILS NFR BLD AUTO: 1 % (ref 0–1)
BILIRUB SERPL-MCNC: 0.38 MG/DL (ref 0.2–1)
BUN SERPL-MCNC: 40 MG/DL (ref 5–25)
BUN SERPL-MCNC: 42 MG/DL (ref 5–25)
CA-I BLD-SCNC: 1.09 MMOL/L (ref 1.12–1.32)
CALCIUM SERPL-MCNC: 7.8 MG/DL (ref 8.3–10.1)
CALCIUM SERPL-MCNC: 7.9 MG/DL (ref 8.3–10.1)
CHLORIDE SERPL-SCNC: 88 MMOL/L (ref 100–108)
CHLORIDE SERPL-SCNC: 88 MMOL/L (ref 100–108)
CO2 SERPL-SCNC: 26 MMOL/L (ref 21–32)
CO2 SERPL-SCNC: 26 MMOL/L (ref 21–32)
CREAT SERPL-MCNC: 4.65 MG/DL (ref 0.6–1.3)
CREAT SERPL-MCNC: 4.73 MG/DL (ref 0.6–1.3)
EOSINOPHIL # BLD AUTO: 0.21 THOUSAND/ΜL (ref 0–0.61)
EOSINOPHIL NFR BLD AUTO: 4 % (ref 0–6)
ERYTHROCYTE [DISTWIDTH] IN BLOOD BY AUTOMATED COUNT: 14.7 % (ref 11.6–15.1)
FIO2 GAS DIL.REBREATH: 32 L
GFR SERPL CREATININE-BSD FRML MDRD: 12 ML/MIN/1.73SQ M
GFR SERPL CREATININE-BSD FRML MDRD: 12 ML/MIN/1.73SQ M
GLUCOSE P FAST SERPL-MCNC: 164 MG/DL (ref 65–99)
GLUCOSE SERPL-MCNC: 123 MG/DL (ref 65–140)
GLUCOSE SERPL-MCNC: 155 MG/DL (ref 65–140)
GLUCOSE SERPL-MCNC: 164 MG/DL (ref 65–140)
GLUCOSE SERPL-MCNC: 260 MG/DL (ref 65–140)
GLUCOSE SERPL-MCNC: 261 MG/DL (ref 65–140)
GLUCOSE SERPL-MCNC: 279 MG/DL (ref 65–140)
GLUCOSE SERPL-MCNC: 310 MG/DL (ref 65–140)
GLUCOSE SERPL-MCNC: 323 MG/DL (ref 65–140)
HCO3 BLDA-SCNC: 26.2 MMOL/L (ref 22–28)
HCT VFR BLD AUTO: 27.5 % (ref 36.5–49.3)
HCT VFR BLD CALC: 30 % (ref 36.5–49.3)
HGB BLD-MCNC: 9 G/DL (ref 12–17)
HGB BLDA-MCNC: 10.2 G/DL (ref 12–17)
INR PPP: 1.73 (ref 0.86–1.16)
LYMPHOCYTES # BLD AUTO: 1.47 THOUSANDS/ΜL (ref 0.6–4.47)
LYMPHOCYTES NFR BLD AUTO: 26 % (ref 14–44)
MCH RBC QN AUTO: 30.4 PG (ref 26.8–34.3)
MCHC RBC AUTO-ENTMCNC: 32.7 G/DL (ref 31.4–37.4)
MCV RBC AUTO: 93 FL (ref 82–98)
MONOCYTES # BLD AUTO: 0.66 THOUSAND/ΜL (ref 0.17–1.22)
MONOCYTES NFR BLD AUTO: 12 % (ref 4–12)
NEUTROPHILS # BLD AUTO: 3.31 THOUSANDS/ΜL (ref 1.85–7.62)
NEUTS SEG NFR BLD AUTO: 57 % (ref 43–75)
NRBC BLD AUTO-RTO: 0 /100 WBCS
PCO2 BLD: 28 MMOL/L (ref 21–32)
PCO2 BLD: 45 MM HG (ref 36–44)
PH BLD: 7.37 [PH] (ref 7.35–7.45)
PHOSPHATE SERPL-MCNC: 5 MG/DL (ref 2.3–4.1)
PLATELET # BLD AUTO: 141 THOUSANDS/UL (ref 149–390)
PMV BLD AUTO: 10.2 FL (ref 8.9–12.7)
PO2 BLD: 93 MM HG (ref 75–129)
POTASSIUM BLD-SCNC: 4.7 MMOL/L (ref 3.5–5.3)
POTASSIUM SERPL-SCNC: 4.4 MMOL/L (ref 3.5–5.3)
POTASSIUM SERPL-SCNC: 4.5 MMOL/L (ref 3.5–5.3)
PROT SERPL-MCNC: 7.1 G/DL (ref 6.4–8.2)
PROTHROMBIN TIME: 20.4 SECONDS (ref 12.1–14.4)
RBC # BLD AUTO: 2.96 MILLION/UL (ref 3.88–5.62)
SAO2 % BLD FROM PO2: 97 % (ref 95–98)
SODIUM BLD-SCNC: 124 MMOL/L (ref 136–145)
SODIUM SERPL-SCNC: 124 MMOL/L (ref 136–145)
SODIUM SERPL-SCNC: 125 MMOL/L (ref 136–145)
SPECIMEN SOURCE: ABNORMAL
WBC # BLD AUTO: 5.72 THOUSAND/UL (ref 4.31–10.16)

## 2017-08-09 PROCEDURE — 85610 PROTHROMBIN TIME: CPT | Performed by: NURSE PRACTITIONER

## 2017-08-09 PROCEDURE — 85730 THROMBOPLASTIN TIME PARTIAL: CPT | Performed by: PHYSICIAN ASSISTANT

## 2017-08-09 PROCEDURE — G0257 UNSCHED DIALYSIS ESRD PT HOS: HCPCS

## 2017-08-09 PROCEDURE — 85025 COMPLETE CBC W/AUTO DIFF WBC: CPT | Performed by: PHYSICIAN ASSISTANT

## 2017-08-09 PROCEDURE — 80048 BASIC METABOLIC PNL TOTAL CA: CPT | Performed by: INTERNAL MEDICINE

## 2017-08-09 PROCEDURE — 85730 THROMBOPLASTIN TIME PARTIAL: CPT | Performed by: INTERNAL MEDICINE

## 2017-08-09 PROCEDURE — 84100 ASSAY OF PHOSPHORUS: CPT | Performed by: INTERNAL MEDICINE

## 2017-08-09 PROCEDURE — 70450 CT HEAD/BRAIN W/O DYE: CPT

## 2017-08-09 PROCEDURE — 36600 WITHDRAWAL OF ARTERIAL BLOOD: CPT

## 2017-08-09 PROCEDURE — 85730 THROMBOPLASTIN TIME PARTIAL: CPT | Performed by: NURSE PRACTITIONER

## 2017-08-09 PROCEDURE — 82140 ASSAY OF AMMONIA: CPT | Performed by: PHYSICIAN ASSISTANT

## 2017-08-09 PROCEDURE — 82948 REAGENT STRIP/BLOOD GLUCOSE: CPT

## 2017-08-09 PROCEDURE — 80053 COMPREHEN METABOLIC PANEL: CPT | Performed by: PHYSICIAN ASSISTANT

## 2017-08-09 RX ORDER — OXYCODONE HYDROCHLORIDE AND ACETAMINOPHEN 5; 325 MG/1; MG/1
1 TABLET ORAL ONCE
Status: COMPLETED | OUTPATIENT
Start: 2017-08-09 | End: 2017-08-09

## 2017-08-09 RX ADMIN — PANTOPRAZOLE SODIUM 40 MG: 40 TABLET, DELAYED RELEASE ORAL at 05:40

## 2017-08-09 RX ADMIN — Medication 250 MG: at 07:25

## 2017-08-09 RX ADMIN — LISINOPRIL 2.5 MG: 2.5 TABLET ORAL at 07:25

## 2017-08-09 RX ADMIN — Medication 250 MG: at 17:11

## 2017-08-09 RX ADMIN — HEPARIN SODIUM 26 UNITS/KG/HR: 10000 INJECTION, SOLUTION INTRAVENOUS at 07:28

## 2017-08-09 RX ADMIN — METOPROLOL TARTRATE 100 MG: 50 TABLET ORAL at 22:00

## 2017-08-09 RX ADMIN — WARFARIN SODIUM 7.5 MG: 7.5 TABLET ORAL at 17:11

## 2017-08-09 RX ADMIN — INSULIN GLARGINE 5 UNITS: 100 INJECTION, SOLUTION SUBCUTANEOUS at 21:59

## 2017-08-09 RX ADMIN — INSULIN LISPRO 5 UNITS: 100 INJECTION, SOLUTION INTRAVENOUS; SUBCUTANEOUS at 22:01

## 2017-08-09 RX ADMIN — HEPARIN SODIUM 4400 UNITS: 1000 INJECTION, SOLUTION INTRAVENOUS; SUBCUTANEOUS at 01:59

## 2017-08-09 RX ADMIN — NICOTINE 1 PATCH: 14 PATCH, EXTENDED RELEASE TRANSDERMAL at 22:00

## 2017-08-09 RX ADMIN — INSULIN GLARGINE 5 UNITS: 100 INJECTION, SOLUTION SUBCUTANEOUS at 07:25

## 2017-08-09 RX ADMIN — TEMAZEPAM 15 MG: 15 CAPSULE ORAL at 22:00

## 2017-08-09 RX ADMIN — VITAMIN D, TAB 1000IU (100/BT) 2000 UNITS: 25 TAB at 07:25

## 2017-08-09 RX ADMIN — CITALOPRAM HYDROBROMIDE 10 MG: 10 TABLET ORAL at 07:25

## 2017-08-09 RX ADMIN — OXYCODONE HYDROCHLORIDE AND ACETAMINOPHEN 1 TABLET: 5; 325 TABLET ORAL at 19:35

## 2017-08-10 PROBLEM — G93.40 ENCEPHALOPATHY ACUTE: Status: RESOLVED | Noted: 2017-02-18 | Resolved: 2017-08-10

## 2017-08-10 PROBLEM — R41.82 ALTERED MENTAL STATUS: Status: RESOLVED | Noted: 2017-08-03 | Resolved: 2017-08-10

## 2017-08-10 PROBLEM — G93.40 ACUTE ENCEPHALOPATHY: Status: ACTIVE | Noted: 2017-08-10

## 2017-08-10 PROBLEM — R55 SYNCOPE: Status: RESOLVED | Noted: 2017-07-07 | Resolved: 2017-08-10

## 2017-08-10 PROBLEM — R10.13 EPIGASTRIC PAIN: Chronic | Status: RESOLVED | Noted: 2017-07-11 | Resolved: 2017-08-10

## 2017-08-10 LAB
APTT PPP: 44 SECONDS (ref 23–35)
APTT PPP: 65 SECONDS (ref 23–35)
CORTIS AM PEAK SERPL-MCNC: 13.5 UG/DL (ref 4.2–22.4)
GLUCOSE SERPL-MCNC: 191 MG/DL (ref 65–140)
GLUCOSE SERPL-MCNC: 202 MG/DL (ref 65–140)
GLUCOSE SERPL-MCNC: 256 MG/DL (ref 65–140)
GLUCOSE SERPL-MCNC: 308 MG/DL (ref 65–140)
INR PPP: 2.7 (ref 0.86–1.16)
PROTHROMBIN TIME: 29 SECONDS (ref 12.1–14.4)

## 2017-08-10 PROCEDURE — 85610 PROTHROMBIN TIME: CPT | Performed by: NURSE PRACTITIONER

## 2017-08-10 PROCEDURE — 82948 REAGENT STRIP/BLOOD GLUCOSE: CPT

## 2017-08-10 PROCEDURE — 97110 THERAPEUTIC EXERCISES: CPT

## 2017-08-10 PROCEDURE — 85730 THROMBOPLASTIN TIME PARTIAL: CPT | Performed by: NURSE PRACTITIONER

## 2017-08-10 PROCEDURE — 97530 THERAPEUTIC ACTIVITIES: CPT

## 2017-08-10 PROCEDURE — 82533 TOTAL CORTISOL: CPT | Performed by: INTERNAL MEDICINE

## 2017-08-10 RX ORDER — WARFARIN SODIUM 5 MG/1
5 TABLET ORAL
Status: DISCONTINUED | OUTPATIENT
Start: 2017-08-10 | End: 2017-08-11

## 2017-08-10 RX ORDER — OXYCODONE HCL 5 MG/5 ML
2 SOLUTION, ORAL ORAL EVERY 6 HOURS PRN
Status: DISCONTINUED | OUTPATIENT
Start: 2017-08-10 | End: 2017-08-12 | Stop reason: HOSPADM

## 2017-08-10 RX ORDER — WARFARIN SODIUM 5 MG/1
5 TABLET ORAL
Refills: 0 | Status: CANCELLED
Start: 2017-08-10

## 2017-08-10 RX ADMIN — INSULIN GLARGINE 5 UNITS: 100 INJECTION, SOLUTION SUBCUTANEOUS at 21:14

## 2017-08-10 RX ADMIN — OXYCODONE HYDROCHLORIDE 2 MG: 5 SOLUTION ORAL at 20:45

## 2017-08-10 RX ADMIN — METOPROLOL TARTRATE 100 MG: 50 TABLET ORAL at 08:21

## 2017-08-10 RX ADMIN — Medication 250 MG: at 08:21

## 2017-08-10 RX ADMIN — INSULIN GLARGINE 5 UNITS: 100 INJECTION, SOLUTION SUBCUTANEOUS at 08:25

## 2017-08-10 RX ADMIN — NICOTINE 1 PATCH: 14 PATCH, EXTENDED RELEASE TRANSDERMAL at 21:15

## 2017-08-10 RX ADMIN — Medication 250 MG: at 17:10

## 2017-08-10 RX ADMIN — VITAMIN D, TAB 1000IU (100/BT) 2000 UNITS: 25 TAB at 08:21

## 2017-08-10 RX ADMIN — INSULIN LISPRO 4 UNITS: 100 INJECTION, SOLUTION INTRAVENOUS; SUBCUTANEOUS at 21:14

## 2017-08-10 RX ADMIN — PANTOPRAZOLE SODIUM 40 MG: 40 TABLET, DELAYED RELEASE ORAL at 05:07

## 2017-08-10 RX ADMIN — CITALOPRAM HYDROBROMIDE 10 MG: 10 TABLET ORAL at 08:21

## 2017-08-10 RX ADMIN — WARFARIN SODIUM 5 MG: 5 TABLET ORAL at 17:10

## 2017-08-10 RX ADMIN — LEVOTHYROXINE SODIUM 200 MCG: 100 TABLET ORAL at 05:07

## 2017-08-10 RX ADMIN — HEPARIN SODIUM 22 UNITS/KG/HR: 10000 INJECTION, SOLUTION INTRAVENOUS at 05:10

## 2017-08-10 RX ADMIN — HEPARIN SODIUM 2200 UNITS: 1000 INJECTION, SOLUTION INTRAVENOUS; SUBCUTANEOUS at 02:00

## 2017-08-10 RX ADMIN — METOPROLOL TARTRATE 100 MG: 50 TABLET ORAL at 21:13

## 2017-08-10 RX ADMIN — LISINOPRIL 2.5 MG: 2.5 TABLET ORAL at 08:21

## 2017-08-10 RX ADMIN — TEMAZEPAM 15 MG: 15 CAPSULE ORAL at 23:00

## 2017-08-11 ENCOUNTER — APPOINTMENT (INPATIENT)
Dept: DIALYSIS | Facility: HOSPITAL | Age: 67
DRG: 070 | End: 2017-08-11
Attending: INTERNAL MEDICINE
Payer: COMMERCIAL

## 2017-08-11 LAB
ANION GAP SERPL CALCULATED.3IONS-SCNC: 10 MMOL/L (ref 4–13)
BASOPHILS # BLD AUTO: 0.09 THOUSANDS/ΜL (ref 0–0.1)
BASOPHILS NFR BLD AUTO: 2 % (ref 0–1)
BUN SERPL-MCNC: 41 MG/DL (ref 5–25)
CALCIUM SERPL-MCNC: 8.6 MG/DL (ref 8.3–10.1)
CHLORIDE SERPL-SCNC: 88 MMOL/L (ref 100–108)
CO2 SERPL-SCNC: 24 MMOL/L (ref 21–32)
CREAT SERPL-MCNC: 4.77 MG/DL (ref 0.6–1.3)
EOSINOPHIL # BLD AUTO: 0.17 THOUSAND/ΜL (ref 0–0.61)
EOSINOPHIL NFR BLD AUTO: 3 % (ref 0–6)
ERYTHROCYTE [DISTWIDTH] IN BLOOD BY AUTOMATED COUNT: 14.5 % (ref 11.6–15.1)
GFR SERPL CREATININE-BSD FRML MDRD: 12 ML/MIN/1.73SQ M
GLUCOSE SERPL-MCNC: 104 MG/DL (ref 65–140)
GLUCOSE SERPL-MCNC: 120 MG/DL (ref 65–140)
GLUCOSE SERPL-MCNC: 312 MG/DL (ref 65–140)
GLUCOSE SERPL-MCNC: 59 MG/DL (ref 65–140)
GLUCOSE SERPL-MCNC: 78 MG/DL (ref 65–140)
GLUCOSE SERPL-MCNC: 82 MG/DL (ref 65–140)
GLUCOSE SERPL-MCNC: 86 MG/DL (ref 65–140)
HCT VFR BLD AUTO: 34.4 % (ref 36.5–49.3)
HGB BLD-MCNC: 11 G/DL (ref 12–17)
INR PPP: 1.97 (ref 0.86–1.16)
LYMPHOCYTES # BLD AUTO: 1.26 THOUSANDS/ΜL (ref 0.6–4.47)
LYMPHOCYTES NFR BLD AUTO: 22 % (ref 14–44)
MCH RBC QN AUTO: 29.9 PG (ref 26.8–34.3)
MCHC RBC AUTO-ENTMCNC: 32 G/DL (ref 31.4–37.4)
MCV RBC AUTO: 94 FL (ref 82–98)
MONOCYTES # BLD AUTO: 1.07 THOUSAND/ΜL (ref 0.17–1.22)
MONOCYTES NFR BLD AUTO: 19 % (ref 4–12)
NEUTROPHILS # BLD AUTO: 3.16 THOUSANDS/ΜL (ref 1.85–7.62)
NEUTS SEG NFR BLD AUTO: 54 % (ref 43–75)
NRBC BLD AUTO-RTO: 0 /100 WBCS
PLATELET # BLD AUTO: 134 THOUSANDS/UL (ref 149–390)
PMV BLD AUTO: 10.8 FL (ref 8.9–12.7)
POTASSIUM SERPL-SCNC: 4.6 MMOL/L (ref 3.5–5.3)
PROTHROMBIN TIME: 22.6 SECONDS (ref 12.1–14.4)
RBC # BLD AUTO: 3.68 MILLION/UL (ref 3.88–5.62)
SODIUM SERPL-SCNC: 122 MMOL/L (ref 136–145)
WBC # BLD AUTO: 5.75 THOUSAND/UL (ref 4.31–10.16)

## 2017-08-11 PROCEDURE — 85610 PROTHROMBIN TIME: CPT | Performed by: NURSE PRACTITIONER

## 2017-08-11 PROCEDURE — 80048 BASIC METABOLIC PNL TOTAL CA: CPT | Performed by: NURSE PRACTITIONER

## 2017-08-11 PROCEDURE — 82948 REAGENT STRIP/BLOOD GLUCOSE: CPT

## 2017-08-11 PROCEDURE — 85025 COMPLETE CBC W/AUTO DIFF WBC: CPT | Performed by: NURSE PRACTITIONER

## 2017-08-11 RX ORDER — WARFARIN SODIUM 7.5 MG/1
7.5 TABLET ORAL
Status: DISCONTINUED | OUTPATIENT
Start: 2017-08-11 | End: 2017-08-12 | Stop reason: HOSPADM

## 2017-08-11 RX ADMIN — INSULIN GLARGINE 5 UNITS: 100 INJECTION, SOLUTION SUBCUTANEOUS at 21:11

## 2017-08-11 RX ADMIN — Medication 250 MG: at 17:20

## 2017-08-11 RX ADMIN — VITAMIN D, TAB 1000IU (100/BT) 2000 UNITS: 25 TAB at 14:08

## 2017-08-11 RX ADMIN — METOPROLOL TARTRATE 100 MG: 50 TABLET ORAL at 14:09

## 2017-08-11 RX ADMIN — INSULIN LISPRO 5 UNITS: 100 INJECTION, SOLUTION INTRAVENOUS; SUBCUTANEOUS at 21:11

## 2017-08-11 RX ADMIN — ONDANSETRON 4 MG: 2 INJECTION INTRAMUSCULAR; INTRAVENOUS at 11:04

## 2017-08-11 RX ADMIN — PANTOPRAZOLE SODIUM 40 MG: 40 TABLET, DELAYED RELEASE ORAL at 04:53

## 2017-08-11 RX ADMIN — WARFARIN SODIUM 7.5 MG: 7.5 TABLET ORAL at 17:21

## 2017-08-11 RX ADMIN — TEMAZEPAM 15 MG: 15 CAPSULE ORAL at 22:21

## 2017-08-11 RX ADMIN — METOPROLOL TARTRATE 100 MG: 50 TABLET ORAL at 20:56

## 2017-08-11 RX ADMIN — LISINOPRIL 2.5 MG: 2.5 TABLET ORAL at 14:09

## 2017-08-11 RX ADMIN — OXYCODONE HYDROCHLORIDE 2 MG: 5 SOLUTION ORAL at 20:55

## 2017-08-11 RX ADMIN — NICOTINE 1 PATCH: 14 PATCH, EXTENDED RELEASE TRANSDERMAL at 22:19

## 2017-08-11 RX ADMIN — Medication 250 MG: at 14:10

## 2017-08-11 RX ADMIN — CITALOPRAM HYDROBROMIDE 10 MG: 10 TABLET ORAL at 14:08

## 2017-08-12 VITALS
SYSTOLIC BLOOD PRESSURE: 111 MMHG | TEMPERATURE: 97.4 F | WEIGHT: 131.84 LBS | HEIGHT: 66 IN | HEART RATE: 61 BPM | RESPIRATION RATE: 18 BRPM | DIASTOLIC BLOOD PRESSURE: 68 MMHG | BODY MASS INDEX: 21.19 KG/M2 | OXYGEN SATURATION: 95 %

## 2017-08-12 LAB
ANION GAP SERPL CALCULATED.3IONS-SCNC: 9 MMOL/L (ref 4–13)
BUN SERPL-MCNC: 33 MG/DL (ref 5–25)
CALCIUM SERPL-MCNC: 8.6 MG/DL (ref 8.3–10.1)
CHLORIDE SERPL-SCNC: 88 MMOL/L (ref 100–108)
CO2 SERPL-SCNC: 27 MMOL/L (ref 21–32)
CORTIS SERPL-MCNC: 25.3 UG/DL
CORTIS SERPL-MCNC: 33.2 UG/DL
CORTIS SERPL-MCNC: 39.7 UG/DL
CREAT SERPL-MCNC: 3.68 MG/DL (ref 0.6–1.3)
GFR SERPL CREATININE-BSD FRML MDRD: 16 ML/MIN/1.73SQ M
GLUCOSE SERPL-MCNC: 105 MG/DL (ref 65–140)
GLUCOSE SERPL-MCNC: 128 MG/DL (ref 65–140)
GLUCOSE SERPL-MCNC: 97 MG/DL (ref 65–140)
INR PPP: 2.34 (ref 0.86–1.16)
POTASSIUM SERPL-SCNC: 4.4 MMOL/L (ref 3.5–5.3)
PROTHROMBIN TIME: 25.9 SECONDS (ref 12.1–14.4)
SODIUM SERPL-SCNC: 124 MMOL/L (ref 136–145)

## 2017-08-12 PROCEDURE — 85610 PROTHROMBIN TIME: CPT | Performed by: NURSE PRACTITIONER

## 2017-08-12 PROCEDURE — 82533 TOTAL CORTISOL: CPT | Performed by: INTERNAL MEDICINE

## 2017-08-12 PROCEDURE — 80048 BASIC METABOLIC PNL TOTAL CA: CPT | Performed by: INTERNAL MEDICINE

## 2017-08-12 PROCEDURE — 82948 REAGENT STRIP/BLOOD GLUCOSE: CPT

## 2017-08-12 RX ORDER — METOPROLOL TARTRATE 100 MG/1
100 TABLET ORAL 2 TIMES DAILY
Status: ON HOLD
Start: 2017-08-12 | End: 2017-12-07

## 2017-08-12 RX ORDER — LORAZEPAM 0.5 MG/1
0.5 TABLET ORAL EVERY 6 HOURS PRN
Qty: 20 TABLET | Refills: 0 | Status: SHIPPED | OUTPATIENT
Start: 2017-08-12 | End: 2017-09-17

## 2017-08-12 RX ORDER — COSYNTROPIN 0.25 MG/ML
0.25 INJECTION, POWDER, FOR SOLUTION INTRAMUSCULAR; INTRAVENOUS ONCE
Status: COMPLETED | OUTPATIENT
Start: 2017-08-12 | End: 2017-08-12

## 2017-08-12 RX ORDER — LEVOTHYROXINE SODIUM 0.2 MG/1
200 TABLET ORAL EVERY OTHER DAY
Refills: 0
Start: 2017-08-12 | End: 2017-09-27 | Stop reason: HOSPADM

## 2017-08-12 RX ORDER — WARFARIN SODIUM 7.5 MG/1
7.5 TABLET ORAL
Qty: 30 TABLET | Refills: 0 | Status: ON HOLD | OUTPATIENT
Start: 2017-08-12 | End: 2017-09-18 | Stop reason: CLARIF

## 2017-08-12 RX ADMIN — LISINOPRIL 2.5 MG: 2.5 TABLET ORAL at 08:29

## 2017-08-12 RX ADMIN — METOPROLOL TARTRATE 100 MG: 50 TABLET ORAL at 08:29

## 2017-08-12 RX ADMIN — PANTOPRAZOLE SODIUM 40 MG: 40 TABLET, DELAYED RELEASE ORAL at 06:05

## 2017-08-12 RX ADMIN — NICOTINE 1 PATCH: 14 PATCH, EXTENDED RELEASE TRANSDERMAL at 08:34

## 2017-08-12 RX ADMIN — Medication 250 MG: at 08:30

## 2017-08-12 RX ADMIN — VITAMIN D, TAB 1000IU (100/BT) 2000 UNITS: 25 TAB at 08:30

## 2017-08-12 RX ADMIN — LEVOTHYROXINE SODIUM 200 MCG: 100 TABLET ORAL at 06:05

## 2017-08-12 RX ADMIN — CITALOPRAM HYDROBROMIDE 10 MG: 10 TABLET ORAL at 08:30

## 2017-08-12 RX ADMIN — COSYNTROPIN 0.25 MG: 0.25 INJECTION, POWDER, LYOPHILIZED, FOR SOLUTION INTRAMUSCULAR; INTRAVENOUS at 11:06

## 2017-08-12 RX ADMIN — INSULIN GLARGINE 5 UNITS: 100 INJECTION, SOLUTION SUBCUTANEOUS at 08:34

## 2017-08-18 ENCOUNTER — HOSPITAL ENCOUNTER (EMERGENCY)
Facility: HOSPITAL | Age: 67
Discharge: HOME | End: 2017-08-18
Attending: EMERGENCY MEDICINE | Admitting: EMERGENCY MEDICINE
Payer: COMMERCIAL

## 2017-08-18 VITALS
OXYGEN SATURATION: 99 % | RESPIRATION RATE: 18 BRPM | BODY MASS INDEX: 27.4 KG/M2 | WEIGHT: 169.75 LBS | HEART RATE: 70 BPM | SYSTOLIC BLOOD PRESSURE: 155 MMHG | DIASTOLIC BLOOD PRESSURE: 74 MMHG

## 2017-08-18 DIAGNOSIS — R40.4 TRANSIENT ALTERATION OF AWARENESS: ICD-10-CM

## 2017-08-18 DIAGNOSIS — E16.2 HYPOGLYCEMIA: Primary | ICD-10-CM

## 2017-08-18 LAB
ATRIAL RATE: 76 BPM
GLUCOSE SERPL-MCNC: 99 MG/DL (ref 65–140)
P AXIS: 102 DEGREES
PR INTERVAL: 148 MS
QRS AXIS: -24 DEGREES
QRSD INTERVAL: 100 MS
QT INTERVAL: 392 MS
QTC INTERVAL: 432 MS
T WAVE AXIS: 140 DEGREES
VENTRICULAR RATE: 73 BPM

## 2017-08-18 PROCEDURE — 82948 REAGENT STRIP/BLOOD GLUCOSE: CPT

## 2017-08-18 PROCEDURE — 93005 ELECTROCARDIOGRAM TRACING: CPT | Performed by: EMERGENCY MEDICINE

## 2017-08-18 PROCEDURE — 99285 EMERGENCY DEPT VISIT HI MDM: CPT

## 2017-09-17 ENCOUNTER — APPOINTMENT (EMERGENCY)
Dept: CT IMAGING | Facility: HOSPITAL | Age: 67
DRG: 070 | End: 2017-09-17
Payer: COMMERCIAL

## 2017-09-17 ENCOUNTER — HOSPITAL ENCOUNTER (INPATIENT)
Facility: HOSPITAL | Age: 67
LOS: 10 days | Discharge: HOME HEALTH | DRG: 070 | End: 2017-09-27
Attending: INTERNAL MEDICINE | Admitting: INTERNAL MEDICINE
Payer: COMMERCIAL

## 2017-09-17 ENCOUNTER — APPOINTMENT (EMERGENCY)
Dept: RADIOLOGY | Facility: HOSPITAL | Age: 67
DRG: 070 | End: 2017-09-17
Payer: COMMERCIAL

## 2017-09-17 DIAGNOSIS — N39.0 URINARY TRACT INFECTION: Primary | ICD-10-CM

## 2017-09-17 DIAGNOSIS — J90 PLEURAL EFFUSION: ICD-10-CM

## 2017-09-17 DIAGNOSIS — E11.10 DIABETIC KETOACIDOSIS (HCC): ICD-10-CM

## 2017-09-17 DIAGNOSIS — N18.6 ESRD (END STAGE RENAL DISEASE) ON DIALYSIS (HCC): ICD-10-CM

## 2017-09-17 DIAGNOSIS — E10.21 TYPE 1 DIABETES MELLITUS WITH NEPHROPATHY (HCC): ICD-10-CM

## 2017-09-17 DIAGNOSIS — Z99.2 ESRD (END STAGE RENAL DISEASE) ON DIALYSIS (HCC): ICD-10-CM

## 2017-09-17 DIAGNOSIS — E86.0 DEHYDRATION: ICD-10-CM

## 2017-09-17 DIAGNOSIS — E11.10 DKA (DIABETIC KETOACIDOSES): ICD-10-CM

## 2017-09-17 DIAGNOSIS — A04.72 C. DIFFICILE COLITIS: ICD-10-CM

## 2017-09-17 DIAGNOSIS — R26.2 AMBULATORY DYSFUNCTION: ICD-10-CM

## 2017-09-17 DIAGNOSIS — E03.9 HYPOTHYROIDISM: ICD-10-CM

## 2017-09-17 DIAGNOSIS — Z86.19 H/O CLOSTRIDIUM DIFFICILE INFECTION: ICD-10-CM

## 2017-09-17 DIAGNOSIS — R41.82 ALTERED MENTAL STATUS: ICD-10-CM

## 2017-09-17 PROBLEM — E87.2 METABOLIC ACIDOSIS, INCREASED ANION GAP: Status: ACTIVE | Noted: 2017-09-17

## 2017-09-17 LAB
ACETONE SERPL-MCNC: ABNORMAL MG/DL
ALBUMIN SERPL BCP-MCNC: 2.6 G/DL (ref 3.5–5)
ALBUMIN SERPL BCP-MCNC: 2.7 G/DL (ref 3.5–5)
ALP SERPL-CCNC: 166 U/L (ref 46–116)
ALP SERPL-CCNC: 186 U/L (ref 46–116)
ALT SERPL W P-5'-P-CCNC: 18 U/L (ref 12–78)
ALT SERPL W P-5'-P-CCNC: 19 U/L (ref 12–78)
AMMONIA PLAS-SCNC: 19 UMOL/L (ref 11–35)
ANION GAP SERPL CALCULATED.3IONS-SCNC: 13 MMOL/L (ref 4–13)
ANION GAP SERPL CALCULATED.3IONS-SCNC: 14 MMOL/L (ref 4–13)
AST SERPL W P-5'-P-CCNC: 25 U/L (ref 5–45)
AST SERPL W P-5'-P-CCNC: 34 U/L (ref 5–45)
ATRIAL RATE: 75 BPM
BASE EX.OXY STD BLDV CALC-SCNC: 77.7 % (ref 60–80)
BASE EXCESS BLDV CALC-SCNC: -4.6 MMOL/L
BASOPHILS # BLD AUTO: 0.1 THOUSANDS/ΜL (ref 0–0.1)
BASOPHILS # BLD AUTO: 0.1 THOUSANDS/ΜL (ref 0–0.1)
BASOPHILS NFR BLD AUTO: 2 % (ref 0–1)
BASOPHILS NFR BLD AUTO: 2 % (ref 0–1)
BILIRUB SERPL-MCNC: 0.5 MG/DL (ref 0.2–1)
BILIRUB SERPL-MCNC: 0.6 MG/DL (ref 0.2–1)
BUN SERPL-MCNC: 58 MG/DL (ref 5–25)
BUN SERPL-MCNC: 61 MG/DL (ref 5–25)
CALCIUM SERPL-MCNC: 8.6 MG/DL (ref 8.3–10.1)
CALCIUM SERPL-MCNC: 8.7 MG/DL (ref 8.3–10.1)
CHLORIDE SERPL-SCNC: 88 MMOL/L (ref 100–108)
CHLORIDE SERPL-SCNC: 90 MMOL/L (ref 100–108)
CO2 SERPL-SCNC: 24 MMOL/L (ref 21–32)
CO2 SERPL-SCNC: 24 MMOL/L (ref 21–32)
CREAT SERPL-MCNC: 6.69 MG/DL (ref 0.6–1.3)
CREAT SERPL-MCNC: 6.79 MG/DL (ref 0.6–1.3)
EOSINOPHIL # BLD AUTO: 0.02 THOUSAND/ΜL (ref 0–0.61)
EOSINOPHIL # BLD AUTO: 0.03 THOUSAND/ΜL (ref 0–0.61)
EOSINOPHIL NFR BLD AUTO: 0 % (ref 0–6)
EOSINOPHIL NFR BLD AUTO: 1 % (ref 0–6)
ERYTHROCYTE [DISTWIDTH] IN BLOOD BY AUTOMATED COUNT: 15.1 % (ref 11.6–15.1)
ERYTHROCYTE [DISTWIDTH] IN BLOOD BY AUTOMATED COUNT: 15.4 % (ref 11.6–15.1)
EXT BILIRUBIN, UA: NORMAL
EXT BLOOD URINE: NORMAL
EXT GLUCOSE, UA: 2000
EXT KETONES: NORMAL
EXT NITRITE, UA: NORMAL
EXT PH, UA: 6
EXT PROTEIN, UA: 2000
EXT SPECIFIC GRAVITY, UA: 1010
EXT UROBILINOGEN: NORMAL
GFR SERPL CREATININE-BSD FRML MDRD: 8 ML/MIN/1.73SQ M
GFR SERPL CREATININE-BSD FRML MDRD: 8 ML/MIN/1.73SQ M
GLUCOSE SERPL-MCNC: 350 MG/DL (ref 65–140)
GLUCOSE SERPL-MCNC: 493 MG/DL (ref 65–140)
GLUCOSE SERPL-MCNC: 647 MG/DL (ref 65–140)
GLUCOSE SERPL-MCNC: 807 MG/DL (ref 65–140)
GLUCOSE SERPL-MCNC: >500 MG/DL (ref 65–140)
HCO3 BLDV-SCNC: 22.4 MMOL/L (ref 24–30)
HCT VFR BLD AUTO: 37.6 % (ref 36.5–49.3)
HCT VFR BLD AUTO: 38 % (ref 36.5–49.3)
HGB BLD-MCNC: 11.7 G/DL (ref 12–17)
HGB BLD-MCNC: 11.9 G/DL (ref 12–17)
INR PPP: 1.35 (ref 0.86–1.16)
LACTATE SERPL-SCNC: 2.7 MMOL/L (ref 0.5–2)
LACTATE SERPL-SCNC: 3.8 MMOL/L (ref 0.5–2)
LACTATE SERPL-SCNC: 3.9 MMOL/L (ref 0.5–2)
LACTATE SERPL-SCNC: 4.4 MMOL/L (ref 0.5–2)
LIPASE SERPL-CCNC: 210 U/L (ref 73–393)
LYMPHOCYTES # BLD AUTO: 0.51 THOUSANDS/ΜL (ref 0.6–4.47)
LYMPHOCYTES # BLD AUTO: 0.61 THOUSANDS/ΜL (ref 0.6–4.47)
LYMPHOCYTES NFR BLD AUTO: 11 % (ref 14–44)
LYMPHOCYTES NFR BLD AUTO: 9 % (ref 14–44)
MAGNESIUM SERPL-MCNC: 1.8 MG/DL (ref 1.6–2.6)
MCH RBC QN AUTO: 28.2 PG (ref 26.8–34.3)
MCH RBC QN AUTO: 28.3 PG (ref 26.8–34.3)
MCHC RBC AUTO-ENTMCNC: 31.1 G/DL (ref 31.4–37.4)
MCHC RBC AUTO-ENTMCNC: 31.3 G/DL (ref 31.4–37.4)
MCV RBC AUTO: 90 FL (ref 82–98)
MCV RBC AUTO: 91 FL (ref 82–98)
MONOCYTES # BLD AUTO: 0.51 THOUSAND/ΜL (ref 0.17–1.22)
MONOCYTES # BLD AUTO: 0.54 THOUSAND/ΜL (ref 0.17–1.22)
MONOCYTES NFR BLD AUTO: 10 % (ref 4–12)
MONOCYTES NFR BLD AUTO: 9 % (ref 4–12)
NEUTROPHILS # BLD AUTO: 4.18 THOUSANDS/ΜL (ref 1.85–7.62)
NEUTROPHILS # BLD AUTO: 4.38 THOUSANDS/ΜL (ref 1.85–7.62)
NEUTS SEG NFR BLD AUTO: 76 % (ref 43–75)
NEUTS SEG NFR BLD AUTO: 80 % (ref 43–75)
O2 CT BLDV-SCNC: 13.4 ML/DL
P AXIS: 89 DEGREES
PCO2 BLDV: 49.4 MM HG (ref 42–50)
PH BLDV: 7.27 [PH] (ref 7.3–7.4)
PLATELET # BLD AUTO: 182 THOUSANDS/UL (ref 149–390)
PLATELET # BLD AUTO: 192 THOUSANDS/UL (ref 149–390)
PMV BLD AUTO: 10.3 FL (ref 8.9–12.7)
PMV BLD AUTO: 10.7 FL (ref 8.9–12.7)
PO2 BLDV: 48.6 MM HG (ref 35–45)
POTASSIUM SERPL-SCNC: 4.7 MMOL/L (ref 3.5–5.3)
POTASSIUM SERPL-SCNC: 4.9 MMOL/L (ref 3.5–5.3)
PR INTERVAL: 192 MS
PROT SERPL-MCNC: 7.5 G/DL (ref 6.4–8.2)
PROT SERPL-MCNC: 7.8 G/DL (ref 6.4–8.2)
PROTHROMBIN TIME: 17.1 SECONDS (ref 12.1–14.4)
QRS AXIS: -68 DEGREES
QRSD INTERVAL: 104 MS
QT INTERVAL: 412 MS
QTC INTERVAL: 460 MS
RBC # BLD AUTO: 4.14 MILLION/UL (ref 3.88–5.62)
RBC # BLD AUTO: 4.22 MILLION/UL (ref 3.88–5.62)
SODIUM SERPL-SCNC: 125 MMOL/L (ref 136–145)
SODIUM SERPL-SCNC: 128 MMOL/L (ref 136–145)
T WAVE AXIS: 100 DEGREES
T3 SERPL-MCNC: 0.2 NG/ML (ref 0.6–1.8)
T4 FREE SERPL-MCNC: 0.55 NG/DL (ref 0.76–1.46)
TROPONIN I SERPL-MCNC: <0.02 NG/ML
TSH SERPL DL<=0.05 MIU/L-ACNC: 49.49 UIU/ML (ref 0.36–3.74)
VENTRICULAR RATE: 75 BPM
WBC # BLD AUTO: 5.46 THOUSAND/UL (ref 4.31–10.16)
WBC # BLD AUTO: 5.52 THOUSAND/UL (ref 4.31–10.16)
WBC # BLD EST: NORMAL 10*3/UL

## 2017-09-17 PROCEDURE — 36415 COLL VENOUS BLD VENIPUNCTURE: CPT

## 2017-09-17 PROCEDURE — 82009 KETONE BODYS QUAL: CPT | Performed by: PHYSICIAN ASSISTANT

## 2017-09-17 PROCEDURE — 85610 PROTHROMBIN TIME: CPT | Performed by: PHYSICIAN ASSISTANT

## 2017-09-17 PROCEDURE — 93005 ELECTROCARDIOGRAM TRACING: CPT

## 2017-09-17 PROCEDURE — 84480 ASSAY TRIIODOTHYRONINE (T3): CPT | Performed by: PHYSICIAN ASSISTANT

## 2017-09-17 PROCEDURE — 84443 ASSAY THYROID STIM HORMONE: CPT | Performed by: PHYSICIAN ASSISTANT

## 2017-09-17 PROCEDURE — 80053 COMPREHEN METABOLIC PANEL: CPT | Performed by: PHYSICIAN ASSISTANT

## 2017-09-17 PROCEDURE — 82948 REAGENT STRIP/BLOOD GLUCOSE: CPT

## 2017-09-17 PROCEDURE — 85025 COMPLETE CBC W/AUTO DIFF WBC: CPT | Performed by: PHYSICIAN ASSISTANT

## 2017-09-17 PROCEDURE — 84484 ASSAY OF TROPONIN QUANT: CPT | Performed by: PHYSICIAN ASSISTANT

## 2017-09-17 PROCEDURE — 83605 ASSAY OF LACTIC ACID: CPT | Performed by: PHYSICIAN ASSISTANT

## 2017-09-17 PROCEDURE — 87077 CULTURE AEROBIC IDENTIFY: CPT | Performed by: PHYSICIAN ASSISTANT

## 2017-09-17 PROCEDURE — 82805 BLOOD GASES W/O2 SATURATION: CPT | Performed by: PHYSICIAN ASSISTANT

## 2017-09-17 PROCEDURE — 74177 CT ABD & PELVIS W/CONTRAST: CPT

## 2017-09-17 PROCEDURE — 87186 SC STD MICRODIL/AGAR DIL: CPT | Performed by: PHYSICIAN ASSISTANT

## 2017-09-17 PROCEDURE — 82140 ASSAY OF AMMONIA: CPT | Performed by: PHYSICIAN ASSISTANT

## 2017-09-17 PROCEDURE — 83735 ASSAY OF MAGNESIUM: CPT | Performed by: PHYSICIAN ASSISTANT

## 2017-09-17 PROCEDURE — 96361 HYDRATE IV INFUSION ADD-ON: CPT

## 2017-09-17 PROCEDURE — 96375 TX/PRO/DX INJ NEW DRUG ADDON: CPT

## 2017-09-17 PROCEDURE — 96365 THER/PROPH/DIAG IV INF INIT: CPT

## 2017-09-17 PROCEDURE — 87493 C DIFF AMPLIFIED PROBE: CPT | Performed by: PHYSICIAN ASSISTANT

## 2017-09-17 PROCEDURE — 83690 ASSAY OF LIPASE: CPT | Performed by: PHYSICIAN ASSISTANT

## 2017-09-17 PROCEDURE — 83605 ASSAY OF LACTIC ACID: CPT | Performed by: INTERNAL MEDICINE

## 2017-09-17 PROCEDURE — 87086 URINE CULTURE/COLONY COUNT: CPT | Performed by: PHYSICIAN ASSISTANT

## 2017-09-17 PROCEDURE — 84439 ASSAY OF FREE THYROXINE: CPT | Performed by: PHYSICIAN ASSISTANT

## 2017-09-17 PROCEDURE — 87040 BLOOD CULTURE FOR BACTERIA: CPT | Performed by: PHYSICIAN ASSISTANT

## 2017-09-17 PROCEDURE — 80053 COMPREHEN METABOLIC PANEL: CPT | Performed by: INTERNAL MEDICINE

## 2017-09-17 PROCEDURE — 85025 COMPLETE CBC W/AUTO DIFF WBC: CPT | Performed by: INTERNAL MEDICINE

## 2017-09-17 PROCEDURE — 99285 EMERGENCY DEPT VISIT HI MDM: CPT

## 2017-09-17 PROCEDURE — 70450 CT HEAD/BRAIN W/O DYE: CPT

## 2017-09-17 PROCEDURE — 71020 HB CHEST X-RAY 2VW FRONTAL&LATL: CPT

## 2017-09-17 PROCEDURE — 81002 URINALYSIS NONAUTO W/O SCOPE: CPT | Performed by: PHYSICIAN ASSISTANT

## 2017-09-17 PROCEDURE — 96366 THER/PROPH/DIAG IV INF ADDON: CPT

## 2017-09-17 RX ORDER — SODIUM CHLORIDE 9 MG/ML
2000 INJECTION, SOLUTION INTRAVENOUS CONTINUOUS
Status: DISCONTINUED | OUTPATIENT
Start: 2017-09-17 | End: 2017-09-17

## 2017-09-17 RX ORDER — CITALOPRAM 20 MG/1
10 TABLET ORAL DAILY
Status: DISCONTINUED | OUTPATIENT
Start: 2017-09-18 | End: 2017-09-27 | Stop reason: HOSPADM

## 2017-09-17 RX ORDER — SODIUM CHLORIDE 9 MG/ML
250 INJECTION, SOLUTION INTRAVENOUS CONTINUOUS
Status: DISCONTINUED | OUTPATIENT
Start: 2017-09-18 | End: 2017-09-17

## 2017-09-17 RX ORDER — SACCHAROMYCES BOULARDII 250 MG
250 CAPSULE ORAL 2 TIMES DAILY
Status: DISCONTINUED | OUTPATIENT
Start: 2017-09-17 | End: 2017-09-27 | Stop reason: HOSPADM

## 2017-09-17 RX ORDER — ACETAMINOPHEN 325 MG/1
650 TABLET ORAL EVERY 6 HOURS PRN
Status: DISCONTINUED | OUTPATIENT
Start: 2017-09-17 | End: 2017-09-27 | Stop reason: HOSPADM

## 2017-09-17 RX ORDER — PANTOPRAZOLE SODIUM 40 MG/1
40 TABLET, DELAYED RELEASE ORAL
Status: DISCONTINUED | OUTPATIENT
Start: 2017-09-18 | End: 2017-09-27 | Stop reason: HOSPADM

## 2017-09-17 RX ORDER — MAGNESIUM SULFATE HEPTAHYDRATE 40 MG/ML
2 INJECTION, SOLUTION INTRAVENOUS ONCE
Status: COMPLETED | OUTPATIENT
Start: 2017-09-17 | End: 2017-09-17

## 2017-09-17 RX ORDER — MELATONIN
2000 DAILY
Status: DISCONTINUED | OUTPATIENT
Start: 2017-09-18 | End: 2017-09-27 | Stop reason: HOSPADM

## 2017-09-17 RX ORDER — SODIUM CHLORIDE 9 MG/ML
500 INJECTION, SOLUTION INTRAVENOUS CONTINUOUS
Status: DISCONTINUED | OUTPATIENT
Start: 2017-09-17 | End: 2017-09-17

## 2017-09-17 RX ORDER — DIPHENOXYLATE HYDROCHLORIDE AND ATROPINE SULFATE 2.5; .025 MG/1; MG/1
1 TABLET ORAL EVERY 6 HOURS PRN
Status: DISCONTINUED | OUTPATIENT
Start: 2017-09-17 | End: 2017-09-19

## 2017-09-17 RX ORDER — SEVELAMER HYDROCHLORIDE 800 MG/1
800 TABLET, FILM COATED ORAL
Status: DISCONTINUED | OUTPATIENT
Start: 2017-09-18 | End: 2017-09-27 | Stop reason: HOSPADM

## 2017-09-17 RX ORDER — WARFARIN SODIUM 7.5 MG/1
7.5 TABLET ORAL
Status: DISCONTINUED | OUTPATIENT
Start: 2017-09-17 | End: 2017-09-18

## 2017-09-17 RX ORDER — METOPROLOL TARTRATE 100 MG/1
100 TABLET ORAL 2 TIMES DAILY
Status: DISCONTINUED | OUTPATIENT
Start: 2017-09-17 | End: 2017-09-19

## 2017-09-17 RX ORDER — NICOTINE 21 MG/24HR
1 PATCH, TRANSDERMAL 24 HOURS TRANSDERMAL EVERY 24 HOURS
Status: DISCONTINUED | OUTPATIENT
Start: 2017-09-17 | End: 2017-09-27 | Stop reason: HOSPADM

## 2017-09-17 RX ORDER — ACETAMINOPHEN 325 MG/1
650 TABLET ORAL EVERY 6 HOURS PRN
Status: ON HOLD | COMMUNITY
End: 2018-05-17 | Stop reason: ALTCHOICE

## 2017-09-17 RX ORDER — SEVELAMER CARBONATE 800 MG/1
800 TABLET, FILM COATED ORAL 3 TIMES DAILY PRN
COMMUNITY
End: 2018-01-30

## 2017-09-17 RX ORDER — SODIUM CHLORIDE 9 MG/ML
100 INJECTION, SOLUTION INTRAVENOUS CONTINUOUS
Status: DISCONTINUED | OUTPATIENT
Start: 2017-09-17 | End: 2017-09-18

## 2017-09-17 RX ORDER — ECHINACEA PURPUREA EXTRACT 125 MG
1 TABLET ORAL AS NEEDED
Status: DISCONTINUED | OUTPATIENT
Start: 2017-09-17 | End: 2017-09-27 | Stop reason: HOSPADM

## 2017-09-17 RX ORDER — LISINOPRIL 2.5 MG/1
2.5 TABLET ORAL DAILY
Status: DISCONTINUED | OUTPATIENT
Start: 2017-09-18 | End: 2017-09-19

## 2017-09-17 RX ORDER — DEXTROSE AND SODIUM CHLORIDE 5; .9 G/100ML; G/100ML
100 INJECTION, SOLUTION INTRAVENOUS CONTINUOUS
Status: DISCONTINUED | OUTPATIENT
Start: 2017-09-17 | End: 2017-09-19

## 2017-09-17 RX ORDER — HEPARIN SODIUM 5000 [USP'U]/ML
5000 INJECTION, SOLUTION INTRAVENOUS; SUBCUTANEOUS EVERY 8 HOURS SCHEDULED
Status: DISCONTINUED | OUTPATIENT
Start: 2017-09-17 | End: 2017-09-17

## 2017-09-17 RX ADMIN — Medication 250 MG: at 22:04

## 2017-09-17 RX ADMIN — SODIUM CHLORIDE 100 ML/HR: 0.9 INJECTION, SOLUTION INTRAVENOUS at 21:36

## 2017-09-17 RX ADMIN — SODIUM CHLORIDE 8 UNITS/HR: 9 INJECTION, SOLUTION INTRAVENOUS at 17:20

## 2017-09-17 RX ADMIN — VANCOMYCIN HYDROCHLORIDE 1250 MG: 1 INJECTION, POWDER, LYOPHILIZED, FOR SOLUTION INTRAVENOUS at 18:48

## 2017-09-17 RX ADMIN — WARFARIN SODIUM 7.5 MG: 7.5 TABLET ORAL at 22:09

## 2017-09-17 RX ADMIN — SODIUM CHLORIDE 4 UNITS/HR: 9 INJECTION, SOLUTION INTRAVENOUS at 21:36

## 2017-09-17 RX ADMIN — IODIXANOL 100 ML: 320 INJECTION, SOLUTION INTRAVASCULAR at 17:23

## 2017-09-17 RX ADMIN — MAGNESIUM SULFATE HEPTAHYDRATE 2 G: 40 INJECTION, SOLUTION INTRAVENOUS at 21:37

## 2017-09-17 RX ADMIN — VANCOMYCIN HYDROCHLORIDE 125 MG: 500 INJECTION, POWDER, LYOPHILIZED, FOR SOLUTION INTRAVENOUS at 22:10

## 2017-09-17 RX ADMIN — NICOTINE 1 PATCH: 14 PATCH TRANSDERMAL at 21:48

## 2017-09-17 RX ADMIN — DEXTROSE AND SODIUM CHLORIDE 100 ML/HR: 5; .9 INJECTION, SOLUTION INTRAVENOUS at 23:16

## 2017-09-17 RX ADMIN — SODIUM CHLORIDE 1000 ML: 0.9 INJECTION, SOLUTION INTRAVENOUS at 17:42

## 2017-09-17 RX ADMIN — CEFEPIME 2000 MG: 2 INJECTION, POWDER, FOR SOLUTION INTRAMUSCULAR; INTRAVENOUS at 17:20

## 2017-09-17 RX ADMIN — METOPROLOL TARTRATE 100 MG: 100 TABLET ORAL at 22:03

## 2017-09-17 RX ADMIN — SODIUM CHLORIDE 1000 ML: 0.9 INJECTION, SOLUTION INTRAVENOUS at 16:37

## 2017-09-18 PROBLEM — E11.10 DIABETIC KETOACIDOSIS WITHOUT COMA (HCC): Status: RESOLVED | Noted: 2017-09-17 | Resolved: 2017-09-18

## 2017-09-18 PROBLEM — A49.8 CLOSTRIDIUM DIFFICILE INFECTION: Status: ACTIVE | Noted: 2017-09-18

## 2017-09-18 LAB
ANION GAP SERPL CALCULATED.3IONS-SCNC: 11 MMOL/L (ref 4–13)
ANION GAP SERPL CALCULATED.3IONS-SCNC: 12 MMOL/L (ref 4–13)
ANION GAP SERPL CALCULATED.3IONS-SCNC: 13 MMOL/L (ref 4–13)
BASOPHILS # BLD AUTO: 0.12 THOUSANDS/ΜL (ref 0–0.1)
BASOPHILS NFR BLD AUTO: 2 % (ref 0–1)
BUN SERPL-MCNC: 57 MG/DL (ref 5–25)
BUN SERPL-MCNC: 58 MG/DL (ref 5–25)
BUN SERPL-MCNC: 60 MG/DL (ref 5–25)
C DIFF TOX GENS STL QL NAA+PROBE: ABNORMAL
CALCIUM SERPL-MCNC: 8 MG/DL (ref 8.3–10.1)
CALCIUM SERPL-MCNC: 8.2 MG/DL (ref 8.3–10.1)
CALCIUM SERPL-MCNC: 8.3 MG/DL (ref 8.3–10.1)
CHLORIDE SERPL-SCNC: 97 MMOL/L (ref 100–108)
CHLORIDE SERPL-SCNC: 98 MMOL/L (ref 100–108)
CHLORIDE SERPL-SCNC: 99 MMOL/L (ref 100–108)
CO2 SERPL-SCNC: 22 MMOL/L (ref 21–32)
CO2 SERPL-SCNC: 22 MMOL/L (ref 21–32)
CO2 SERPL-SCNC: 23 MMOL/L (ref 21–32)
CREAT SERPL-MCNC: 6.28 MG/DL (ref 0.6–1.3)
CREAT SERPL-MCNC: 6.28 MG/DL (ref 0.6–1.3)
CREAT SERPL-MCNC: 6.58 MG/DL (ref 0.6–1.3)
EOSINOPHIL # BLD AUTO: 0.24 THOUSAND/ΜL (ref 0–0.61)
EOSINOPHIL NFR BLD AUTO: 3 % (ref 0–6)
ERYTHROCYTE [DISTWIDTH] IN BLOOD BY AUTOMATED COUNT: 15.3 % (ref 11.6–15.1)
GFR SERPL CREATININE-BSD FRML MDRD: 8 ML/MIN/1.73SQ M
GFR SERPL CREATININE-BSD FRML MDRD: 9 ML/MIN/1.73SQ M
GFR SERPL CREATININE-BSD FRML MDRD: 9 ML/MIN/1.73SQ M
GLUCOSE SERPL-MCNC: 103 MG/DL (ref 65–140)
GLUCOSE SERPL-MCNC: 104 MG/DL (ref 65–140)
GLUCOSE SERPL-MCNC: 109 MG/DL (ref 65–140)
GLUCOSE SERPL-MCNC: 114 MG/DL (ref 65–140)
GLUCOSE SERPL-MCNC: 114 MG/DL (ref 65–140)
GLUCOSE SERPL-MCNC: 115 MG/DL (ref 65–140)
GLUCOSE SERPL-MCNC: 122 MG/DL (ref 65–140)
GLUCOSE SERPL-MCNC: 124 MG/DL (ref 65–140)
GLUCOSE SERPL-MCNC: 125 MG/DL (ref 65–140)
GLUCOSE SERPL-MCNC: 129 MG/DL (ref 65–140)
GLUCOSE SERPL-MCNC: 129 MG/DL (ref 65–140)
GLUCOSE SERPL-MCNC: 132 MG/DL (ref 65–140)
GLUCOSE SERPL-MCNC: 161 MG/DL (ref 65–140)
GLUCOSE SERPL-MCNC: 163 MG/DL (ref 65–140)
GLUCOSE SERPL-MCNC: 253 MG/DL (ref 65–140)
GLUCOSE SERPL-MCNC: 265 MG/DL (ref 65–140)
GLUCOSE SERPL-MCNC: 275 MG/DL (ref 65–140)
GLUCOSE SERPL-MCNC: 75 MG/DL (ref 65–140)
GLUCOSE SERPL-MCNC: 92 MG/DL (ref 65–140)
HCT VFR BLD AUTO: 35.5 % (ref 36.5–49.3)
HGB BLD-MCNC: 11.2 G/DL (ref 12–17)
LACTATE SERPL-SCNC: 1.7 MMOL/L (ref 0.5–2)
LACTATE SERPL-SCNC: 1.9 MMOL/L (ref 0.5–2)
LACTATE SERPL-SCNC: 2.1 MMOL/L (ref 0.5–2)
LACTATE SERPL-SCNC: 2.2 MMOL/L (ref 0.5–2)
LYMPHOCYTES # BLD AUTO: 0.99 THOUSANDS/ΜL (ref 0.6–4.47)
LYMPHOCYTES NFR BLD AUTO: 14 % (ref 14–44)
MAGNESIUM SERPL-MCNC: 2 MG/DL (ref 1.6–2.6)
MAGNESIUM SERPL-MCNC: 2.1 MG/DL (ref 1.6–2.6)
MAGNESIUM SERPL-MCNC: 2.4 MG/DL (ref 1.6–2.6)
MCH RBC QN AUTO: 28.4 PG (ref 26.8–34.3)
MCHC RBC AUTO-ENTMCNC: 31.5 G/DL (ref 31.4–37.4)
MCV RBC AUTO: 90 FL (ref 82–98)
MONOCYTES # BLD AUTO: 0.6 THOUSAND/ΜL (ref 0.17–1.22)
MONOCYTES NFR BLD AUTO: 8 % (ref 4–12)
NEUTROPHILS # BLD AUTO: 5.33 THOUSANDS/ΜL (ref 1.85–7.62)
NEUTS SEG NFR BLD AUTO: 73 % (ref 43–75)
PHOSPHATE SERPL-MCNC: 2.8 MG/DL (ref 2.3–4.1)
PHOSPHATE SERPL-MCNC: 2.9 MG/DL (ref 2.3–4.1)
PHOSPHATE SERPL-MCNC: 3 MG/DL (ref 2.3–4.1)
PLATELET # BLD AUTO: 181 THOUSANDS/UL (ref 149–390)
PMV BLD AUTO: 10.1 FL (ref 8.9–12.7)
POTASSIUM SERPL-SCNC: 3.5 MMOL/L (ref 3.5–5.3)
POTASSIUM SERPL-SCNC: 4.2 MMOL/L (ref 3.5–5.3)
POTASSIUM SERPL-SCNC: 4.3 MMOL/L (ref 3.5–5.3)
RBC # BLD AUTO: 3.94 MILLION/UL (ref 3.88–5.62)
SODIUM SERPL-SCNC: 131 MMOL/L (ref 136–145)
SODIUM SERPL-SCNC: 132 MMOL/L (ref 136–145)
SODIUM SERPL-SCNC: 134 MMOL/L (ref 136–145)
WBC # BLD AUTO: 7.28 THOUSAND/UL (ref 4.31–10.16)

## 2017-09-18 PROCEDURE — 84100 ASSAY OF PHOSPHORUS: CPT | Performed by: INTERNAL MEDICINE

## 2017-09-18 PROCEDURE — 80048 BASIC METABOLIC PNL TOTAL CA: CPT | Performed by: PHYSICIAN ASSISTANT

## 2017-09-18 PROCEDURE — 80048 BASIC METABOLIC PNL TOTAL CA: CPT | Performed by: INTERNAL MEDICINE

## 2017-09-18 PROCEDURE — 85025 COMPLETE CBC W/AUTO DIFF WBC: CPT | Performed by: PHYSICIAN ASSISTANT

## 2017-09-18 PROCEDURE — 83735 ASSAY OF MAGNESIUM: CPT | Performed by: INTERNAL MEDICINE

## 2017-09-18 PROCEDURE — 83605 ASSAY OF LACTIC ACID: CPT | Performed by: PHYSICIAN ASSISTANT

## 2017-09-18 PROCEDURE — 83735 ASSAY OF MAGNESIUM: CPT | Performed by: PHYSICIAN ASSISTANT

## 2017-09-18 PROCEDURE — 82948 REAGENT STRIP/BLOOD GLUCOSE: CPT

## 2017-09-18 PROCEDURE — 92610 EVALUATE SWALLOWING FUNCTION: CPT

## 2017-09-18 PROCEDURE — 84100 ASSAY OF PHOSPHORUS: CPT | Performed by: PHYSICIAN ASSISTANT

## 2017-09-18 RX ORDER — HEPARIN SODIUM 5000 [USP'U]/ML
5000 INJECTION, SOLUTION INTRAVENOUS; SUBCUTANEOUS EVERY 8 HOURS SCHEDULED
Status: DISCONTINUED | OUTPATIENT
Start: 2017-09-18 | End: 2017-09-20

## 2017-09-18 RX ORDER — NYSTATIN 100000 [USP'U]/G
1 POWDER TOPICAL 3 TIMES DAILY
COMMUNITY
End: 2017-09-27 | Stop reason: HOSPADM

## 2017-09-18 RX ORDER — DEXTROSE MONOHYDRATE 25 G/50ML
INJECTION, SOLUTION INTRAVENOUS
Status: COMPLETED
Start: 2017-09-18 | End: 2017-09-18

## 2017-09-18 RX ORDER — POTASSIUM CHLORIDE 20MEQ/15ML
40 LIQUID (ML) ORAL ONCE
Status: COMPLETED | OUTPATIENT
Start: 2017-09-18 | End: 2017-09-18

## 2017-09-18 RX ORDER — LEVOTHYROXINE SODIUM 0.1 MG/1
200 TABLET ORAL
Status: DISCONTINUED | OUTPATIENT
Start: 2017-09-18 | End: 2017-09-18

## 2017-09-18 RX ORDER — LEVOTHYROXINE SODIUM 0.12 MG/1
125 TABLET ORAL
Status: DISCONTINUED | OUTPATIENT
Start: 2017-09-19 | End: 2017-09-27 | Stop reason: HOSPADM

## 2017-09-18 RX ADMIN — CEFEPIME HYDROCHLORIDE 1000 MG: 1 INJECTION, POWDER, FOR SOLUTION INTRAMUSCULAR; INTRAVENOUS at 17:02

## 2017-09-18 RX ADMIN — LISINOPRIL 2.5 MG: 2.5 TABLET ORAL at 08:54

## 2017-09-18 RX ADMIN — Medication 250 MG: at 18:58

## 2017-09-18 RX ADMIN — VANCOMYCIN HYDROCHLORIDE 125 MG: 500 INJECTION, POWDER, LYOPHILIZED, FOR SOLUTION INTRAVENOUS at 08:53

## 2017-09-18 RX ADMIN — NICOTINE 1 PATCH: 14 PATCH TRANSDERMAL at 20:52

## 2017-09-18 RX ADMIN — METOPROLOL TARTRATE 100 MG: 100 TABLET ORAL at 08:54

## 2017-09-18 RX ADMIN — DEXTROSE MONOHYDRATE 25 ML: 25 INJECTION, SOLUTION INTRAVENOUS at 03:31

## 2017-09-18 RX ADMIN — Medication 250 MG: at 08:54

## 2017-09-18 RX ADMIN — VANCOMYCIN 125 MG: KIT at 14:02

## 2017-09-18 RX ADMIN — ACETAMINOPHEN 650 MG: 325 TABLET ORAL at 22:23

## 2017-09-18 RX ADMIN — CITALOPRAM HYDROBROMIDE 10 MG: 20 TABLET ORAL at 08:54

## 2017-09-18 RX ADMIN — HEPARIN SODIUM 5000 UNITS: 5000 INJECTION, SOLUTION INTRAVENOUS; SUBCUTANEOUS at 21:45

## 2017-09-18 RX ADMIN — HEPARIN SODIUM 5000 UNITS: 5000 INJECTION, SOLUTION INTRAVENOUS; SUBCUTANEOUS at 05:57

## 2017-09-18 RX ADMIN — DIAPER RASH SKIN PROTECTENT 1 APPLICATION: at 22:55

## 2017-09-18 RX ADMIN — VANCOMYCIN 125 MG: KIT at 20:51

## 2017-09-18 RX ADMIN — ACETAMINOPHEN 650 MG: 325 TABLET ORAL at 13:53

## 2017-09-18 RX ADMIN — POTASSIUM CHLORIDE 40 MEQ: 20 SOLUTION ORAL at 01:21

## 2017-09-18 RX ADMIN — METOPROLOL TARTRATE 100 MG: 100 TABLET ORAL at 18:58

## 2017-09-18 RX ADMIN — HEPARIN SODIUM 5000 UNITS: 5000 INJECTION, SOLUTION INTRAVENOUS; SUBCUTANEOUS at 13:53

## 2017-09-18 RX ADMIN — LEVOTHYROXINE SODIUM 200 MCG: 100 TABLET ORAL at 05:11

## 2017-09-18 RX ADMIN — SODIUM CHLORIDE 4 UNITS/HR: 9 INJECTION, SOLUTION INTRAVENOUS at 16:30

## 2017-09-18 RX ADMIN — DEXTROSE AND SODIUM CHLORIDE 100 ML/HR: 5; .9 INJECTION, SOLUTION INTRAVENOUS at 20:30

## 2017-09-18 RX ADMIN — DEXTROSE AND SODIUM CHLORIDE 100 ML/HR: 5; .9 INJECTION, SOLUTION INTRAVENOUS at 10:13

## 2017-09-18 RX ADMIN — CHOLECALCIFEROL TAB 25 MCG (1000 UNIT) 2000 UNITS: 25 TAB at 08:53

## 2017-09-19 ENCOUNTER — APPOINTMENT (INPATIENT)
Dept: RADIOLOGY | Facility: HOSPITAL | Age: 67
DRG: 070 | End: 2017-09-19
Payer: COMMERCIAL

## 2017-09-19 ENCOUNTER — APPOINTMENT (INPATIENT)
Dept: DIALYSIS | Facility: HOSPITAL | Age: 67
DRG: 070 | End: 2017-09-19
Payer: COMMERCIAL

## 2017-09-19 LAB
ALBUMIN SERPL BCP-MCNC: 2.2 G/DL (ref 3.5–5)
ALP SERPL-CCNC: 110 U/L (ref 46–116)
ALT SERPL W P-5'-P-CCNC: 10 U/L (ref 12–78)
ANION GAP SERPL CALCULATED.3IONS-SCNC: 12 MMOL/L (ref 4–13)
APPEARANCE FLD: CLEAR
AST SERPL W P-5'-P-CCNC: 24 U/L (ref 5–45)
BILIRUB SERPL-MCNC: 0.5 MG/DL (ref 0.2–1)
BUN SERPL-MCNC: 58 MG/DL (ref 5–25)
CALCIUM SERPL-MCNC: 8.2 MG/DL (ref 8.3–10.1)
CHLORIDE SERPL-SCNC: 99 MMOL/L (ref 100–108)
CO2 SERPL-SCNC: 19 MMOL/L (ref 21–32)
COLOR FLD: NORMAL
CREAT SERPL-MCNC: 7.02 MG/DL (ref 0.6–1.3)
EOSINOPHIL NFR FLD MANUAL: 1 %
ERYTHROCYTE [DISTWIDTH] IN BLOOD BY AUTOMATED COUNT: 15.7 % (ref 11.6–15.1)
GFR SERPL CREATININE-BSD FRML MDRD: 8 ML/MIN/1.73SQ M
GLUCOSE FLD-MCNC: 227 MG/DL
GLUCOSE SERPL-MCNC: 113 MG/DL (ref 65–140)
GLUCOSE SERPL-MCNC: 120 MG/DL (ref 65–140)
GLUCOSE SERPL-MCNC: 123 MG/DL (ref 65–140)
GLUCOSE SERPL-MCNC: 132 MG/DL (ref 65–140)
GLUCOSE SERPL-MCNC: 154 MG/DL (ref 65–140)
GLUCOSE SERPL-MCNC: 159 MG/DL (ref 65–140)
GLUCOSE SERPL-MCNC: 189 MG/DL (ref 65–140)
GLUCOSE SERPL-MCNC: 236 MG/DL (ref 65–140)
GLUCOSE SERPL-MCNC: 275 MG/DL (ref 65–140)
GLUCOSE SERPL-MCNC: 295 MG/DL (ref 65–140)
GLUCOSE SERPL-MCNC: 45 MG/DL (ref 65–140)
GLUCOSE SERPL-MCNC: 58 MG/DL (ref 65–140)
GLUCOSE SERPL-MCNC: 74 MG/DL (ref 65–140)
GRAM STN SPEC: NORMAL
HCT VFR BLD AUTO: 37.4 % (ref 36.5–49.3)
HGB BLD-MCNC: 11.6 G/DL (ref 12–17)
INR PPP: 1.9 (ref 0.86–1.16)
LDH FLD L TO P-CCNC: 73 U/L
LYMPHOCYTES NFR BLD AUTO: 36 %
MAGNESIUM SERPL-MCNC: 2.1 MG/DL (ref 1.6–2.6)
MCH RBC QN AUTO: 27.9 PG (ref 26.8–34.3)
MCHC RBC AUTO-ENTMCNC: 31 G/DL (ref 31.4–37.4)
MCV RBC AUTO: 90 FL (ref 82–98)
MONO+MESO NFR FLD MANUAL: 41 %
MONOCYTES NFR BLD AUTO: 6 %
MONONUC CELLS NFR FLD MANUAL: 9 %
NEUTS SEG NFR BLD AUTO: 7 %
PH BODY FLUID: 7.7
PHOSPHATE SERPL-MCNC: 3.1 MG/DL (ref 2.3–4.1)
PLATELET # BLD AUTO: 168 THOUSANDS/UL (ref 149–390)
PMV BLD AUTO: 10 FL (ref 8.9–12.7)
POTASSIUM SERPL-SCNC: 4.5 MMOL/L (ref 3.5–5.3)
PROT FLD-MCNC: 2.3 G/DL
PROT SERPL-MCNC: 7 G/DL (ref 6.4–8.2)
PROTHROMBIN TIME: 22.5 SECONDS (ref 12.1–14.4)
RBC # BLD AUTO: 4.16 MILLION/UL (ref 3.88–5.62)
SITE: NORMAL
SODIUM SERPL-SCNC: 130 MMOL/L (ref 136–145)
TOTAL CELLS COUNTED SPEC: 100
WBC # BLD AUTO: 6.34 THOUSAND/UL (ref 4.31–10.16)
WBC # FLD MANUAL: 117 /UL

## 2017-09-19 PROCEDURE — 84100 ASSAY OF PHOSPHORUS: CPT | Performed by: NURSE PRACTITIONER

## 2017-09-19 PROCEDURE — 85610 PROTHROMBIN TIME: CPT | Performed by: NURSE PRACTITIONER

## 2017-09-19 PROCEDURE — 84157 ASSAY OF PROTEIN OTHER: CPT | Performed by: NURSE PRACTITIONER

## 2017-09-19 PROCEDURE — 83735 ASSAY OF MAGNESIUM: CPT | Performed by: NURSE PRACTITIONER

## 2017-09-19 PROCEDURE — 82945 GLUCOSE OTHER FLUID: CPT | Performed by: NURSE PRACTITIONER

## 2017-09-19 PROCEDURE — 80053 COMPREHEN METABOLIC PANEL: CPT | Performed by: NURSE PRACTITIONER

## 2017-09-19 PROCEDURE — 88305 TISSUE EXAM BY PATHOLOGIST: CPT | Performed by: NURSE PRACTITIONER

## 2017-09-19 PROCEDURE — 92526 ORAL FUNCTION THERAPY: CPT

## 2017-09-19 PROCEDURE — 82948 REAGENT STRIP/BLOOD GLUCOSE: CPT

## 2017-09-19 PROCEDURE — 83986 ASSAY PH BODY FLUID NOS: CPT | Performed by: NURSE PRACTITIONER

## 2017-09-19 PROCEDURE — 0W993ZZ DRAINAGE OF RIGHT PLEURAL CAVITY, PERCUTANEOUS APPROACH: ICD-10-PCS | Performed by: RADIOLOGY

## 2017-09-19 PROCEDURE — 5A1D60Z PERFORMANCE OF URINARY FILTRATION, MULTIPLE: ICD-10-PCS | Performed by: INTERNAL MEDICINE

## 2017-09-19 PROCEDURE — 88112 CYTOPATH CELL ENHANCE TECH: CPT | Performed by: NURSE PRACTITIONER

## 2017-09-19 PROCEDURE — 85027 COMPLETE CBC AUTOMATED: CPT | Performed by: NURSE PRACTITIONER

## 2017-09-19 PROCEDURE — 32555 ASPIRATE PLEURA W/ IMAGING: CPT

## 2017-09-19 PROCEDURE — 83615 LACTATE (LD) (LDH) ENZYME: CPT | Performed by: NURSE PRACTITIONER

## 2017-09-19 PROCEDURE — 89051 BODY FLUID CELL COUNT: CPT | Performed by: NURSE PRACTITIONER

## 2017-09-19 PROCEDURE — 93005 ELECTROCARDIOGRAM TRACING: CPT | Performed by: PHYSICIAN ASSISTANT

## 2017-09-19 RX ORDER — INSULIN GLARGINE 100 [IU]/ML
5 INJECTION, SOLUTION SUBCUTANEOUS
Status: DISCONTINUED | OUTPATIENT
Start: 2017-09-19 | End: 2017-09-19

## 2017-09-19 RX ORDER — INSULIN GLARGINE 100 [IU]/ML
5 INJECTION, SOLUTION SUBCUTANEOUS ONCE
Status: COMPLETED | OUTPATIENT
Start: 2017-09-19 | End: 2017-09-19

## 2017-09-19 RX ORDER — DEXTROSE MONOHYDRATE 25 G/50ML
INJECTION, SOLUTION INTRAVENOUS
Status: COMPLETED
Start: 2017-09-19 | End: 2017-09-19

## 2017-09-19 RX ORDER — TRAMADOL HYDROCHLORIDE 50 MG/1
50 TABLET ORAL EVERY 8 HOURS PRN
Status: DISCONTINUED | OUTPATIENT
Start: 2017-09-19 | End: 2017-09-27 | Stop reason: HOSPADM

## 2017-09-19 RX ORDER — INSULIN GLARGINE 100 [IU]/ML
5 INJECTION, SOLUTION SUBCUTANEOUS EVERY MORNING
Status: DISCONTINUED | OUTPATIENT
Start: 2017-09-20 | End: 2017-09-19

## 2017-09-19 RX ORDER — INSULIN GLARGINE 100 [IU]/ML
3 INJECTION, SOLUTION SUBCUTANEOUS EVERY MORNING
Status: DISCONTINUED | OUTPATIENT
Start: 2017-09-20 | End: 2017-09-20

## 2017-09-19 RX ORDER — LISINOPRIL 2.5 MG/1
2.5 TABLET ORAL DAILY
Status: DISCONTINUED | OUTPATIENT
Start: 2017-09-20 | End: 2017-09-19

## 2017-09-19 RX ORDER — INSULIN GLARGINE 100 [IU]/ML
2 INJECTION, SOLUTION SUBCUTANEOUS
Status: DISCONTINUED | OUTPATIENT
Start: 2017-09-19 | End: 2017-09-20

## 2017-09-19 RX ORDER — LISINOPRIL 2.5 MG/1
2.5 TABLET ORAL DAILY
Status: DISCONTINUED | OUTPATIENT
Start: 2017-09-20 | End: 2017-09-22

## 2017-09-19 RX ORDER — METOPROLOL TARTRATE 100 MG/1
100 TABLET ORAL EVERY 12 HOURS SCHEDULED
Status: DISCONTINUED | OUTPATIENT
Start: 2017-09-19 | End: 2017-09-27 | Stop reason: HOSPADM

## 2017-09-19 RX ADMIN — NICOTINE 1 PATCH: 14 PATCH TRANSDERMAL at 22:00

## 2017-09-19 RX ADMIN — DEXTROSE AND SODIUM CHLORIDE 100 ML/HR: 5; .9 INJECTION, SOLUTION INTRAVENOUS at 08:37

## 2017-09-19 RX ADMIN — CITALOPRAM HYDROBROMIDE 10 MG: 20 TABLET ORAL at 08:37

## 2017-09-19 RX ADMIN — HEPARIN SODIUM 5000 UNITS: 5000 INJECTION, SOLUTION INTRAVENOUS; SUBCUTANEOUS at 21:05

## 2017-09-19 RX ADMIN — LISINOPRIL 2.5 MG: 2.5 TABLET ORAL at 08:37

## 2017-09-19 RX ADMIN — INSULIN GLARGINE 5 UNITS: 100 INJECTION, SOLUTION SUBCUTANEOUS at 10:49

## 2017-09-19 RX ADMIN — TRAMADOL HYDROCHLORIDE 50 MG: 50 TABLET, COATED ORAL at 14:34

## 2017-09-19 RX ADMIN — Medication 250 MG: at 08:37

## 2017-09-19 RX ADMIN — LEVOTHYROXINE SODIUM 125 MCG: 125 TABLET ORAL at 05:36

## 2017-09-19 RX ADMIN — PANTOPRAZOLE SODIUM 40 MG: 40 TABLET, DELAYED RELEASE ORAL at 05:36

## 2017-09-19 RX ADMIN — HEPARIN SODIUM 5000 UNITS: 5000 INJECTION, SOLUTION INTRAVENOUS; SUBCUTANEOUS at 05:36

## 2017-09-19 RX ADMIN — DEXTROSE MONOHYDRATE 50 ML: 25 INJECTION, SOLUTION INTRAVENOUS at 02:10

## 2017-09-19 RX ADMIN — RENAGEL 800 MG: 800 TABLET ORAL at 14:35

## 2017-09-19 RX ADMIN — INSULIN LISPRO 1 UNITS: 100 INJECTION, SOLUTION INTRAVENOUS; SUBCUTANEOUS at 18:08

## 2017-09-19 RX ADMIN — METOPROLOL TARTRATE 100 MG: 100 TABLET ORAL at 08:37

## 2017-09-19 RX ADMIN — PIPERACILLIN SODIUM,TAZOBACTAM SODIUM 2.25 G: 2; .25 INJECTION, POWDER, FOR SOLUTION INTRAVENOUS at 20:12

## 2017-09-19 RX ADMIN — INSULIN LISPRO 3 UNITS: 100 INJECTION, SOLUTION INTRAVENOUS; SUBCUTANEOUS at 18:09

## 2017-09-19 RX ADMIN — Medication 250 MG: at 18:08

## 2017-09-19 RX ADMIN — RENAGEL 800 MG: 800 TABLET ORAL at 18:09

## 2017-09-19 RX ADMIN — DEXTROSE MONOHYDRATE 50 ML: 25 INJECTION, SOLUTION INTRAVENOUS at 21:05

## 2017-09-19 RX ADMIN — METOPROLOL TARTRATE 100 MG: 100 TABLET ORAL at 20:12

## 2017-09-19 RX ADMIN — VANCOMYCIN 125 MG: KIT at 14:35

## 2017-09-19 RX ADMIN — VANCOMYCIN 125 MG: KIT at 03:27

## 2017-09-19 RX ADMIN — INSULIN GLARGINE 2 UNITS: 100 INJECTION, SOLUTION SUBCUTANEOUS at 21:33

## 2017-09-19 RX ADMIN — VANCOMYCIN 125 MG: KIT at 08:45

## 2017-09-19 RX ADMIN — INSULIN LISPRO 4 UNITS: 100 INJECTION, SOLUTION INTRAVENOUS; SUBCUTANEOUS at 12:15

## 2017-09-19 RX ADMIN — VANCOMYCIN 125 MG: KIT at 20:12

## 2017-09-19 RX ADMIN — HEPARIN SODIUM 5000 UNITS: 5000 INJECTION, SOLUTION INTRAVENOUS; SUBCUTANEOUS at 14:35

## 2017-09-19 RX ADMIN — CHOLECALCIFEROL TAB 25 MCG (1000 UNIT) 2000 UNITS: 25 TAB at 08:37

## 2017-09-19 RX ADMIN — ACETAMINOPHEN 650 MG: 325 TABLET ORAL at 20:15

## 2017-09-20 ENCOUNTER — GENERIC CONVERSION - ENCOUNTER (OUTPATIENT)
Dept: OTHER | Facility: OTHER | Age: 67
End: 2017-09-20

## 2017-09-20 ENCOUNTER — APPOINTMENT (INPATIENT)
Dept: DIALYSIS | Facility: HOSPITAL | Age: 67
DRG: 070 | End: 2017-09-20
Attending: INTERNAL MEDICINE
Payer: COMMERCIAL

## 2017-09-20 ENCOUNTER — APPOINTMENT (INPATIENT)
Dept: CT IMAGING | Facility: HOSPITAL | Age: 67
DRG: 070 | End: 2017-09-20
Payer: COMMERCIAL

## 2017-09-20 PROBLEM — E16.2 HYPOGLYCEMIA: Status: ACTIVE | Noted: 2017-09-20

## 2017-09-20 PROBLEM — D64.9 ANEMIA: Status: ACTIVE | Noted: 2017-09-20

## 2017-09-20 LAB
ANION GAP SERPL CALCULATED.3IONS-SCNC: 13 MMOL/L (ref 4–13)
APTT PPP: 49 SECONDS (ref 23–35)
ATRIAL RATE: 65 BPM
BACTERIA UR CULT: NORMAL
BACTERIA UR CULT: NORMAL
BASOPHILS # BLD AUTO: 0.09 THOUSANDS/ΜL (ref 0–0.1)
BASOPHILS NFR BLD AUTO: 2 % (ref 0–1)
BUN SERPL-MCNC: 38 MG/DL (ref 5–25)
CALCIUM SERPL-MCNC: 8.5 MG/DL (ref 8.3–10.1)
CHLORIDE SERPL-SCNC: 100 MMOL/L (ref 100–108)
CO2 SERPL-SCNC: 22 MMOL/L (ref 21–32)
CREAT SERPL-MCNC: 5.98 MG/DL (ref 0.6–1.3)
EOSINOPHIL # BLD AUTO: 0.16 THOUSAND/ΜL (ref 0–0.61)
EOSINOPHIL NFR BLD AUTO: 4 % (ref 0–6)
ERYTHROCYTE [DISTWIDTH] IN BLOOD BY AUTOMATED COUNT: 15.8 % (ref 11.6–15.1)
GFR SERPL CREATININE-BSD FRML MDRD: 9 ML/MIN/1.73SQ M
GLUCOSE SERPL-MCNC: 101 MG/DL (ref 65–140)
GLUCOSE SERPL-MCNC: 125 MG/DL (ref 65–140)
GLUCOSE SERPL-MCNC: 126 MG/DL (ref 65–140)
GLUCOSE SERPL-MCNC: 42 MG/DL (ref 65–140)
GLUCOSE SERPL-MCNC: 56 MG/DL (ref 65–140)
GLUCOSE SERPL-MCNC: 57 MG/DL (ref 65–140)
GLUCOSE SERPL-MCNC: 62 MG/DL (ref 65–140)
GLUCOSE SERPL-MCNC: 65 MG/DL (ref 65–140)
GLUCOSE SERPL-MCNC: 72 MG/DL (ref 65–140)
GLUCOSE SERPL-MCNC: 95 MG/DL (ref 65–140)
HCT VFR BLD AUTO: 36.5 % (ref 36.5–49.3)
HGB BLD-MCNC: 11.5 G/DL (ref 12–17)
INR PPP: 2.21 (ref 0.86–1.16)
LYMPHOCYTES # BLD AUTO: 0.72 THOUSANDS/ΜL (ref 0.6–4.47)
LYMPHOCYTES NFR BLD AUTO: 16 % (ref 14–44)
MAGNESIUM SERPL-MCNC: 2 MG/DL (ref 1.6–2.6)
MCH RBC QN AUTO: 28.1 PG (ref 26.8–34.3)
MCHC RBC AUTO-ENTMCNC: 31.5 G/DL (ref 31.4–37.4)
MCV RBC AUTO: 89 FL (ref 82–98)
MONOCYTES # BLD AUTO: 0.63 THOUSAND/ΜL (ref 0.17–1.22)
MONOCYTES NFR BLD AUTO: 14 % (ref 4–12)
NEUTROPHILS # BLD AUTO: 2.96 THOUSANDS/ΜL (ref 1.85–7.62)
NEUTS SEG NFR BLD AUTO: 64 % (ref 43–75)
P AXIS: 84 DEGREES
PHOSPHATE SERPL-MCNC: 3.2 MG/DL (ref 2.3–4.1)
PLATELET # BLD AUTO: 183 THOUSANDS/UL (ref 149–390)
PMV BLD AUTO: 10.1 FL (ref 8.9–12.7)
POTASSIUM SERPL-SCNC: 3.6 MMOL/L (ref 3.5–5.3)
PR INTERVAL: 176 MS
PROTHROMBIN TIME: 25.3 SECONDS (ref 12.1–14.4)
QRS AXIS: 7 DEGREES
QRSD INTERVAL: 100 MS
QT INTERVAL: 452 MS
QTC INTERVAL: 470 MS
RBC # BLD AUTO: 4.09 MILLION/UL (ref 3.88–5.62)
SODIUM SERPL-SCNC: 135 MMOL/L (ref 136–145)
T WAVE AXIS: 125 DEGREES
VENTRICULAR RATE: 65 BPM
WBC # BLD AUTO: 4.56 THOUSAND/UL (ref 4.31–10.16)

## 2017-09-20 PROCEDURE — 83735 ASSAY OF MAGNESIUM: CPT | Performed by: INTERNAL MEDICINE

## 2017-09-20 PROCEDURE — 85730 THROMBOPLASTIN TIME PARTIAL: CPT | Performed by: INTERNAL MEDICINE

## 2017-09-20 PROCEDURE — 82948 REAGENT STRIP/BLOOD GLUCOSE: CPT

## 2017-09-20 PROCEDURE — 80048 BASIC METABOLIC PNL TOTAL CA: CPT | Performed by: INTERNAL MEDICINE

## 2017-09-20 PROCEDURE — 85025 COMPLETE CBC W/AUTO DIFF WBC: CPT | Performed by: INTERNAL MEDICINE

## 2017-09-20 PROCEDURE — 85610 PROTHROMBIN TIME: CPT | Performed by: INTERNAL MEDICINE

## 2017-09-20 PROCEDURE — 84100 ASSAY OF PHOSPHORUS: CPT | Performed by: INTERNAL MEDICINE

## 2017-09-20 PROCEDURE — 71250 CT THORAX DX C-: CPT

## 2017-09-20 RX ORDER — INSULIN GLARGINE 100 [IU]/ML
1 INJECTION, SOLUTION SUBCUTANEOUS
Status: DISCONTINUED | OUTPATIENT
Start: 2017-09-20 | End: 2017-09-23

## 2017-09-20 RX ORDER — DEXTROSE MONOHYDRATE 25 G/50ML
INJECTION, SOLUTION INTRAVENOUS
Status: COMPLETED
Start: 2017-09-20 | End: 2017-09-20

## 2017-09-20 RX ORDER — INSULIN GLARGINE 100 [IU]/ML
2 INJECTION, SOLUTION SUBCUTANEOUS EVERY MORNING
Status: DISCONTINUED | OUTPATIENT
Start: 2017-09-20 | End: 2017-09-26

## 2017-09-20 RX ORDER — WARFARIN SODIUM 5 MG/1
5 TABLET ORAL
Status: DISCONTINUED | OUTPATIENT
Start: 2017-09-20 | End: 2017-09-22

## 2017-09-20 RX ADMIN — VANCOMYCIN 125 MG: KIT at 08:17

## 2017-09-20 RX ADMIN — VANCOMYCIN 125 MG: KIT at 15:49

## 2017-09-20 RX ADMIN — DEXTROSE MONOHYDRATE 25 ML: 25 INJECTION, SOLUTION INTRAVENOUS at 12:37

## 2017-09-20 RX ADMIN — PANTOPRAZOLE SODIUM 40 MG: 40 TABLET, DELAYED RELEASE ORAL at 05:05

## 2017-09-20 RX ADMIN — INSULIN GLARGINE 1 UNITS: 100 INJECTION, SOLUTION SUBCUTANEOUS at 22:28

## 2017-09-20 RX ADMIN — VANCOMYCIN 125 MG: KIT at 20:41

## 2017-09-20 RX ADMIN — RENAGEL 800 MG: 800 TABLET ORAL at 13:18

## 2017-09-20 RX ADMIN — CITALOPRAM HYDROBROMIDE 10 MG: 20 TABLET ORAL at 11:35

## 2017-09-20 RX ADMIN — RENAGEL 800 MG: 800 TABLET ORAL at 15:51

## 2017-09-20 RX ADMIN — DEXTROSE MONOHYDRATE 25 ML: 25 INJECTION, SOLUTION INTRAVENOUS at 08:16

## 2017-09-20 RX ADMIN — WARFARIN SODIUM 5 MG: 5 TABLET ORAL at 18:19

## 2017-09-20 RX ADMIN — DEXTROSE MONOHYDRATE 50 ML: 25 INJECTION, SOLUTION INTRAVENOUS at 03:42

## 2017-09-20 RX ADMIN — INSULIN GLARGINE 2 UNITS: 100 INJECTION, SOLUTION SUBCUTANEOUS at 08:17

## 2017-09-20 RX ADMIN — Medication 250 MG: at 09:00

## 2017-09-20 RX ADMIN — HEPARIN SODIUM 5000 UNITS: 5000 INJECTION, SOLUTION INTRAVENOUS; SUBCUTANEOUS at 05:05

## 2017-09-20 RX ADMIN — PIPERACILLIN SODIUM,TAZOBACTAM SODIUM 2.25 G: 2; .25 INJECTION, POWDER, FOR SOLUTION INTRAVENOUS at 11:44

## 2017-09-20 RX ADMIN — PIPERACILLIN SODIUM,TAZOBACTAM SODIUM 2.25 G: 2; .25 INJECTION, POWDER, FOR SOLUTION INTRAVENOUS at 18:21

## 2017-09-20 RX ADMIN — VANCOMYCIN 125 MG: KIT at 03:10

## 2017-09-20 RX ADMIN — LISINOPRIL 2.5 MG: 2.5 TABLET ORAL at 11:34

## 2017-09-20 RX ADMIN — NICOTINE 1 PATCH: 14 PATCH TRANSDERMAL at 20:47

## 2017-09-20 RX ADMIN — METOPROLOL TARTRATE 100 MG: 100 TABLET ORAL at 11:35

## 2017-09-20 RX ADMIN — DIAPER RASH SKIN PROTECTENT 1 APPLICATION: at 08:17

## 2017-09-20 RX ADMIN — Medication 250 MG: at 18:19

## 2017-09-20 RX ADMIN — CHOLECALCIFEROL TAB 25 MCG (1000 UNIT) 2000 UNITS: 25 TAB at 11:34

## 2017-09-20 RX ADMIN — LEVOTHYROXINE SODIUM 125 MCG: 125 TABLET ORAL at 05:05

## 2017-09-20 RX ADMIN — METOPROLOL TARTRATE 100 MG: 100 TABLET ORAL at 20:43

## 2017-09-20 RX ADMIN — RENAGEL 800 MG: 800 TABLET ORAL at 09:16

## 2017-09-21 ENCOUNTER — APPOINTMENT (INPATIENT)
Dept: DIALYSIS | Facility: HOSPITAL | Age: 67
DRG: 070 | End: 2017-09-21
Payer: COMMERCIAL

## 2017-09-21 ENCOUNTER — APPOINTMENT (INPATIENT)
Dept: RADIOLOGY | Facility: HOSPITAL | Age: 67
DRG: 070 | End: 2017-09-21
Payer: COMMERCIAL

## 2017-09-21 LAB
ANION GAP SERPL CALCULATED.3IONS-SCNC: 8 MMOL/L (ref 4–13)
BUN SERPL-MCNC: 24 MG/DL (ref 5–25)
CALCIUM SERPL-MCNC: 8.4 MG/DL (ref 8.3–10.1)
CHLORIDE SERPL-SCNC: 99 MMOL/L (ref 100–108)
CO2 SERPL-SCNC: 27 MMOL/L (ref 21–32)
CREAT SERPL-MCNC: 5.26 MG/DL (ref 0.6–1.3)
GFR SERPL CREATININE-BSD FRML MDRD: 11 ML/MIN/1.73SQ M
GLUCOSE SERPL-MCNC: 105 MG/DL (ref 65–140)
GLUCOSE SERPL-MCNC: 113 MG/DL (ref 65–140)
GLUCOSE SERPL-MCNC: 117 MG/DL (ref 65–140)
GLUCOSE SERPL-MCNC: 145 MG/DL (ref 65–140)
GLUCOSE SERPL-MCNC: 166 MG/DL (ref 65–140)
GLUCOSE SERPL-MCNC: 222 MG/DL (ref 65–140)
GLUCOSE SERPL-MCNC: 250 MG/DL (ref 65–140)
INR PPP: 2.12 (ref 0.86–1.16)
POTASSIUM SERPL-SCNC: 4.5 MMOL/L (ref 3.5–5.3)
PROTHROMBIN TIME: 24.5 SECONDS (ref 12.1–14.4)
SODIUM SERPL-SCNC: 134 MMOL/L (ref 136–145)

## 2017-09-21 PROCEDURE — 80048 BASIC METABOLIC PNL TOTAL CA: CPT | Performed by: PHYSICIAN ASSISTANT

## 2017-09-21 PROCEDURE — 85610 PROTHROMBIN TIME: CPT | Performed by: PHYSICIAN ASSISTANT

## 2017-09-21 PROCEDURE — 82948 REAGENT STRIP/BLOOD GLUCOSE: CPT

## 2017-09-21 RX ORDER — CIPROFLOXACIN 500 MG/1
500 TABLET, FILM COATED ORAL EVERY 24 HOURS
Status: COMPLETED | OUTPATIENT
Start: 2017-09-21 | End: 2017-09-23

## 2017-09-21 RX ORDER — OXYCODONE HYDROCHLORIDE 5 MG/1
5 TABLET ORAL EVERY 4 HOURS PRN
Status: DISCONTINUED | OUTPATIENT
Start: 2017-09-21 | End: 2017-09-27 | Stop reason: HOSPADM

## 2017-09-21 RX ADMIN — VANCOMYCIN 125 MG: KIT at 20:02

## 2017-09-21 RX ADMIN — METOPROLOL TARTRATE 100 MG: 100 TABLET ORAL at 08:54

## 2017-09-21 RX ADMIN — INSULIN LISPRO 1 UNITS: 100 INJECTION, SOLUTION INTRAVENOUS; SUBCUTANEOUS at 11:49

## 2017-09-21 RX ADMIN — WARFARIN SODIUM 5 MG: 5 TABLET ORAL at 17:40

## 2017-09-21 RX ADMIN — Medication 250 MG: at 08:54

## 2017-09-21 RX ADMIN — INSULIN GLARGINE 1 UNITS: 100 INJECTION, SOLUTION SUBCUTANEOUS at 21:21

## 2017-09-21 RX ADMIN — CHOLECALCIFEROL TAB 25 MCG (1000 UNIT) 2000 UNITS: 25 TAB at 08:54

## 2017-09-21 RX ADMIN — DIAPER RASH SKIN PROTECTENT 1 APPLICATION: at 17:40

## 2017-09-21 RX ADMIN — Medication 250 MG: at 17:40

## 2017-09-21 RX ADMIN — INSULIN GLARGINE 2 UNITS: 100 INJECTION, SOLUTION SUBCUTANEOUS at 08:55

## 2017-09-21 RX ADMIN — VANCOMYCIN 125 MG: KIT at 04:00

## 2017-09-21 RX ADMIN — LISINOPRIL 2.5 MG: 2.5 TABLET ORAL at 08:54

## 2017-09-21 RX ADMIN — NICOTINE 1 PATCH: 14 PATCH TRANSDERMAL at 21:21

## 2017-09-21 RX ADMIN — CITALOPRAM HYDROBROMIDE 10 MG: 20 TABLET ORAL at 08:54

## 2017-09-21 RX ADMIN — RENAGEL 800 MG: 800 TABLET ORAL at 11:49

## 2017-09-21 RX ADMIN — RENAGEL 800 MG: 800 TABLET ORAL at 17:40

## 2017-09-21 RX ADMIN — PANTOPRAZOLE SODIUM 40 MG: 40 TABLET, DELAYED RELEASE ORAL at 06:15

## 2017-09-21 RX ADMIN — CIPROFLOXACIN 500 MG: 500 TABLET, FILM COATED ORAL at 13:59

## 2017-09-21 RX ADMIN — OXYCODONE HYDROCHLORIDE 5 MG: 5 TABLET ORAL at 11:48

## 2017-09-21 RX ADMIN — VANCOMYCIN 125 MG: KIT at 09:20

## 2017-09-21 RX ADMIN — METOPROLOL TARTRATE 100 MG: 100 TABLET ORAL at 20:03

## 2017-09-21 RX ADMIN — INSULIN LISPRO 1 UNITS: 100 INJECTION, SOLUTION INTRAVENOUS; SUBCUTANEOUS at 21:24

## 2017-09-21 RX ADMIN — VANCOMYCIN 125 MG: KIT at 14:00

## 2017-09-21 RX ADMIN — LEVOTHYROXINE SODIUM 125 MCG: 125 TABLET ORAL at 06:15

## 2017-09-21 RX ADMIN — RENAGEL 800 MG: 800 TABLET ORAL at 08:00

## 2017-09-22 ENCOUNTER — APPOINTMENT (INPATIENT)
Dept: DIALYSIS | Facility: HOSPITAL | Age: 67
DRG: 070 | End: 2017-09-22
Payer: COMMERCIAL

## 2017-09-22 ENCOUNTER — GENERIC CONVERSION - ENCOUNTER (OUTPATIENT)
Dept: OTHER | Facility: OTHER | Age: 67
End: 2017-09-22

## 2017-09-22 PROBLEM — D69.6 THROMBOCYTOPENIA (HCC): Status: ACTIVE | Noted: 2017-09-22

## 2017-09-22 LAB
ANION GAP SERPL CALCULATED.3IONS-SCNC: 9 MMOL/L (ref 4–13)
BUN SERPL-MCNC: 19 MG/DL (ref 5–25)
CALCIUM SERPL-MCNC: 8.6 MG/DL (ref 8.3–10.1)
CHLORIDE SERPL-SCNC: 97 MMOL/L (ref 100–108)
CO2 SERPL-SCNC: 24 MMOL/L (ref 21–32)
CREAT SERPL-MCNC: 4.67 MG/DL (ref 0.6–1.3)
ERYTHROCYTE [DISTWIDTH] IN BLOOD BY AUTOMATED COUNT: 15.9 % (ref 11.6–15.1)
GFR SERPL CREATININE-BSD FRML MDRD: 12 ML/MIN/1.73SQ M
GLUCOSE SERPL-MCNC: 119 MG/DL (ref 65–140)
GLUCOSE SERPL-MCNC: 219 MG/DL (ref 65–140)
GLUCOSE SERPL-MCNC: 252 MG/DL (ref 65–140)
GLUCOSE SERPL-MCNC: 314 MG/DL (ref 65–140)
GLUCOSE SERPL-MCNC: 344 MG/DL (ref 65–140)
HCT VFR BLD AUTO: 38.8 % (ref 36.5–49.3)
HGB BLD-MCNC: 12 G/DL (ref 12–17)
INR PPP: 3.07 (ref 0.86–1.16)
MCH RBC QN AUTO: 28.1 PG (ref 26.8–34.3)
MCHC RBC AUTO-ENTMCNC: 30.9 G/DL (ref 31.4–37.4)
MCV RBC AUTO: 91 FL (ref 82–98)
PLATELET # BLD AUTO: 118 THOUSANDS/UL (ref 149–390)
PMV BLD AUTO: 10.6 FL (ref 8.9–12.7)
POTASSIUM SERPL-SCNC: 4.2 MMOL/L (ref 3.5–5.3)
PROTHROMBIN TIME: 32.9 SECONDS (ref 12.1–14.4)
RBC # BLD AUTO: 4.27 MILLION/UL (ref 3.88–5.62)
SODIUM SERPL-SCNC: 130 MMOL/L (ref 136–145)
WBC # BLD AUTO: 4.75 THOUSAND/UL (ref 4.31–10.16)

## 2017-09-22 PROCEDURE — 82948 REAGENT STRIP/BLOOD GLUCOSE: CPT

## 2017-09-22 PROCEDURE — 85610 PROTHROMBIN TIME: CPT | Performed by: INTERNAL MEDICINE

## 2017-09-22 PROCEDURE — 85027 COMPLETE CBC AUTOMATED: CPT | Performed by: INTERNAL MEDICINE

## 2017-09-22 PROCEDURE — 80048 BASIC METABOLIC PNL TOTAL CA: CPT | Performed by: INTERNAL MEDICINE

## 2017-09-22 RX ORDER — LISINOPRIL 2.5 MG/1
2.5 TABLET ORAL
Status: DISCONTINUED | OUTPATIENT
Start: 2017-09-22 | End: 2017-09-23

## 2017-09-22 RX ADMIN — INSULIN LISPRO 2 UNITS: 100 INJECTION, SOLUTION INTRAVENOUS; SUBCUTANEOUS at 22:52

## 2017-09-22 RX ADMIN — INSULIN LISPRO 1 UNITS: 100 INJECTION, SOLUTION INTRAVENOUS; SUBCUTANEOUS at 09:15

## 2017-09-22 RX ADMIN — LEVOTHYROXINE SODIUM 125 MCG: 125 TABLET ORAL at 06:27

## 2017-09-22 RX ADMIN — INSULIN GLARGINE 2 UNITS: 100 INJECTION, SOLUTION SUBCUTANEOUS at 09:16

## 2017-09-22 RX ADMIN — CIPROFLOXACIN 500 MG: 500 TABLET, FILM COATED ORAL at 13:10

## 2017-09-22 RX ADMIN — VANCOMYCIN 125 MG: KIT at 23:55

## 2017-09-22 RX ADMIN — INSULIN LISPRO 3 UNITS: 100 INJECTION, SOLUTION INTRAVENOUS; SUBCUTANEOUS at 17:12

## 2017-09-22 RX ADMIN — LISINOPRIL 2.5 MG: 2.5 TABLET ORAL at 17:11

## 2017-09-22 RX ADMIN — VANCOMYCIN 125 MG: KIT at 04:36

## 2017-09-22 RX ADMIN — RENAGEL 800 MG: 800 TABLET ORAL at 13:10

## 2017-09-22 RX ADMIN — PANTOPRAZOLE SODIUM 40 MG: 40 TABLET, DELAYED RELEASE ORAL at 06:27

## 2017-09-22 RX ADMIN — RENAGEL 800 MG: 800 TABLET ORAL at 15:30

## 2017-09-22 RX ADMIN — RENAGEL 800 MG: 800 TABLET ORAL at 09:19

## 2017-09-22 RX ADMIN — INSULIN GLARGINE 1 UNITS: 100 INJECTION, SOLUTION SUBCUTANEOUS at 22:51

## 2017-09-22 RX ADMIN — Medication 250 MG: at 17:11

## 2017-09-22 RX ADMIN — VANCOMYCIN 125 MG: KIT at 15:29

## 2017-09-22 RX ADMIN — NICOTINE 1 PATCH: 14 PATCH TRANSDERMAL at 22:50

## 2017-09-22 RX ADMIN — METOPROLOL TARTRATE 100 MG: 100 TABLET ORAL at 23:42

## 2017-09-23 ENCOUNTER — APPOINTMENT (INPATIENT)
Dept: RADIOLOGY | Facility: HOSPITAL | Age: 67
DRG: 070 | End: 2017-09-23
Payer: COMMERCIAL

## 2017-09-23 LAB
ANION GAP SERPL CALCULATED.3IONS-SCNC: 10 MMOL/L (ref 4–13)
BACTERIA BLD CULT: NORMAL
BACTERIA BLD CULT: NORMAL
BUN SERPL-MCNC: 19 MG/DL (ref 5–25)
CALCIUM SERPL-MCNC: 8.8 MG/DL (ref 8.3–10.1)
CHLORIDE SERPL-SCNC: 93 MMOL/L (ref 100–108)
CO2 SERPL-SCNC: 24 MMOL/L (ref 21–32)
CREAT SERPL-MCNC: 3.74 MG/DL (ref 0.6–1.3)
ERYTHROCYTE [DISTWIDTH] IN BLOOD BY AUTOMATED COUNT: 15.5 % (ref 11.6–15.1)
GFR SERPL CREATININE-BSD FRML MDRD: 16 ML/MIN/1.73SQ M
GLUCOSE SERPL-MCNC: 170 MG/DL (ref 65–140)
GLUCOSE SERPL-MCNC: 254 MG/DL (ref 65–140)
GLUCOSE SERPL-MCNC: 384 MG/DL (ref 65–140)
GLUCOSE SERPL-MCNC: 414 MG/DL (ref 65–140)
GLUCOSE SERPL-MCNC: 417 MG/DL (ref 65–140)
HCT VFR BLD AUTO: 39.4 % (ref 36.5–49.3)
HGB BLD-MCNC: 12.3 G/DL (ref 12–17)
INR PPP: 1.95 (ref 0.86–1.16)
MCH RBC QN AUTO: 28.1 PG (ref 26.8–34.3)
MCHC RBC AUTO-ENTMCNC: 31.2 G/DL (ref 31.4–37.4)
MCV RBC AUTO: 90 FL (ref 82–98)
PLATELET # BLD AUTO: 138 THOUSANDS/UL (ref 149–390)
PMV BLD AUTO: 11 FL (ref 8.9–12.7)
POTASSIUM SERPL-SCNC: 4.1 MMOL/L (ref 3.5–5.3)
PROTHROMBIN TIME: 23 SECONDS (ref 12.1–14.4)
RBC # BLD AUTO: 4.37 MILLION/UL (ref 3.88–5.62)
SODIUM SERPL-SCNC: 127 MMOL/L (ref 136–145)
WBC # BLD AUTO: 7.48 THOUSAND/UL (ref 4.31–10.16)

## 2017-09-23 PROCEDURE — 71020 HB CHEST X-RAY 2VW FRONTAL&LATL: CPT

## 2017-09-23 PROCEDURE — 82948 REAGENT STRIP/BLOOD GLUCOSE: CPT

## 2017-09-23 PROCEDURE — 85027 COMPLETE CBC AUTOMATED: CPT | Performed by: INTERNAL MEDICINE

## 2017-09-23 PROCEDURE — 85610 PROTHROMBIN TIME: CPT | Performed by: INTERNAL MEDICINE

## 2017-09-23 PROCEDURE — G8979 MOBILITY GOAL STATUS: HCPCS

## 2017-09-23 PROCEDURE — 97163 PT EVAL HIGH COMPLEX 45 MIN: CPT

## 2017-09-23 PROCEDURE — G8978 MOBILITY CURRENT STATUS: HCPCS

## 2017-09-23 PROCEDURE — 80048 BASIC METABOLIC PNL TOTAL CA: CPT | Performed by: INTERNAL MEDICINE

## 2017-09-23 RX ORDER — INSULIN GLARGINE 100 [IU]/ML
2 INJECTION, SOLUTION SUBCUTANEOUS
Status: DISCONTINUED | OUTPATIENT
Start: 2017-09-23 | End: 2017-09-24

## 2017-09-23 RX ORDER — CHOLECALCIFEROL (VITAMIN D3) 10 MCG
1 TABLET ORAL
Status: DISCONTINUED | OUTPATIENT
Start: 2017-09-23 | End: 2017-09-27 | Stop reason: HOSPADM

## 2017-09-23 RX ORDER — HEPARIN SODIUM 5000 [USP'U]/ML
5000 INJECTION, SOLUTION INTRAVENOUS; SUBCUTANEOUS EVERY 8 HOURS SCHEDULED
Status: DISCONTINUED | OUTPATIENT
Start: 2017-09-24 | End: 2017-09-27 | Stop reason: HOSPADM

## 2017-09-23 RX ORDER — CHOLECALCIFEROL (VITAMIN D3) 10 MCG
1 TABLET ORAL DAILY
Status: DISCONTINUED | OUTPATIENT
Start: 2017-09-23 | End: 2017-09-24 | Stop reason: SDUPTHER

## 2017-09-23 RX ADMIN — RENAGEL 800 MG: 800 TABLET ORAL at 17:06

## 2017-09-23 RX ADMIN — INSULIN LISPRO 4 UNITS: 100 INJECTION, SOLUTION INTRAVENOUS; SUBCUTANEOUS at 08:02

## 2017-09-23 RX ADMIN — Medication 250 MG: at 08:00

## 2017-09-23 RX ADMIN — CIPROFLOXACIN 500 MG: 500 TABLET, FILM COATED ORAL at 12:04

## 2017-09-23 RX ADMIN — VANCOMYCIN 125 MG: KIT at 17:06

## 2017-09-23 RX ADMIN — VANCOMYCIN 125 MG: KIT at 12:04

## 2017-09-23 RX ADMIN — INSULIN LISPRO 2 UNITS: 100 INJECTION, SOLUTION INTRAVENOUS; SUBCUTANEOUS at 17:07

## 2017-09-23 RX ADMIN — INSULIN LISPRO 1 UNITS: 100 INJECTION, SOLUTION INTRAVENOUS; SUBCUTANEOUS at 17:07

## 2017-09-23 RX ADMIN — VANCOMYCIN 125 MG: KIT at 05:24

## 2017-09-23 RX ADMIN — CALCIUM ACETATE 1334 MG: 667 CAPSULE ORAL at 17:07

## 2017-09-23 RX ADMIN — RENAGEL 800 MG: 800 TABLET ORAL at 12:04

## 2017-09-23 RX ADMIN — NICOTINE 1 PATCH: 14 PATCH TRANSDERMAL at 22:23

## 2017-09-23 RX ADMIN — INSULIN LISPRO 1 UNITS: 100 INJECTION, SOLUTION INTRAVENOUS; SUBCUTANEOUS at 12:04

## 2017-09-23 RX ADMIN — METOPROLOL TARTRATE 100 MG: 100 TABLET ORAL at 08:00

## 2017-09-23 RX ADMIN — INSULIN GLARGINE 2 UNITS: 100 INJECTION, SOLUTION SUBCUTANEOUS at 08:00

## 2017-09-23 RX ADMIN — INSULIN GLARGINE 2 UNITS: 100 INJECTION, SOLUTION SUBCUTANEOUS at 22:11

## 2017-09-23 RX ADMIN — RENAGEL 800 MG: 800 TABLET ORAL at 08:01

## 2017-09-23 RX ADMIN — PANTOPRAZOLE SODIUM 40 MG: 40 TABLET, DELAYED RELEASE ORAL at 05:24

## 2017-09-23 RX ADMIN — INSULIN LISPRO 4 UNITS: 100 INJECTION, SOLUTION INTRAVENOUS; SUBCUTANEOUS at 12:03

## 2017-09-23 RX ADMIN — LEVOTHYROXINE SODIUM 125 MCG: 125 TABLET ORAL at 05:24

## 2017-09-23 RX ADMIN — Medication 250 MG: at 17:07

## 2017-09-23 RX ADMIN — CHOLECALCIFEROL TAB 25 MCG (1000 UNIT) 2000 UNITS: 25 TAB at 08:00

## 2017-09-23 RX ADMIN — Medication 1 CAPSULE: at 17:07

## 2017-09-23 RX ADMIN — TRAMADOL HYDROCHLORIDE 50 MG: 50 TABLET, COATED ORAL at 18:05

## 2017-09-24 PROBLEM — E87.1 HYPONATREMIA: Status: ACTIVE | Noted: 2017-09-24

## 2017-09-24 LAB
ANION GAP SERPL CALCULATED.3IONS-SCNC: 11 MMOL/L (ref 4–13)
BUN SERPL-MCNC: 31 MG/DL (ref 5–25)
CALCIUM SERPL-MCNC: 9.1 MG/DL (ref 8.3–10.1)
CHLORIDE SERPL-SCNC: 93 MMOL/L (ref 100–108)
CO2 SERPL-SCNC: 24 MMOL/L (ref 21–32)
CREAT SERPL-MCNC: 5.1 MG/DL (ref 0.6–1.3)
ERYTHROCYTE [DISTWIDTH] IN BLOOD BY AUTOMATED COUNT: 15.3 % (ref 11.6–15.1)
GFR SERPL CREATININE-BSD FRML MDRD: 11 ML/MIN/1.73SQ M
GLUCOSE SERPL-MCNC: 125 MG/DL (ref 65–140)
GLUCOSE SERPL-MCNC: 134 MG/DL (ref 65–140)
GLUCOSE SERPL-MCNC: 216 MG/DL (ref 65–140)
GLUCOSE SERPL-MCNC: 286 MG/DL (ref 65–140)
GLUCOSE SERPL-MCNC: 286 MG/DL (ref 65–140)
GLUCOSE SERPL-MCNC: 65 MG/DL (ref 65–140)
HCT VFR BLD AUTO: 38 % (ref 36.5–49.3)
HGB BLD-MCNC: 11.8 G/DL (ref 12–17)
INR PPP: 1.29 (ref 0.86–1.16)
MCH RBC QN AUTO: 27.9 PG (ref 26.8–34.3)
MCHC RBC AUTO-ENTMCNC: 31.1 G/DL (ref 31.4–37.4)
MCV RBC AUTO: 90 FL (ref 82–98)
PLATELET # BLD AUTO: 140 THOUSANDS/UL (ref 149–390)
PMV BLD AUTO: 11 FL (ref 8.9–12.7)
POTASSIUM SERPL-SCNC: 4 MMOL/L (ref 3.5–5.3)
PROTHROMBIN TIME: 16.6 SECONDS (ref 12.1–14.4)
RBC # BLD AUTO: 4.23 MILLION/UL (ref 3.88–5.62)
SODIUM SERPL-SCNC: 128 MMOL/L (ref 136–145)
WBC # BLD AUTO: 6.75 THOUSAND/UL (ref 4.31–10.16)

## 2017-09-24 PROCEDURE — 82948 REAGENT STRIP/BLOOD GLUCOSE: CPT

## 2017-09-24 PROCEDURE — 80048 BASIC METABOLIC PNL TOTAL CA: CPT | Performed by: INTERNAL MEDICINE

## 2017-09-24 PROCEDURE — 85610 PROTHROMBIN TIME: CPT | Performed by: INTERNAL MEDICINE

## 2017-09-24 PROCEDURE — 85027 COMPLETE CBC AUTOMATED: CPT | Performed by: INTERNAL MEDICINE

## 2017-09-24 RX ORDER — DEXTROSE MONOHYDRATE 25 G/50ML
25 INJECTION, SOLUTION INTRAVENOUS ONCE
Status: COMPLETED | OUTPATIENT
Start: 2017-09-24 | End: 2017-09-24

## 2017-09-24 RX ORDER — INSULIN GLARGINE 100 [IU]/ML
3 INJECTION, SOLUTION SUBCUTANEOUS
Status: DISCONTINUED | OUTPATIENT
Start: 2017-09-24 | End: 2017-09-25

## 2017-09-24 RX ORDER — DEXTROSE MONOHYDRATE 25 G/50ML
INJECTION, SOLUTION INTRAVENOUS
Status: COMPLETED
Start: 2017-09-24 | End: 2017-09-24

## 2017-09-24 RX ADMIN — LEVOTHYROXINE SODIUM 125 MCG: 125 TABLET ORAL at 05:45

## 2017-09-24 RX ADMIN — VANCOMYCIN 250 MG: KIT at 17:37

## 2017-09-24 RX ADMIN — Medication 250 MG: at 17:37

## 2017-09-24 RX ADMIN — RENAGEL 800 MG: 800 TABLET ORAL at 13:07

## 2017-09-24 RX ADMIN — DEXTROSE MONOHYDRATE 25 ML: 25 INJECTION, SOLUTION INTRAVENOUS at 20:46

## 2017-09-24 RX ADMIN — CHOLECALCIFEROL TAB 25 MCG (1000 UNIT) 2000 UNITS: 25 TAB at 08:18

## 2017-09-24 RX ADMIN — HEPARIN SODIUM 5000 UNITS: 5000 INJECTION, SOLUTION INTRAVENOUS; SUBCUTANEOUS at 13:09

## 2017-09-24 RX ADMIN — VANCOMYCIN 250 MG: KIT at 23:28

## 2017-09-24 RX ADMIN — VANCOMYCIN 250 MG: KIT at 13:07

## 2017-09-24 RX ADMIN — METOPROLOL TARTRATE 100 MG: 100 TABLET ORAL at 20:36

## 2017-09-24 RX ADMIN — RENAGEL 800 MG: 800 TABLET ORAL at 17:37

## 2017-09-24 RX ADMIN — INSULIN LISPRO 2 UNITS: 100 INJECTION, SOLUTION INTRAVENOUS; SUBCUTANEOUS at 13:08

## 2017-09-24 RX ADMIN — CITALOPRAM HYDROBROMIDE 10 MG: 20 TABLET ORAL at 08:18

## 2017-09-24 RX ADMIN — CALCIUM ACETATE 1334 MG: 667 CAPSULE ORAL at 13:07

## 2017-09-24 RX ADMIN — INSULIN LISPRO 2 UNITS: 100 INJECTION, SOLUTION INTRAVENOUS; SUBCUTANEOUS at 08:20

## 2017-09-24 RX ADMIN — INSULIN GLARGINE 2 UNITS: 100 INJECTION, SOLUTION SUBCUTANEOUS at 08:19

## 2017-09-24 RX ADMIN — VANCOMYCIN 125 MG: KIT at 00:53

## 2017-09-24 RX ADMIN — CALCIUM ACETATE 1334 MG: 667 CAPSULE ORAL at 17:37

## 2017-09-24 RX ADMIN — METOPROLOL TARTRATE 100 MG: 100 TABLET ORAL at 08:18

## 2017-09-24 RX ADMIN — INSULIN LISPRO 1 UNITS: 100 INJECTION, SOLUTION INTRAVENOUS; SUBCUTANEOUS at 08:21

## 2017-09-24 RX ADMIN — NICOTINE 1 PATCH: 14 PATCH TRANSDERMAL at 21:05

## 2017-09-24 RX ADMIN — Medication 250 MG: at 08:19

## 2017-09-24 RX ADMIN — VANCOMYCIN 125 MG: KIT at 05:46

## 2017-09-24 RX ADMIN — Medication 1 CAPSULE: at 17:37

## 2017-09-24 RX ADMIN — HEPARIN SODIUM 5000 UNITS: 5000 INJECTION, SOLUTION INTRAVENOUS; SUBCUTANEOUS at 05:45

## 2017-09-24 RX ADMIN — CALCIUM ACETATE 1334 MG: 667 CAPSULE ORAL at 08:18

## 2017-09-24 RX ADMIN — RENAGEL 800 MG: 800 TABLET ORAL at 08:18

## 2017-09-24 RX ADMIN — PANTOPRAZOLE SODIUM 40 MG: 40 TABLET, DELAYED RELEASE ORAL at 05:45

## 2017-09-24 RX ADMIN — HEPARIN SODIUM 5000 UNITS: 5000 INJECTION, SOLUTION INTRAVENOUS; SUBCUTANEOUS at 21:04

## 2017-09-25 ENCOUNTER — APPOINTMENT (INPATIENT)
Dept: DIALYSIS | Facility: HOSPITAL | Age: 67
DRG: 070 | End: 2017-09-25
Attending: INTERNAL MEDICINE
Payer: COMMERCIAL

## 2017-09-25 LAB
ANION GAP SERPL CALCULATED.3IONS-SCNC: 12 MMOL/L (ref 4–13)
BUN SERPL-MCNC: 46 MG/DL (ref 5–25)
CALCIUM SERPL-MCNC: 8.8 MG/DL (ref 8.3–10.1)
CHLORIDE SERPL-SCNC: 94 MMOL/L (ref 100–108)
CO2 SERPL-SCNC: 22 MMOL/L (ref 21–32)
CREAT SERPL-MCNC: 6.37 MG/DL (ref 0.6–1.3)
ERYTHROCYTE [DISTWIDTH] IN BLOOD BY AUTOMATED COUNT: 15.3 % (ref 11.6–15.1)
GFR SERPL CREATININE-BSD FRML MDRD: 8 ML/MIN/1.73SQ M
GLUCOSE SERPL-MCNC: 123 MG/DL (ref 65–140)
GLUCOSE SERPL-MCNC: 128 MG/DL (ref 65–140)
GLUCOSE SERPL-MCNC: 160 MG/DL (ref 65–140)
GLUCOSE SERPL-MCNC: 233 MG/DL (ref 65–140)
GLUCOSE SERPL-MCNC: 88 MG/DL (ref 65–140)
GLUCOSE SERPL-MCNC: 98 MG/DL (ref 65–140)
HCT VFR BLD AUTO: 36.4 % (ref 36.5–49.3)
HGB BLD-MCNC: 11.2 G/DL (ref 12–17)
MCH RBC QN AUTO: 27.5 PG (ref 26.8–34.3)
MCHC RBC AUTO-ENTMCNC: 30.8 G/DL (ref 31.4–37.4)
MCV RBC AUTO: 89 FL (ref 82–98)
PHOSPHATE SERPL-MCNC: 4.5 MG/DL (ref 2.3–4.1)
PLATELET # BLD AUTO: 147 THOUSANDS/UL (ref 149–390)
PMV BLD AUTO: 10.8 FL (ref 8.9–12.7)
POTASSIUM SERPL-SCNC: 5 MMOL/L (ref 3.5–5.3)
RBC # BLD AUTO: 4.08 MILLION/UL (ref 3.88–5.62)
SODIUM SERPL-SCNC: 128 MMOL/L (ref 136–145)
WBC # BLD AUTO: 5.11 THOUSAND/UL (ref 4.31–10.16)

## 2017-09-25 PROCEDURE — 80048 BASIC METABOLIC PNL TOTAL CA: CPT | Performed by: INTERNAL MEDICINE

## 2017-09-25 PROCEDURE — 84100 ASSAY OF PHOSPHORUS: CPT | Performed by: PHYSICIAN ASSISTANT

## 2017-09-25 PROCEDURE — 85027 COMPLETE CBC AUTOMATED: CPT | Performed by: INTERNAL MEDICINE

## 2017-09-25 PROCEDURE — 82948 REAGENT STRIP/BLOOD GLUCOSE: CPT

## 2017-09-25 RX ORDER — INSULIN GLARGINE 100 [IU]/ML
2 INJECTION, SOLUTION SUBCUTANEOUS
Status: DISCONTINUED | OUTPATIENT
Start: 2017-09-25 | End: 2017-09-26

## 2017-09-25 RX ORDER — INSULIN GLARGINE 100 [IU]/ML
1 INJECTION, SOLUTION SUBCUTANEOUS
Status: DISCONTINUED | OUTPATIENT
Start: 2017-09-25 | End: 2017-09-25

## 2017-09-25 RX ORDER — WARFARIN SODIUM 5 MG/1
5 TABLET ORAL
Status: DISCONTINUED | OUTPATIENT
Start: 2017-09-25 | End: 2017-09-27 | Stop reason: HOSPADM

## 2017-09-25 RX ADMIN — PANTOPRAZOLE SODIUM 40 MG: 40 TABLET, DELAYED RELEASE ORAL at 05:09

## 2017-09-25 RX ADMIN — RENAGEL 800 MG: 800 TABLET ORAL at 17:43

## 2017-09-25 RX ADMIN — INSULIN LISPRO 1 UNITS: 100 INJECTION, SOLUTION INTRAVENOUS; SUBCUTANEOUS at 09:26

## 2017-09-25 RX ADMIN — RENAGEL 800 MG: 800 TABLET ORAL at 09:24

## 2017-09-25 RX ADMIN — INSULIN GLARGINE 2 UNITS: 100 INJECTION, SOLUTION SUBCUTANEOUS at 21:48

## 2017-09-25 RX ADMIN — HEPARIN SODIUM 5000 UNITS: 5000 INJECTION, SOLUTION INTRAVENOUS; SUBCUTANEOUS at 05:10

## 2017-09-25 RX ADMIN — NICOTINE 1 PATCH: 14 PATCH TRANSDERMAL at 21:47

## 2017-09-25 RX ADMIN — Medication 1 CAPSULE: at 17:44

## 2017-09-25 RX ADMIN — RENAGEL 800 MG: 800 TABLET ORAL at 14:09

## 2017-09-25 RX ADMIN — CHOLECALCIFEROL TAB 25 MCG (1000 UNIT) 2000 UNITS: 25 TAB at 09:24

## 2017-09-25 RX ADMIN — CALCIUM ACETATE 1334 MG: 667 CAPSULE ORAL at 14:09

## 2017-09-25 RX ADMIN — INSULIN GLARGINE 2 UNITS: 100 INJECTION, SOLUTION SUBCUTANEOUS at 09:25

## 2017-09-25 RX ADMIN — Medication 250 MG: at 17:43

## 2017-09-25 RX ADMIN — HEPARIN SODIUM 5000 UNITS: 5000 INJECTION, SOLUTION INTRAVENOUS; SUBCUTANEOUS at 14:25

## 2017-09-25 RX ADMIN — VANCOMYCIN 250 MG: KIT at 17:44

## 2017-09-25 RX ADMIN — CALCIUM ACETATE 1334 MG: 667 CAPSULE ORAL at 17:43

## 2017-09-25 RX ADMIN — VANCOMYCIN 250 MG: KIT at 05:11

## 2017-09-25 RX ADMIN — LEVOTHYROXINE SODIUM 125 MCG: 125 TABLET ORAL at 05:09

## 2017-09-25 RX ADMIN — VANCOMYCIN 250 MG: KIT at 14:10

## 2017-09-25 RX ADMIN — HEPARIN SODIUM 5000 UNITS: 5000 INJECTION, SOLUTION INTRAVENOUS; SUBCUTANEOUS at 21:48

## 2017-09-25 RX ADMIN — CALCIUM ACETATE 1334 MG: 667 CAPSULE ORAL at 09:25

## 2017-09-25 RX ADMIN — WARFARIN SODIUM 5 MG: 5 TABLET ORAL at 18:38

## 2017-09-25 RX ADMIN — Medication 250 MG: at 09:24

## 2017-09-25 RX ADMIN — INSULIN LISPRO 1 UNITS: 100 INJECTION, SOLUTION INTRAVENOUS; SUBCUTANEOUS at 21:49

## 2017-09-25 RX ADMIN — CITALOPRAM HYDROBROMIDE 10 MG: 20 TABLET ORAL at 09:24

## 2017-09-26 LAB
ANION GAP SERPL CALCULATED.3IONS-SCNC: 7 MMOL/L (ref 4–13)
BUN SERPL-MCNC: 29 MG/DL (ref 5–25)
CALCIUM SERPL-MCNC: 8.7 MG/DL (ref 8.3–10.1)
CHLORIDE SERPL-SCNC: 93 MMOL/L (ref 100–108)
CO2 SERPL-SCNC: 26 MMOL/L (ref 21–32)
CREAT SERPL-MCNC: 4.78 MG/DL (ref 0.6–1.3)
ERYTHROCYTE [DISTWIDTH] IN BLOOD BY AUTOMATED COUNT: 15.6 % (ref 11.6–15.1)
GFR SERPL CREATININE-BSD FRML MDRD: 12 ML/MIN/1.73SQ M
GLUCOSE SERPL-MCNC: 273 MG/DL (ref 65–140)
GLUCOSE SERPL-MCNC: 286 MG/DL (ref 65–140)
GLUCOSE SERPL-MCNC: 293 MG/DL (ref 65–140)
GLUCOSE SERPL-MCNC: 303 MG/DL (ref 65–140)
GLUCOSE SERPL-MCNC: 338 MG/DL (ref 65–140)
HCT VFR BLD AUTO: 38.7 % (ref 36.5–49.3)
HGB BLD-MCNC: 12.1 G/DL (ref 12–17)
INR PPP: 1.15 (ref 0.86–1.16)
MCH RBC QN AUTO: 28 PG (ref 26.8–34.3)
MCHC RBC AUTO-ENTMCNC: 31.3 G/DL (ref 31.4–37.4)
MCV RBC AUTO: 90 FL (ref 82–98)
PLATELET # BLD AUTO: 151 THOUSANDS/UL (ref 149–390)
PMV BLD AUTO: 10.8 FL (ref 8.9–12.7)
POTASSIUM SERPL-SCNC: 4.2 MMOL/L (ref 3.5–5.3)
PROTHROMBIN TIME: 15.1 SECONDS (ref 12.1–14.4)
RBC # BLD AUTO: 4.32 MILLION/UL (ref 3.88–5.62)
SODIUM SERPL-SCNC: 126 MMOL/L (ref 136–145)
WBC # BLD AUTO: 6.11 THOUSAND/UL (ref 4.31–10.16)

## 2017-09-26 PROCEDURE — 85610 PROTHROMBIN TIME: CPT | Performed by: INTERNAL MEDICINE

## 2017-09-26 PROCEDURE — 97530 THERAPEUTIC ACTIVITIES: CPT

## 2017-09-26 PROCEDURE — 85027 COMPLETE CBC AUTOMATED: CPT | Performed by: INTERNAL MEDICINE

## 2017-09-26 PROCEDURE — 82948 REAGENT STRIP/BLOOD GLUCOSE: CPT

## 2017-09-26 PROCEDURE — 97535 SELF CARE MNGMENT TRAINING: CPT

## 2017-09-26 PROCEDURE — 80048 BASIC METABOLIC PNL TOTAL CA: CPT | Performed by: INTERNAL MEDICINE

## 2017-09-26 RX ORDER — INSULIN GLARGINE 100 [IU]/ML
3 INJECTION, SOLUTION SUBCUTANEOUS EVERY MORNING
Status: DISCONTINUED | OUTPATIENT
Start: 2017-09-27 | End: 2017-09-27 | Stop reason: HOSPADM

## 2017-09-26 RX ORDER — INSULIN GLARGINE 100 [IU]/ML
3 INJECTION, SOLUTION SUBCUTANEOUS
Status: DISCONTINUED | OUTPATIENT
Start: 2017-09-26 | End: 2017-09-27 | Stop reason: HOSPADM

## 2017-09-26 RX ORDER — INSULIN GLARGINE 100 [IU]/ML
2 INJECTION, SOLUTION SUBCUTANEOUS
Qty: 10 ML | Refills: 0 | Status: SHIPPED | OUTPATIENT
Start: 2017-09-26 | End: 2017-09-27 | Stop reason: HOSPADM

## 2017-09-26 RX ORDER — INSULIN GLARGINE 100 [IU]/ML
2 INJECTION, SOLUTION SUBCUTANEOUS EVERY MORNING
Qty: 10 ML | Refills: 0 | Status: SHIPPED | OUTPATIENT
Start: 2017-09-26 | End: 2017-09-27 | Stop reason: HOSPADM

## 2017-09-26 RX ORDER — WARFARIN SODIUM 5 MG/1
5 TABLET ORAL
Refills: 0 | Status: ON HOLD
Start: 2017-09-26 | End: 2017-12-07

## 2017-09-26 RX ADMIN — CITALOPRAM HYDROBROMIDE 10 MG: 20 TABLET ORAL at 08:02

## 2017-09-26 RX ADMIN — INSULIN LISPRO 2 UNITS: 100 INJECTION, SOLUTION INTRAVENOUS; SUBCUTANEOUS at 08:00

## 2017-09-26 RX ADMIN — RENAGEL 800 MG: 800 TABLET ORAL at 12:02

## 2017-09-26 RX ADMIN — HEPARIN SODIUM 5000 UNITS: 5000 INJECTION, SOLUTION INTRAVENOUS; SUBCUTANEOUS at 14:34

## 2017-09-26 RX ADMIN — METOPROLOL TARTRATE 100 MG: 100 TABLET ORAL at 21:48

## 2017-09-26 RX ADMIN — Medication 250 MG: at 17:13

## 2017-09-26 RX ADMIN — PANTOPRAZOLE SODIUM 40 MG: 40 TABLET, DELAYED RELEASE ORAL at 06:35

## 2017-09-26 RX ADMIN — LEVOTHYROXINE SODIUM 125 MCG: 125 TABLET ORAL at 06:35

## 2017-09-26 RX ADMIN — VANCOMYCIN 250 MG: KIT at 00:26

## 2017-09-26 RX ADMIN — INSULIN LISPRO 1 UNITS: 100 INJECTION, SOLUTION INTRAVENOUS; SUBCUTANEOUS at 21:53

## 2017-09-26 RX ADMIN — INSULIN LISPRO 2 UNITS: 100 INJECTION, SOLUTION INTRAVENOUS; SUBCUTANEOUS at 12:01

## 2017-09-26 RX ADMIN — VANCOMYCIN 250 MG: KIT at 23:26

## 2017-09-26 RX ADMIN — CHOLECALCIFEROL TAB 25 MCG (1000 UNIT) 2000 UNITS: 25 TAB at 08:02

## 2017-09-26 RX ADMIN — WARFARIN SODIUM 5 MG: 5 TABLET ORAL at 17:14

## 2017-09-26 RX ADMIN — CALCIUM ACETATE 1334 MG: 667 CAPSULE ORAL at 12:02

## 2017-09-26 RX ADMIN — NICOTINE 1 PATCH: 14 PATCH TRANSDERMAL at 21:52

## 2017-09-26 RX ADMIN — RENAGEL 800 MG: 800 TABLET ORAL at 06:36

## 2017-09-26 RX ADMIN — INSULIN GLARGINE 3 UNITS: 100 INJECTION, SOLUTION SUBCUTANEOUS at 21:52

## 2017-09-26 RX ADMIN — VANCOMYCIN 250 MG: KIT at 06:37

## 2017-09-26 RX ADMIN — INSULIN LISPRO 3 UNITS: 100 INJECTION, SOLUTION INTRAVENOUS; SUBCUTANEOUS at 17:14

## 2017-09-26 RX ADMIN — VANCOMYCIN 250 MG: KIT at 17:14

## 2017-09-26 RX ADMIN — RENAGEL 800 MG: 800 TABLET ORAL at 17:13

## 2017-09-26 RX ADMIN — HEPARIN SODIUM 5000 UNITS: 5000 INJECTION, SOLUTION INTRAVENOUS; SUBCUTANEOUS at 21:48

## 2017-09-26 RX ADMIN — Medication 1 CAPSULE: at 17:13

## 2017-09-26 RX ADMIN — VANCOMYCIN 250 MG: KIT at 12:02

## 2017-09-26 RX ADMIN — INSULIN GLARGINE 2 UNITS: 100 INJECTION, SOLUTION SUBCUTANEOUS at 08:01

## 2017-09-26 RX ADMIN — Medication 250 MG: at 08:03

## 2017-09-26 RX ADMIN — CALCIUM ACETATE 1334 MG: 667 CAPSULE ORAL at 06:36

## 2017-09-26 RX ADMIN — CALCIUM ACETATE 1334 MG: 667 CAPSULE ORAL at 17:13

## 2017-09-26 RX ADMIN — HEPARIN SODIUM 5000 UNITS: 5000 INJECTION, SOLUTION INTRAVENOUS; SUBCUTANEOUS at 06:37

## 2017-09-27 ENCOUNTER — APPOINTMENT (INPATIENT)
Dept: DIALYSIS | Facility: HOSPITAL | Age: 67
DRG: 070 | End: 2017-09-27
Attending: INTERNAL MEDICINE
Payer: COMMERCIAL

## 2017-09-27 VITALS
DIASTOLIC BLOOD PRESSURE: 65 MMHG | OXYGEN SATURATION: 96 % | HEART RATE: 82 BPM | TEMPERATURE: 97.9 F | BODY MASS INDEX: 21.68 KG/M2 | HEIGHT: 66 IN | RESPIRATION RATE: 20 BRPM | SYSTOLIC BLOOD PRESSURE: 112 MMHG | WEIGHT: 134.92 LBS

## 2017-09-27 LAB
GLUCOSE SERPL-MCNC: 107 MG/DL (ref 65–140)
GLUCOSE SERPL-MCNC: 115 MG/DL (ref 65–140)
GLUCOSE SERPL-MCNC: 242 MG/DL (ref 65–140)
GLUCOSE SERPL-MCNC: 319 MG/DL (ref 65–140)

## 2017-09-27 PROCEDURE — 82948 REAGENT STRIP/BLOOD GLUCOSE: CPT

## 2017-09-27 RX ORDER — LEVOTHYROXINE SODIUM 0.12 MG/1
125 TABLET ORAL
Qty: 30 TABLET | Refills: 0 | Status: SHIPPED | OUTPATIENT
Start: 2017-09-27 | End: 2017-12-07 | Stop reason: HOSPADM

## 2017-09-27 RX ORDER — INSULIN GLARGINE 100 [IU]/ML
3 INJECTION, SOLUTION SUBCUTANEOUS
Qty: 10 ML | Refills: 0 | Status: SHIPPED | OUTPATIENT
Start: 2017-09-27 | End: 2017-11-27

## 2017-09-27 RX ORDER — INSULIN GLARGINE 100 [IU]/ML
3 INJECTION, SOLUTION SUBCUTANEOUS EVERY MORNING
Qty: 10 ML | Refills: 0 | Status: SHIPPED | OUTPATIENT
Start: 2017-09-27 | End: 2017-11-27

## 2017-09-27 RX ORDER — INSULIN GLARGINE 100 [IU]/ML
3 INJECTION, SOLUTION SUBCUTANEOUS EVERY MORNING
Qty: 10 ML | Refills: 0 | Status: SHIPPED | OUTPATIENT
Start: 2017-09-27 | End: 2017-09-27

## 2017-09-27 RX ADMIN — VANCOMYCIN 250 MG: KIT at 17:06

## 2017-09-27 RX ADMIN — WARFARIN SODIUM 5 MG: 5 TABLET ORAL at 17:05

## 2017-09-27 RX ADMIN — RENAGEL 800 MG: 800 TABLET ORAL at 16:31

## 2017-09-27 RX ADMIN — VANCOMYCIN 250 MG: KIT at 13:02

## 2017-09-27 RX ADMIN — Medication 1 CAPSULE: at 16:31

## 2017-09-27 RX ADMIN — LEVOTHYROXINE SODIUM 125 MCG: 125 TABLET ORAL at 05:43

## 2017-09-27 RX ADMIN — INSULIN LISPRO 2 UNITS: 100 INJECTION, SOLUTION INTRAVENOUS; SUBCUTANEOUS at 08:05

## 2017-09-27 RX ADMIN — CALCIUM ACETATE 1334 MG: 667 CAPSULE ORAL at 16:31

## 2017-09-27 RX ADMIN — CALCIUM ACETATE 1334 MG: 667 CAPSULE ORAL at 13:02

## 2017-09-27 RX ADMIN — PANTOPRAZOLE SODIUM 40 MG: 40 TABLET, DELAYED RELEASE ORAL at 05:43

## 2017-09-27 RX ADMIN — VANCOMYCIN 250 MG: KIT at 05:47

## 2017-09-27 RX ADMIN — INSULIN GLARGINE 3 UNITS: 100 INJECTION, SOLUTION SUBCUTANEOUS at 08:01

## 2017-09-27 RX ADMIN — INSULIN LISPRO 3 UNITS: 100 INJECTION, SOLUTION INTRAVENOUS; SUBCUTANEOUS at 16:32

## 2017-09-27 RX ADMIN — HEPARIN SODIUM 5000 UNITS: 5000 INJECTION, SOLUTION INTRAVENOUS; SUBCUTANEOUS at 13:26

## 2017-09-27 RX ADMIN — HEPARIN SODIUM 5000 UNITS: 5000 INJECTION, SOLUTION INTRAVENOUS; SUBCUTANEOUS at 05:43

## 2017-09-27 RX ADMIN — Medication 250 MG: at 17:05

## 2017-09-27 RX ADMIN — RENAGEL 800 MG: 800 TABLET ORAL at 13:02

## 2017-09-28 ENCOUNTER — HOSPITAL ENCOUNTER (EMERGENCY)
Facility: HOSPITAL | Age: 67
Discharge: HOME | End: 2017-09-28
Attending: EMERGENCY MEDICINE
Payer: COMMERCIAL

## 2017-09-28 VITALS
SYSTOLIC BLOOD PRESSURE: 105 MMHG | WEIGHT: 126.98 LBS | BODY MASS INDEX: 20.5 KG/M2 | TEMPERATURE: 97.5 F | OXYGEN SATURATION: 95 % | DIASTOLIC BLOOD PRESSURE: 62 MMHG | HEART RATE: 69 BPM | RESPIRATION RATE: 18 BRPM

## 2017-09-28 DIAGNOSIS — R73.9 HYPERGLYCEMIA: Primary | ICD-10-CM

## 2017-09-28 LAB
ANION GAP SERPL CALCULATED.3IONS-SCNC: 11 MMOL/L (ref 4–13)
ANION GAP SERPL CALCULATED.3IONS-SCNC: 7 MMOL/L (ref 4–13)
BASE EX.OXY STD BLDV CALC-SCNC: 65.8 % (ref 60–80)
BASE EXCESS BLDV CALC-SCNC: -0.2 MMOL/L
BUN SERPL-MCNC: 42 MG/DL (ref 5–25)
BUN SERPL-MCNC: 44 MG/DL (ref 5–25)
CALCIUM SERPL-MCNC: 8.1 MG/DL (ref 8.3–10.1)
CALCIUM SERPL-MCNC: 8.7 MG/DL (ref 8.3–10.1)
CHLORIDE SERPL-SCNC: 87 MMOL/L (ref 100–108)
CHLORIDE SERPL-SCNC: 91 MMOL/L (ref 100–108)
CO2 SERPL-SCNC: 27 MMOL/L (ref 21–32)
CO2 SERPL-SCNC: 27 MMOL/L (ref 21–32)
CREAT SERPL-MCNC: 5.31 MG/DL (ref 0.6–1.3)
CREAT SERPL-MCNC: 5.32 MG/DL (ref 0.6–1.3)
GFR SERPL CREATININE-BSD FRML MDRD: 10 ML/MIN/1.73SQ M
GFR SERPL CREATININE-BSD FRML MDRD: 11 ML/MIN/1.73SQ M
GLUCOSE SERPL-MCNC: 259 MG/DL (ref 65–140)
GLUCOSE SERPL-MCNC: 296 MG/DL (ref 65–140)
GLUCOSE SERPL-MCNC: 418 MG/DL (ref 65–140)
GLUCOSE SERPL-MCNC: 493 MG/DL (ref 65–140)
HCO3 BLDV-SCNC: 27.6 MMOL/L (ref 24–30)
O2 CT BLDV-SCNC: 12.7 ML/DL
PCO2 BLDV: 59.2 MM HG (ref 42–50)
PH BLDV: 7.29 [PH] (ref 7.3–7.4)
PO2 BLDV: 38.8 MM HG (ref 35–45)
POTASSIUM SERPL-SCNC: 4.5 MMOL/L (ref 3.5–5.3)
POTASSIUM SERPL-SCNC: 5.1 MMOL/L (ref 3.5–5.3)
SODIUM SERPL-SCNC: 125 MMOL/L (ref 136–145)
SODIUM SERPL-SCNC: 125 MMOL/L (ref 136–145)

## 2017-09-28 PROCEDURE — 99285 EMERGENCY DEPT VISIT HI MDM: CPT

## 2017-09-28 PROCEDURE — 96366 THER/PROPH/DIAG IV INF ADDON: CPT

## 2017-09-28 PROCEDURE — 82948 REAGENT STRIP/BLOOD GLUCOSE: CPT

## 2017-09-28 PROCEDURE — 96365 THER/PROPH/DIAG IV INF INIT: CPT

## 2017-09-28 PROCEDURE — 36415 COLL VENOUS BLD VENIPUNCTURE: CPT | Performed by: EMERGENCY MEDICINE

## 2017-09-28 PROCEDURE — 93005 ELECTROCARDIOGRAM TRACING: CPT | Performed by: EMERGENCY MEDICINE

## 2017-09-28 PROCEDURE — 82805 BLOOD GASES W/O2 SATURATION: CPT | Performed by: EMERGENCY MEDICINE

## 2017-09-28 PROCEDURE — 96361 HYDRATE IV INFUSION ADD-ON: CPT

## 2017-09-28 PROCEDURE — 80048 BASIC METABOLIC PNL TOTAL CA: CPT | Performed by: EMERGENCY MEDICINE

## 2017-09-28 RX ORDER — SODIUM CHLORIDE, SODIUM LACTATE, POTASSIUM CHLORIDE, CALCIUM CHLORIDE 600; 310; 30; 20 MG/100ML; MG/100ML; MG/100ML; MG/100ML
250 INJECTION, SOLUTION INTRAVENOUS CONTINUOUS
Status: DISCONTINUED | OUTPATIENT
Start: 2017-09-28 | End: 2017-09-28 | Stop reason: HOSPADM

## 2017-09-28 RX ADMIN — SODIUM CHLORIDE 0.1 UNITS/HR: 9 INJECTION, SOLUTION INTRAVENOUS at 15:49

## 2017-09-28 RX ADMIN — SODIUM CHLORIDE, SODIUM LACTATE, POTASSIUM CHLORIDE, AND CALCIUM CHLORIDE 250 ML: .6; .31; .03; .02 INJECTION, SOLUTION INTRAVENOUS at 14:47

## 2017-09-28 NOTE — ED PROVIDER NOTES
History  Chief Complaint   Patient presents with    Hyperglycemia - Symptomatic     per pt's chart, discharged from this facility yesterday  family called ems for "high" blood sugar, reading - glucometer would not read  blood glucose 418 at this time  hx of dka  due for dialysis tomorrow  61 YR MALE WITH MUTLIPLE CHRONCI ILLNESS- RECENLTY ADNMITTED FOR UTI AND DKA-  DO FOR HD TOMORRWO- WIFE STATES B S READING HIGH- DID TALK WITH PT ENDOCRINOLOGFIST AND PT GOT EXTRA LEVIMIR AND HUMALOG TODAY - PT DEENIS ANY COMPS- NO CP/SOBG-ABD PAIN-FEVERS-         History provided by:  Patient and spouse   used: No        Prior to Admission Medications   Prescriptions Last Dose Informant Patient Reported? Taking?    ALBUTEROL SULFATE IN   Yes No   Sig: Take 1 Dose by nebulization every 4 (four) hours as needed (Wheezing, SOB)     B Complex-C (B-COMPLEX WITH VITAMIN C) tablet   Yes No   Sig: Take 1 tablet by mouth daily   Eluxadoline (VIBERZI) 100 MG TABS   Yes No   Sig: Take 100 mg by mouth 2 (two) times a day     Emollient (EUCERIN) lotion   Yes No   Sig: Apply 1 application topically daily at bedtime LLE and heel   acetaminophen (TYLENOL) 325 mg tablet   Yes No   Sig: Take 650 mg by mouth every 6 (six) hours as needed for mild pain   citalopram (CeleXA) 10 mg tablet   Yes No   Sig: Take 10 mg by mouth daily   insulin glargine (LANTUS) 100 units/mL subcutaneous injection   No No   Sig: Inject 3 Units under the skin daily at bedtime   insulin glargine (LANTUS) 100 units/mL subcutaneous injection   No No   Sig: Inject 3 Units under the skin every morning   insulin lispro (HumaLOG) 100 units/mL injection   No No   Sig: Inject 1-6 Units under the skin daily at bedtime   Patient taking differently: Inject 1-6 Units under the skin 4 (four) times a day (before meals and at bedtime)     insulin lispro (HumaLOG) 100 units/mL injection   No No   Sig: Inject 2 Units under the skin 3 (three) times a day with meals levothyroxine 125 mcg tablet   No No   Sig: Take 1 tablet by mouth daily in the early morning   menthol-zinc oxide (CALMOSEPTINE) 0 44-20 625 %   Yes No   Sig: Apply 1 application topically 3 (three) times a day     metoprolol tartrate (LOPRESSOR) 100 mg tablet   No No   Sig: Take 1 tablet by mouth 2 (two) times a day   nicotine (NICODERM CQ) 14 mg/24hr TD 24 hr patch   Yes No   Sig: Place 1 patch on the skin every 24 hours   omeprazole (PriLOSEC) 40 MG capsule   Yes No   Sig: Take 40 mg by mouth 2 (two) times a day     ondansetron (ZOFRAN) 8 mg tablet   Yes No   Sig: Take by mouth every 8 (eight) hours as needed for nausea or vomiting   saccharomyces boulardii (FLORASTOR) 250 mg capsule   Yes No   Sig: Take 250 mg by mouth 2 (two) times a day   sevelamer carbonate (RENVELA) 800 mg tablet   Yes No   Sig: Take 800 mg by mouth 3 (three) times a day with meals   sodium chloride (OCEAN) 0 65 % nasal spray   Yes No   Si spray into each nostril as needed (Nasal dryness)   vancomycin (VANCOCIN) 5 mg/mL SOLN   No No   Sig: Take 5 mL 4 times a day for 2 weeks then 5 mL 2 times a day for 1 week then 2 5 mL twice a day for 1 week then stay on 2 5 mL every day indefinitely     warfarin (COUMADIN) 5 mg tablet   No No   Sig: Take 1 tablet by mouth daily      Facility-Administered Medications: None       Past Medical History:   Diagnosis Date    Atrial fibrillation     Clostridium difficile infection 2017    Diabetes mellitus     Dialysis patient     Diarrhea     ESRD (end stage renal disease) on dialysis     Hx of cardiac arrest     Hypertension     Hypothyroidism     Neuropathy     Right lower lobe pneumonia 2017    Sepsis 2017    Polymicrobial MRSA, VRE    Systolic and diastolic CHF, chronic     EF 35%, Grade II DD    Thyroid disease     underactive       Past Surgical History:   Procedure Laterality Date    CATARACT EXTRACTION      CHG GI ENDOSCOPIC ULTRASOUND N/A 3/10/2016    Procedure: LINEAR ENDOSCOPIC U/S;  Surgeon: Jeana Shanks MD;  Location: [facility]; Service: Gastroenterology    CHOLECYSTECTOMY      GASTROSTOMY TUBE PLACEMENT N/A 4/12/2017    Procedure: INSERTION PEG TUBE;  Surgeon: Jacque Cockayne, MD;  Location: [facility];  Service:    Russell Saleem LEG AMPUTATION THROUGH LOWER TIBIA AND FIBULA Right 4/6/2017    Procedure: AMPUTATION BELOW KNEE (BKA); Surgeon: Khoi Stephen DO;  Location: [facility];  Service:     TOE AMPUTATION  2000       Family History   Problem Relation Age of Onset    Diabetes Father     Cancer Other     Lupus Mother      I have reviewed and agree with the history as documented  Social History   Substance Use Topics    Smoking status: Former Smoker     Packs/day: 1 00     Years: 46 00     Types: Cigarettes     Start date: 1970     Quit date: 3/1/2017    Smokeless tobacco: Never Used    Alcohol use No        Review of Systems   Constitutional: Negative  HENT: Negative  Eyes: Negative  Respiratory: Negative  Cardiovascular: Negative  Gastrointestinal: Negative  Endocrine: Negative  Genitourinary: Negative  Musculoskeletal: Negative  Skin: Negative  Allergic/Immunologic: Negative  Neurological: Negative  Hematological: Negative  Psychiatric/Behavioral: Negative  Physical Exam  ED Triage Vitals   Temperature Pulse Respirations Blood Pressure SpO2   09/28/17 1503 09/28/17 1352 09/28/17 1352 09/28/17 1352 09/28/17 1352   97 5 °F (36 4 °C) 75 18 117/67 95 %      Temp Source Heart Rate Source Patient Position - Orthostatic VS BP Location FiO2 (%)   09/28/17 1503 09/28/17 1352 09/28/17 1352 09/28/17 1352 --   Oral Monitor Lying Right arm       Pain Score       --                  Physical Exam   Constitutional: He is oriented to person, place, and time  No distress  AVSS-- PULSE OX  95 % ON RA- INTEPRETATION IS NORMAL- NO INTERVENTION - CHRONICALLY IOLL APPEARING   HENT:   Head: Normocephalic and atraumatic     Eyes: Conjunctivae and EOM are normal  Pupils are equal, round, and reactive to light  Right eye exhibits no discharge  Left eye exhibits no discharge  No scleral icterus  Neck: Normal range of motion  Neck supple  No JVD present  No tracheal deviation present  No thyromegaly present  Cardiovascular: Normal rate and regular rhythm  Exam reveals no gallop and no friction rub  Murmur heard  Pulmonary/Chest: Effort normal and breath sounds normal  No stridor  No respiratory distress  He has no wheezes  He has no rales  He exhibits no tenderness  Abdominal: Soft  Bowel sounds are normal  He exhibits no distension and no mass  There is tenderness  There is no rebound and no guarding  No hernia    - NO PULSATIEL ABD MASS/BRUIT   Musculoskeletal: Normal range of motion  He exhibits no tenderness or deformity  R FOREARM AV FISTULA- POS BRUIT/THRILL= R BKA   Lymphadenopathy:     He has no cervical adenopathy  Neurological: He is alert and oriented to person, place, and time  No cranial nerve deficit or sensory deficit  He exhibits normal muscle tone  Coordination normal    Skin: Skin is warm  Capillary refill takes less than 2 seconds  No rash noted  He is not diaphoretic  No erythema  No pallor  Psychiatric: He has a normal mood and affect  His behavior is normal  Judgment and thought content normal    Nursing note and vitals reviewed        ED Medications  Medications   lactated ringers infusion 250 mL (0 mL Intravenous Stopped 9/28/17 1541)   insulin regular (HumuLIN R,NovoLIN R) 1 Units/mL in sodium chloride 0 9 % 100 mL infusion (0 1 Units/hr Intravenous New Bag 9/28/17 1549)       Diagnostic Studies  Labs Reviewed   BASIC METABOLIC PANEL - Abnormal        Result Value Ref Range Status    Sodium 125 (*) 136 - 145 mmol/L Final    Chloride 87 (*) 100 - 108 mmol/L Final    BUN 42 (*) 5 - 25 mg/dL Final    Creatinine 5 31 (*) 0 60 - 1 30 mg/dL Final    Comment: Standardized to IDMS reference method    Glucose 493 (*) 65 - 140 mg/dL Final    Comment:   If the patient is fasting, the ADA then defines impaired fasting glucose as > 100 mg/dL and diabetes as > or equal to 123 mg/dL  Specimen collection should occur prior to Sulfasalazine administration due to the potential for falsely depressed results  Specimen collection should occur prior to Sulfapyridine administration due to the potential for falsely elevated results  Potassium 4 5  3 5 - 5 3 mmol/L Final    CO2 27  21 - 32 mmol/L Final    Anion Gap 11  4 - 13 mmol/L Final    Calcium 8 7  8 3 - 10 1 mg/dL Final    eGFR 11  ml/min/1 73sq m Final    Narrative:     National Kidney Disease Education Program recommendations are as follows:  GFR calculation is accurate only with a steady state creatinine  Chronic Kidney disease less than 60 ml/min/1 73 sq  meters  Kidney failure less than 15 ml/min/1 73 sq  meters  BLOOD GAS, VENOUS - Abnormal     pH, Neil 7 287 (*) 7 300 - 7 400 Final    pCO2, Neil 59 2 (*) 42 0 - 50 0 mm Hg Final    pO2, Neil 38 8  35 0 - 45 0 mm Hg Final    HCO3, Neil 27 6  24 - 30 mmol/L Final    Base Excess, Neil -0 2  mmol/L Final    O2 Content, Neil 12 7  ml/dL Final    O2 HGB, VENOUS 65 8  60 0 - 80 0 % Final   POCT GLUCOSE - Abnormal     POC Glucose 418 (*) 65 - 140 mg/dl Final       No orders to display       Procedures  Procedures      Phone Contacts  ED Phone Contact    ED Course  ED Course as of Sep 28 1811   Thu Sep 28, 2017   1520 Er md note- case d/w- dr Sanju Rico-- pt [facility] endocrinologist--  wants pt to go upon on humalog 5 units plus coverage with meals - levimir up from 3 to 5 untis bid- and wants 3 hrs of insulin d rip in er  prior to er d/c- wife aware of this                                MDM  The patient presented with a condition in which there was a high probability of imminent or life-threatening deterioration, and critical care services (excluding separately billable procedures) totalled 30-74 minutes          Disposition  Final diagnoses:   None ED Disposition     None      Follow-up Information    None       Patient's Medications   Discharge Prescriptions    No medications on file     No discharge procedures on file      ED Provider  Electronically Signed by       Jenna Bonner MD  09/29/17 7749

## 2017-09-28 NOTE — DISCHARGE INSTRUCTIONS
DIAGNOSIS: HIGH BLOOD SUGAR    - PLEASE TAKE  INSULIN AS RECOMMENDED BY  THE ENDOCRINOLOGIST     - PLEASE RETURN TO  THE ER FOR ANY NEW/ WORSENING /CONCERNING SYMPTOMS TO YOU    - CHECK SUGAR AS BEFORE    - MAKE SURE THAT YOU HAVE A SORurE OF BLOOD SUGAR ClosE BY   - you received extra insulin today which lasts 6-12 hrs     - please return to  The er for any new/ worsening/concerning symptoms to you

## 2017-09-28 NOTE — ED PROCEDURE NOTE
PROCEDURE  ECG 12 Lead Documentation  Date/Time: 9/28/2017 2:55 PM  Performed by: Coty Stokes  Authorized by: Coty Stokes     Indications / Diagnosis:  Hyperglycemia  ECG reviewed by me, the ED Provider: yes    Patient location:  ED and bedside  Previous ECG:     Previous ECG:  Compared to current    Comparison ECG info:  9/19/17    Similarity:  No change  Interpretation:     Interpretation: non-specific    Rate:     ECG rate:  72    ECG rate assessment: normal    Rhythm:     Rhythm: sinus rhythm    Ectopy:     Ectopy: none    QRS:     QRS axis:  Left    QRS intervals:   Wide  Conduction:     Conduction: normal    ST segments:     ST segments:  Normal  T waves:     T waves: flattening      Flattening:  I, aVL and V1  Q waves:     Q waves:  V1, V2 and V3  Other findings:     Other findings: poor R wave progression and U wave    Comments:      - no ecg signs of ischemia/ injury / r heart strain

## 2017-09-28 NOTE — ED PROCEDURE NOTE
PROCEDURE  CriticalCare Time  Performed by: Erendira Pineda  Authorized by: Erendira Pineda     Critical care provider statement:     Critical care time (minutes):  35    Critical care start time:  9/28/2017 5:37 AM    Critical care end time:  9/28/2017 6:12 PM    Critical care time was exclusive of:  Separately billable procedures and treating other patients and teaching time    Critical care was time spent personally by me on the following activities:  Discussions with consultants, evaluation of patient's response to treatment, examination of patient, development of treatment plan with patient or surrogate, ordering and review of laboratory studies, re-evaluation of patient's condition and review of old charts    I assumed direction of critical care for this patient from another provider in my specialty: no

## 2017-09-28 NOTE — ED NOTES
Sent request to Pharmacy for pt's Insulin drip  A/w drip arrival to dept        Corona Marcos RN  09/28/17 0101

## 2017-09-28 NOTE — ED NOTES
Sam Walton from Respiratory for ABG     Geoffrey Weller  09/28/17 161 Asuncion Mcclellan  09/28/17 0059

## 2017-09-29 LAB
ATRIAL RATE: 72 BPM
P AXIS: 83 DEGREES
PR INTERVAL: 180 MS
QRS AXIS: -38 DEGREES
QRSD INTERVAL: 106 MS
QT INTERVAL: 414 MS
QTC INTERVAL: 453 MS
T WAVE AXIS: 113 DEGREES
VENTRICULAR RATE: 72 BPM

## 2017-10-10 ENCOUNTER — TRANSCRIBE ORDERS (OUTPATIENT)
Dept: ADMINISTRATIVE | Facility: HOSPITAL | Age: 67
End: 2017-10-10

## 2017-10-10 ENCOUNTER — HOSPITAL ENCOUNTER (OUTPATIENT)
Dept: RADIOLOGY | Facility: HOSPITAL | Age: 67
Discharge: HOME | End: 2017-10-10
Payer: COMMERCIAL

## 2017-10-10 DIAGNOSIS — J91.8 PLEURAL EFFUSION ASSOCIATED WITH PANCREATITIS: ICD-10-CM

## 2017-10-10 DIAGNOSIS — J90 PLEURAL EFFUSION: ICD-10-CM

## 2017-10-10 DIAGNOSIS — J90 PLEURAL EFFUSION: Primary | ICD-10-CM

## 2017-10-10 DIAGNOSIS — K85.90 PLEURAL EFFUSION ASSOCIATED WITH PANCREATITIS: ICD-10-CM

## 2017-10-10 PROCEDURE — 71020 HB CHEST X-RAY 2VW FRONTAL&LATL: CPT

## 2017-10-13 ENCOUNTER — ALLSCRIPTS OFFICE VISIT (OUTPATIENT)
Dept: OTHER | Facility: OTHER | Age: 67
End: 2017-10-13

## 2017-10-13 DIAGNOSIS — J90 PLEURAL EFFUSION, NOT ELSEWHERE CLASSIFIED: ICD-10-CM

## 2017-10-15 NOTE — PROGRESS NOTES
Assessment  1  Pleural effusion on right (511 9) (J90)   2  End stage renal disease (585 6) (N18 6)   3  Pulmonary emphysema, unspecified emphysema type (492 8) (J43 9)    Plan  Pleural effusion on right    · * XR CHEST PA & LATERAL; Status:Active; Requested MTK:[id number];    Perform: [facility]; Due:13Oct2018; Ordered; For:Pleural effusion on right; Ordered By:Stef Palacios; Results/Data  Results Free Text Form [facility]:   Results   Chest X-ray I have reviewed and interpreted the images of previous CXR and CT scan: stable right pleural effusion; loculated  Discussion/Summary  Discussion Summary:   Pt is a 58yo M w/ ESRD on HDwho has a chronic right pleural effusion for which he has undergone multiple thoracentesis the most recent of which occurred --9/19/2017  The fluid is transudative consistent with volume overload in the setting of ESRD for which his nephrologist is being appropriately aggressive with fluid removal  The patient does not notice an impairment in his quality of life or breathing with the effusion  Given the lack of symptoms, it is best to monitor the effusion  There is no indication for thoracentesis  If the patient wishes, the definitive therapy would be a referral to thoracic surgery for possible decortication and pleurodesis  this time, will continue to monitor imaging, symptoms and quality of life  CXR in 3 months  the patients reported mild COPD, he has not wheezing and no inhaler usage  He has albuterol at home but no requirements  Will continue to monitor and congratulated on smoking cessation in early 2017  Counseling Documentation With Imm: The patient, patient's family was counseled regarding diagnostic results,-- instructions for management,-- impressions,-- risks and benefits of treatment options  Goals and Barriers: The patient has the current Goals: To continue to have good quality of life   The patent has the current Barriers: End stage renal disease; dialysis dependent  Patient's Capacity to Self-Care: Patient agrees and allows to involve family/caregiver in development of care plan:   Medication SE Review and Pt Understands Tx: The treatment plan was reviewed with the patient/guardian  The patient/guardian understands and agrees with the treatment plan   Self Referrals:   Self Referrals: Yes      Active Problems  1  Abnormal chest x-ray (793 2) (R93 8)   2  At risk for impaired skin integrity (V49 89) (Z91 89)   3  Atrial fibrillation (427 31) (I48 91)   4  Below knee amputation status (V49 75) (Z89 519)   5  Benign essential hypertension (401 1) (I10)   6  C  difficile colitis (008 45) (A04 72)   7  C  difficile colitis (008 45) (A04 72)   8  Cellulitis (682 9) (L03 90)   9  Cellulitis of foot (682 7) (L03 119)   10  Chronic diarrhea (787 91) (K52 9)   11  Depression (311) (F32 9)   12  Dilated cbd, acquired (576 8) (K83 8)   13  Dyslipidemia (272 4) (E78 5)   14  Dyspepsia (536 8) (K30)   15  Dysuria (788 1) (R30 0)   16  Encephalopathy (348 30) (G93 40)   17  Hemorrhoids (455 6) (K64 9)   18  Herpes zoster (053 9) (B02 9)   19  History of colon polyps (V12 72) (Z86 010)   20  Hyperlipidemia (272 4) (E78 5)   21  Hypothyroidism (244 9) (E03 9)   22  Insomnia (780 52) (G47 00)   23  Lobar pneumonia, unspecified organism (481) (J18 1)   24  Murmur (785 2) (R01 1)   25  Neck pain (723 1) (M54 2)   26  Nonhealing amputation stump (997 69) (T87 89)   27  Osteomyelitis of right foot (730 27) (M86 9)   28  Peripheral neuropathy (356 9) (G62 9)   29  Polymicrobial sepsis (038 9,995 91) (A41 9)   30  Postoperative examination (V67 00) (Z09)   31  Restlessness (799 29) (R45 1)   32  Sepsis due to methicillin resistant Staphylococcus aureus (MRSA) with metabolic    encephalopathy (038 12,348 31) (A41 02,G93 41)   33  Sepsis due to vancomycin resistant Enterococcus species (038 8,V09 80)    (A41 81,Z16 21)   34   Status post below knee amputation of right lower extremity (V49 75) (Z89 511)   35  Type 2 diabetes mellitus (250 00) (E11 9)   36  Vitamin deficiency (269 2) (E56 9)    Chief Complaint  Chief Complaint Free Text Note Form: [facility] for pleural effusion   Chief Complaint Chronic Condition [facility]: The patient is here for an emergency room follow-up  History of Present Illness  HPI: Pt is a 58yo M w/ pmhx sig for ESRD on HD w/ chronic right sided pleural effusion s/p multiple thoracentesis who presents for hospital follow up  The patient reports since his discharge he has been doing very well  He states his dialysis center has taken off more fluid than usual and have been aggressive about it  He has no complaints of shortness of breath at any time and it is not limiting in any way  He denies cough, congestion, fevers or chills  He has no additional complaints  has no complaints of wheezing or other problems related to COPD  He has an albuterol inhaler but has not used it  Review of Systems  Complete-Male Pulm:   Constitutional: as noted in HPI  Cardiovascular: no chest pain,-- no intermittent leg claudication-- and-- no extremity edema  Respiratory: as noted in HPI,-- no shortness of breath,-- no cough,-- no orthopnea,-- no wheezing,-- no shortness of breath during exertion-- and-- no PND  Gastrointestinal: no complaints of esophageal reflux, no abdominal pain  Musculoskeletal: no arthralgias, no joint swelling, no myalgias  Integumentary: no rash, no lesions  ROS Reviewed:   ROS reviewed  Past Medical History  1  Cellulitis of foot (682 7) (L03 119)    Surgical History  1  History of Amputation Of Leg Below Knee   2  History of Cataract Surgery   3  History of Cholecystectomy  Surgical History Reviewed: The surgical history was reviewed and updated today  Family History  Father    1  Family history of Pancreatic Lymphoma   2  Family history of Renal Disease  Family History    3   Family history of Bladder Cancer (K36 25)  Family History Reviewed: The family history was reviewed and updated today  Social History   · Being A Social Drinker   · Current Smoker (305 1)   · 1/2 PPD   ·    · Retired  Social History Reviewed: The social history was reviewed and updated today  The social history was reviewed and is unchanged  Current Meds   1  Albuterol Sulfate (2 5 MG/3ML) 0 083% Inhalation Nebulization Solution; USE 1 UNIT   DOSE EVERY 4-6 HOURS AS NEEDED FOR WHEEZING ; Therapy: (Recorded:15May2017) to Recorded   2  Calmoseptine 0 44-20 6 % External Ointment; apply to sacrum topically every shift for   maintanence; Therapy: (Recorded:15May2017) to Recorded   3  CeleXA 10 MG Oral Tablet; TAKE 1 TABLET DAILY; Therapy: (Recorded:15May2017) to Recorded   4  Coumadin 6 MG Oral Tablet; TAKE 1 TABLET DAILY at bedtime  Weekly PT/INRs drawn at   HD and dose managed by facility MD;   Therapy: (Recorded:15May2017) to Recorded   5  Creon 40407-63660 UNIT Oral Capsule Delayed Release Particles; TAKE 3 CAPSULE 3   times daily; Therapy: ([place] [facility]) to Recorded   6  [employer] Vitamin D CAPS; TAKE CAPSULE Daily; Therapy: (Recorded:11Nov2014) to Recorded   7  Daily Multivitamin TABS; TAKE 1 TABLET DAILY; Therapy: (Recorded:11Nov2014) to Recorded   8  Diphenoxylate-Atropine 2 5-0 025 MG Oral Tablet; TAKE 1 TABLET FOUR TIMES A DAY AS   NEEDED FOR DIARRHEA; Therapy: 87DPF6292 to (Evaluate:95Gpq2800)  Requested for: 89Uhl7750; Last   Rx:[id number] Ordered   9  Florastor 250 MG Oral Capsule; TAKE 1 CAPSULE TWICE DAILY; Therapy: 85ARO1417 to (Scott Mcdowell)  Requested for: 66JLD5681; Last   Rx:[id number] Ordered   10  Gabapentin 100 MG TABS; Take 1 tablet twice daily; Therapy: ([place] [facility]) to Recorded   11  Levothyroxine Sodium 200 MCG Oral Tablet; TAKE 1 TABLET DAILY; Therapy: 53GFN0288 to Recorded   12  Lopressor 100 MG Oral Tablet; TAKE 1 TABLET TWICE DAILY; Therapy: 56FNV5363 to Recorded   13   NovoLOG 100 UNIT/ML Subcutaneous Solution; USE AS DIRECTED; Therapy: 41BIG7729 to Recorded   14  OxyCODONE HCl - 5 MG Oral Capsule; TAKE 1 CAPSULE EVERY 4 HOURS AS    NEEDED; Therapy: (Alli Stewart) to Recorded   15  Pantoprazole Sodium 40 MG Oral Tablet Delayed Release; TAKE 1 TABLET DAILY; Therapy: 17BBR5459 to (Evaluate:41Zei3014)  Requested for: [phone]; Last    Rx:27Oct2015 Ordered  Medication List Reviewed: The medication list was reviewed and updated today  Allergies  1  No Known Drug Allergies    Vitals  Vital Signs    Recorded: 13SDO3532 09:47AM   Temperature 96 F   Heart Rate 62   Respiration 16   Systolic 964   Diastolic 60   Height 5 ft 6 in   Weight 130 lb    BMI Calculated 20 98   BSA Calculated 1 67   O2 Saturation 99, RA     Physical Exam    Constitutional   General appearance: No acute distress, well appearing and well nourished  Eyes   Examination of pupil and irises: Anicteric, pupils reactive  Ears, Nose, Mouth, and Throat   External inspection of ears and nose: Normal     Neck   Neck: Supple, symmetric, trachea midline, no masses  Jugular veins: Normal     Pulmonary   Chest: Normal     Respiratory effort: No increased work of breathing or signs of respiratory distress  Percussion of chest: Abnormal  -- diminished on right side  Auscultation of lungs: Abnormal  -- decreased breath sounds at right base  Cardiovascular   Auscultation of heart: Normal rate and rhythm, normal S1 and S2, no murmurs  Examination of extremities for edema and/or varicosities: Normal     Abdomen   Abdomen: Soft, non-tender  Neurologic   Mental Status: Normal  Not confused, no evidence of dementia, good comprehension, good concentration  Cranial Nerves: Normal     Motor Exam: Gross motor function normal     Psychiatric   Orientation to person, place and time: Normal     Mood and affect: Normal        Health Management  Chronic diarrhea   COLONOSCOPY (GI, SURG); every 3 years;  Last 12Aug2015; Next Due: 83Pqb1388;  Active    Signatures   Electronically signed by : EDNA Briggs ; Oct 14 2017  6:55AM EST                       (Author)

## 2017-11-27 ENCOUNTER — APPOINTMENT (INPATIENT)
Dept: CT IMAGING | Facility: HOSPITAL | Age: 67
DRG: 208 | End: 2017-11-27
Payer: COMMERCIAL

## 2017-11-27 ENCOUNTER — HOSPITAL ENCOUNTER (INPATIENT)
Facility: HOSPITAL | Age: 67
LOS: 10 days | Discharge: HOME HEALTH | DRG: 208 | End: 2017-12-07
Attending: EMERGENCY MEDICINE | Admitting: INTERNAL MEDICINE
Payer: COMMERCIAL

## 2017-11-27 ENCOUNTER — APPOINTMENT (INPATIENT)
Dept: DIALYSIS | Facility: HOSPITAL | Age: 67
DRG: 208 | End: 2017-11-27
Payer: COMMERCIAL

## 2017-11-27 ENCOUNTER — APPOINTMENT (EMERGENCY)
Dept: CT IMAGING | Facility: HOSPITAL | Age: 67
DRG: 208 | End: 2017-11-27
Payer: COMMERCIAL

## 2017-11-27 ENCOUNTER — APPOINTMENT (EMERGENCY)
Dept: RADIOLOGY | Facility: HOSPITAL | Age: 67
DRG: 208 | End: 2017-11-27
Payer: COMMERCIAL

## 2017-11-27 DIAGNOSIS — L97.509 DIABETIC FOOT ULCER (HCC): ICD-10-CM

## 2017-11-27 DIAGNOSIS — Z93.1 S/P PERCUTANEOUS ENDOSCOPIC GASTROSTOMY (PEG) TUBE PLACEMENT (HCC): ICD-10-CM

## 2017-11-27 DIAGNOSIS — J18.9 HCAP (HEALTHCARE-ASSOCIATED PNEUMONIA): ICD-10-CM

## 2017-11-27 DIAGNOSIS — G93.40 ACUTE ENCEPHALOPATHY: Primary | ICD-10-CM

## 2017-11-27 DIAGNOSIS — R76.8 ELEVATED SERUM IMMUNOGLOBULIN FREE LIGHT CHAIN LEVEL: ICD-10-CM

## 2017-11-27 DIAGNOSIS — E16.2 HYPOGLYCEMIA: ICD-10-CM

## 2017-11-27 DIAGNOSIS — R41.82 ALTERED MENTAL STATUS: ICD-10-CM

## 2017-11-27 DIAGNOSIS — I48.91 RAPID ATRIAL FIBRILLATION (HCC): ICD-10-CM

## 2017-11-27 DIAGNOSIS — N18.6 ESRD (END STAGE RENAL DISEASE) ON DIALYSIS (HCC): ICD-10-CM

## 2017-11-27 DIAGNOSIS — E11.621 DIABETIC FOOT ULCER (HCC): ICD-10-CM

## 2017-11-27 DIAGNOSIS — N39.0 UTI (URINARY TRACT INFECTION): ICD-10-CM

## 2017-11-27 DIAGNOSIS — E10.21 TYPE 1 DIABETES MELLITUS WITH NEPHROPATHY (HCC): Chronic | ICD-10-CM

## 2017-11-27 DIAGNOSIS — Z99.2 ESRD (END STAGE RENAL DISEASE) ON DIALYSIS (HCC): ICD-10-CM

## 2017-11-27 PROBLEM — N18.9 CHRONIC KIDNEY DISEASE-MINERAL AND BONE DISORDER: Chronic | Status: ACTIVE | Noted: 2017-11-27

## 2017-11-27 PROBLEM — M89.9 CHRONIC KIDNEY DISEASE-MINERAL AND BONE DISORDER: Chronic | Status: ACTIVE | Noted: 2017-11-27

## 2017-11-27 PROBLEM — A04.71 RECURRENT COLITIS DUE TO CLOSTRIDIUM DIFFICILE: Status: ACTIVE | Noted: 2017-02-22

## 2017-11-27 PROBLEM — E83.9 CHRONIC KIDNEY DISEASE-MINERAL AND BONE DISORDER: Chronic | Status: ACTIVE | Noted: 2017-11-27

## 2017-11-27 LAB
ALBUMIN SERPL BCP-MCNC: 2.3 G/DL (ref 3.5–5)
ALP SERPL-CCNC: 183 U/L (ref 46–116)
ALT SERPL W P-5'-P-CCNC: 24 U/L (ref 12–78)
ANION GAP BLD CALC-SCNC: 16 MMOL/L (ref 4–13)
ANION GAP SERPL CALCULATED.3IONS-SCNC: 13 MMOL/L (ref 4–13)
AST SERPL W P-5'-P-CCNC: 30 U/L (ref 5–45)
ATRIAL RATE: 58 BPM
BACTERIA UR QL AUTO: ABNORMAL /HPF
BASE EX.OXY STD BLDV CALC-SCNC: 72.1 % (ref 60–80)
BASE EXCESS BLDV CALC-SCNC: -6.2 MMOL/L
BASOPHILS # BLD AUTO: 0.07 THOUSANDS/ΜL (ref 0–0.1)
BASOPHILS NFR BLD AUTO: 1 % (ref 0–1)
BILIRUB SERPL-MCNC: 0.5 MG/DL (ref 0.2–1)
BILIRUB UR QL STRIP: ABNORMAL
BUN BLD-MCNC: 107 MG/DL (ref 5–25)
BUN SERPL-MCNC: 98 MG/DL (ref 5–25)
CA-I BLD-SCNC: 1 MMOL/L (ref 1.12–1.32)
CALCIUM SERPL-MCNC: 8.6 MG/DL (ref 8.3–10.1)
CHLORIDE BLD-SCNC: 96 MMOL/L (ref 100–108)
CHLORIDE SERPL-SCNC: 91 MMOL/L (ref 100–108)
CLARITY UR: ABNORMAL
CO2 SERPL-SCNC: 22 MMOL/L (ref 21–32)
COLOR UR: YELLOW
CREAT BLD-MCNC: 8.7 MG/DL (ref 0.6–1.3)
CREAT SERPL-MCNC: 8.74 MG/DL (ref 0.6–1.3)
EOSINOPHIL # BLD AUTO: 0.07 THOUSAND/ΜL (ref 0–0.61)
EOSINOPHIL NFR BLD AUTO: 1 % (ref 0–6)
ERYTHROCYTE [DISTWIDTH] IN BLOOD BY AUTOMATED COUNT: 16 % (ref 11.6–15.1)
EST. AVERAGE GLUCOSE BLD GHB EST-MCNC: 223 MG/DL
GFR SERPL CREATININE-BSD FRML MDRD: 6 ML/MIN/1.73SQ M
GFR SERPL CREATININE-BSD FRML MDRD: 6 ML/MIN/1.73SQ M
GLUCOSE SERPL-MCNC: 168 MG/DL (ref 65–140)
GLUCOSE SERPL-MCNC: 199 MG/DL (ref 65–140)
GLUCOSE SERPL-MCNC: 209 MG/DL (ref 65–140)
GLUCOSE SERPL-MCNC: 71 MG/DL (ref 65–140)
GLUCOSE SERPL-MCNC: 77 MG/DL (ref 65–140)
GLUCOSE UR STRIP-MCNC: NEGATIVE MG/DL
HBA1C MFR BLD: 9.4 % (ref 4.2–6.3)
HCO3 BLDV-SCNC: 20.7 MMOL/L (ref 24–30)
HCT VFR BLD AUTO: 34.7 % (ref 36.5–49.3)
HCT VFR BLD CALC: 37 % (ref 36.5–49.3)
HGB BLD-MCNC: 11.1 G/DL (ref 12–17)
HGB BLDA-MCNC: 12.6 G/DL (ref 12–17)
HGB UR QL STRIP.AUTO: ABNORMAL
INR PPP: 4.69 (ref 0.86–1.16)
KETONES UR STRIP-MCNC: ABNORMAL MG/DL
LEUKOCYTE ESTERASE UR QL STRIP: ABNORMAL
LYMPHOCYTES # BLD AUTO: 0.83 THOUSANDS/ΜL (ref 0.6–4.47)
LYMPHOCYTES NFR BLD AUTO: 7 % (ref 14–44)
MAGNESIUM SERPL-MCNC: 2.1 MG/DL (ref 1.6–2.6)
MCH RBC QN AUTO: 29.4 PG (ref 26.8–34.3)
MCHC RBC AUTO-ENTMCNC: 32 G/DL (ref 31.4–37.4)
MCV RBC AUTO: 92 FL (ref 82–98)
MONOCYTES # BLD AUTO: 1.11 THOUSAND/ΜL (ref 0.17–1.22)
MONOCYTES NFR BLD AUTO: 9 % (ref 4–12)
NEUTROPHILS # BLD AUTO: 9.88 THOUSANDS/ΜL (ref 1.85–7.62)
NEUTS SEG NFR BLD AUTO: 82 % (ref 43–75)
NITRITE UR QL STRIP: POSITIVE
NON-SQ EPI CELLS URNS QL MICRO: ABNORMAL /HPF
NT-PROBNP SERPL-MCNC: ABNORMAL PG/ML
O2 CT BLDV-SCNC: 11.3 ML/DL
P AXIS: 123 DEGREES
PCO2 BLD: 23 MMOL/L (ref 21–32)
PCO2 BLDV: 46.8 MM HG (ref 42–50)
PH BLDV: 7.26 [PH] (ref 7.3–7.4)
PH UR STRIP.AUTO: 6.5 [PH] (ref 4.5–8)
PLATELET # BLD AUTO: 340 THOUSANDS/UL (ref 149–390)
PMV BLD AUTO: 9.6 FL (ref 8.9–12.7)
PO2 BLDV: 48.3 MM HG (ref 35–45)
POTASSIUM BLD-SCNC: 8.6 MMOL/L (ref 3.5–5.3)
PR INTERVAL: 234 MS
PROT SERPL-MCNC: 8.2 G/DL (ref 6.4–8.2)
PROT UR STRIP-MCNC: ABNORMAL MG/DL
PROTHROMBIN TIME: 46 SECONDS (ref 12.1–14.4)
QRS AXIS: -58 DEGREES
QRSD INTERVAL: 162 MS
QT INTERVAL: 488 MS
QTC INTERVAL: 479 MS
RBC # BLD AUTO: 3.78 MILLION/UL (ref 3.88–5.62)
RBC #/AREA URNS AUTO: ABNORMAL /HPF
SODIUM BLD-SCNC: 125 MMOL/L (ref 136–145)
SODIUM SERPL-SCNC: 126 MMOL/L (ref 136–145)
SP GR UR STRIP.AUTO: 1.02 (ref 1–1.03)
SPECIMEN SOURCE: ABNORMAL
T WAVE AXIS: 89 DEGREES
TROPONIN I SERPL-MCNC: <0.02 NG/ML
UROBILINOGEN UR QL STRIP.AUTO: 0.2 E.U./DL
VENTRICULAR RATE: 58 BPM
WBC # BLD AUTO: 11.96 THOUSAND/UL (ref 4.31–10.16)
WBC #/AREA URNS AUTO: ABNORMAL /HPF

## 2017-11-27 PROCEDURE — 74176 CT ABD & PELVIS W/O CONTRAST: CPT

## 2017-11-27 PROCEDURE — 83735 ASSAY OF MAGNESIUM: CPT | Performed by: EMERGENCY MEDICINE

## 2017-11-27 PROCEDURE — 83036 HEMOGLOBIN GLYCOSYLATED A1C: CPT | Performed by: PHYSICIAN ASSISTANT

## 2017-11-27 PROCEDURE — 84484 ASSAY OF TROPONIN QUANT: CPT | Performed by: EMERGENCY MEDICINE

## 2017-11-27 PROCEDURE — 71010 HB CHEST X-RAY 1 VIEW FRONTAL (PORTABLE): CPT

## 2017-11-27 PROCEDURE — 87077 CULTURE AEROBIC IDENTIFY: CPT | Performed by: EMERGENCY MEDICINE

## 2017-11-27 PROCEDURE — 82805 BLOOD GASES W/O2 SATURATION: CPT | Performed by: HOSPITALIST

## 2017-11-27 PROCEDURE — 93005 ELECTROCARDIOGRAM TRACING: CPT | Performed by: EMERGENCY MEDICINE

## 2017-11-27 PROCEDURE — 82948 REAGENT STRIP/BLOOD GLUCOSE: CPT

## 2017-11-27 PROCEDURE — 99285 EMERGENCY DEPT VISIT HI MDM: CPT

## 2017-11-27 PROCEDURE — 36415 COLL VENOUS BLD VENIPUNCTURE: CPT | Performed by: EMERGENCY MEDICINE

## 2017-11-27 PROCEDURE — 80053 COMPREHEN METABOLIC PANEL: CPT | Performed by: EMERGENCY MEDICINE

## 2017-11-27 PROCEDURE — 87086 URINE CULTURE/COLONY COUNT: CPT | Performed by: EMERGENCY MEDICINE

## 2017-11-27 PROCEDURE — 81001 URINALYSIS AUTO W/SCOPE: CPT | Performed by: EMERGENCY MEDICINE

## 2017-11-27 PROCEDURE — 83880 ASSAY OF NATRIURETIC PEPTIDE: CPT | Performed by: EMERGENCY MEDICINE

## 2017-11-27 PROCEDURE — 85610 PROTHROMBIN TIME: CPT | Performed by: PHYSICIAN ASSISTANT

## 2017-11-27 PROCEDURE — 85014 HEMATOCRIT: CPT

## 2017-11-27 PROCEDURE — 70450 CT HEAD/BRAIN W/O DYE: CPT

## 2017-11-27 PROCEDURE — 87186 SC STD MICRODIL/AGAR DIL: CPT | Performed by: EMERGENCY MEDICINE

## 2017-11-27 PROCEDURE — 87449 NOS EACH ORGANISM AG IA: CPT | Performed by: NURSE PRACTITIONER

## 2017-11-27 PROCEDURE — 80047 BASIC METABLC PNL IONIZED CA: CPT

## 2017-11-27 PROCEDURE — 85025 COMPLETE CBC W/AUTO DIFF WBC: CPT | Performed by: EMERGENCY MEDICINE

## 2017-11-27 RX ORDER — SEVELAMER HYDROCHLORIDE 800 MG/1
800 TABLET, FILM COATED ORAL
Status: DISCONTINUED | OUTPATIENT
Start: 2017-11-28 | End: 2017-11-29

## 2017-11-27 RX ORDER — ASCORBIC ACID, THIAMINE MONONITRATE,RIBOFLAVIN, NIACINAMIDE, PYRIDOXINE HYDROCHLORIDE, FOLIC ACID, CYANOCOBALAMIN, BIOTIN, CALCIUM PANTOTHENATE, 100; 1.5; 1.7; 20; 10; 1; 6000; 150000; 5 MG/1; MG/1; MG/1; MG/1; MG/1; MG/1; UG/1; UG/1; MG/1
1 CAPSULE, LIQUID FILLED ORAL DAILY
COMMUNITY

## 2017-11-27 RX ORDER — GABAPENTIN 300 MG/1
300 CAPSULE ORAL
COMMUNITY
End: 2018-02-09 | Stop reason: HOSPADM

## 2017-11-27 RX ORDER — TRAMADOL HYDROCHLORIDE 50 MG/1
50 TABLET ORAL EVERY 6 HOURS PRN
COMMUNITY
End: 2018-04-20

## 2017-11-27 RX ORDER — DIPHENOXYLATE HCL/ATROPINE 2.5-.025/5
5 LIQUID (ML) ORAL 4 TIMES DAILY PRN
COMMUNITY
End: 2018-02-09 | Stop reason: HOSPADM

## 2017-11-27 RX ORDER — CINACALCET 30 MG/1
30 TABLET, FILM COATED ORAL
Status: DISCONTINUED | OUTPATIENT
Start: 2017-11-27 | End: 2017-12-07 | Stop reason: HOSPADM

## 2017-11-27 RX ORDER — CHOLECALCIFEROL (VITAMIN D3) 10 MCG
1 TABLET ORAL DAILY
Status: DISCONTINUED | OUTPATIENT
Start: 2017-11-28 | End: 2017-12-07 | Stop reason: HOSPADM

## 2017-11-27 RX ORDER — CITALOPRAM 20 MG/1
10 TABLET ORAL DAILY
Status: DISCONTINUED | OUTPATIENT
Start: 2017-11-28 | End: 2017-12-07 | Stop reason: HOSPADM

## 2017-11-27 RX ORDER — ONDANSETRON 2 MG/ML
4 INJECTION INTRAMUSCULAR; INTRAVENOUS EVERY 6 HOURS PRN
Status: DISCONTINUED | OUTPATIENT
Start: 2017-11-27 | End: 2017-11-29

## 2017-11-27 RX ORDER — IBUPROFEN 200 MG
2400 CAPSULE ORAL
COMMUNITY

## 2017-11-27 RX ORDER — ACETAMINOPHEN 325 MG/1
650 TABLET ORAL EVERY 6 HOURS PRN
Status: DISCONTINUED | OUTPATIENT
Start: 2017-11-27 | End: 2017-12-07 | Stop reason: HOSPADM

## 2017-11-27 RX ORDER — WARFARIN SODIUM 6 MG/1
6 TABLET ORAL
Status: DISCONTINUED | OUTPATIENT
Start: 2017-11-27 | End: 2017-11-27

## 2017-11-27 RX ORDER — GABAPENTIN 100 MG/1
100 CAPSULE ORAL DAILY
COMMUNITY
End: 2017-12-07 | Stop reason: HOSPADM

## 2017-11-27 RX ORDER — PHYTONADIONE 5 MG/1
5 TABLET ORAL ONCE
Status: COMPLETED | OUTPATIENT
Start: 2017-11-27 | End: 2017-11-27

## 2017-11-27 RX ORDER — CINACALCET 30 MG/1
30 TABLET, FILM COATED ORAL
COMMUNITY
End: 2018-03-27 | Stop reason: CLARIF

## 2017-11-27 RX ORDER — CALCIUM CARBONATE 500(1250)
1 TABLET ORAL 2 TIMES DAILY WITH MEALS
Status: DISCONTINUED | OUTPATIENT
Start: 2017-11-28 | End: 2017-11-29

## 2017-11-27 RX ORDER — SACCHAROMYCES BOULARDII 250 MG
250 CAPSULE ORAL 2 TIMES DAILY
Status: DISCONTINUED | OUTPATIENT
Start: 2017-11-27 | End: 2017-12-07 | Stop reason: HOSPADM

## 2017-11-27 RX ORDER — SODIUM POLYSTYRENE SULFONATE 15 G/60ML
15 SUSPENSION ORAL; RECTAL ONCE
Status: COMPLETED | OUTPATIENT
Start: 2017-11-27 | End: 2017-11-27

## 2017-11-27 RX ORDER — IBUPROFEN 200 MG
2400 CAPSULE ORAL
Status: DISCONTINUED | OUTPATIENT
Start: 2017-11-28 | End: 2017-11-27

## 2017-11-27 RX ORDER — DEXTROSE MONOHYDRATE 25 G/50ML
25 INJECTION, SOLUTION INTRAVENOUS ONCE
Status: COMPLETED | OUTPATIENT
Start: 2017-11-27 | End: 2017-11-27

## 2017-11-27 RX ORDER — DIPHENOXYLATE HYDROCHLORIDE AND ATROPINE SULFATE 2.5; .025 MG/1; MG/1
1 TABLET ORAL 4 TIMES DAILY PRN
Status: DISCONTINUED | OUTPATIENT
Start: 2017-11-27 | End: 2017-11-29

## 2017-11-27 RX ORDER — METOPROLOL TARTRATE 100 MG/1
100 TABLET ORAL 2 TIMES DAILY
Status: DISCONTINUED | OUTPATIENT
Start: 2017-11-27 | End: 2017-11-28

## 2017-11-27 RX ORDER — GABAPENTIN 300 MG/1
300 CAPSULE ORAL
Status: DISCONTINUED | OUTPATIENT
Start: 2017-11-27 | End: 2017-12-07 | Stop reason: HOSPADM

## 2017-11-27 RX ADMIN — Medication 250 MG: at 21:29

## 2017-11-27 RX ADMIN — CEFEPIME HYDROCHLORIDE 500 MG: 1 INJECTION, POWDER, FOR SOLUTION INTRAMUSCULAR; INTRAVENOUS at 23:15

## 2017-11-27 RX ADMIN — CALCIUM GLUCONATE 1 G: 94 INJECTION, SOLUTION INTRAVENOUS at 17:47

## 2017-11-27 RX ADMIN — INSULIN HUMAN 5 UNITS: 100 INJECTION, SOLUTION PARENTERAL at 17:37

## 2017-11-27 RX ADMIN — CEFEPIME HYDROCHLORIDE 1000 MG: 1 INJECTION, SOLUTION INTRAVENOUS at 15:41

## 2017-11-27 RX ADMIN — DEXTROSE MONOHYDRATE 25 ML: 500 INJECTION PARENTERAL at 17:35

## 2017-11-27 RX ADMIN — SODIUM POLYSTYRENE SULFONATE 15 G: 15 SUSPENSION ORAL; RECTAL at 17:38

## 2017-11-27 RX ADMIN — PHYTONADIONE 5 MG: 5 TABLET ORAL at 23:44

## 2017-11-27 RX ADMIN — METOPROLOL TARTRATE 100 MG: 100 TABLET ORAL at 23:15

## 2017-11-27 RX ADMIN — INSULIN DETEMIR 5 UNITS: 100 INJECTION, SOLUTION SUBCUTANEOUS at 23:37

## 2017-11-27 RX ADMIN — CINACALCET HYDROCHLORIDE 30 MG: 30 TABLET, COATED ORAL at 21:29

## 2017-11-27 RX ADMIN — GABAPENTIN 300 MG: 300 CAPSULE ORAL at 21:29

## 2017-11-27 NOTE — ED PROVIDER NOTES
History  Chief Complaint   Patient presents with    Fatigue     Spouse reports increased fatigue and decreased appetite for 1 week  PT reports painful urination for several months  Spouse also states the pt fell and hit his head on 11/25       72-year-old male comes in complaining of generalized weakness  Patient has a history of end-stage renal disease in out dialysis Monday Wednesday Friday did not have dialysis yet today and reports over the past week has become much more weak and had a fall on the 25th very struck his head he was not evaluated at that time  Patient also has a history of right-sided pleural effusion secondary to his end-stage renal disease that has had to be drained in the past   Wife feels that patient is having a harder time breathing although he does not look uncomfortable to me  Patient's wife also states that he is much more confused patient is not able to give me any kind of significant history today    Patient intermittently takes urine and said he feels like he has suprapubic pain to his wife this morning        History provided by:  Spouse  History limited by:  Mental status change   used: No    Fatigue   Severity:  Severe  Onset quality:  Gradual  Duration:  1 week  Timing:  Constant  Progression:  Worsening  Chronicity:  New  Context: increased activity    Context: not alcohol use and not drug use    Ineffective treatments:  None tried  Associated symptoms: anorexia, falls and shortness of breath    Associated symptoms: no abdominal pain, no arthralgias, no chest pain, no cough, no dizziness, no headaches and no seizures    Shortness of breath:     Severity:  Mild    Onset quality:  Gradual    Duration:  1 week    Timing:  Intermittent    Progression:  Waxing and waning  Risk factors: no anemia, no family hx of stroke and no new medications    Risk factors comment:  End-stage renal disease      Prior to Admission Medications   Prescriptions Last Dose Informant Patient Reported? Taking?    Insulin Detemir (LEVEMIR SC)   Yes Yes   Sig: Inject 5 Units under the skin daily at bedtime   Probiotic Product (PRO-BIOTIC BLEND) CAPS   Yes Yes   Sig: Take 1 capsule by mouth daily   acetaminophen (TYLENOL) 325 mg tablet   Yes Yes   Sig: Take 650 mg by mouth every 6 (six) hours as needed for mild pain   b complex-vitamin C-folic acid (RENAL) 1 mg   Yes Yes   Sig: Take 1 mg by mouth daily   calcium citrate (CALCITRATE) 950 MG tablet   Yes Yes   Sig: Take 2,400 mg by mouth 3 (three) times a day with meals   cinacalcet (SENSIPAR) 30 mg tablet   Yes Yes   Sig: Take 30 mg by mouth daily at bedtime   citalopram (CeleXA) 10 mg tablet   Yes Yes   Sig: Take 25 mg by mouth daily     diphenoxylate-atropine (LOMOTIL) 2 5-0 025 mg/5 mL liquid   Yes Yes   Sig: Take 5 mL by mouth 4 (four) times a day as needed for diarrhea   gabapentin (NEURONTIN) 100 mg capsule   Yes Yes   Sig: Take 100 mg by mouth daily   gabapentin (NEURONTIN) 300 mg capsule   Yes Yes   Sig: Take 300 mg by mouth daily at bedtime   insulin lispro (HumaLOG) 100 units/mL injection   No Yes   Sig: Inject 1-6 Units under the skin daily at bedtime   Patient taking differently: Inject 1-6 Units under the skin 4 (four) times a day (before meals and at bedtime) Sliding scale    levothyroxine 125 mcg tablet   No Yes   Sig: Take 1 tablet by mouth daily in the early morning   Patient taking differently: Take 137 mcg by mouth daily in the early morning     metoprolol tartrate (LOPRESSOR) 100 mg tablet   No Yes   Sig: Take 1 tablet by mouth 2 (two) times a day   sevelamer carbonate (RENVELA) 800 mg tablet   Yes Yes   Sig: Take 800 mg by mouth 3 (three) times a day as needed     traMADol (ULTRAM) 50 mg tablet   Yes Yes   Sig: Take 50 mg by mouth every 6 (six) hours as needed for moderate pain   vancomycin (VANCOCIN) 5 mg/mL SOLN   No Yes   Sig: Take 5 mL 4 times a day for 2 weeks then 5 mL 2 times a day for 1 week then 2 5 mL twice a day for 1 week then stay on 2 5 mL every day indefinitely  Patient taking differently: Take 5 mL by mouth daily Take 5 mL 4 times a day for 2 weeks then 5 mL 2 times a day for 1 week then 2 5 mL twice a day for 1 week then stay on 2 5 mL every day indefinitely  warfarin (COUMADIN) 5 mg tablet   No Yes   Sig: Take 1 tablet by mouth daily   Patient taking differently: Take 6 mg by mouth daily        Facility-Administered Medications: None       Past Medical History:   Diagnosis Date    Acquired hypothyroidism     underactive    Atrial fibrillation ([place] [address] )     Clostridium difficile infection 04/2017    Diabetes mellitus ([place] [address] )     Dialysis patient ([place] [address] )     Diarrhea     ESRD (end stage renal disease) on dialysis ([place] [address] )     Hx of cardiac arrest     Hypertension     Neuropathy     Right lower lobe pneumonia ([place] [address] ) 2/20/2017    Sepsis ([place] [address] ) 04/2017    Polymicrobial MRSA, VRE    Systolic and diastolic CHF, chronic (HCC)     EF 35%, Grade II DD       Past Surgical History:   Procedure Laterality Date    CATARACT EXTRACTION      CHG GI ENDOSCOPIC ULTRASOUND N/A 3/10/2016    Procedure: LINEAR ENDOSCOPIC U/S;  Surgeon: Fox Giordano MD;  [facility]; Service: Gastroenterology    CHOLECYSTECTOMY      GASTROSTOMY TUBE PLACEMENT N/A 4/12/2017    Procedure: INSERTION PEG TUBE;  Surgeon: Sunday Jarvis MD;  [facility];  Service:    Mollie Sizer LEG AMPUTATION THROUGH LOWER TIBIA AND FIBULA Right 4/6/2017    Procedure: AMPUTATION BELOW KNEE (BKA); Surgeon: Patel Coats DO;  [facility];  Service:     TOE AMPUTATION  2000       Family History   Problem Relation Age of Onset    Diabetes Father     Cancer Other     Lupus Mother      I have reviewed and agree with the history as documented      Social History   Substance Use Topics    Smoking status: Former Smoker     Packs/day: 1 00     Years: 46 00     Types: Cigarettes     Start date: 1970     Quit date: 3/1/2017    Smokeless tobacco: Never Used   Mollie Sizer Alcohol use No        Review of Systems   Constitutional: Positive for fatigue  Negative for activity change and appetite change  HENT: Negative for congestion and facial swelling  Eyes: Negative for discharge and redness  Respiratory: Positive for shortness of breath  Negative for cough and wheezing  Cardiovascular: Negative for chest pain and leg swelling  Gastrointestinal: Positive for anorexia  Negative for abdominal distention, abdominal pain and blood in stool  Endocrine: Negative for cold intolerance and polydipsia  Genitourinary: Negative for difficulty urinating and hematuria  Musculoskeletal: Positive for falls  Negative for arthralgias and gait problem  Skin: Negative for color change and rash  Allergic/Immunologic: Negative for food allergies and immunocompromised state  Neurological: Negative for dizziness, seizures and headaches  Hematological: Negative for adenopathy  Does not bruise/bleed easily  Psychiatric/Behavioral: Negative for agitation, confusion and decreased concentration  All other systems reviewed and are negative  Physical Exam  ED Triage Vitals   Temperature Pulse Respirations Blood Pressure SpO2   11/27/17 1110 11/27/17 1110 11/27/17 1110 11/27/17 1110 11/27/17 1110   97 6 °F (36 4 °C) 61 16 116/60 97 %      Temp Source Heart Rate Source Patient Position - Orthostatic VS BP Location FiO2 (%)   11/27/17 1110 11/27/17 1110 11/27/17 1110 11/27/17 1110 --   Oral Monitor Sitting Right arm       Pain Score       11/27/17 1443       No Pain           Orthostatic Vital Signs  Vitals:    11/27/17 1110 11/27/17 1300 11/27/17 1443   BP: 116/60 112/64 110/59   Pulse: 61 58 55   Patient Position - Orthostatic VS: Sitting  Lying       Physical Exam   Constitutional:  Non-toxic appearance  He has a sickly appearance  HENT:   Head: Normocephalic and atraumatic         Right Ear: Tympanic membrane normal    Left Ear: Tympanic membrane normal    Nose: Nose normal  Mouth/Throat: Oropharynx is clear and moist    Eyes: Conjunctivae, EOM and lids are normal  Pupils are equal, round, and reactive to light  Neck: Trachea normal and normal range of motion  Neck supple  No JVD present  Carotid bruit is not present  Cardiovascular: Normal rate, regular rhythm, normal heart sounds and intact distal pulses  No extrasystoles are present  Pulmonary/Chest: Effort normal  He has decreased breath sounds  He has no wheezes  He has no rhonchi  He has no rales  He exhibits no tenderness and no deformity  Abdominal: Soft  Bowel sounds are normal  There is no tenderness  There is no rebound, no guarding and no CVA tenderness  Musculoskeletal:        Right shoulder: He exhibits normal range of motion, no tenderness, no swelling and no deformity  Cervical back: Normal  He exhibits normal range of motion, no tenderness, no bony tenderness and no deformity  Lymphadenopathy:     He has no cervical adenopathy  He has no axillary adenopathy  Neurological: He is alert  He has normal strength and normal reflexes  He is disoriented  No cranial nerve deficit or sensory deficit  He displays a negative Romberg sign  Skin: Skin is warm and dry  Psychiatric: Cognition and memory are impaired  He is noncommunicative  Nursing note and vitals reviewed  ED Medications  Medications   cefepime (MAXIPIME) IVPB (premix) 1,000 mg (not administered)       Diagnostic Studies  Results Reviewed     Procedure Component Value Units Date/Time    Comprehensive metabolic panel [[id number]] Collected:  11/27/17 1509    Lab Status:   In process Specimen:  Blood from Hand, Left Updated:  11/27/17 1515    Urine Microscopic [[id number]]  (Abnormal) Collected:  11/27/17 1442    Lab Status:  Final result Specimen:  Urine from Urine, Straight Cath Updated:  11/27/17 1505     RBC, UA None Seen /hpf      WBC, UA  /hpf      Field obscured, unable to enumerate (A)     Epithelial Cells None Seen /hpf Bacteria, UA None Seen /hpf     Urine culture [[id number]] Collected:  11/27/17 1442    Lab Status: In process Specimen:  Urine from Urine, Straight Cath Updated:  11/27/17 1505    UA w Reflex to Microscopic w Reflex to Culture [[id number]]  (Abnormal) Collected:  11/27/17 1442    Lab Status:  Final result Specimen:  Urine from Urine, Straight Cath Updated:  11/27/17 1458     Color, UA Yellow     Clarity, UA Turbid     Specific Gravity, UA 1 025     pH, UA 6 5     Leukocytes, UA Large (A)     Nitrite, UA Positive (A)     Protein,  (2+) (A) mg/dl      Glucose, UA Negative mg/dl      Ketones, UA Trace (A) mg/dl      Urobilinogen, UA 0 2 E U /dl      Bilirubin, UA Small (A)     Blood, UA Moderate (A)    B-type natriuretic peptide [[id number]] Collected:  11/27/17 1440    Lab Status: In process Specimen:  Blood from Hand, Left Updated:  11/27/17 1454    Magnesium [[id number]]  (Normal) Collected:  11/27/17 1140    Lab Status:  Final result Specimen:  Blood from Arm, Left Updated:  11/27/17 1250     Magnesium 2 1 mg/dL     Troponin I [[id number]]  (Normal) Collected:  11/27/17 1140    Lab Status:  Final result Specimen:  Blood from Arm, Left Updated:  11/27/17 1231     Troponin I <0 02 ng/mL     Narrative:         [employer] Chemistry analyzer 99% cutoff is > 0 04 ng/mL in network labs    o cTnI 99% cutoff is useful only when applied to patients in the clinical setting of myocardial ischemia  o cTnI 99% cutoff should be interpreted in the context of clinical history, ECG findings and possibly cardiac imaging to establish correct diagnosis  o cTnI 99% cutoff may be suggestive but clearly not indicative of a coronary event without the clinical setting of myocardial ischemia      CBC and differential [[id number]]  (Abnormal) Collected:  11/27/17 1140    Lab Status:  Final result Specimen:  Blood from Arm, Left Updated:  11/27/17 1211     WBC 11 96 (H) Thousand/uL      RBC 3 78 (L) Million/uL      Hemoglobin 11 1 (L) g/dL Hematocrit 34 7 (L) %      MCV 92 fL      MCH 29 4 pg      MCHC 32 0 g/dL      RDW 16 0 (H) %      MPV 9 6 fL      Platelets 907 Thousands/uL      Neutrophils Relative 82 (H) %      Lymphocytes Relative 7 (L) %      Monocytes Relative 9 %      Eosinophils Relative 1 %      Basophils Relative 1 %      Neutrophils Absolute 9 88 (H) Thousands/µL      Lymphocytes Absolute 0 83 Thousands/µL      Monocytes Absolute 1 11 Thousand/µL      Eosinophils Absolute 0 07 Thousand/µL      Basophils Absolute 0 07 Thousands/µL                  CT head without contrast   Final Result by Noralee Hodgkin, MD (11/27 1244)      No acute intracranial abnormality  Microangiopathic changes  Workstation performed: [id number]         XR chest 1 view portable   Final Result by Chelita White MD (11/27 1202)      Persistent right lower lobe and right middle lobe lobulated alveolar opacities and moderate right pleural effusion  Differential includes chronic and/or recurrent pneumonia, aspiration, or lung mass  Cardiomegaly  Overall, findings similar to October 10, 2017  Workstation performed: [id number]                    Procedures  Procedures       Phone Contacts  ED Phone Contact    ED Course  ED Course                                MDM  Number of Diagnoses or Management Options  Altered mental status: new and requires workup  UTI (urinary tract infection): new and requires workup     Amount and/or Complexity of Data Reviewed  Clinical lab tests: ordered and reviewed  Tests in the radiology section of CPT®: ordered and reviewed  Tests in the medicine section of CPT®: ordered and reviewed  Review and summarize past medical records: yes  Discuss the patient with other providers: yes  Independent visualization of images, tracings, or specimens: yes (No pneumonia positive right-sided pleural effusion)    Patient Progress  Patient progress: improved    CritCare Time    Disposition  Final diagnoses:    Altered mental status   UTI (urinary tract infection)     Time reflects when diagnosis was documented in both MDM as applicable and the Disposition within this note     Time User Action Codes Description Comment    11/27/2017  3:05 PM Kanivanairving BHARDWAJ Add [R41 82] Altered mental status     11/27/2017  3:05 PM Mya Paz Add [N39 0] UTI (urinary tract infection)       ED Disposition     ED Disposition Condition Comment    Admit  Case was discussed with Dr Bailey Beckman and the patient's admission status was agreed to be Admission Status: inpatient status to the service of [facility]      Follow-up Information    None       Patient's Medications   Discharge Prescriptions    No medications on file     No discharge procedures on file      ED Provider  Electronically Signed by           Artis Bland DO  11/27/17 2126

## 2017-11-27 NOTE — ED NOTES
Pt viviane guthrie'rudi for urine; approx 50cc milky return, sample sent to lab     Renea Dsouza RN  11/27/17 0430

## 2017-11-27 NOTE — CONSULTS
Consultation - Nephrology   Xiomara Bojorquez 61 y o  male MRN: [id number]  Unit/Bed#: ED 24 Encounter: [id number]    ASSESSMENT:  1  ESRD on HD (MWF @ [address] [place] [address]): Last dialysis was on Friday  - for HD today  - next HD Wednesday  2  Access: Right upper AVF  3  Hypertension: BP soft, monitor  4  Anemia: not on MAR, hgb at goal  5  Mineral and bone disease: will obtain outpatient orders  6  Possible UTI: checking UA with culture  7  Hyponatremia, chronic: monitor with fluid removal today  8  Persistent right lower lobe/middle lobe lobulated alveolar opacity with moderate right pleural effusion: ? Need for thoracentesis    PLAN:  HD today  Obtain outpatient orders    HISTORY OF PRESENT ILLNESS:  Requesting Physician: Jake Mckeon DO  Reason for Consult: ESRD on HD    Xiomara Bojorquez is a 61y o  year old male who was admitted to [address] [place] [address] after presenting with lethargy and fatigue at home with dysuria and burning with urination  He presents today with his wife  She states this has been going on for a few days  He relates his breathing is a little worse than usual  A renal consultation is requested today for assistance in the management of ESRD  Xiomara Bojorquez is a known ESRD patient who undergoes maintenance hemodialysis at [address] [place] [address] on [facility]      PAST MEDICAL HISTORY:  Past Medical History:   Diagnosis Date    Acquired hypothyroidism     underactive    [facility] )     Chronic kidney disease-mineral and bone disorder 11/27/2017    Clostridium difficile infection 04/2017    Diabetes mellitus ([facility] )     Dialysis patient ([facility]     Diarrhea     ESRD (end stage renal disease) on dialysis ([facility] )     Hx of cardiac arrest     Hypertension     Neuropathy     Right lower lobe pneumonia ([facility] ) 2/20/2017    Sepsis ([facility] ) 04/2017    Polymicrobial MRSA, VRE    Systolic and diastolic CHF, chronic (HCC)     EF 35%, Grade II DD       PAST SURGICAL HISTORY:  Past Surgical History:   Procedure Laterality Date    CATARACT EXTRACTION      CHG GI ENDOSCOPIC ULTRASOUND N/A 3/10/2016    Procedure: LINEAR ENDOSCOPIC U/S;  Surgeon: Skip Warren MD;  [facility]; Service: Gastroenterology    CHOLECYSTECTOMY      GASTROSTOMY TUBE PLACEMENT N/A 4/12/2017    Procedure: INSERTION PEG TUBE;  Surgeon: Cecilia Ball MD;  [facility];  Service:    Gordy Sungwell LEG AMPUTATION THROUGH LOWER TIBIA AND FIBULA Right 4/6/2017    Procedure: AMPUTATION BELOW KNEE (BKA);   Surgeon: Abigail Morales DO;  [facility];  Service:     TOE AMPUTATION  2000       ALLERGIES:  No Known Allergies    SOCIAL HISTORY:  History   Alcohol Use No     History   Drug Use No     History   Smoking Status    Former Smoker    Packs/day: 1 00    Years: 46 00    Types: Cigarettes    Start date: 5    Quit date: 3/1/2017   Smokeless Tobacco    Never Used       FAMILY HISTORY:  Family History   Problem Relation Age of Onset    Diabetes Father     Cancer Other     Lupus Mother        MEDICATIONS:    Current Facility-Administered Medications:     cefepime (MAXIPIME) IVPB (premix) 1,000 mg, 1,000 mg, Intravenous, Once, Mya Paz DO, Last Rate: 100 mL/hr at 11/27/17 1541, 1,000 mg at 11/27/17 1541    Current Outpatient Prescriptions:     acetaminophen (TYLENOL) 325 mg tablet, Take 650 mg by mouth every 6 (six) hours as needed for mild pain, Disp: , Rfl:     b complex-vitamin C-folic acid (RENAL) 1 mg, Take 1 mg by mouth daily, Disp: , Rfl:     calcium citrate (CALCITRATE) 950 MG tablet, Take 2,400 mg by mouth 3 (three) times a day with meals, Disp: , Rfl:     cinacalcet (SENSIPAR) 30 mg tablet, Take 30 mg by mouth daily at bedtime, Disp: , Rfl:     citalopram (CeleXA) 10 mg tablet, Take 25 mg by mouth daily  , Disp: , Rfl:     diphenoxylate-atropine (LOMOTIL) 2 5-0 025 mg/5 mL liquid, Take 5 mL by mouth 4 (four) times a day as needed for diarrhea, Disp: , Rfl:     gabapentin (NEURONTIN) 100 mg capsule, Take 100 mg by mouth daily, Disp: , Rfl:     gabapentin (NEURONTIN) 300 mg capsule, Take 300 mg by mouth daily at bedtime, Disp: , Rfl:     Insulin Detemir (LEVEMIR SC), Inject 5 Units under the skin daily at bedtime, Disp: , Rfl:     insulin lispro (HumaLOG) 100 units/mL injection, Inject 1-6 Units under the skin daily at bedtime (Patient taking differently: Inject 1-6 Units under the skin 4 (four) times a day (before meals and at bedtime) Sliding scale ), Disp: , Rfl: 0    levothyroxine 125 mcg tablet, Take 1 tablet by mouth daily in the early morning (Patient taking differently: Take 137 mcg by mouth daily in the early morning  ), Disp: 30 tablet, Rfl: 0    metoprolol tartrate (LOPRESSOR) 100 mg tablet, Take 1 tablet by mouth 2 (two) times a day, Disp: , Rfl:     Probiotic Product (PRO-BIOTIC BLEND) CAPS, Take 1 capsule by mouth daily, Disp: , Rfl:     sevelamer carbonate (RENVELA) 800 mg tablet, Take 800 mg by mouth 3 (three) times a day as needed  , Disp: , Rfl:     traMADol (ULTRAM) 50 mg tablet, Take 50 mg by mouth every 6 (six) hours as needed for moderate pain, Disp: , Rfl:     vancomycin (VANCOCIN) 5 mg/mL SOLN, Take 5 mL 4 times a day for 2 weeks then 5 mL 2 times a day for 1 week then 2 5 mL twice a day for 1 week then stay on 2 5 mL every day indefinitely  (Patient taking differently: Take 5 mL by mouth daily Take 5 mL 4 times a day for 2 weeks then 5 mL 2 times a day for 1 week then 2 5 mL twice a day for 1 week then stay on 2 5 mL every day indefinitely  ), Disp: 300 mL, Rfl: 1    warfarin (COUMADIN) 5 mg tablet, Take 1 tablet by mouth daily (Patient taking differently: Take 6 mg by mouth daily  ), Disp: , Rfl: 0    REVIEW OF SYSTEMS:  General: No fevers, chills  Cardiovascular: No chest pain, shortness of breath, palpitations, leg edema  Respiratory: No cough, sputum production, shortness of breath  Gastrointestinal: No nausea, vomiting, abdominal pain, diarrhea  Genitourinary: No hematuria      PHYSICAL EXAM:  Current Weight: Weight - Scale: 65 5 kg (144 lb 8 oz)  First Weight: Weight - Scale: 65 5 kg (144 lb 8 oz)  Vitals:    11/27/17 1300 11/27/17 1400 11/27/17 1443 11/27/17 1539   BP: 112/64  110/59 111/56   Pulse: 58  55 58   Resp:   16 18   Temp:       TempSrc:       SpO2: (!) 88% 93% 96% 94%   Weight:   65 5 kg (144 lb 8 oz)      No intake or output data in the 24 hours ending 11/27/17 1544  General: NAD  Skin: no rash  HEENT: normocephalic atraumatic  Neck: supple  Chest: CTAB  CVS: RRR  Abdomen: soft, nt, nd  Extremities: no edema  Neuro: alert awake     Lab Results:     Results from last 7 days  Lab Units 11/27/17  1140   WBC Thousand/uL 11 96*   HEMOGLOBIN g/dL 11 1*   HEMATOCRIT % 34 7*   PLATELETS Thousands/uL 340   MAGNESIUM mg/dL 2 1     Lab Results   Component Value Date    CALCIUM 8 1 (L) 09/28/2017    CAION 1 16 04/12/2017    PHOS 4 5 (H) 09/25/2017

## 2017-11-27 NOTE — ED NOTES
Pt typically gets dialysis M/W/F, last week he went S/T/F d/t holiday, should be at dialyasis now     Chaparro Chowdhury RN  11/27/17 1458 Damari Dumas RN  11/27/17 9723

## 2017-11-27 NOTE — ED NOTES
Verbal order given per Dr Paul Haddad to run chem 8; specimen #4 slightly hemolyzed        Dahlia Anderson RN  11/27/17 4147

## 2017-11-27 NOTE — H&P
History and Physical - [facility] Internal Medicine    Patient Information: Francesca Lagos 61 y o  male MRN: [id number]  Unit/Bed#: ED 24 Encounter: [id number]  Admitting Physician: Garret Oneill PA-C  PCP: Margaret Lopez DO  Date of Admission:  11/27/17    Assessment/Plan:  Hospital Problem List:   Principal Problem:    UTI (urinary tract infection)  Active Problems:    ESRD (end stage renal disease) on dialysis [facility])    Atrial fibrillation ([facility] )    S/P percutaneous endoscopic gastrostomy (PEG) tube placement ([facility] )    Chronic combined systolic and diastolic CHF (congestive heart failure) ([facility] )    Acute encephalopathy    Anemia    Acquired hypothyroidism    Chronic kidney disease-mineral and bone disorder    Plan for the Primary Problem(s):  · Acute encephalopathy likely secondary to infectious process such as underlying UTI  Should rule out intra-abdominal pathology as well  · CT head:  Negative  · Chest x-ray:  Negative for acute pulmonary process  · Obtain CT scan abdomen and pelvis without contrast   There has been noted to be some drainage around his PEG tube  · Neuro checks  · Check TSH  · Suspected UTI  · Urinalysis is positive  Await final culture results  · Continue IV cefepime  · Monitor WBCs and temps closely  · PEG tube  · Patient has not used tube since April 2017 nor has it been changed since that time  · Given bloody drainage from around the tube and cleanliness of the tube, I would recommend imaging the abdomen to rule out fluid collection  · I would also recommend GI evaluation to determine if tube can be removed  Plan for Additional Problems:   · ESRD on HD  · HD schedule:  [id number]  · Patient did not undergo dialysis today  Does not appear uremic at this time  Likely will hold off on dialysis today but will defer to Renal   · Chronic combined heart failure  · Monitor volume status closely  · Anemia of chronic disease  · Monitor H&H closely    · Acquired hypothyroidism  · Continue Synthroid  · Diabetes mellitus type 2 with neuropathy  · Home regimen: Levemir 5 units at bedtime and SSI  · Check A1c  · Continue Gabapentin (renally dosed)  · SSI  · Mineral Bone Disease  · Continue Sensipar and Renvela  · Paroxysmal atrial fibrillation  · On Coumadin  · Await PT/INR results  · Recurrent C  Diff colitis  · S/p fecal transplant  · On suppressive Vancomycin    VTE Prophylaxis: Warfarin (Coumadin)  / sequential compression device on LLE  Code Status: Full code  POLST: There is no POLST form on file for this patient (pre-hospital)    Anticipated Length of Stay:  Patient will be admitted on an Inpatient basis with an anticipated length of stay of  Greater than 2 midnights  Justification for Hospital Stay: Need for IV antibiotics  Total Time for Visit, including Counseling / Coordination of Care: 1 hour  Greater than 50% of this total time spent on direct patient counseling and coordination of care  Chief Complaint:   Change in mental status  History of Present Illness:    Natty Menon is a 61 y o  male who presents with change in mental status similar to in the past with acute infections  The patient's history of obtained from his wife for the most part as the patient's mental status is lethargic  Patient's wife states that he is been altered since 2 days ago  Despite being a dialysis patient he does urinate very small amounts and she states this has been coffee colored  He has also been complaining of some right-sided flank pain  Patient also has a PEG tube in place for unknown reasons and the wife states there has been some drainage around the PEG tube  It is unclear when it was changed last   It was originally placed in April 2017  Review of Systems:    Review of Systems   Constitutional: Positive for chills, diaphoresis, fatigue and fever  Negative for appetite change  HENT: Negative for congestion      Respiratory: Negative for cough, chest tightness, shortness of breath and wheezing  Cardiovascular: Negative for chest pain, palpitations and leg swelling  Gastrointestinal: Positive for diarrhea  Negative for abdominal distention, abdominal pain, blood in stool, constipation, nausea and vomiting  Genitourinary: Positive for dysuria and flank pain (Right)  Negative for difficulty urinating, frequency, hematuria and urgency  Musculoskeletal: Positive for gait problem (Recurrent falls  )  Negative for back pain and neck pain  Skin: Negative for rash  Neurological: Positive for weakness  Negative for dizziness, tremors, speech difficulty, light-headedness, numbness and headaches  Psychiatric/Behavioral: Positive for confusion  All other systems reviewed and are negative  Past Medical and Surgical History:     Past Medical History:   Diagnosis Date    Acquired hypothyroidism     underactive    Atrial fibrillation ([place] [address] )     Chronic kidney disease-mineral and bone disorder 11/27/2017    Clostridium difficile infection 04/2017    Diabetes mellitus ([place] [address] )     Dialysis patient ([place] [address] )     Diarrhea     ESRD (end stage renal disease) on dialysis ([place] [address] )     Hx of cardiac arrest     Hypertension     Neuropathy     Right lower lobe pneumonia ([place] [address] ) 2/20/2017    Sepsis ([place] [address] ) 04/2017    Polymicrobial MRSA, VRE    Systolic and diastolic CHF, chronic (HCC)     EF 35%, Grade II DD       Past Surgical History:   Procedure Laterality Date    CATARACT EXTRACTION      CHG GI ENDOSCOPIC ULTRASOUND N/A 3/10/2016    Procedure: LINEAR ENDOSCOPIC U/S;  Surgeon: Fox Giordano MD;  Location: [facility]; Service: Gastroenterology    CHOLECYSTECTOMY      GASTROSTOMY TUBE PLACEMENT N/A 4/12/2017    Procedure: INSERTION PEG TUBE;  Surgeon: Sunday Jarvis MD;  [facility];  Service:    Mollie Sizer LEG AMPUTATION THROUGH LOWER TIBIA AND FIBULA Right 4/6/2017    Procedure: AMPUTATION BELOW KNEE (BKA);   Surgeon: Patel Coats DO;  Location: [facility]; Service:     TOE AMPUTATION  2000       Meds/Allergies:    Prior to Admission medications    Medication Sig Start Date End Date Taking?  Authorizing Provider   acetaminophen (TYLENOL) 325 mg tablet Take 650 mg by mouth every 6 (six) hours as needed for mild pain   Yes Historical Provider, MD   b complex-vitamin C-folic acid (RENAL) 1 mg Take 1 mg by mouth daily   Yes Historical Provider, MD   calcium citrate (CALCITRATE) 950 MG tablet Take 2,400 mg by mouth 3 (three) times a day with meals   Yes Historical Provider, MD   cinacalcet (SENSIPAR) 30 mg tablet Take 30 mg by mouth daily at bedtime   Yes Historical Provider, MD   citalopram (CeleXA) 10 mg tablet Take 25 mg by mouth daily     Yes Historical Provider, MD   diphenoxylate-atropine (LOMOTIL) 2 5-0 025 mg/5 mL liquid Take 5 mL by mouth 4 (four) times a day as needed for diarrhea   Yes Historical Provider, MD   gabapentin (NEURONTIN) 100 mg capsule Take 100 mg by mouth daily   Yes Historical Provider, MD   gabapentin (NEURONTIN) 300 mg capsule Take 300 mg by mouth daily at bedtime   Yes Historical Provider, MD   Insulin Detemir (LEVEMIR SC) Inject 5 Units under the skin daily at bedtime   Yes Historical Provider, MD   insulin lispro (HumaLOG) 100 units/mL injection Inject 1-6 Units under the skin daily at bedtime  Patient taking differently: Inject 1-6 Units under the skin 4 (four) times a day (before meals and at bedtime) Sliding scale  8/12/17  Yes Jude Hays MD   levothyroxine 125 mcg tablet Take 1 tablet by mouth daily in the early morning  Patient taking differently: Take 137 mcg by mouth daily in the early morning   9/27/17  Yes Lincoln Bailey MD   metoprolol tartrate (LOPRESSOR) 100 mg tablet Take 1 tablet by mouth 2 (two) times a day 8/12/17  Yes Jude Hays MD   Probiotic Product (PRO-BIOTIC BLEND) CAPS Take 1 capsule by mouth daily   Yes Historical Provider, MD   sevelamer carbonate (RENVELA) 800 mg tablet Take 800 mg by mouth 3 (three) times a day as needed     Yes Historical Provider, MD   traMADol (ULTRAM) 50 mg tablet Take 50 mg by mouth every 6 (six) hours as needed for moderate pain   Yes Historical Provider, MD   vancomycin (VANCOCIN) 5 mg/mL SOLN Take 5 mL 4 times a day for 2 weeks then 5 mL 2 times a day for 1 week then 2 5 mL twice a day for 1 week then stay on 2 5 mL every day indefinitely  Patient taking differently: Take 5 mL by mouth daily Take 5 mL 4 times a day for 2 weeks then 5 mL 2 times a day for 1 week then 2 5 mL twice a day for 1 week then stay on 2 5 mL every day indefinitely    9/26/17  Yes Fuentes Morales MD   warfarin (COUMADIN) 5 mg tablet Take 1 tablet by mouth daily  Patient taking differently: Take 6 mg by mouth daily   9/26/17  Yes Fuentes Morales MD   ALBUTEROL SULFATE IN Take 1 Dose by nebulization every 4 (four) hours as needed (Wheezing, SOB)    11/27/17  Historical Provider, MD   B Complex-C (B-COMPLEX WITH VITAMIN C) tablet Take 1 tablet by mouth daily  11/27/17  Historical Provider, MD   Eluxadoline (VIBERZI) 100 MG TABS Take 100 mg by mouth 2 (two) times a day    11/27/17  Historical Provider, MD   Emollient (EUCERIN) lotion Apply 1 application topically daily at bedtime LLE and heel  11/27/17  Historical Provider, MD   insulin glargine (LANTUS) 100 units/mL subcutaneous injection Inject 3 Units under the skin daily at bedtime 9/27/17 11/27/17  Fuentes Morales MD   insulin glargine (LANTUS) 100 units/mL subcutaneous injection Inject 3 Units under the skin every morning 9/27/17 11/27/17  Fuentes Morales MD   insulin lispro (HumaLOG) 100 units/mL injection Inject 2 Units under the skin 3 (three) times a day with meals 9/27/17 11/27/17  Fuentes Morales MD   menthol-zinc oxide (CALMOSEPTINE) 2 91-16 759 % Apply 1 application topically 3 (three) times a day    11/27/17  Historical Provider, MD   nicotine (NICODERM CQ) 14 mg/24hr TD 24 hr patch Place 1 patch on the skin every 24 hours  11/27/17  Historical Provider, MD omeprazole (PriLOSEC) 40 MG capsule Take 40 mg by mouth 2 (two) times a day    11/27/17  Historical Provider, MD   ondansetron (ZOFRAN) 8 mg tablet Take by mouth every 8 (eight) hours as needed for nausea or vomiting  11/27/17  Historical Provider, MD   saccharomyces boulardii (FLORASTOR) 250 mg capsule Take 250 mg by mouth 2 (two) times a day  11/27/17  Historical Provider, MD   sodium chloride (OCEAN) 0 65 % nasal spray 1 spray into each nostril as needed (Nasal dryness)  11/27/17  Historical Provider, MD   Vancomycin HCl (VANCOCIN HCL PO) Take 250 mg by mouth daily  11/27/17  Historical Provider, MD     I have reviewed home medications with patient family member  Allergies: No Known Allergies    Social History:     Marital Status: /Civil Union   Occupation:  Disabled  Patient Pre-hospital Living Situation: Currently lives with his wife but has been in/out rehab facilities  Patient Pre-hospital Level of Mobility: Patient has R BKA  Patient Pre-hospital Diet Restrictions: Renal  Substance Use History:   History   Alcohol Use No     History   Smoking Status    Former Smoker    Packs/day: 1 00    Years: 46 00    Types: Cigarettes    Start date: 5    Quit date: 3/1/2017   Smokeless Tobacco    Never Used     History   Drug Use No       Family History:    Unable to obtain from patient and patient's wife is not currently at the bedside  Physical Exam:     Vitals:   Blood Pressure: 111/56 (11/27/17 1539)  Pulse: 58 (11/27/17 1539)  Temperature: 97 6 °F (36 4 °C) (11/27/17 1110)  Temp Source: Oral (11/27/17 1110)  Respirations: 18 (11/27/17 1539)  Weight - Scale: 65 5 kg (144 lb 8 oz) (11/27/17 1443)  SpO2: 94 % (11/27/17 1539)    Physical Exam   Constitutional: He appears well-developed and well-nourished  He appears lethargic  He is sleeping  Non-toxic appearance  No distress  Difficult to arouse to sternal rub  HENT:   Head: Normocephalic         Mouth/Throat: Oropharynx is clear and moist  Neck: Neck supple  No JVD present  Cardiovascular: Normal rate and regular rhythm  No murmur heard  Pulmonary/Chest: Breath sounds normal  He has no wheezes  He has no rhonchi  He has no rales  He exhibits no tenderness  Abdominal: Soft  Normal appearance and bowel sounds are normal  He exhibits no distension and no ascites  There is no tenderness  There is no CVA tenderness  Musculoskeletal:        Right shoulder: He exhibits no pain and no spasm  (+) right BKA   Lymphadenopathy:     He has no cervical adenopathy  Neurological: He appears lethargic  Patient correctly identified where he was but then quickly fell back to sleep  Skin: Skin is warm and dry  No rash noted  He is not diaphoretic  No cyanosis  No pallor  Nails show no clubbing  Vitals reviewed  Additional Data:   Labs:    Results from last 7 days  Lab Units 11/27/17  1140   WBC Thousand/uL 11 96*   HEMOGLOBIN g/dL 11 1*   HEMATOCRIT % 34 7*   PLATELETS Thousands/uL 340   NEUTROS PCT % 82*   LYMPHS PCT % 7*   MONOS PCT % 9   EOS PCT % 1           Invalid input(s): LABALBU        * I Have Reviewed All Lab Data Listed Above  * Additional Pertinent Lab Tests Reviewed: Gaudencio Reyes Admission Reviewed    Imaging:  Imaging Reports Reviewed Today Include:   1  CT head: No acute intracranial abnormalities  Microangiopathic changes  2   Chest xray:  Persistent right lower lobe and right middle lobe lobulated alveolar opacities and moderate right pleural effusion  Differential includes chronic and/or recurrence pneumonia, aspiration or lung mass  Cardiomegaly  All findings are similar to previous findings in October 2017  EKG, Pathology, and Other Studies Reviewed on Admission:   · EKG: None    [employer] Records Reviewed: Yes     ** Please Note: Dragon 360 Dictation voice to text software may have been used in the creation of this document   **

## 2017-11-28 ENCOUNTER — APPOINTMENT (INPATIENT)
Dept: DIALYSIS | Facility: HOSPITAL | Age: 67
DRG: 208 | End: 2017-11-28
Payer: COMMERCIAL

## 2017-11-28 PROBLEM — R79.1 ELEVATED INR: Status: ACTIVE | Noted: 2017-11-28

## 2017-11-28 PROBLEM — E87.5 HYPERKALEMIA: Status: ACTIVE | Noted: 2017-11-28

## 2017-11-28 LAB
ALBUMIN SERPL BCP-MCNC: 2.1 G/DL (ref 3.5–5)
ALP SERPL-CCNC: 199 U/L (ref 46–116)
ALT SERPL W P-5'-P-CCNC: 20 U/L (ref 12–78)
ANION GAP SERPL CALCULATED.3IONS-SCNC: 10 MMOL/L (ref 4–13)
ANION GAP SERPL CALCULATED.3IONS-SCNC: 12 MMOL/L (ref 4–13)
AST SERPL W P-5'-P-CCNC: 18 U/L (ref 5–45)
BASE EX.OXY STD BLDV CALC-SCNC: 86.5 % (ref 60–80)
BASE EXCESS BLDV CALC-SCNC: 0.9 MMOL/L
BASOPHILS # BLD AUTO: 0.07 THOUSANDS/ΜL (ref 0–0.1)
BASOPHILS # BLD MANUAL: 0 THOUSAND/UL (ref 0–0.1)
BASOPHILS NFR BLD AUTO: 1 % (ref 0–1)
BASOPHILS NFR MAR MANUAL: 0 % (ref 0–1)
BILIRUB SERPL-MCNC: 0.8 MG/DL (ref 0.2–1)
BUN SERPL-MCNC: 20 MG/DL (ref 5–25)
BUN SERPL-MCNC: 33 MG/DL (ref 5–25)
BUN SERPL-MCNC: 35 MG/DL (ref 5–25)
BUN SERPL-MCNC: 37 MG/DL (ref 5–25)
BUN SERPL-MCNC: 55 MG/DL (ref 5–25)
CALCIUM SERPL-MCNC: 8.2 MG/DL (ref 8.3–10.1)
CALCIUM SERPL-MCNC: 8.3 MG/DL (ref 8.3–10.1)
CHLORIDE SERPL-SCNC: 86 MMOL/L (ref 100–108)
CHLORIDE SERPL-SCNC: 90 MMOL/L (ref 100–108)
CO2 SERPL-SCNC: 22 MMOL/L (ref 21–32)
CO2 SERPL-SCNC: 24 MMOL/L (ref 21–32)
CREAT SERPL-MCNC: 3.99 MG/DL (ref 0.6–1.3)
CREAT SERPL-MCNC: 5.98 MG/DL (ref 0.6–1.3)
EOSINOPHIL # BLD AUTO: 0.27 THOUSAND/ΜL (ref 0–0.61)
EOSINOPHIL # BLD MANUAL: 0 THOUSAND/UL (ref 0–0.4)
EOSINOPHIL NFR BLD AUTO: 4 % (ref 0–6)
EOSINOPHIL NFR BLD MANUAL: 0 % (ref 0–6)
ERYTHROCYTE [DISTWIDTH] IN BLOOD BY AUTOMATED COUNT: 15.8 % (ref 11.6–15.1)
ERYTHROCYTE [DISTWIDTH] IN BLOOD BY AUTOMATED COUNT: 16.1 % (ref 11.6–15.1)
GFR SERPL CREATININE-BSD FRML MDRD: 15 ML/MIN/1.73SQ M
GFR SERPL CREATININE-BSD FRML MDRD: 9 ML/MIN/1.73SQ M
GLUCOSE SERPL-MCNC: 121 MG/DL (ref 65–140)
GLUCOSE SERPL-MCNC: 141 MG/DL (ref 65–140)
GLUCOSE SERPL-MCNC: 333 MG/DL (ref 65–140)
GLUCOSE SERPL-MCNC: 382 MG/DL (ref 65–140)
GLUCOSE SERPL-MCNC: 449 MG/DL (ref 65–140)
GLUCOSE SERPL-MCNC: 53 MG/DL (ref 65–140)
GLUCOSE SERPL-MCNC: 60 MG/DL (ref 65–140)
GLUCOSE SERPL-MCNC: 735 MG/DL (ref 65–140)
GLUCOSE SERPL-MCNC: 78 MG/DL (ref 65–140)
GLUCOSE SERPL-MCNC: >500 MG/DL (ref 65–140)
GLUCOSE SERPL-MCNC: >500 MG/DL (ref 65–140)
HCO3 BLDV-SCNC: 25.5 MMOL/L (ref 24–30)
HCT VFR BLD AUTO: 32.8 % (ref 36.5–49.3)
HCT VFR BLD AUTO: 34.7 % (ref 36.5–49.3)
HGB BLD-MCNC: 10.5 G/DL (ref 12–17)
HGB BLD-MCNC: 11 G/DL (ref 12–17)
INR PPP: 3.44 (ref 0.86–1.16)
INR PPP: 5.31 (ref 0.86–1.16)
LYMPHOCYTES # BLD AUTO: 0.38 THOUSAND/UL (ref 0.6–4.47)
LYMPHOCYTES # BLD AUTO: 0.81 THOUSANDS/ΜL (ref 0.6–4.47)
LYMPHOCYTES # BLD AUTO: 5 % (ref 14–44)
LYMPHOCYTES NFR BLD AUTO: 11 % (ref 14–44)
MCH RBC QN AUTO: 29.4 PG (ref 26.8–34.3)
MCH RBC QN AUTO: 29.6 PG (ref 26.8–34.3)
MCHC RBC AUTO-ENTMCNC: 31.7 G/DL (ref 31.4–37.4)
MCHC RBC AUTO-ENTMCNC: 32 G/DL (ref 31.4–37.4)
MCV RBC AUTO: 92 FL (ref 82–98)
MCV RBC AUTO: 94 FL (ref 82–98)
MONOCYTES # BLD AUTO: 0.08 THOUSAND/UL (ref 0–1.22)
MONOCYTES # BLD AUTO: 0.81 THOUSAND/ΜL (ref 0.17–1.22)
MONOCYTES NFR BLD AUTO: 11 % (ref 4–12)
MONOCYTES NFR BLD: 1 % (ref 4–12)
NEUTROPHILS # BLD AUTO: 5.34 THOUSANDS/ΜL (ref 1.85–7.62)
NEUTROPHILS # BLD MANUAL: 7.13 THOUSAND/UL (ref 1.85–7.62)
NEUTS BAND NFR BLD MANUAL: 3 % (ref 0–8)
NEUTS SEG NFR BLD AUTO: 73 % (ref 43–75)
NEUTS SEG NFR BLD AUTO: 91 % (ref 43–75)
O2 CT BLDV-SCNC: 13.6 ML/DL
PCO2 BLDV: 40.8 MM HG (ref 42–50)
PH BLDV: 7.41 [PH] (ref 7.3–7.4)
PLATELET # BLD AUTO: 258 THOUSANDS/UL (ref 149–390)
PLATELET # BLD AUTO: 344 THOUSANDS/UL (ref 149–390)
PLATELET BLD QL SMEAR: ADEQUATE
PMV BLD AUTO: 9.3 FL (ref 8.9–12.7)
PMV BLD AUTO: 9.3 FL (ref 8.9–12.7)
PO2 BLDV: 56.8 MM HG (ref 35–45)
POTASSIUM SERPL-SCNC: 4.8 MMOL/L (ref 3.5–5.3)
POTASSIUM SERPL-SCNC: 5.8 MMOL/L (ref 3.5–5.3)
PROT SERPL-MCNC: 7.9 G/DL (ref 6.4–8.2)
PROTHROMBIN TIME: 36 SECONDS (ref 12.1–14.4)
PROTHROMBIN TIME: 50.8 SECONDS (ref 12.1–14.4)
RBC # BLD AUTO: 3.57 MILLION/UL (ref 3.88–5.62)
RBC # BLD AUTO: 3.71 MILLION/UL (ref 3.88–5.62)
S PNEUM AG UR QL: NEGATIVE
SODIUM SERPL-SCNC: 118 MMOL/L (ref 136–145)
SODIUM SERPL-SCNC: 126 MMOL/L (ref 136–145)
TOTAL CELLS COUNTED SPEC: 100
TROPONIN I SERPL-MCNC: <0.02 NG/ML
TSH SERPL DL<=0.05 MIU/L-ACNC: 15.23 UIU/ML (ref 0.36–3.74)
WBC # BLD AUTO: 7.3 THOUSAND/UL (ref 4.31–10.16)
WBC # BLD AUTO: 7.59 THOUSAND/UL (ref 4.31–10.16)

## 2017-11-28 PROCEDURE — 87493 C DIFF AMPLIFIED PROBE: CPT | Performed by: PHYSICIAN ASSISTANT

## 2017-11-28 PROCEDURE — 80053 COMPREHEN METABOLIC PANEL: CPT | Performed by: PHYSICIAN ASSISTANT

## 2017-11-28 PROCEDURE — 85025 COMPLETE CBC W/AUTO DIFF WBC: CPT | Performed by: NURSE PRACTITIONER

## 2017-11-28 PROCEDURE — 82805 BLOOD GASES W/O2 SATURATION: CPT | Performed by: NURSE PRACTITIONER

## 2017-11-28 PROCEDURE — 5A1D70Z PERFORMANCE OF URINARY FILTRATION, INTERMITTENT, LESS THAN 6 HOURS PER DAY: ICD-10-PCS | Performed by: INTERNAL MEDICINE

## 2017-11-28 PROCEDURE — 93005 ELECTROCARDIOGRAM TRACING: CPT

## 2017-11-28 PROCEDURE — 85027 COMPLETE CBC AUTOMATED: CPT | Performed by: PHYSICIAN ASSISTANT

## 2017-11-28 PROCEDURE — 87798 DETECT AGENT NOS DNA AMP: CPT | Performed by: NURSE PRACTITIONER

## 2017-11-28 PROCEDURE — 85610 PROTHROMBIN TIME: CPT | Performed by: NURSE PRACTITIONER

## 2017-11-28 PROCEDURE — 80048 BASIC METABOLIC PNL TOTAL CA: CPT | Performed by: NURSE PRACTITIONER

## 2017-11-28 PROCEDURE — 85610 PROTHROMBIN TIME: CPT | Performed by: PHYSICIAN ASSISTANT

## 2017-11-28 PROCEDURE — 82948 REAGENT STRIP/BLOOD GLUCOSE: CPT

## 2017-11-28 PROCEDURE — 84520 ASSAY OF UREA NITROGEN: CPT | Performed by: PHYSICIAN ASSISTANT

## 2017-11-28 PROCEDURE — 85007 BL SMEAR W/DIFF WBC COUNT: CPT | Performed by: PHYSICIAN ASSISTANT

## 2017-11-28 PROCEDURE — 84443 ASSAY THYROID STIM HORMONE: CPT | Performed by: NURSE PRACTITIONER

## 2017-11-28 PROCEDURE — 84484 ASSAY OF TROPONIN QUANT: CPT | Performed by: PHYSICIAN ASSISTANT

## 2017-11-28 RX ORDER — DILTIAZEM HYDROCHLORIDE 5 MG/ML
15 INJECTION INTRAVENOUS EVERY 6 HOURS PRN
Status: DISCONTINUED | OUTPATIENT
Start: 2017-11-28 | End: 2017-11-29

## 2017-11-28 RX ORDER — OLANZAPINE 10 MG/1
10 TABLET, ORALLY DISINTEGRATING ORAL
Status: DISCONTINUED | OUTPATIENT
Start: 2017-11-28 | End: 2017-11-28

## 2017-11-28 RX ORDER — CHOLESTYRAMINE LIGHT 4 G/5.7G
4 POWDER, FOR SUSPENSION ORAL 2 TIMES DAILY
Status: DISCONTINUED | OUTPATIENT
Start: 2017-11-28 | End: 2017-12-07 | Stop reason: HOSPADM

## 2017-11-28 RX ORDER — OLANZAPINE 10 MG/1
10 TABLET, ORALLY DISINTEGRATING ORAL ONCE
Status: DISCONTINUED | OUTPATIENT
Start: 2017-11-28 | End: 2017-11-28

## 2017-11-28 RX ORDER — METOPROLOL TARTRATE 50 MG/1
50 TABLET, FILM COATED ORAL EVERY 6 HOURS
Status: DISCONTINUED | OUTPATIENT
Start: 2017-11-29 | End: 2017-11-29

## 2017-11-28 RX ORDER — NYSTATIN 100000 [USP'U]/G
POWDER TOPICAL 2 TIMES DAILY
Status: DISCONTINUED | OUTPATIENT
Start: 2017-11-28 | End: 2017-12-07 | Stop reason: HOSPADM

## 2017-11-28 RX ORDER — PHYTONADIONE 5 MG/1
5 TABLET ORAL ONCE
Status: COMPLETED | OUTPATIENT
Start: 2017-11-28 | End: 2017-11-28

## 2017-11-28 RX ADMIN — CHOLESTYRAMINE 4 G: 4 POWDER, FOR SUSPENSION ORAL at 18:17

## 2017-11-28 RX ADMIN — VANCOMYCIN 125 MG: KIT at 18:00

## 2017-11-28 RX ADMIN — CITALOPRAM HYDROBROMIDE 10 MG: 20 TABLET ORAL at 12:37

## 2017-11-28 RX ADMIN — GABAPENTIN 300 MG: 300 CAPSULE ORAL at 21:16

## 2017-11-28 RX ADMIN — VANCOMYCIN 125 MG: KIT at 12:44

## 2017-11-28 RX ADMIN — INSULIN HUMAN 8 UNITS: 100 INJECTION, SOLUTION PARENTERAL at 21:52

## 2017-11-28 RX ADMIN — CINACALCET HYDROCHLORIDE 30 MG: 30 TABLET, COATED ORAL at 21:16

## 2017-11-28 RX ADMIN — Medication 250 MG: at 12:39

## 2017-11-28 RX ADMIN — Medication 1 TABLET: at 18:17

## 2017-11-28 RX ADMIN — RENAGEL 800 MG: 800 TABLET ORAL at 18:17

## 2017-11-28 RX ADMIN — INSULIN LISPRO 5 UNITS: 100 INJECTION, SOLUTION INTRAVENOUS; SUBCUTANEOUS at 21:16

## 2017-11-28 RX ADMIN — SODIUM CHLORIDE 10 UNITS/HR: 9 INJECTION, SOLUTION INTRAVENOUS at 23:18

## 2017-11-28 RX ADMIN — LEVOTHYROXINE SODIUM 137 MCG: 112 TABLET ORAL at 05:07

## 2017-11-28 RX ADMIN — Medication 250 MG: at 18:17

## 2017-11-28 RX ADMIN — NEPHROCAP 1 CAPSULE: 1 CAP ORAL at 12:39

## 2017-11-28 RX ADMIN — AMIODARONE HYDROCHLORIDE 150 MG: 50 INJECTION, SOLUTION INTRAVENOUS at 18:16

## 2017-11-28 RX ADMIN — METOPROLOL TARTRATE 100 MG: 100 TABLET ORAL at 12:39

## 2017-11-28 RX ADMIN — AMIODARONE HYDROCHLORIDE 1 MG/MIN: 50 INJECTION, SOLUTION INTRAVENOUS at 20:40

## 2017-11-28 RX ADMIN — Medication 1 TABLET: at 12:37

## 2017-11-28 RX ADMIN — RENAGEL 800 MG: 800 TABLET ORAL at 12:37

## 2017-11-28 RX ADMIN — INSULIN DETEMIR 5 UNITS: 100 INJECTION, SOLUTION SUBCUTANEOUS at 22:00

## 2017-11-28 RX ADMIN — CEFEPIME HYDROCHLORIDE 500 MG: 1 INJECTION, POWDER, FOR SOLUTION INTRAMUSCULAR; INTRAVENOUS at 20:48

## 2017-11-28 RX ADMIN — PHYTONADIONE 5 MG: 5 TABLET ORAL at 05:07

## 2017-11-28 RX ADMIN — DILTIAZEM HYDROCHLORIDE 15 MG: 5 INJECTION INTRAVENOUS at 14:56

## 2017-11-28 RX ADMIN — CHOLESTYRAMINE 4 G: 4 POWDER, FOR SUSPENSION ORAL at 16:30

## 2017-11-28 NOTE — ASSESSMENT & PLAN NOTE
· Patient was at the point where he was tapered down on vancomycin to once daily dosing  However in the context of being back on IV antibiotics for urinary tract infection, ID recommends increasing him back to q i d  vancomycin    Three days after he completes his antibiotic for urinary tract infection, we can reinitiate the taper down on his vancomycin

## 2017-11-28 NOTE — CONSULTS
Consultation - Cardiology   Kellie Shanks 61 y o  male MRN: [id number]  Unit/Bed#: -01 Encounter: [id number]    Assessment/Plan     Principal Problem:    UTI (urinary tract infection)  Active Problems:    Type 1 diabetes mellitus with nephropathy (HCC)    ESRD (end stage renal disease) on dialysis ([facility] [address] )    Rapid atrial fibrillation (HCC)    S/P percutaneous endoscopic gastrostomy (PEG) tube placement (HCC)    Chronic combined systolic and diastolic CHF (congestive heart failure) ([facility])    Acute encephalopathy    Clostridium difficile infection    Acquired hypothyroidism    Chronic kidney disease-mineral and bone disorder    Elevated INR    Hyperkalemia      Assessment/Plan    1  Atrial fibrillation/PAF- now with atrial fibrillation with RVR   He had been in sinus rhythm until starting with atrial fibrillation this afternoon  His BB was held this am d/t hypotension  He received lopressor 100mg about 1230  If HR does not improve, consider adding amiodarone IV to restore sinus rhythm  Pt has been on warfarin  INR remains supratherapeutic  He had received a dose of vitamin K ( total 10mg )  Presenting EKG yesterday- sinus bradycardia     2  Hyponatremia/hyperkalemia- managed by HD    3  ESRD/HD- volume management per nephrology    4  CMP/ EF 35%- no prior ischemia evaluation documented  F/U with echocardiogram ordered  On BB   Ideally should be on ace inhibitor  Pt is ESRD so I would ask advise of renal  I would however not start at this time d/t hypotension  He is not volume overloaded    5  Persistent RLL and RML lobulated alveolar opacities and moderate right pleural effusion  H/O recurrent right pleural effusion and multiple thoracentesis  Currently not hypoxic    6  Altered mental status- SLIM feels multifactorial d/t toxic metabolic abnormalities/infection    7  UTI- Antibiotics per SLIM       8  Recurrent C diff- on vanco      9  H O AV endocarditis- s/p 6 weeks antibiotics  F/U with echocardiogram    History of Present Illness   Physician Requesting Consult: Jesus Manuel Cobb MD  Reason for Consult / Principal Problem: atrial fibrillation    HPI: Bryanna Troy is a 61y o  year old male with DM 1, atrial fibrillation, chronic right pleural effusion s/p thoracentesis , peg tube placement , chronic combined heart failure, ESRD, diabetes  who presents with altered mental status , urinary symptoms and lethargy  He had missed HD X1  We have been consulted regarding rapid atrial fibrillation  He is on warfarin  CT head no acute pathology  CT abdomen/pelvis- moderate right pleural effusion with adjacent opacities possibly atelectasis vs aspiration or PNA  Pt is confused and not able to give me any meaningful information  He had prolonged hospitalizations in April and June this year  In April he was admitted with altered mental status  He had a PEA arrest in the setting of AV endocarditis/Profound hypoglycemia per documentation  He underwent R BKA for chronic nonhealing ulcers  Hospital course was complicated by bacteremia with MRSA and VRE, C diff colitis  Peg tube was placed  MARIA revealed probable vegetation on AV for which he completed 6 weeks of antibiotics  He was hospitalized in June with altered mental status, acute hypoxic and hypercapneic respiratory failure, pulmonary edema and sepsis  I do not see any cardiology notes during those hospitalizations  Cardiac testing  MARIA 4/2017- EF 35% with moderate diffuse hypokinesis  Grade 1 DD  RV normal  Mild LAE and ESA  Mild to moderate MR  AV- probable small, mobile vegetation on the right coronary cusp, on the left ventricular aspect  No significant AI       Inpatient consult to Cardiology  Consult performed by: Chico Burgess ordered by: Kelly Spicer          Review of Systems    Historical Information   Past Medical History:   Diagnosis Date    Acquired hypothyroidism     underactive    Atrial fibrillation ([facility] [address] )     Chronic kidney disease-mineral and bone disorder 11/27/2017    Clostridium difficile infection 04/2017    Diabetes mellitus ([facility] )     Dialysis patient ([place] [address] )     Diarrhea     ESRD (end stage renal disease) on dialysis ([place] [address] )     Hx of cardiac arrest     Hypertension     Neuropathy     Right lower lobe pneumonia ([facility] ) 2/20/2017    Sepsis ([place] [address] ) 04/2017    Polymicrobial MRSA, VRE    Systolic and diastolic CHF, chronic (HCC)     EF 35%, Grade II DD     Past Surgical History:   Procedure Laterality Date    CATARACT EXTRACTION      CHG GI ENDOSCOPIC ULTRASOUND N/A 3/10/2016    Procedure: LINEAR ENDOSCOPIC U/S;  Surgeon: Charu Greene MD;  Location: [facility]; Service: Gastroenterology    CHOLECYSTECTOMY      GASTROSTOMY TUBE PLACEMENT N/A 4/12/2017    Procedure: INSERTION PEG TUBE;  Surgeon: Nena Camacho MD;  Location: [facility];  Service:    Ramiro Jessica LEG AMPUTATION THROUGH LOWER TIBIA AND FIBULA Right 4/6/2017    Procedure: AMPUTATION BELOW KNEE (BKA);   Surgeon: Anthony Arce DO;  Location: [facility];  Service:     TOE AMPUTATION  2000     History   Alcohol Use No     History   Drug Use No     History   Smoking Status    Former Smoker    Packs/day: 1 00    Years: 46 00    Types: Cigarettes    Start date: 5    Quit date: 3/1/2017   Smokeless Tobacco    Never Used     Family History:   Family History   Problem Relation Age of Onset    Diabetes Father     Cancer Other     Lupus Mother        Meds/Allergies   current meds:   Current Facility-Administered Medications   Medication Dose Route Frequency    acetaminophen (TYLENOL) tablet 650 mg  650 mg Oral Q6H PRN    b complex-vitamin C-folic acid (NEPHROCAPS) capsule 1 capsule  1 capsule Oral Daily    bismuth subsalicylate (PEPTO BISMOL) 524 mg/30 mL oral suspension 524 mg  524 mg Oral Q6H PRN    calcium carbonate (OYSTER SHELL,OSCAL) 500 mg tablet 1 tablet  1 tablet Oral BID With Meals    cefepime (MAXIPIME) 500 mg in sodium chloride 0 9 % 50 mL IVPB  500 mg Intravenous Q24H    cholestyramine sugar free (QUESTRAN LIGHT) packet 4 g  4 g Oral BID    cinacalcet (SENSIPAR) tablet 30 mg  30 mg Oral HS    citalopram (CeleXA) tablet 10 mg  10 mg Oral Daily    diltiazem (CARDIZEM) injection 15 mg  15 mg Intravenous Q6H PRN    diphenoxylate-atropine (LOMOTIL) 2 5-0 025 mg per tablet 1 tablet  1 tablet Oral 4x Daily PRN    gabapentin (NEURONTIN) capsule 300 mg  300 mg Oral HS    insulin detemir (LEVEMIR) subcutaneous injection 5 Units  5 Units Subcutaneous HS    insulin lispro (HumaLOG) 100 units/mL subcutaneous injection 1-5 Units  1-5 Units Subcutaneous TID AC    insulin lispro (HumaLOG) 100 units/mL subcutaneous injection 1-5 Units  1-5 Units Subcutaneous HS    levothyroxine tablet 137 mcg  137 mcg Oral Early Morning    menthol-zinc oxide (CALMOSEPTINE) 0 44-20 6 % ointment   Topical BID    metoprolol tartrate (LOPRESSOR) tablet 100 mg  100 mg Oral BID    nystatin (MYCOSTATIN) powder   Topical BID    ondansetron (ZOFRAN) injection 4 mg  4 mg Intravenous Q6H PRN    saccharomyces boulardii (FLORASTOR) capsule 250 mg  250 mg Oral BID    sevelamer (RENAGEL) tablet 800 mg  800 mg Oral TID With Meals    vancomycin (VANCOCIN) oral solution 125 mg  125 mg Oral Q6H [facility]    and PTA meds:  Prescriptions Prior to Admission   Medication    acetaminophen (TYLENOL) 325 mg tablet    b complex-vitamin C-folic acid (RENAL) 1 mg    calcium citrate (CALCITRATE) 950 MG tablet    cinacalcet (SENSIPAR) 30 mg tablet    citalopram (CeleXA) 10 mg tablet    diphenoxylate-atropine (LOMOTIL) 2 5-0 025 mg/5 mL liquid    gabapentin (NEURONTIN) 100 mg capsule    gabapentin (NEURONTIN) 300 mg capsule    Insulin Detemir (LEVEMIR SC)    insulin lispro (HumaLOG) 100 units/mL injection    levothyroxine 125 mcg tablet    metoprolol tartrate (LOPRESSOR) 100 mg tablet    Probiotic Product (PRO-BIOTIC BLEND) CAPS    sevelamer carbonate (RENVELA) 800 mg tablet    traMADol (ULTRAM) 50 mg tablet    vancomycin (VANCOCIN) 5 mg/mL SOLN    warfarin (COUMADIN) 5 mg tablet     No Known Allergies    Objective   Vitals: Blood pressure 112/72, pulse 74, temperature 98 °F (36 7 °C), temperature source Oral, resp  rate 18, height 5' 6" (1 676 m), weight 63 1 kg (139 lb 1 8 oz), SpO2 95 %    Orthostatic Blood Pressures    Flowsheet Row Most Recent Value   Blood Pressure  112/72 filed at 11/28/2017 1450   Patient Position - Orthostatic VS  Lying filed at 11/28/2017 1450            Intake/Output Summary (Last 24 hours) at 11/28/17 1545  Last data filed at 11/28/17 1208   Gross per 24 hour   Intake             1390 ml   Output             5256 ml   Net            -3866 ml       Invasive Devices     Peripheral Intravenous Line            Peripheral IV 09/28/17 Left;Upper Arm 61 days    Peripheral IV 11/27/17 Left;Upper Arm less than 1 day          Line            Hemodialysis AV Fistula  Right Forearm -- days    Hemodialysis AV Fistula Right Forearm -- days          Drain            Gastrostomy/Enterostomy -- days    Gastrostomy/Enterostomy Percutaneous endoscopic gastrostomy (PEG) 20 Fr   days                Physical Exam: /72   Pulse 74   Temp 98 °F (36 7 °C) (Oral)   Resp 18   Ht 5' 6" (1 676 m)   Wt 63 1 kg (139 lb 1 8 oz)   SpO2 95%   BMI 22 45 kg/m²   General Appearance:    Alert, cooperative, no distress, appears older than stated age and chronically ill   Head:    Normocephalic, no scleral icterus   Eyes:    PERRL   Nose:   Nares normal, septum midline, mucosa normal, no drainage    Throat:   Lips, mucosa, and tongue normal   Neck:   Supple, symmetrical, trachea midline     no  JVD       Lungs:     Clear to auscultation bilaterally, respirations unlabored        Heart:    Irregular rate and rhythm, S1 and S2 normal, no murmur, rub   or gallop   Abdomen:     Soft, non-tender, bowel sounds active all four quadrants,       Extremities:   Extremities normal, atraumatic, no cyanosis or edema       Skin:   Skin warm   Neurologic:   Alert , confused       Lab Results:   Recent Results (from the past 72 hour(s))   CBC and differential    Collection Time: 11/27/17 11:40 AM   Result Value Ref Range    WBC 11 96 (H) 4 31 - 10 16 Thousand/uL    RBC 3 78 (L) 3 88 - 5 62 Million/uL    Hemoglobin 11 1 (L) 12 0 - 17 0 g/dL    Hematocrit 34 7 (L) 36 5 - 49 3 %    MCV 92 82 - 98 fL    MCH 29 4 26 8 - 34 3 pg    MCHC 32 0 31 4 - 37 4 g/dL    RDW 16 0 (H) 11 6 - 15 1 %    MPV 9 6 8 9 - 12 7 fL    Platelets 248 218 - 226 Thousands/uL    Neutrophils Relative 82 (H) 43 - 75 %    Lymphocytes Relative 7 (L) 14 - 44 %    Monocytes Relative 9 4 - 12 %    Eosinophils Relative 1 0 - 6 %    Basophils Relative 1 0 - 1 %    Neutrophils Absolute 9 88 (H) 1 85 - 7 62 Thousands/µL    Lymphocytes Absolute 0 83 0 60 - 4 47 Thousands/µL    Monocytes Absolute 1 11 0 17 - 1 22 Thousand/µL    Eosinophils Absolute 0 07 0 00 - 0 61 Thousand/µL    Basophils Absolute 0 07 0 00 - 0 10 Thousands/µL   Magnesium    Collection Time: 11/27/17 11:40 AM   Result Value Ref Range    Magnesium 2 1 1 6 - 2 6 mg/dL   Troponin I    Collection Time: 11/27/17 11:40 AM   Result Value Ref Range    Troponin I <0 02 <=0 04 ng/mL   ECG 12 lead    Collection Time: 11/27/17 11:50 AM   Result Value Ref Range    Ventricular Rate 58 BPM    Atrial Rate 58 BPM    [place] Interval 234 ms    QRSD Interval 162 ms    QT Interval 488 ms    QTC Interval 479 ms    P [place] 123 degrees    QRS Axis -58 degrees    T Wave Axis 89 degrees   B-type natriuretic peptide    Collection Time: 11/27/17  2:40 PM   Result Value Ref Range    NT-proBNP 152,794 (H) <125 pg/mL   UA w Reflex to Microscopic w Reflex to Culture    Collection Time: 11/27/17  2:42 PM   Result Value Ref Range    Color, UA Yellow     Clarity, UA Turbid     Specific [place], UA 1 025 1 003 - 1 030    pH, UA 6 5 4 5 - 8 0    Leukocytes, UA Large (A) Negative    Nitrite, UA Positive (A) Negative    Protein, UA 100 (2+) (A) Negative mg/dl    Glucose, UA Negative Negative mg/dl    Ketones, UA Trace (A) Negative mg/dl    Urobilinogen, UA 0 2 0 2, 1 0 E U /dl E U /dl    Bilirubin, UA Small (A) Negative    Blood, UA Moderate (A) Negative   Urine Microscopic    Collection Time: 11/27/17  2:42 PM   Result Value Ref Range    RBC, UA None Seen None Seen, 0-5 /hpf    WBC, UA Field obscured, unable to enumerate (A) None Seen, 0-5, 5-55, 5-65 /hpf    Epithelial Cells None Seen None Seen, Occasional /hpf    Bacteria, UA None Seen None Seen, Occasional /hpf   Urine culture    Collection Time: 11/27/17  2:42 PM   Result Value Ref Range    Urine Culture Culture results to follow      Strep Pneumoniae, Urine    Collection Time: 11/27/17  2:42 PM   Result Value Ref Range    Strep pneumoniae antigen, urine Negative Negative   Comprehensive metabolic panel    Collection Time: 11/27/17  4:23 PM   Result Value Ref Range    Sodium 126 (L) 136 - 145 mmol/L    Potassium  3 5 - 5 3 mmol/L    Chloride 91 (L) 100 - 108 mmol/L    CO2 22 21 - 32 mmol/L    Anion Gap 13 4 - 13 mmol/L    BUN 98 (H) 5 - 25 mg/dL    Creatinine 8 74 (H) 0 60 - 1 30 mg/dL    Glucose 209 (H) 65 - 140 mg/dL    Calcium 8 6 8 3 - 10 1 mg/dL    AST 30 5 - 45 U/L    ALT 24 12 - 78 U/L    Alkaline Phosphatase 183 (H) 46 - 116 U/L    Total Protein 8 2 6 4 - 8 2 g/dL    Albumin 2 3 (L) 3 5 - 5 0 g/dL    Total Bilirubin 0 50 0 20 - 1 00 mg/dL    eGFR 6 ml/min/1 73sq m   POCT Chem 8+    Collection Time: 11/27/17  5:08 PM   Result Value Ref Range    SODIUM, I-STAT 125 (L) 136 - 145 mmol/l    Potassium, i-STAT 8 6 (HH) 3 5 - 5 3 mmol/L    Chloride, istat 96 (L) 100 - 108 mmol/L    CO2, i-STAT 23 21 - 32 mmol/L    Anion Gap, Istat 16 (H) 4 - 13 mmol/L    Calcium, Ionized i-STAT 1 00 (L) 1 12 - 1 32 mmol/L    BUN, I-STAT 107 (H) 5 - 25 mg/dl    Creatinine, i-STAT 8 7 (H) 0 6 - 1 3 mg/dl    eGFR 6 ml/min/1 73sq m    Glucose, i-STAT 199 (H) 65 - 140 mg/dl    Hct, i-STAT 37 36 5 - 49 3 % Hgb, i-STAT 12 6 12 0 - 17 0 g/dl    Specimen Type VENOUS    Blood gas, venous    Collection Time: 11/27/17  5:25 PM   Result Value Ref Range    pH, Neil 7 263 (L) 7 300 - 7 400    pCO2, Neil 46 8 42 0 - 50 0 mm Hg    pO2, Neil 48 3 (H) 35 0 - 45 0 mm Hg    HCO3, Neil 20 7 (L) 24 - 30 mmol/L    Base Excess, Neil -6 2 mmol/L    O2 Content, Neil 11 3 ml/dL    O2 HGB, VENOUS 72 1 60 0 - 80 0 %   Hemoglobin A1c (Orders if not completed within the last 90 days)    Collection Time: 11/27/17  8:45 PM   Result Value Ref Range    Hemoglobin A1C 9 4 (H) 4 2 - 6 3 %     mg/dl   Protime-INR    Collection Time: 11/27/17  8:45 PM   Result Value Ref Range    Protime 46 0 (H) 12 1 - 14 4 seconds    INR 4 69 (H) 0 86 - 1 16   Fingerstick Glucose (POCT)    Collection Time: 11/27/17  9:16 PM   Result Value Ref Range    POC Glucose 77 65 - 140 mg/dl   Fingerstick Glucose (POCT)    Collection Time: 11/27/17  9:47 PM   Result Value Ref Range    POC Glucose 71 65 - 140 mg/dl   Fingerstick Glucose (POCT)    Collection Time: 11/27/17 11:34 PM   Result Value Ref Range    POC Glucose 168 (H) 65 - 140 mg/dl   Basic metabolic panel    Collection Time: 11/28/17  4:03 AM   Result Value Ref Range    Sodium 126 (L) 136 - 145 mmol/L    Potassium 5 8 (H) 3 5 - 5 3 mmol/L    Chloride 90 (L) 100 - 108 mmol/L    CO2 24 21 - 32 mmol/L    Anion Gap 12 4 - 13 mmol/L    BUN 55 (H) 5 - 25 mg/dL    Creatinine 5 98 (H) 0 60 - 1 30 mg/dL    Glucose 141 (H) 65 - 140 mg/dL    Calcium 8 2 (L) 8 3 - 10 1 mg/dL    eGFR 9 ml/min/1 73sq m   CBC and differential    Collection Time: 11/28/17  4:04 AM   Result Value Ref Range    WBC 7 30 4 31 - 10 16 Thousand/uL    RBC 3 57 (L) 3 88 - 5 62 Million/uL    Hemoglobin 10 5 (L) 12 0 - 17 0 g/dL    Hematocrit 32 8 (L) 36 5 - 49 3 %    MCV 92 82 - 98 fL    MCH 29 4 26 8 - 34 3 pg    MCHC 32 0 31 4 - 37 4 g/dL    RDW 16 1 (H) 11 6 - 15 1 %    MPV 9 3 8 9 - 12 7 fL    Platelets 574 288 - 302 Thousands/uL    Neutrophils Relative 73 43 - 75 %    Lymphocytes Relative 11 (L) 14 - 44 %    Monocytes Relative 11 4 - 12 %    Eosinophils Relative 4 0 - 6 %    Basophils Relative 1 0 - 1 %    Neutrophils Absolute 5 34 1 85 - 7 62 Thousands/µL    Lymphocytes Absolute 0 81 0 60 - 4 47 Thousands/µL    Monocytes Absolute 0 81 0 17 - 1 22 Thousand/µL    Eosinophils Absolute 0 27 0 00 - 0 61 Thousand/µL    Basophils Absolute 0 07 0 00 - 0 10 Thousands/µL   Protime-INR    Collection Time: 11/28/17  4:04 AM   Result Value Ref Range    Protime 50 8 (H) 12 1 - 14 4 seconds    INR 5 31 (HH) 0 86 - 1 16   TSH, 3rd generation    Collection Time: 11/28/17  4:04 AM   Result Value Ref Range    TSH 3RD GENERATON 15 234 (H) 0 358 - 3 740 uIU/mL   Blood gas, venous    Collection Time: 11/28/17  6:03 AM   Result Value Ref Range    pH, Neil 7 414 (H) 7 300 - 7 400    pCO2, Neil 40 8 (L) 42 0 - 50 0 mm Hg    pO2, Neil 56 8 (H) 35 0 - 45 0 mm Hg    HCO3, Neil 25 5 24 - 30 mmol/L    Base Excess, Neil 0 9 mmol/L    O2 Content, Neil 13 6 ml/dL    O2 HGB, VENOUS 86 5 (H) 60 0 - 80 0 %   Fingerstick Glucose (POCT)    Collection Time: 11/28/17  7:18 AM   Result Value Ref Range    POC Glucose 78 65 - 140 mg/dl   BUN,ARTERIAL    Collection Time: 11/28/17  9:08 AM   Result Value Ref Range    BUN ARTERIAL 35 (H) 5 - 25 mg/dl   BUN,VENOUS    Collection Time: 11/28/17  9:08 AM   Result Value Ref Range    BUN VENOUS 20 5 - 25 mg/dl   BUN,Systemic    Collection Time: 11/28/17  9:08 AM   Result Value Ref Range    BUN SYSTEMIC 37 (H) 5 - 25 mg/dl   Protime-INR    Collection Time: 11/28/17 10:30 AM   Result Value Ref Range    Protime 36 0 (H) 12 1 - 14 4 seconds    INR 3 44 (H) 0 86 - 1 16   Fingerstick Glucose (POCT)    Collection Time: 11/28/17 11:02 AM   Result Value Ref Range    POC Glucose 60 (L) 65 - 140 mg/dl   Fingerstick Glucose (POCT)    Collection Time: 11/28/17 11:04 AM   Result Value Ref Range    POC Glucose 53 (L) 65 - 140 mg/dl   Fingerstick Glucose (POCT)    Collection Time: 17 11:45 AM   Result Value Ref Range    POC Glucose 121 65 - 140 mg/dl   Fingerstick Glucose (POCT)    Collection Time: 17  2:06 PM   Result Value Ref Range    POC Glucose 333 (H) 65 - 140 mg/dl     [facility]  [facility], [address] [facility]  [phone]     Transesophageal Echocardiogram  2D, 3D, Doppler, and Color Doppler     Study date:  2017     Patient: Vincent Donaldson  MR number: [id number]  Account number: [de-identified]  : 1950  Age: 61 years  Gender: Male  Status: Inpatient  Location: Echo lab  Height: 65 in  Weight: 137 9 lb  BP: 127/ 66 mmHg     Indications: Evaluate for suspected valvular vegetation  Atrial fibrillation  Respiratory failure      Diagnoses: R78 81 - Bacteremia     Sonographer:  ZACHARY Grove  Interpreting Physician:  Charla Bailey DO  Primary Physician:  Hector Hutton MD  Group:  [facility]  Cardiology Fellow:  Juan Chiang MD  RN:  [facility]     SUMMARY     LEFT VENTRICLE:  The ventricle was dilated  Systolic function was severely reduced  Ejection fraction was estimated to be 35 %  There was moderate diffuse hypokinesis with distinct regional wall motion abnormalities  Doppler parameters were consistent with abnormal left ventricular relaxation (grade 1 diastolic dysfunction)      RIGHT VENTRICLE:  The size was normal   Systolic function was normal      LEFT ATRIUM:  The atrium was mildly dilated      RIGHT ATRIUM:  The atrium was mildly dilated      MITRAL VALVE:  There was mild to moderate regurgitation      AORTIC VALVE:  There was no significant regurgitation  There was a probable small, mobile vegetation on the right coronary cusp, on the left ventricular aspect      TRICUSPID VALVE:  There was mild regurgitation      HISTORY: PRIOR HISTORY: Previous sudden death episode  Risk factors: diabetes      PROCEDURE: The procedure was performed in the echo lab   This was a routine study  The risks and alternatives of the procedure were explained to the patient's next of kin and informed consent was obtained  The transesophageal approach was  used  The study included complete 2D imaging, 3D imaging, complete spectral Doppler, and color Doppler  The heart rate was 88 bpm, at the start of the study  An adult omniplane probe was inserted by the cardiology fellow under direct  supervision of the attending cardiologist  Images were obtained from the parasternal, apical, subcostal, and suprasternal notch acoustic windows  Intubated with ease  One intubation attempt(s)  There was no blood detected on the probe  Echocardiographic views were limited due to poor acoustic window availability, decreased penetration, and lung interference  This was a technically difficult study  MEDICATIONS: SBE prophylaxis not indicated  Sedation administered by  anesthesia team      LEFT VENTRICLE: The ventricle was dilated  Systolic function was severely reduced  Ejection fraction was estimated to be 35 %  There was moderate diffuse hypokinesis with distinct regional wall motion abnormalities  Wall thickness was  normal  DOPPLER: Doppler parameters were consistent with abnormal left ventricular relaxation (grade 1 diastolic dysfunction)      RIGHT VENTRICLE: The size was normal  Systolic function was normal  Wall thickness was normal      LEFT ATRIUM: The atrium was mildly dilated  No thrombus was identified  APPENDAGE: The size was normal  No thrombus was identified  DOPPLER: The function was normal (normal emptying velocity)      RIGHT ATRIUM: The atrium was mildly dilated  No thrombus was identified      MITRAL VALVE: Valve structure was normal  There was normal leaflet separation  DOPPLER: There was mild to moderate regurgitation      AORTIC VALVE: The valve was trileaflet  Leaflets exhibited normal cuspal separation and sclerosis   There was a probable small, mobile vegetation on the right coronary cusp, on the left ventricular aspect  DOPPLER: There was no significant  regurgitation      TRICUSPID VALVE: The valve structure was normal  There was normal leaflet separation  DOPPLER: There was mild regurgitation      PULMONIC VALVE: Leaflets exhibited normal thickness, no calcification, and normal cuspal separation  There was no echocardiographic evidence of vegetation      PERICARDIUM: There was no pericardial effusion  The pericardium was normal in appearance      AORTA: The root exhibited normal size  There was Grade I atheroma of the proximal descending aorta and distal aortic arch  There was no evidence for dissection  There was no evidence for aneurysm      PULMONARY VEINS: DOPPLER: There was systolic blunting in the pulmonary vein(s)      MEASUREMENT TABLES     DOPPLER MEASUREMENTS  Left atrium   (Reference normals)  TAYLOR peak noah   40 cm/s   (--)       Imaging: I have personally reviewed pertinent reports  EKG: sinus bradycardia  VTE Prophylaxis: Warfarin (Coumadin)    Code Status: Level 1 - Full Code  Advance Directive and Living Will:      Power of :    POLST:      Counseling / Coordination of Care  Total floor / unit time spent today 45 minutes  Greater than 50% of total time was spent with the patient and / or family counseling and / or coordination of care

## 2017-11-28 NOTE — ASSESSMENT & PLAN NOTE
· Patient did receive a dose of vitamin K  INR has trended down  Follow CBC and INR in a abigail Moya Continue to hold Coumadin for now

## 2017-11-28 NOTE — CASE MANAGEMENT
Initial Clinical Review    Admission: Date/Time/Statement: 11/27/17 @ 1506     Orders Placed This Encounter   Procedures    Inpatient Admission (expected length of stay for this patient is greater than two midnights)     Standing Status:   Standing     Number of Occurrences:   1     Order Specific Question:   Admitting Physician     Answer:   Cherie Mcknight [[id number]]     Order Specific Question:   Level of Care     Answer:   Med Surg [16]     Order Specific Question:   Estimated length of stay     Answer:   More than 2 Midnights     Order Specific Question:   Certification     Answer:   I certify that inpatient services are medically necessary for this patient for a duration of greater than two midnights  See H&P and MD Progress Notes for additional information about the patient's course of treatment  ED: Date/Time/Mode of Arrival:   ED Arrival Information     Expected Arrival Acuity Means of Arrival Escorted By Service Admission Type    - 11/27/2017 10:54 Urgent Wheelchair Family Member General Medicine Urgent    Arrival Complaint    Lethargic          Chief Complaint:   Chief Complaint   Patient presents with    Fatigue     Spouse reports increased fatigue and decreased appetite for 1 week  PT reports painful urination for several months  Spouse also states the pt fell and hit his head on 11/25  History of Illness: 61 y o  male who presents with change in mental status similar to in the past with acute infections  The patient's history of obtained from his wife for the most part as the patient's mental status is lethargic  Patient's wife states that he is been altered since 2 days ago  Despite being a dialysis patient he does urinate very small amounts and she states this has been coffee colored  He has also been complaining of some right-sided flank pain  Patient also has a PEG tube in place for unknown reasons and the wife states there has been some drainage around the PEG tube    It is unclear when it was changed last   It was originally placed in April 2017  ED Vital Signs:   ED Triage Vitals   Temperature Pulse Respirations Blood Pressure SpO2   11/27/17 1110 11/27/17 1110 11/27/17 1110 11/27/17 1110 11/27/17 1110   97 6 °F (36 4 °C) 61 16 116/60 97 %      Temp Source Heart Rate Source Patient Position - Orthostatic VS BP Location FiO2 (%)   11/27/17 1110 11/27/17 1110 11/27/17 1110 11/27/17 1110 --   Oral Monitor Sitting Right arm       Pain Score       11/27/17 1443       No Pain        Wt Readings from Last 1 Encounters:   11/27/17 63 1 kg (139 lb 1 8 oz)       Vital Signs (abnormal):  Low sat of 88%  11/28 low BP 88/46  Bladder scan 52 ml  Exam - sickly appearance  Disoriented  Non communicative  Decreased breath sounds  Abnormal Labs/Diagnostic Test Results:   Na 126  Cl 91  Bun 98  Creatinine 8 74  Glucose 209  Alkaline phosphatase 183  Albumin 2 3  NT-proBNP 152,794  UA  Large leukocytes  + nitrites  2+ protein  Trace ketones  Small bilirubin  Moderate blood  Wbc 11 96, hgb 11 1, hct 34 7  Ct head - No acute intracranial abnormality   Microangiopathic changes  CxR:  Persistent right lower lobe and right middle lobe lobulated alveolar opacities and moderate right pleural effusion   Differential includes chronic and/or recurrent pneumonia, aspiration, or lung mass  Cardiomegaly  EKG - Sinus bradycardia  First degree AV block  Left bundle branch block  Left axis deviation    Blood gas venous 7 263/46 8/48 3/20 7/72 1    2045 - INR 4 69- vitamin K given  hgb 9 4   Glucose 77    Labs am 11/28- na 126, K 5 8 slightly hemolyzed  Cl 90  Bun 55  Creatinine 5 98  Glucose 141  Calcium 8 2  TSH 3rd generation 15 234  INR 5 31    hgb 10 5, hct 32 8  Blood gas venous 7 41/40 8/56 8/25 5/86 5%    1030- INR 3 44    1102- glucose 60    1145- glucose 121       ED Treatment: straight cath for milky return @ 50 cc  Medication Administration from 11/27/2017 1054 to 11/27/2017 1856       Date/Time Order Dose Route Action Action by Comments     11/27/2017 1623 cefepime (MAXIPIME) IVPB (premix) 1,000 mg 0 mg Intravenous Stopped Dahlia Anderson RN      11/27/2017 1541 cefepime (MAXIPIME) IVPB (premix) 1,000 mg 1,000 mg Intravenous [address] Dahlia Anderson RN      11/27/2017 1818 calcium gluconate 1 g in sodium chloride 0 9 % 100 mL IVPB 0 g Intravenous Stopped Dahlia Anderson RN      11/27/2017 1747 calcium gluconate 1 g in sodium chloride 0 9 % 100 mL IVPB 1 g Intravenous [address] Dahlia Anderson RN      11/27/2017 1735 dextrose 50 % IV solution 25 mL 25 mL Intravenous Given Dahlia Anderson RN      11/27/2017 1737 insulin regular (HumuLIN R,NovoLIN R) injection 5 Units 5 Units Intravenous Given Dahlia Anderson RN      11/27/2017 1738 sodium polystyrene sulfonate (KAYEXALATE) oral suspension 15 g 15 g Oral Given Dahlia Anderson RN           Past Medical/Surgical History:    Active Ambulatory Problems     Diagnosis Date Noted    Type 1 diabetes mellitus with nephropathy ([facility] )     ESRD (end stage renal disease) on dialysis ([facility] )     Hypothyroidism     H/O Clostridium difficile infection 09/13/2016    Stage 2 skin ulcer of sacral region 12/12/2016    Ambulatory dysfunction 12/12/2016    Diabetic ulcer of right foot associated with type 1 [facility] mellitus [facility] 12/12/2016    Lactic acidosis 12/30/2016    Encephalopathy acute 02/18/2017    Atrial fibrillation ([facility] ) 02/18/2017    Foot ulcer ([facility] ) 02/18/2017    Recurrent colitis due to Clostridium difficile 02/22/2017    S/P percutaneous endoscopic gastrostomy (PEG) tube placement (HCC) 06/16/2017    Pleural effusion on right 06/16/2017    Wound of right leg on R BKA stump 06/16/2017    Transaminitis 06/16/2017    Chronic combined systolic and diastolic CHF (congestive heart failure) [facility] ) 06/16/2017    R Fibula fracture, proximal to stump 06/17/2017    UTI (urinary tract infection) 06/19/2017    HTN (hypertension) 08/08/2017    [facility], [facility] 08/08/2017    Goals of care, counseling/discussion 08/08/2017    Hyponatremia 08/08/2017    Acute encephalopathy 08/10/2017    DKA (diabetic ketoacidoses) ([facility] ) 95/72/0629    Metabolic acidosis, increased anion gap 09/17/2017    Clostridium difficile infection 09/18/2017    Hypoglycemia 09/20/2017    Anemia 09/20/2017    Thrombocytopenia ([facility] ) 09/22/2017    Hyponatremia 09/24/2017     Resolved Ambulatory Problems     Diagnosis Date Noted    Hypertension     Neuropathy     Sepsis ([facility] ) 09/10/2016    Encephalopathy 09/10/2016    Cellulitis of left thigh 11/10/2016    Cellulitis of right lower extremity 12/11/2016    Hyperglycemia 12/11/2016    Leg pain 12/11/2016    Hyponatremia 12/12/2016    Tobacco abuse 12/12/2016    Hemoptysis 12/13/2016    Acute respiratory failure with hypoxia (HCC) 12/13/2016    Bursitis of left elbow 12/30/2016    Hypomagnesemia 12/30/2016    Lethargy 02/18/2017    Cellulitis of right leg 02/18/2017    HCAP (healthcare-associated pneumonia) 02/20/2017    Bacteremia 04/02/2017    Cardiac arrest ([facility] ) 04/02/2017    Dysphagia 06/16/2017    Syncope 07/07/2017    Epigastric pain 07/11/2017    acute Encephalopathy, suspected metabolic 81/69/6596     Past Medical History:   Diagnosis Date    Acquired hypothyroidism     Atrial fibrillation ([facility] )     Chronic kidney disease-mineral and bone disorder 11/27/2017    Clostridium difficile infection 04/2017    Diabetes mellitus ([place] [address] )     Dialysis patient ([place] [address] )     Diarrhea     ESRD (end stage renal disease) on dialysis ([facility] )     Hx of cardiac arrest     Hypertension     Neuropathy     Right lower lobe pneumonia ([facility] ) 2/20/2017    Sepsis ([facility] ) 63/3085    Systolic and diastolic CHF, chronic (HCC)        Admitting Diagnosis: UTI (urinary tract infection) [N39 0]  Altered mental status [R41 82]  Fatigue [R53 83]  S/P percutaneous endoscopic gastrostomy (PEG) tube placement ([facility] ) [Z93 1]    Age/Sex: 61 y o  male    Assessment/Plan: 82-year-old presenting with altered mental status likely due to UTI  Agree with cefepime, tailor when able  Nephro consult for HD  Given mental status would check VBG now in the ED  GI consult to determine ongoing need for PEG  Has not used in sometime  Eating and drinking okay when feeling well  CT abd to r/o intraabdominal infection       Admission Orders:  11/27/2017  1506 INPATIENT   Scheduled Meds:   b complex-vitamin C-folic acid 1 capsule Oral Daily   calcium carbonate 1 tablet Oral BID With Meals   cefepime 500 mg Intravenous Q24H   cinacalcet 30 mg Oral HS   citalopram 10 mg Oral Daily   gabapentin 300 mg Oral HS   insulin detemir 5 Units Subcutaneous HS   insulin lispro 1-5 Units Subcutaneous TID AC   insulin lispro 1-5 Units Subcutaneous HS   levothyroxine 137 mcg Oral Early Morning   metoprolol tartrate 100 mg Oral BID   saccharomyces boulardii 250 mg Oral BID   sevelamer 800 mg Oral TID With Meals   vancomycin 125 mg Oral Daily     Continuous Infusions:    PRN Meds: not used:   acetaminophen    diphenoxylate-atropine    Ondansetron    OTHER ORDERS:  Fingerstick glucose qid  scds  Continuous cardiac monitoring  Consult GI  Hemodialysis MWF  PT/OT    Per renal on 11/27: The patient had not been doing well for the past 2 days  The patient has had increased fatigue and lethargy  It was noted that his sodium was 125  Plan on doing dialysis today  Will do a lower sodium bath will do a complete treatment today  UF as tolerated on dialysis  1  Per renal on 11/28- ESRD on HD ([facility]egas, MWF): HD today to make up for short treatment yesterday  2  Access: R arm AVF  3  Hyperkalemia: due to high K diet  Dialyze on 2K today  4  Hypertension: BP on the low side  Monitor  5  Anemia: Hgb at goal    6  R sided pleural effusion: chronic and felt to be related to fluid overload  7  Hyponatremia: Needs fluid restriction     8  Abnormal UA: On Cefepime for possible UTI  Consider stopping Cefepime  9  Recurrent Cdiff: on oral Vancomycin       Recs:  - HD today  - Fluid restriction of 1200 cc/24 hours  - Low K diet  - Consider stopping Cefepime given history of Cdiff  [facility] in the [facility] by Graeme Michael for 2017  Network Utilization Review Department  Phone: [phone]; Fax [phone]  ATTENTION: The Network Utilization Review Department is now centralized for our 7 Facilities  Make a note that we have a new phone and fax numbers for our Department  Please call with any questions or concerns to [phone] and carefully follow the prompts so that you are directed to the right person  All voicemails are confidential  Fax any determinations, approvals, denials, and requests for initial or continue stay review clinical to [phone]  Due to HIGH CALL volume, it would be easier if you could please send faxed requests to expedite your requests and in part, help us provide discharge notifications faster

## 2017-11-28 NOTE — ASSESSMENT & PLAN NOTE
· Patient was reportedly hypoglycemic earlier today but now is elevated at 333  He is only on very low doses of Lantus and sliding scale insulin    · Monitor closely as he has prior history of significantly uncontrolled diabetes with DKA

## 2017-11-28 NOTE — CONSULTS
Consultation - [facility]  Cierra Dudley 61 y o  male MRN: [id number]  Unit/Bed#: -01 Encounter: [id number]        Inpatient consult to gastroenterology  Consult performed by: Maryanne Rao ordered by: Lynn Keller          Reason for Consult / Principal Problem: PEG tube    ASSESSMENT and PLAN:    Principal Problem:    UTI (urinary tract infection)  Active Problems:    ESRD (end stage renal disease) on dialysis [facility])    Atrial fibrillation ([facility] )    S/P percutaneous endoscopic gastrostomy (PEG) tube placement ([facility] )    Chronic combined systolic and diastolic CHF (congestive heart failure) ([facility] )    Acute encephalopathy    Anemia    Acquired hypothyroidism    Chronic kidney disease-mineral and bone disorder    #1  PEG tube status: patient currently has PEG tube; placed by surgery in April 2017  Has not used it for several months  CT scan ruled out any infection or abscess about the tube  -As this is non-emergent, would recommend outpatient follow up with either surgery (who placed the tube) or GI if surgery defers to us for removal  Patient would need either Coumadin held with a normal INR for PEG tube removal or he could have it endoscopically removed with Coumadin on board   -Would defer this to outpatient as patient is currently being treated for other infections and this is non-emergent  #2  Chronic diarrhea: has history of recurrent C diff  Continues to have loose stools  On Questran at home BID  -Resume Questran  -Check c diff   -Monitor stool output     -------------------------------------------------------------------------------------------------------------------    HPI: This is a 61year old male with extensive GI hx including hx of recurrent c diff s/p fecal transplant, chronic epigastric pain, dilated CBD, acute pancreatitis s/p ERCP, as well as other medical issues such as ESRD on dialysis, atrial fibrillation, and CHF  He is overall a poor historian   We were consulted to address need for PEG tube  Patient has PEG tube in place  It was placed via surgery in April 2017 at which time he was ventilated and underwent a BKA  He reports not using the PEG tube in several months  His weight is stable and he is eating well  He flushes it daily  It has never been replaced  It was noted to have some bloody drainage around the site upon admission but patient is unable to tell me if it bleeds often  He continues to have loose bowel movements  This has been chronic and he has had a full work up with no cause  He denies any abdominal pain, nausea, or vomiting  He had an EGD and colonoscopy around June 2017 at [facility]  He had gastritis and esophagitis at that time  He denies any bright red blood in the stool but reports possible black stools occasionally  REVIEW OF SYSTEMS:    CONSTITUTIONAL: Denies any rigors  +Chills, fever, Good appetite, and no recent weight loss  HEENT: No earache or tinnitus  Denies hearing loss or visual disturbances  CARDIOVASCULAR: No chest pain or palpitations  RESPIRATORY: Denies any cough, hemoptysis, shortness of breath or dyspnea on exertion  GASTROINTESTINAL: As noted in the History of Present Illness  GENITOURINARY: No problems with urination  Denies any hematuria; +dysuria  NEUROLOGIC: No dizziness or vertigo, denies headaches  MUSCULOSKELETAL: Denies any muscle or joint pain  SKIN: Denies skin rashes or itching  ENDOCRINE: Denies excessive thirst  Denies intolerance to heat or cold  PSYCHOSOCIAL: Denies depression or anxiety  Denies any recent memory loss         Historical Information   Past Medical History:   Diagnosis Date    Acquired hypothyroidism     underactive    Atrial fibrillation ([place] [address] )     Chronic kidney disease-mineral and bone disorder 11/27/2017    Clostridium difficile infection 04/2017    Diabetes mellitus ([place] [address] )     Dialysis patient ([place] [address] )     Diarrhea     ESRD (end stage renal disease) on dialysis ([place] [address] )     Hx of cardiac arrest     Hypertension     Neuropathy     Right lower lobe pneumonia ([facility] ) 2/20/2017    Sepsis ([facility] ) 04/2017    Polymicrobial MRSA, VRE    Systolic and diastolic CHF, chronic (HCC)     EF 35%, Grade II DD     Past Surgical History:   Procedure Laterality Date    CATARACT EXTRACTION      CHG GI ENDOSCOPIC ULTRASOUND N/A 3/10/2016    Procedure: LINEAR ENDOSCOPIC U/S;  Surgeon: Esther Coe MD;  [facility]: [facility]; Service: Gastroenterology    CHOLECYSTECTOMY      GASTROSTOMY TUBE PLACEMENT N/A 4/12/2017    Procedure: INSERTION PEG TUBE;  Surgeon: Fawn Jimenez MD;  [facility];  Service:    Domi Deangelo LEG AMPUTATION THROUGH LOWER TIBIA AND FIBULA Right 4/6/2017    Procedure: AMPUTATION BELOW KNEE (BKA);   Surgeon: Anibal Hardin DO;  [facility];  Service:     TOE AMPUTATION  2000     Social History   History   Alcohol Use No     History   Drug Use No     History   Smoking Status    Former Smoker    Packs/day: 1 00    Years: 46 00    Types: Cigarettes    Start date: 5    Quit date: 3/1/2017   Smokeless Tobacco    Never Used     Family History   Problem Relation Age of Onset    Diabetes Father     Cancer Other     Lupus Mother        Meds/Allergies     Prescriptions Prior to Admission   Medication    acetaminophen (TYLENOL) 325 mg tablet    b complex-vitamin C-folic acid (RENAL) 1 mg    calcium citrate (CALCITRATE) 950 MG tablet    cinacalcet (SENSIPAR) 30 mg tablet    citalopram (CeleXA) 10 mg tablet    diphenoxylate-atropine (LOMOTIL) 2 5-0 025 mg/5 mL liquid    gabapentin (NEURONTIN) 100 mg capsule    gabapentin (NEURONTIN) 300 mg capsule    Insulin Detemir (LEVEMIR SC)    insulin lispro (HumaLOG) 100 units/mL injection    levothyroxine 125 mcg tablet    metoprolol tartrate (LOPRESSOR) 100 mg tablet    Probiotic Product (PRO-BIOTIC BLEND) CAPS    sevelamer carbonate (RENVELA) 800 mg tablet    traMADol (ULTRAM) 50 mg tablet    vancomycin (VANCOCIN) 5 mg/mL SOLN    warfarin (COUMADIN) 5 mg tablet     Current Facility-Administered Medications   Medication Dose Route Frequency    acetaminophen (TYLENOL) tablet 650 mg  650 mg Oral Q6H PRN    b complex-vitamin C-folic acid (NEPHROCAPS) capsule 1 capsule  1 capsule Oral Daily    calcium carbonate (OYSTER SHELL,OSCAL) 500 mg tablet 1 tablet  1 tablet Oral BID With Meals    cefepime (MAXIPIME) 500 mg in sodium chloride 0 9 % 50 mL IVPB  500 mg Intravenous Q24H    cinacalcet (SENSIPAR) tablet 30 mg  30 mg Oral HS    citalopram (CeleXA) tablet 10 mg  10 mg Oral Daily    diphenoxylate-atropine (LOMOTIL) 2 5-0 025 mg per tablet 1 tablet  1 tablet Oral 4x Daily PRN    gabapentin (NEURONTIN) capsule 300 mg  300 mg Oral HS    insulin detemir (LEVEMIR) subcutaneous injection 5 Units  5 Units Subcutaneous HS    insulin lispro (HumaLOG) 100 units/mL subcutaneous injection 1-5 Units  1-5 Units Subcutaneous TID AC    insulin lispro (HumaLOG) 100 units/mL subcutaneous injection 1-5 Units  1-5 Units Subcutaneous HS    levothyroxine tablet 137 mcg  137 mcg Oral Early Morning    metoprolol tartrate (LOPRESSOR) tablet 100 mg  100 mg Oral BID    ondansetron (ZOFRAN) injection 4 mg  4 mg Intravenous Q6H PRN    saccharomyces boulardii (FLORASTOR) capsule 250 mg  250 mg Oral BID    sevelamer (RENAGEL) tablet 800 mg  800 mg Oral TID With Meals    vancomycin (VANCOCIN) oral solution 125 mg  125 mg Oral Daily       No Known Allergies        Objective     Blood pressure 105/51, pulse 72, temperature 98 °F (36 7 °C), temperature source Oral, resp  rate 18, height 5' 6" (1 676 m), weight 63 1 kg (139 lb 1 8 oz), SpO2 95 %        Intake/Output Summary (Last 24 hours) at 11/28/17 1059  Last data filed at 11/28/17 0818   Gross per 24 hour   Intake             1090 ml   Output             2500 ml   Net            -1410 ml         PHYSICAL EXAM:      General Appearance:   Alert, cooperative, no distress, appears stated age, chronically ill appearing    HEENT:   Normocephalic, atraumatic, anicteric, no oropharyngeal thrush present      Neck:  Supple, symmetrical, trachea midline, no adenopathy;    thyroid: no enlargement/tenderness/nodules; no carotid  bruit or JVD    Lungs:   Clear to auscultation bilaterally; no rales, rhonchi or wheezing; respirations unlabored    Heart[de-identified]   S1 and S2 normal; regular rate and rhythm; no murmur, rub, or gallop  Abdomen:   Soft, non-tender, non-distended; normal bowel sounds; no masses, no organomegaly; PEG tube present in LUQ with brown dried scab around insertion site, no active bleeding    Genitalia:   Deferred    Rectal:   Deferred    Extremities:  No cyanosis, clubbing or edema; R BKA   Pulses:  2+ and symmetric all extremities    Skin:  Skin color, texture, turgor normal, no rashes or lesions    Lymph nodes:  No palpable cervical, axillary or inguinal lymphadenopathy        Lab Results:     Results from last 7 days  Lab Units 11/28/17  0404   WBC Thousand/uL 7 30   HEMOGLOBIN g/dL 10 5*   HEMATOCRIT % 32 8*   PLATELETS Thousands/uL 344   NEUTROS PCT % 73   LYMPHS PCT % 11*   MONOS PCT % 11   EOS PCT % 4       Results from last 7 days  Lab Units 11/28/17  0403  11/27/17  1623   SODIUM mmol/L 126*  --  126*   POTASSIUM mmol/L 5 8*  --   --    CHLORIDE mmol/L 90*  --  91*   CO2 mmol/L 24  --  22   BUN mg/dL 55*  --  98*   CREATININE mg/dL 5 98*  --  8 74*   CALCIUM mg/dL 8 2*  --  8 6   TOTAL PROTEIN g/dL  --   --  8 2   BILIRUBIN TOTAL mg/dL  --   --  0 50   ALK PHOS U/L  --   --  183*   ALT U/L  --   --  24   AST U/L  --   --  30   GLUCOSE RANDOM mg/dL 141*  --  209*   GLUCOSE, ISTAT   --   < >  --    < > = values in this interval not displayed  Results from last 7 days  Lab Units 11/28/17  0404   INR  5 31*           Imaging Studies: I have personally reviewed pertinent imaging studies  Ct Abdomen Pelvis Wo Contrast    Result Date: 11/27/2017  Impression: 1    Moderate right pleural effusion with adjacent somewhat rounded opacities possibly atelectasis versus aspiration or pneumonia  Workstation performed: [id number]     Xr Chest 1 View Portable    Result Date: 11/27/2017  Impression: Persistent right lower lobe and right middle lobe lobulated alveolar opacities and moderate right pleural effusion  Differential includes chronic and/or recurrent pneumonia, aspiration, or lung mass  Cardiomegaly  Overall, findings similar to October 10, 2017  Workstation performed: [id number]     Ct Head Without Contrast    Result Date: 11/27/2017  Impression: No acute intracranial abnormality  Microangiopathic changes  Workstation performed: [id number]           Patient was seen and examined by Dr Demetri Norris  All gallo medical decisions were made by Dr Demetri Norris  Thank you for allowing us to participate in the care of this present patient  We will follow-up with you closely

## 2017-11-28 NOTE — PROGRESS NOTES
NEPHROLOGY PROGRESS NOTE   Aravind Atkinson 61 y o  male MRN: [id number]  Unit/Bed#: -01 Encounter: [id number]  Reason for Consult: ESRD on HD    ASSESSMENT/PLAN:  1  ESRD on HD (MWF @ [address]): Last dialysis was on Friday  - HD x2hr 11/27, HD x4hr 11/28  - next HD Wednesday for usual treatment  2  Access: Right upper AVF  3  Hypertension: BP soft, monitor  4  Anemia: micera recently started at outpatient unit but hgb at goal here, will monitor without marci  5  Mineral and bone disease: renal diet  6  Possible UTI: checking culture, UA dirty  - on cefepime  7  Hyponatremia, chronic: monitor with fluid removal  8  Hyperkalemia: secondary to dietary indiscretion (bananas and oranges)  9  Persistent right lower lobe/middle lobe lobulated alveolar opacity with moderate right pleural effusion: ? Need for thoracentesis    SUBJECTIVE:  Patient seen on dialysis  Tolerating well  No acute complaints  Wife at bedside  Relates that she was giving patient BRAT diet due to diarrhea and her PCP instructed her over the phone to try [address]  Patient also had a craving for oranges so she gave him an orange also      OBJECTIVE:  Current Weight: Weight - Scale: 63 1 kg (139 lb 1 8 oz)  Vitals:    11/28/17 1000 11/28/17 1030 11/28/17 1100 11/28/17 1130   BP: (!) 124/34 105/51 (!) 101/43 99/57   Pulse: 70 72 70 78   Resp:       Temp:       TempSrc:       SpO2:       Weight:       Height:           Intake/Output Summary (Last 24 hours) at 11/28/17 1144  Last data filed at 11/28/17 0818   Gross per 24 hour   Intake             1090 ml   Output             2500 ml   Net            -1410 ml     General: NAD  Skin: no rash  HEENT: normocephalic atraumatic  Neck: supple  Chest: CTAB  Heart: RRR  Abdomen: soft, nt, nd  Extremities: no edema  Neuro: alert awake  Psych: mood and affect appropriate    Medications:    Current Facility-Administered Medications:     acetaminophen (TYLENOL) tablet 650 mg, 650 mg, Oral, Q6H PRN, Georges Grandchild Mehran House PA-C    b complex-vitamin C-folic acid (NEPHROCAPS) capsule 1 capsule, 1 capsule, Oral, Daily, Alexis Dinh PA-C    calcium carbonate (OYSTER SHELL,OSCAL) 500 mg tablet 1 tablet, 1 tablet, Oral, BID With Meals, Catina Brink PA-C    cefepime (MAXIPIME) 500 mg in sodium chloride 0 9 % 50 mL IVPB, 500 mg, Intravenous, Q24H, Alexis Dinh PA-C, Last Rate: 100 mL/hr at 11/27/17 2315, 500 mg at 11/27/17 2315    cinacalcet (SENSIPAR) tablet 30 mg, 30 mg, Oral, HS, Alexis Dinh PA-C, 30 mg at 11/27/17 2129    citalopram (CeleXA) tablet 10 mg, 10 mg, Oral, Daily, Catina Brink PA-C    diphenoxylate-atropine (LOMOTIL) 2 5-0 025 mg per tablet 1 tablet, 1 tablet, Oral, 4x Daily PRN, Alexis Dinh PA-C    gabapentin (NEURONTIN) capsule 300 mg, 300 mg, Oral, HS, Alexis Dinh PA-C, 300 mg at 11/27/17 2129    insulin detemir (LEVEMIR) subcutaneous injection 5 Units, 5 Units, Subcutaneous, HS, Alexis Dinh PA-C, 5 Units at 11/27/17 2337    insulin lispro (HumaLOG) 100 units/mL subcutaneous injection 1-5 Units, 1-5 Units, Subcutaneous, TID AC **AND** Fingerstick Glucose (POCT), , , TID AC, Alexis Dinh PA-C    insulin lispro (HumaLOG) 100 units/mL subcutaneous injection 1-5 Units, 1-5 Units, Subcutaneous, HS, Alexis Dinh PA-C    levothyroxine tablet 137 mcg, 137 mcg, Oral, Early Morning, Alexis Dinh PA-C, 137 mcg at 11/28/17 0507    metoprolol tartrate (LOPRESSOR) tablet 100 mg, 100 mg, Oral, BID, Alexis Dinh PA-C, 100 mg at 11/27/17 2315    ondansetron (ZOFRAN) injection 4 mg, 4 mg, Intravenous, Q6H PRN, Alexis Dinh PA-C    saccharomyces boulardii (FLORASTOR) capsule 250 mg, 250 mg, Oral, BID, Alexis Dinh PA-C, 250 mg at 11/27/17 2129    sevelamer (RENAGEL) tablet 800 mg, 800 mg, Oral, TID With Meals, Alexis Dinh PA-C    vancomycin (VANCOCIN) oral solution 125 mg, 125 mg, Oral, Daily, Vern Art PA-C    Laboratory Results:    Results from last 7 days  Lab Units 11/28/17  3461 11/28/17  0403 11/27/17  1708 11/27/17  1623 11/27/17  1140   WBC Thousand/uL 7 30  --   --   --  11 96*   HEMOGLOBIN g/dL 10 5*  --   --   --  11 1*   I STAT HEMOGLOBIN g/dl  --   --  12 6  --   --    HEMATOCRIT % 32 8*  --   --   --  34 7*   PLATELETS Thousands/uL 344  --   --   --  340   SODIUM mmol/L  --  126*  --  126*  --    POTASSIUM mmol/L  --  5 8*  --   --   --    CHLORIDE mmol/L  --  90*  --  91*  --    CO2 mmol/L  --  24  --  22  --    BUN mg/dL  --  55*  --  98*  --    CREATININE mg/dL  --  5 98*  --  8 74*  --    CALCIUM mg/dL  --  8 2*  --  8 6  --    MAGNESIUM mg/dL  --   --   --   --  2 1   ALBUMIN g/dL  --   --   --  2 3*  --    TOTAL PROTEIN g/dL  --   --   --  8 2  --    GLUCOSE RANDOM mg/dL  --  141*  --  209*  --    GLUCOSE, ISTAT mg/dl  --   --  199*  --   --

## 2017-11-28 NOTE — PHYSICAL THERAPY NOTE
Physical Therapy Cancellation Note       Chart reviewed  Evaluation and treatment cancelled today due to pt receiving hemodialysis  Will follow up and treat as appropriate       Marin JASSO

## 2017-11-28 NOTE — ASSESSMENT & PLAN NOTE
· Patient's wife reports recent alteration in his mental status since this past weekend  · Suspect this is multifactorial due to toxic metabolic abnormalities and infection  · CT scan of the head does not show any focal changes  · Avoid any narcotics or other agents that could further worsen his mental status including quinolones

## 2017-11-28 NOTE — ASSESSMENT & PLAN NOTE
· TSH noted to be very high greater than 40 in September  It seems at that time he was followed up by Endocrinology and placed on Synthroid 125 mcg  Recent TSH noted to be improved at 15 and he remains on higher dose Synthroid of 137 mcg   Recheck in 4 weeks

## 2017-11-28 NOTE — MALNUTRITION/BMI
This medical record reflects one or more clinical indicators suggestive of malnutrition and/or morbid obesity  Please indicate the one diagnosis below which you feel best reflects the clinical picture  Malnutrition Findings:   chronic illness  malnutrition of moderate degree    BMI Findings:  19-24 9    Body mass index is 22 45 kg/m²  See Nutrition note dated 11/28/2017 for additional details  Completed nutrition assessment is viewable in the nutrition documentation  Moderate malnutrition related to inadequate intake, poorly controlled DM as evidenced by depletion body fat and muscle, temporal indentation

## 2017-11-28 NOTE — PROGRESS NOTES
Progress Note - Chuy Fontaine 61 y o  male MRN: [id number]    Unit/Bed#: -01 Encounter: [id number]        Acute encephalopathy   Assessment & Plan    · Patient's wife reports recent alteration in his mental status since this past weekend  · Suspect this is multifactorial due to toxic metabolic abnormalities and infection  · CT scan of the head does not show any focal changes  · Avoid any narcotics or other agents that could further worsen his mental status including quinolones        Rapid atrial fibrillation ([address] )   Assessment & Plan    · Patient's wife reports that he was shocked out of AFib" successfully sometime ago and had been out of it and not followed up recently with Cardiology  · Noted that he has been back in rapid AFib with rates up to 170 this afternoon  He did not receive his morning metoprolol due to hypotension parameters  We will give the metoprolol immediately and also order IV Cardizem as needed per my discussion with my attending  · He remains on Coumadin (currently being held for elevated INR)  · May need to ask for cardiology consultation  · Would repeat 2D echocardiogram as it has been greater than 6 months  Previously a noted low ejection fraction of 35%        * UTI (urinary tract infection)   Assessment & Plan    · Patient with abnormal urinalysis, leukocytosis, altered mental status and reports of dysuria  · Based on prior cultures growing Pseudomonas he remains on IV cefepime day #2, renally dosed  I reviewed the case with Infectious Disease  Given his encephalopathy it would not be prudent to change him to a quinolone  · Await results of final urine culture  Clostridium difficile infection   Assessment & Plan    · Patient was at the point where he was tapered down on vancomycin to once daily dosing  However in the context of being back on IV antibiotics for urinary tract infection, ID recommends increasing him back to q i d  vancomycin    Three days after he completes his antibiotic for urinary tract infection, we can reinitiate the taper down on his vancomycin        Acquired hypothyroidism   Assessment & Plan    · TSH noted to be very high greater than 40 in September  It seems at that time he was followed up by Endocrinology and placed on Synthroid 125 mcg  Recent TSH noted to be improved at 15 and he remains on higher dose Synthroid of 137 mcg  Recheck in 4 weeks        Hyperkalemia   Assessment & Plan    · Renal managing via dialysis  Avoid high potassium diet        Chronic combined systolic and diastolic CHF (congestive heart failure) ([facility])   Assessment & Plan    · Volume status is being managed by Nephrology with dialysis  Today he had 2 kg removed  Monitor closely  Consider albumin if he remains tachycardic or if there is any concern of him being volume depleted        Elevated INR   Assessment & Plan    · Patient did receive a dose of vitamin K  INR has trended down  Follow CBC and INR in a m  [place] Continue to hold Coumadin for now  Type 1 diabetes mellitus with nephropathy ([facility] [address] )   Assessment & Plan    · Patient was reportedly hypoglycemic earlier today but now is elevated at 333  He is only on very low doses of Lantus and sliding scale insulin  · Monitor closely as he has prior history of significantly uncontrolled diabetes with DKA          ESRD (end stage renal disease) on dialysis [facility])   Assessment & Plan    · End-stage renal disease on hemodialysis  Received treatment today and is planned to have another 1 tomorrow    · It was noted that he had severe hyperkalemia >8 initially which is improving        S/P percutaneous endoscopic gastrostomy (PEG) tube placement [facility])   Assessment & Plan    · PEG tube remains in place, may need to reinstitute tube feedings if his intake is poor          Pharmacologic: Warfarin (Coumadin)  Mechanical VTE Prophylaxis in Place: No    Patient Centered Rounds: I have performed bedside rounds with nursing staff today     Discussions with Specialists or Other Care Team Provider: ID, renal, my attending; patient is overall quite complex and chronically ill     Education and Discussions with Family / Patient: wife at bedside    Time Spent for Care: 45 minutes  More than 50% of total time spent on counseling and coordination of care as described above  Current Length of Stay: 1 day(s)    Current Patient Status: Inpatient   Certification Statement: The patient will continue to require additional inpatient hospital stay due to rapid afib, iv cefepime    Discharge Plan / Estimated Discharge Date: not stable for dc; recently was home (not snf)      Code Status: Level 1 - Full Code      Subjective:   Notified by wife that patient is very confused  Patient himself cannot provide any appropriate history  He is not currently reporting any pain  He was noted to have a loose bowel movement in the bed  He has not been noted to have any bleeding  His blood sugar was low earlier but he 80 and it is now elevated in the room to 333  Patient's wife does state that he was not having any fevers at home but was definitely complaining of a lot of pain with urination  He has been more confused since the weekend  Objective:     Vitals:   Temp (24hrs), Av 9 °F (36 6 °C), Min:97 5 °F (36 4 °C), Max:98 2 °F (36 8 °C)    HR:  [56-82] 74  Resp:  [18] 18  BP: ()/(34-91) 160/75  SpO2:  [90 %-98 %] 95 %  Body mass index is 22 45 kg/m²  Input and Output Summary (last 24 hours): Intake/Output Summary (Last 24 hours) at 17 1443  Last data filed at 17 1208   Gross per 24 hour   Intake             1390 ml   Output             5256 ml   Net            -3866 ml       Physical Exam:     Physical Exam   Constitutional:   Appears older than stated age  When I entered the room the patient was lying perpendicular to the bed and clearly was altered  HENT:   Head: Normocephalic and atraumatic     Eyes: Conjunctivae are normal  Right eye exhibits no discharge  Left eye exhibits no discharge  No scleral icterus  Cardiovascular:   No murmur heard  Rapid AFib   Pulmonary/Chest: No respiratory distress  He has no wheezes  Limited effort provided   Abdominal: Soft  Bowel sounds are normal  He exhibits no distension  There is no tenderness  There is no rebound  PEG tube in place   Musculoskeletal: He exhibits deformity (BKA)  He exhibits no edema  Neurological:   On repeated attempts he kept telling me his current location as "home "  He was able to identify his wife in the room and tell me her name and tell me the current month as November and the last holiday as Thanksgiving  He was able to follow commands and had equal  strength  He had no tremor  He was however quite somnolent otherwise and had difficulty maintaining appropriate attention   Skin: Skin is warm and dry  No rash noted  He is not diaphoretic  No erythema  No pallor  Nursing note and vitals reviewed  Additional Data: labs, diagnostics, tele reviewed    Labs:      Results from last 7 days  Lab Units 11/28/17  0404   WBC Thousand/uL 7 30   HEMOGLOBIN g/dL 10 5*   HEMATOCRIT % 32 8*   PLATELETS Thousands/uL 344   NEUTROS PCT % 73   LYMPHS PCT % 11*   MONOS PCT % 11   EOS PCT % 4       Results from last 7 days  Lab Units 11/28/17  0403  11/27/17  1623   SODIUM mmol/L 126*  --  126*   POTASSIUM mmol/L 5 8*  --   --    CHLORIDE mmol/L 90*  --  91*   CO2 mmol/L 24  --  22   BUN mg/dL 55*  --  98*   CREATININE mg/dL 5 98*  --  8 74*   CALCIUM mg/dL 8 2*  --  8 6   TOTAL PROTEIN g/dL  --   --  8 2   BILIRUBIN TOTAL mg/dL  --   --  0 50   ALK PHOS U/L  --   --  183*   ALT U/L  --   --  24   AST U/L  --   --  30   GLUCOSE RANDOM mg/dL 141*  --  209*   GLUCOSE, ISTAT   --   < >  --    < > = values in this interval not displayed      Results from last 7 days  Lab Units 11/28/17  1030   INR  3 44*         Recent Cultures (last 7 days):       Results from last 7 days  Lab Units 11/27/17  1442   URINE CULTURE  Culture results to follow  Last 24 Hours Medication List:     b complex-vitamin C-folic acid 1 capsule Oral Daily   calcium carbonate 1 tablet Oral BID With Meals   cefepime 500 mg Intravenous Q24H   cholestyramine sugar free 4 g Oral BID   cinacalcet 30 mg Oral HS   citalopram 10 mg Oral Daily   gabapentin 300 mg Oral HS   insulin detemir 5 Units Subcutaneous HS   insulin lispro 1-5 Units Subcutaneous TID AC   insulin lispro 1-5 Units Subcutaneous HS   levothyroxine 137 mcg Oral Early Morning   menthol-zinc oxide  Topical BID   metoprolol tartrate 100 mg Oral BID   nystatin  Topical BID   saccharomyces boulardii 250 mg Oral BID   sevelamer 800 mg Oral TID With Meals   vancomycin 125 mg Oral Q6H [address]        Today, Patient Was Seen By: Farida Duenas PA-C    ** Please Note: Dragon 360 Dictation voice to text software may have been used in the creation of this document   **

## 2017-11-28 NOTE — ASSESSMENT & PLAN NOTE
· End-stage renal disease on hemodialysis  Received treatment today and is planned to have another 1 tomorrow    · It was noted that he had severe hyperkalemia >8 initially which is improving

## 2017-11-28 NOTE — ASSESSMENT & PLAN NOTE
· Patient with abnormal urinalysis, leukocytosis, altered mental status and reports of dysuria  · Based on prior cultures growing Pseudomonas he remains on IV cefepime day #2, renally dosed  I reviewed the case with Infectious Disease  Given his encephalopathy it would not be prudent to change him to a quinolone  · Await results of final urine culture

## 2017-11-28 NOTE — ASSESSMENT & PLAN NOTE
· Volume status is being managed by Nephrology with dialysis  Today he had 2 kg removed  Monitor closely    Consider albumin if he remains tachycardic or if there is any concern of him being volume depleted

## 2017-11-28 NOTE — ASSESSMENT & PLAN NOTE
· Patient's wife reports that he was shocked out of AFib" successfully sometime ago and had been out of it and not followed up recently with Cardiology  · Noted that he has been back in rapid AFib with rates up to 170 this afternoon  He did not receive his morning metoprolol due to hypotension parameters  We will give the metoprolol immediately and also order IV Cardizem as needed per my discussion with my attending  · He remains on Coumadin (currently being held for elevated INR)  · May need to ask for cardiology consultation  · Would repeat 2D echocardiogram as it has been greater than 6 months    Previously a noted low ejection fraction of 35%

## 2017-11-29 ENCOUNTER — GENERIC CONVERSION - ENCOUNTER (OUTPATIENT)
Dept: OTHER | Facility: OTHER | Age: 67
End: 2017-11-29

## 2017-11-29 ENCOUNTER — APPOINTMENT (INPATIENT)
Dept: CT IMAGING | Facility: HOSPITAL | Age: 67
DRG: 208 | End: 2017-11-29
Payer: COMMERCIAL

## 2017-11-29 ENCOUNTER — APPOINTMENT (INPATIENT)
Dept: NON INVASIVE DIAGNOSTICS | Facility: HOSPITAL | Age: 67
DRG: 208 | End: 2017-11-29
Payer: COMMERCIAL

## 2017-11-29 ENCOUNTER — APPOINTMENT (INPATIENT)
Dept: RADIOLOGY | Facility: HOSPITAL | Age: 67
DRG: 208 | End: 2017-11-29
Payer: COMMERCIAL

## 2017-11-29 PROBLEM — E87.1 HYPONATREMIA: Chronic | Status: ACTIVE | Noted: 2017-08-08

## 2017-11-29 PROBLEM — I50.42 CHRONIC COMBINED SYSTOLIC AND DIASTOLIC CHF (CONGESTIVE HEART FAILURE) (HCC): Chronic | Status: ACTIVE | Noted: 2017-06-16

## 2017-11-29 PROBLEM — I95.9 HYPOTENSION: Status: ACTIVE | Noted: 2017-11-29

## 2017-11-29 PROBLEM — R74.01 TRANSAMINITIS: Status: RESOLVED | Noted: 2017-06-16 | Resolved: 2017-11-29

## 2017-11-29 PROBLEM — E87.5 HYPERKALEMIA: Status: RESOLVED | Noted: 2017-11-28 | Resolved: 2017-11-29

## 2017-11-29 PROBLEM — L97.509 FOOT ULCER (HCC): Status: RESOLVED | Noted: 2017-02-18 | Resolved: 2017-11-29

## 2017-11-29 PROBLEM — G93.40 ENCEPHALOPATHY ACUTE: Status: RESOLVED | Noted: 2017-02-18 | Resolved: 2017-11-29

## 2017-11-29 PROBLEM — I10 HTN (HYPERTENSION): Status: RESOLVED | Noted: 2017-08-08 | Resolved: 2017-11-29

## 2017-11-29 PROBLEM — E11.10 DKA (DIABETIC KETOACIDOSES): Status: RESOLVED | Noted: 2017-09-17 | Resolved: 2017-11-29

## 2017-11-29 PROBLEM — Z71.89 GOALS OF CARE, COUNSELING/DISCUSSION: Status: RESOLVED | Noted: 2017-08-08 | Resolved: 2017-11-29

## 2017-11-29 PROBLEM — E87.1 HYPONATREMIA: Status: RESOLVED | Noted: 2017-09-24 | Resolved: 2017-11-29

## 2017-11-29 PROBLEM — D69.6 THROMBOCYTOPENIA (HCC): Status: RESOLVED | Noted: 2017-09-22 | Resolved: 2017-11-29

## 2017-11-29 PROBLEM — R79.1 ELEVATED INR: Status: RESOLVED | Noted: 2017-11-28 | Resolved: 2017-11-29

## 2017-11-29 PROBLEM — Z93.1 S/P PERCUTANEOUS ENDOSCOPIC GASTROSTOMY (PEG) TUBE PLACEMENT (HCC): Chronic | Status: ACTIVE | Noted: 2017-06-16

## 2017-11-29 PROBLEM — E87.2 METABOLIC ACIDOSIS, INCREASED ANION GAP: Status: RESOLVED | Noted: 2017-09-17 | Resolved: 2017-11-29

## 2017-11-29 PROBLEM — S81.801A WOUND OF RIGHT LEG: Status: RESOLVED | Noted: 2017-06-16 | Resolved: 2017-11-29

## 2017-11-29 LAB
ALBUMIN SERPL BCP-MCNC: 2.1 G/DL (ref 3.5–5)
ALP SERPL-CCNC: 171 U/L (ref 46–116)
ALT SERPL W P-5'-P-CCNC: 16 U/L (ref 12–78)
ANCILLARY VALUES: ABNORMAL
ANION GAP SERPL CALCULATED.3IONS-SCNC: 11 MMOL/L (ref 4–13)
ANION GAP SERPL CALCULATED.3IONS-SCNC: 11 MMOL/L (ref 4–13)
APTT PPP: 43 SECONDS (ref 23–35)
APTT PPP: 59 SECONDS (ref 23–35)
APTT PPP: 60 SECONDS (ref 23–35)
APTT PPP: 65 SECONDS (ref 23–35)
ARTERIAL PATENCY WRIST A: ABNORMAL
ARTERIAL PATENCY WRIST A: ABNORMAL
AST SERPL W P-5'-P-CCNC: 21 U/L (ref 5–45)
ATRIAL RATE: 258 BPM
ATRIAL RATE: 47 BPM
ATRIAL RATE: 97 BPM
BACTERIA UR CULT: ABNORMAL
BASE EXCESS BLDA CALC-SCNC: 0 MMOL/L (ref -2–3)
BASE EXCESS BLDA CALC-SCNC: 2 MMOL/L (ref -2–3)
BASOPHILS # BLD AUTO: 0.04 THOUSANDS/ΜL (ref 0–0.1)
BASOPHILS # BLD MANUAL: 0 THOUSAND/UL (ref 0–0.1)
BASOPHILS NFR BLD AUTO: 1 % (ref 0–1)
BASOPHILS NFR MAR MANUAL: 0 % (ref 0–1)
BILIRUB SERPL-MCNC: 0.6 MG/DL (ref 0.2–1)
BUN SERPL-MCNC: 41 MG/DL (ref 5–25)
BUN SERPL-MCNC: 41 MG/DL (ref 5–25)
C DIFF TOX GENS STL QL NAA+PROBE: NORMAL
CA-I BLD-SCNC: 0.93 MMOL/L (ref 1.12–1.32)
CA-I BLD-SCNC: 1.15 MMOL/L (ref 1.12–1.32)
CA-I BLD-SCNC: 1.18 MMOL/L (ref 1.12–1.32)
CALCIUM SERPL-MCNC: 8.4 MG/DL (ref 8.3–10.1)
CALCIUM SERPL-MCNC: 9.1 MG/DL (ref 8.3–10.1)
CHLORIDE SERPL-SCNC: 91 MMOL/L (ref 100–108)
CHLORIDE SERPL-SCNC: 92 MMOL/L (ref 100–108)
CO2 SERPL-SCNC: 22 MMOL/L (ref 21–32)
CO2 SERPL-SCNC: 25 MMOL/L (ref 21–32)
CREAT SERPL-MCNC: 4.51 MG/DL (ref 0.6–1.3)
CREAT SERPL-MCNC: 4.65 MG/DL (ref 0.6–1.3)
DS:DELIVERY SYSTEM: ABNORMAL
DS:DELIVERY SYSTEM: AC
EOSINOPHIL # BLD AUTO: 0.16 THOUSAND/ΜL (ref 0–0.61)
EOSINOPHIL # BLD MANUAL: 0.29 THOUSAND/UL (ref 0–0.4)
EOSINOPHIL NFR BLD AUTO: 2 % (ref 0–6)
EOSINOPHIL NFR BLD MANUAL: 3 % (ref 0–6)
ERYTHROCYTE [DISTWIDTH] IN BLOOD BY AUTOMATED COUNT: 15.4 % (ref 11.6–15.1)
ERYTHROCYTE [DISTWIDTH] IN BLOOD BY AUTOMATED COUNT: 15.6 % (ref 11.6–15.1)
ERYTHROCYTE [DISTWIDTH] IN BLOOD BY AUTOMATED COUNT: 15.9 % (ref 11.6–15.1)
FIO2 GAS DIL.REBREATH: 60 L
FIO2 GAS DIL.REBREATH: 80 L
FLUAV AG SPEC QL: NORMAL
FLUBV AG SPEC QL: NORMAL
GFR SERPL CREATININE-BSD FRML MDRD: 12 ML/MIN/1.73SQ M
GFR SERPL CREATININE-BSD FRML MDRD: 13 ML/MIN/1.73SQ M
GLUCOSE SERPL-MCNC: 117 MG/DL (ref 65–140)
GLUCOSE SERPL-MCNC: 128 MG/DL (ref 65–140)
GLUCOSE SERPL-MCNC: 130 MG/DL (ref 65–140)
GLUCOSE SERPL-MCNC: 131 MG/DL (ref 65–140)
GLUCOSE SERPL-MCNC: 132 MG/DL (ref 65–140)
GLUCOSE SERPL-MCNC: 145 MG/DL (ref 65–140)
GLUCOSE SERPL-MCNC: 158 MG/DL (ref 65–140)
GLUCOSE SERPL-MCNC: 167 MG/DL (ref 65–140)
GLUCOSE SERPL-MCNC: 180 MG/DL (ref 65–140)
GLUCOSE SERPL-MCNC: 186 MG/DL (ref 65–140)
GLUCOSE SERPL-MCNC: 239 MG/DL (ref 65–140)
GLUCOSE SERPL-MCNC: 250 MG/DL (ref 65–140)
GLUCOSE SERPL-MCNC: 253 MG/DL (ref 65–140)
GLUCOSE SERPL-MCNC: 288 MG/DL (ref 65–140)
GLUCOSE SERPL-MCNC: 31 MG/DL (ref 65–140)
GLUCOSE SERPL-MCNC: 41 MG/DL (ref 65–140)
GLUCOSE SERPL-MCNC: 62 MG/DL (ref 65–140)
GLUCOSE SERPL-MCNC: 75 MG/DL (ref 65–140)
GLUCOSE SERPL-MCNC: 78 MG/DL (ref 65–140)
GLUCOSE SERPL-MCNC: 82 MG/DL (ref 65–140)
GLUCOSE SERPL-MCNC: 83 MG/DL (ref 65–140)
GLUCOSE SERPL-MCNC: 83 MG/DL (ref 65–140)
GLUCOSE SERPL-MCNC: 84 MG/DL (ref 65–140)
GLUCOSE SERPL-MCNC: 92 MG/DL (ref 65–140)
GLUCOSE SERPL-MCNC: 99 MG/DL (ref 65–140)
GLUCOSE SERPL-MCNC: <20 MG/DL (ref 65–140)
HCO3 BLDA-SCNC: 25.6 MMOL/L (ref 22–28)
HCO3 BLDA-SCNC: 27.7 MMOL/L (ref 22–28)
HCT VFR BLD AUTO: 29.8 % (ref 36.5–49.3)
HCT VFR BLD AUTO: 33.2 % (ref 36.5–49.3)
HCT VFR BLD AUTO: 35.5 % (ref 36.5–49.3)
HCT VFR BLD CALC: 31 % (ref 36.5–49.3)
HCT VFR BLD CALC: 33 % (ref 36.5–49.3)
HGB BLD-MCNC: 10.5 G/DL (ref 12–17)
HGB BLD-MCNC: 11.3 G/DL (ref 12–17)
HGB BLD-MCNC: 9.2 G/DL (ref 12–17)
HGB BLDA-MCNC: 10.5 G/DL (ref 12–17)
HGB BLDA-MCNC: 11.2 G/DL (ref 12–17)
INR PPP: 1.42 (ref 0.86–1.16)
INR PPP: 1.47 (ref 0.86–1.16)
INR PPP: 1.58 (ref 0.86–1.16)
LACTATE SERPL-SCNC: 1.8 MMOL/L (ref 0.5–2)
LG PLATELETS BLD QL SMEAR: PRESENT
LYMPHOCYTES # BLD AUTO: 0.67 THOUSAND/UL (ref 0.6–4.47)
LYMPHOCYTES # BLD AUTO: 0.89 THOUSANDS/ΜL (ref 0.6–4.47)
LYMPHOCYTES # BLD AUTO: 7 % (ref 14–44)
LYMPHOCYTES NFR BLD AUTO: 11 % (ref 14–44)
MAGNESIUM SERPL-MCNC: 2 MG/DL (ref 1.6–2.6)
MCH RBC QN AUTO: 29.1 PG (ref 26.8–34.3)
MCH RBC QN AUTO: 29.4 PG (ref 26.8–34.3)
MCH RBC QN AUTO: 29.4 PG (ref 26.8–34.3)
MCHC RBC AUTO-ENTMCNC: 30.9 G/DL (ref 31.4–37.4)
MCHC RBC AUTO-ENTMCNC: 31.6 G/DL (ref 31.4–37.4)
MCHC RBC AUTO-ENTMCNC: 31.8 G/DL (ref 31.4–37.4)
MCV RBC AUTO: 92 FL (ref 82–98)
MCV RBC AUTO: 93 FL (ref 82–98)
MCV RBC AUTO: 95 FL (ref 82–98)
METAMYELOCYTES NFR BLD MANUAL: 1 % (ref 0–1)
MONOCYTES # BLD AUTO: 1.54 THOUSAND/UL (ref 0–1.22)
MONOCYTES # BLD AUTO: 1.67 THOUSAND/ΜL (ref 0.17–1.22)
MONOCYTES NFR BLD AUTO: 21 % (ref 4–12)
MONOCYTES NFR BLD: 16 % (ref 4–12)
NEUTROPHILS # BLD AUTO: 5.34 THOUSANDS/ΜL (ref 1.85–7.62)
NEUTROPHILS # BLD MANUAL: 7.02 THOUSAND/UL (ref 1.85–7.62)
NEUTS BAND NFR BLD MANUAL: 2 % (ref 0–8)
NEUTS SEG NFR BLD AUTO: 65 % (ref 43–75)
NEUTS SEG NFR BLD AUTO: 71 % (ref 43–75)
P AXIS: 44 DEGREES
PCO2 BLD: 27 MMOL/L (ref 21–32)
PCO2 BLD: 29 MMOL/L (ref 21–32)
PCO2 BLD: 43.5 MM HG (ref 36–44)
PCO2 BLD: 45.5 MM HG (ref 36–44)
PH BLD: 7.38 [PH] (ref 7.35–7.45)
PH BLD: 7.39 [PH] (ref 7.35–7.45)
PHOSPHATE SERPL-MCNC: 5.2 MG/DL (ref 2.3–4.1)
PLATELET # BLD AUTO: 285 THOUSANDS/UL (ref 149–390)
PLATELET # BLD AUTO: 353 THOUSANDS/UL (ref 149–390)
PLATELET # BLD AUTO: 416 THOUSANDS/UL (ref 149–390)
PLATELET BLD QL SMEAR: ADEQUATE
PMV BLD AUTO: 9 FL (ref 8.9–12.7)
PMV BLD AUTO: 9.1 FL (ref 8.9–12.7)
PMV BLD AUTO: 9.4 FL (ref 8.9–12.7)
PO2 BLD: 177 MM HG (ref 75–129)
PO2 BLD: 46 MM HG (ref 75–129)
POTASSIUM BLD-SCNC: 3.6 MMOL/L (ref 3.5–5.3)
POTASSIUM BLD-SCNC: 4.2 MMOL/L (ref 3.5–5.3)
POTASSIUM SERPL-SCNC: 3.7 MMOL/L (ref 3.5–5.3)
POTASSIUM SERPL-SCNC: 3.7 MMOL/L (ref 3.5–5.3)
PR INTERVAL: 152 MS
PRESSURE SETTING: 5
PROT SERPL-MCNC: 7.8 G/DL (ref 6.4–8.2)
PROTHROMBIN TIME: 17.8 SECONDS (ref 12.1–14.4)
PROTHROMBIN TIME: 18.3 SECONDS (ref 12.1–14.4)
PROTHROMBIN TIME: 19.4 SECONDS (ref 12.1–14.4)
QRS AXIS: -10 DEGREES
QRS AXIS: -35 DEGREES
QRS AXIS: 142 DEGREES
QRSD INTERVAL: 102 MS
QRSD INTERVAL: 106 MS
QRSD INTERVAL: 96 MS
QT INTERVAL: 366 MS
QT INTERVAL: 412 MS
QT INTERVAL: 474 MS
QTC INTERVAL: 419 MS
QTC INTERVAL: 467 MS
QTC INTERVAL: 495 MS
RBC # BLD AUTO: 3.13 MILLION/UL (ref 3.88–5.62)
RBC # BLD AUTO: 3.57 MILLION/UL (ref 3.88–5.62)
RBC # BLD AUTO: 3.88 MILLION/UL (ref 3.88–5.62)
RESPIRATORY RATE: 0
RESPIRATORY RATE: 12
RSV B RNA SPEC QL NAA+PROBE: NORMAL
SAMPLE SITE: ABNORMAL
SAMPLE SITE: ABNORMAL
SAO2 % BLD FROM PO2: 100 % (ref 95–98)
SAO2 % BLD FROM PO2: 81 % (ref 95–98)
SODIUM BLD-SCNC: 127 MMOL/L (ref 136–145)
SODIUM BLD-SCNC: 127 MMOL/L (ref 136–145)
SODIUM SERPL-SCNC: 125 MMOL/L (ref 136–145)
SODIUM SERPL-SCNC: 127 MMOL/L (ref 136–145)
SPECIMEN SOURCE: ABNORMAL
SPECIMEN SOURCE: ABNORMAL
T WAVE AXIS: -48 DEGREES
T WAVE AXIS: 136 DEGREES
T WAVE AXIS: 141 DEGREES
T4 FREE SERPL-MCNC: 0.83 NG/DL (ref 0.76–1.46)
TOTAL CELLS COUNTED SPEC: 100
TROPONIN I SERPL-MCNC: <0.02 NG/ML
TSH SERPL DL<=0.05 MIU/L-ACNC: 22.35 UIU/ML (ref 0.36–3.74)
VENTILATION VALUE: 450
VENTRICULAR RATE: 47 BPM
VENTRICULAR RATE: 87 BPM
VENTRICULAR RATE: 98 BPM
WBC # BLD AUTO: 7.29 THOUSAND/UL (ref 4.31–10.16)
WBC # BLD AUTO: 8.1 THOUSAND/UL (ref 4.31–10.16)
WBC # BLD AUTO: 9.61 THOUSAND/UL (ref 4.31–10.16)

## 2017-11-29 PROCEDURE — 84132 ASSAY OF SERUM POTASSIUM: CPT

## 2017-11-29 PROCEDURE — 85610 PROTHROMBIN TIME: CPT | Performed by: NURSE PRACTITIONER

## 2017-11-29 PROCEDURE — 82803 BLOOD GASES ANY COMBINATION: CPT

## 2017-11-29 PROCEDURE — 85610 PROTHROMBIN TIME: CPT | Performed by: PHYSICIAN ASSISTANT

## 2017-11-29 PROCEDURE — 3E0G76Z INTRODUCTION OF NUTRITIONAL SUBSTANCE INTO UPPER GI, VIA NATURAL OR ARTIFICIAL OPENING: ICD-10-PCS | Performed by: HOSPITALIST

## 2017-11-29 PROCEDURE — 94664 DEMO&/EVAL PT USE INHALER: CPT

## 2017-11-29 PROCEDURE — 84443 ASSAY THYROID STIM HORMONE: CPT | Performed by: NURSE PRACTITIONER

## 2017-11-29 PROCEDURE — 85025 COMPLETE CBC W/AUTO DIFF WBC: CPT | Performed by: PHYSICIAN ASSISTANT

## 2017-11-29 PROCEDURE — 82948 REAGENT STRIP/BLOOD GLUCOSE: CPT

## 2017-11-29 PROCEDURE — 94002 VENT MGMT INPAT INIT DAY: CPT

## 2017-11-29 PROCEDURE — 94760 N-INVAS EAR/PLS OXIMETRY 1: CPT

## 2017-11-29 PROCEDURE — 85730 THROMBOPLASTIN TIME PARTIAL: CPT | Performed by: INTERNAL MEDICINE

## 2017-11-29 PROCEDURE — 85730 THROMBOPLASTIN TIME PARTIAL: CPT | Performed by: NURSE PRACTITIONER

## 2017-11-29 PROCEDURE — 87040 BLOOD CULTURE FOR BACTERIA: CPT | Performed by: NURSE PRACTITIONER

## 2017-11-29 PROCEDURE — 36600 WITHDRAWAL OF ARTERIAL BLOOD: CPT

## 2017-11-29 PROCEDURE — 87205 SMEAR GRAM STAIN: CPT | Performed by: NURSE PRACTITIONER

## 2017-11-29 PROCEDURE — 70450 CT HEAD/BRAIN W/O DYE: CPT

## 2017-11-29 PROCEDURE — 0BH17EZ INSERTION OF ENDOTRACHEAL AIRWAY INTO TRACHEA, VIA NATURAL OR ARTIFICIAL OPENING: ICD-10-PCS | Performed by: HOSPITALIST

## 2017-11-29 PROCEDURE — 83735 ASSAY OF MAGNESIUM: CPT | Performed by: NURSE PRACTITIONER

## 2017-11-29 PROCEDURE — 82330 ASSAY OF CALCIUM: CPT

## 2017-11-29 PROCEDURE — 85014 HEMATOCRIT: CPT

## 2017-11-29 PROCEDURE — 87147 CULTURE TYPE IMMUNOLOGIC: CPT | Performed by: NURSE PRACTITIONER

## 2017-11-29 PROCEDURE — 94640 AIRWAY INHALATION TREATMENT: CPT

## 2017-11-29 PROCEDURE — 93005 ELECTROCARDIOGRAM TRACING: CPT | Performed by: NURSE PRACTITIONER

## 2017-11-29 PROCEDURE — 82330 ASSAY OF CALCIUM: CPT | Performed by: NURSE PRACTITIONER

## 2017-11-29 PROCEDURE — 94150 VITAL CAPACITY TEST: CPT

## 2017-11-29 PROCEDURE — 87081 CULTURE SCREEN ONLY: CPT | Performed by: NURSE PRACTITIONER

## 2017-11-29 PROCEDURE — 84100 ASSAY OF PHOSPHORUS: CPT | Performed by: NURSE PRACTITIONER

## 2017-11-29 PROCEDURE — 84295 ASSAY OF SERUM SODIUM: CPT

## 2017-11-29 PROCEDURE — 83605 ASSAY OF LACTIC ACID: CPT | Performed by: NURSE PRACTITIONER

## 2017-11-29 PROCEDURE — 82947 ASSAY GLUCOSE BLOOD QUANT: CPT

## 2017-11-29 PROCEDURE — 71010 HB CHEST X-RAY 1 VIEW FRONTAL (PORTABLE): CPT

## 2017-11-29 PROCEDURE — 87070 CULTURE OTHR SPECIMN AEROBIC: CPT | Performed by: NURSE PRACTITIONER

## 2017-11-29 PROCEDURE — 93306 TTE W/DOPPLER COMPLETE: CPT

## 2017-11-29 PROCEDURE — 84439 ASSAY OF FREE THYROXINE: CPT | Performed by: NURSE PRACTITIONER

## 2017-11-29 PROCEDURE — 5A1945Z RESPIRATORY VENTILATION, 24-96 CONSECUTIVE HOURS: ICD-10-PCS | Performed by: HOSPITALIST

## 2017-11-29 PROCEDURE — 85027 COMPLETE CBC AUTOMATED: CPT | Performed by: NURSE PRACTITIONER

## 2017-11-29 PROCEDURE — 85007 BL SMEAR W/DIFF WBC COUNT: CPT | Performed by: NURSE PRACTITIONER

## 2017-11-29 PROCEDURE — 93005 ELECTROCARDIOGRAM TRACING: CPT

## 2017-11-29 PROCEDURE — 80053 COMPREHEN METABOLIC PANEL: CPT | Performed by: NURSE PRACTITIONER

## 2017-11-29 PROCEDURE — 80048 BASIC METABOLIC PNL TOTAL CA: CPT | Performed by: PHYSICIAN ASSISTANT

## 2017-11-29 PROCEDURE — 84484 ASSAY OF TROPONIN QUANT: CPT | Performed by: NURSE PRACTITIONER

## 2017-11-29 RX ORDER — MIDAZOLAM HYDROCHLORIDE 1 MG/ML
1 INJECTION INTRAMUSCULAR; INTRAVENOUS EVERY 4 HOURS PRN
Status: DISCONTINUED | OUTPATIENT
Start: 2017-11-29 | End: 2017-11-30

## 2017-11-29 RX ORDER — DEXTROSE MONOHYDRATE 25 G/50ML
INJECTION, SOLUTION INTRAVENOUS
Status: COMPLETED
Start: 2017-11-29 | End: 2017-11-29

## 2017-11-29 RX ORDER — ETOMIDATE 2 MG/ML
INJECTION INTRAVENOUS CODE/TRAUMA/SEDATION MEDICATION
Status: DISCONTINUED | OUTPATIENT
Start: 2017-11-29 | End: 2017-12-07 | Stop reason: HOSPADM

## 2017-11-29 RX ORDER — DEXTROSE MONOHYDRATE 100 MG/ML
20 INJECTION, SOLUTION INTRAVENOUS CONTINUOUS
Status: DISCONTINUED | OUTPATIENT
Start: 2017-11-29 | End: 2017-11-29

## 2017-11-29 RX ORDER — LEVALBUTEROL 1.25 MG/.5ML
1.25 SOLUTION, CONCENTRATE RESPIRATORY (INHALATION)
Status: DISCONTINUED | OUTPATIENT
Start: 2017-11-29 | End: 2017-11-30

## 2017-11-29 RX ORDER — MIDAZOLAM HYDROCHLORIDE 1 MG/ML
INJECTION INTRAMUSCULAR; INTRAVENOUS CODE/TRAUMA/SEDATION MEDICATION
Status: DISCONTINUED | OUTPATIENT
Start: 2017-11-29 | End: 2017-12-07 | Stop reason: HOSPADM

## 2017-11-29 RX ORDER — SODIUM CHLORIDE FOR INHALATION 0.9 %
3 VIAL, NEBULIZER (ML) INHALATION
Status: DISCONTINUED | OUTPATIENT
Start: 2017-11-29 | End: 2017-11-30

## 2017-11-29 RX ORDER — METOPROLOL TARTRATE 5 MG/5ML
2.5 INJECTION INTRAVENOUS EVERY 6 HOURS PRN
Status: DISCONTINUED | OUTPATIENT
Start: 2017-11-29 | End: 2017-12-07 | Stop reason: HOSPADM

## 2017-11-29 RX ORDER — HEPARIN SODIUM 10000 [USP'U]/100ML
3-20 INJECTION, SOLUTION INTRAVENOUS
Status: DISCONTINUED | OUTPATIENT
Start: 2017-11-29 | End: 2017-12-06

## 2017-11-29 RX ORDER — FENTANYL CITRATE 50 UG/ML
50 INJECTION, SOLUTION INTRAMUSCULAR; INTRAVENOUS EVERY 2 HOUR PRN
Status: DISCONTINUED | OUTPATIENT
Start: 2017-11-29 | End: 2017-11-30

## 2017-11-29 RX ORDER — ATROPINE SULFATE 1 MG/ML
INJECTION, SOLUTION INTRAMUSCULAR; INTRAVENOUS; SUBCUTANEOUS
Status: COMPLETED
Start: 2017-11-29 | End: 2017-11-29

## 2017-11-29 RX ORDER — FENTANYL CITRATE 50 UG/ML
INJECTION, SOLUTION INTRAMUSCULAR; INTRAVENOUS CODE/TRAUMA/SEDATION MEDICATION
Status: DISCONTINUED | OUTPATIENT
Start: 2017-11-29 | End: 2017-12-07 | Stop reason: HOSPADM

## 2017-11-29 RX ORDER — CHLORHEXIDINE GLUCONATE 0.12 MG/ML
15 RINSE ORAL EVERY 12 HOURS SCHEDULED
Status: DISCONTINUED | OUTPATIENT
Start: 2017-11-29 | End: 2017-12-01

## 2017-11-29 RX ORDER — FENTANYL CITRATE 50 UG/ML
INJECTION, SOLUTION INTRAMUSCULAR; INTRAVENOUS
Status: COMPLETED
Start: 2017-11-29 | End: 2017-11-29

## 2017-11-29 RX ORDER — MIDAZOLAM HYDROCHLORIDE 1 MG/ML
INJECTION INTRAMUSCULAR; INTRAVENOUS
Status: COMPLETED
Start: 2017-11-29 | End: 2017-11-29

## 2017-11-29 RX ORDER — HEPARIN SODIUM 1000 [USP'U]/ML
3600 INJECTION, SOLUTION INTRAVENOUS; SUBCUTANEOUS AS NEEDED
Status: DISCONTINUED | OUTPATIENT
Start: 2017-11-29 | End: 2017-12-06

## 2017-11-29 RX ORDER — ALBUTEROL SULFATE 2.5 MG/3ML
2.5 SOLUTION RESPIRATORY (INHALATION) EVERY 6 HOURS PRN
Status: DISCONTINUED | OUTPATIENT
Start: 2017-11-29 | End: 2017-12-03

## 2017-11-29 RX ORDER — LORAZEPAM 2 MG/ML
INJECTION INTRAMUSCULAR
Status: DISPENSED
Start: 2017-11-29 | End: 2017-11-29

## 2017-11-29 RX ORDER — HEPARIN SODIUM 1000 [USP'U]/ML
1800 INJECTION, SOLUTION INTRAVENOUS; SUBCUTANEOUS AS NEEDED
Status: DISCONTINUED | OUTPATIENT
Start: 2017-11-29 | End: 2017-12-06

## 2017-11-29 RX ORDER — LORAZEPAM 2 MG/ML
1 INJECTION INTRAMUSCULAR ONCE
Status: COMPLETED | OUTPATIENT
Start: 2017-11-29 | End: 2017-11-29

## 2017-11-29 RX ORDER — AMIODARONE HYDROCHLORIDE 200 MG/1
200 TABLET ORAL
Status: DISCONTINUED | OUTPATIENT
Start: 2017-11-30 | End: 2017-11-30

## 2017-11-29 RX ADMIN — LEVALBUTEROL 1.25 MG: 1.25 SOLUTION, CONCENTRATE RESPIRATORY (INHALATION) at 09:17

## 2017-11-29 RX ADMIN — FENTANYL CITRATE 50 MCG/HR: 50 INJECTION, SOLUTION INTRAMUSCULAR; INTRAVENOUS at 06:14

## 2017-11-29 RX ADMIN — METOPROLOL TARTRATE 50 MG: 50 TABLET ORAL at 01:27

## 2017-11-29 RX ADMIN — ETOMIDATE 20 MG: 2 INJECTION, SOLUTION INTRAVENOUS at 05:06

## 2017-11-29 RX ADMIN — HEPARIN SODIUM 1800 UNITS: 1000 INJECTION, SOLUTION INTRAVENOUS; SUBCUTANEOUS at 14:43

## 2017-11-29 RX ADMIN — FENTANYL CITRATE 50 MCG: 50 INJECTION INTRAMUSCULAR; INTRAVENOUS at 06:39

## 2017-11-29 RX ADMIN — CHLORHEXIDINE GLUCONATE 15 ML: 1.2 RINSE ORAL at 13:30

## 2017-11-29 RX ADMIN — VANCOMYCIN 125 MG: KIT at 18:12

## 2017-11-29 RX ADMIN — Medication 20 MG: at 13:30

## 2017-11-29 RX ADMIN — ISODIUM CHLORIDE 3 ML: 0.03 SOLUTION RESPIRATORY (INHALATION) at 13:36

## 2017-11-29 RX ADMIN — MIDAZOLAM HYDROCHLORIDE 2 MG: 1 INJECTION, SOLUTION INTRAMUSCULAR; INTRAVENOUS at 05:18

## 2017-11-29 RX ADMIN — DEXTROSE MONOHYDRATE 25 ML: 500 INJECTION PARENTERAL at 08:23

## 2017-11-29 RX ADMIN — ANORECTAL OINTMENT: 15.7; .44; 24; 20.6 OINTMENT TOPICAL at 18:12

## 2017-11-29 RX ADMIN — DEXTROSE MONOHYDRATE 25 ML: 500 INJECTION PARENTERAL at 08:26

## 2017-11-29 RX ADMIN — ISODIUM CHLORIDE 3 ML: 0.03 SOLUTION RESPIRATORY (INHALATION) at 09:17

## 2017-11-29 RX ADMIN — HEPARIN SODIUM 12 UNITS/KG/HR: 10000 INJECTION, SOLUTION INTRAVENOUS at 07:17

## 2017-11-29 RX ADMIN — FENTANYL CITRATE 50 MCG: 50 INJECTION INTRAMUSCULAR; INTRAVENOUS at 05:12

## 2017-11-29 RX ADMIN — DEXTROSE MONOHYDRATE 25 ML: 500 INJECTION PARENTERAL at 12:02

## 2017-11-29 RX ADMIN — ATROPINE SULFATE 0.5 MG: 1 INJECTION, SOLUTION INTRAMUSCULAR; INTRAVENOUS; SUBCUTANEOUS at 13:29

## 2017-11-29 RX ADMIN — LEVALBUTEROL 1.25 MG: 1.25 SOLUTION, CONCENTRATE RESPIRATORY (INHALATION) at 13:36

## 2017-11-29 RX ADMIN — SODIUM CHLORIDE 250 ML: 0.9 INJECTION, SOLUTION INTRAVENOUS at 07:14

## 2017-11-29 RX ADMIN — AMIODARONE HYDROCHLORIDE 0.5 MG/MIN: 50 INJECTION, SOLUTION INTRAVENOUS at 02:41

## 2017-11-29 RX ADMIN — VANCOMYCIN 125 MG: KIT at 13:31

## 2017-11-29 RX ADMIN — DILTIAZEM HYDROCHLORIDE 15 MG: 5 INJECTION INTRAVENOUS at 04:24

## 2017-11-29 RX ADMIN — FENTANYL CITRATE 50 MCG: 50 INJECTION INTRAMUSCULAR; INTRAVENOUS at 05:58

## 2017-11-29 RX ADMIN — NYSTATIN: 100000 POWDER TOPICAL at 18:13

## 2017-11-29 RX ADMIN — CEFEPIME HYDROCHLORIDE 500 MG: 1 INJECTION, POWDER, FOR SOLUTION INTRAMUSCULAR; INTRAVENOUS at 19:30

## 2017-11-29 RX ADMIN — VANCOMYCIN 125 MG: KIT at 01:30

## 2017-11-29 RX ADMIN — LORAZEPAM 1 MG: 2 INJECTION INTRAMUSCULAR; INTRAVENOUS at 09:56

## 2017-11-29 RX ADMIN — DEXTROSE MONOHYDRATE 50 ML: 25 INJECTION, SOLUTION INTRAVENOUS at 06:12

## 2017-11-29 RX ADMIN — CHOLESTYRAMINE 4 G: 4 POWDER, FOR SUSPENSION ORAL at 21:26

## 2017-11-29 RX ADMIN — MIDAZOLAM HYDROCHLORIDE: 1 INJECTION, SOLUTION INTRAMUSCULAR; INTRAVENOUS at 05:59

## 2017-11-29 RX ADMIN — LEVALBUTEROL 1.25 MG: 1.25 SOLUTION, CONCENTRATE RESPIRATORY (INHALATION) at 19:23

## 2017-11-29 RX ADMIN — GABAPENTIN 300 MG: 300 CAPSULE ORAL at 21:26

## 2017-11-29 RX ADMIN — ISODIUM CHLORIDE 3 ML: 0.03 SOLUTION RESPIRATORY (INHALATION) at 19:23

## 2017-11-29 RX ADMIN — DEXTROSE 20 ML/HR: 10 SOLUTION INTRAVENOUS at 07:24

## 2017-11-29 RX ADMIN — MIDAZOLAM HYDROCHLORIDE 2 MG: 1 INJECTION, SOLUTION INTRAMUSCULAR; INTRAVENOUS at 07:52

## 2017-11-29 RX ADMIN — CHLORHEXIDINE GLUCONATE 15 ML: 1.2 RINSE ORAL at 21:26

## 2017-11-29 RX ADMIN — Medication 250 MG: at 19:28

## 2017-11-29 NOTE — RAPID RESPONSE
Accept/Progress Note - Rapid Response   Nadir Danielle 61 y o  male MRN: [id number]    Time Called ( Time): 0307  Room#: 810  STAN Time ( Time): 8281  Event End Time ( Time): 1338  MEWS score at time of Rapid Response:   Primary reason for call: Acute change in mental status  Interventions:  Airway/Breathing:  Intubated, Bag Mask and O2 Mask/Nasal  Circulation: IV Fluid Bolus  Other Treatments: N/A       Assessment/plan:  1  Encephalopathy toxic metabolic  CT of the head negative -continue neuro checks, daily sedation holiday, fentanyl drip at 50 mics per hour, p r n  fentanyl and Versed  2  AFib with RVR now rate controlled-DC to Lopressor given mild hypotension, continue amiodarone drip at 0 5 mg per minute, Cardiology following, follow up with EKG, INR now 1 4 start heparin drip with eventual transition back to Coumadin  3  Chronic combined CHF  Echo in April with an EF of 54% grade 2 diastolic dysfunction with inferior lateral basal on septal akinesis, repeat echo pending, Cardiology following the  4  Acute hypoxic respiratory failure secondary to encephalopathy and Hcap-maintain mechanical ventilation AC 64238/5/100%, wean on FiO2 for sats greater than 92%, VAP bundle, nebs, repeat ABG now  5  Hcap  Urine strep negative -check urine Legionella and blood cultures, sputum culture, MR , continue cefepime per HD dose, hold on IV vancomycin given C diff less further decompensation  6  Status post PEG  7  End-stage renal disease on HD Monday Wednesday Friday-Renal following last HD 11/28 with 1 4 kilos removed  8  Possible UTI-follow up urine culture on cefepime  9  History of C diff-p o  Vanco was on taper prior given antibiotics increased back to usual dosing  10  Chronic hyponatremia-daily BMPs, Renal following  11  Hypoglycemia with DM type 1-insulin drip DC 8, glucose is q 1 hour, given amp of dextrose now again, and start the 10 infusion is at 20 cc/hour  12   Hypothyroidism TSH is 15-home Synthroid, follow-up with free T4         HPI/Chief Complaint (Background/Situation):   Faye Castellanos is a 61y o  year old male past medical history DM 1, end-stage renal disease on HD, chronic combined CHF, AFib on Coumadin, history of aortic valve endocarditis, history of C diff currently on a Vanco taper who presented on 11/27 with change in mental status  CT of the head was negative  CT of the abdomen and pelvis revealed moderate right pleural effusion with adjacent opacity  Chest x-ray with right middle lobe lower lobe opacity and pleural effusion  Patient was placed on cefepime and p  o  Vanco dosing increased  Patient was rapid response earlier in the night due to an episode of unresponsiveness questionable secondary to hypotension while receiving amiodarone bolus  EKG was unremarkable  Telemetry was reviewed with no events  Neuro was nonfocal on arrival patient was confused with per wife is his baseline however it has been worse lately  Patient was left on the 4  Labs revealed a glucose was 700 without an anion gap   8 units of regular insulin and Insulin drip was started  Another rapid response was called this morning secondary to patient being unresponsive and diaphoretic  Blood sugar was less than 20  Insulin drip was stopped  Amp of dextrose was given  Blood sugar improved to 123  Oxygen saturations were noted to be 77% patient was placed on a non-rebreather  However patient GCS remained 3 and was intubated for airway protection  CT of the head was repeated which was negative  Labs were sent    The in speaking with the nursing staff patient's blood sugar was 83 at 0 300 in which the insulin drip was decreased to 0 3 units/hour  Patient was noted to be restless at 4:30 a m  when staff was in the room  However when went in to check blood sugar at 0-500 and lab work patient was noted to be unresponsive and diaphoretic          Historical Information   Past Medical History: Diagnosis Date    Acquired hypothyroidism     underactive    Atrial fibrillation (HCC)     Chronic kidney disease-mineral and bone disorder 11/27/2017    Clostridium difficile infection 04/2017    Diabetes mellitus ([place] [id number] )     Dialysis patient ([place] [address] )     Diarrhea     ESRD (end stage renal disease) on dialysis ([facility] )     Hx of cardiac arrest     Hypertension     Neuropathy     Right lower lobe pneumonia ([facility] ) 2/20/2017    Sepsis ([place] [address] ) 04/2017    Polymicrobial MRSA, VRE    Systolic and diastolic CHF, chronic (HCC)     EF 35%, Grade II DD     Past Surgical History:   Procedure Laterality Date    CATARACT EXTRACTION      CHG GI ENDOSCOPIC ULTRASOUND N/A 3/10/2016    Procedure: LINEAR ENDOSCOPIC U/S;  Surgeon: Mitra Alston MD;  Location: [facility]; Service: Gastroenterology    CHOLECYSTECTOMY      GASTROSTOMY TUBE PLACEMENT N/A 4/12/2017    Procedure: INSERTION PEG TUBE;  Surgeon: Emanuel Serrano MD;  Location[facility];  Service:    [facility] LEG AMPUTATION THROUGH LOWER TIBIA AND FIBULA Right 4/6/2017    Procedure: AMPUTATION BELOW KNEE (BKA);   Surgeon: Licha Hamilton DO;  [facility];  Service:     TOE AMPUTATION  2000     Social History   History   Alcohol Use No     History   Drug Use No     History   Smoking Status    Former Smoker    Packs/day: 1 00    Years: 46 00    Types: Cigarettes    Start date: 5    Quit date: 3/1/2017   Smokeless Tobacco    Never Used     Family History: non-contributory    Meds/Allergies     b complex-vitamin C-folic acid 1 capsule Oral Daily   [MAR Hold] cefepime 500 mg Intravenous Q24H   [MAR Hold] chlorhexidine 15 mL Swish & Spit Q12H [address]   [MAR Hold] cholestyramine sugar free 4 g Oral BID   cinacalcet 30 mg Oral HS   [MAR Hold] citalopram 10 mg Oral Daily   dextrose      dextrose      gabapentin 300 mg Oral HS   levothyroxine 137 mcg Oral Early Morning   [MAR Hold] menthol-zinc oxide  Topical BID   [MAR Hold] nystatin  Topical BID   [MAR Hold] omeprazole (PRILOSEC) suspension 2 mg/mL 20 mg Oral Daily   saccharomyces boulardii 250 mg Oral BID   [MAR Hold] vancomycin 125 mg Oral Q6H [facility]         amiodarone 0 5 mg/min Last Rate: 0 5 mg/min (11/29/17 0241)   fentaNYL 50 mcg/hr Last Rate: 50 mcg/hr (11/29/17 0614)   insulin regular (HumuLIN R,NovoLIN R) infusion 0 3-21 Units/hr Last Rate: 4 Units/hr (11/29/17 0121)       No Known Allergies    ROS: unobtainable 2/2 mental status    Physical Exam:  Physical Exam   HENT:   Head: Normocephalic and atraumatic  Mouth/Throat: Oropharynx is clear and moist    Eyes: No scleral icterus  Right 3 sluggish left 2 sluggish   Neck: Neck supple  No JVD present  Cardiovascular: Normal rate, normal heart sounds and intact distal pulses  An irregular rhythm present  Exam reveals no friction rub  No murmur heard  Pulmonary/Chest: Effort normal  No respiratory distress  He has decreased breath sounds  He has no wheezes  He has no rales  Abdominal: Soft  Bowel sounds are normal  He exhibits no distension  Musculoskeletal: He exhibits no edema  Neurological: GCS eye subscore is 1  GCS verbal subscore is 1  GCS motor subscore is 1  Skin: There is pallor     Cool diaphoretic           Intake/Output Summary (Last 24 hours) at 11/29/17 0631  Last data filed at 11/28/17 1846   Gross per 24 hour   Intake              980 ml   Output             2756 ml   Net            -1776 ml       Respiratory    Lab Data (Last 4 hours)    None         O2/Vent Data (Last 4 hours)      11/29 0551           Vent Mode AC/VC       Resp Rate (BPM) (BPM) 12       Vt (mL) (mL) 450       FIO2 (%) (%) 100       PEEP (cmH2O) (cmH2O) 5       Patient safety screen outcome: Failed       MV 5 53                 Invasive Devices     Peripheral Intravenous Line            Peripheral IV 11/28/17 Left Arm less than 1 day    Peripheral IV 11/28/17 Left Forearm less than 1 day          Line            Hemodialysis AV Fistula  Right Forearm -- days Hemodialysis AV Fistula Right Forearm -- days          Drain            Gastrostomy/Enterostomy -- days    Gastrostomy/Enterostomy Percutaneous endoscopic gastrostomy (PEG) 20 Fr   days          Airway            ETT  Hi-Lo; Cuffed 8 mm less than 1 day                DIAGNOSTIC DATA:    Lab: I have personally reviewed pertinent lab results  CBC:     Results from last 7 days  Lab Units 11/29/17  0522   WBC Thousand/uL 9 61   HEMOGLOBIN g/dL 10 5*   HEMATOCRIT % 33 2*   PLATELETS Thousands/uL 353     CMP:     Results from last 7 days  Lab Units 11/29/17  0521 11/29/17  0452 11/28/17  2040  11/27/17  1623   SODIUM mmol/L 125* 127* 118*  < > 126*   POTASSIUM mmol/L 3 7 3 7 4 8  < >  --    CHLORIDE mmol/L 92* 91* 86*  < > 91*   CO2 mmol/L 22 25 22  < > 22   BUN mg/dL 41* 41* 33*  < > 98*   CREATININE mg/dL 4 51* 4 65* 3 99*  < > 8 74*   CALCIUM mg/dL 8 4 9 1 8 3  < > 8 6   TOTAL PROTEIN g/dL 7 8  --  7 9  --  8 2   BILIRUBIN TOTAL mg/dL 0 60  --  0 80  --  0 50   ALK PHOS U/L 171*  --  199*  --  183*   ALT U/L 16  --  20  --  24   AST U/L 21  --  18  --  30   GLUCOSE RANDOM mg/dL 84 31* 735*  < > 209*   GLUCOSE, ISTAT   --   --   --   < >  --    < > = values in this interval not displayed  PT/INR:   Lab Results   Component Value Date    INR 1 47 (H) 11/29/2017   ,   Magnesium: No components found for: MAG,   Phosphorous:   Lab Results   Component Value Date    PHOS 5 2 (H) 11/29/2017       Microbiology:  Lab Results   Component Value Date    BLOODCX No Growth After 5 Days  09/17/2017    BLOODCX No Growth After 5 Days  09/17/2017    BLOODCX No Growth After 5 Days  08/03/2017    URINECX Culture results to follow   11/27/2017    URINECX >100,000 cfu/ml Pseudomonas aeruginosa 09/17/2017    URINECX  09/17/2017     1966-1272 cfu/ml Oxidase Positive gram negative jose alfredo    WOUNDCULT (A) 04/04/2017     4+ Growth of Vancomycin Resistant Enterococcus faecalis    WOUNDCULT (A) 04/04/2017     3+ Growth of Methicillin Resistant Staphylococcus aureus    WOUNDCULT (A) 02/18/2017     3+ Growth of Methicillin Resistant Staphylococcus aureus    WOUNDCULT 2+ Growth of Mixed Skin Reanna 02/18/2017    SPUTUMCULTUR 2+ Growth of Candida sp  presumptively albicans 04/01/2017    SPUTUMCULTUR 1+ Growth of Mixed Respiratory Reanna 04/01/2017    SPUTUMCULTUR 2+ Growth of Candida sp  presumptively albicans 12/30/2016    SPUTUMCULTUR 2+ Growth of Mixed Respiratory Reanna 12/30/2016         OUTCOME:   Transferred to Critical Care Unit, Critical Care Attending notified to transfer and Attending service notified  Family notified of transfer: yes  Family member contacted: sorin tao  Code Status: Level 1 - Full Code  Critical Care Time: Total Critical Care time spent 60 minutes excluding procedures, teaching and family updates

## 2017-11-29 NOTE — PROGRESS NOTES
Progress Note - Cardiology   Dionisio Malcolm 61 y o  male MRN: [id number]  Unit/Bed#:  Encounter: [id number]  11/29/17  8:38 AM        Assessment/Plan:    1  Paroxysmal atrial fibrillation  HR controlled with IV amiodarone  On IV heparin now that INR subtherapeutic  2  Chronic combined HF  EF by 35% by echo 4/17, repeat echo ordered  Volume managed by HD  At home was supposedly taking Metoprolol tartrate 100 bid, currently held due to borderline hypotension  3  ESRD  Volume managed by HD  4  DM  On insulin drip  5  History of aortic valve endocarditis  Treated with IV antibiotics for appropriate time course  Follow up echo ordered  6  UTI  Being treated with Cefepime  Subjective/Objective   Chief Complaint:   Chief Complaint   Patient presents with    Fatigue     Spouse reports increased fatigue and decreased appetite for 1 week  PT reports painful urination for several months  Spouse also states the pt fell and hit his head on 11/25  Subjective: 61y o  year old male with DM 1, atrial fibrillation, chronic right pleural effusion s/p thoracentesis , PEG tube placement , chronic combined heart failure, ESRD, DM, who presents with altered mental status, urinary symptoms and lethargy  He had missed HD X1  We have been consulted regarding rapid atrial fibrillation  He is on warfarin  CT head no acute pathology  CT abdomen/pelvis- moderate right pleural effusion with adjacent opacities possibly atelectasis vs aspiration or PNA  He had prolonged hospitalizations in April and June this year  In April he was admitted with altered mental status  He had a PEA arrest in the setting of AV endocarditis/profound hypoglycemia per documentation  He underwent R BKA for chronic nonhealing ulcers  Hospital course was complicated by bacteremia with MRSA and VRE, C diff colitis  Peg tube was placed  MARIA revealed probable vegetation on AV for which he completed 6 weeks of antibiotics   He was hospitalized in June with altered mental status, acute hypoxic and hypercapneic respiratory failure, pulmonary edema and sepsis  MARIA 4/2017- EF 35% with moderate diffuse hypokinesis  Grade 1 DD  RV normal  Mild LAE and ESA  Mild to moderate MR  AV- probable small, mobile vegetation on the right coronary cusp, on the left ventricular aspect  No significant AI  On 11/28 he was noted to have gone into afib with RVR, he has a history of paroxysmal afib and was on Coumadin prior to admission  He was started on IV amiodarone to help maintain SR  Per report patient had an episode of unresponsiveness while receiving amiodarone bolus, which resolved after 10 seconds, no arrhythmias or bradycardia noted on telemetry at time, it was felt possibly due to hypotension/hypoperfusion at that time  He was hyperglycemic and insulin drip was started for improved glycemic control, and early morning of 11/29 he had another episode of being unresponsive and diaphoretic, found to be hypoglycemic with FS less than 20  He was given dextrose, with improved FS to 123, but still unresponsive and hypoxic to 77%, intubated for airway protection  He remains in atrial fibrillation currently, but HR controlled  Remains on amiodarone drip  Is intubated  Family reports he was seeing a cardiologist at [facility], last office appt was after his hospitalization in April      Patient Active Problem List   Diagnosis    Type 1 diabetes mellitus with nephropathy ([address] )    ESRD (end stage renal disease) on dialysis ([address] )    Ambulatory dysfunction    Acute respiratory failure with hypoxia (HCC)    Rapid atrial fibrillation ([address] )    HCAP (healthcare-associated pneumonia)    Recurrent colitis due to Clostridium difficile    S/P percutaneous endoscopic gastrostomy (PEG) tube placement ([address] )    Pleural effusion on right    Chronic combined systolic and diastolic CHF (congestive heart failure) ([address] )    R Fibula fracture, proximal to stump  UTI (urinary tract infection)    DVT, lower extremity ([facility])    Hyponatremia    Acute encephalopathy    Clostridium difficile infection    Hypoglycemia    Anemia    Acquired hypothyroidism    Chronic kidney disease-mineral and bone disorder    Hypotension     Past Medical History:   Diagnosis Date    Acquired hypothyroidism     underactive    Atrial fibrillation (HCC)     Chronic kidney disease-mineral and bone disorder 11/27/2017    Clostridium difficile infection 04/2017    Diabetes mellitus ([place] [address] )     Dialysis patient ([place] [address] )     Diarrhea     ESRD (end stage renal disease) on dialysis ([place] [address] )     Hx of cardiac arrest     Hypertension     Neuropathy     Right lower lobe pneumonia ([place] [address] ) 2/20/2017    Sepsis ([place] [address] ) 04/2017    Polymicrobial MRSA, VRE    Systolic and diastolic CHF, chronic (HCC)     EF 35%, Grade II DD       No Known Allergies    Current Facility-Administered Medications   Medication Dose Route Frequency Provider Last Rate Last Dose    acetaminophen (TYLENOL) tablet 650 mg  650 mg Oral Q6H PRN Ana Shafer PA-C        albuterol inhalation solution 2 5 mg  2 5 mg Nebulization Q6H PRN Edvin Mercury, DO        amiodarone (CORDARONE) 900 mg in dextrose 5 % 500 mL infusion  0 5 mg/min Intravenous Continuous ALIE Rose 16 7 mL/hr at 11/29/17 0241 0 5 mg/min at 11/29/17 0241    b complex-vitamin C-folic acid (NEPHROCAPS) capsule 1 capsule  1 capsule Oral Daily Ana Shafer PA-C   1 capsule at 11/28/17 1239    cefepime (MAXIPIME) 500 mg in sodium chloride 0 9 % 50 mL IVPB  500 mg Intravenous Q24H TERESA Farrar-C 100 mL/hr at 11/28/17 2048 500 mg at 11/28/17 2048    chlorhexidine (PERIDEX) 0 12 % oral rinse 15 mL  15 mL Swish & Spit Q12H [address] ALIE Rodriguez        cholestyramine sugar free (QUESTRAN LIGHT) packet 4 g  4 g Oral BID Awa Alves PA-C   4 g at 11/28/17 1817    cinacalcet (SENSIPAR) tablet 30 mg  30 mg Oral HS TERESA Farrar-C   30 mg at 11/28/17 2116    citalopram (CeleXA) tablet 10 mg  10 mg Oral Daily Catina Brink PA-C   10 mg at 11/28/17 1237    dextrose 50 % IV solution **AcuDose Override Pull**             dextrose infusion 10 %  20 mL/hr Intravenous Continuous ALIE Rodriguez 20 mL/hr at 11/29/17 0724 20 mL/hr at 11/29/17 0724    etomidate (AMIDATE) 2 mg/mL injection    Code/Trauma/Sedation Med ALIE Orosco   20 mg at 11/29/17 0506    fentaNYL 1250 mcg in sodium chloride 0 9% 125mL drip  50 mcg/hr Intravenous Titrated ALIE Orosco 5 mL/hr at 11/29/17 0614 50 mcg/hr at 11/29/17 0614    fentanyl citrate (PF) 100 MCG/2ML 50 mcg  50 mcg Intravenous Q2H PRN ALIE Orosco   50 mcg at 11/29/17 3118    fentanyl citrate (PF) 100 MCG/2ML   Intravenous Code/Trauma/Sedation Med ALIE Orosco   50 mcg at 11/29/17 5422    gabapentin (NEURONTIN) capsule 300 mg  300 mg Oral HS Sofi Shearer PA-C   300 mg at 11/28/17 2116    heparin (porcine) 25,000 units in 250 mL infusion (premix)  3-20 Units/kg/hr (Order-Specific) Intravenous Titrated ALIE Orosco 7 2 mL/hr at 11/29/17 0717 12 Units/kg/hr at 11/29/17 0717    heparin (porcine) injection 1,800 Units  1,800 Units Intravenous PRN ALIE Orosco        heparin (porcine) injection 3,600 Units  3,600 Units Intravenous PRN ALIE Orosco        insulin regular (HumuLIN R,NovoLIN R) 1 Units/mL in sodium chloride 0 9 % 100 mL infusion  0 3-21 Units/hr Intravenous Titrated Belkys Cleveland PA-C 4 mL/hr at 11/29/17 0121 4 Units/hr at 11/29/17 0121    levalbuterol (XOPENEX) inhalation solution 1 25 mg  1 25 mg Nebulization Q6H ALIE Rodriguez        levothyroxine tablet 137 mcg  137 mcg Oral Early Morning REBEKA TovarC   137 mcg at 11/28/17 0507    menthol-zinc oxide (CALMOSEPTINE) 0 44-20 6 % ointment   Topical BID Alisha Moreno PA-C        metoprolol (LOPRESSOR) injection 2 5 mg  2 5 mg Intravenous Q6H PRN Jordon Nims, CRNP        midazolam (VERSED) injection 1 mg  1 mg Intravenous Q4H PRN [place] Nims, CRNP        midazolam (VERSED) injection    Code/Trauma/Sedation Med Jordon NimsALIE   2 mg at 11/29/17 0825    nystatin (MYCOSTATIN) powder   Topical BID Alisha Moreno PA-C        omeprazole (PRILOSEC) suspension 2 mg/mL  20 mg Oral Daily ALIE Rodriguez        saccharomyces boulardii (FLORASTOR) capsule 250 mg  250 mg Oral BID Mateo Ivy PA-C   250 mg at 11/28/17 1817    sodium chloride 0 9 % inhalation solution 3 mL  3 mL Nebulization Q6H DO L[facility]) oral solution 125 mg  125 mg Oral Q6H [address] Alisha Moreno PA-C   125 mg at 11/29/17 0130       Vitals: /52   Pulse 78   Temp 97 6 °F (36 4 °C) (Axillary)   Resp 16   Ht 5' 6" (1 676 m)   Wt 60 3 kg (132 lb 15 oz)   SpO2 100%   BMI 65 58 kg/m²     Systolic (70GNY), TCF:342 , Min:97 , JXV:146     Diastolic (10RYQ), JBN:37, Min:34, Max:85      Vitals:    11/27/17 1903 11/29/17 0600   Weight: 63 1 kg (139 lb 1 8 oz) 60 3 kg (132 lb 15 oz)     Orthostatic Blood Pressures    Flowsheet Row Most Recent Value   Blood Pressure  112/52 filed at 11/29/2017 0700   Patient Position - Orthostatic VS  Lying filed at 11/28/2017 2300            Intake/Output Summary (Last 24 hours) at 11/29/17 0838  Last data filed at 11/28/17 1846   Gross per 24 hour   Intake              540 ml   Output             2756 ml   Net            -2216 ml       Invasive Devices     Peripheral Intravenous Line            Peripheral IV 11/29/17 Left Antecubital less than 1 day    Peripheral IV 11/29/17 Left Arm less than 1 day    Peripheral IV 11/29/17 Left Wrist less than 1 day          Line            Hemodialysis AV Fistula  Right Forearm -- days    Hemodialysis AV Fistula Right Forearm -- days          Drain            Gastrostomy/Enterostomy -- days    Gastrostomy/Enterostomy Percutaneous endoscopic gastrostomy (PEG) 20 Fr   days          Airway            ETT  Hi-Lo; Cuffed 8 mm less than 1 day                  Physical Exam:     GEN: Intubated, sedated, in no acute distress  HEENT: Sclera anicteric, conjunctivae pink, mucous membranes moist   NECK: Supple, no carotid bruits, no significant JVD  HEART: Irregularly irregular, HR controlled, no murmurs, clicks, gallops or rubs  LUNGS: Clear to auscultation bilaterally anteriorly; no wheezes, rales, or rhonchi   ABDOMEN: Soft, nontender, nondistended, normoactive bowel sounds  EXTREMITIES: Skin warm and well perfused, no clubbing, cyanosis, or edema  NEURO: No focal findings  SKIN: Normal without suspicious lesions on exposed skin        Lab Results:     Troponins:   Results from last 7 days  Lab Units 11/29/17 0521 11/28/17 2040 11/27/17  1140   TROPONIN I ng/mL <0 02 <0 02 <0 02       CBC with diff:   Results from last 7 days  Lab Units 11/29/17 0759 11/29/17 0522 11/29/17 0452 11/28/17 2040 11/28/17 0404 11/27/17 1708 11/27/17  1140   WBC Thousand/uL  --  9 61 8 10 7 59 7 30  --  11 96*   HEMOGLOBIN g/dL  --  10 5* 11 3* 11 0* 10 5*  --  11 1*   I STAT HEMOGLOBIN g/dl 10 5*  --   --   --   --  12 6  --    HEMATOCRIT %  --  33 2* 35 5* 34 7* 32 8*  --  34 7*   MCV fL  --  93 92 94 92  --  92   PLATELETS Thousands/uL  --  353 416* 258 344  --  340   MCH pg  --  29 4 29 1 29 6 29 4  --  29 4   MCHC g/dL  --  31 6 31 8 31 7 32 0  --  32 0   RDW %  --  15 6* 15 4* 15 8* 16 1*  --  16 0*   MPV fL  --  9 0 9 1 9 3 9 3  --  9 6         CMP:  Results from last 7 days  Lab Units 11/29/17  0759 11/29/17 0521 11/29/17 0452 11/28/17 2040 11/28/17  0403 11/27/17  1708 11/27/17  1623   SODIUM mmol/L  --  125* 127* 118* 126*  --  126*   POTASSIUM mmol/L  --  3 7 3 7 4 8 5 8*  --   --    CHLORIDE mmol/L  --  92* 91* 86* 90*  --  91*   CO2 mmol/L  --  22 25 22 24  --  22   ANION GAP mmol/L  --  11 11 10 12  --  13   BUN mg/dL  --  41* 41* 33* 55*  --  98* CREATININE mg/dL  --  4 51* 4 65* 3 99* 5 98*  --  8 74*   GLUCOSE RANDOM mg/dL  --  84 31* 735* 141*  --  209*   GLUCOSE, ISTAT mg/dl 82  --   --   --   --  199*  --    CALCIUM mg/dL  --  8 4 9 1 8 3 8 2*  --  8 6   AST U/L  --  21  --  18  --   --  30   ALT U/L  --  16  --  20  --   --  24   ALK PHOS U/L  --  171*  --  199*  --   --  183*   TOTAL PROTEIN g/dL  --  7 8  --  7 9  --   --  8 2   ALBUMIN g/dL  --  2 1*  --  2 1*  --   --  2 3*   BILIRUBIN TOTAL mg/dL  --  0 60  --  0 80  --   --  0 50   EGFR ml/min/1 73sq m  --  13 12 15 9 6 6       Magnesium:   Results from last 7 days  Lab Units 17  0521 17  1140   MAGNESIUM mg/dL 2 0 2 1       Coags:   Results from last 7 days  Lab Units 17  0521 17  0452 17  1030 17  0404 17  2045   PTT seconds 59*  --   --   --   --    INR  1 47* 1 42* 3 44* 5 31* 4 69*     Hgb A1c:   Results from last 7 days  Lab Units 17  2045   HEMOGLOBIN A1C % 9 4*       Cardiac testing:   Results for orders placed during the hospital encounter of 17   Echo complete with contrast if indicated    N[facility]ative [facility] [address] [place] [address]  [phone]    Transthoracic Echocardiogram  2D, M-mode, Doppler, and Color Doppler    Study date:  2017    Patient: Mauricio Smoker  MR number: [id number]  Account number: [de-identified]  : 1950  Age: 61 years  Gender: Male  Status: Inpatient  Location: Bedside  Height: 65 in  Weight: 140 8 lb  BP: 145/ 74 mmHg    Indications: Evaluate for suspected valvular vegetation  Diagnoses: R78 81 - Bacteremia    Sonographer:  ZACHARY Cordon Junior  Referring Physician:  Sergio Chu PA-C  Group:  [facility]  Cardiology Fellow:  Ulises Salamanca MD  Interpreting Physician:  Sherral Closs, MD    SUMMARY    LEFT VENTRICLE:  The ventricle was mildly dilated    Systolic function was moderately to markedly reduced by visual assessment  Ejection fraction was estimated to be 35 %  There was akinesis of the basal-mid inferoseptal, entire inferior, basal-mid inferolateral, and apical septal wall(s)  The changes were consistent with eccentric hypertrophy  Features were consistent with a pseudonormal left ventricular filling pattern, with concomitant abnormal relaxation and increased filling pressure (grade 2 diastolic dysfunction)  RIGHT VENTRICLE:  The size was normal   Systolic function was normal     LEFT ATRIUM:  The atrium was mildly dilated  ATRIAL SEPTUM:  The septum bows from left to right, consistent with increased left atrial pressure  RIGHT ATRIUM:  The atrium was mildly dilated  MITRAL VALVE:  There was mild to moderate annular calcification  There was mild regurgitation  AORTIC VALVE:  The valve was trileaflet  Leaflets exhibited mildly increased thickness, mild to moderate calcification, normal cuspal separation, and sclerosis  TRICUSPID VALVE:  There was trace regurgitation  IVC, HEPATIC VEINS:  The inferior vena cava was dilated  PERICARDIUM:  A trace, loculated pericardial effusion was identified anterior to the heart  HISTORY: PRIOR HISTORY: Cardiac arrest, ESRD, DM1, Cdif, Afib, Alchohol use    PROCEDURE: The procedure was performed at the bedside  This was a routine study  The transthoracic approach was used  The study included complete 2D imaging, M-mode, complete spectral Doppler, and color Doppler  The heart rate was 80 bpm,  at the start of the study  Images were obtained from the parasternal, apical, subcostal, and suprasternal notch acoustic windows  Image quality was adequate  LEFT VENTRICLE: The ventricle was mildly dilated  Systolic function was moderately to markedly reduced by visual assessment  Ejection fraction was estimated to be 35 %  There was akinesis of the basal-mid inferoseptal, entire inferior,  basal-mid inferolateral, and apical septal wall(s)   Wall thickness was increased  The changes were consistent with eccentric hypertrophy  DOPPLER: Features were consistent with a pseudonormal left ventricular filling pattern, with  concomitant abnormal relaxation and increased filling pressure (grade 2 diastolic dysfunction)  RIGHT VENTRICLE: The size was normal  Systolic function was normal  Wall thickness was normal     LEFT ATRIUM: The atrium was mildly dilated  ATRIAL SEPTUM: The septum bows from left to right, consistent with increased left atrial pressure  RIGHT ATRIUM: The atrium was mildly dilated  MITRAL VALVE: There was mild to moderate annular calcification  There was mild-moderate thickening  There was mild-moderate calcification  There was normal leaflet separation  DOPPLER: The transmitral velocity was within the normal range  There was no evidence for stenosis  There was mild regurgitation  AORTIC VALVE: The valve was trileaflet  Leaflets exhibited mildly increased thickness, mild to moderate calcification, normal cuspal separation, and sclerosis  DOPPLER: Transaortic velocity was within the normal range  There was no  evidence for stenosis  There was no regurgitation  TRICUSPID VALVE: The valve structure was normal  There was normal leaflet separation  DOPPLER: The transtricuspid velocity was within the normal range  There was no evidence for stenosis  There was trace regurgitation  PULMONIC VALVE: Leaflets exhibited normal thickness, no calcification, and normal cuspal separation  DOPPLER: The transpulmonic velocity was within the normal range  There was no regurgitation  PERICARDIUM: A trace, loculated pericardial effusion was identified anterior to the heart  AORTA: The root exhibited normal size and fibrocalcific change  SYSTEMIC VEINS: IVC: The inferior vena cava was dilated      SYSTEM MEASUREMENT TABLES    2D  %FS: 17 51 %  Ao Diam: 3 3 cm  EDV(Teich): 129 17 ml  EF Biplane: 40 01 %  EF(Teich): 36 23 %  ESV(Teich): 82 37 ml  IVSd: 1 19 cm  LA Area: 22 27 cm2  LA Diam: 3 95 cm  LVEDV MOD A2C: 146 9 ml  LVEDV MOD A4C: 140 92 ml  LVEDV MOD BP: 144 09 ml  LVEF MOD A2C: 38 23 %  LVEF MOD A4C: 42 19 %  LVESV MOD A2C: 90 74 ml  LVESV MOD A4C: 81 47 ml  LVESV MOD BP: 86 44 ml  LVIDd: 5 19 cm  LVIDs: 4 28 cm  LVLd A2C: 8 75 cm  LVLd A4C: 8 72 cm  LVLs A2C: 7 87 cm  LVLs A4C: 7 98 cm  LVPWd: 1 15 cm  RA Area: 17 74 cm2  RVIDd: 3 51 cm  SV MOD A2C: 56 16 ml  SV MOD A4C: 59 45 ml  SV(Teich): 46 8 ml    MM  TAPSE: 2 03 cm    PW  AVC: 369 41 ms  E': 0 03 m/s  E/E': 19 2  MV A Jose: 0 52 m/s  MV Dec Davidson: 2 82 m/s2  MV DecT: 224 73 ms  MV E Jose: 0 63 m/s  MV E/A Ratio: 1 21  MV PHT: 65 17 ms  MVA By PHT: 3 38 cm2    [facility] Accredited Echocardiography Laboratory    Prepared and electronically signed by    Darnell Marks MD  Signed 2017 09:59:07       Results for orders placed during the hospital encounter of 17   MARIA    N[facility]ative [facility]  [address]  [phone]    Transesophageal Echocardiogram  2D, 3D, Doppler, and Color Doppler    Study date:  2017    Patient: Juancarlos Tomlinson  MR number: [id number]  Account number: [de-identified]  : 1950  Age: 61 years  Gender: Male  Status: Inpatient  Location: Echo lab  Height: 65 in  Weight: 137 9 lb  BP: 127/ 66 mmHg    Indications: Evaluate for suspected valvular vegetation  Atrial fibrillation  Respiratory failure  Diagnoses: R78 81 - Bacteremia    Sonographer:  ZACHARY Fry  Interpreting Physician:  Destiney Elam DO  Primary Physician:  Pamela Munoz MD  Group:  [facility]  Cardiology Fellow:  Enid Medina MD  RN:  Judah Jimenez    SUMMARY    LEFT VENTRICLE:  The ventricle was dilated  Systolic function was severely reduced  Ejection fraction was estimated to be 35 %    There was moderate diffuse hypokinesis with distinct regional wall motion abnormalities  Doppler parameters were consistent with abnormal left ventricular relaxation (grade 1 diastolic dysfunction)  RIGHT VENTRICLE:  The size was normal   Systolic function was normal     LEFT ATRIUM:  The atrium was mildly dilated  RIGHT ATRIUM:  The atrium was mildly dilated  MITRAL VALVE:  There was mild to moderate regurgitation  AORTIC VALVE:  There was no significant regurgitation  There was a probable small, mobile vegetation on the right coronary cusp, on the left ventricular aspect  TRICUSPID VALVE:  There was mild regurgitation  HISTORY: PRIOR HISTORY: Previous sudden death episode  Risk factors: diabetes  PROCEDURE: The procedure was performed in the echo lab  This was a routine study  The risks and alternatives of the procedure were explained to the patient's next of kin and informed consent was obtained  The transesophageal approach was  used  The study included complete 2D imaging, 3D imaging, complete spectral Doppler, and color Doppler  The heart rate was 88 bpm, at the start of the study  An adult omniplane probe was inserted by the cardiology fellow under direct  supervision of the attending cardiologist  Images were obtained from the parasternal, apical, subcostal, and suprasternal notch acoustic windows  Intubated with ease  One intubation attempt(s)  There was no blood detected on the probe  Echocardiographic views were limited due to poor acoustic window availability, decreased penetration, and lung interference  This was a technically difficult study  MEDICATIONS: SBE prophylaxis not indicated  Sedation administered by  anesthesia team     LEFT VENTRICLE: The ventricle was dilated  Systolic function was severely reduced  Ejection fraction was estimated to be 35 %  There was moderate diffuse hypokinesis with distinct regional wall motion abnormalities   Wall thickness was  normal  DOPPLER: Doppler parameters were consistent with abnormal left ventricular relaxation (grade 1 diastolic dysfunction)  RIGHT VENTRICLE: The size was normal  Systolic function was normal  Wall thickness was normal     LEFT ATRIUM: The atrium was mildly dilated  No thrombus was identified  APPENDAGE: The size was normal  No thrombus was identified  DOPPLER: The function was normal (normal emptying velocity)  RIGHT ATRIUM: The atrium was mildly dilated  No thrombus was identified  MITRAL VALVE: Valve structure was normal  There was normal leaflet separation  DOPPLER: There was mild to moderate regurgitation  AORTIC VALVE: The valve was trileaflet  Leaflets exhibited normal cuspal separation and sclerosis  There was a probable small, mobile vegetation on the right coronary cusp, on the left ventricular aspect  DOPPLER: There was no significant  regurgitation  TRICUSPID VALVE: The valve structure was normal  There was normal leaflet separation  DOPPLER: There was mild regurgitation  PULMONIC VALVE: Leaflets exhibited normal thickness, no calcification, and normal cuspal separation  There was no echocardiographic evidence of vegetation  PERICARDIUM: There was no pericardial effusion  The pericardium was normal in appearance  AORTA: The root exhibited normal size  There was Grade I atheroma of the proximal descending aorta and distal aortic arch  There was no evidence for dissection  There was no evidence for aneurysm  PULMONARY VEINS: DOPPLER: There was systolic blunting in the pulmonary vein(s)  MEASUREMENT TABLES    DOPPLER MEASUREMENTS  Left atrium   (Reference normals)  TAYLOR peak jose   40 cm/s   (--)    SYSTEM MEASUREMENT TABLES    CF  MR Als  Jose: 0 29 m/s  MR Flow: 152 23 ml/s  MR Rad: 0 91 cm    [employer] Accredited Echocardiography Laboratory    Prepared and electronically signed by    Bryan Logan DO  Signed 07-Apr-2017 17:27:13         Imaging: I have personally reviewed pertinent reports          Telemetry: personally reviewed, afib, HR controlled

## 2017-11-29 NOTE — PLAN OF CARE
Received notification by RN in regards to follow-up with BMP during rapid response 735  Assess patient at the bedside alert and oriented to person but not place or time without significant complaints however known poor historian  Initiate IV insulin GTT, Accu-Cheks Q 2 likely secondary to amiodarone with dextrose 5% infusion

## 2017-11-29 NOTE — RESPIRATORY THERAPY NOTE
RT Protocol Note  Opal Grubbs 61 y o  male MRN: [id number]  Unit/Bed#:  Encounter: [id number]    Assessment    Principal Problem:    UTI (urinary tract infection)  Active Problems:    Type 1 diabetes mellitus with nephropathy (HCC)    ESRD (end stage renal disease) on dialysis (HCC)    Rapid atrial fibrillation (HCC)    S/P percutaneous endoscopic gastrostomy (PEG) tube placement (HCC)    Chronic combined systolic and diastolic CHF (congestive heart failure) ([facility])    Acute encephalopathy    Clostridium difficile infection    Acquired hypothyroidism    Chronic kidney disease-mineral and bone disorder    Elevated INR    Hyperkalemia      Home Pulmonary Medications:  Patient does not take any pulmonary meds at home  Past Medical History:   Diagnosis Date    Acquired hypothyroidism     underactive    Atrial fibrillation ([facility])     Chronic kidney disease-mineral and bone disorder 11/27/2017    Clostridium difficile infection 04/2017    Diabetes mellitus ([facility] )     Dialysis patient ([facility] )     Diarrhea     ESRD (end stage renal disease) on dialysis ([facility] )     Hx of cardiac arrest     Hypertension     Neuropathy     Right lower lobe pneumonia ([facility] ) 2/20/2017    Sepsis ([facility] ) 04/2017    Polymicrobial MRSA, VRE    Systolic and diastolic CHF, chronic (HCC)     EF 35%, Grade II DD     Social History     Social History    Marital status: /Civil Union     Spouse name: N/A    Number of children: N/A    Years of education: N/A     Occupational History    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 [employer]     DISABLED     Social History Main Topics    Smoking status: Former Smoker     Packs/day: 1 00     Years: 46 00     Types: Cigarettes     Start date: 1970     Quit date: 3/1/2017    Smokeless tobacco: Never Used    Alcohol use No    Drug use: No    Sexual activity: No     Other Topics Concern    None     Social History Narrative    None       Subjective  Unable to talk due to being intubated   Pt wife states that he does not take any breathing treatments at home  He does not have any pulmonary history  He does not wear any oxygen at home  Objective  Breath sounds are diminished and clear bilaterally  SPO2 100%  Physical Exam:   Assessment Type: Assess only  General Appearance: Sedated  Respiratory Pattern: Normal, Assisted  Chest Assessment: Chest expansion symmetrical  Bilateral Breath Sounds: Clear, Diminished  R Breath Sounds: Clear, Diminished  L Breath Sounds: Clear, Diminished    Vitals:  Blood pressure 102/58, pulse 81, temperature (!) 97 3 °F (36 3 °C), temperature source Axillary, resp  rate 12, height 5' 6" (1 676 m), weight 60 3 kg (132 lb 15 oz), SpO2 100 %  Imaging and other studies: I have personally reviewed pertinent reports  Plan    Respiratory Plan: Vent/NIV/HFNC        Resp Comments: Pt examined per RT protocol  BS clear and diminished  SPO2 100%  Will order PRN Albuterol

## 2017-11-29 NOTE — RESPIRATORY THERAPY NOTE
RT Ventilator Management Note  Xiomara Bojorquez 61 y o  male MRN: [id number]  Unit/Bed#:  Encounter: [id number]      Daily Screen       11/29/2017 0551 11/29/2017 2093          Patient safety screen outcome[de-identified] Failed Failed      Not Ready for Weaning due to[de-identified] Underline problem not resolved;FiO2 >60% Intubated Today              Physical Exam:   Assessment Type: Pre-treatment  General Appearance: Sedated  Respiratory Pattern: Assisted, Normal  Chest Assessment: Chest expansion symmetrical  Bilateral Breath Sounds: Diminished, Clear  R Breath Sounds: Clear, Diminished  L Breath Sounds: Clear, Diminished  Cough: Non-productive  O2 Device: Vent 40% +5 Peep  Subjective Data: ABG performed, fio2 lowered to 40%  Resp Comments: Pt tolerating current vent settings without issue, will continue to monitor      AC 12/450/40%/+5

## 2017-11-29 NOTE — PROGRESS NOTES
NEPHROLOGY PROGRESS NOTE   Aravind Atkinson 61 y o  male MRN: [id number]  Unit/Bed#:  Encounter: [id number]  Reason for Consult: ESRD on HD    ASSESSMENT/PLAN:  1  ESRD on HD (MWF @ [facility])  - HD x2hr 11/27, HD x4hr 11/28  - for dialysis today pending stability of patient  2  Access: Right upper AVF  3  Hypertension: BP soft, monitor  4  Gisele Derby recently added at outpatient unit but hgb at goal here, will monitor without marci  5  Mineral and bone disease: renal diet  6  Possible UTI: culture pending, UA dirty  - on cefepime  7  Hyponatremia, chronic: monitor with fluid removal  8  Hyperkalemia: secondary to dietary indiscretion (bananas and oranges)  - patient was on BRAT diet due to diarrhea  9  Persistent right lower lobe/middle lobe lobulated alveolar opacity with moderate right pleural effusion: ? Need for thoracentesis  10  Brittle Diabetes: hyper/hypoglycemic episodes with rapid responses called    SUBJECTIVE:  Overnight events noted  Rapid response called last night for hyperglycemia with unresponsive episode  Patient was started on an insulin drip and eventually came around  Early this am, another rapid response was called for hypoglycemia of <20  Patient was unable to be aroused and therefore was intubated to protect his airway  Hopeful extubation later today  Depending on clinical course, will dialyze later today      OBJECTIVE:  Current Weight: Weight - Scale: 60 3 kg (132 lb 15 oz)  Vitals:    11/29/17 0618 11/29/17 0700 11/29/17 0739 11/29/17 0757   BP: 102/58 112/52     Pulse: 81 78     Resp: 12 16     Temp: (!) 97 3 °F (36 3 °C)  97 6 °F (36 4 °C)    TempSrc: Axillary  Axillary    SpO2: 100% 100%  100%   Weight:       Height:           Intake/Output Summary (Last 24 hours) at 11/29/17 0912  Last data filed at 11/28/17 1846   Gross per 24 hour   Intake              540 ml   Output             2756 ml   Net            -2216 ml     General: NAD  Skin: no rash  HEENT: normocephalic atraumatic  Neck: supple  Chest: CTAB  Heart: RRR  Abdomen: soft, nt, nd  Extremities: no edema  Neuro: sedated    Medications:    Current Facility-Administered Medications:     acetaminophen (TYLENOL) tablet 650 mg, 650 mg, Oral, Q6H PRN, Nickie Mckeon PA-C    albuterol inhalation solution 2 5 mg, 2 5 mg, Nebulization, Q6H PRN, Nova Laos, DO    [COMPLETED] amiodarone 150 mg in dextrose 5 % 100 mL IV bolus, 150 mg, Intravenous, Once, Last Rate: 600 mL/hr at 17, 150 mg at 17 **FOLLOWED BY** [] amiodarone (CORDARONE) 900 mg in dextrose 5 % 500 mL infusion, 1 mg/min, Intravenous, Continuous, Last Rate: 33 3 mL/hr at 17, 1 mg/min at 17 **FOLLOWED BY** amiodarone (CORDARONE) 900 mg in dextrose 5 % 500 mL infusion, 0 5 mg/min, Intravenous, Continuous, ALIE Rosario, Last Rate: 16 7 mL/hr at 17, 0 5 mg/min at 171    b complex-vitamin C-folic acid (NEPHROCAPS) capsule 1 capsule, 1 capsule, Oral, Daily, Nickie Mckeon PA-C, 1 capsule at 17 1239    cefepime (MAXIPIME) 500 mg in sodium chloride 0 9 % 50 mL IVPB, 500 mg, Intravenous, Q24H, Nickie Mckeon PA-C, Last Rate: 100 mL/hr at 17, 500 mg at 17    chlorhexidine (PERIDEX) 0 12 % oral rinse 15 mL, 15 mL, Swish & Spit, Q12H [facility], ALIE Rodriguez    cholestyramine sugar free (QUESTRAN LIGHT) packet 4 g, 4 g, Oral, BID, Awa Alves PA-C, 4 g at 17    cinacalcet (SENSIPAR) tablet 30 mg, 30 mg, Oral, HS, Nickie Mckeon PA-C, 30 mg at 176    citalopram (CeleXA) tablet 10 mg, 10 mg, Oral, Daily, Catina Brink PA-C, 10 mg at 17 1237    dextrose 50 % IV solution **AcuDose Override Pull**, , , ,     dextrose infusion 10 %, 20 mL/hr, Intravenous, Continuous, ALIE Rodriguez, Last Rate: 20 mL/hr at 17, 20 mL/hr at 17    etomidate (AMIDATE) 2 mg/mL injection, , , Code/Trauma/Sedation Med, Oval April ALIE Ahmadi, 20 mg at 11/29/17 0506    fentaNYL 1250 mcg in sodium chloride 0 9% 125mL drip, 50 mcg/hr, Intravenous, Titrated, ALIE Rodriguez, Last Rate: 5 mL/hr at 11/29/17 0614, 50 mcg/hr at 11/29/17 0614    fentanyl citrate (PF) 100 MCG/2ML 50 mcg, 50 mcg, Intravenous, Q2H PRN, ALIE Riggs, 50 mcg at 11/29/17 3003    fentanyl citrate (PF) 100 MCG/2ML, , Intravenous, Code/Trauma/Sedation Med, ALIE Riggs, 50 mcg at 11/29/17 6974    gabapentin (NEURONTIN) capsule 300 mg, 300 mg, Oral, HS, Iraida Mueller PA-C, 300 mg at 11/28/17 2116    heparin (porcine) 25,000 units in 250 mL infusion (premix), 3-20 Units/kg/hr (Order-Specific), Intravenous, Titrated, ALIE Rodriguez, Last Rate: 7 2 mL/hr at 11/29/17 0717, 12 Units/kg/hr at 11/29/17 0717    heparin (porcine) injection 1,800 Units, 1,800 Units, Intravenous, PRN, ALIE Riggs    heparin (porcine) injection 3,600 Units, 3,600 Units, Intravenous, PRN, ALIE Riggs    insulin regular (HumuLIN R,NovoLIN R) 1 Units/mL in sodium chloride 0 9 % 100 mL infusion, 0 3-21 Units/hr, Intravenous, Titrated, Catina Brink PA-C, Last Rate: 4 mL/hr at 11/29/17 0121, 4 Units/hr at 11/29/17 0121    levalbuterol (XOPENEX) inhalation solution 1 25 mg, 1 25 mg, Nebulization, Q6H, ALIE Rodriguez    levothyroxine tablet 137 mcg, 137 mcg, Oral, Early Morning, Iraida Mueller PA-C, 137 mcg at 11/28/17 0507    menthol-zinc oxide (CALMOSEPTINE) 0 44-20 6 % ointment, , Topical, BID, Alisha Stoudt, PA-C    metoprolol (LOPRESSOR) injection 2 5 mg, 2 5 mg, Intravenous, Q6H PRN, ALIE Riggs    midazolam (VERSED) injection 1 mg, 1 mg, Intravenous, Q4H PRN, ALIE Riggs    midazolam (VERSED) injection, , , Code/Trauma/Sedation Med, ALIE Riggs, 2 mg at 11/29/17 4297    nystatin (MYCOSTATIN) powder, , Topical, BID, TERESA Lee-HUMBERTO    omeprazole (PRILOSEC) suspension 2 mg/mL, 20 mg, Oral, Daily, ALIE Rodriguez    saccharomyces boulardii (FLORASTOR) capsule 250 mg, 250 mg, Oral, BID, Marina Cole PA-C, 250 mg at 11/28/17 1817    sodium chloride 0 9 % inhalation solution 3 mL, 3 mL, Nebulization, Q6H, DO Lena Parham [facility]) oral solution 125 mg, 125 mg, Oral, Q6H [facility], Alisha Moreno PA-C, 125 mg at 11/29/17 0130    Laboratory Results:    Results from last 7 days  Lab Units 11/29/17  0759 11/29/17  0522 11/29/17  0521 11/29/17  0452 11/28/17  2040 11/28/17  0404 11/28/17  0403 11/27/17  1708 11/27/17  1623 11/27/17  1140   WBC Thousand/uL  --  9 61  --  8 10 7 59 7 30  --   --   --  11 96*   HEMOGLOBIN g/dL  --  10 5*  --  11 3* 11 0* 10 5*  --   --   --  11 1*   I STAT HEMOGLOBIN g/dl 10 5*  --   --   --   --   --   --  12 6  --   --    HEMATOCRIT %  --  33 2*  --  35 5* 34 7* 32 8*  --   --   --  34 7*   PLATELETS Thousands/uL  --  353  --  416* 258 344  --   --   --  340   SODIUM mmol/L  --   --  125* 127* 118*  --  126*  --  126*  --    POTASSIUM mmol/L  --   --  3 7 3 7 4 8  --  5 8*  --   --   --    CHLORIDE mmol/L  --   --  92* 91* 86*  --  90*  --  91*  --    CO2 mmol/L  --   --  22 25 22  --  24  --  22  --    BUN mg/dL  --   --  41* 41* 33*  --  55*  --  98*  --    CREATININE mg/dL  --   --  4 51* 4 65* 3 99*  --  5 98*  --  8 74*  --    CALCIUM mg/dL  --   --  8 4 9 1 8 3  --  8 2*  --  8 6  --    MAGNESIUM mg/dL  --   --  2 0  --   --   --   --   --   --  2 1   PHOSPHORUS mg/dL  --   --  5 2*  --   --   --   --   --   --   --    ALBUMIN g/dL  --   --  2 1*  --  2 1*  --   --   --  2 3*  --    TOTAL PROTEIN g/dL  --   --  7 8  --  7 9  --   --   --  8 2  --    GLUCOSE RANDOM mg/dL  --   --  84 31* 735*  --  141*  --  209*  --    GLUCOSE, ISTAT mg/dl 82  --   --   --   --   --   --  199*  --   --

## 2017-11-29 NOTE — RESPIRATORY THERAPY NOTE
RT responded to rapid response- pt minimally responsive/not following commands  Pt placed on 100% NRB  ABG drawn from L radial artery- positive allens test, ran on Istat- results given to [facility]- critical care PA  Pt not responding to sternal rub, pt BMV with 100% 02 and intubated with 8 0 ETT at 26 @lip- positive b/l BS, positive ETC02  ETT secured and pt placed on transport ventilator  Pt transported on transport vent to CT and ICU without complication- vitals stable  Pt placed on  ventilator in ICU

## 2017-11-29 NOTE — PROCEDURES
Intubation  Date/Time: 11/29/2017 7:38 AM  Performed by: Mickey Brought by: Essence Lee     Patient location:  Bedside  Other Assisting Provider: No    Consent:     Consent obtained:  Emergent situation  Universal protocol:     Immediately prior to procedure, a time out was called: yes      Patient identity confirmed:  Arm band  Pre-procedure details:     Patient status:  Unresponsive    Pretreatment medications:  Etomidate (20mg)    Paralytics:  None  Indications:     Indications for intubation: airway protection and hypoxemia    Procedure details:     Preoxygenation:  Bag valve mask    CPR in progress: no      Intubation method:  Oral    Oral intubation technique:  Direct    Laryngoscope blade: Mac 3    Tube size (mm):  8 0    Tube type:  Cuffed    Number of attempts:  2    Ventilation between attempts: yes      Cricoid pressure: yes      Tube visualized through cords: yes    Placement assessment:     ETT to teeth:  26    Tube secured with:  ETT harper    Breath sounds:  Equal and absent over the epigastrium    Placement verification: chest rise, condensation, CXR verification, direct visualization, equal breath sounds, ETCO2 detector and tube exhalation      CXR findings:  ETT in proper place    Ventilator settings: Ac 450/12/5/100  Post-procedure details:     Patient tolerance of procedure:   Tolerated well, no immediate complications

## 2017-11-29 NOTE — PHYSICAL THERAPY NOTE
PHYSICAL THERAPY NOTE    Patient Name: Elizabeth Woodall  QGEXY'K Date: 11/29/2017  Chart reviewed, noted pt had rapid response last night, transferred from med surg to ICU  Critical care DCed PT orders  Please reconsult if PT indicated   Chichi Chowdhury PT

## 2017-11-29 NOTE — RAPID RESPONSE
Progress Note - Rapid Response   Elizabeth Woodall 61 y o  male MRN: [id number]    Time Called ( Time): 2117  Room#: 926  [id number] Time ( Time): 2118  Event End Time ( Time): 2132  MEWS score at time of Rapid Response: 0  Primary reason for call: Acute change in mental status  Interventions:  Airway/Breathing:  O2 Mask/Nasal  Circulation: EKG  Other Treatments: N/A       Assessment:   1  Encephalopathy Hyperglycemia/dm 1  2  AFib with RVR  3  UTI  4  End-stage renal disease on HD Monday Wednesday Friday  5  Chronic combined CHF  6  Coumadin coagulopathy    Plan:   questionable secondary to hypotensive episode while receiving amiodarone bolus given subsequent nausea post event  EKG with no acute ST changes  Neuro exam nonfocal will hold on CT of the head given recent CT of the head negative  Continue amnio drip for rate control currently in the 1 teens and monitor blood pressure  ·   · Continue neuro checks, monitor for any changes if would have any focal deficits repeat CT of the head  · Completed amnio bolus, continue amiodarone drip, INR in the a m  · Cover with sliding scale insulin, a blood sugar remains elevated start insulin drip       HPI/Chief Complaint (Background/Situation):   Elizabeth Woodall is a 61y o  year old male with past medical history of DM 1, and end renal disease on HD, chronic combined CHF, this AFib on Coumadin, history of aortic valve endocarditis, C diff who presents with change in mental status  Initial CT of the head was negative  Chest x-ray with right middle and lower lobe opacities and pleural effusion  CT abdomen and pelvis revealed moderate right pleural effusion with adjacent opacities  Urine strep negative and Legionella not sent, flu pending, a repeat C diff pending, blood cultures were not sent  Patient placed on cefepime and Vanco p  o  dosing which was increased given patient have been tapering down for treatment of C diff      Today rapid response was called this p m  secondary to an episode of unresponsiveness  Per the wife who was at bedside patient has been confused worse than normal and pulling at things however he arched his back in his eyes rolled back in his head and was unresponsive for few seconds  At that time patient was receiving an EMG overall his per the RN  Heart rate was AFib in the 1 teens, blood pressure was in the 1 teens  On arrival patient was confused to place however oriented and able to follow commands he was dry heaving and complained of nausea, and stated he almost passed out  EKG with no acute ST changes  Blood sugar was  greater than 500 in BMP sent with full set of labs  Historical Information   Past Medical History:   Diagnosis Date    Acquired hypothyroidism     underactive    Atrial fibrillation ([place] [address] )     Chronic kidney disease-mineral and bone disorder 11/27/2017    Clostridium difficile infection 04/2017    Diabetes mellitus ([place] [address] )     Dialysis patient ([place] [address] )     Diarrhea     ESRD (end stage renal disease) on dialysis ([place] [address] )     Hx of cardiac arrest     Hypertension     Neuropathy     Right lower lobe pneumonia ([place] [address] ) 2/20/2017    Sepsis ([place] [address] ) 04/2017    Polymicrobial MRSA, VRE    Systolic and diastolic CHF, chronic (HCC)     EF 35%, Grade II DD     Past Surgical History:   Procedure Laterality Date    CATARACT EXTRACTION      CHG GI ENDOSCOPIC ULTRASOUND N/A 3/10/2016    Procedure: LINEAR ENDOSCOPIC U/S;  Surgeon: Josefina Manley MD;  Location: [facility]; Service: Gastroenterology    CHOLECYSTECTOMY      GASTROSTOMY TUBE PLACEMENT N/A 4/12/2017    Procedure: INSERTION PEG TUBE;  Surgeon: Gerardo Foss MD;  [facility]: [facility];  Service:    Les Singleton LEG AMPUTATION THROUGH LOWER TIBIA AND FIBULA Right 4/6/2017    Procedure: AMPUTATION BELOW KNEE (BKA);   Surgeon: Naomi Zhao DO;  Location: [facility];  Service:     TOE AMPUTATION  2000     Social History   History   Alcohol Use No     History   Drug Use No History   Smoking Status    Former Smoker    Packs/day: 1 00    Years: 46 00    Types: Cigarettes    Start date: 1970    Quit date: 3/1/2017   Smokeless Tobacco    Never Used     Family History: non-contributory    Meds/Allergies     b complex-vitamin C-folic acid 1 capsule Oral Daily   calcium carbonate 1 tablet Oral BID With Meals   cefepime 500 mg Intravenous Q24H   cholestyramine sugar free 4 g Oral BID   cinacalcet 30 mg Oral HS   citalopram 10 mg Oral Daily   gabapentin 300 mg Oral HS   insulin detemir 5 Units Subcutaneous HS   [START ON 11/29/2017] insulin lispro 1-5 Units Subcutaneous TID AC   insulin lispro 1-5 Units Subcutaneous HS   levothyroxine 137 mcg Oral Early Morning   menthol-zinc oxide  Topical BID   [START ON 11/29/2017] metoprolol tartrate 50 mg Oral Q6H   nystatin  Topical BID   saccharomyces boulardii 250 mg Oral BID   sevelamer 800 mg Oral TID With Meals   vancomycin 125 mg Oral Q6H [address]         amiodarone 1 mg/min Last Rate: 1 mg/min (11/28/17 2040)   Followed by     Sheryl Bustos ON 11/29/2017] amiodarone 0 5 mg/min    insulin regular (HumuLIN R,NovoLIN R) infusion 0 3-21 Units/hr        No Known Allergies    ROS: Negative except change in mental status, nausea    Physical Exam:  Physical Exam   Constitutional: No distress  HENT:   Head: Normocephalic and atraumatic  Mouth/Throat: Oropharynx is clear and moist    Eyes: No scleral icterus  Right pupil 2-3, left pupil 1-2 sluggish   Neck: Neck supple  No JVD present  Cardiovascular: Normal heart sounds and intact distal pulses  An irregularly irregular rhythm present  Tachycardia present  Pulmonary/Chest: Effort normal  No respiratory distress  He has decreased breath sounds in the right middle field and the right lower field  He has no wheezes  He has no rales  Abdominal: Soft  Bowel sounds are normal  He exhibits no distension  There is no tenderness  Musculoskeletal: He exhibits no edema     Right BKA   Neurological: GCS eye subscore is 4  GCS verbal subscore is 4  GCS motor subscore is 6  Skin: Skin is warm and dry  No rash noted  No erythema  No pallor         Intake/Output Summary (Last 24 hours) at 11/28/17 2234  Last data filed at 11/28/17 1846   Gross per 24 hour   Intake              980 ml   Output             2756 ml   Net            -1776 ml       Respiratory    Lab Data (Last 4 hours)    None         O2/Vent Data (Last 4 hours)    None              Invasive Devices     Peripheral Intravenous Line            Peripheral IV 11/28/17 Left Forearm less than 1 day    Peripheral IV 11/28/17 Left;Upper Arm less than 1 day          Line            Hemodialysis AV Fistula  Right Forearm -- days    Hemodialysis AV Fistula Right Forearm -- days          Drain            Gastrostomy/Enterostomy -- days    Gastrostomy/Enterostomy Percutaneous endoscopic gastrostomy (PEG) 20 Fr   days                DIAGNOSTIC DATA:    Lab: I have personally reviewed pertinent lab results     CBC:     Results from last 7 days  Lab Units 11/28/17  2040   WBC Thousand/uL 7 59   HEMOGLOBIN g/dL 11 0*   HEMATOCRIT % 34 7*   PLATELETS Thousands/uL 258     CMP:     Results from last 7 days  Lab Units 11/28/17  2040 11/28/17  0403 11/27/17  1708 11/27/17  1623   SODIUM mmol/L 118* 126*  --  126*   POTASSIUM mmol/L 4 8 5 8*  --   --    CHLORIDE mmol/L 86* 90*  --  91*   CO2 mmol/L 22 24  --  22   BUN mg/dL 33* 55*  --  98*   CREATININE mg/dL 3 99* 5 98*  --  8 74*   CALCIUM mg/dL 8 3 8 2*  --  8 6   TOTAL PROTEIN g/dL 7 9  --   --  8 2   BILIRUBIN TOTAL mg/dL 0 80  --   --  0 50   ALK PHOS U/L 199*  --   --  183*   ALT U/L 20  --   --  24   AST U/L 18  --   --  30   GLUCOSE RANDOM mg/dL 735* 141*  --  209*   GLUCOSE, ISTAT mg/dl  --   --  199*  --      PT/INR:   Lab Results   Component Value Date    INR 3 44 (H) 11/28/2017   ,   Magnesium: No components found for: MAG,   Phosphorous: No results found for: PHOS    Microbiology:  Lab Results Component Value Date    BLOODCX No Growth After 5 Days  09/17/2017    BLOODCX No Growth After 5 Days  09/17/2017    BLOODCX No Growth After 5 Days  08/03/2017    URINECX Culture results to follow  11/27/2017    URINECX >100,000 cfu/ml Pseudomonas aeruginosa 09/17/2017    URINECX  09/17/2017     3759-0675 cfu/ml Oxidase Positive gram negative jose alfredo    WOUNDCULT (A) 04/04/2017     4+ Growth of Vancomycin Resistant Enterococcus faecalis    WOUNDCULT (A) 04/04/2017     3+ Growth of Methicillin Resistant Staphylococcus aureus    WOUNDCULT (A) 02/18/2017     3+ Growth of Methicillin Resistant Staphylococcus aureus    WOUNDCULT 2+ Growth of Mixed Skin Reanna 02/18/2017    SPUTUMCULTUR 2+ Growth of Candida sp  presumptively albicans 04/01/2017    SPUTUMCULTUR 1+ Growth of Mixed Respiratory Reanna 04/01/2017    SPUTUMCULTUR 2+ Growth of Candida sp  presumptively albicans 12/30/2016    SPUTUMCULTUR 2+ Growth of Mixed Respiratory Reanna 12/30/2016         OUTCOME:   Stayed in room   Family notified of transfer: yes  Family member contacted: Wife at bedside  Code Status: Level 1 - Full Code  Critical Care Time: Total Critical Care time spent 20 minutes excluding procedures, teaching and family updates

## 2017-11-29 NOTE — CODE DOCUMENTATION
Patient staring up and to the left unresponsive for 10 seconds  Patient had just received bolus of Amiodarone

## 2017-11-30 ENCOUNTER — APPOINTMENT (INPATIENT)
Dept: RADIOLOGY | Facility: HOSPITAL | Age: 67
DRG: 208 | End: 2017-11-30
Payer: COMMERCIAL

## 2017-11-30 ENCOUNTER — APPOINTMENT (INPATIENT)
Dept: DIALYSIS | Facility: HOSPITAL | Age: 67
DRG: 208 | End: 2017-11-30
Attending: NURSE PRACTITIONER
Payer: COMMERCIAL

## 2017-11-30 PROBLEM — A49.8 CLOSTRIDIUM DIFFICILE INFECTION: Chronic | Status: ACTIVE | Noted: 2017-09-18

## 2017-11-30 PROBLEM — B96.5 PSEUDOMONAS URINARY TRACT INFECTION: Status: ACTIVE | Noted: 2017-06-19

## 2017-11-30 PROBLEM — I95.9 HYPOTENSION: Status: RESOLVED | Noted: 2017-11-29 | Resolved: 2017-11-30

## 2017-11-30 LAB
ACETONE SERPL-MCNC: ABNORMAL MG/DL
ANION GAP SERPL CALCULATED.3IONS-SCNC: 15 MMOL/L (ref 4–13)
ANION GAP SERPL CALCULATED.3IONS-SCNC: 8 MMOL/L (ref 4–13)
APTT PPP: 60 SECONDS (ref 23–35)
APTT PPP: 76 SECONDS (ref 23–35)
ATRIAL RATE: 75 BPM
ATRIAL RATE: 80 BPM
BASE EX.OXY STD BLDV CALC-SCNC: 93.9 % (ref 60–80)
BASE EXCESS BLDV CALC-SCNC: -5.5 MMOL/L
BASOPHILS # BLD AUTO: 0.06 THOUSANDS/ΜL (ref 0–0.1)
BASOPHILS NFR BLD AUTO: 1 % (ref 0–1)
BUN SERPL-MCNC: 25 MG/DL (ref 5–25)
BUN SERPL-MCNC: 49 MG/DL (ref 5–25)
CA-I BLD-SCNC: 1 MMOL/L (ref 1.12–1.32)
CALCIUM SERPL-MCNC: 8.2 MG/DL (ref 8.3–10.1)
CALCIUM SERPL-MCNC: 8.4 MG/DL (ref 8.3–10.1)
CHLORIDE SERPL-SCNC: 85 MMOL/L (ref 100–108)
CHLORIDE SERPL-SCNC: 94 MMOL/L (ref 100–108)
CO2 SERPL-SCNC: 20 MMOL/L (ref 21–32)
CO2 SERPL-SCNC: 26 MMOL/L (ref 21–32)
CREAT SERPL-MCNC: 4.16 MG/DL (ref 0.6–1.3)
CREAT SERPL-MCNC: 5.95 MG/DL (ref 0.6–1.3)
EOSINOPHIL # BLD AUTO: 0.33 THOUSAND/ΜL (ref 0–0.61)
EOSINOPHIL NFR BLD AUTO: 3 % (ref 0–6)
ERYTHROCYTE [DISTWIDTH] IN BLOOD BY AUTOMATED COUNT: 15.4 % (ref 11.6–15.1)
GFR SERPL CREATININE-BSD FRML MDRD: 14 ML/MIN/1.73SQ M
GFR SERPL CREATININE-BSD FRML MDRD: 9 ML/MIN/1.73SQ M
GLUCOSE SERPL-MCNC: 124 MG/DL (ref 65–140)
GLUCOSE SERPL-MCNC: 125 MG/DL (ref 65–140)
GLUCOSE SERPL-MCNC: 155 MG/DL (ref 65–140)
GLUCOSE SERPL-MCNC: 182 MG/DL (ref 65–140)
GLUCOSE SERPL-MCNC: 189 MG/DL (ref 65–140)
GLUCOSE SERPL-MCNC: 219 MG/DL (ref 65–140)
GLUCOSE SERPL-MCNC: 237 MG/DL (ref 65–140)
GLUCOSE SERPL-MCNC: 248 MG/DL (ref 65–140)
GLUCOSE SERPL-MCNC: 337 MG/DL (ref 65–140)
GLUCOSE SERPL-MCNC: 371 MG/DL (ref 65–140)
GLUCOSE SERPL-MCNC: 379 MG/DL (ref 65–140)
GLUCOSE SERPL-MCNC: 415 MG/DL (ref 65–140)
GLUCOSE SERPL-MCNC: 420 MG/DL (ref 65–140)
GLUCOSE SERPL-MCNC: 57 MG/DL (ref 65–140)
GLUCOSE SERPL-MCNC: 57 MG/DL (ref 65–140)
GLUCOSE SERPL-MCNC: 96 MG/DL (ref 65–140)
HCO3 BLDV-SCNC: 18.6 MMOL/L (ref 24–30)
HCT VFR BLD AUTO: 33.7 % (ref 36.5–49.3)
HGB BLD-MCNC: 10.9 G/DL (ref 12–17)
LACTATE SERPL-SCNC: 1.4 MMOL/L (ref 0.5–2)
LYMPHOCYTES # BLD AUTO: 1.04 THOUSANDS/ΜL (ref 0.6–4.47)
LYMPHOCYTES NFR BLD AUTO: 11 % (ref 14–44)
MAGNESIUM SERPL-MCNC: 1.9 MG/DL (ref 1.6–2.6)
MCH RBC QN AUTO: 29.7 PG (ref 26.8–34.3)
MCHC RBC AUTO-ENTMCNC: 32.3 G/DL (ref 31.4–37.4)
MCV RBC AUTO: 92 FL (ref 82–98)
MONOCYTES # BLD AUTO: 0.99 THOUSAND/ΜL (ref 0.17–1.22)
MONOCYTES NFR BLD AUTO: 10 % (ref 4–12)
MRSA NOSE QL CULT: ABNORMAL
NEUTROPHILS # BLD AUTO: 7.17 THOUSANDS/ΜL (ref 1.85–7.62)
NEUTS SEG NFR BLD AUTO: 75 % (ref 43–75)
O2 CT BLDV-SCNC: 15.8 ML/DL
P AXIS: 104 DEGREES
P AXIS: 97 DEGREES
PCO2 BLDV: 32 MM HG (ref 42–50)
PH BLDV: 7.38 [PH] (ref 7.3–7.4)
PLATELET # BLD AUTO: 332 THOUSANDS/UL (ref 149–390)
PMV BLD AUTO: 9.3 FL (ref 8.9–12.7)
PO2 BLDV: 114.4 MM HG (ref 35–45)
POTASSIUM SERPL-SCNC: 4.1 MMOL/L (ref 3.5–5.3)
POTASSIUM SERPL-SCNC: 5.5 MMOL/L (ref 3.5–5.3)
PR INTERVAL: 164 MS
PR INTERVAL: 170 MS
QRS AXIS: 108 DEGREES
QRS AXIS: 99 DEGREES
QRSD INTERVAL: 96 MS
QRSD INTERVAL: 98 MS
QT INTERVAL: 416 MS
QT INTERVAL: 430 MS
QTC INTERVAL: 479 MS
QTC INTERVAL: 480 MS
RBC # BLD AUTO: 3.67 MILLION/UL (ref 3.88–5.62)
SODIUM SERPL-SCNC: 120 MMOL/L (ref 136–145)
SODIUM SERPL-SCNC: 128 MMOL/L (ref 136–145)
T WAVE AXIS: 22 DEGREES
T WAVE AXIS: 29 DEGREES
VENT-TC: ABNORMAL
VENTRICULAR RATE: 75 BPM
VENTRICULAR RATE: 80 BPM
WBC # BLD AUTO: 9.59 THOUSAND/UL (ref 4.31–10.16)

## 2017-11-30 PROCEDURE — 94640 AIRWAY INHALATION TREATMENT: CPT

## 2017-11-30 PROCEDURE — 82948 REAGENT STRIP/BLOOD GLUCOSE: CPT

## 2017-11-30 PROCEDURE — 94003 VENT MGMT INPAT SUBQ DAY: CPT

## 2017-11-30 PROCEDURE — 71010 HB CHEST X-RAY 1 VIEW FRONTAL (PORTABLE): CPT

## 2017-11-30 PROCEDURE — 94760 N-INVAS EAR/PLS OXIMETRY 1: CPT

## 2017-11-30 PROCEDURE — 83605 ASSAY OF LACTIC ACID: CPT | Performed by: NURSE PRACTITIONER

## 2017-11-30 PROCEDURE — 85025 COMPLETE CBC W/AUTO DIFF WBC: CPT | Performed by: INTERNAL MEDICINE

## 2017-11-30 PROCEDURE — 80048 BASIC METABOLIC PNL TOTAL CA: CPT | Performed by: NURSE PRACTITIONER

## 2017-11-30 PROCEDURE — 5A1D70Z PERFORMANCE OF URINARY FILTRATION, INTERMITTENT, LESS THAN 6 HOURS PER DAY: ICD-10-PCS | Performed by: INTERNAL MEDICINE

## 2017-11-30 PROCEDURE — 82805 BLOOD GASES W/O2 SATURATION: CPT | Performed by: NURSE PRACTITIONER

## 2017-11-30 PROCEDURE — 85730 THROMBOPLASTIN TIME PARTIAL: CPT | Performed by: INTERNAL MEDICINE

## 2017-11-30 PROCEDURE — 82009 KETONE BODYS QUAL: CPT | Performed by: NURSE PRACTITIONER

## 2017-11-30 PROCEDURE — 93005 ELECTROCARDIOGRAM TRACING: CPT | Performed by: NURSE PRACTITIONER

## 2017-11-30 PROCEDURE — 82330 ASSAY OF CALCIUM: CPT | Performed by: NURSE PRACTITIONER

## 2017-11-30 PROCEDURE — 83735 ASSAY OF MAGNESIUM: CPT | Performed by: NURSE PRACTITIONER

## 2017-11-30 PROCEDURE — 94150 VITAL CAPACITY TEST: CPT

## 2017-11-30 PROCEDURE — 80048 BASIC METABOLIC PNL TOTAL CA: CPT | Performed by: INTERNAL MEDICINE

## 2017-11-30 PROCEDURE — 93005 ELECTROCARDIOGRAM TRACING: CPT

## 2017-11-30 RX ORDER — AMIODARONE HYDROCHLORIDE 200 MG/1
200 TABLET ORAL 3 TIMES DAILY
Status: DISCONTINUED | OUTPATIENT
Start: 2017-11-30 | End: 2017-12-07 | Stop reason: HOSPADM

## 2017-11-30 RX ORDER — DEXTROSE MONOHYDRATE 25 G/50ML
INJECTION, SOLUTION INTRAVENOUS
Status: COMPLETED
Start: 2017-11-30 | End: 2017-11-30

## 2017-11-30 RX ORDER — MAGNESIUM SULFATE HEPTAHYDRATE 40 MG/ML
2 INJECTION, SOLUTION INTRAVENOUS ONCE
Status: COMPLETED | OUTPATIENT
Start: 2017-11-30 | End: 2017-12-01

## 2017-11-30 RX ORDER — DEXTROSE MONOHYDRATE 25 G/50ML
INJECTION, SOLUTION INTRAVENOUS
Status: DISPENSED
Start: 2017-11-30 | End: 2017-12-01

## 2017-11-30 RX ADMIN — LEVALBUTEROL 1.25 MG: 1.25 SOLUTION, CONCENTRATE RESPIRATORY (INHALATION) at 02:17

## 2017-11-30 RX ADMIN — DEXMEDETOMIDINE 0.2 MCG/KG/HR: 100 INJECTION, SOLUTION, CONCENTRATE INTRAVENOUS at 04:47

## 2017-11-30 RX ADMIN — NYSTATIN: 100000 POWDER TOPICAL at 08:10

## 2017-11-30 RX ADMIN — CHLORHEXIDINE GLUCONATE 15 ML: 1.2 RINSE ORAL at 08:09

## 2017-11-30 RX ADMIN — DEXTROSE MONOHYDRATE 50 ML: 500 INJECTION PARENTERAL at 14:02

## 2017-11-30 RX ADMIN — ISODIUM CHLORIDE 3 ML: 0.03 SOLUTION RESPIRATORY (INHALATION) at 02:17

## 2017-11-30 RX ADMIN — VANCOMYCIN 125 MG: KIT at 05:49

## 2017-11-30 RX ADMIN — Medication 20 MG: at 08:10

## 2017-11-30 RX ADMIN — LEVOTHYROXINE SODIUM 137 MCG: 112 TABLET ORAL at 05:49

## 2017-11-30 RX ADMIN — AMIODARONE HYDROCHLORIDE 200 MG: 200 TABLET ORAL at 21:43

## 2017-11-30 RX ADMIN — GABAPENTIN 300 MG: 300 CAPSULE ORAL at 21:43

## 2017-11-30 RX ADMIN — ISODIUM CHLORIDE 3 ML: 0.03 SOLUTION RESPIRATORY (INHALATION) at 07:17

## 2017-11-30 RX ADMIN — CITALOPRAM HYDROBROMIDE 10 MG: 20 TABLET ORAL at 08:09

## 2017-11-30 RX ADMIN — SODIUM CHLORIDE 8 UNITS/HR: 9 INJECTION, SOLUTION INTRAVENOUS at 06:21

## 2017-11-30 RX ADMIN — Medication 250 MG: at 17:50

## 2017-11-30 RX ADMIN — CHLORHEXIDINE GLUCONATE 15 ML: 1.2 RINSE ORAL at 21:43

## 2017-11-30 RX ADMIN — VANCOMYCIN 125 MG: KIT at 00:03

## 2017-11-30 RX ADMIN — HEPARIN SODIUM 14 UNITS/KG/HR: 10000 INJECTION, SOLUTION INTRAVENOUS at 03:00

## 2017-11-30 RX ADMIN — NYSTATIN: 100000 POWDER TOPICAL at 17:51

## 2017-11-30 RX ADMIN — AMIODARONE HYDROCHLORIDE 200 MG: 200 TABLET ORAL at 08:09

## 2017-11-30 RX ADMIN — CHOLESTYRAMINE 4 G: 4 POWDER, FOR SUSPENSION ORAL at 18:12

## 2017-11-30 RX ADMIN — ANORECTAL OINTMENT: 15.7; .44; 24; 20.6 OINTMENT TOPICAL at 17:51

## 2017-11-30 RX ADMIN — VANCOMYCIN 125 MG: KIT at 11:43

## 2017-11-30 RX ADMIN — AMIODARONE HYDROCHLORIDE 200 MG: 200 TABLET ORAL at 16:24

## 2017-11-30 RX ADMIN — VANCOMYCIN 125 MG: KIT at 17:57

## 2017-11-30 RX ADMIN — ANORECTAL OINTMENT: 15.7; .44; 24; 20.6 OINTMENT TOPICAL at 08:11

## 2017-11-30 RX ADMIN — LEVALBUTEROL 1.25 MG: 1.25 SOLUTION, CONCENTRATE RESPIRATORY (INHALATION) at 07:17

## 2017-11-30 RX ADMIN — Medication 250 MG: at 08:09

## 2017-11-30 NOTE — PROGRESS NOTES
Progress Note - Critical Care   Francesca Lagos 61 y o  male MRN: [id number]  Unit/Bed#:  Encounter: [id number]    Attending Physician: Camille Alvarez, DO      ______________________________________________________________________  Assessment and Plan:   Principal Problem:    Acute encephalopathy  Active Problems:    Type 1 diabetes mellitus with nephropathy ([facility] )    ESRD (end stage renal disease) on dialysis ([facility] )    Acute respiratory failure with hypoxia ([facility])    Rapid atrial fibrillation ([facility] )    HCAP (healthcare-associated pneumonia)    S/P percutaneous endoscopic gastrostomy (PEG) tube placement ([facility])    Chronic combined systolic and diastolic CHF (congestive heart failure) ([facility] )    Pseudomonas urinary tract infection    Hyponatremia    Clostridium difficile infection    Hypoglycemia    Acquired hypothyroidism    Chronic kidney disease-mineral and bone disorder    Hyperkalemia  Resolved Problems:    Elevated INR    Hypotension      Neuro:  Toxic metabolic encephalopathy improved-continue neuro checks, delirium precautions, sleep hygiene, Precedex drip started for agitation/restlessness goal RASS 0 to -1 currently at 0    CV:  AFib with RVR now normal sinus rhythm-p o  amnio, follow up with Cardiology regarding resumption of Lopressor versus amiodarone only, continue heparin drip with eventual transition back to Coumadin    Chronic combined CHF    Echo EF 35% with hypokinesis of the anterior septal and apical walls, decreased RV function, mild-to-moderate MR, trace pericardial effusion-Cardiology following, I/O, daily weight, fluid volume management via HD, appears euvolemic    Pulm:  Acute hypoxic respiratory failure secondary to encephalopathy-continue mechanical ventilation, attempt tube comp trial this a m , nebs, VAP bundle    Hcap present on admission-sputum culture rare gram-negative rods, MRSA pending, flu negative, strep pneumonia negative, continue cefepime however increased for pseudomonal coverage given urine culture    Right pleural effusion-volume removal per HD    GI:  Started tube feeds Nepro, ppi  Status post PEG    :  End-stage renal disease on HD Monday Wednesday Friday-Renal following, plan for HD today and Saturday and then to resume Monday Wednesday Friday schedule    Pseudomonas UTI-questionable colonization given patient voids once a month per wife, cefepime increased to 1 g Q 24 hours after dialysis for pseudomonal coverage    F/E/N:  Chronic hyponatremia-daily BMPs    Anion gap metabolic acidosis-blood sugar in the 400s, check lactate, check acetone, check VBG, if all negative than likely secondary to creatinine    Hyperkalemia-check EKG if unremarkable monitor given starting of insulin drip and plans for HD today, would not treat with D5 and insulin given hypoglycemia prior, would consider bicarb albuterol and Kayexalate if EKG changes    ID:  Hcap present on admission  Blood cultures pending  MRSA pending  Sputum culture rare gram-negative rods  Urine culture Pseudomonas -continue cefepime however dosing increased for pseudomonal coverage day 4    Chronic recurrent C diff-p o  Vanco dose increased while on antibiotics    Heme:  Chronic anemia baseline 10-11-hemoglobin dropped 1 g, repeat hemoglobin stable, CBC in a m  Endo:  Hypoglycemia now with hyperglycemia/DM 1 blood sugar -D10 drip DC, restart insulin drip follow-up on acetone and VBG, may need lower Levemir dose seen given renal failure and a m  versus p m  dosing, consider Levemir 2 units in the a m  with sliding scale insulin once extubated and taking p o      Hypothyroidism TSH 22 with normal free T4-continue Synthroid dose was increased this admission from 125-137, repeat TSH with reflux and 4-6 weeks     Msk/Skin:  T/P q 2 hours    Disposition:  Patient awake and following commands, plan for tube comp trial this a m  and possible extubation, follow up on labs and start insulin drip monitor closely given prior hypoglycemia    Code Status: Level 1 - Full Code    Counseling / Coordination of Care  Total time spent today 31 minutes  Greater than 50% of total time was spent with the patient and / or family counseling and / or coordination of care  A description of the counseling / coordination of care: Plan of care  ______________________________________________________________________    Chief Complaint:  Shakes head yes noted questions appropriately denies pain or shortness of breath    24 Hour Events:   Patient remained hypoglycemic requiring boluses of dextrose along with increase of D10 to 30 cc/hour  Overnight blood sugars 180s to 200s dextrose initially decreased to 20 cc/hour and then held  Patient following commands    This a m  patient with an anion gap metabolic acidosis, hyperkalemia, blood sugar of 415  Labs are pending will resume insulin drip to watch for hypoglycemia closely  Patient alert following commands restless and started on a Precedex drip     ______________________________________________________________________    Physical Exam:   Physical Exam   Constitutional: He appears lethargic  He is sedated, intubated and restrained  HENT:   Head: Normocephalic and atraumatic  Mouth/Throat: Oropharynx is clear and moist    Eyes: No scleral icterus  Right pupil greater than left   Neck: Neck supple  No JVD present  Cardiovascular: Regular rhythm, normal heart sounds and intact distal pulses  Bradycardia present  Exam reveals no gallop and no friction rub  No murmur heard  Pulmonary/Chest: Effort normal  He is intubated  No respiratory distress  He has decreased breath sounds in the right middle field and the right lower field  He has no wheezes  He has no rales  Abdominal: Soft  Bowel sounds are normal  He exhibits no distension  Musculoskeletal: He exhibits no edema  Neurological: He appears lethargic  GCS eye subscore is 3  GCS verbal subscore is 1  GCS motor subscore is 6     Skin: Skin is warm and dry  No rash noted  No erythema  No pallor  Fistula with bruit/through         ______________________________________________________________________  Vitals:    17 0000 17 0218 17 0300 17 0400   BP: 122/58  118/56 157/70   Pulse: 66  68 80   Resp:    Temp:   97 6 °F (36 4 °C)    TempSrc:   Axillary    SpO2: 100% 100% 100% 100%   Weight:       Height:           Temperature:   Temp (24hrs), Av 6 °F (36 4 °C), Min:97 3 °F (36 3 °C), Max:98 2 °F (36 8 °C)    Current Temperature: 97 6 °F (36 4 °C)  Weights:   IBW: 63 8 kg    Body mass index is 21 46 kg/m²  Weight (last 2 days)     Date/Time   Weight    17 0600  60 3 (132 94)            Hemodynamic Monitoring:  N/A     Non-Invasive/Invasive Ventilation Settings:  Respiratory    Lab Data (Last 4 hours)    None         O2/Vent Data (Last 4 hours)    None              No results found for: PHART, PKI8DDT, PO2ART, CGA6XDB, F3KXFZRF, BEART, SOURCE  SpO2: SpO2: 100 %  Intake and Outputs:  I/O        07 -  0700  07 -  0700    P  O  480     I V  (mL/kg) 500 (8 3) 856 8 (14 2)    IV Piggyback  250    Total Intake(mL/kg) 980 (16 3) 1106 8 (18 4)    Other 2756     Stool 0     Total Output 2756      Net -1776 +1106 8          Unmeasured Stool Occurrence 1 x 1 x          Nutrition:        Diet Orders            Start     Ordered    17  Diet Enteral/Parenteral; Tube Feeding No Oral Diet; Nepro; 35  Diet effective now     Comments:  startb at 10cc/hr advance by 10cc/hr q 4 hrs to goal of 35cc/hr hold for residual >250cc   Question Answer Comment   Diet Type Enteral/Parenteral    Enteral/Parenteral Tube Feeding No Oral Diet    Tube Feeding Formula: Nepro    Tube Feeding Continuous rate (mL/hr): 35    RD to adjust diet per protocol?  Yes        17  Room Service  Once     Question:  Type of Service  Answer:  Room Service - Appropriate with Assistance    17 Labs:     Results from last 7 days  Lab Units 11/30/17 0346 11/29/17  0928 11/29/17  0759 11/29/17  0522  11/29/17  0452  11/28/17  0404   WBC Thousand/uL 9 59 7 29  --  9 61  --  8 10  < > 7 30   HEMOGLOBIN g/dL 10 9* 9 2*  --  10 5*  --  11 3*  < > 10 5*   I STAT HEMOGLOBIN g/dl  --   --  10 5*  --   < >  --   --   --    HEMATOCRIT % 33 7* 29 8*  --  33 2*  --  35 5*  < > 32 8*   PLATELETS Thousands/uL 332 285  --  353  --  416*  < > 344   NEUTROS PCT % 75  --   --   --   --  65  --  73   MONOS PCT % 10  --   --   --   --  21*  --  11   MONO PCT MAN %  --   --   --  16*  --   --   < >  --    < > = values in this interval not displayed  Results from last 7 days  Lab Units 11/30/17 0346 11/29/17 0759 11/29/17  0521  11/29/17 0452 11/28/17  2040  11/27/17  1623   SODIUM mmol/L 120*  --  125*  --  127* 118*  < > 126*   POTASSIUM mmol/L 5 5*  --  3 7  --  3 7 4 8  < >  --    CHLORIDE mmol/L 85*  --  92*  --  91* 86*  < > 91*   CO2 mmol/L 20*  --  22  --  25 22  < > 22   BUN mg/dL 49*  --  41*  --  41* 33*  < > 98*   CREATININE mg/dL 5 95*  --  4 51*  --  4 65* 3 99*  < > 8 74*   CALCIUM mg/dL 8 2*  --  8 4  --  9 1 8 3  < > 8 6   TOTAL PROTEIN g/dL  --   --  7 8  --   --  7 9  --  8 2   BILIRUBIN TOTAL mg/dL  --   --  0 60  --   --  0 80  --  0 50   ALK PHOS U/L  --   --  171*  --   --  199*  --  183*   ALT U/L  --   --  16  --   --  20  --  24   AST U/L  --   --  21  --   --  18  --  30   GLUCOSE RANDOM mg/dL 415*  --  84  --  31* 735*  < > 209*   GLUCOSE, ISTAT mg/dl  --  82  --   < >  --   --   < >  --    < > = values in this interval not displayed      Results from last 7 days  Lab Units 11/29/17  0521 11/27/17  1140   MAGNESIUM mg/dL 2 0 2 1     Lab Results   Component Value Date    PHOS 5 2 (H) 11/29/2017    PHOS 4 5 (H) 09/25/2017    PHOS 3 2 09/20/2017    PHOS 2 5 07/24/2015    PHOS 6 2 (H) 07/01/2015        Results from last 7 days  Lab Units 11/30/17  0347 11/29/17  2128 11/29/17  1351 11/29/17  0928 11/29/17  0521  11/29/17  0452   INR   --   --   --  1 58* 1 47*  --  1 42*   PTT seconds 60* 60* 43* 65* 59*  < >  --    < > = values in this interval not displayed  0  Lab Value Date/Time   TROPONINI <0 02 11/29/2017 0521   TROPONINI <0 02 11/28/2017 2040   TROPONINI <0 02 11/27/2017 1140   TROPONINI <0 02 09/17/2017 1514   TROPONINI 0 04 07/08/2017 0112   TROPONINI 0 04 07/07/2017 1510   TROPONINI 0 04 (H) 04/02/2017 1304   TROPONINI 0 04 (H) 04/02/2017 0939   TROPONINI 0 02 04/02/2017 0342   TROPONINI <0 02 03/31/2017 2055   TROPONINI <0 02 09/10/2016 1728       Results from last 7 days  Lab Units 11/29/17  0522   LACTIC ACID mmol/L 1 8     ABG:  Lab Results   Component Value Date    PHART 7 352 06/16/2017    ROP1DUB 45 1 (H) 06/16/2017    PO2ART 116 9 06/16/2017    AXR3NKR 24 5 06/16/2017    BEART -1 2 06/16/2017    SOURCE Brachial, Left 06/16/2017     Imaging:  Chest x-ray with right effusion/opacity, ET tube appropriate position I have personally reviewed pertinent reports  and I have personally reviewed pertinent films in PACS  EKG:  No events on tele other than conversion pause at 10:00 a m  Micro:  Lab Results   Component Value Date    BLOODCX No Growth After 5 Days  09/17/2017    BLOODCX No Growth After 5 Days  09/17/2017    BLOODCX No Growth After 5 Days   08/03/2017    URINECX >100,000 cfu/ml Pseudomonas aeruginosa (A) 11/27/2017    URINECX >100,000 cfu/ml Pseudomonas aeruginosa 09/17/2017    URINECX  09/17/2017     7635-6009 cfu/ml Oxidase Positive gram negative jose alfredo    WOUNDCULT (A) 04/04/2017     4+ Growth of Vancomycin Resistant Enterococcus faecalis    WOUNDCULT (A) 04/04/2017     3+ Growth of Methicillin Resistant Staphylococcus aureus    WOUNDCULT (A) 02/18/2017     3+ Growth of Methicillin Resistant Staphylococcus aureus    WOUNDCULT 2+ Growth of Mixed Skin Reanna 02/18/2017    SPUTUMCULTUR 2+ Growth of Candida sp  presumptively albicans 04/01/2017    SPUTUMCULTUR 1+ Growth of Mixed Respiratory Reanna 04/01/2017    SPUTUMCULTUR 2+ Growth of Candida sp  presumptively albicans 12/30/2016    SPUTUMCULTUR 2+ Growth of Mixed Respiratory Reanna 12/30/2016     Allergies: No Known Allergies  Medications:   Scheduled Meds:    amiodarone 200 mg Oral Daily With Breakfast   b complex-vitamin C-folic acid 1 capsule Oral Daily   cefepime 1,000 mg Intravenous Q24H   chlorhexidine 15 mL Swish & Spit Q12H [address]   cholestyramine sugar free 4 g Oral BID   cinacalcet 30 mg Oral HS   citalopram 10 mg Oral Daily   gabapentin 300 mg Oral HS   levalbuterol 1 25 mg Nebulization Q6H   levothyroxine 137 mcg Oral Early Morning   menthol-zinc oxide  Topical BID   nystatin  Topical BID   omeprazole (PRILOSEC) suspension 2 mg/mL 20 mg Oral Daily   saccharomyces boulardii 250 mg Oral BID   sodium chloride 3 mL Nebulization Q6H   vancomycin 125 mg Oral Q6H [address]     Continuous Infusions:    dexmedetomidine 0 1-0 7 mcg/kg/hr Last Rate: 0 2 mcg/kg/hr (11/30/17 0447)   heparin (porcine) 3-20 Units/kg/hr (Order-Specific) Last Rate: 14 Units/kg/hr (11/30/17 0300)   insulin regular (HumuLIN R,NovoLIN R) infusion 0 3-21 Units/hr      PRN Meds:    acetaminophen 650 mg Q6H PRN   albuterol 2 5 mg Q6H PRN   etomidate  Code/Trauma/Sedation Med   fentanyl citrate (PF)  Code/Trauma/Sedation Med   heparin (porcine) 1,800 Units PRN   heparin (porcine) 3,600 Units PRN   metoprolol 2 5 mg Q6H PRN   midazolam  Code/Trauma/Sedation Med     VTE Pharmacologic Prophylaxis: Heparin Drip  VTE Mechanical Prophylaxis: sequential compression device  Invasive lines and devices:   Invasive Devices     Peripheral Intravenous Line            Peripheral IV 11/29/17 Left Arm 1 day    Peripheral IV 11/29/17 Left Antecubital less than 1 day    Peripheral IV 11/29/17 Left Wrist less than 1 day          Line            Hemodialysis AV Fistula  Right Forearm -- days    Hemodialysis AV Fistula Right Forearm -- days          Drain            Gastrostomy/Enterostomy -- days    Gastrostomy/Enterostomy Percutaneous endoscopic gastrostomy (PEG) 20 Fr   days          Airway            ETT  Hi-Lo; Cuffed 8 mm 1 day                     Portions of the record may have been created with voice recognition software  Occasional wrong word or "sound a like" substitutions may have occurred due to the inherent limitations of voice recognition software  Read the chart carefully and recognize, using context, where substitutions have occurred      ALIE Lebron

## 2017-11-30 NOTE — PROGRESS NOTES
Progress Note - Cardiology   Prema Freedman 61 y o  male MRN: [id number]  Unit/Bed#:  Encounter: [id number]  11/30/17  9:43 AM        Assessment/Plan:    1  Paroxysmal atrial fibrillation  Converted to SR/SB with IV amiodarone  On IV heparin now that INR subtherapeutic until can resume Coumadin  Started on oral Amiodarone to help maintain SR, as currently cannot resume Metoprolol due to hypotension  2  Chronic combined HF  EF by 35% by echo 4/17, repeat echo shows EF unchanged  Spoke with wife this morning, she was not aware his EF was decreased  Denies any history of MI or prior coronary interventions  Will eventually need risk stratification with stress testing as outpt if he has not been evaluated previously, which was explained to wife  She would like him to follow up with [facility] after discharge  At baseline he is mostly wheelchair bound due to his prior amputation  She reports his baseline mental status waxes and wanes, he has never been diagnosed as having dementia, but she wonders if he does have it, as he sometimes has difficulty orienting to where he is, or finding words  Volume managed by HD  At home was supposedly taking Metoprolol tartrate 100 bid, currently held due to borderline hypotension  3  ESRD  Volume managed by HD, plan on HD today  Has right sided effusion on CXR  4  DM  On insulin drip  Hgb A1c was checked and was 9%  5  History of aortic valve endocarditis  Treated with IV antibiotics for appropriate time course  Follow up echo did not show any evidence of aortic valve vegetation  6  HCAP  Being treated with Cefepime  Now on Vancomycin for chronic recurrent C diff as well  Felt to have Pseudomonas colonization in urine  Subjective/Objective   Chief Complaint:   Chief Complaint   Patient presents with    Fatigue     Spouse reports increased fatigue and decreased appetite for 1 week  PT reports painful urination for several months   Spouse also states the pt fell and hit his head on 11/25  Subjective: 61y o  year old male with DM 1, atrial fibrillation, chronic right pleural effusion s/p thoracentesis , PEG tube placement , chronic combined heart failure, ESRD, DM, who presents with altered mental status, urinary symptoms and lethargy  He had missed HD X1  We have been consulted regarding rapid atrial fibrillation  He is on warfarin  CT head no acute pathology  CT abdomen/pelvis- moderate right pleural effusion with adjacent opacities possibly atelectasis vs aspiration or PNA  He had prolonged hospitalizations in April and June this year  In April he was admitted with altered mental status  He had a PEA arrest in the setting of AV endocarditis/profound hypoglycemia per documentation  He underwent R BKA for chronic nonhealing ulcers  Hospital course was complicated by bacteremia with MRSA and VRE, C diff colitis  Peg tube was placed  MARIA revealed probable vegetation on AV for which he completed 6 weeks of antibiotics  He was hospitalized in June with altered mental status, acute hypoxic and hypercapneic respiratory failure, pulmonary edema and sepsis  MARIA 4/2017- EF 35% with moderate diffuse hypokinesis  Grade 1 DD  RV normal  Mild LAE and ESA  Mild to moderate MR  AV- probable small, mobile vegetation on the right coronary cusp, on the left ventricular aspect  No significant AI  On 11/28 he was noted to have gone into afib with RVR, he has a history of paroxysmal afib and was on Coumadin prior to admission  He was started on IV amiodarone to help maintain SR  Per report patient had an episode of unresponsiveness while receiving amiodarone bolus, which resolved after 10 seconds, no arrhythmias or bradycardia noted on telemetry at time, it was felt possibly due to hypotension/hypoperfusion at that time   He was hyperglycemic and insulin drip was started for improved glycemic control, and early morning of 11/29 he had another episode of being unresponsive and diaphoretic, found to be hypoglycemic with FS less than 20  He was given dextrose, with improved FS to 123, but still unresponsive and hypoxic to 77%, intubated for airway protection  He converted back to SR/SB in 40s around 10:30 AM on 11/29, has since remained in SR  IV amiodarone stopped due to bradycardia in SR  Remains intubated  No fevers  Attempting to extubate today  Family reports he was seeing a cardiologist at [facility], last office appt was after his hospitalization in April, but wife reports she would like him to switch to [facility] after this discharge      Patient Active Problem List   Diagnosis    Type 1 diabetes mellitus with nephropathy ([facility] )    ESRD (end stage renal disease) on dialysis ([facility] )    Ambulatory dysfunction    Acute respiratory failure with hypoxia (HCC)    Rapid atrial fibrillation ([facility] )    HCAP (healthcare-associated pneumonia)    Recurrent colitis due to Clostridium difficile    S/P percutaneous endoscopic gastrostomy (PEG) tube placement ([facility] )    Pleural effusion on right    Chronic combined systolic and diastolic CHF (congestive heart failure) ([facility] )    R Fibula fracture, proximal to stump    Pseudomonas urinary tract infection    DVT, lower extremity (HCC)    Hyponatremia    Acute encephalopathy    Metabolic acidosis, increased anion gap    Clostridium difficile infection    Hypoglycemia    Anemia    Acquired hypothyroidism    Chronic kidney disease-mineral and bone disorder    Hyperkalemia     Past Medical History:   Diagnosis Date    Acquired hypothyroidism     underactive    Atrial fibrillation ([facility] )     Chronic kidney disease-mineral and bone disorder 11/27/2017    Clostridium difficile infection 04/2017    Diabetes mellitus ([facility] )     Dialysis patient ([facility] )     Diarrhea     ESRD (end stage renal disease) on dialysis ([facility] )     Hx of cardiac arrest     Hypertension     Neuropathy     Right lower lobe pneumonia ([facility] ) 2/20/2017  Sepsis ([facility] ) 04/2017    Polymicrobial MRSA, VRE    Systolic and diastolic CHF, chronic (HCC)     EF 35%, Grade II DD       No Known Allergies    Current Facility-Administered Medications   Medication Dose Route Frequency Provider Last Rate Last Dose    acetaminophen (TYLENOL) tablet 650 mg  650 mg Oral Q6H PRN Long Billingsley PA-C        albuterol inhalation solution 2 5 mg  2 5 mg Nebulization Q6H PRN Alexy Neibert, DO        amiodarone tablet 200 mg  200 mg Oral Daily With Breakfast ALIE Rodriguez   200 mg at 11/30/17 0809    b complex-vitamin C-folic acid (NEPHROCAPS) capsule 1 capsule  1 capsule Oral Daily Long Billingsley PA-C   1 capsule at 11/28/17 1239    cefepime (MAXIPIME) IVPB (premix) 1,000 mg  1,000 mg Intravenous Q24H ALIE Rodriguez        chlorhexidine (PERIDEX) 0 12 % oral rinse 15 mL  15 mL Swish & Spit Q12H [facility] ALIE Rodriguez   15 mL at 11/30/17 0809    cholestyramine sugar free (QUESTRAN LIGHT) packet 4 g  4 g Oral BID Aayush Reilly PA-C   4 g at 11/29/17 2126    cinacalcet (SENSIPAR) tablet 30 mg  30 mg Oral HS Long Billingsley PA-C   30 mg at 11/28/17 2116    citalopram (CeleXA) tablet 10 mg  10 mg Oral Daily Catina Brink PA-C   10 mg at 11/30/17 0809    dexmedetomidine (PRECEDEX) 200 mcg in sodium chloride 0 9 % 50 mL infusion  0 1-0 7 mcg/kg/hr Intravenous Titrated ALIE House   Stopped at 11/30/17 0500    etomidate (AMIDATE) 2 mg/mL injection    Code/Trauma/Sedation Med ALIE House   20 mg at 11/29/17 0506    fentanyl citrate (PF) 100 MCG/2ML   Intravenous Code/Trauma/Sedation Med ALIE House   50 mcg at 11/29/17 5131    gabapentin (NEURONTIN) capsule 300 mg  300 mg Oral HS Long Billingsley PA-C   300 mg at 11/29/17 2126    heparin (porcine) 25,000 units in 250 mL infusion (premix)  3-20 Units/kg/hr (Order-Specific) Intravenous Titrated ALIE House 8 4 mL/hr at 11/30/17 0300 14 Units/kg/hr at 11/30/17 0300    heparin (porcine) injection 1,800 Units  1,800 Units Intravenous PRN Charleston Lacrosse, CRNP   1,800 Units at 17 1443    heparin (porcine) injection 3,600 Units  3,600 Units Intravenous PRN Maribel Lacrosse, CRNP        insulin regular (HumuLIN R,NovoLIN R) 1 Units/mL in sodium chloride 0 9 % 100 mL infusion  0 3-21 Units/hr Intravenous Titrated Maribel Lacrosse, CRNP 8 mL/hr at 17 0756 8 Units/hr at 17 0756    levalbuterol (XOPENEX) inhalation solution 1 25 mg  1 25 mg Nebulization Q6H Lindsey Ahmadi, SUKUMARNP   1 25 mg at 17 3105    levothyroxine tablet 137 mcg  137 mcg Oral Early Morning AlveTERESA Payne-C   137 mcg at 17 0549    menthol-zinc oxide (CALMOSEPTINE) 0 44-20 6 % ointment   Topical BID Alisha Moreno PA-C        metoprolol (LOPRESSOR) injection 2 5 mg  2 5 mg Intravenous Q6H PRN Charleston Lacrosse, CRNP        midazolam (VERSED) injection    Code/Trauma/Sedation Med Charleston Lacrosse, CRNP   2 mg at 17 3363    nystatin (MYCOSTATIN) powder   Topical BID Alisha Moreno PA-C        omeprazole (PRILOSEC) suspension 2 mg/mL  20 mg Oral Daily Maribel Lacrosse, CRNP   20 mg at 17 4123    saccharomyces boulardii (FLORASTOR) capsule 250 mg  250 mg Oral BID Alveda TERESA Ballesteros-C   250 mg at 17 0809    sodium chloride 0 9 % inhalation solution 3 mL  3 mL Nebulization Q6H Scheryl Nephew, DO   3 mL at 17 0717    vancomycin (VANCOCIN) oral solution 125 mg  125 mg Oral Q6H [address] Alisha Moreno PA-C   125 mg at 17 0549       Vitals: BP (!) 77/48   Pulse 88   Temp 98 2 °F (36 8 °C) (Axillary)   Resp 15   Ht 5' 6" (1 676 m)   Wt 60 3 kg (132 lb 15 oz)   SpO2 100%   BMI 46 05 kg/m²     Systolic (09GBJ), QLV:278 , Min:77 , JORDAN:631     Diastolic (05ERI), K, Min:44, Max:70      Vitals:    17 1903 17 0600   Weight: 63 1 kg (139 lb 1 8 oz) 60 3 kg (132 lb 15 oz)     Orthostatic Blood Pressures    Flowsheet Row Most Recent Value   Blood Pressure   77/48 filed at 11/30/2017 0900   Patient Position - Orthostatic VS  Lying filed at 11/30/2017 0719            Intake/Output Summary (Last 24 hours) at 11/30/17 0943  Last data filed at 11/30/17 0600   Gross per 24 hour   Intake           612 06 ml   Output                0 ml   Net           612 06 ml       Invasive Devices     Peripheral Intravenous Line            Peripheral IV 11/29/17 Left Antecubital 1 day    Peripheral IV 11/29/17 Left Arm 1 day    Peripheral IV 11/29/17 Left Wrist 1 day          Line            Hemodialysis AV Fistula  Right Forearm -- days    Hemodialysis AV Fistula Right Forearm -- days          Drain            Gastrostomy/Enterostomy -- days    Gastrostomy/Enterostomy Percutaneous endoscopic gastrostomy (PEG) 20 Fr   days          Airway            ETT  Hi-Lo; Cuffed 8 mm 1 day                  Physical Exam:     GEN: Intubated, sedated, in no acute distress  HEENT: Sclera anicteric, conjunctivae pink, mucous membranes moist   NECK: Supple, no carotid bruits, no significant JVD  HEART:Regular, HR controlled, no murmurs, clicks, gallops or rubs  LUNGS: Clear to auscultation bilaterally anteriorly; no wheezes, rales, or rhonchi   ABDOMEN: Soft, nontender, nondistended, normoactive bowel sounds  EXTREMITIES: Skin warm and well perfused, no clubbing, cyanosis, or edema  NEURO: No focal findings  SKIN: Normal without suspicious lesions on exposed skin        Lab Results:     Troponins:     Results from last 7 days  Lab Units 11/29/17  0521 11/28/17  2040 11/27/17  1140   TROPONIN I ng/mL <0 02 <0 02 <0 02       CBC with diff:   Results from last 7 days  Lab Units 11/30/17  0346 11/29/17  0928 11/29/17  0759 11/29/17  0522 11/29/17  0501 11/29/17  0452 11/28/17  2040 11/28/17  0404  11/27/17  1140   WBC Thousand/uL 9 59 7 29  --  9 61  --  8 10 7 59 7 30  --  11 96*   HEMOGLOBIN g/dL 10 9* 9 2*  --  10 5*  --  11 3* 11 0* 10 5*  --  11 1*   I STAT HEMOGLOBIN g/dl  --   --  10 5*  --  11 2*  --   --   --   < >  --    HEMATOCRIT % 33 7* 29 8*  --  33 2*  --  35 5* 34 7* 32 8*  --  34 7*   MCV fL 92 95  --  93  --  92 94 92  --  92   PLATELETS Thousands/uL 332 285  --  353  --  416* 258 344  --  340   MCH pg 29 7 29 4  --  29 4  --  29 1 29 6 29 4  --  29 4   MCHC g/dL 32 3 30 9*  --  31 6  --  31 8 31 7 32 0  --  32 0   RDW % 15 4* 15 9*  --  15 6*  --  15 4* 15 8* 16 1*  --  16 0*   MPV fL 9 3 9 4  --  9 0  --  9 1 9 3 9 3  --  9 6   < > = values in this interval not displayed        CMP:    Results from last 7 days  Lab Units 11/30/17  0346 11/29/17  0759 11/29/17  0521 11/29/17  0501 11/29/17  0452 11/28/17  2040 11/28/17  0403 11/27/17  1708 11/27/17  1623   SODIUM mmol/L 120*  --  125*  --  127* 118* 126*  --  126*   POTASSIUM mmol/L 5 5*  --  3 7  --  3 7 4 8 5 8*  --   --    CHLORIDE mmol/L 85*  --  92*  --  91* 86* 90*  --  91*   CO2 mmol/L 20*  --  22  --  25 22 24  --  22   ANION GAP mmol/L 15*  --  11  --  11 10 12  --  13   BUN mg/dL 49*  --  41*  --  41* 33* 55*  --  98*   CREATININE mg/dL 5 95*  --  4 51*  --  4 65* 3 99* 5 98*  --  8 74*   GLUCOSE RANDOM mg/dL 415*  --  84  --  31* 735* 141*  --  209*   GLUCOSE, ISTAT mg/dl  --  82  --  180*  --   --   --  199*  --    CALCIUM mg/dL 8 2*  --  8 4  --  9 1 8 3 8 2*  --  8 6   AST U/L  --   --  21  --   --  18  --   --  30   ALT U/L  --   --  16  --   --  20  --   --  24   ALK PHOS U/L  --   --  171*  --   --  199*  --   --  183*   TOTAL PROTEIN g/dL  --   --  7 8  --   --  7 9  --   --  8 2   ALBUMIN g/dL  --   --  2 1*  --   --  2 1*  --   --  2 3*   BILIRUBIN TOTAL mg/dL  --   --  0 60  --   --  0 80  --   --  0 50   EGFR ml/min/1 73sq m 9  --  13  --  12 15 9 6 6       Magnesium:     Results from last 7 days  Lab Units 11/29/17  0521 11/27/17  1140   MAGNESIUM mg/dL 2 0 2 1       Coags:     Results from last 7 days  Lab Units 11/30/17  0627 11/30/17  0347 11/29/17  2128 11/29/17  1351 17  0928 17  0521 17  0452 17  1030 17  0404 17  2045   PTT seconds 76* 60* 60* 43* 65* 59*  --   --   --   --    INR   --   --   --   --  1 58* 1 47* 1 42* 3 44* 5 31* 4 69*     Hgb A1c:     Results from last 7 days  Lab Units 17  204   HEMOGLOBIN A1C % 9 4*       Cardiac testing:   Results for orders placed during the hospital encounter of 17   Echo complete with contrast if indicated    Narrative [address]  [address]  [phone]    Transthoracic Echocardiogram  2D, M-mode, Doppler, and Color Doppler    Study date:  2017    Patient: Shane Kendrick  MR number: [id number]  Account number: [de-identified]  : 1950  Age: 61 years  Gender: Male  Status: Inpatient  Location: Bedside  Height: 65 in  Weight: 140 8 lb  BP: 145/ 74 mmHg    Indications: Evaluate for suspected valvular vegetation  Diagnoses: R78 81 - Bacteremia    Sonographer:  ZACHARY Salazar  Referring Physician:  Hamida Link PA-C  Group:  [facility]  Cardiology Fellow:  Elida Castellanos MD  Interpreting Physician:  Lieutenant Loulou MD    SUMMARY    LEFT VENTRICLE:  The ventricle was mildly dilated  Systolic function was moderately to markedly reduced by visual assessment  Ejection fraction was estimated to be 35 %  There was akinesis of the basal-mid inferoseptal, entire inferior, basal-mid inferolateral, and apical septal wall(s)  The changes were consistent with eccentric hypertrophy  Features were consistent with a pseudonormal left ventricular filling pattern, with concomitant abnormal relaxation and increased filling pressure (grade 2 diastolic dysfunction)  RIGHT VENTRICLE:  The size was normal   Systolic function was normal     LEFT ATRIUM:  The atrium was mildly dilated  ATRIAL SEPTUM:  The septum bows from left to right, consistent with increased left atrial pressure      RIGHT ATRIUM:  The atrium was mildly dilated  MITRAL VALVE:  There was mild to moderate annular calcification  There was mild regurgitation  AORTIC VALVE:  The valve was trileaflet  Leaflets exhibited mildly increased thickness, mild to moderate calcification, normal cuspal separation, and sclerosis  TRICUSPID VALVE:  There was trace regurgitation  IVC, HEPATIC VEINS:  The inferior vena cava was dilated  PERICARDIUM:  A trace, loculated pericardial effusion was identified anterior to the heart  HISTORY: PRIOR HISTORY: Cardiac arrest, ESRD, DM1, Cdif, Afib, Alchohol use    PROCEDURE: The procedure was performed at the bedside  This was a routine study  The transthoracic approach was used  The study included complete 2D imaging, M-mode, complete spectral Doppler, and color Doppler  The heart rate was 80 bpm,  at the start of the study  Images were obtained from the parasternal, apical, subcostal, and suprasternal notch acoustic windows  Image quality was adequate  LEFT VENTRICLE: The ventricle was mildly dilated  Systolic function was moderately to markedly reduced by visual assessment  Ejection fraction was estimated to be 35 %  There was akinesis of the basal-mid inferoseptal, entire inferior,  basal-mid inferolateral, and apical septal wall(s)  Wall thickness was increased  The changes were consistent with eccentric hypertrophy  DOPPLER: Features were consistent with a pseudonormal left ventricular filling pattern, with  concomitant abnormal relaxation and increased filling pressure (grade 2 diastolic dysfunction)  RIGHT VENTRICLE: The size was normal  Systolic function was normal  Wall thickness was normal     LEFT ATRIUM: The atrium was mildly dilated  ATRIAL SEPTUM: The septum bows from left to right, consistent with increased left atrial pressure  RIGHT ATRIUM: The atrium was mildly dilated  MITRAL VALVE: There was mild to moderate annular calcification   There was mild-moderate thickening  There was mild-moderate calcification  There was normal leaflet separation  DOPPLER: The transmitral velocity was within the normal range  There was no evidence for stenosis  There was mild regurgitation  AORTIC VALVE: The valve was trileaflet  Leaflets exhibited mildly increased thickness, mild to moderate calcification, normal cuspal separation, and sclerosis  DOPPLER: Transaortic velocity was within the normal range  There was no  evidence for stenosis  There was no regurgitation  TRICUSPID VALVE: The valve structure was normal  There was normal leaflet separation  DOPPLER: The transtricuspid velocity was within the normal range  There was no evidence for stenosis  There was trace regurgitation  PULMONIC VALVE: Leaflets exhibited normal thickness, no calcification, and normal cuspal separation  DOPPLER: The transpulmonic velocity was within the normal range  There was no regurgitation  PERICARDIUM: A trace, loculated pericardial effusion was identified anterior to the heart  AORTA: The root exhibited normal size and fibrocalcific change  SYSTEMIC VEINS: IVC: The inferior vena cava was dilated      SYSTEM MEASUREMENT TABLES    2D  %FS: 17 51 %  Ao Diam: 3 3 cm  EDV(Teich): 129 17 ml  EF Biplane: 40 01 %  EF(Teich): 36 23 %  ESV(Teich): 82 37 ml  IVSd: 1 19 cm  LA Area: 22 27 cm2  LA Diam: 3 95 cm  LVEDV MOD A2C: 146 9 ml  LVEDV MOD A4C: 140 92 ml  LVEDV MOD BP: 144 09 ml  LVEF MOD A2C: 38 23 %  LVEF MOD A4C: 42 19 %  LVESV MOD A2C: 90 74 ml  LVESV MOD A4C: 81 47 ml  LVESV MOD BP: 86 44 ml  LVIDd: 5 19 cm  LVIDs: 4 28 cm  LVLd A2C: 8 75 cm  LVLd A4C: 8 72 cm  LVLs A2C: 7 87 cm  LVLs A4C: 7 98 cm  LVPWd: 1 15 cm  RA Area: 17 74 cm2  RVIDd: 3 51 cm  SV MOD A2C: 56 16 ml  SV MOD A4C: 59 45 ml  SV(Teich): 46 8 ml    MM  TAPSE: 2 03 cm    PW  AVC: 369 41 ms  E': 0 03 m/s  E/E': 19 2  MV A Jose: 0 52 m/s  MV Dec [place]: 2 82 m/s2  MV DecT: 224 73 ms  MV E Jose: 0 63 m/s  MV E/A Ratio: 1 21  MV PHT: 65 17 ms  MVA By PHT: 3 38 cm2    [employer] Accredited Echocardiography Laboratory    Prepared and electronically signed by    Jarad Grier MD  Signed 2017 09:59:07       Results for orders placed during the hospital encounter of 17   MARIA    Narrative [address]  [facility] [place], [address] [facility]  [phone]    Transesophageal Echocardiogram  2D, 3D, Doppler, and Color Doppler    Study date:  2017    Patient: Rita Ceja  MR number: [id number]  Account number: [de-identified]  : 1950  Age: 61 years  Gender: Male  Status: Inpatient  Location: Echo lab  Height: 65 in  Weight: 137 9 lb  BP: 127/ 66 mmHg    Indications: Evaluate for suspected valvular vegetation  Atrial fibrillation  Respiratory failure  Diagnoses: R78 81 - Bacteremia    Sonographer:  Ralene Phalen, RCS  Interpreting Physician:  Debbie Wayne DO  Primary Physician:  Corinna Wheeler MD  Group:  [facility]  Cardiology Fellow:  Jerrod Parks MD  RN:  Madeline Lancaster [facility]    SUMMARY    LEFT VENTRICLE:  The ventricle was dilated  Systolic function was severely reduced  Ejection fraction was estimated to be 35 %  There was moderate diffuse hypokinesis with distinct regional wall motion abnormalities  Doppler parameters were consistent with abnormal left ventricular relaxation (grade 1 diastolic dysfunction)  RIGHT VENTRICLE:  The size was normal   Systolic function was normal     LEFT ATRIUM:  The atrium was mildly dilated  RIGHT ATRIUM:  The atrium was mildly dilated  MITRAL VALVE:  There was mild to moderate regurgitation  AORTIC VALVE:  There was no significant regurgitation  There was a probable small, mobile vegetation on the right coronary cusp, on the left ventricular aspect  TRICUSPID VALVE:  There was mild regurgitation  HISTORY: PRIOR HISTORY: Previous sudden death episode   Risk factors: diabetes  PROCEDURE: The procedure was performed in the echo lab  This was a routine study  The risks and alternatives of the procedure were explained to the patient's next of kin and informed consent was obtained  The transesophageal approach was  used  The study included complete 2D imaging, 3D imaging, complete spectral Doppler, and color Doppler  The heart rate was 88 bpm, at the start of the study  An adult omniplane probe was inserted by the cardiology fellow under direct  supervision of the attending cardiologist  Images were obtained from the parasternal, apical, subcostal, and suprasternal notch acoustic windows  Intubated with ease  One intubation attempt(s)  There was no blood detected on the probe  Echocardiographic views were limited due to poor acoustic window availability, decreased penetration, and lung interference  This was a technically difficult study  MEDICATIONS: SBE prophylaxis not indicated  Sedation administered by  anesthesia team     LEFT VENTRICLE: The ventricle was dilated  Systolic function was severely reduced  Ejection fraction was estimated to be 35 %  There was moderate diffuse hypokinesis with distinct regional wall motion abnormalities  Wall thickness was  normal  DOPPLER: Doppler parameters were consistent with abnormal left ventricular relaxation (grade 1 diastolic dysfunction)  RIGHT VENTRICLE: The size was normal  Systolic function was normal  Wall thickness was normal     LEFT ATRIUM: The atrium was mildly dilated  No thrombus was identified  APPENDAGE: The size was normal  No thrombus was identified  DOPPLER: The function was normal (normal emptying velocity)  RIGHT ATRIUM: The atrium was mildly dilated  No thrombus was identified  MITRAL VALVE: Valve structure was normal  There was normal leaflet separation  DOPPLER: There was mild to moderate regurgitation  AORTIC VALVE: The valve was trileaflet  Leaflets exhibited normal cuspal separation and sclerosis  There was a probable small, mobile vegetation on the right coronary cusp, on the left ventricular aspect  DOPPLER: There was no significant  regurgitation  TRICUSPID VALVE: The valve structure was normal  There was normal leaflet separation  DOPPLER: There was mild regurgitation  PULMONIC VALVE: Leaflets exhibited normal thickness, no calcification, and normal cuspal separation  There was no echocardiographic evidence of vegetation  PERICARDIUM: There was no pericardial effusion  The pericardium was normal in appearance  AORTA: The root exhibited normal size  There was Grade I atheroma of the proximal descending aorta and distal aortic arch  There was no evidence for dissection  There was no evidence for aneurysm  PULMONARY VEINS: DOPPLER: There was systolic blunting in the pulmonary vein(s)  MEASUREMENT TABLES    DOPPLER MEASUREMENTS  Left atrium   (Reference normals)  TAYLOR peak jose   40 cm/s   (--)    SYSTEM MEASUREMENT TABLES    CF  MR Als  Jose: 0 29 m/s  MR Flow: 152 23 ml/s  MR Rad: 0 91 cm    [employer] Accredited Echocardiography Laboratory    Prepared and electronically signed by    Charlotte Goldstein DO  Signed 07-Apr-2017 17:27:13         Imaging: I have personally reviewed pertinent reports          Telemetry: personally reviewed, SR, SB, no further afib since 11/29 AM

## 2017-11-30 NOTE — PROGRESS NOTES
11/30/17 0900   Clinical Encounter Type   Visited With Patient and family together   Crisis Visit Critical Care   Patient Spiritual Encounters   Spiritual Encounter Notes Patient currently intubated   spoke with patient's spouse who described patient's current illness as a setback  She stated that he has been ill since last March, struggling with various ailments related to his diabetes and hypertension, noting that in April he had an amputation and that he is also a dialysis patient  She said that he had two good months since his last hospitalization but now after this latest UTI she says "we have to take things one day at a time " She said that her and her  have spoken about what type of care he would want and that he told her he would want everything if there was hope for recovery but he would not want to be kept alive on a ventilator long-term if there was no chance of his getting better   oriented her to the availability of ongoing pastoral care and offered comfort and support     Family Spiritual Encounters   Family Coping Accepting;Open/discussion

## 2017-11-30 NOTE — RESPIRATORY THERAPY NOTE
Mechanics performed with 0 PS  Pt placed on weaning trial- 100% tube comp per Leroy, CRNP  Pt awake and tolerating wean well at this time   Will continue to wean and monitor

## 2017-11-30 NOTE — RESPIRATORY THERAPY NOTE
RT Ventilator Management Note  Kim Mehta 61 y o  male MRN: [id number]  Unit/Bed#:  Encounter: [id number]      Daily Screen       11/30/2017 0601 11/30/2017 0719          Patient safety screen outcome[de-identified] Passed Passed      Spont breathing trial % for 30 min: Yes Yes      Spont breathing trial outcome[de-identified] - Passed      RSBI: 46 25      FVC (mL): 949 955              Physical Exam:   O2 Device: Vent 40% +5 Peep      Resp Comments: Pt passed 100% Tube Comp breathing trial, placed on PSV 5/5, tolerating well, will continue to monitor

## 2017-11-30 NOTE — RESPIRATORY THERAPY NOTE
RT Ventilator Management Note  Beth Cr 61 y o  male MRN: [id number]  Unit/Bed#:  Encounter: [id number]      Daily Screen       11/29/2017 0551 11/29/2017 0757          Patient safety screen outcome[de-identified] Failed Failed      Not Ready for Weaning due to[de-identified] Underline problem not resolved;FiO2 >60% -              Physical Exam:   Respiratory Pattern: Assisted  Chest Assessment: Chest expansion symmetrical  Bilateral Breath Sounds: Clear, Diminished      Resp Comments: Currently pt is on AC/VC/12/450/40%/+5  Pt is tolerating well  Will continue to monitor

## 2017-11-30 NOTE — PLAN OF CARE

## 2017-11-30 NOTE — PROGRESS NOTES
NEPHROLOGY PROGRESS NOTE   Xiomara Bojorquez 61 y o  male MRN: [id number]  Unit/Bed#:  Encounter: [id number]  Reason for Consult: ESRD on HD    ASSESSMENT and PLAN:  1  ESRD on HD ([address] [facility], [facility]): HD is off schedule due to recent events  HD today and plan for Saturday  Return to [facility] sched next week  2  Access: R arm AVF  3  VDRF: Doing well on PS trials  Possible extubation later  4  Paroxysmal atrial fibrillation: on amiodarone  5  BP/volume: Not fluid overloaded  Mild fluid removal on HD today  6  Anemia: Hgb at goal    7  R sided pleural effusion  8  Hyponatremia (chronic): Unclear etiology  Corrected Na is stable from yesterday  Thyroid related? Levothyroxine dose increased this admission  9  Recurrent Cdiff: on oral Vancomycin  10  CHF: compensated now       SUMMARY OF RECOMMENDATIONS:  · HD today  Discussed with critical care  SUBJECTIVE / INTERVAL HISTORY:  Hyperglycemic this morning  Doing well on PSV trials  HEMODIALYSIS PROCEDURE NOTE  The patient was seen and examined on hemodialysis  Time: 3 5 hours  Sodium: 140 Blood flow: 375   Dialyzer: F180 Potassium: 2 Dialysate flow: 1 5 X   Access: AVF Bicarbonate: 35 Ultrafiltration goal: 1 0 kg   Medications on HD: none     OBJECTIVE:  Current Weight: Weight - Scale: 60 3 kg (132 lb 15 oz)  Vitals:    11/30/17 1100 11/30/17 1145 11/30/17 1200 11/30/17 1230   BP: 111/60 118/62 118/64 110/75   Pulse: 77 77 78 82   Resp: 18 15 14 19   Temp: 98 3 °F (36 8 °C)      TempSrc: Oral      SpO2: 100% 100% 100% 100%   Weight:       Height:           Intake/Output Summary (Last 24 hours) at 11/30/17 1241  Last data filed at 11/30/17 1200   Gross per 24 hour   Intake            721 5 ml   Output                0 ml   Net            721 5 ml     General: on the vent     Chest/Lungs: coarse breath sounds  CVS: distinct heart sounds, normal rate  Abdomen: soft  Extremities: no edema, R BKA    Medications:    Current Facility-Administered Medications:     acetaminophen (TYLENOL) tablet 650 mg, 650 mg, Oral, Q6H PRN, Leland Severs, PA-C    albuterol inhalation solution 2 5 mg, 2 5 mg, Nebulization, Q6H PRN, Mare Engel DO    amiodarone tablet 200 mg, 200 mg, Oral, TID, MD Lena Morgan complex-vitamin C-folic acid (NEPHROCAPS) capsule 1 capsule, 1 capsule, Oral, Daily, Leland Severs, PA-C, 1 capsule at 11/28/17 1239    cefepime (MAXIPIME) IVPB (premix) 1,000 mg, 1,000 mg, Intravenous, Q24H, ALIE Rodriguez    chlorhexidine (PERIDEX) 0 12 % oral rinse 15 mL, 15 mL, Swish & Spit, Q12H [address], ALIE Rodriguez, 15 mL at 11/30/17 0809    cholestyramine sugar free (QUESTRAN LIGHT) packet 4 g, 4 g, Oral, BID, Awa Alves PA-C, 4 g at 11/29/17 2126    cinacalcet (SENSIPAR) tablet 30 mg, 30 mg, Oral, HS, Leland Severs, PA-C, 30 mg at 11/28/17 2116    citalopram (CeleXA) tablet 10 mg, 10 mg, Oral, Daily, Catina Brink PA-C, 10 mg at 11/30/17 0809    dexmedetomidine (PRECEDEX) 200 mcg in sodium chloride 0 9 % 50 mL infusion, 0 1-0 7 mcg/kg/hr, Intravenous, Titrated, ALIE Rodriguez, Stopped at 11/30/17 0500    etomidate (AMIDATE) 2 mg/mL injection, , , Code/Trauma/Sedation Med, ALIE Clements, 20 mg at 11/29/17 0506    fentanyl citrate (PF) 100 MCG/2ML, , Intravenous, Code/Trauma/Sedation Med, ALIE Clements, 50 mcg at 11/29/17 6160    gabapentin (NEURONTIN) capsule 300 mg, 300 mg, Oral, HS, Leland Severs, PA-C, 300 mg at 11/29/17 2126    heparin (porcine) 25,000 units in 250 mL infusion (premix), 3-20 Units/kg/hr (Order-Specific), Intravenous, Titrated, ALIE Rodriguez, Last Rate: 8 4 mL/hr at 11/30/17 0300, 14 Units/kg/hr at 11/30/17 0300    heparin (porcine) injection 1,800 Units, 1,800 Units, Intravenous, PRN, ALIE Clements, 1,800 Units at 11/29/17 1443    heparin (porcine) injection 3,600 Units, 3,600 Units, Intravenous, PRN, ALIE Clements    insulin regular (HumuLIN R,NovoLIN R) 1 Units/mL in sodium chloride 0 9 % 100 mL infusion, 0 3-21 Units/hr, Intravenous, Titrated, ALIE Rodriguez, Last Rate: 3 mL/hr at 11/30/17 1016, 3 Units/hr at 11/30/17 1016    levothyroxine tablet 137 mcg, 137 mcg, Oral, Early Morning, TERESA Leonard-HUMBERTO, 137 mcg at 11/30/17 0549    menthol-zinc oxide (CALMOSEPTINE) 0 44-20 6 % ointment, , Topical, BID, Alisha Moreno PA-C    metoprolol (LOPRESSOR) injection 2 5 mg, 2 5 mg, Intravenous, Q6H PRN, ALIE Bell    midazolam (VERSED) injection, , , Code/Trauma/Sedation Med, ALIE Bell, 2 mg at 11/29/17 3820    nystatin (MYCOSTATIN) powder, , Topical, BID, Alisha Moreno PA-C    omeprazole (PRILOSEC) suspension 2 mg/mL, 20 mg, Oral, Daily, ALIE Rodriguez, 20 mg at 11/30/17 2792    saccharomyces boulardii (FLORASTOR) capsule 250 mg, 250 mg, Oral, BID, Meme German PA-C, 250 mg at 11/30/17 0809    vancomycin [facility]) oral solution 125 mg, 125 mg, Oral, Q6H CELIA, Alisha Moreno PA-C, 125 mg at 11/30/17 1143    Laboratory Results:    Results from last 7 days  Lab Units 11/30/17  0346 11/29/17  0928 11/29/17  0759 11/29/17  0522 11/29/17  0521 11/29/17  0501 11/29/17  0452 11/28/17  2040 11/28/17  0404 11/28/17  0403  11/27/17  1623  11/27/17  1140   WBC Thousand/uL 9 59 7 29  --  9 61  --   --  8 10 7 59 7 30  --   --   --   --  11 96*   HEMOGLOBIN g/dL 10 9* 9 2*  --  10 5*  --   --  11 3* 11 0* 10 5*  --   --   --   --  11 1*   I STAT HEMOGLOBIN g/dl  --   --  10 5*  --   --  11 2*  --   --   --   --   < >  --   --   --    HEMATOCRIT % 33 7* 29 8*  --  33 2*  --   --  35 5* 34 7* 32 8*  --   --   --   --  34 7*   PLATELETS Thousands/uL 332 285  --  353  --   --  416* 258 344  --   --   --   --  340   SODIUM mmol/L 120*  --   --   --  125*  --  127* 118*  --  126*  --  126*  --   --    POTASSIUM mmol/L 5 5*  --   --   --  3 7  --  3 7 4 8  --  5 8*  --   --   --   --    CHLORIDE mmol/L 85*  -- --   --  92*  --  91* 86*  --  90*  --  91*  --   --    CO2 mmol/L 20*  --   --   --  22  --  25 22  --  24  --  22  --   --    BUN mg/dL 49*  --   --   --  41*  --  41* 33*  --  55*  --  98*  --   --    CREATININE mg/dL 5 95*  --   --   --  4 51*  --  4 65* 3 99*  --  5 98*  --  8 74*  --   --    CALCIUM mg/dL 8 2*  --   --   --  8 4  --  9 1 8 3  --  8 2*  --  8 6  --   --    MAGNESIUM mg/dL  --   --   --   --  2 0  --   --   --   --   --   --   --   --  2 1   PHOSPHORUS mg/dL  --   --   --   --  5 2*  --   --   --   --   --   --   --   --   --    ALBUMIN g/dL  --   --   --   --  2 1*  --   --  2 1*  --   --   --  2 3*  --   --    TOTAL PROTEIN g/dL  --   --   --   --  7 8  --   --  7 9  --   --   --  8 2  --   --    GLUCOSE RANDOM mg/dL 415*  --   --   --  84  --  31* 735*  --  141*  --  209*  < >  --    GLUCOSE, ISTAT mg/dl  --   --  82  --   --  180*  --   --   --   --   < >  --   --   --    < > = values in this interval not displayed    Previous work up:

## 2017-11-30 NOTE — RESPIRATORY THERAPY NOTE
RT Ventilator Management Note  Elaina Ji 61 y o  male MRN: [id number]  Unit/Bed#:  Encounter: [id number]      Daily Screen       11/29/2017 0551 11/29/2017 0757          Patient safety screen outcome[de-identified] Failed Failed      Not Ready for Weaning due to[de-identified] Underline problem not resolved;FiO2 >60% -              Physical Exam:   Assessment Type: Pre-treatment  General Appearance: Sedated  Respiratory Pattern: Assisted  Chest Assessment: Chest expansion symmetrical  Bilateral Breath Sounds: Diminished, Clear      Resp Comments: Currently pt is on AC/VC/12/450/40%/+5  Pt is tolerating well  Will continue to monitor

## 2017-11-30 NOTE — NUTRITION
11/30/17 1303   Assessment   Timepoint Nutrition Review   Labs   List Completed Labs (noted and reviewed)   Adequacy of Intake   Nutrition Modality NPO   Intake Meals (NPO)   Estimated calorie intake compared to estimated need Not meeting estimated energy needs   Nutrition Prognosis   Nutrition Considerations (Nutrition education not appropriate)   PES Statement   Problem Continue previous diagnosis   Related to EN Interruptions  (revised)   Patient Nutrition Goals   Goal nutrition via appropriate route   Goal Status not met;revised   Timeframe to complete goal 24-48hrs   Recommendations/Interventions   Summary Patient intubated 11/29 s/p rapid Response  EN of Nepro 35ml/hr on hold for extubation today  HD also planned for today and Saturday with return to [facility] sched next week per Nephrology  Patient has been identified 11/28/2017 as meeting characteristics of malnutrition  Interventions EN initiate;Diet: to Advance   Nutrition Recommendations Initiate diet; Initiate EN/PN  (Per outcome of extubation today  )   Nutrition Complexity Risk   Nutrition complexity level High risk   Follow up date 12/04/17  (chk EN vs PO)

## 2017-12-01 PROBLEM — E87.5 HYPERKALEMIA: Status: RESOLVED | Noted: 2017-11-28 | Resolved: 2017-12-01

## 2017-12-01 PROBLEM — E87.2 METABOLIC ACIDOSIS, INCREASED ANION GAP: Status: RESOLVED | Noted: 2017-09-17 | Resolved: 2017-12-01

## 2017-12-01 PROBLEM — G93.40 ACUTE ENCEPHALOPATHY: Status: RESOLVED | Noted: 2017-08-10 | Resolved: 2017-12-01

## 2017-12-01 LAB
ALBUMIN SERPL BCP-MCNC: 1.9 G/DL (ref 3.5–5)
ALP SERPL-CCNC: 150 U/L (ref 46–116)
ALT SERPL W P-5'-P-CCNC: 19 U/L (ref 12–78)
ANION GAP SERPL CALCULATED.3IONS-SCNC: 10 MMOL/L (ref 4–13)
ANION GAP SERPL CALCULATED.3IONS-SCNC: 11 MMOL/L (ref 4–13)
APTT PPP: 62 SECONDS (ref 23–35)
AST SERPL W P-5'-P-CCNC: 24 U/L (ref 5–45)
BACTERIA SPT RESP CULT: ABNORMAL
BACTERIA SPT RESP CULT: ABNORMAL
BASE EX.OXY STD BLDV CALC-SCNC: 83.9 % (ref 60–80)
BASE EXCESS BLDV CALC-SCNC: -3.5 MMOL/L
BASOPHILS # BLD AUTO: 0.06 THOUSANDS/ΜL (ref 0–0.1)
BASOPHILS NFR BLD AUTO: 1 % (ref 0–1)
BILIRUB SERPL-MCNC: 0.5 MG/DL (ref 0.2–1)
BUN SERPL-MCNC: 28 MG/DL (ref 5–25)
BUN SERPL-MCNC: 38 MG/DL (ref 5–25)
CA-I BLD-SCNC: 1.04 MMOL/L (ref 1.12–1.32)
CALCIUM SERPL-MCNC: 8.5 MG/DL (ref 8.3–10.1)
CALCIUM SERPL-MCNC: 9 MG/DL (ref 8.3–10.1)
CHLORIDE SERPL-SCNC: 88 MMOL/L (ref 100–108)
CHLORIDE SERPL-SCNC: 91 MMOL/L (ref 100–108)
CO2 SERPL-SCNC: 23 MMOL/L (ref 21–32)
CO2 SERPL-SCNC: 25 MMOL/L (ref 21–32)
CREAT SERPL-MCNC: 4.65 MG/DL (ref 0.6–1.3)
CREAT SERPL-MCNC: 6.04 MG/DL (ref 0.6–1.3)
EOSINOPHIL # BLD AUTO: 0.36 THOUSAND/ΜL (ref 0–0.61)
EOSINOPHIL NFR BLD AUTO: 4 % (ref 0–6)
ERYTHROCYTE [DISTWIDTH] IN BLOOD BY AUTOMATED COUNT: 15.1 % (ref 11.6–15.1)
GFR SERPL CREATININE-BSD FRML MDRD: 12 ML/MIN/1.73SQ M
GFR SERPL CREATININE-BSD FRML MDRD: 9 ML/MIN/1.73SQ M
GLUCOSE SERPL-MCNC: 143 MG/DL (ref 65–140)
GLUCOSE SERPL-MCNC: 146 MG/DL (ref 65–140)
GLUCOSE SERPL-MCNC: 168 MG/DL (ref 65–140)
GLUCOSE SERPL-MCNC: 194 MG/DL (ref 65–140)
GLUCOSE SERPL-MCNC: 258 MG/DL (ref 65–140)
GLUCOSE SERPL-MCNC: 266 MG/DL (ref 65–140)
GLUCOSE SERPL-MCNC: 370 MG/DL (ref 65–140)
GLUCOSE SERPL-MCNC: 405 MG/DL (ref 65–140)
GLUCOSE SERPL-MCNC: 421 MG/DL (ref 65–140)
GLUCOSE SERPL-MCNC: 440 MG/DL (ref 65–140)
GLUCOSE SERPL-MCNC: 442 MG/DL (ref 65–140)
GLUCOSE SERPL-MCNC: 445 MG/DL (ref 65–140)
GLUCOSE SERPL-MCNC: 475 MG/DL (ref 65–140)
GLUCOSE SERPL-MCNC: 487 MG/DL (ref 65–140)
GLUCOSE SERPL-MCNC: 492 MG/DL (ref 65–140)
GLUCOSE SERPL-MCNC: 63 MG/DL (ref 65–140)
GLUCOSE SERPL-MCNC: 692 MG/DL (ref 65–140)
GLUCOSE SERPL-MCNC: >500 MG/DL (ref 65–140)
GLUCOSE SERPL-MCNC: >500 MG/DL (ref 65–140)
GRAM STN SPEC: ABNORMAL
GRAM STN SPEC: ABNORMAL
HCO3 BLDV-SCNC: 21.9 MMOL/L (ref 24–30)
HCT VFR BLD AUTO: 30.5 % (ref 36.5–49.3)
HGB BLD-MCNC: 9.7 G/DL (ref 12–17)
LYMPHOCYTES # BLD AUTO: 1.04 THOUSANDS/ΜL (ref 0.6–4.47)
LYMPHOCYTES NFR BLD AUTO: 10 % (ref 14–44)
MAGNESIUM SERPL-MCNC: 2.5 MG/DL (ref 1.6–2.6)
MCH RBC QN AUTO: 29.7 PG (ref 26.8–34.3)
MCHC RBC AUTO-ENTMCNC: 31.8 G/DL (ref 31.4–37.4)
MCV RBC AUTO: 93 FL (ref 82–98)
MONOCYTES # BLD AUTO: 0.95 THOUSAND/ΜL (ref 0.17–1.22)
MONOCYTES NFR BLD AUTO: 9 % (ref 4–12)
NEUTROPHILS # BLD AUTO: 7.77 THOUSANDS/ΜL (ref 1.85–7.62)
NEUTS SEG NFR BLD AUTO: 76 % (ref 43–75)
O2 CT BLDV-SCNC: 12 ML/DL
PCO2 BLDV: 40.6 MM HG (ref 42–50)
PH BLDV: 7.35 [PH] (ref 7.3–7.4)
PHOSPHATE SERPL-MCNC: 5.5 MG/DL (ref 2.3–4.1)
PLATELET # BLD AUTO: 313 THOUSANDS/UL (ref 149–390)
PMV BLD AUTO: 8.9 FL (ref 8.9–12.7)
PO2 BLDV: 55.9 MM HG (ref 35–45)
POTASSIUM SERPL-SCNC: 4.4 MMOL/L (ref 3.5–5.3)
POTASSIUM SERPL-SCNC: 5 MMOL/L (ref 3.5–5.3)
PROT SERPL-MCNC: 7.1 G/DL (ref 6.4–8.2)
RBC # BLD AUTO: 3.27 MILLION/UL (ref 3.88–5.62)
SODIUM SERPL-SCNC: 122 MMOL/L (ref 136–145)
SODIUM SERPL-SCNC: 126 MMOL/L (ref 136–145)
WBC # BLD AUTO: 10.18 THOUSAND/UL (ref 4.31–10.16)

## 2017-12-01 PROCEDURE — 85025 COMPLETE CBC W/AUTO DIFF WBC: CPT | Performed by: NURSE PRACTITIONER

## 2017-12-01 PROCEDURE — 82948 REAGENT STRIP/BLOOD GLUCOSE: CPT

## 2017-12-01 PROCEDURE — 80048 BASIC METABOLIC PNL TOTAL CA: CPT | Performed by: NURSE PRACTITIONER

## 2017-12-01 PROCEDURE — 80053 COMPREHEN METABOLIC PANEL: CPT | Performed by: NURSE PRACTITIONER

## 2017-12-01 PROCEDURE — 84100 ASSAY OF PHOSPHORUS: CPT | Performed by: NURSE PRACTITIONER

## 2017-12-01 PROCEDURE — 82330 ASSAY OF CALCIUM: CPT | Performed by: NURSE PRACTITIONER

## 2017-12-01 PROCEDURE — 82805 BLOOD GASES W/O2 SATURATION: CPT | Performed by: NURSE PRACTITIONER

## 2017-12-01 PROCEDURE — 92610 EVALUATE SWALLOWING FUNCTION: CPT

## 2017-12-01 PROCEDURE — 85730 THROMBOPLASTIN TIME PARTIAL: CPT | Performed by: INTERNAL MEDICINE

## 2017-12-01 PROCEDURE — 83735 ASSAY OF MAGNESIUM: CPT | Performed by: NURSE PRACTITIONER

## 2017-12-01 RX ORDER — LOPERAMIDE HYDROCHLORIDE 2 MG/1
2 CAPSULE ORAL 4 TIMES DAILY PRN
Status: DISCONTINUED | OUTPATIENT
Start: 2017-12-01 | End: 2017-12-07 | Stop reason: HOSPADM

## 2017-12-01 RX ORDER — WARFARIN SODIUM 5 MG/1
5 TABLET ORAL
Status: DISCONTINUED | OUTPATIENT
Start: 2017-12-01 | End: 2017-12-07 | Stop reason: HOSPADM

## 2017-12-01 RX ORDER — CITALOPRAM 20 MG/1
20 TABLET ORAL DAILY
Status: DISCONTINUED | OUTPATIENT
Start: 2017-12-01 | End: 2017-12-01

## 2017-12-01 RX ORDER — DEXTROSE MONOHYDRATE 25 G/50ML
50 INJECTION, SOLUTION INTRAVENOUS AS NEEDED
Status: DISCONTINUED | OUTPATIENT
Start: 2017-12-01 | End: 2017-12-07 | Stop reason: HOSPADM

## 2017-12-01 RX ADMIN — CHOLESTYRAMINE 4 G: 4 POWDER, FOR SUSPENSION ORAL at 19:18

## 2017-12-01 RX ADMIN — CEFEPIME HYDROCHLORIDE 1000 MG: 1 INJECTION, SOLUTION INTRAVENOUS at 19:11

## 2017-12-01 RX ADMIN — NYSTATIN: 100000 POWDER TOPICAL at 08:11

## 2017-12-01 RX ADMIN — CALCIUM GLUCONATE 3 G: 94 INJECTION, SOLUTION INTRAVENOUS at 00:01

## 2017-12-01 RX ADMIN — Medication 20 MG: at 08:18

## 2017-12-01 RX ADMIN — METOPROLOL TARTRATE 12.5 MG: 25 TABLET ORAL at 09:38

## 2017-12-01 RX ADMIN — INSULIN LISPRO 4 UNITS: 100 INJECTION, SOLUTION INTRAVENOUS; SUBCUTANEOUS at 22:53

## 2017-12-01 RX ADMIN — ANORECTAL OINTMENT: 15.7; .44; 24; 20.6 OINTMENT TOPICAL at 08:11

## 2017-12-01 RX ADMIN — AMIODARONE HYDROCHLORIDE 200 MG: 200 TABLET ORAL at 16:49

## 2017-12-01 RX ADMIN — VANCOMYCIN 125 MG: KIT at 12:27

## 2017-12-01 RX ADMIN — AMIODARONE HYDROCHLORIDE 200 MG: 200 TABLET ORAL at 08:11

## 2017-12-01 RX ADMIN — Medication 250 MG: at 08:11

## 2017-12-01 RX ADMIN — WARFARIN SODIUM 5 MG: 5 TABLET ORAL at 19:16

## 2017-12-01 RX ADMIN — SODIUM CHLORIDE 0.3 UNITS/HR: 9 INJECTION, SOLUTION INTRAVENOUS at 02:31

## 2017-12-01 RX ADMIN — INSULIN DETEMIR 2 UNITS: 100 INJECTION, SOLUTION SUBCUTANEOUS at 21:23

## 2017-12-01 RX ADMIN — VANCOMYCIN 125 MG: KIT at 00:01

## 2017-12-01 RX ADMIN — CITALOPRAM HYDROBROMIDE 10 MG: 20 TABLET ORAL at 08:10

## 2017-12-01 RX ADMIN — Medication 250 MG: at 19:16

## 2017-12-01 RX ADMIN — VANCOMYCIN 125 MG: KIT at 19:20

## 2017-12-01 RX ADMIN — CINACALCET HYDROCHLORIDE 30 MG: 30 TABLET, COATED ORAL at 21:26

## 2017-12-01 RX ADMIN — HEPARIN SODIUM 14 UNITS/KG/HR: 10000 INJECTION, SOLUTION INTRAVENOUS at 01:29

## 2017-12-01 RX ADMIN — INSULIN LISPRO 3 UNITS: 100 INJECTION, SOLUTION INTRAVENOUS; SUBCUTANEOUS at 16:49

## 2017-12-01 RX ADMIN — GABAPENTIN 300 MG: 300 CAPSULE ORAL at 21:23

## 2017-12-01 RX ADMIN — NYSTATIN: 100000 POWDER TOPICAL at 19:17

## 2017-12-01 RX ADMIN — MAGNESIUM SULFATE HEPTAHYDRATE 2 G: 40 INJECTION, SOLUTION INTRAVENOUS at 00:01

## 2017-12-01 RX ADMIN — LEVOTHYROXINE SODIUM 137 MCG: 112 TABLET ORAL at 05:16

## 2017-12-01 RX ADMIN — ANORECTAL OINTMENT: 15.7; .44; 24; 20.6 OINTMENT TOPICAL at 19:17

## 2017-12-01 RX ADMIN — METOPROLOL TARTRATE 12.5 MG: 25 TABLET ORAL at 21:23

## 2017-12-01 RX ADMIN — INSULIN LISPRO 2 UNITS: 100 INJECTION, SOLUTION INTRAVENOUS; SUBCUTANEOUS at 21:26

## 2017-12-01 RX ADMIN — LOPERAMIDE HYDROCHLORIDE 2 MG: 2 CAPSULE ORAL at 14:08

## 2017-12-01 RX ADMIN — VANCOMYCIN 125 MG: KIT at 05:16

## 2017-12-01 RX ADMIN — CHOLESTYRAMINE 4 G: 4 POWDER, FOR SUSPENSION ORAL at 08:10

## 2017-12-01 RX ADMIN — AMIODARONE HYDROCHLORIDE 200 MG: 200 TABLET ORAL at 21:23

## 2017-12-01 RX ADMIN — INSULIN DETEMIR 2 UNITS: 100 INJECTION, SOLUTION SUBCUTANEOUS at 14:08

## 2017-12-01 NOTE — PROGRESS NOTES
Progress Note - Critical Care   Kim Mehta 61 y o  male MRN: [id number]  Unit/Bed#:  Encounter: [id number]    Attending Physician: Brittnee Spann, DO      ______________________________________________________________________  Assessment and Plan:   Principal Problem (Resolved):    Acute encephalopathy  Active Problems:    Type 1 diabetes mellitus with nephropathy ([facility] [address] )    ESRD (end stage renal disease) on dialysis ([facility] [address] )    Rapid atrial fibrillation ([facility] [address] )    HCAP (healthcare-associated pneumonia)    S/P percutaneous endoscopic gastrostomy (PEG) tube placement ([facility] [address] )    Pleural effusion on right    Chronic combined systolic and diastolic CHF (congestive heart failure) ([facility] [address] )    Pseudomonas urinary tract infection    Hyponatremia    Clostridium difficile infection    Hypoglycemia    Acquired hypothyroidism    Chronic kidney disease-mineral and bone disorder  Resolved Problems:    Acute respiratory failure with hypoxia (HCC)    Elevated INR    Hyperkalemia    Hypotension    Neuro:  Encephalopathy toxic metabolic improved-currently awake alert and oriented, delirium precautions, sleep hygiene, CAM ICU negative    CV:  AFib now normal sinus rhythm-p o  Amnio, heparin drip transition back to Coumadin today    Chronic combined CHF  Echo with an EF of 35% with hypokinesis of the anterior septal and apical walls with decreased RV function mild-to-moderate MR and a trace pericardial effusion-Cardiology following, I/O, daily weight, volume management per HD, euvolemic    Pulm:  Hcap present on admission  Sputum 1+ Candida  MR SA positive    Urine strep negative-wean to room air this morning, IIS, oxygen for sats greater than 92%, cefepime given improvement and positive MRSA patient was never on IV Vanco would hold at this point    Right pleural effusion-volume removal per HD    GI:  Status post PEG-patient does take p o  intake however plan for outpatient follow-up for possible removal-will have speech evaluation and advance diet as tolerated    :  End-stage renal disease on HD Monday Wednesday Friday-Renal following, plan for HD Saturday then resume Monday Wednesday Friday schedule    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    F/E/N:  Chronic hyponatremia-daily BMPs  Anion gap metabolic acidosis resolved  Hyperkalemia resolved  Per [place] management per renal    ID:  Hcap present on admission  Leukocytosis stay however afebrile  Blood cultures no growth  C diff negative  MRSA positive  Sputum 1+ Candida  Flu negative  Urine culture Pseudomonas however likely colonization-continue cefepime 1 g Q 24 after dialysis day 5/7    History of recurrent C diff  C diff negative this admission-p o  Vanco increased while on antibiotics once off antibiotics continue slow taper to off    Heme:  Chronic anemia baseline hemoglobin 10-11-currently 9 7    Endo:  DM 1 with hypoglycemic/hyperglycemic episodes blood sugars 125-445 D-insulin drip restarted at 0 200 this morning secondary to blood sugars up to the 400s-consult endocrine given brittle diabetes and renal dysfunction, may need to maintain on insulin infusion at lower dose    Hypothyroidism TSH 22 with normal free T4-continue Synthroid, outpatient TSH in 4-6 weeks     Msk/Skin:  Left foot diabetic ulcers  -follow up wound consult Out of bed PT eval consulted    Disposition:  Down grade to step-down 2 under slim, monitor in step-down given persistent hypoglycemic episodes, follow up with Endocrine consult    Code Status: Level 1 - Full Code    Counseling / Coordination of Care  Total time spent today 30 minutes  Greater than 50% of total time was spent with the patient and / or family counseling and / or coordination of care   A description of the counseling / coordination of care: Plan of care  ______________________________________________________________________    Chief Complaint:  Denies complaints this morning    24 Hour Events:   Extubated to nasal cannula transitioned to room IR this morning mental status has greatly improved he is awake alert and oriented x3  Insulin drip was stopped yesterday with hypoglycemic episodes however patient becoming hyperglycemic overnight and restarted at 0 3 units/hour however blood sugars increased up to 400s and then titrated up    ______________________________________________________________________    Physical Exam:   Physical Exam   Constitutional: He is oriented to person, place, and time  No distress  HENT:   Head: Normocephalic and atraumatic  Mouth/Throat: Oropharynx is clear and moist    Eyes: Pupils are equal, round, and reactive to light  No scleral icterus  Neck: Neck supple  No JVD present  Cardiovascular: Normal rate, regular rhythm, normal heart sounds and intact distal pulses  Exam reveals no friction rub  No murmur heard  Pulmonary/Chest: Effort normal and breath sounds normal  No respiratory distress  He has no rales  Abdominal: Soft  Bowel sounds are normal  He exhibits no distension  There is no tenderness  Musculoskeletal: Normal range of motion  He exhibits no edema  Right BKA   Neurological: He is alert and oriented to person, place, and time  Skin: Skin is warm and dry  No rash noted  No erythema  No pallor  ______________________________________________________________________  Vitals:    17 0440 17 0540 17 0640 17 0700   BP: 108/54 113/57 99/51 102/50   Pulse: 89 88 86 90   Resp: 12 16 12 16   Temp:    98 3 °F (36 8 °C)   TempSrc:    Oral   SpO2: 100% 100% 100% 98%   Weight:       Height:           Temperature:   Temp (24hrs), Av 6 °F (37 °C), Min:98 3 °F (36 8 °C), Max:99 4 °F (37 4 °C)    Current Temperature: 98 3 °F (36 8 °C)  Weights:   IBW: 63 8 kg    Body mass index is 21 46 kg/m²    Weight (last 2 days)     Date/Time   Weight    17 0600  60 3 (132 94)            Hemodynamic Monitoring:  N/A     Non-Invasive/Invasive Ventilation Settings:  Respiratory    Lab Data (Last 4 hours)    None         O2/Vent Data (Last 4 hours)    None              No results found for: PHART, RPL4CVV, PO2ART, FOU7MEW, U4IQCXVK, BEART, SOURCE  SpO2: SpO2: 98 %  Intake and Outputs:  I/O       11/29 0701 - 11/30 0700 11/30 0701 - 12/01 0700 12/01 0701 - 12/02 0700    P  O        I V  (mL/kg) 907 2 (15) 763 8 (12 7)     NG/GT  180     IV Piggyback 250      Feedings 20 250     Total Intake(mL/kg) 1177 2 (19 5) 1193 8 (19 8)     Other  1500     Stool       Total Output   1500      Net +1177 2 -306  2             Unmeasured Stool Occurrence 1 x 1 x           Nutrition:        Diet Orders            Start     Ordered    12/01/17 0759  Diet NPO  Diet effective now     Question Answer Comment   Diet Type NPO    RD to adjust diet per protocol? Yes        12/01/17 0759 11/27/17 1929  Room Service  Once     Question:  Type of Service  Answer:  Room Service - Appropriate with Assistance    11/27/17 1929          Labs:     Results from last 7 days  Lab Units 12/01/17 0412 11/30/17 0346 11/29/17  0928  11/29/17  0522  11/29/17  0452   WBC Thousand/uL 10 18* 9 59 7 29  --  9 61  --  8 10   HEMOGLOBIN g/dL 9 7* 10 9* 9 2*  --  10 5*  --  11 3*   I STAT HEMOGLOBIN   --   --   --   < >  --   < >  --    HEMATOCRIT % 30 5* 33 7* 29 8*  --  33 2*  --  35 5*   PLATELETS Thousands/uL 313 332 285  --  353  --  416*   NEUTROS PCT % 76* 75  --   --   --   --  65   MONOS PCT % 9 10  --   --   --   --  21*   MONO PCT MAN %  --   --   --   --  16*  --   --    < > = values in this interval not displayed      Results from last 7 days  Lab Units 12/01/17 0412 11/30/17  2230 11/30/17  0346  11/29/17  0521  11/28/17  2040   SODIUM mmol/L 126* 128* 120*  --  125*  < > 118*   POTASSIUM mmol/L 4 4 4 1 5 5*  --  3 7  < > 4 8   CHLORIDE mmol/L 91* 94* 85*  --  92*  < > 86*   CO2 mmol/L 25 26 20*  --  22  < > 22   BUN mg/dL 28* 25 49*  --  41*  < > 33*   CREATININE mg/dL 4 65* 4 16* 5 95*  --  4 51*  < > 3 99*   CALCIUM mg/dL 9 0 8 4 8 2*  --  8 4  < > 8 3   TOTAL PROTEIN g/dL 7 1  --   --   --  7 8  --  7 9   BILIRUBIN TOTAL mg/dL 0 50  --   --   --  0 60  --  0 80   ALK PHOS U/L 150*  --   --   --  171*  --  199*   ALT U/L 19  --   --   --  16  --  20   AST U/L 24  --   --   --  21  --  18   GLUCOSE RANDOM mg/dL 445* 219* 415*  --  84  < > 735*   GLUCOSE, ISTAT   --   --   --   < >  --   < >  --    < > = values in this interval not displayed  Results from last 7 days  Lab Units 12/01/17  0412 11/30/17 2230 11/29/17  0521   MAGNESIUM mg/dL 2 5 1 9 2 0     Lab Results   Component Value Date    PHOS 5 5 (H) 12/01/2017    PHOS 5 2 (H) 11/29/2017    PHOS 4 5 (H) 09/25/2017    PHOS 2 5 07/24/2015    PHOS 6 2 (H) 07/01/2015        Results from last 7 days  Lab Units 12/01/17  0415 11/30/17  0627 11/30/17  0347  11/29/17  0928 11/29/17  0521  11/29/17  0452   INR   --   --   --   --  1 58* 1 47*  --  1 42*   PTT seconds 62* 76* 60*  < > 65* 59*  < >  --    < > = values in this interval not displayed  0  Lab Value Date/Time   TROPONINI <0 02 11/29/2017 0521   TROPONINI <0 02 11/28/2017 2040   TROPONINI <0 02 11/27/2017 1140   TROPONINI <0 02 09/17/2017 1514   TROPONINI 0 04 07/08/2017 0112   TROPONINI 0 04 07/07/2017 1510   TROPONINI 0 04 (H) 04/02/2017 1304   TROPONINI 0 04 (H) 04/02/2017 0939   TROPONINI 0 02 04/02/2017 0342   TROPONINI <0 02 03/31/2017 2055   TROPONINI <0 02 09/10/2016 1728       Results from last 7 days  Lab Units 11/30/17  0621 11/29/17  0522   LACTIC ACID mmol/L 1 4 1 8     ABG:  Lab Results   Component Value Date    PHART 7 352 06/16/2017    RVW2JRL 45 1 (H) 06/16/2017    PO2ART 116 9 06/16/2017    BOJ7OSR 24 5 06/16/2017    BEART -1 2 06/16/2017    SOURCE Brachial, Left 06/16/2017     Imaging:  Chest x-ray with right effusion/opacity ET tube appropriate position I have personally reviewed pertinent reports     and I have personally reviewed pertinent films in PACS  EKG:  Tele reviewed  Micro:  Lab Results   Component Value Date    BLOODCX No Growth at 24 hrs  11/29/2017    BLOODCX No Growth at 24 hrs  11/29/2017    BLOODCX No Growth After 5 Days   09/17/2017    URINECX >100,000 cfu/ml Pseudomonas aeruginosa (A) 11/27/2017    URINECX >100,000 cfu/ml Pseudomonas aeruginosa 09/17/2017    URINECX  09/17/2017     8327-1803 cfu/ml Oxidase Positive gram negative jose alfredo    WOUNDCULT (A) 04/04/2017     4+ Growth of Vancomycin Resistant Enterococcus faecalis    WOUNDCULT (A) 04/04/2017     3+ Growth of Methicillin Resistant Staphylococcus aureus    WOUNDCULT (A) 02/18/2017     3+ Growth of Methicillin Resistant Staphylococcus aureus    WOUNDCULT 2+ Growth of Mixed Skin Reanna 02/18/2017    SPUTUMCULTUR 1+ Growth of Candida sp  presumptively albicans (A) 11/29/2017    SPUTUMCULTUR 2+ Growth of Candida sp  presumptively albicans 04/01/2017    SPUTUMCULTUR 1+ Growth of Mixed Respiratory Reanna 04/01/2017     Allergies: No Known Allergies  Medications:   Scheduled Meds:    amiodarone 200 mg Oral TID   b complex-vitamin C-folic acid 1 capsule Oral Daily   cefepime 1,000 mg Intravenous Q24H   cholestyramine sugar free 4 g Oral BID   cinacalcet 30 mg Oral HS   citalopram 10 mg Oral Daily   gabapentin 300 mg Oral HS   levothyroxine 137 mcg Oral Early Morning   menthol-zinc oxide  Topical BID   nystatin  Topical BID   omeprazole (PRILOSEC) suspension 2 mg/mL 20 mg Oral Daily   saccharomyces boulardii 250 mg Oral BID   vancomycin 125 mg Oral Q6H [address]     Continuous Infusions:    heparin (porcine) 3-20 Units/kg/hr (Order-Specific) Last Rate: 14 Units/kg/hr (12/01/17 0129)   insulin regular (HumuLIN R,NovoLIN R) infusion 0 3-21 Units/hr Last Rate: 3 Units/hr (12/01/17 0809)     PRN Meds:    acetaminophen 650 mg Q6H PRN   albuterol 2 5 mg Q6H PRN   etomidate  Code/Trauma/Sedation Med   fentanyl citrate (PF)  Code/Trauma/Sedation Med   heparin (porcine) 1,800 Units PRN   heparin (porcine) 3,600 Units PRN   metoprolol 2 5 mg Q6H PRN   midazolam Code/Trauma/Sedation Med     VTE Pharmacologic Prophylaxis: Heparin Drip  VTE Mechanical Prophylaxis: sequential compression device  Invasive lines and devices: Invasive Devices     Peripheral Intravenous Line            Peripheral IV 11/29/17 Left Antecubital 2 days    Peripheral IV 11/29/17 Left Arm 2 days    Peripheral IV 11/29/17 Left Wrist 2 days          Line            Hemodialysis AV Fistula  Right Forearm -- days    Hemodialysis AV Fistula Right Forearm -- days          Drain            Gastrostomy/Enterostomy -- days    Gastrostomy/Enterostomy Percutaneous endoscopic gastrostomy (PEG) 20 Fr   days                     Portions of the record may have been created with voice recognition software  Occasional wrong word or "sound a like" substitutions may have occurred due to the inherent limitations of voice recognition software  Read the chart carefully and recognize, using context, where substitutions have occurred      ALIE House

## 2017-12-01 NOTE — CONSULTS
Progress Note - Wound   Prema Freedman 61 y o  male MRN: [id number]  Unit/Bed#:  Encounter: [id number]      Assessment:  Patient is 61year old male who presents with change in mental status  Admitted with acute encephalopathy   Patient has a complicated medical history  Wound care consulted for foot ulcers  Patient seen Irasema Santos in recliner chair with wife at bedside  Nutrition is following along with this patient  Assist of one with turning  Patient agreeable with assessment  R BKA stump with well healed incision with some areas of intact dry scabbing noted  No wounds  No redness noted to distal / posterior aspect  R lower extremity with healing abrasions  Patients wife states, "those are from when he falls sometimes"  Healing abrasions noted to b/l upper extremities / ecchymosis  Recommend skin nourishing cream       L heel is intact with dry skin and blanchable redness  Skin to L lower extremity is dry and scaling recommend skin nourishing cream daily  Stable dry eschar noted to plantar surface of the great toe  Wound is 100% dry brown/black eschar  Edges are dry and intact with no unroofing noted  Well adhered  No drainage noted  Quin-wound is intact with no redness, induration, or fluctuance  L lateral plantar foot with stable dry eschar  Wound is 100% dry brown/black eschar  Edges are dry and intact with no unroofing noted  Well adhered  No drainage noted  Quin-wound is intact with no redness, induration, or fluctuance - callous noted  Per wife patient follows with [facility] wound care center and visiting nurses apply betadine to these areas  Will recommend paint with betadine daily  Patient and patients wife declined assessment of the buttocks/sacrum as patient is in the recliner and is "finally comfortable"  Discussed skin with his primary nurse who states that skin is red blanching with scarring and scabbed areas  Patient is also having loose BMs   Will recommend calazime protectant paste TID with incontinence care  See flow sheets for more detailed assessment findings  Wound care will follow along weekly  Primary nurse aware of plan of care  Educated patient on turning, weight shifting and repositioning frequently when in the chair and in the bed to offload pressure to prevent skin breakdown  Verbalized understanding of plan of care  Plan:   1  Apply hydraguard to L heel and R stump BID and PRN for prevention and protection  2  Paint stable eschars to L foot plantar great toe & L lateral plantar foot daily  3  Cleanse buttocks and sacrum with soap and water, pat dry, and apply calazime for protection TID and PRN  4  Apply skin nourishing cream to the entire skin daily for moisture   5  Soft care cushion to chair when OOB  6  Turn and reposition patient every 2 hours   7  Elevate heels off of bed with pillows to offload   8  Wound care will follow along with patient weekly, please call with questions and concerns [phone]  9  Follow up with [facility] wound care center after d/c      Objective:    Vitals: Blood pressure 145/78, pulse 87, temperature 97 8 °F (36 6 °C), temperature source Oral, resp  rate 16, height 5' 6" (1 676 m), weight 60 3 kg (132 lb 15 oz), SpO2 100 %  ,Body mass index is 21 46 kg/m²  Other Ulcers 02/18/17 Foot Left plantar / great toe  (Active)   Wound Description Black; Brown;Dry 12/1/2017  1:00 PM   Quin-wound Assessment Clean;Dry; Intact 12/1/2017  1:00 PM   Shape round 12/1/2017  1:00 PM   Size 1 1 12/1/2017  1:00 PM   Wound Length (cm) 1 6 cm 12/1/2017  1:00 PM   Wound Width (cm) 0 cm 12/1/2017  1:00 PM   Drainage Amount None 12/1/2017  1:00 PM   Treatment Cleansed;Elevated with pillow(s); Offload 12/1/2017  1:00 PM   Dressings Other (Comment) 12/1/2017  1:00 PM   Wound packed?  No 12/1/2017  1:00 PM   Packing- # removed 0 12/1/2017  1:00 PM   Packing- # inserted 0 12/1/2017  1:00 PM   Patient Tolerance Tolerated well 12/1/2017  1:00 PM   Dressing Status Open to air 12/1/2017  1:00 PM       Other Ulcers 07/10/17 Foot Left lateral plantar L foot  (Active)   Wound Description Black; Brown;Dry 12/1/2017  1:00 PM   Quin-wound Assessment Clean;Dry; Intact 12/1/2017  1:00 PM   Shape round  12/1/2017  1:00 PM   Size 1 5 12/1/2017  1:00 PM   Wound Length (cm) 2 cm 12/1/2017  1:00 PM   Wound Width (cm) 0 cm 12/1/2017  1:00 PM   Drainage Amount None 12/1/2017  1:00 PM   Treatment Cleansed;Elevated with pillow(s); Offload 12/1/2017  1:00 PM   Dressings Other (Comment) 12/1/2017  1:00 PM   Wound packed? No 12/1/2017  1:00 PM   Packing- # removed 0 12/1/2017  1:00 PM   Packing- # inserted 0 12/1/2017  1:00 PM   Patient Tolerance Tolerated well 12/1/2017  1:00 PM   Dressing Status Open to air 12/1/2017  1:00 PM         Recommendations written as orders    Fabrizio Knapp RN BSN

## 2017-12-01 NOTE — SPEECH THERAPY NOTE
Speech Language/Pathology  Speech/Language Pathology  Assessment    Patient Name: Francesca Lagos  Today's Date: 12/1/2017     Problem List  Patient Active Problem List   Diagnosis    Type 1 diabetes mellitus with nephropathy ([facility] )    ESRD (end stage renal disease) on dialysis ([facility] )    Ambulatory dysfunction    Rapid atrial fibrillation ([facility] )    HCAP (healthcare-associated pneumonia)    Recurrent colitis due to Clostridium difficile    S/P percutaneous endoscopic gastrostomy (PEG) tube placement ([facility] )    Pleural effusion on right    Chronic combined systolic and diastolic CHF (congestive heart failure) ([facility] )    R Fibula fracture, proximal to stump    Pseudomonas urinary tract infection    DVT, lower extremity ([facility] )    Hyponatremia    Clostridium difficile infection    Hypoglycemia    Anemia    Acquired hypothyroidism    Chronic kidney disease-mineral and bone disorder     Past Medical History  Past Medical History:   Diagnosis Date    Acquired hypothyroidism     underactive    Atrial fibrillation ([facility] )     Chronic kidney disease-mineral and bone disorder 11/27/2017    Clostridium difficile infection 04/2017    Diabetes mellitus ([facility] )     Dialysis patient ([facility] )     Diarrhea     ESRD (end stage renal disease) on dialysis ([facility] )     Hx of cardiac arrest     Hypertension     Neuropathy     Right lower lobe pneumonia ([facility] ) 2/20/2017    Sepsis ([facility] ) 04/2017    Polymicrobial MRSA, VRE    Systolic and diastolic CHF, chronic (HCC)     EF 35%, Grade II DD     Past Surgical History  Past Surgical History:   Procedure Laterality Date    CATARACT EXTRACTION      CHG GI ENDOSCOPIC ULTRASOUND N/A 3/10/2016    Procedure: LINEAR ENDOSCOPIC U/S;  Surgeon: Sanket Keith MD;  Location: [facility];   Service: Gastroenterology    CHOLECYSTECTOMY      GASTROSTOMY TUBE PLACEMENT N/A 4/12/2017    Procedure: INSERTION PEG TUBE;  Surgeon: Darin Norris MD;  [facility];  Service:    Deadra Whit LEG AMPUTATION THROUGH LOWER TIBIA AND FIBULA Right 4/6/2017    Procedure: AMPUTATION BELOW KNEE (BKA); Surgeon: Ranjan Brizuela DO;  [facility];  Service:     TOE AMPUTATION  2000 12/01/17 1057   Patient Information   Current Medical Jackson Farfan is a 61 y o  male who presents with change in mental status similar to in the past with acute infections  The patient's history of obtained from his wife for the most part as the patient's mental status is lethargic  Patient's wife states that he is been altered since 2 days ago  Despite being a dialysis patient he does urinate very small amounts and she states this has been coffee colored  He has also been complaining of some right-sided flank pain  Patient also has a PEG tube in place  It was originally placed in April 2017  Pt reports he has not used the PEG tube for several months, receiving all nutrition orally  Pt has been seen by ST several times the past few months  Pt had a VBS in May 2017, when a ground diet and NTL were recommended (no penetration/aspiration, but pt was at risk)  Pt was again seen for clinical swallow eval in June 2017, and regular diet and thin liquids were recommended  Pt reports he has been tolerating this diet at home with no difficulty  Special Studies CXR-Unchanged size of right pleural effusion with underlying right basilar airspace disease similar to prior study  CT head-No acute intracranial abnormality, WBC-slightly elevated   Past Medical History DM, ESRD, Afib, HCAP, CDiff, PEG, Hypothryoidism   Social/Educational/Vocational Hx Lives at home with wife   Swallow Information   Current Risks for Dysphagia & Aspiration Recent intubation  (Pt was intubated for one day, extubated yesterday at 1600)   Current Symptoms/Concerns HX Dysphagia   Current Diet NPO   Baseline Diet Regular; Thin liquids   Baseline Assessment   Behavior/Cognition Alert; Cooperative; Interactive   Speech/Language Status appears WFL in conversation, voice is strong, no breathiness   Patient Positioning Upright in chair   Swallow Mechanism Exam   Labial Symmetry WFL   Labial Strength WFL   Labial ROM WFL   Labial Sensation WFL   Facial Symmetry WFL   Facial Strength WFL   Facial ROM WFL   Facial Sensation WFL   Lingual Strength WFL   Lingual ROM WFL   Lingual Sensation WFL   Velum WFL   Mandible WFL   Dentition Other (Comment)  (Several missing teeth, mostly molars)   Volitional Cough Strong   Tracheostomy No   SpO2 (Upper 90's to 100 during eval)   Consistencies Assessed and Performance   Materials Admnistered Puree/Level 1;Soft/Level 3;Regular/Solid; Thin liquid   Materials Adminstered Comment 4 oz applesauce, softened jodie crackers, crunchy jodie crackers, ice chips, thin liquid, NTL (8 oz) by cup   Oral Stage WFL   Oral Stage Comment Bolus retrieval and draw from straw was [facility], mastication, bolus formation and transfer appear adequate with no oral residue noted  Phargngeal Stage Mild impaired;Aspiration risk   Pharyngeal Stage Comment Swallows appear timely to mildly delayed, ?premature spill to pharynx, hyolaryngeal elevation palpated and judged adequate  Pt had a delayed coughed after swallowing ice chips, and immediate cough after two sips of thin liquid (cough after each sip)  Cough is strong  Swallow Mechanics Mild delayed;Good Larygneal rise;Aspiration risk  (? premature spill to pharynx)   Esophageal Concerns No s/s reported   Strategies and Efficacy Pt responded to cues to slow down, take small bites  Summary   Swallow Summary Oral phase of the swallow appears WFL  Pharyngeal phase appears mildly impaired, with possible premature spill into pharynx, mild delayed swallows, and clinical s/s of aspiration after ice chips and small sips of thin water  Pt is at risk to aspirate thin liquids  No clinical s/s of aspiration with any other consistencies trialed today   In addition pt tends to be impulsive at times, taking large bites and sips; he does respond to cues to slow down, and take smaller bites  Pt is appropriate to begin PO diet  Recommendations   Risk for Aspiration Moderate  (with thin liquids)   Recommendations Consider oral diet; Dysphagia treatment   Diet Solid Recommendation Level 3 Dysphagia/ advanced/ soft to chew   Diet Liquid Recommendation Nectar thick liquid   Recommended Form of Meds Whole; With thick liquid   General Precautions Aspiration precautions; Feed only when alert;Upright as possible for all oral intake;Minimize distractions,   (Intermittent supervision with meals)   Compensatory Swallowing Strategies External pacing, cue pt to take small bites/sips   Results Reviewed with PAC/CRNP;RN;PT/Family/Caregiver  (Sign posted)   Treatment Recommendations   Duration of treatment 1 week   Follow up treatments Strategy training; Assure diet tolerance; Patient/family education;Continue clinical assessment   Dysphagia Goals Patient will tolerate recommended diet without observed clinical signs of oral/pharngeal dysphagia; Patient will tolerate advanced diet with no signs of oral or pharngeal dysphagia; Patient will utilize appropriate strategies for swallowing safety   Speech Therapy Prognosis   Prognosis Excellent   Prognosis Considerations Age; Co-Morbidities; Patient Participation Level; Potential;Previous Level of Function;Severity of Impairments

## 2017-12-01 NOTE — SOCIAL WORK
Patient is not currently a bundle  CM met with patient and his wife Sujata Keller at bedside  Patient lives with his wife Sujata Keller and their daughter and son in law  Dariel has RX coverage and uses [facility] on [address] with no issues  Patient goes to dialysis at [facility] at 12:45 PM prior to him going to the [facility] patient's wife was transporting him to his dialysis  Patient does not work  Patient's wife provides all needed transport  Patient is currently open with [facility] and a referral for [facility] will be sent in  Patient has the following DME at home: shower chair, hospital bed, WC, RW, BSC, Life Chair, Ramp, and over the bed table       CM reviewed d/c planning process including the following: identifying help at home, patient preference for d/c planning needs, [facility] Meds to Bed program, availability of treatment team to discuss questions or concerns patient and/or family may have regarding diagnosis, plan for care, old or new medications and discharge planning   CM name and role reviewed  CM will follow needs at discharge

## 2017-12-01 NOTE — PROGRESS NOTES
Progress Note - Cardiology   Mae Figueroa 61 y o  male MRN: [id number]  Unit/Bed#:  Encounter: [id number]  12/01/17  8:39 AM        Assessment/Plan:    1  Paroxysmal atrial fibrillation  Converted to SR/SB with IV amiodarone  On IV heparin now that INR subtherapeutic until can resume Coumadin  Started on oral Amiodarone tid 11/30 to help maintain SR, decrease to 200 qd on 12/5, resume Metoprolol at lower dose than prior to admission and titrate up slowly, as BP somewhat borderline currently  Home dose was reportedly Metoprolol tartrate 100 bid  2  Chronic combined HF  EF by 35% by echo 4/17, repeat echo shows EF unchanged  Spoke with wife, she was not aware his EF was decreased  Denies any history of MI or prior coronary interventions  Will eventually need risk stratification with stress testing as outpt if he has not been evaluated previously, which was explained to wife  She would like him to follow up with [facility] after discharge  At baseline he is mostly wheelchair bound due to his prior amputation  She reports his baseline mental status waxes and wanes, he has never been diagnosed as having dementia, but she wonders if he does have it, as he sometimes has difficulty orienting to where he is, or finding words  Volume managed by HD  At home was supposedly taking Metoprolol tartrate 100 bid, currently held due to borderline hypotension  Restart at lower dose of 12 5 bid and titrate up as BP tolerates  3  ESRD  Volume managed by HD, underwent HD 11/30 and plan for repeat on 12/2      4  DM  On insulin drip  Hgb A1c was checked and was 9%  5  History of aortic valve endocarditis  Treated with IV antibiotics for appropriate time course  Follow up echo did not show any evidence of aortic valve vegetation  6  HCAP  Being treated with Cefepime  Now on Vancomycin for chronic recurrent C diff as well  Felt to have Pseudomonas colonization in urine      Subjective/Objective   Chief Complaint:   Chief Complaint   Patient presents with    Fatigue     Spouse reports increased fatigue and decreased appetite for 1 week  PT reports painful urination for several months  Spouse also states the pt fell and hit his head on 11/25  Subjective: 61y o  year old male with DM 1, atrial fibrillation, chronic right pleural effusion s/p thoracentesis , PEG tube placement , chronic combined heart failure, ESRD, DM, who presents with altered mental status, urinary symptoms and lethargy  He had missed HD X1  We have been consulted regarding rapid atrial fibrillation  He is on warfarin  CT head no acute pathology  CT abdomen/pelvis- moderate right pleural effusion with adjacent opacities possibly atelectasis vs aspiration or PNA  He had prolonged hospitalizations in April and June this year  In April he was admitted with altered mental status  He had a PEA arrest in the setting of AV endocarditis/profound hypoglycemia per documentation  He underwent R BKA for chronic nonhealing ulcers  Hospital course was complicated by bacteremia with MRSA and VRE, C diff colitis  Peg tube was placed  MARIA revealed probable vegetation on AV for which he completed 6 weeks of antibiotics  He was hospitalized in June with altered mental status, acute hypoxic and hypercapneic respiratory failure, pulmonary edema and sepsis  MARIA 4/2017- EF 35% with moderate diffuse hypokinesis  Grade 1 DD  RV normal  Mild LAE and ESA  Mild to moderate MR  AV- probable small, mobile vegetation on the right coronary cusp, on the left ventricular aspect  No significant AI  On 11/28 he was noted to have gone into afib with RVR, he has a history of paroxysmal afib and was on Coumadin prior to admission  He was started on IV amiodarone to help maintain SR   Per report patient had an episode of unresponsiveness while receiving amiodarone bolus, which resolved after 10 seconds, no arrhythmias or bradycardia noted on telemetry at time, it was felt possibly due to hypotension/hypoperfusion at that time  He was hyperglycemic and insulin drip was started for improved glycemic control, and early morning of 11/29 he had another episode of being unresponsive and diaphoretic, found to be hypoglycemic with FS less than 20  He was given dextrose, with improved FS to 123, but still unresponsive and hypoxic to 77%, intubated for airway protection  He converted back to SR/SB in 40s around 10:30 AM on 11/29, has since remained in SR  IV amiodarone stopped due to bradycardia in SR  He has been maintaining SR  Extubated 11/30  No fevers  Currently sleeping comfortably in bed       Patient Active Problem List   Diagnosis    Type 1 diabetes mellitus with nephropathy ([place] [address] )    ESRD (end stage renal disease) on dialysis ([place] [address] )    Ambulatory dysfunction    Rapid atrial fibrillation ([place] [address] )    HCAP (healthcare-associated pneumonia)    Recurrent colitis due to Clostridium difficile    S/P percutaneous endoscopic gastrostomy (PEG) tube placement ([place] [address] )    Pleural effusion on right    Chronic combined systolic and diastolic CHF (congestive heart failure) ([place] [address] )    R Fibula fracture, proximal to stump    Pseudomonas urinary tract infection    DVT, lower extremity (HCC)    Hyponatremia    Clostridium difficile infection    Hypoglycemia    Anemia    Acquired hypothyroidism    Chronic kidney disease-mineral and bone disorder     Past Medical History:   Diagnosis Date    Acquired hypothyroidism     underactive    Atrial fibrillation ([place] [address] )     Chronic kidney disease-mineral and bone disorder 11/27/2017    Clostridium difficile infection 04/2017    Diabetes mellitus ([facility] )     Dialysis patient ([place] [address] )     Diarrhea     ESRD (end stage renal disease) on dialysis ([facility] )     Hx of cardiac arrest     Hypertension     Neuropathy     [facility] 2/20/2017    Sepsis ([facility] ) 04/2017    Polymicrobial MRSA, VRE    Systolic and diastolic CHF, chronic (HCC)     EF 35%, Grade II DD       No Known Allergies    Current Facility-Administered Medications   Medication Dose Route Frequency Provider Last Rate Last Dose    acetaminophen (TYLENOL) tablet 650 mg  650 mg Oral Q6H PRN Julia Trejo PA-C        albuterol inhalation solution 2 5 mg  2 5 mg Nebulization Q6H PRN Ricardo Francisco DO        amiodarone tablet 200 mg  200 mg Oral TID Carline Del Real MD   200 mg at 12/01/17 2331    b complex-vitamin C-folic acid (NEPHROCAPS) capsule 1 capsule  1 capsule Oral Daily Julia Trejo PA-C   1 capsule at 11/28/17 1239    cefepime (MAXIPIME) IVPB (premix) 1,000 mg  1,000 mg Intravenous Q24H ALIE Pyle        cholestyramine sugar free (QUESTRAN LIGHT) packet 4 g  4 g Oral BID Jonnathan Pelaez PA-C   4 g at 12/01/17 0810    cinacalcet (SENSIPAR) tablet 30 mg  30 mg Oral HS Julia Trejo PA-C   30 mg at 11/28/17 2116    citalopram (CeleXA) tablet 10 mg  10 mg Oral Daily Catina Brink PA-C   10 mg at 12/01/17 0810    etomidate (AMIDATE) 2 mg/mL injection    Code/Trauma/Sedation Med ALIE Pyle   20 mg at 11/29/17 0506    fentanyl citrate (PF) 100 MCG/2ML   Intravenous Code/Trauma/Sedation Med ALIE Pyle   50 mcg at 11/29/17 1399    gabapentin (NEURONTIN) capsule 300 mg  300 mg Oral HS Julia Trejo PA-C   300 mg at 11/30/17 2143    heparin (porcine) 25,000 units in 250 mL infusion (premix)  3-20 Units/kg/hr (Order-Specific) Intravenous Titrated ALEI Pyle 8 4 mL/hr at 12/01/17 0129 14 Units/kg/hr at 12/01/17 0129    heparin (porcine) injection 1,800 Units  1,800 Units Intravenous PRN ALIE Pyle   1,800 Units at 11/29/17 1443    heparin (porcine) injection 3,600 Units  3,600 Units Intravenous PRN ALIE Pyle        insulin regular (HumuLIN R,NovoLIN R) 1 Units/mL in sodium chloride 0 9 % 100 mL infusion  0 3-21 Units/hr Intravenous Titrated ALIE Pyle 3 mL/hr at 12/01/17 0809 3 Units/hr at 12/01/17 0809    levothyroxine tablet 137 mcg  137 mcg Oral Early Morning Noemi Gunderson, PA-C   137 mcg at 12/01/17 0516    menthol-zinc oxide (CALMOSEPTINE) 0 44-20 6 % ointment   Topical BID Alisha Moreno PA-C        metoprolol (LOPRESSOR) injection 2 5 mg  2 5 mg Intravenous Q6H PRN ALIE Pena        midazolam (VERSED) injection    Code/Trauma/Sedation Med ALIE Pena   2 mg at 11/29/17 1830    nystatin (MYCOSTATIN) powder   Topical BID Alisha Moreno PA-C        omeprazole (PRILOSEC) suspension 2 mg/mL  20 mg Oral Daily ALIE Pena   20 mg at 12/01/17 0818    saccharomyces boulardii (FLORASTOR) capsule 250 mg  250 mg Oral BID Noemi Gunderson PA-C   250 mg at 12/01/17 5796    vancomycin (VANCOCIN) oral solution 125 mg  125 mg Oral Q6H [address] TERESA Lee-C   125 mg at 12/01/17 5686    warfarin (COUMADIN) tablet 5 mg  5 mg Oral Daily (warfarin) ALIE Pena           Vitals: /50   Pulse 90   Temp 98 3 °F (36 8 °C) (Oral)   Resp 16   Ht 5' 6" (1 676 m)   Wt 60 3 kg (132 lb 15 oz)   SpO2 98%   BMI 46 50 kg/m²     Systolic (17NWQ), MSA:334 , Min:77 , RQR:753     Diastolic (15YTV), ZLW:58, Min:48, Max:77      Vitals:    11/27/17 1903 11/29/17 0600   Weight: 63 1 kg (139 lb 1 8 oz) 60 3 kg (132 lb 15 oz)     Orthostatic Blood Pressures    Flowsheet Row Most Recent Value   Blood Pressure  102/50 filed at 12/01/2017 0700   Patient Position - Orthostatic VS  Lying filed at 12/01/2017 0700            Intake/Output Summary (Last 24 hours) at 12/01/17 0839  Last data filed at 12/01/17 0600   Gross per 24 hour   Intake          1160 98 ml   Output             1500 ml   Net          -339 02 ml       Invasive Devices     Peripheral Intravenous Line            Peripheral IV 11/29/17 Left Antecubital 2 days    Peripheral IV 11/29/17 Left Arm 2 days    Peripheral IV 11/29/17 Left Wrist 2 days          Line            Hemodialysis AV Fistula  Right Forearm -- days    Hemodialysis AV Fistula Right Forearm -- days          Drain            Gastrostomy/Enterostomy -- days    Gastrostomy/Enterostomy Percutaneous endoscopic gastrostomy (PEG) 20 Fr   days                  Physical Exam:     GEN: sleeping comfortably in bed, flat, in no acute distress  HEENT: Sclera anicteric, conjunctivae pink, mucous membranes moist   NECK: Supple, no carotid bruits, no significant JVD  HEART:Regular, HR controlled, no murmurs, clicks, gallops or rubs  LUNGS: Clear to auscultation bilaterally anteriorly; no wheezes, rales, or rhonchi   ABDOMEN: Soft, nontender, nondistended, normoactive bowel sounds  EXTREMITIES: Skin warm and well perfused, no clubbing, cyanosis, or edema  S/p R BKA  NEURO: No focal findings  SKIN: Normal without suspicious lesions on exposed skin        Lab Results:     Troponins:     Results from last 7 days  Lab Units 11/29/17  0521 11/28/17  2040 11/27/17  1140   TROPONIN I ng/mL <0 02 <0 02 <0 02       CBC with diff:     Results from last 7 days  Lab Units 12/01/17  0412 11/30/17  0346 11/29/17  0928 11/29/17  0759 11/29/17  0522 11/29/17  0501 11/29/17  0452 11/28/17  2040 11/28/17  0404   WBC Thousand/uL 10 18* 9 59 7 29  --  9 61  --  8 10 7 59 7 30   HEMOGLOBIN g/dL 9 7* 10 9* 9 2*  --  10 5*  --  11 3* 11 0* 10 5*   I STAT HEMOGLOBIN g/dl  --   --   --  10 5*  --  11 2*  --   --   --    HEMATOCRIT % 30 5* 33 7* 29 8*  --  33 2*  --  35 5* 34 7* 32 8*   MCV fL 93 92 95  --  93  --  92 94 92   PLATELETS Thousands/uL 313 332 285  --  353  --  416* 258 344   MCH pg 29 7 29 7 29 4  --  29 4  --  29 1 29 6 29 4   MCHC g/dL 31 8 32 3 30 9*  --  31 6  --  31 8 31 7 32 0   RDW % 15 1 15 4* 15 9*  --  15 6*  --  15 4* 15 8* 16 1*   MPV fL 8 9 9 3 9 4  --  9 0  --  9 1 9 3 9 3         CMP:    Results from last 7 days  Lab Units 12/01/17  0412 11/30/17  2230 11/30/17  0346 11/29/17  0759 11/29/17  0521 11/29/17  0501 11/29/17  4158 11/28/17  2040 11/28/17  0403  11/27/17  1623   SODIUM mmol/L 126* 128* 120*  --  125*  --  127* 118* 126*  --  126*   POTASSIUM mmol/L 4 4 4 1 5 5*  --  3 7  --  3 7 4 8 5 8*  --   --    CHLORIDE mmol/L 91* 94* 85*  --  92*  --  91* 86* 90*  --  91*   CO2 mmol/L 25 26 20*  --  22  --  25 22 24  --  22   ANION GAP mmol/L 10 8 15*  --  11  --  11 10 12  --  13   BUN mg/dL 28* 25 49*  --  41*  --  41* 33* 55*  --  98*   CREATININE mg/dL 4 65* 4 16* 5 95*  --  4 51*  --  4 65* 3 99* 5 98*  --  8 74*   GLUCOSE RANDOM mg/dL 445* 219* 415*  --  84  --  31* 735* 141*  --  209*   GLUCOSE, ISTAT mg/dl  --   --   --  82  --  180*  --   --   --   < >  --    CALCIUM mg/dL 9 0 8 4 8 2*  --  8 4  --  9 1 8 3 8 2*  --  8 6   AST U/L 24  --   --   --  21  --   --  18  --   --  30   ALT U/L 19  --   --   --  16  --   --  20  --   --  24   ALK PHOS U/L 150*  --   --   --  171*  --   --  199*  --   --  183*   TOTAL PROTEIN g/dL 7 1  --   --   --  7 8  --   --  7 9  --   --  8 2   ALBUMIN g/dL 1 9*  --   --   --  2 1*  --   --  2 1*  --   --  2 3*   BILIRUBIN TOTAL mg/dL 0 50  --   --   --  0 60  --   --  0 80  --   --  0 50   EGFR ml/min/1 73sq m 12 14 9  --  13  --  12 15 9  < > 6   < > = values in this interval not displayed      Magnesium:     Results from last 7 days  Lab Units 12/01/17  0412 11/30/17  2230 11/29/17  0521 11/27/17  1140   MAGNESIUM mg/dL 2 5 1 9 2 0 2 1       Coags:     Results from last 7 days  Lab Units 12/01/17  0415 11/30/17  0627 11/30/17  0347 11/29/17  2128 11/29/17  1351 11/29/17  0928 11/29/17  0521 11/29/17  0452 11/28/17  1030 11/28/17  0404 11/27/17  2045   PTT seconds 62* 76* 60* 60* 43* 65* 59*  --   --   --   --    INR   --   --   --   --   --  1 58* 1 47* 1 42* 3 44* 5 31* 4 69*     Hgb A1c:     Results from last 7 days  Lab Units 11/27/17 2045   HEMOGLOBIN A1C % 9 4*       Cardiac testing:   Results for orders placed during the hospital encounter of 04/02/17   Echo complete with contrast if indicated    [facility] [address]  [facility], [address] [facility]  [phone]    Transthoracic Echocardiogram  2D, M-mode, Doppler, and Color Doppler    Study date:  2017    Patient: Judith Perez  MR number: [id number]  Account number: [de-identified]  : 1950  Age: 61 years  Gender: Male  Status: Inpatient  Location: Bedside  Height: 65 in  Weight: 140 8 lb  BP: 145/ 74 mmHg    Indications: Evaluate for suspected valvular vegetation  Diagnoses: R78 81 - Bacteremia    Sonographer:  ZACHARY Curry  Referring Physician:  Vic James PA-C  Group:  [facility]  Cardiology Fellow:  Christian Fontana MD  Interpreting Physician:  Eliazar Perez MD    SUMMARY    LEFT VENTRICLE:  The ventricle was mildly dilated  Systolic function was moderately to markedly reduced by visual assessment  Ejection fraction was estimated to be 35 %  There was akinesis of the basal-mid inferoseptal, entire inferior, basal-mid inferolateral, and apical septal wall(s)  The changes were consistent with eccentric hypertrophy  Features were consistent with a pseudonormal left ventricular filling pattern, with concomitant abnormal relaxation and increased filling pressure (grade 2 diastolic dysfunction)  RIGHT VENTRICLE:  The size was normal   Systolic function was normal     LEFT ATRIUM:  The atrium was mildly dilated  ATRIAL SEPTUM:  The septum bows from left to right, consistent with increased left atrial pressure  RIGHT ATRIUM:  The atrium was mildly dilated  MITRAL VALVE:  There was mild to moderate annular calcification  There was mild regurgitation  AORTIC VALVE:  The valve was trileaflet  Leaflets exhibited mildly increased thickness, mild to moderate calcification, normal cuspal separation, and sclerosis  TRICUSPID VALVE:  There was trace regurgitation  IVC, HEPATIC VEINS:  The inferior vena cava was dilated      PERICARDIUM:  A trace, loculated pericardial effusion was identified anterior to the heart  HISTORY: PRIOR HISTORY: Cardiac arrest, ESRD, DM1, Cdif, Afib, Alchohol use    PROCEDURE: The procedure was performed at the bedside  This was a routine study  The transthoracic approach was used  The study included complete 2D imaging, M-mode, complete spectral Doppler, and color Doppler  The heart rate was 80 bpm,  at the start of the study  Images were obtained from the parasternal, apical, subcostal, and suprasternal notch acoustic windows  Image quality was adequate  LEFT VENTRICLE: The ventricle was mildly dilated  Systolic function was moderately to markedly reduced by visual assessment  Ejection fraction was estimated to be 35 %  There was akinesis of the basal-mid inferoseptal, entire inferior,  basal-mid inferolateral, and apical septal wall(s)  Wall thickness was increased  The changes were consistent with eccentric hypertrophy  DOPPLER: Features were consistent with a pseudonormal left ventricular filling pattern, with  concomitant abnormal relaxation and increased filling pressure (grade 2 diastolic dysfunction)  RIGHT VENTRICLE: The size was normal  Systolic function was normal  Wall thickness was normal     LEFT ATRIUM: The atrium was mildly dilated  ATRIAL SEPTUM: The septum bows from left to right, consistent with increased left atrial pressure  RIGHT ATRIUM: The atrium was mildly dilated  MITRAL VALVE: There was mild to moderate annular calcification  There was mild-moderate thickening  There was mild-moderate calcification  There was normal leaflet separation  DOPPLER: The transmitral velocity was within the normal range  There was no evidence for stenosis  There was mild regurgitation  AORTIC VALVE: The valve was trileaflet  Leaflets exhibited mildly increased thickness, mild to moderate calcification, normal cuspal separation, and sclerosis  DOPPLER: Transaortic velocity was within the normal range  There was no  evidence for stenosis  There was no regurgitation  TRICUSPID VALVE: The valve structure was normal  There was normal leaflet separation  DOPPLER: The transtricuspid velocity was within the normal range  There was no evidence for stenosis  There was trace regurgitation  PULMONIC VALVE: Leaflets exhibited normal thickness, no calcification, and normal cuspal separation  DOPPLER: The transpulmonic velocity was within the normal range  There was no regurgitation  PERICARDIUM: A trace, loculated pericardial effusion was identified anterior to the heart  AORTA: The root exhibited normal size and fibrocalcific change  SYSTEMIC VEINS: IVC: The inferior vena cava was dilated      SYSTEM MEASUREMENT TABLES    2D  %FS: 17 51 %  Ao Diam: 3 3 cm  EDV(Teich): 129 17 ml  EF Biplane: 40 01 %  EF(Teich): 36 23 %  ESV(Teich): 82 37 ml  IVSd: 1 19 cm  LA Area: 22 27 cm2  LA Diam: 3 95 cm  LVEDV MOD A2C: 146 9 ml  LVEDV MOD A4C: 140 92 ml  LVEDV MOD BP: 144 09 ml  LVEF MOD A2C: 38 23 %  LVEF MOD A4C: 42 19 %  LVESV MOD A2C: 90 74 ml  LVESV MOD A4C: 81 47 ml  LVESV MOD BP: 86 44 ml  LVIDd: 5 19 cm  LVIDs: 4 28 cm  LVLd A2C: 8 75 cm  LVLd A4C: 8 72 cm  LVLs A2C: 7 87 cm  LVLs A4C: 7 98 cm  LVPWd: 1 15 cm  RA Area: 17 74 cm2  RVIDd: 3 51 cm  SV MOD A2C: 56 16 ml  SV MOD A4C: 59 45 ml  SV(Teich): 46 8 ml    MM  TAPSE: 2 03 cm    PW  AVC: 369 41 ms  E': 0 03 m/s  E/E': 19 2  MV A Jose: 0 52 m/s  MV Dec [place]: 2 82 m/s2  MV DecT: 224 73 ms  MV E Jose: 0 63 m/s  MV E/A Ratio: 1 21  MV PHT: 65 17 ms  MVA By PHT: 3 38 cm2    [facility] Commission Accredited Echocardiography Laboratory    Prepared and electronically signed by    Carli Trujillo MD  Signed 04-Apr-2017 09:59:07       Results for orders placed during the hospital encounter of 04/02/17   MARIA    Narrative [address]  [phone]    Transesophageal Echocardiogram  2D, 3D, Doppler, and Color Doppler    Study date:  2017    Patient: Isauro Lee  MR number: [id number]  Account number: [de-identified]  : 1950  Age: 61 years  Gender: Male  Status: Inpatient  Location: Echo lab  Height: 65 in  Weight: 137 9 lb  BP: 127/ 66 mmHg    Indications: Evaluate for suspected valvular vegetation  Atrial fibrillation  Respiratory failure  Diagnoses: R78 81 - Bacteremia    Sonographer:  ZACHARY Aguilera  Interpreting Physician:  Elicia Najjar, DO  Primary Physician:  Aidee Renee MD  Group:  [facility]  Cardiology Fellow:  Isaura Pepper MD  RN:  Nikolai Jimenez    SUMMARY    LEFT VENTRICLE:  The ventricle was dilated  Systolic function was severely reduced  Ejection fraction was estimated to be 35 %  There was moderate diffuse hypokinesis with distinct regional wall motion abnormalities  Doppler parameters were consistent with abnormal left ventricular relaxation (grade 1 diastolic dysfunction)  RIGHT VENTRICLE:  The size was normal   Systolic function was normal     LEFT ATRIUM:  The atrium was mildly dilated  RIGHT ATRIUM:  The atrium was mildly dilated  MITRAL VALVE:  There was mild to moderate regurgitation  AORTIC VALVE:  There was no significant regurgitation  There was a probable small, mobile vegetation on the right coronary cusp, on the left ventricular aspect  TRICUSPID VALVE:  There was mild regurgitation  HISTORY: PRIOR HISTORY: Previous sudden death episode  Risk factors: diabetes  PROCEDURE: The procedure was performed in the echo lab  This was a routine study  The risks and alternatives of the procedure were explained to the patient's next of kin and informed consent was obtained  The transesophageal approach was  used  The study included complete 2D imaging, 3D imaging, complete spectral Doppler, and color Doppler  The heart rate was 88 bpm, at the start of the study   An adult omniplane probe was inserted by the cardiology fellow under direct  supervision of the attending cardiologist  Images were obtained from the parasternal, apical, subcostal, and suprasternal notch acoustic windows  Intubated with ease  One intubation attempt(s)  There was no blood detected on the probe  Echocardiographic views were limited due to poor acoustic window availability, decreased penetration, and lung interference  This was a technically difficult study  MEDICATIONS: SBE prophylaxis not indicated  Sedation administered by  anesthesia team     LEFT VENTRICLE: The ventricle was dilated  Systolic function was severely reduced  Ejection fraction was estimated to be 35 %  There was moderate diffuse hypokinesis with distinct regional wall motion abnormalities  Wall thickness was  normal  DOPPLER: Doppler parameters were consistent with abnormal left ventricular relaxation (grade 1 diastolic dysfunction)  RIGHT VENTRICLE: The size was normal  Systolic function was normal  Wall thickness was normal     LEFT ATRIUM: The atrium was mildly dilated  No thrombus was identified  APPENDAGE: The size was normal  No thrombus was identified  DOPPLER: The function was normal (normal emptying velocity)  RIGHT ATRIUM: The atrium was mildly dilated  No thrombus was identified  MITRAL VALVE: Valve structure was normal  There was normal leaflet separation  DOPPLER: There was mild to moderate regurgitation  AORTIC VALVE: The valve was trileaflet  Leaflets exhibited normal cuspal separation and sclerosis  There was a probable small, mobile vegetation on the right coronary cusp, on the left ventricular aspect  DOPPLER: There was no significant  regurgitation  TRICUSPID VALVE: The valve structure was normal  There was normal leaflet separation  DOPPLER: There was mild regurgitation  PULMONIC VALVE: Leaflets exhibited normal thickness, no calcification, and normal cuspal separation   There was no echocardiographic evidence of vegetation  PERICARDIUM: There was no pericardial effusion  The pericardium was normal in appearance  AORTA: The root exhibited normal size  There was Grade I atheroma of the proximal descending aorta and distal aortic arch  There was no evidence for dissection  There was no evidence for aneurysm  PULMONARY VEINS: DOPPLER: There was systolic blunting in the pulmonary vein(s)  MEASUREMENT TABLES    DOPPLER MEASUREMENTS  Left atrium   (Reference normals)  TAYLOR peak jose   40 cm/s   (--)    SYSTEM MEASUREMENT TABLES    CF  MR Als  Jose: 0 29 m/s  MR Flow: 152 23 ml/s  MR Rad: 0 91 cm    IntersDepartment of [facility] Barreetal Commission Accredited Echocardiography Laboratory    Prepared and electronically signed by    Blanche Newman DO  Signed 07-Apr-2017 17:27:13         Imaging: I have personally reviewed pertinent reports  Telemetry: personally reviewed, SR, SB, no further afib since 11/29 AM  NSVT

## 2017-12-01 NOTE — PROGRESS NOTES
Progress Note - ICU Transfer to Step down/med  surg  - Yoli Manriquez 61 y o  male MRN: [id number]    Unit/Bed#:  Encounter: [id number]    Code Status: Level 1 - Full Code  POA:    POLST:      Reason for ICU adm: Intubation    Active problems:   Patient Active Problem List   Diagnosis    Type 1 diabetes mellitus with nephropathy ([facility] )    ESRD (end stage renal disease) on dialysis ([facility] )    Ambulatory dysfunction    Rapid atrial fibrillation ([place] [address] )    HCAP (healthcare-associated pneumonia)    Recurrent colitis due to Clostridium difficile    S/P percutaneous endoscopic gastrostomy (PEG) tube placement ([facility] )    Pleural effusion on right    Chronic combined systolic and diastolic CHF (congestive heart failure) ([place] [address] )    R Fibula fracture, proximal to stump    Pseudomonas urinary tract infection    DVT, lower extremity (HCC)    Hyponatremia    Clostridium difficile infection    Hypoglycemia    Anemia    Acquired hypothyroidism    Chronic kidney disease-mineral and bone disorder       Consultants:  Endocrine, Nephrology, Cardiology    History of Present Illness/Summary of clinical course:  63-year-old male with past medical history of DM 1, end-stage renal disease on HD Monday Wednesday Friday, chronic combined CHF with an EF of 35%, AFib on Coumadin, history of aortic valve and endocarditis, history of recurrent C diff on indefinite p o  Vanco who presented on 11/27 with change in mental status  CT of the head was negative  CT of the abdomen and pelvis revealed moderate right pleural effusion with adjacent opacity  Patient was started on antibiotics for pneumonia  UA was positive with concern for UTI   P o  Vanco dosing was increased secondary to antibiotics  Patient has a history of PAF in 1 into rapid AFib, metoprolol p o  was given however he remained in rapid AFib requiring MV bolus amnio bolus and drip    During the amiodarone bolus patient had an unresponsive episode where his eyes rolled back into his head rapid response was called vital signs remained stable and patient had no focal deficits  Blood sugar was noted to be greater than 500 labs were sent with a blood sugar in the 700s patient was given 8 units of regular insulin and started on insulin drip  A few hours later patient was noted to be unresponsive diaphoretic with a blood sugar less than 20 rapid response was called insulin was held and dextrose was given  Blood sugar improved to the 120 is however patient did not wake up in GCS remained 3 requiring intubation  Repeat CT of the head was negative  Patient had recurrent hypoglycemic episodes requiring a D 10 drip  That was DC did on 11/29  Insulin drip was restarted on 11/30 however patient had recurrent hypoglycemic episodes in which he is asymptomatic on insulin drip and was held  Patient was extubated on 11/30  Overnight of 12 1 he was hyperglycemic into the 400s required resumption of insulin drip again however resulting again in hypoglycemia  Endocrinology was consulted patient is started on a b i d  Levemir dosing of 2 units with Humalog 2 units with meals and sliding scale insulin    Will remain on glucose is Q 1 hours as he has asymptomatic hypoglycemia once dosing is a better evaluated will transition back to [facility] with 2:00 a m  blood sugars      After rrT patient was started on heparin drip Coumadin restarted today    Recent or scheduled procedures:   11/27 urine culture Pseudomonas  11/28 flu negative  11/28 C diff negative  11/29 blood cultures no growth  11/29 MRSA positive  11/29 sputum 1+ growth of Candida    11/30 chest x-ray right pleural effusion with underlying right basilar airspace disease  11/29 CT of the head no acute abnormality  11/29 echo EF of 35% hypokinesis of the anterior anterior septal and apical walls trace pericardial effusion  In 11/27 CT of the abdomen and pelvis moderate right pleural effusion with adjacent rounded opacities atelectasis versus pneumonia  11/27 CT of the head negative      Outstanding/pending diagnostics:  None       Mobilization Plan:  Out of bed    Nutrition Plan:  CC cm renal dysphagia    Discharge Plan:    Patient should be ready for discharge to pending PT eval after antibiotics completed and blood sugar stabilized   Initial PT/OT/ST Recommendations:  Pending   Initial /Plan:  Following     Specific Diagnosis Plan:    Sepsis: Source:  Pneumonia, Antibiotics:  Cefepime, Length:  7 days    Need for Infectious Disease consult:  No  P o  vancomycin should be tapered back down to his 2 5 mg p  o  daily dose once antibiotics completed this is to continue indefinitely and he has an outpatient follow-up visit with ID in January  Heart Failure:  Cardiology consult placed  Following    Diuresis plan:  HD  Hyperglycemia:  Converting from IV to subcutaneous insulin:  Levemir b i d  started with Humalog 2 units with meals and sliding scale insulin will continue q 1 hour glucose is in close monitoring due to asymptomatic hypoglycemia in the step-down unit today  If remains stable q 6 hour glucose is tomorrow    Need for Endocrine consult: Following    See progress note for full plan of care    Portions of the record may have been created with voice recognition software  Occasional wrong word or "sound a like" substitutions may have occurred due to the inherent limitations of voice recognition software  Read the chart carefully and recognize, using context, where substitutions have occurred      ALIE Ramirez

## 2017-12-01 NOTE — PROGRESS NOTES
NEPHROLOGY PROGRESS NOTE   Elaina Ji 61 y o  male MRN: [id number]  Unit/Bed#:  Encounter: [id number]  Reason for Consult: ESRD on HD    ASSESSMENT and PLAN:  1  ESRD on HD ([facility], [facility]): HD tomorrow then back to [facility] next week  2  Access: R arm AVF  Stable  3  Paroxysmal atrial fibrillation: on amiodarone  4  BP/volume: BP at goal  Euvolemic  5  Anemia: Hgb close to goal    6  Brittle DM  7  Hyponatremia (chronic): Corrected Na is acceptable but remains hyponatremic  Unclear etiology  TSH was elevated this admission and Levothyroxine dose increased  No adrenal insufficiency from August 2017 labs  No free water administered via TF    8  Nutrition: Currently on Nepro TF  9  Recurrent Cdiff: on oral Vancomycin  10  CHF: compensated now  11  R sided pleural effusion, recent VDRF s/p extubation on 11/30/17     SUMMARY OF RECOMMENDATIONS:  · HD tomorrow  Discussed with critical care  SUBJECTIVE / INTERVAL HISTORY:  Successfully extubated yesterday after completion of HD  Glycemic control has been very labile  OBJECTIVE:  Current Weight: Weight - Scale: 60 3 kg (132 lb 15 oz)  Vitals:    12/01/17 0440 12/01/17 0540 12/01/17 0640 12/01/17 0700   BP: 108/54 113/57 99/51 102/50   Pulse: 89 88 86 90   Resp: 12 16 12 16   Temp:    98 3 °F (36 8 °C)   TempSrc:    Oral   SpO2: 100% 100% 100% 98%   Weight:       Height:           Intake/Output Summary (Last 24 hours) at 12/01/17 0845  Last data filed at 12/01/17 0600   Gross per 24 hour   Intake          1160 98 ml   Output             1500 ml   Net          -339 02 ml     General: conscious, cooperative, no distress  Chest/Lungs: decreased in bases  CVS: distinct heart sounds, normal rate, regular  Abdomen: soft, (+) PEG tube  Extremities: R BKA, no edema       Medications:    Current Facility-Administered Medications:     acetaminophen (TYLENOL) tablet 650 mg, 650 mg, Oral, Q6H PRN, Luciano Pitts PA-C    albuterol inhalation solution 2 5 mg, 2 5 mg, Nebulization, Q6H PRN, Ricardo Francisco DO    amiodarone tablet 200 mg, 200 mg, Oral, TID, Carline Del Real MD, 200 mg at 12/01/17 9412    b complex-vitamin C-folic acid (NEPHROCAPS) capsule 1 capsule, 1 capsule, Oral, Daily, Julia Trejo PA-C, 1 capsule at 11/28/17 1239    cefepime (MAXIPIME) IVPB (premix) 1,000 mg, 1,000 mg, Intravenous, Q24H, ALIE Rodriguez    cholestyramine sugar free (QUESTRAN LIGHT) packet 4 g, 4 g, Oral, BID, Awa Alves PA-C, 4 g at 12/01/17 0810    cinacalcet (SENSIPAR) tablet 30 mg, 30 mg, Oral, HS, Julia Trejo PA-C, 30 mg at 11/28/17 2116    citalopram (CeleXA) tablet 10 mg, 10 mg, Oral, Daily, Catina Brink PA-C, 10 mg at 12/01/17 0810    etomidate (AMIDATE) 2 mg/mL injection, , , Code/Trauma/Sedation Med, ALIE Pyle, 20 mg at 11/29/17 0506    fentanyl citrate (PF) 100 MCG/2ML, , Intravenous, Code/Trauma/Sedation Med, ALIE Pyle, 50 mcg at 11/29/17 0451    gabapentin (NEURONTIN) capsule 300 mg, 300 mg, Oral, HS, Julia Trejo PA-C, 300 mg at 11/30/17 2143    heparin (porcine) 25,000 units in 250 mL infusion (premix), 3-20 Units/kg/hr (Order-Specific), Intravenous, Titrated, ALIE Rodriguez, Last Rate: 8 4 mL/hr at 12/01/17 0129, 14 Units/kg/hr at 12/01/17 0129    heparin (porcine) injection 1,800 Units, 1,800 Units, Intravenous, PRN, ALIE Pyle, 1,800 Units at 11/29/17 1443    heparin (porcine) injection 3,600 Units, 3,600 Units, Intravenous, PRN, ALIE Pyle    insulin regular (HumuLIN R,NovoLIN R) 1 Units/mL in sodium chloride 0 9 % 100 mL infusion, 0 3-21 Units/hr, Intravenous, Titrated, ALIE Rodriguez, Last Rate: 3 mL/hr at 12/01/17 0809, 3 Units/hr at 12/01/17 0809    levothyroxine tablet 137 mcg, 137 mcg, Oral, Early Morning, Julia Trejo PA-C, 137 mcg at 12/01/17 0516    menthol-zinc oxide (CALMOSEPTINE) 0 44-20 6 % ointment, , Topical, BID, Alisha Moreno, NORMAN    metoprolol (LOPRESSOR) injection 2 5 mg, 2 5 mg, Intravenous, Q6H PRN, ALIE Pyle    metoprolol tartrate (LOPRESSOR) partial tablet 12 5 mg, 12 5 mg, Oral, Q12H [address], Carline Del Real MD    midazolam (VERSED) injection, , , Code/Trauma/Sedation Med, ALIE Pyle, 2 mg at 11/29/17 1137    nystatin (MYCOSTATIN) powder, , Topical, BID, Alisha Moreno PA-C    omeprazole (PRILOSEC) suspension 2 mg/mL, 20 mg, Oral, Daily, ALIE Rodriguez, 20 mg at 12/01/17 0818    saccharomyces boulardii (FLORASTOR) capsule 250 mg, 250 mg, Oral, BID, Julia Trejo PA-C, 250 mg at 12/01/17 4833    vancomycin (VANCOCIN) oral solution 125 mg, 125 mg, Oral, Q6H CELIA, Alisha Moreno PA-C, 125 mg at 12/01/17 0516    warfarin (COUMADIN) tablet 5 mg, 5 mg, Oral, Daily (warfarin), ALIE Pyle    Laboratory Results:    Results from last 7 days  Lab Units 12/01/17  0412 11/30/17  2230 11/30/17  0346 11/29/17  0928 11/29/17  0759 11/29/17  0522 11/29/17  0521 11/29/17  0501 11/29/17  0452 11/28/17  2040 11/28/17  0404 11/28/17  0403  11/27/17  1623  11/27/17  1140   WBC Thousand/uL 10 18*  --  9 59 7 29  --  9 61  --   --  8 10 7 59 7 30  --   --   --   --  11 96*   HEMOGLOBIN g/dL 9 7*  --  10 9* 9 2*  --  10 5*  --   --  11 3* 11 0* 10 5*  --   --   --   --  11 1*   I STAT HEMOGLOBIN g/dl  --   --   --   --  10 5*  --   --  11 2*  --   --   --   --   < >  --   --   --    HEMATOCRIT % 30 5*  --  33 7* 29 8*  --  33 2*  --   --  35 5* 34 7* 32 8*  --   --   --   --  34 7*   PLATELETS Thousands/uL 313  --  332 285  --  353  --   --  416* 258 344  --   --   --   --  340   SODIUM mmol/L 126* 128* 120*  --   --   --  125*  --  127* 118*  --  126*  --  126*  < >  --    POTASSIUM mmol/L 4 4 4 1 5 5*  --   --   --  3 7  --  3 7 4 8  --  5 8*  --   --   --   --    CHLORIDE mmol/L 91* 94* 85*  --   --   --  92*  --  91* 86*  --  90*  --  91*  < >  --    CO2 mmol/L 25 26 20*  --   --   --  22  --  25 22 --  24  --  22  < >  --    BUN mg/dL 28* 25 49*  --   --   --  41*  --  41* 33*  --  55*  --  98*  < >  --    CREATININE mg/dL 4 65* 4 16* 5 95*  --   --   --  4 51*  --  4 65* 3 99*  --  5 98*  --  8 74*  < >  --    CALCIUM mg/dL 9 0 8 4 8 2*  --   --   --  8 4  --  9 1 8 3  --  8 2*  --  8 6  < >  --    MAGNESIUM mg/dL 2 5 1 9  --   --   --   --  2 0  --   --   --   --   --   --   --   --  2 1   PHOSPHORUS mg/dL 5 5*  --   --   --   --   --  5 2*  --   --   --   --   --   --   --   --   --    ALBUMIN g/dL 1 9*  --   --   --   --   --  2 1*  --   --  2 1*  --   --   --  2 3*  --   --    TOTAL PROTEIN g/dL 7 1  --   --   --   --   --  7 8  --   --  7 9  --   --   --  8 2  --   --    GLUCOSE RANDOM mg/dL 445* 219* 415*  --   --   --  84  --  31* 735*  --  141*  --  209*  < >  --    GLUCOSE, ISTAT mg/dl  --   --   --   --  82  --   --  180*  --   --   --   --   < >  --   --   --    < > = values in this interval not displayed    Previous work up:

## 2017-12-01 NOTE — PLAN OF CARE
Problem: DISCHARGE PLANNING - CARE MANAGEMENT  Goal: Discharge to post-acute care or home with appropriate resources  INTERVENTIONS:  - Conduct assessment to determine patient/family and health care team treatment goals, and need for post-acute services based on payer coverage, community resources, and patient preferences, and barriers to discharge  - Address psychosocial, clinical, and financial barriers to discharge as identified in assessment in conjunction with the patient/family and health care team  - Arrange appropriate level of post-acute services according to patients   needs and preference and payer coverage in collaboration with the physician and health care team  - Communicate with and update the patient/family, physician, and health care team regarding progress on the discharge plan  - Arrange appropriate transportation to post-acute venues  Outcome: Progressing  Patient is not currently a bundle  CM met with patient and his wife [facility] at bedside  Patient lives with his wife [facility] and their daughter and son in law  Dariel has RX coverage and uses [facility] on [address] with no issues  Patient goes to dialysis at [facility] at 12:45 PM prior to him going to the [address] [place] [address] patient's wife was transporting him to his dialysis  Patient does not work  Patient's wife provides all needed transport  Patient is currently open with [facility] and a referral for [facility] will be sent in  Patient has the following DME at home: shower chair, hospital bed, WC, RW, BSC, Life Chair, Ramp, and over the bed table       CM reviewed d/c planning process including the following: identifying help at home, patient preference for d/c planning needs, [facility] Meds to Bed program, availability of treatment team to discuss questions or concerns patient and/or family may have regarding diagnosis, plan for care, old or new medications and discharge planning   CM name and role reviewed  CM will follow needs at discharge

## 2017-12-01 NOTE — CONSULTS
Consultation - Yoli Manriquez 61 y o  male MRN: [id number]    Unit/Bed#:  Encounter: [id number]      Assessment/Plan     Assessment: This is a 61y o -year-old male with type 1 diabetes with hyperglycemia and hypoglycemia, difficult to control, secondary to multiple comorbidities as well as CKD  - CKD- on hemodialysis Monday Wednesday and Friday, nephrology on board  -Congestive heart failure- managed by Cardiology  -pneumonia- managed by primary team    Plan:  1  Type 1 diabetes,( brittle)- insulin drip was held, as patient had hypoglycemia, recent blood sugar is 160, will transition to subcu insulin regimen  - start Levemir 2 units twice a day, give 1 dose now  -start Humalog 2 units with meals plus scale, starting at 200, as patient is very sensitive to insulin regimen( custom scale)  -start scale at bedtime, starting at 350   -check blood sugar at 2 a m , to ensure blood sugar is okay as patient has hypoglycemia unawareness  -hypoglycemia protocol in place  -carbohydrate consistent diet  -hypoglycemia protocol is in place  -will follow-up    Thank you for consulting Endocrinology, will gladly  follow with you, please do not hesitate to call if any questions  CC: Diabetes Consult    History of Present Illness     HPI: Yoli Manriquez is a 61y o  year old male with history of type 1 diabetes, ( brittle) difficult to control, with frequent hyper and hypoglycemia, for 30 years, with complications such as CKD stage 5 on hemodialysis, peripheral vascular disease status post amputation below-knee right leg, history of neuropathy, history of retinopathy and blindness was admitted to [facility], during this visit for increased fatigue and decreased appetite for 1 week, painful urination for several months, and history of fall    Patient had a complicated course during the admission, with acute hypoxic respiratory failure requiring intubation, currently extubated, awake alert oriented x3 and answering all the questions  He stated he takes Levemir 8 units twice a day at home, and Humalog per sliding scale with meals  He checks blood sugars 2-3 times daily at home, and they are in 200-300 range mostly, he also reports hypoglycemia less than 70, 2 to 3 times a week  He does not feel symptoms of hypoglycemia, has hypoglycemia unawareness  He was treated with insulin drip, however had the low blood sugar, so insulin drip is on hold, recent blood sugar is 160  Patient has not received Levemir during this admission, he has been managed on insulin drip on an off  Inpatient consult to Endocrinology  Consult performed by: [facility], [facility]  ordered by: Samy Signs          Review of Systems   Constitutional: Positive for activity change, appetite change and fatigue  Negative for unexpected weight change  HENT: Negative  Eyes: Positive for visual disturbance  Respiratory: Positive for shortness of breath  Cardiovascular: Negative  Gastrointestinal: Negative  Endocrine: Negative  Genitourinary: Negative  Musculoskeletal: Positive for back pain and gait problem  Ambulatory dysfunction, because of previous amputation   Skin: Negative  Allergic/Immunologic: Negative  Neurological: Positive for dizziness, weakness and numbness  Hematological: Negative  Psychiatric/Behavioral: Negative          Historical Information   Past Medical History:   Diagnosis Date    Acquired hypothyroidism     underactive    Atrial fibrillation ([facility] )     Chronic kidney disease-mineral and bone disorder 11/27/2017    Clostridium difficile infection 04/2017    Diabetes mellitus ([facility] )     Dialysis patient ([place] [id number] )     Diarrhea     ESRD (end stage renal disease) on dialysis ([facility] )     Hx of cardiac arrest     Hypertension     Neuropathy     Right lower lobe pneumonia ([facility] ) 2/20/2017    Sepsis ([place] [address] ) 04/2017    Polymicrobial MRSA, VRE    Systolic and diastolic CHF, chronic (HCC)     EF 35%, Grade II DD     Past Surgical History:   Procedure Laterality Date    CATARACT EXTRACTION      CHG GI ENDOSCOPIC ULTRASOUND N/A 3/10/2016    Procedure: LINEAR ENDOSCOPIC U/S;  Surgeon: Jason Hanson MD;  Location: [facility]; Service: Gastroenterology    CHOLECYSTECTOMY      GASTROSTOMY TUBE PLACEMENT N/A 4/12/2017    Procedure: INSERTION PEG TUBE;  Surgeon: Sarabjit Lombardi MD;  Location: [facility];  Service:    [employer] LEG AMPUTATION THROUGH LOWER TIBIA AND FIBULA Right 4/6/2017    Procedure: AMPUTATION BELOW KNEE (BKA);   Surgeon: Leela Bonner DO;  Location: [facility];  Service:     TOE AMPUTATION  2000     Social History   History   Alcohol Use No     History   Drug Use No     History   Smoking Status    Former Smoker    Packs/day: 1 00    Years: 46 00    Types: Cigarettes    Start date: 5    Quit date: 3/1/2017   Smokeless Tobacco    Never Used     Family History:   Family History   Problem Relation Age of Onset    Diabetes Father     Cancer Other     Lupus Mother        Meds/Allergies   Current Facility-Administered Medications   Medication Dose Route Frequency Provider Last Rate Last Dose    acetaminophen (TYLENOL) tablet 650 mg  650 mg Oral Q6H PRN Sammy Kline PA-C        albuterol inhalation solution 2 5 mg  2 5 mg Nebulization Q6H PRN Rowena Joseph DO        amiodarone tablet 200 mg  200 mg Oral TID Claudia Estrella MD   200 mg at 12/01/17 5879    b complex-vitamin C-folic acid (NEPHROCAPS) capsule 1 capsule  1 capsule Oral Daily Sammy Kline PA-C   1 capsule at 11/28/17 1239    cefepime (MAXIPIME) IVPB (premix) 1,000 mg  1,000 mg Intravenous Q24H ALIE Rodriguez        cholestyramine sugar free (QUESTRAN LIGHT) packet 4 g  4 g Oral BID Awa Alves PA-C   4 g at 12/01/17 0810    cinacalcet (SENSIPAR) tablet 30 mg  30 mg Oral HS Sammy Kline PA-C   30 mg at 11/28/17 2116    citalopram (CeleXA) tablet 10 mg  10 mg Oral Daily Catina Brink PA-C   10 mg at 12/01/17 0810    etomidate (AMIDATE) 2 mg/mL injection    Code/Trauma/Sedation Med ALIE Lynn   20 mg at 11/29/17 0506    fentanyl citrate (PF) 100 MCG/2ML   Intravenous Code/Trauma/Sedation Med ALIE Lynn   50 mcg at 11/29/17 8639    gabapentin (NEURONTIN) capsule 300 mg  300 mg Oral HS Charolett Masker, PA-C   300 mg at 11/30/17 2143    heparin (porcine) 25,000 units in 250 mL infusion (premix)  3-20 Units/kg/hr (Order-Specific) Intravenous Titrated ALIE Lynn 8 4 mL/hr at 12/01/17 0129 14 Units/kg/hr at 12/01/17 0129    heparin (porcine) injection 1,800 Units  1,800 Units Intravenous PRN ALIE Lynn   1,800 Units at 11/29/17 1443    heparin (porcine) injection 3,600 Units  3,600 Units Intravenous PRN ALIE Lynn        insulin detemir (LEVEMIR) subcutaneous injection 2 Units  2 Units Subcutaneous Q12H [address] ALIE Rodriguez        insulin lispro (HumaLOG) 100 units/mL subcutaneous injection 1-5 Units  1-5 Units Subcutaneous TID AC ALIE Rodriguez        insulin lispro (HumaLOG) 100 units/mL subcutaneous injection 1-5 Units  1-5 Units Subcutaneous HS ALIE Rodriguez        levothyroxine tablet 137 mcg  137 mcg Oral Early Morning Susit MaskREBEKA grantC   137 mcg at 12/01/17 5686    loperamide (IMODIUM) capsule 2 mg  2 mg Oral 4x Daily PRN ALIE Lynn        menthol-zinc oxide (CALMOSEPTINE) 0 44-20 6 % ointment   Topical BID Alisha Moreno PA-C        metoprolol (LOPRESSOR) injection 2 5 mg  2 5 mg Intravenous Q6H PRN ALIE Lynn        metoprolol tartrate (LOPRESSOR) partial tablet 12 5 mg  12 5 mg Oral Q12H Scarlett Marques MD   12 5 mg at 12/01/17 9980    midazolam (VERSED) injection    Code/Trauma/Sedation Med ALIE Lynn   2 mg at 11/29/17 1939    nystatin (MYCOSTATIN) powder   Topical BID Alisha Moreno PA-C        omeprazole (PRILOSEC) suspension 2 mg/mL  20 mg Oral Daily ALIE Aragon   20 mg at 12/01/17 0818    saccharomyces boulardii (FLORASTOR) capsule 250 mg  250 mg Oral BID [address] Mistry PA-C   250 mg at 12/01/17 7614    vancomycin (VANCOCIN) oral solution 125 mg  125 mg Oral Q6H [address] Alisha Moreno PA-C   125 mg at 12/01/17 1227    warfarin (COUMADIN) tablet 5 mg  5 mg Oral Daily (warfarin) ALIE Aragon         No Known Allergies    Objective   Vitals: Blood pressure 145/78, pulse 87, temperature 97 8 °F (36 6 °C), temperature source Oral, resp  rate 16, height 5' 6" (1 676 m), weight 60 3 kg (132 lb 15 oz), SpO2 100 %  Intake/Output Summary (Last 24 hours) at 12/01/17 1400  Last data filed at 12/01/17 0800   Gross per 24 hour   Intake          1070 25 ml   Output             1500 ml   Net          -429 75 ml     Invasive Devices     Peripheral Intravenous Line            Peripheral IV 11/29/17 Left Antecubital 2 days    Peripheral IV 11/29/17 Left Arm 2 days    Peripheral IV 11/29/17 Left Wrist 2 days          Line            Hemodialysis AV Fistula  Right Forearm -- days    Hemodialysis AV Fistula Right Forearm -- days          Drain            Gastrostomy/Enterostomy -- days    Gastrostomy/Enterostomy Percutaneous endoscopic gastrostomy (PEG) 20 Fr   days                Physical Exam   Constitutional: He is oriented to person, place, and time  He appears well-developed and well-nourished  No distress  HENT:   Head: Normocephalic and atraumatic  Eyes: Conjunctivae and EOM are normal  Right eye exhibits no discharge  Left eye exhibits no discharge  Neck: Normal range of motion  Neck supple  No thyromegaly present  Cardiovascular: Normal rate and normal heart sounds  Irregular rhythum   Pulmonary/Chest: Effort normal and breath sounds normal  No respiratory distress  He has no wheezes  Abdominal: Soft  Bowel sounds are normal  He exhibits no distension  Musculoskeletal: Normal range of motion   He exhibits deformity (Right BKA)  He exhibits no edema  Neurological: He is alert and oriented to person, place, and time  He has normal reflexes  Skin: Skin is warm and dry  No rash noted  No erythema  Psychiatric: He has a normal mood and affect  His behavior is normal        The history was obtained from the review of the chart, patient  Lab Results:     Results from last 7 days  Lab Units 11/27/17 2045   HEMOGLOBIN A1C % 9 4*     Lab Results   Component Value Date    WBC 10 18 (H) 12/01/2017    HGB 9 7 (L) 12/01/2017    HCT 30 5 (L) 12/01/2017    MCV 93 12/01/2017     12/01/2017     Lab Results   Component Value Date/Time    BUN 28 (H) 12/01/2017 04:12 AM    BUN 27 (H) 10/19/2015 05:49 PM     (L) 12/01/2017 04:12 AM     (L) 10/19/2015 05:49 PM    K 4 4 12/01/2017 04:12 AM    K 5 5 (H) 10/19/2015 05:49 PM    CL 91 (L) 12/01/2017 04:12 AM    CL 97 (L) 10/19/2015 05:49 PM    CO2 25 12/01/2017 04:12 AM    CO2 31 10/19/2015 05:49 PM    CREATININE 4 65 (H) 12/01/2017 04:12 AM    CREATININE 4 45 (H) 10/19/2015 05:49 PM    AST 24 12/01/2017 04:12 AM    AST 31 07/28/2015 02:58 PM    ALT 19 12/01/2017 04:12 AM    ALT 18 07/28/2015 02:58 PM     No results for input(s): CHOL, HDL, LDL, TRIG, VLDL in the last 72 hours  No results found for: Michelle Norton  POC Glucose   Date Value   12/01/2017 168 mg/dl (H)   12/01/2017 146 mg/dl (H)   12/01/2017 63 mg/dl (L)   12/01/2017 143 mg/dl (H)   12/01/2017 258 mg/dl (H)   12/01/2017 405 mg/dl (H)   12/01/2017 266 mg/dl (H)   12/01/2017 194 mg/dl (H)   11/30/2017 182 mg/dl (H)   11/30/2017 155 mg/dl (H)   10/16/2015 217 mg/dL (H)   07/01/2015 323 mg/dL (H)       Imaging Studies: I have personally reviewed pertinent reports  CT abdomen- November 27, 2017   PANCREAS:  Unremarkable      ADRENAL GLANDS:  Unremarkable      KIDNEYS/URETERS:  Atrophic without hydronephrosis     STOMACH AND BOWEL:  PEG tube noted    Moderate amount of retained colonic stool diffusely      APPENDIX:  No findings to suggest appendicitis      ABDOMINOPELVIC CAVITY:  Small amount of free fluid in the pelvic cul-de-sac without large collection detected      VESSELS:  Atherosclerotic changes are present  No evidence of aneurysm      PELVIS     REPRODUCTIVE ORGANS:  Unremarkable for patient's age      URINARY BLADDER:  Unremarkable      ABDOMINAL WALL/INGUINAL REGIONS:  Unremarkable      OSSEOUS STRUCTURES:  Chronic right-sided posterior rib fracture again noted     IMPRESSION:     1  Moderate right pleural effusion with adjacent somewhat rounded opacities possibly atelectasis versus aspiration or pneumonia  Portions of the record may have been created with voice recognition software

## 2017-12-01 NOTE — PLAN OF CARE
Problem: SLP ADULT - SWALLOWING, IMPAIRED  Goal: Initial SLP swallow eval performed  Outcome: Completed Date Met: 12/01/17

## 2017-12-02 ENCOUNTER — APPOINTMENT (INPATIENT)
Dept: DIALYSIS | Facility: HOSPITAL | Age: 67
DRG: 208 | End: 2017-12-02
Attending: NURSE PRACTITIONER
Payer: COMMERCIAL

## 2017-12-02 LAB
ANION GAP SERPL CALCULATED.3IONS-SCNC: 12 MMOL/L (ref 4–13)
APTT PPP: 87 SECONDS (ref 23–35)
APTT PPP: >210 SECONDS (ref 23–35)
BASOPHILS # BLD AUTO: 0.06 THOUSANDS/ΜL (ref 0–0.1)
BASOPHILS NFR BLD AUTO: 1 % (ref 0–1)
BUN SERPL-MCNC: 42 MG/DL (ref 5–25)
CA-I BLD-SCNC: 1.01 MMOL/L (ref 1.12–1.32)
CALCIUM SERPL-MCNC: 8.3 MG/DL (ref 8.3–10.1)
CHLORIDE SERPL-SCNC: 89 MMOL/L (ref 100–108)
CO2 SERPL-SCNC: 24 MMOL/L (ref 21–32)
CREAT SERPL-MCNC: 6.14 MG/DL (ref 0.6–1.3)
EOSINOPHIL # BLD AUTO: 0.47 THOUSAND/ΜL (ref 0–0.61)
EOSINOPHIL NFR BLD AUTO: 5 % (ref 0–6)
ERYTHROCYTE [DISTWIDTH] IN BLOOD BY AUTOMATED COUNT: 14.6 % (ref 11.6–15.1)
GFR SERPL CREATININE-BSD FRML MDRD: 9 ML/MIN/1.73SQ M
GLUCOSE SERPL-MCNC: 191 MG/DL (ref 65–140)
GLUCOSE SERPL-MCNC: 222 MG/DL (ref 65–140)
GLUCOSE SERPL-MCNC: 232 MG/DL (ref 65–140)
GLUCOSE SERPL-MCNC: 242 MG/DL (ref 65–140)
GLUCOSE SERPL-MCNC: 286 MG/DL (ref 65–140)
GLUCOSE SERPL-MCNC: 290 MG/DL (ref 65–140)
GLUCOSE SERPL-MCNC: 308 MG/DL (ref 65–140)
GLUCOSE SERPL-MCNC: 391 MG/DL (ref 65–140)
GLUCOSE SERPL-MCNC: 410 MG/DL (ref 65–140)
GLUCOSE SERPL-MCNC: 442 MG/DL (ref 65–140)
GLUCOSE SERPL-MCNC: 490 MG/DL (ref 65–140)
GLUCOSE SERPL-MCNC: >500 MG/DL (ref 65–140)
HCT VFR BLD AUTO: 28.5 % (ref 36.5–49.3)
HGB BLD-MCNC: 9 G/DL (ref 12–17)
INR PPP: 1.43 (ref 0.86–1.16)
LYMPHOCYTES # BLD AUTO: 0.92 THOUSANDS/ΜL (ref 0.6–4.47)
LYMPHOCYTES NFR BLD AUTO: 10 % (ref 14–44)
MAGNESIUM SERPL-MCNC: 2.3 MG/DL (ref 1.6–2.6)
MCH RBC QN AUTO: 29.1 PG (ref 26.8–34.3)
MCHC RBC AUTO-ENTMCNC: 31.6 G/DL (ref 31.4–37.4)
MCV RBC AUTO: 92 FL (ref 82–98)
MONOCYTES # BLD AUTO: 1.02 THOUSAND/ΜL (ref 0.17–1.22)
MONOCYTES NFR BLD AUTO: 11 % (ref 4–12)
NEUTROPHILS # BLD AUTO: 6.66 THOUSANDS/ΜL (ref 1.85–7.62)
NEUTS SEG NFR BLD AUTO: 73 % (ref 43–75)
PHOSPHATE SERPL-MCNC: 5.8 MG/DL (ref 2.3–4.1)
PLATELET # BLD AUTO: 298 THOUSANDS/UL (ref 149–390)
PMV BLD AUTO: 8.6 FL (ref 8.9–12.7)
POTASSIUM SERPL-SCNC: 4.5 MMOL/L (ref 3.5–5.3)
PROTHROMBIN TIME: 17.9 SECONDS (ref 12.1–14.4)
RBC # BLD AUTO: 3.09 MILLION/UL (ref 3.88–5.62)
SODIUM SERPL-SCNC: 125 MMOL/L (ref 136–145)
WBC # BLD AUTO: 9.13 THOUSAND/UL (ref 4.31–10.16)

## 2017-12-02 PROCEDURE — 82330 ASSAY OF CALCIUM: CPT | Performed by: NURSE PRACTITIONER

## 2017-12-02 PROCEDURE — 82948 REAGENT STRIP/BLOOD GLUCOSE: CPT

## 2017-12-02 PROCEDURE — 85610 PROTHROMBIN TIME: CPT | Performed by: NURSE PRACTITIONER

## 2017-12-02 PROCEDURE — 85025 COMPLETE CBC W/AUTO DIFF WBC: CPT | Performed by: NURSE PRACTITIONER

## 2017-12-02 PROCEDURE — 84100 ASSAY OF PHOSPHORUS: CPT | Performed by: NURSE PRACTITIONER

## 2017-12-02 PROCEDURE — 80048 BASIC METABOLIC PNL TOTAL CA: CPT | Performed by: NURSE PRACTITIONER

## 2017-12-02 PROCEDURE — 85730 THROMBOPLASTIN TIME PARTIAL: CPT | Performed by: INTERNAL MEDICINE

## 2017-12-02 PROCEDURE — 83735 ASSAY OF MAGNESIUM: CPT | Performed by: NURSE PRACTITIONER

## 2017-12-02 RX ADMIN — NEPHROCAP 1 CAPSULE: 1 CAP ORAL at 12:45

## 2017-12-02 RX ADMIN — LEVOTHYROXINE SODIUM 137 MCG: 112 TABLET ORAL at 05:12

## 2017-12-02 RX ADMIN — CINACALCET HYDROCHLORIDE 30 MG: 30 TABLET, COATED ORAL at 22:00

## 2017-12-02 RX ADMIN — CHOLESTYRAMINE 4 G: 4 POWDER, FOR SUSPENSION ORAL at 13:59

## 2017-12-02 RX ADMIN — INSULIN DETEMIR 2 UNITS: 100 INJECTION, SOLUTION SUBCUTANEOUS at 12:53

## 2017-12-02 RX ADMIN — VANCOMYCIN 125 MG: KIT at 19:26

## 2017-12-02 RX ADMIN — INSULIN LISPRO 2 UNITS: 100 INJECTION, SOLUTION INTRAVENOUS; SUBCUTANEOUS at 21:59

## 2017-12-02 RX ADMIN — WARFARIN SODIUM 5 MG: 5 TABLET ORAL at 19:00

## 2017-12-02 RX ADMIN — AMIODARONE HYDROCHLORIDE 200 MG: 200 TABLET ORAL at 21:59

## 2017-12-02 RX ADMIN — VANCOMYCIN 125 MG: KIT at 05:12

## 2017-12-02 RX ADMIN — Medication 250 MG: at 12:43

## 2017-12-02 RX ADMIN — HEPARIN SODIUM 14 UNITS/KG/HR: 10000 INJECTION, SOLUTION INTRAVENOUS at 08:09

## 2017-12-02 RX ADMIN — INSULIN DETEMIR 2 UNITS: 100 INJECTION, SOLUTION SUBCUTANEOUS at 21:59

## 2017-12-02 RX ADMIN — AMIODARONE HYDROCHLORIDE 200 MG: 200 TABLET ORAL at 12:44

## 2017-12-02 RX ADMIN — ANORECTAL OINTMENT: 15.7; .44; 24; 20.6 OINTMENT TOPICAL at 19:23

## 2017-12-02 RX ADMIN — CEFEPIME HYDROCHLORIDE 1000 MG: 1 INJECTION, SOLUTION INTRAVENOUS at 19:26

## 2017-12-02 RX ADMIN — VANCOMYCIN 125 MG: KIT at 00:22

## 2017-12-02 RX ADMIN — CHOLESTYRAMINE 4 G: 4 POWDER, FOR SUSPENSION ORAL at 19:22

## 2017-12-02 RX ADMIN — NYSTATIN 1 APPLICATION: 100000 POWDER TOPICAL at 19:23

## 2017-12-02 RX ADMIN — Medication 250 MG: at 19:00

## 2017-12-02 RX ADMIN — GABAPENTIN 300 MG: 300 CAPSULE ORAL at 21:59

## 2017-12-02 RX ADMIN — METOPROLOL TARTRATE 12.5 MG: 25 TABLET ORAL at 21:59

## 2017-12-02 RX ADMIN — INSULIN LISPRO 1 UNITS: 100 INJECTION, SOLUTION INTRAVENOUS; SUBCUTANEOUS at 08:09

## 2017-12-02 RX ADMIN — CITALOPRAM HYDROBROMIDE 10 MG: 20 TABLET ORAL at 12:43

## 2017-12-02 RX ADMIN — INSULIN LISPRO 3 UNITS: 100 INJECTION, SOLUTION INTRAVENOUS; SUBCUTANEOUS at 17:00

## 2017-12-02 RX ADMIN — ANORECTAL OINTMENT: 15.7; .44; 24; 20.6 OINTMENT TOPICAL at 09:00

## 2017-12-02 RX ADMIN — METOPROLOL TARTRATE 12.5 MG: 25 TABLET ORAL at 12:44

## 2017-12-02 RX ADMIN — VANCOMYCIN 125 MG: KIT at 12:42

## 2017-12-02 RX ADMIN — AMIODARONE HYDROCHLORIDE 200 MG: 200 TABLET ORAL at 17:00

## 2017-12-02 RX ADMIN — NYSTATIN: 100000 POWDER TOPICAL at 09:00

## 2017-12-02 RX ADMIN — Medication 20 MG: at 12:53

## 2017-12-02 NOTE — PROGRESS NOTES
Progress Note - Chuy Fontaine 61 y o  male MRN: [id number]    Unit/Bed#:  Encounter: [id number]      CC: diabetes f/u    Subjective:   Chuy Fontaine is a 61y o  year old male with type 1 diabetes  Feels well  No complaints  No hypoglycemia  Objective:     Vitals: Blood pressure 124/68, pulse 79, temperature 99 °F (37 2 °C), temperature source Oral, resp  rate 16, height 5' 6" (1 676 m), weight 60 3 kg (132 lb 15 oz), SpO2 100 %  ,Body mass index is 21 46 kg/m²  Intake/Output Summary (Last 24 hours) at 12/02/17 1557  Last data filed at 12/02/17 1345   Gross per 24 hour   Intake           1499 9 ml   Output                0 ml   Net           1499 9 ml       Physical Exam:  General Appearance: awake, appears stated age and cooperative  Head: Normocephalic, without obvious abnormality, atraumatic  Extremities: moves all extremities  Skin: Skin color and temperature normal    Pulm: no labored breathing    Lab, Imaging and other studies: I have personally reviewed pertinent reports  POC Glucose   Date Value   12/02/2017 191 mg/dl (H)   12/02/2017 232 mg/dl (H)   12/02/2017 242 mg/dl (H)   12/02/2017 286 mg/dl (H)   12/02/2017 222 mg/dl (H)   12/02/2017 308 mg/dl (H)   12/02/2017 410 mg/dl (H)   12/02/2017 391 mg/dl (H)   12/02/2017 442 mg/dl (H)   12/01/2017 >500 mg/dl (HH)   10/16/2015 217 mg/dL (H)   07/01/2015 323 mg/dL (H)       Assessment:  diabetes with hyperglycemia    Plan:  1  Type 1 diabetes with hyperglycemia-his hypoglycemic episode has resolved and he has not had any further episodes of hypoglycemia  He his hyperglycemia has improved over time  At this moment, I would continue his current insulin regimen  Continue to monitor blood sugar closely and make adjustments to his regimen if necessary  Portions of the record may have been created with voice recognition software

## 2017-12-02 NOTE — PROGRESS NOTES
Progress Note - Cardiology   Yoli Manriquez 61 y o  male MRN: [id number]  Unit/Bed#:  Encounter: [id number]  12/02/17  9:28 AM        Subjective/Objective   Chief Complaint:   Chief Complaint   Patient presents with    Fatigue     Spouse reports increased fatigue and decreased appetite for 1 week  PT reports painful urination for several months  Spouse also states the pt fell and hit his head on 11/25  Subjective: Patient denies any complaints    Specifically denies chest pains or shortness of breath, undergoing hemodialysis    Objective: Comfortable , no distress at the time of exam      Patient Active Problem List   Diagnosis    Type 1 diabetes mellitus with nephropathy ([facility] )    ESRD (end stage renal disease) on dialysis ([facility] )    Ambulatory dysfunction    [facility] fibrillation ([facility] )    HCAP (healthcare-associated pneumonia)    Recurrent colitis due to Clostridium difficile    S/P percutaneous endoscopic gastrostomy (PEG) tube placement ([facility] )    Pleural effusion on right    Chronic combined systolic and diastolic CHF (congestive heart failure) ([facility] )    R Fibula fracture, proximal to stump    Pseudomonas urinary tract infection    DVT, lower extremity (HCC)    Hyponatremia    Clostridium difficile infection    Hypoglycemia    Anemia    Acquired hypothyroidism    Chronic kidney disease-mineral and bone disorder       Vitals: /72   Pulse 80   Temp 99 °F (37 2 °C) (Oral)   Resp (!) 10   Ht 5' 6" (1 676 m)   Wt 60 3 kg (132 lb 15 oz)   SpO2 99%   BMI 21 46 kg/m²     I/O this shift:  In: 200 [I V :200]  Out: -   Wt Readings from Last 3 Encounters:   11/29/17 60 3 kg (132 lb 15 oz)   09/28/17 57 6 kg (126 lb 15 8 oz)   09/26/17 61 2 kg (134 lb 14 7 oz)       Intake/Output Summary (Last 24 hours) at 12/02/17 0928  Last data filed at 12/02/17 0845   Gross per 24 hour   Intake            694 4 ml   Output                0 ml   Net            694 4 ml     I/O last 3 completed shifts:   In: 1087 7 [P O :360; I V :297 7; NG/GT:150; Feedings:280]  Out: -     Invasive Devices     Peripheral Intravenous Line            Peripheral IV 11/29/17 Left Arm 3 days    Peripheral IV 12/02/17 Left Forearm less than 1 day          Line            Hemodialysis AV Fistula  Right Forearm -- days    Hemodialysis AV Fistula Right Forearm -- days          Drain            Gastrostomy/Enterostomy -- days    Gastrostomy/Enterostomy Percutaneous endoscopic gastrostomy (PEG) 20 Fr   days                  Physical Exam:  GEN: Dionisio Malcolm appears well, alert, pleasant and cooperative   HEENT: pupils equal, round, and reactive to light; extraocular muscles intact  NECK: supple, no carotid bruits or JVD  HEART: regular rhythm, normal S1 and S2, no murmurs, clicks, gallops or rubs   LUNGS: clear to auscultation bilaterally; no wheezes, rales, or rhonchi distant sounds  ABDOMEN/GI: normal bowel sounds, soft, no tenderness, no distention  EXTREMITIES/Musculoskeltal: peripheral pulses normal; no clubbing, cyanosis, or edema  NEURO: no focal findings   SKIN: normal without suspicious lesions on exposed skin            Lab Results:     Results from last 7 days  Lab Units 11/29/17  0521 11/28/17  2040 11/27/17  1140   TROPONIN I ng/mL <0 02 <0 02 <0 02     Results from last 7 days  Lab Units 12/02/17  0513 12/01/17  0412 11/30/17  0346   WBC Thousand/uL 9 13 10 18* 9 59   HEMOGLOBIN g/dL 9 0* 9 7* 10 9*   HEMATOCRIT % 28 5* 30 5* 33 7*   PLATELETS Thousands/uL 298 313 332           Results from last 7 days  Lab Units 12/02/17  0513 12/01/17  2210 12/01/17  0412  11/29/17  0521  11/28/17  2040   SODIUM mmol/L 125* 122* 126*  < > 125*  < > 118*   POTASSIUM mmol/L 4 5 5 0 4 4  < > 3 7  < > 4 8   CHLORIDE mmol/L 89* 88* 91*  < > 92*  < > 86*   CO2 mmol/L 24 23 25  < > 22  < > 22   BUN mg/dL 42* 38* 28*  < > 41*  < > 33*   CREATININE mg/dL 6 14* 6 04* 4 65*  < > 4 51*  < > 3 99*   CALCIUM mg/dL 8 3 8 5 9 0  < > 8 4  < > 8  3   TOTAL PROTEIN g/dL  --   --  7 1  --  7 8  --  7 9   BILIRUBIN TOTAL mg/dL  --   --  0 50  --  0 60  --  0 80   ALK PHOS U/L  --   --  150*  --  171*  --  199*   ALT U/L  --   --  19  --  16  --  20   AST U/L  --   --  24  --  21  --  18   GLUCOSE RANDOM mg/dL 290* 692* 445*  < > 84  < > 735*   GLUCOSE, ISTAT   --   --   --   < >  --   < >  --    < > = values in this interval not displayed  Results from last 7 days  Lab Units 12/02/17 0513 11/29/17 0928 11/29/17  0521   INR  1 43* 1 58* 1 47*       Imaging: I have personally reviewed pertinent reports      EKG:  No events on telemetry    Scheduled Meds:    amiodarone 200 mg Oral TID   b complex-vitamin C-folic acid 1 capsule Oral Daily   cefepime 1,000 mg Intravenous Q24H   cholestyramine sugar free 4 g Oral BID   cinacalcet 30 mg Oral HS   citalopram 10 mg Oral Daily   gabapentin 300 mg Oral HS   insulin detemir 2 Units Subcutaneous HS   insulin detemir 2 Units Subcutaneous Daily   insulin lispro 1-3 Units Subcutaneous HS   insulin lispro 1-5 Units Subcutaneous TID AC   levothyroxine 137 mcg Oral Early Morning   menthol-zinc oxide  Topical BID   metoprolol tartrate 12 5 mg Oral Q12H [facility]   nystatin  Topical BID   omeprazole (PRILOSEC) suspension 2 mg/mL 20 mg Oral Daily   psyllium 1 packet Oral Daily   saccharomyces boulardii 250 mg Oral BID   vancomycin 125 mg Oral Q6H [facility]   warfarin 5 mg Oral Daily (warfarin)     Continuous Infusions:    heparin (porcine) 3-20 Units/kg/hr (Order-Specific) Last Rate: 14 Units/kg/hr (12/02/17 0809)       VTE Pharmacologic Prophylaxis: Sequential compression device (Venodyne)  IV heparin  VTE Mechanical Prophylaxis: sequential compression device       Impression/plan:    Episodes of altered mental status:  Likely secondary to hypoperfusion, hypoglycemia, noncardiac    Paroxysmal atrial fibrillation:  Currently in sinus rhythm, no events in the last 24 hours, INR 1 4, restaredt Coumadin, also on IV heparin, continue amiodarone to keep him in sinus rhythm  End-stage renal disease: On hemodialysis currently undergoing, hemodynamically stable    Cardiomyopathy:  Ejection fraction of 35% this admission, continue metoprolol, start lisinopril at a low dose after hemodialysis  Blood pressure has been stable  eventual ischemic evaluation as an outpatient, volume removal via hemodialysis     prior history of aortic valve vegetation:  Status post completion of antibiotics, no evidence of sepsis/bacteremia at this time    Counseling / Coordination of Care  Total time spent 20 minutes including teaching and family updates  More than 50% was spent counseling pt and family

## 2017-12-02 NOTE — PROGRESS NOTES
[facility] Progress Note  Patient: Rossi Scott 61 y o  male   MRN: [id number]  PCP: Aline Oneal DO  Unit/Bed#:  Encounter: [id number]  Date Of Visit: 12/02/17    Assessment:    Active Problems:    Type 1 diabetes mellitus with nephropathy ([facility] [address] )    ESRD (end stage renal disease) on dialysis ([facility])    Rapid atrial fibrillation (HCC)    HCAP (healthcare-associated pneumonia)    S/P percutaneous endoscopic gastrostomy (PEG) tube placement ([facility])    Pleural effusion on right    Chronic combined systolic and diastolic CHF (congestive heart failure) ([facility])    Pseudomonas urinary tract infection    Hyponatremia    Clostridium difficile infection    Hypoglycemia    Acquired hypothyroidism    Chronic kidney disease-mineral and bone disorder      Plan:    · Encephalopathy toxic versus metabolic improving continue to monitor  · Labile blood glucose levels with episodes of hypoglycemia now receiving Levemir and Humalog, monitor Accu-Cheks closely, avoid hypoglycemia endocrinology following  · Acute hypoxic respiratory failure improved monitor  · Healthcare acquired pneumonia/Gram-negative bacterial continue cefepime to complete 7 day course  · Atrial fibrillation with rapid ventricular response rate better controlled now on amiodarone, Coumadin and IV heparin for for anticoagulation, cardiology input noted  · End-stage renal disease on hemodialysis  · Chronic diastolic and systolic congestive heart failure  · Out of bed as able ambulation as able physical therapy          VTE Pharmacologic Prophylaxis:   Pharmacologic: Heparin Drip  Mechanical VTE Prophylaxis in Place: Yes    Patient Centered Rounds: I have performed bedside rounds with nursing staff today      Discussions with Specialists or Other Care Team Provider:     Education and Discussions with Family / Patient:  Discussed with the patient his daughter is at bedside questions answered detailed updated provided    Time Spent for Care: 30 minutes  More than 50% of total time spent on counseling and coordination of care as described above  Current Length of Stay: 5 day(s)    Current Patient Status: Inpatient   Certification Statement: The patient will continue to require additional inpatient hospital stay due to As mentioned    Discharge Plan / Estimated Discharge Date:  Labile blood glucose levels, AFib requiring IV medications close monitoring as outlined    Code Status: Level 1 - Full Code      Subjective:     Reports feeling slightly better today  Chart history Labs medications reviewed    Objective:     Vitals:   Temp (24hrs), Av 7 °F (37 1 °C), Min:97 8 °F (36 6 °C), Max:99 2 °F (37 3 °C)    HR:  [75-94] 80  Resp:  [10-22] 14  BP: (117-140)/(53-87) 124/68  SpO2:  [97 %-100 %] 100 %  Body mass index is 21 46 kg/m²  Input and Output Summary (last 24 hours):        Intake/Output Summary (Last 24 hours) at 17 1439  Last data filed at 17 1345   Gross per 24 hour   Intake           1499 9 ml   Output                0 ml   Net           1499 9 ml       Physical Exam:     Physical Exam     Comfortably lying in bed  Neck supple  Lungs diminished breath sounds bilaterally at the bases  Heart sounds irregular distant  Abdomen soft nontender  Pulses present  No rash  Awake alert, obeys simple commands        Additional Data:     Labs:      Results from last 7 days  Lab Units 17  0513   WBC Thousand/uL 9 13   HEMOGLOBIN g/dL 9 0*   HEMATOCRIT % 28 5*   PLATELETS Thousands/uL 298   NEUTROS PCT % 73   LYMPHS PCT % 10*   MONOS PCT % 11   EOS PCT % 5       Results from last 7 days  Lab Units 17  0513  17  0412   SODIUM mmol/L 125*  < > 126*   POTASSIUM mmol/L 4 5  < > 4 4   CHLORIDE mmol/L 89*  < > 91*   CO2 mmol/L 24  < > 25   BUN mg/dL 42*  < > 28*   CREATININE mg/dL 6 14*  < > 4 65*   CALCIUM mg/dL 8 3  < > 9 0   TOTAL PROTEIN g/dL  --   --  7 1   BILIRUBIN TOTAL mg/dL  --   --  0 50   ALK PHOS U/L  --   --  150*   ALT U/L  --   --  19   AST U/L  --   --  24   GLUCOSE RANDOM mg/dL 290*  < > 445*   < > = values in this interval not displayed  Results from last 7 days  Lab Units 12/02/17  0513   INR  1 43*       * I Have Reviewed All Lab Data Listed Above  * Additional Pertinent Lab Tests Reviewed: Gaudencio Reyes Admission Reviewed    Imaging:    Imaging Reports Reviewed Today Include:   Imaging Personally Reviewed by Myself Includes:    Recent Cultures (last 7 days):       Results from last 7 days  Lab Units 11/29/17  1351 11/29/17  0927 11/29/17  0912 11/28/17  1425 11/28/17  1024 11/27/17  1442   BLOOD CULTURE   --  No Growth at 72 hrs   No Growth at 72 hrs   --   --   --    SPUTUM CULTURE  1+ Growth of Candida sp  presumptively albicans*  1+ Growth of   --   --   --   --   --    GRAM STAIN RESULT  Rare Polys  Rare Gram negative rods  --   --   --   --   --    URINE CULTURE   --   --   --   --   --  >100,000 cfu/ml Pseudomonas aeruginosa*   INFLUENZA A PCR   --   --   --   --  None Detected  --    INFLUENZA B PCR   --   --   --   --  None Detected  --    RSV PCR   --   --   --   --  None Detected  --    C DIFF TOXIN B   --   --   --  NEGATIVE for C difficle toxin by PCR    --   --        Last 24 Hours Medication List:     amiodarone 200 mg Oral TID   b complex-vitamin C-folic acid 1 capsule Oral Daily   cefepime 1,000 mg Intravenous Q24H   cholestyramine sugar free 4 g Oral BID   cinacalcet 30 mg Oral HS   citalopram 10 mg Oral Daily   gabapentin 300 mg Oral HS   insulin detemir 2 Units Subcutaneous HS   insulin detemir 2 Units Subcutaneous Daily   insulin lispro 1-3 Units Subcutaneous HS   insulin lispro 1-5 Units Subcutaneous TID AC   levothyroxine 137 mcg Oral Early Morning   menthol-zinc oxide  Topical BID   metoprolol tartrate 12 5 mg Oral Q12H CELIA   nystatin  Topical BID   omeprazole (PRILOSEC) suspension 2 mg/mL 20 mg Oral Daily   psyllium 1 packet Oral Daily   saccharomyces boulardii 250 mg Oral BID vancomycin 125 mg Oral Q6H [address]   warfarin 5 mg Oral Daily (warfarin)        Today, Patient Was Seen By: Claudia Nelson MD    ** Please Note: This note has been constructed using a voice recognition system   **

## 2017-12-02 NOTE — PROGRESS NOTES
Patient had unwitnessed fall attempting to move from chair back to bed independently  Patient was found sitting down on the ground when found by PCA  Patient is neurologically intact with no deficits, states he did not hit his head during the fall  He did sustain an abrasion on anterior aspect of left shin, which was cleaned and dressed by nursing  Patient did c/o mild back pain, will closely monitor  Was able to safely be transferred back into bed with staff assist  Emphasized to patient importance of utilizing call bell to ring for staff assistance when attempting ambulation or transfer  Bed alarm on, and will need chair alarm placed when OOB in chair

## 2017-12-02 NOTE — PROGRESS NOTES
Daily Progress Note - Critical Care/ Stepdown   Kim Mehta 61 y o  male MRN: [id number]  Unit/Bed#:  Encounter: [id number]    ______________________________________________________________________  Assessment:   Principal Problem (Resolved):    Acute encephalopathy  Active Problems:    Type 1 diabetes mellitus with nephropathy (HCC)    ESRD (end stage renal disease) on dialysis (HCC)    Rapid atrial fibrillation (HCC)    HCAP (healthcare-associated pneumonia)    S/P percutaneous endoscopic gastrostomy (PEG) tube placement ([facility])    Pleural effusion on right    Chronic combined systolic and diastolic CHF (congestive heart failure) ([facility])    Pseudomonas urinary tract infection    Hyponatremia    Clostridium difficile infection    Hypoglycemia    Acquired hypothyroidism    Chronic kidney disease-mineral and bone disorder  Resolved Problems:    Acute respiratory failure with hypoxia ([facility])    Elevated INR    Hyperkalemia    Hypotension    Plan:    Neuro:   · Toxic metabolic encephalopathy resolved  · Delirium: CAM ICU positive no   · Continue to regulate sleep-wake cycle  · Pain controlled with:  P r n  Tylenol  · Pain score: none  · History of depression, continue Celexa  · History of neuropathy, continue gabapentin    CV:   · AFib with RVR, resolved  · Amio drip stopped  Continue 200 mg of p  o  amiodarone, currently, normal sinus rhythm  · Heparin drip, bridge to Coumadin  · Restart home beta-blocker  · Chronic combined systolic and diastolic heart failure  Echo 11/29 with EF 35%  Moderate diffuse hypokinesis with more severe hypokinesis of anterior anterior septal and apical walls  Mild to moderate MR  Trace AI  Mild TR  Trace pericardial effusion  Cardiology following  · Rhythm: NSR  · Follow rhythm on telemetry    Pulm:   · Hcap, continue cefepime  Sputum culture positive for Candida albicans  · Chest x-ray from 11/30 shows persistent right pleural effusion    Volume removal with HD       GI: · Nutrition/diet plan:  Patient has PEG tube, however is tolerating dysphagia 3 diet with nectar thick liquids with adequate p o  Intake  · Need GI evaluation to determine if PEG be removed  Patient has not used April 2017  · Stress ulcer prophylaxis: Prilosec suspension, can DC is patient is tolerating diet  · Bowel regimen: Metamucil added for frequent loose stools   · 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, on p o  Vanco  · Last BM: 12/2    FEN:   · Chronic hyponatremia  · Hypothyroidism versus SIADH  · Continue to trend sodium daily  · Strict I&O and daily weight  · Electrolytes repleted: no WNL cont to trend   · Goal: K >4 0, Mag >2 0, and Phos >3 0    :   · ESRD on HD, apparently getting HD treatment  · The patient is anuric     ID:   · UTI; urine culture positive for pseudomonas   · HCAP  · Cefepime, cont for completion of 7 day course  · Afebrile continue to trend temps  · Sputum 1+ Candida  MRSA nares positive  · No leukocytosis, continue to trend WBC  · History of recurrent chronic C diff  C diff negative this admission, continue p o  Vanco while on antibiotics  Plan for slow taper off  Heme:   · Chronic normocytic anemia in the setting of end-stage renal disease, continue to trend hemoglobin daily  · Anticoagulated on heparin drip bridging to Coumadin, continue SCDs  Trend daily INR  Endo:   · Blood glucose continues to be labile, received 4 units of Humalog overnight for a blood glucose of 692  Endocrine following  Continue Levemir 2 units twice a day and Humalog 2 units with meals, plus sliding scale to start at 200  Sliding scale insulin @ hs to start at 250  Continue to 2:00 a m  Accu-Cheks  Continue hypoglycemia protocol  · Hypothyroidism, elevated TSH with normal free T4  Synthroid increased from 125 mcg to 137 mcg 11/28       Msk/Skin:  · Status post right BKA  · Mobility goal:  Out of bed to chair, frequent turning and repositioning to off-load pressure points  · PT consult: yes  · OT consult: yes  ·   Family:  · Family updated within 24 hours: no   · Family meeting planned today: yes     Lines:  Peripheral IV access only     VTE Prophylaxis:  · Pharmacologic Prophylaxis: Heparin  · Mechanical Prophylaxis: sequential compression device LLE only     Disposition: Continue Stepdown    Code Status: Level 1 - Full Code    Counseling / Coordination of Care  Total time spent today 25 minutes  Greater than 50% of total time was spent with the patient and / or family counseling and / or coordination of care  A description of the counseling / coordination of care: Assisting patient, reviewing medications, labs, orders, and hospital course  ______________________________________________________________________    HPI/24hr events:  Blood glucose 692 at 10 o'clock last night, given 4 units of Humalog  Blood glucose elevated between 300-400 after Humalog  This morning his glucose trended down to 222-286      ______________________________________________________________________    Physical Exam:   Physical Exam   Constitutional: He is oriented to person, place, and time  He appears well-developed and well-nourished  HENT:   Head: Normocephalic  Eyes: Conjunctivae and EOM are normal  Pupils are equal, round, and reactive to light  Neck: Normal range of motion  Neck supple  Cardiovascular: Normal rate and regular rhythm  Pulmonary/Chest: Effort normal and breath sounds normal    Abdominal: Soft  Bowel sounds are normal    Musculoskeletal: Normal range of motion  Neurological: He is alert and oriented to person, place, and time  He has normal strength  GCS eye subscore is 4  GCS verbal subscore is 5  GCS motor subscore is 6  Skin: Skin is warm, dry and intact  Capillary refill takes less than 2 seconds  RUE AV fistula    Psychiatric: He has a normal mood and affect   His speech is normal and behavior is normal  Judgment and thought content normal  Cognition and memory are normal        ______________________________________________________________________  Vitals:    17 1030 17 1100 17 1130 17 1200   BP: 122/53 132/62 118/58 117/59   Pulse: 81 82 78 79   Resp: 17 (!) 10 (!) 10 22   Temp:       TempSrc:       SpO2:       Weight:       Height:                  Temperature:   Temp (24hrs), Av 7 °F (37 1 °C), Min:97 8 °F (36 6 °C), Max:99 2 °F (37 3 °C)    Current Temperature: 99 °F (37 2 °C)    Weights:   IBW: 63 8 kg    Body mass index is 21 46 kg/m²  Weight (last 2 days)     None          Hemodynamic Monitoring:  N/A       Non-Invasive/Invasive Ventilation Settings:  Respiratory    Lab Data (Last 4 hours)    None         O2/Vent Data (Last 4 hours)    None              No results found for: PHART, OKF7BUW, PO2ART, GRG5SNK, U4CJAKZN, BEART, SOURCE  SpO2: SpO2: 100 %, SpO2 Activity: SpO2 Activity: At Rest, SpO2 Device: O2 Device: None (Room air)    Intake and Outputs:  I/O        07 -  0700  07 -  0700  07 -  0700    P  O   360     I V  (mL/kg) 763 8 (12 7) 163 4 (2 7) 284 (4 7)    NG/ 150 30    IV Piggyback       Feedings 250 70     Total Intake(mL/kg) 1193 8 (19 8) 743 4 (12 3) 314 (5 2)    Other 1500      Total Output 1500        Net -306 2 +743 4 +314           Unmeasured Stool Occurrence 1 x 2 x 1 x            Nutrition:        Diet Orders            Start     Ordered    17 1453  Diet Dysphagia/Modified Consistency; Dysphagia 3-Dental Soft; Nectar Thick Liquid; Consistent Carbohydrate Diet Level 2 (5 carb servings/75 grams CHO/meal), Renal (Dialysis)  Diet effective now     Question Answer Comment   Diet Type Dysphagia/Modified Consistency    Dysphagia/Modified Consistency Dysphagia 3-Dental Soft    Liquid Modifier Nectar Thick Liquid    Other Restriction(s): Consistent Carbohydrate Diet Level 2 (5 carb servings/75 grams CHO/meal)    Other Restriction(s): Renal (Dialysis)    RD to adjust diet per protocol? Yes        12/01/17 1452    11/27/17 1929  Room Service  Once     Question:  Type of Service  Answer:  Room Service - Appropriate with Assistance    11/27/17 1929          Labs:     Results from last 7 days  Lab Units 12/02/17  0513 12/01/17 0412 11/30/17  0346   WBC Thousand/uL 9 13 10 18* 9 59   HEMOGLOBIN g/dL 9 0* 9 7* 10 9*   HEMATOCRIT % 28 5* 30 5* 33 7*   PLATELETS Thousands/uL 298 313 332   NEUTROS PCT % 73 76* 75   MONOS PCT % 11 9 10       Results from last 7 days  Lab Units 12/02/17  0513 12/01/17  2210 12/01/17 0412 11/29/17  0521  11/28/17  2040   SODIUM mmol/L 125* 122* 126*  < > 125*  < > 118*   POTASSIUM mmol/L 4 5 5 0 4 4  < > 3 7  < > 4 8   CHLORIDE mmol/L 89* 88* 91*  < > 92*  < > 86*   CO2 mmol/L 24 23 25  < > 22  < > 22   BUN mg/dL 42* 38* 28*  < > 41*  < > 33*   CREATININE mg/dL 6 14* 6 04* 4 65*  < > 4 51*  < > 3 99*   CALCIUM mg/dL 8 3 8 5 9 0  < > 8 4  < > 8 3   TOTAL PROTEIN g/dL  --   --  7 1  --  7 8  --  7 9   BILIRUBIN TOTAL mg/dL  --   --  0 50  --  0 60  --  0 80   ALK PHOS U/L  --   --  150*  --  171*  --  199*   ALT U/L  --   --  19  --  16  --  20   AST U/L  --   --  24  --  21  --  18   GLUCOSE RANDOM mg/dL 290* 692* 445*  < > 84  < > 735*   GLUCOSE, ISTAT   --   --   --   < >  --   < >  --    < > = values in this interval not displayed  Results from last 7 days  Lab Units 12/02/17 0513 12/01/17 0412 11/30/17  2230   MAGNESIUM mg/dL 2 3 2 5 1 9     Lab Results   Component Value Date    PHOS 5 8 (H) 12/02/2017    PHOS 5 5 (H) 12/01/2017    PHOS 5 2 (H) 11/29/2017    PHOS 2 5 07/24/2015    PHOS 6 2 (H) 07/01/2015        Results from last 7 days  Lab Units 12/02/17  0646 12/02/17  0513 12/01/17  0415  11/29/17  0928 11/29/17  0521   INR   --  1 43*  --   --  1 58* 1 47*   PTT seconds 87* >210* 62*  < > 65* 59*   < > = values in this interval not displayed      0  Lab Value Date/Time   TROPONINI <0 02 11/29/2017 0521   TROPONINI <0 02 11/28/2017 2040   TROPONINI <0 02 11/27/2017 1140   TROPONINI <0 02 09/17/2017 1514   TROPONINI 0 04 07/08/2017 0112   TROPONINI 0 04 07/07/2017 1510   TROPONINI 0 04 (H) 04/02/2017 1304   TROPONINI 0 04 (H) 04/02/2017 0939   TROPONINI 0 02 04/02/2017 0342   TROPONINI <0 02 03/31/2017 2055   TROPONINI <0 02 09/10/2016 1728       Results from last 7 days  Lab Units 11/30/17  0621 11/29/17  0522   LACTIC ACID mmol/L 1 4 1 8     ABG:  Lab Results   Component Value Date    PHART 7 352 06/16/2017    [id number] 45 1 (H) 06/16/2017    PO2ART 116 9 06/16/2017    [id number] 24 5 06/16/2017    BEART -1 2 06/16/2017    SOURCE Brachial, Left 06/16/2017       Imaging:   XR chest portable ICU   Final Result      Unchanged size of right pleural effusion with underlying right basilar airspace disease similar to prior study  Endotracheal tube positioning appropriate         Workstation performed: [id number]         XR chest portable   Final Result      Endotracheal tube in satisfactory position  Persistent right pleural effusion with underlying consolidation  Workstation performed: [id number]         CT head wo contrast   Final Result      No acute intracranial abnormality  Workstation performed: [id number]         CT abdomen pelvis wo contrast   Final Result         1  Moderate right pleural effusion with adjacent somewhat rounded opacities possibly atelectasis versus aspiration or pneumonia  Workstation performed: [id number]         CT head without contrast   Final Result      No acute intracranial abnormality  Microangiopathic changes  Workstation performed: [id number]         XR chest 1 view portable   Final Result      Persistent right lower lobe and right middle lobe lobulated alveolar opacities and moderate right pleural effusion  Differential includes chronic and/or recurrent pneumonia, aspiration, or lung mass  Cardiomegaly  Overall, findings similar to October 10, 2017           Workstation performed: [id number]             Micro:  Lab Results   Component Value Date    BLOODCX No Growth at 48 hrs  11/29/2017    BLOODCX No Growth at 48 hrs  11/29/2017    BLOODCX No Growth After 5 Days   09/17/2017    URINECX >100,000 cfu/ml Pseudomonas aeruginosa (A) 11/27/2017    URINECX >100,000 cfu/ml Pseudomonas aeruginosa 09/17/2017    URINECX  09/17/2017     7664-1414 cfu/ml Oxidase Positive gram negative jose alfredo    WOUNDCULT (A) 04/04/2017     4+ Growth of Vancomycin Resistant Enterococcus faecalis    WOUNDCULT (A) 04/04/2017     3+ Growth of Methicillin Resistant Staphylococcus aureus    WOUNDCULT (A) 02/18/2017     3+ Growth of Methicillin Resistant Staphylococcus aureus    WOUNDCULT 2+ Growth of Mixed Skin Reanna 02/18/2017    SPUTUMCULTUR 1+ Growth of Candida sp  presumptively albicans (A) 11/29/2017    SPUTUMCULTUR 1+ Growth of  11/29/2017    SPUTUMCULTUR 2+ Growth of Candida sp  presumptively albicans 04/01/2017    SPUTUMCULTUR 1+ Growth of Mixed Respiratory Reanna 04/01/2017       Allergies: No Known Allergies  Medications:   Scheduled Meds:  amiodarone 200 mg Oral TID   b complex-vitamin C-folic acid 1 capsule Oral Daily   cefepime 1,000 mg Intravenous Q24H   cholestyramine sugar free 4 g Oral BID   cinacalcet 30 mg Oral HS   citalopram 10 mg Oral Daily   gabapentin 300 mg Oral HS   insulin detemir 2 Units Subcutaneous HS   insulin detemir 2 Units Subcutaneous Daily   insulin lispro 1-3 Units Subcutaneous HS   insulin lispro 1-5 Units Subcutaneous TID AC   levothyroxine 137 mcg Oral Early Morning   menthol-zinc oxide  Topical BID   metoprolol tartrate 12 5 mg Oral Q12H CELIA   nystatin  Topical BID   omeprazole (PRILOSEC) suspension 2 mg/mL 20 mg Oral Daily   psyllium 1 packet Oral Daily   saccharomyces boulardii 250 mg Oral BID   vancomycin 125 mg Oral Q6H [address]   warfarin 5 mg Oral Daily (warfarin)     Continuous Infusions:  heparin (porcine) 3-20 Units/kg/hr (Order-Specific) Last Rate: 14 Units/kg/hr (12/02/17 0809)     PRN Meds:    acetaminophen 650 mg Q6H PRN   albuterol 2 5 mg Q6H PRN   dextrose 50 mL PRN   etomidate  Code/Trauma/Sedation Med   fentanyl citrate (PF)  Code/Trauma/Sedation Med   heparin (porcine) 1,800 Units PRN   heparin (porcine) 3,600 Units PRN   loperamide 2 mg 4x Daily PRN   metoprolol 2 5 mg Q6H PRN   midazolam  Code/Trauma/Sedation Med       Invasive lines and devices:   Invasive Devices     Peripheral Intravenous Line            Peripheral IV 11/29/17 Left Arm 3 days    Peripheral IV 12/02/17 Left Forearm less than 1 day          Line            Hemodialysis AV Fistula  Right Forearm -- days    Hemodialysis AV Fistula Right Forearm -- days          Drain            Gastrostomy/Enterostomy -- days    Gastrostomy/Enterostomy Percutaneous endoscopic gastrostomy (PEG) 20 Fr   days                   SIGNATURE: ALIE Barbosa  DATE: December 2, 2017

## 2017-12-02 NOTE — PROGRESS NOTES
NEPHROLOGY PROGRESS NOTE   Jackeline Vickers 61 y o  male MRN: [id number]  Unit/Bed#:  Encounter: [id number]  Reason for Consult: ESRD on HD    ASSESSMENT and PLAN:  1  ESRD on HD (Mehdi Lopez, MW): HD today then back to [facility] sched next week  2  Access: R arm AVF  Stable  3  Paroxysmal atrial fibrillation: on amiodarone  4  BP/volume: BP at goal  Euvolemic  5  Anemia: Hgb below goal  Will need to check last Mircera dose at [facility]  6  Hyponatremia (chronic): Corrected Na is on the low side for unclear reasons  TSH was elevated this admission and Levothyroxine dose increased  Possibly volume related but he appears euvolemic  No adrenal insufficiency from August 2017 labs  No free water administered via TF    7  Recurrent Cdiff: on oral Vancomycin  8  CHF: compensated now  9  R sided pleural effusion, recent VDRF s/p extubation on 11/30/17  10  Brittle DM       SUMMARY OF RECOMMENDATIONS:  · HD today  · Next HD is Monday  Discussed with family  SUBJECTIVE / INTERVAL HISTORY:  Doing well today  No SOB  HEMODIALYSIS PROCEDURE NOTE  The patient was seen and examined on hemodialysis  Time: 3 5 hours  Sodium: 140 Blood flow: 325   Dialyzer: F180 Potassium: 2 Dialysate flow: 500   Access: R arm AVF Bicarbonate: 35 Ultrafiltration goal: 3 kg   Medications on HD: None     OBJECTIVE:  Current Weight: Weight - Scale: 60 3 kg (132 lb 15 oz)  Vitals:    12/02/17 1030 12/02/17 1100 12/02/17 1130 12/02/17 1200   BP: 122/53 132/62 118/58 117/59   Pulse: 81 82 78 79   Resp: 17 (!) 10 (!) 10 22   Temp:       TempSrc:       SpO2:       Weight:       Height:           Intake/Output Summary (Last 24 hours) at 12/02/17 1242  Last data filed at 12/02/17 1000   Gross per 24 hour   Intake            808 4 ml   Output                0 ml   Net            808 4 ml     General: conscious, cooperative, no distress  Chest/Lungs: decreased in bases  CVS: distinct heart sounds, normal rate, regular     Abdomen: soft, (+) PEG tube  Extremities: R BKA, no edema       Medications:    Current Facility-Administered Medications:     acetaminophen (TYLENOL) tablet 650 mg, 650 mg, Oral, Q6H PRN, Alexis Dinh PA-C    albuterol inhalation solution 2 5 mg, 2 5 mg, Nebulization, Q6H PRN, Sarah Conklin DO    amiodarone tablet 200 mg, 200 mg, Oral, TID, Kathy Soriano MD, 200 mg at 12/01/17 2123    b complex-vitamin C-folic acid (NEPHROCAPS) capsule 1 capsule, 1 capsule, Oral, Daily, Alexis Dinh PA-C, 1 capsule at 11/28/17 1239    cefepime (MAXIPIME) IVPB (premix) 1,000 mg, 1,000 mg, Intravenous, Q24H, ALIE Rodriguez, Last Rate: 100 mL/hr at 12/01/17 1911, 1,000 mg at 12/01/17 1911    cholestyramine sugar free (QUESTRAN LIGHT) packet 4 g, 4 g, Oral, BID, Awa Alves PA-C, 4 g at 12/01/17 1918    cinacalcet (SENSIPAR) tablet 30 mg, 30 mg, Oral, HS, Alexis Dinh PA-C, 30 mg at 12/01/17 2126    citalopram (CeleXA) tablet 10 mg, 10 mg, Oral, Daily, Catina Brink PA-C, 10 mg at 12/01/17 0810    dextrose 50 % IV solution 50 mL, 50 mL, Intravenous, PRN, ALIE Sylvester    etomidate (AMIDATE) 2 mg/mL injection, , , Code/Trauma/Sedation Med, ALIE Sylvester, 20 mg at 11/29/17 0506    fentanyl citrate (PF) 100 MCG/2ML, , Intravenous, Code/Trauma/Sedation MedRemi CRNP, 50 mcg at 11/29/17 7130    gabapentin (NEURONTIN) capsule 300 mg, 300 mg, Oral, HS, Alexis Dinh PA-C, 300 mg at 12/01/17 2123    heparin (porcine) 25,000 units in 250 mL infusion (premix), 3-20 Units/kg/hr (Order-Specific), Intravenous, Titrated, LAIE Rodriguez, Last Rate: 8 4 mL/hr at 12/02/17 0809, 14 Units/kg/hr at 12/02/17 0809    heparin (porcine) injection 1,800 Units, 1,800 Units, Intravenous, PRN, ALIE Sylvester, 1,800 Units at 11/29/17 1443    heparin (porcine) injection 3,600 Units, 3,600 Units, Intravenous, PRN, ALIE Sylvester    insulin detemir (LEVEMIR) subcutaneous injection 2 Units, 2 Units, Subcutaneous, HS, Katy Dawkins MD, 2 Units at 12/01/17 2123    insulin detemir (LEVEMIR) subcutaneous injection 2 Units, 2 Units, Subcutaneous, Daily, Katy Dawkins MD    insulin lispro (HumaLOG) 100 units/mL subcutaneous injection 1-3 Units, 1-3 Units, Subcutaneous, HS, Katy Dawkins MD, 2 Units at 12/01/17 2126    insulin lispro (HumaLOG) 100 units/mL subcutaneous injection 1-5 Units, 1-5 Units, Subcutaneous, TID AC, 1 Units at 12/02/17 0809 **AND** Fingerstick Glucose (POCT), , , TID AC, Katy Dawkins MD    levothyroxine tablet 137 mcg, 137 mcg, Oral, Early Morning, Nickie Mckeon PA-C, 137 mcg at 12/02/17 9333    loperamide (IMODIUM) capsule 2 mg, 2 mg, Oral, 4x Daily PRN, ALIE Joseph, 2 mg at 12/01/17 1408    menthol-zinc oxide (CALMOSEPTINE) 0 44-20 6 % ointment, , Topical, BID, Alisha Moreno PA-C    metoprolol (LOPRESSOR) injection 2 5 mg, 2 5 mg, Intravenous, Q6H PRN, ALIE Joseph    metoprolol tartrate (LOPRESSOR) partial tablet 12 5 mg, 12 5 mg, Oral, Q12H [address], Dora Bland MD, 12 5 mg at 12/01/17 2123    midazolam (VERSED) injection, , , Code/Trauma/Sedation Med, ALIE Joseph, 2 mg at 11/29/17 8631    nystatin (MYCOSTATIN) powder, , Topical, BID, Alisha Moreno PA-C    omeprazole (PRILOSEC) suspension 2 mg/mL, 20 mg, Oral, Daily, ALIE Rodriguez, 20 mg at 12/01/17 0818    psyllium (METAMUCIL) 1 packet, 1 packet, Oral, Daily, ALIE Cuba    saccharomyces boulardii (FLORASTOR) capsule 250 mg, 250 mg, Oral, BID, Nickie Mckeon PA-C, 250 mg at 12/01/17 1916    vancomycin (VANCOCIN) oral solution 125 mg, 125 mg, Oral, Q6H [facility], Alisha Moreno PA-C, 125 mg at 12/02/17 1798    warfarin (COUMADIN) tablet 5 mg, 5 mg, Oral, Daily (warfarin), ALIE Joseph, 5 mg at 12/01/17 1916    Laboratory Results:    Results from last 7 days  Lab Units 12/02/17  0513 12/01/17  2210 12/01/17  4868 11/30/17  2230 11/30/17  0346 11/29/17  0928 11/29/17  0759 11/29/17  0522 11/29/17  0521 11/29/17  0501 11/29/17  0452 11/28/17  2040  11/27/17  1623 11/27/17  1140   WBC Thousand/uL 9 13  --  10 18*  --  9 59 7 29  --  9 61  --   --  8 10 7 59  < >  --  11 96*   HEMOGLOBIN g/dL 9 0*  --  9 7*  --  10 9* 9 2*  --  10 5*  --   --  11 3* 11 0*  < >  --  11 1*   I STAT HEMOGLOBIN g/dl  --   --   --   --   --   --  10 5*  --   --  11 2*  --   --   < >  --   --    HEMATOCRIT % 28 5*  --  30 5*  --  33 7* 29 8*  --  33 2*  --   --  35 5* 34 7*  < >  --  34 7*   PLATELETS Thousands/uL 298  --  313  --  332 285  --  353  --   --  416* 258  < >  --  340   SODIUM mmol/L 125* 122* 126* 128* 120*  --   --   --  125*  --  127* 118*  < > 126*  --    POTASSIUM mmol/L 4 5 5 0 4 4 4 1 5 5*  --   --   --  3 7  --  3 7 4 8  < >  --   --    CHLORIDE mmol/L 89* 88* 91* 94* 85*  --   --   --  92*  --  91* 86*  < > 91*  --    CO2 mmol/L 24 23 25 26 20*  --   --   --  22  --  25 22  < > 22  --    BUN mg/dL 42* 38* 28* 25 49*  --   --   --  41*  --  41* 33*  < > 98*  --    CREATININE mg/dL 6 14* 6 04* 4 65* 4 16* 5 95*  --   --   --  4 51*  --  4 65* 3 99*  < > 8 74*  --    CALCIUM mg/dL 8 3 8 5 9 0 8 4 8 2*  --   --   --  8 4  --  9 1 8 3  < > 8 6  --    MAGNESIUM mg/dL 2 3  --  2 5 1 9  --   --   --   --  2 0  --   --   --   --   --  2 1   PHOSPHORUS mg/dL 5 8*  --  5 5*  --   --   --   --   --  5 2*  --   --   --   --   --   --    ALBUMIN g/dL  --   --  1 9*  --   --   --   --   --  2 1*  --   --  2 1*  --  2 3*  --    TOTAL PROTEIN g/dL  --   --  7 1  --   --   --   --   --  7 8  --   --  7 9  --  8 2  --    GLUCOSE RANDOM mg/dL 290* 692* 445* 219* 415*  --   --   --  84  --  31* 735*  < > 209*  --    GLUCOSE, ISTAT mg/dl  --   --   --   --   --   --  82  --   --  180*  --   --   < >  --   --    < > = values in this interval not displayed    Previous work up:

## 2017-12-03 LAB
ANION GAP SERPL CALCULATED.3IONS-SCNC: 11 MMOL/L (ref 4–13)
APTT PPP: 87 SECONDS (ref 23–35)
APTT PPP: >210 SECONDS (ref 23–35)
BASOPHILS # BLD AUTO: 0.06 THOUSANDS/ΜL (ref 0–0.1)
BASOPHILS NFR BLD AUTO: 1 % (ref 0–1)
BUN SERPL-MCNC: 24 MG/DL (ref 5–25)
CALCIUM SERPL-MCNC: 8 MG/DL (ref 8.3–10.1)
CHLORIDE SERPL-SCNC: 89 MMOL/L (ref 100–108)
CO2 SERPL-SCNC: 26 MMOL/L (ref 21–32)
CREAT SERPL-MCNC: 4.38 MG/DL (ref 0.6–1.3)
EOSINOPHIL # BLD AUTO: 0.35 THOUSAND/ΜL (ref 0–0.61)
EOSINOPHIL NFR BLD AUTO: 4 % (ref 0–6)
ERYTHROCYTE [DISTWIDTH] IN BLOOD BY AUTOMATED COUNT: 14.6 % (ref 11.6–15.1)
GFR SERPL CREATININE-BSD FRML MDRD: 13 ML/MIN/1.73SQ M
GLUCOSE SERPL-MCNC: 294 MG/DL (ref 65–140)
GLUCOSE SERPL-MCNC: 403 MG/DL (ref 65–140)
GLUCOSE SERPL-MCNC: 426 MG/DL (ref 65–140)
GLUCOSE SERPL-MCNC: 430 MG/DL (ref 65–140)
GLUCOSE SERPL-MCNC: 459 MG/DL (ref 65–140)
GLUCOSE SERPL-MCNC: 495 MG/DL (ref 65–140)
HCT VFR BLD AUTO: 27.3 % (ref 36.5–49.3)
HGB BLD-MCNC: 8.6 G/DL (ref 12–17)
LYMPHOCYTES # BLD AUTO: 1.03 THOUSANDS/ΜL (ref 0.6–4.47)
LYMPHOCYTES NFR BLD AUTO: 13 % (ref 14–44)
MAGNESIUM SERPL-MCNC: 2 MG/DL (ref 1.6–2.6)
MCH RBC QN AUTO: 29.4 PG (ref 26.8–34.3)
MCHC RBC AUTO-ENTMCNC: 31.5 G/DL (ref 31.4–37.4)
MCV RBC AUTO: 93 FL (ref 82–98)
MONOCYTES # BLD AUTO: 0.82 THOUSAND/ΜL (ref 0.17–1.22)
MONOCYTES NFR BLD AUTO: 10 % (ref 4–12)
NEUTROPHILS # BLD AUTO: 5.67 THOUSANDS/ΜL (ref 1.85–7.62)
NEUTS SEG NFR BLD AUTO: 72 % (ref 43–75)
PLATELET # BLD AUTO: 231 THOUSANDS/UL (ref 149–390)
PMV BLD AUTO: 8.8 FL (ref 8.9–12.7)
POTASSIUM SERPL-SCNC: 4.2 MMOL/L (ref 3.5–5.3)
RBC # BLD AUTO: 2.93 MILLION/UL (ref 3.88–5.62)
SODIUM SERPL-SCNC: 126 MMOL/L (ref 136–145)
WBC # BLD AUTO: 7.93 THOUSAND/UL (ref 4.31–10.16)

## 2017-12-03 PROCEDURE — 85025 COMPLETE CBC W/AUTO DIFF WBC: CPT | Performed by: PHYSICIAN ASSISTANT

## 2017-12-03 PROCEDURE — 94760 N-INVAS EAR/PLS OXIMETRY 1: CPT

## 2017-12-03 PROCEDURE — 94664 DEMO&/EVAL PT USE INHALER: CPT

## 2017-12-03 PROCEDURE — 80048 BASIC METABOLIC PNL TOTAL CA: CPT | Performed by: PHYSICIAN ASSISTANT

## 2017-12-03 PROCEDURE — 82948 REAGENT STRIP/BLOOD GLUCOSE: CPT

## 2017-12-03 PROCEDURE — 85730 THROMBOPLASTIN TIME PARTIAL: CPT | Performed by: INTERNAL MEDICINE

## 2017-12-03 PROCEDURE — 83735 ASSAY OF MAGNESIUM: CPT | Performed by: PHYSICIAN ASSISTANT

## 2017-12-03 RX ORDER — SEVELAMER HYDROCHLORIDE 800 MG/1
800 TABLET, FILM COATED ORAL
Status: DISCONTINUED | OUTPATIENT
Start: 2017-12-03 | End: 2017-12-07 | Stop reason: HOSPADM

## 2017-12-03 RX ORDER — LISINOPRIL 5 MG/1
5 TABLET ORAL 2 TIMES DAILY
Status: DISCONTINUED | OUTPATIENT
Start: 2017-12-03 | End: 2017-12-07 | Stop reason: HOSPADM

## 2017-12-03 RX ADMIN — NEPHROCAP 1 CAPSULE: 1 CAP ORAL at 08:16

## 2017-12-03 RX ADMIN — CITALOPRAM HYDROBROMIDE 10 MG: 20 TABLET ORAL at 08:27

## 2017-12-03 RX ADMIN — INSULIN LISPRO 3 UNITS: 100 INJECTION, SOLUTION INTRAVENOUS; SUBCUTANEOUS at 07:54

## 2017-12-03 RX ADMIN — INSULIN LISPRO 2 UNITS: 100 INJECTION, SOLUTION INTRAVENOUS; SUBCUTANEOUS at 21:51

## 2017-12-03 RX ADMIN — ANORECTAL OINTMENT: 15.7; .44; 24; 20.6 OINTMENT TOPICAL at 08:16

## 2017-12-03 RX ADMIN — INSULIN LISPRO 3 UNITS: 100 INJECTION, SOLUTION INTRAVENOUS; SUBCUTANEOUS at 17:02

## 2017-12-03 RX ADMIN — AMIODARONE HYDROCHLORIDE 200 MG: 200 TABLET ORAL at 21:50

## 2017-12-03 RX ADMIN — Medication 250 MG: at 17:01

## 2017-12-03 RX ADMIN — CINACALCET HYDROCHLORIDE 30 MG: 30 TABLET, COATED ORAL at 21:53

## 2017-12-03 RX ADMIN — INSULIN DETEMIR 2 UNITS: 100 INJECTION, SOLUTION SUBCUTANEOUS at 08:36

## 2017-12-03 RX ADMIN — VANCOMYCIN 125 MG: KIT at 05:27

## 2017-12-03 RX ADMIN — INSULIN DETEMIR 4 UNITS: 100 INJECTION, SOLUTION SUBCUTANEOUS at 21:49

## 2017-12-03 RX ADMIN — VANCOMYCIN 125 MG: KIT at 01:00

## 2017-12-03 RX ADMIN — LEVOTHYROXINE SODIUM 137 MCG: 112 TABLET ORAL at 05:27

## 2017-12-03 RX ADMIN — CHOLESTYRAMINE 4 G: 4 POWDER, FOR SUSPENSION ORAL at 08:15

## 2017-12-03 RX ADMIN — LISINOPRIL 5 MG: 5 TABLET ORAL at 17:00

## 2017-12-03 RX ADMIN — METOPROLOL TARTRATE 12.5 MG: 25 TABLET ORAL at 08:27

## 2017-12-03 RX ADMIN — HEPARIN SODIUM 14 UNITS/KG/HR: 10000 INJECTION, SOLUTION INTRAVENOUS at 14:37

## 2017-12-03 RX ADMIN — AMIODARONE HYDROCHLORIDE 200 MG: 200 TABLET ORAL at 08:27

## 2017-12-03 RX ADMIN — AMIODARONE HYDROCHLORIDE 200 MG: 200 TABLET ORAL at 17:00

## 2017-12-03 RX ADMIN — VANCOMYCIN 125 MG: KIT at 11:53

## 2017-12-03 RX ADMIN — PSYLLIUM HUSK 1 PACKET: 3.4 POWDER ORAL at 08:36

## 2017-12-03 RX ADMIN — CEFEPIME HYDROCHLORIDE 1000 MG: 1 INJECTION, SOLUTION INTRAVENOUS at 20:22

## 2017-12-03 RX ADMIN — VANCOMYCIN 125 MG: KIT at 17:06

## 2017-12-03 RX ADMIN — ANORECTAL OINTMENT: 15.7; .44; 24; 20.6 OINTMENT TOPICAL at 17:01

## 2017-12-03 RX ADMIN — NYSTATIN: 100000 POWDER TOPICAL at 17:01

## 2017-12-03 RX ADMIN — RENAGEL 800 MG: 800 TABLET ORAL at 17:01

## 2017-12-03 RX ADMIN — LISINOPRIL 5 MG: 5 TABLET ORAL at 11:52

## 2017-12-03 RX ADMIN — Medication 20 MG: at 08:35

## 2017-12-03 RX ADMIN — Medication 250 MG: at 08:27

## 2017-12-03 RX ADMIN — CHOLESTYRAMINE 4 G: 4 POWDER, FOR SUSPENSION ORAL at 17:02

## 2017-12-03 RX ADMIN — WARFARIN SODIUM 5 MG: 5 TABLET ORAL at 17:00

## 2017-12-03 RX ADMIN — INSULIN LISPRO 1 UNITS: 100 INJECTION, SOLUTION INTRAVENOUS; SUBCUTANEOUS at 12:58

## 2017-12-03 RX ADMIN — NYSTATIN: 100000 POWDER TOPICAL at 08:16

## 2017-12-03 RX ADMIN — METOPROLOL TARTRATE 25 MG: 25 TABLET ORAL at 21:50

## 2017-12-03 RX ADMIN — GABAPENTIN 300 MG: 300 CAPSULE ORAL at 21:50

## 2017-12-03 NOTE — PROGRESS NOTES
Progress Note - Chuy Fontaine 61 y o  male MRN: [id number]    Unit/Bed#:  Encounter: [id number]      CC: diabetes f/u    Subjective:   Chuy Fontaine is a 61y o  year old male with type 1 diabetes  Feels well  No complaints  No hypoglycemia  Objective:     Vitals: Blood pressure 141/67, pulse 75, temperature 97 5 °F (36 4 °C), temperature source Oral, resp  rate 14, height 5' 6" (1 676 m), weight 60 3 kg (132 lb 15 oz), SpO2 95 %  ,Body mass index is 21 46 kg/m²  Intake/Output Summary (Last 24 hours) at 12/03/17 1413  Last data filed at 12/03/17 1359   Gross per 24 hour   Intake           913 56 ml   Output                0 ml   Net           913 56 ml       Physical Exam:  General Appearance: awake, appears stated age and cooperative  Head: Normocephalic, without obvious abnormality, atraumatic  Extremities: moves all extremities  Skin: Skin color and temperature normal    Pulm: no labored breathing    Lab, Imaging and other studies: I have personally reviewed pertinent reports  POC Glucose   Date Value   12/03/2017 294 mg/dl (H)   12/03/2017 403 mg/dl (H)   12/03/2017 426 mg/dl (H)   12/02/2017 490 mg/dl (H)   12/02/2017 >500 mg/dl (HH)   12/02/2017 >500 mg/dl (HH)   12/02/2017 >500 mg/dl (HH)   12/02/2017 191 mg/dl (H)   12/02/2017 232 mg/dl (H)   12/02/2017 242 mg/dl (H)   10/16/2015 217 mg/dL (H)   07/01/2015 323 mg/dL (H)       Assessment:  diabetes with hyperglycemia    Plan:  Type 1 diabetes with hyperglycemia-increase evening Levemir to 4 units  Continue to monitor blood sugar over time and make adjustments to his regimen if necessary  Portions of the record may have been created with voice recognition software

## 2017-12-03 NOTE — PHYSICAL THERAPY NOTE
Physical Therapy Cancellation Note    Will hold PT at this time due to low PTT of 85  Will continue to follow patient and see when appropriate  Thank you      Laura Quintanilla, PT

## 2017-12-03 NOTE — PROGRESS NOTES
[facility] Internal Medicine Progress Note  Patient: Sadi Fernandez 61 y o  male   MRN: [id number]  PCP: Jared Orlando DO  Unit/Bed#:  Encounter: [id number]  Date Of Visit: 12/03/17    Assessment:    Active Problems:    Type 1 diabetes mellitus with nephropathy ([facility] )    ESRD (end stage renal disease) on dialysis (HCC)    Rapid atrial fibrillation (HCC)    HCAP (healthcare-associated pneumonia)    S/P percutaneous endoscopic gastrostomy (PEG) tube placement ([facility])    Pleural effusion on right    Chronic combined systolic and diastolic CHF (congestive heart failure) ([facility] )    Pseudomonas urinary tract infection    Hyponatremia    Clostridium difficile infection    Hypoglycemia    Acquired hypothyroidism    Chronic kidney disease-mineral and bone disorder      Plan:      · Encephalopathy toxic versus metabolic resolved  · Acute hypoxic respiratory failure improved monitor  · Healthcare acquired pneumonia/Gram-negative bacterial on cefepime to complete 7 day course, 5/7  · Labile blood glucose levels hypoglycemia improved, now on Levemir and Humalog, monitor Accu-Cheks endocrinology following  · Atrial fibrillation ventricular response better controlled continue amiodarone, Coumadin to heparin bridge, cardiology input noted  · End-stage renal disease on hemodialysis nephrology following  · Chronic diastolic and systolic congestive heart failure  · Out of bed as able physical therapy  · Transition to med surge floor        VTE Pharmacologic Prophylaxis:   Pharmacologic: Heparin Drip  Mechanical VTE Prophylaxis in Place: Yes    Patient Centered Rounds: I have performed bedside rounds with nursing staff today  Discussions with Specialists or Other Care Team Provider:     Education and Discussions with Family / Patient: pt    Time Spent for Care: 30 minutes  More than 50% of total time spent on counseling and coordination of care as described above      Current Length of Stay: 6 day(s)    Current Patient Status: Inpatient   Certification Statement: The patient will continue to require additional inpatient hospital stay due to As mentioned    Discharge Plan / Estimated Discharge Date:  AFib requiring IV medications, therapeutic INR close monitoring of his glucose levels, PT    Code Status: Level 1 - Full Code      Subjective:     Reports feeling better   Appetite fair      Objective:     Vitals:   Temp (24hrs), Av 1 °F (36 7 °C), Min:97 5 °F (36 4 °C), Max:98 3 °F (36 8 °C)    HR:  [75-84] 75  Resp:  [14-22] 14  BP: (135-168)/(67-79) 141/67  SpO2:  [94 %-100 %] 95 %  Body mass index is 21 46 kg/m²  Input and Output Summary (last 24 hours): Intake/Output Summary (Last 24 hours) at 17 1249  Last data filed at 17 0901   Gross per 24 hour   Intake             1118 ml   Output                0 ml   Net             1118 ml       Physical Exam:     Physical Exam     Comfortably lying in bed  Neck supple  Lungs diminished breath sounds at bases  Heart sounds irregular  Abdomen soft  Pulses present  Awake alert obeys simple commands  No rash      Additional Data:     Labs:      Results from last 7 days  Lab Units 17  0444   WBC Thousand/uL 7 93   HEMOGLOBIN g/dL 8 6*   HEMATOCRIT % 27 3*   PLATELETS Thousands/uL 231   NEUTROS PCT % 72   LYMPHS PCT % 13*   MONOS PCT % 10   EOS PCT % 4       Results from last 7 days  Lab Units 17  0444  17  0412   SODIUM mmol/L 126*  < > 126*   POTASSIUM mmol/L 4 2  < > 4 4   CHLORIDE mmol/L 89*  < > 91*   CO2 mmol/L 26  < > 25   BUN mg/dL 24  < > 28*   CREATININE mg/dL 4 38*  < > 4 65*   CALCIUM mg/dL 8 0*  < > 9 0   TOTAL PROTEIN g/dL  --   --  7 1   BILIRUBIN TOTAL mg/dL  --   --  0 50   ALK PHOS U/L  --   --  150*   ALT U/L  --   --  19   AST U/L  --   --  24   GLUCOSE RANDOM mg/dL 459*  < > 445*   < > = values in this interval not displayed      Results from last 7 days  Lab Units 17  0513   INR  1 43*       * I Have Reviewed All Lab Data Listed Above   * Additional Pertinent Lab Tests Reviewed: All Labs Within Last 24 Hours Reviewed    Imaging:    Imaging Reports Reviewed Today Include:   Imaging Personally Reviewed by Myself Includes:     Recent Cultures (last 7 days):       Results from last 7 days  Lab Units 11/29/17  1351 11/29/17  0927 11/29/17  0912 11/28/17  1425 11/28/17  1024 11/27/17  1442   BLOOD CULTURE   --  No Growth at 72 hrs  No Growth at 72 hrs   --   --   --    SPUTUM CULTURE  1+ Growth of Candida sp  presumptively albicans*  1+ Growth of   --   --   --   --   --    GRAM STAIN RESULT  Rare Polys  Rare Gram negative rods  --   --   --   --   --    URINE CULTURE   --   --   --   --   --  >100,000 cfu/ml Pseudomonas aeruginosa*   INFLUENZA A PCR   --   --   --   --  None Detected  --    INFLUENZA B PCR   --   --   --   --  None Detected  --    RSV PCR   --   --   --   --  None Detected  --    C DIFF TOXIN B   --   --   --  NEGATIVE for C difficle toxin by PCR    --   --        Last 24 Hours Medication List:     amiodarone 200 mg Oral TID   b complex-vitamin C-folic acid 1 capsule Oral Daily   cefepime 1,000 mg Intravenous Q24H   cholestyramine sugar free 4 g Oral BID   cinacalcet 30 mg Oral HS   citalopram 10 mg Oral Daily   gabapentin 300 mg Oral HS   insulin detemir 2 Units Subcutaneous HS   insulin detemir 2 Units Subcutaneous Daily   insulin lispro 1-3 Units Subcutaneous HS   insulin lispro 1-5 Units Subcutaneous TID AC   levothyroxine 137 mcg Oral Early Morning   lisinopril 5 mg Oral BID   menthol-zinc oxide  Topical BID   metoprolol tartrate 25 mg Oral Q12H CELIA   nystatin  Topical BID   omeprazole (PRILOSEC) suspension 2 mg/mL 20 mg Oral Daily   psyllium 1 packet Oral Daily   saccharomyces boulardii 250 mg Oral BID   vancomycin 125 mg Oral Q6H [address]   warfarin 5 mg Oral Daily (warfarin)        Today, Patient Was Seen By: Harman Dill MD    ** Please Note: This note has been constructed using a voice recognition system  **

## 2017-12-03 NOTE — PROGRESS NOTES
NG/GT:90; IV Piggyback:50]  Out: -     Invasive Devices     Peripheral Intravenous Line            Peripheral IV 11/29/17 Left Arm 4 days    Peripheral IV 12/02/17 Left Forearm 1 day          Line            Hemodialysis AV Fistula  Right Forearm -- days    Hemodialysis AV Fistula Right Forearm -- days          Drain            Gastrostomy/Enterostomy -- days    Gastrostomy/Enterostomy Percutaneous endoscopic gastrostomy (PEG) 20 Fr   days                  Physical Exam:  GEN: Buzz Frieze appears well, slightly decreased alertness, pleasant and cooperative   HEENT: pupils equal, round, and reactive to light; extraocular muscles intact  NECK: supple, no carotid bruits or JVD  HEART: regular rhythm, normal S1 and S2, no murmurs, clicks, gallops or rubs   LUNGS: clear to auscultation bilaterally; no wheezes, rales, or rhonchi   ABDOMEN/GI: normal bowel sounds, soft, no tenderness, no distention  EXTREMITIES/Musculoskeltal: peripheral pulses normal; no clubbing, cyanosis, or edema  NEURO: no focal findings   SKIN: normal without suspicious lesions on exposed skin            Lab Results:     Results from last 7 days  Lab Units 11/29/17  0521 11/28/17  2040 11/27/17  1140   TROPONIN I ng/mL <0 02 <0 02 <0 02     Results from last 7 days  Lab Units 12/03/17 0444 12/02/17  0513 12/01/17  0412   WBC Thousand/uL 7 93 9 13 10 18*   HEMOGLOBIN g/dL 8 6* 9 0* 9 7*   HEMATOCRIT % 27 3* 28 5* 30 5*   PLATELETS Thousands/uL 231 298 313           Results from last 7 days  Lab Units 12/03/17  0444 12/02/17  0513 12/01/17  2210 12/01/17  0412  11/29/17  0521  11/28/17  2040   SODIUM mmol/L 126* 125* 122* 126*  < > 125*  < > 118*   POTASSIUM mmol/L 4 2 4 5 5 0 4 4  < > 3 7  < > 4 8   CHLORIDE mmol/L 89* 89* 88* 91*  < > 92*  < > 86*   CO2 mmol/L 26 24 23 25  < > 22  < > 22   BUN mg/dL 24 42* 38* 28*  < > 41*  < > 33*   CREATININE mg/dL 4 38* 6 14* 6 04* 4 65*  < > 4 51*  < > 3 99*   CALCIUM mg/dL 8 0* 8 3 8 5 9 0  < > 8 4  < > 8  3   TOTAL PROTEIN g/dL  --   --   --  7 1  --  7 8  --  7 9   BILIRUBIN TOTAL mg/dL  --   --   --  0 50  --  0 60  --  0 80   ALK PHOS U/L  --   --   --  150*  --  171*  --  199*   ALT U/L  --   --   --  19  --  16  --  20   AST U/L  --   --   --  24  --  21  --  18   GLUCOSE RANDOM mg/dL 459* 290* 692* 445*  < > 84  < > 735*   GLUCOSE, ISTAT   --   --   --   --   < >  --   < >  --    < > = values in this interval not displayed  Results from last 7 days  Lab Units 12/02/17 0513 11/29/17 0928 11/29/17 0521   INR  1 43* 1 58* 1 47*       Imaging: I have personally reviewed pertinent reports      EKG:  No events on telemetry    Scheduled Meds:    amiodarone 200 mg Oral TID   b complex-vitamin C-folic acid 1 capsule Oral Daily   cefepime 1,000 mg Intravenous Q24H   cholestyramine sugar free 4 g Oral BID   cinacalcet 30 mg Oral HS   citalopram 10 mg Oral Daily   gabapentin 300 mg Oral HS   insulin detemir 2 Units Subcutaneous HS   insulin detemir 2 Units Subcutaneous Daily   insulin lispro 1-3 Units Subcutaneous HS   insulin lispro 1-5 Units Subcutaneous TID AC   levothyroxine 137 mcg Oral Early Morning   lisinopril 5 mg Oral BID   menthol-zinc oxide  Topical BID   metoprolol tartrate 25 mg Oral Q12H CELIA   nystatin  Topical BID   omeprazole (PRILOSEC) suspension 2 mg/mL 20 mg Oral Daily   psyllium 1 packet Oral Daily   saccharomyces boulardii 250 mg Oral BID   vancomycin 125 mg Oral Q6H [address]   warfarin 5 mg Oral Daily (warfarin)     Continuous Infusions:    heparin (porcine) 3-20 Units/kg/hr (Order-Specific) Last Rate: 14 Units/kg/hr (12/02/17 0809)       VTE Pharmacologic Prophylaxis: Heparin  VTE Mechanical Prophylaxis: sequential compression device    Impression/plan:     Episodes of altered mental status:  Likely secondary to hypoperfusion, hypoglycemia, metabolic abnormalities, noncardiac -not related to heart rate     Paroxysmal atrial fibrillation:  Currently in sinus rhythm, no events in the last 48 hours, INR 1 4 yesterday, restared Coumadin, check INR tomorrow, also on IV heparin, continue amiodarone to keep him in sinus rhythm  Changed to 200 mg daily on 12/10/2017      End-stage renal disease: On hemodialysis currently undergoing, hemodynamically stable     Cardiomyopathy:  Ejection fraction of 35% this admission, continue metoprolol, increase dose, start lisinopril at 5 mg b i d , blood pressures have been stable  eventual ischemic evaluation as an outpatient, volume removal via hemodialysis      prior history of aortic valve vegetation:  Status post completion of antibiotics, no evidence of sepsis/bacteremia at this time     Counseling / Coordination of Care  Total time spent 20 minutes including teaching and family updates  More than 50% was spent counseling pt and family

## 2017-12-03 NOTE — PLAN OF CARE
SAFETY,RESTRAINT: NV/NON-SELF DESTRUCTIVE BEHAVIOR     Remains free of harm/injury (restraint for non violent/non self-detsructive behavior) Progressing     Returns to optimal restraint-free functioning Progressing

## 2017-12-04 ENCOUNTER — APPOINTMENT (INPATIENT)
Dept: DIALYSIS | Facility: HOSPITAL | Age: 67
DRG: 208 | End: 2017-12-04
Attending: NURSE PRACTITIONER
Payer: COMMERCIAL

## 2017-12-04 LAB
ANION GAP SERPL CALCULATED.3IONS-SCNC: 10 MMOL/L (ref 4–13)
APTT PPP: 68 SECONDS (ref 23–35)
BACTERIA BLD CULT: NORMAL
BACTERIA BLD CULT: NORMAL
BASOPHILS # BLD AUTO: 0.08 THOUSANDS/ΜL (ref 0–0.1)
BASOPHILS NFR BLD AUTO: 1 % (ref 0–1)
BUN SERPL-MCNC: 36 MG/DL (ref 5–25)
CA-I BLD-SCNC: 1.04 MMOL/L (ref 1.12–1.32)
CALCIUM SERPL-MCNC: 8 MG/DL (ref 8.3–10.1)
CHLORIDE SERPL-SCNC: 88 MMOL/L (ref 100–108)
CO2 SERPL-SCNC: 24 MMOL/L (ref 21–32)
CREAT SERPL-MCNC: 5.99 MG/DL (ref 0.6–1.3)
EOSINOPHIL # BLD AUTO: 0.38 THOUSAND/ΜL (ref 0–0.61)
EOSINOPHIL NFR BLD AUTO: 5 % (ref 0–6)
ERYTHROCYTE [DISTWIDTH] IN BLOOD BY AUTOMATED COUNT: 14.6 % (ref 11.6–15.1)
GFR SERPL CREATININE-BSD FRML MDRD: 9 ML/MIN/1.73SQ M
GLUCOSE SERPL-MCNC: 146 MG/DL (ref 65–140)
GLUCOSE SERPL-MCNC: 333 MG/DL (ref 65–140)
GLUCOSE SERPL-MCNC: 355 MG/DL (ref 65–140)
GLUCOSE SERPL-MCNC: 408 MG/DL (ref 65–140)
GLUCOSE SERPL-MCNC: 417 MG/DL (ref 65–140)
GLUCOSE SERPL-MCNC: 487 MG/DL (ref 65–140)
HCT VFR BLD AUTO: 28.4 % (ref 36.5–49.3)
HEMOCCULT STL QL: NEGATIVE
HGB BLD-MCNC: 9.2 G/DL (ref 12–17)
INR PPP: 1.81 (ref 0.86–1.16)
LYMPHOCYTES # BLD AUTO: 1.06 THOUSANDS/ΜL (ref 0.6–4.47)
LYMPHOCYTES NFR BLD AUTO: 14 % (ref 14–44)
MAGNESIUM SERPL-MCNC: 1.9 MG/DL (ref 1.6–2.6)
MCH RBC QN AUTO: 29.9 PG (ref 26.8–34.3)
MCHC RBC AUTO-ENTMCNC: 32.4 G/DL (ref 31.4–37.4)
MCV RBC AUTO: 92 FL (ref 82–98)
MONOCYTES # BLD AUTO: 0.65 THOUSAND/ΜL (ref 0.17–1.22)
MONOCYTES NFR BLD AUTO: 8 % (ref 4–12)
NEUTROPHILS # BLD AUTO: 5.66 THOUSANDS/ΜL (ref 1.85–7.62)
NEUTS SEG NFR BLD AUTO: 72 % (ref 43–75)
OSMOLALITY UR/SERPL-RTO: 299 MMOL/KG (ref 282–298)
PHOSPHATE SERPL-MCNC: 6.1 MG/DL (ref 2.3–4.1)
PLATELET # BLD AUTO: 243 THOUSANDS/UL (ref 149–390)
PMV BLD AUTO: 9.3 FL (ref 8.9–12.7)
POTASSIUM SERPL-SCNC: 5.3 MMOL/L (ref 3.5–5.3)
PROTHROMBIN TIME: 21.6 SECONDS (ref 12.1–14.4)
RBC # BLD AUTO: 3.08 MILLION/UL (ref 3.88–5.62)
SODIUM SERPL-SCNC: 122 MMOL/L (ref 136–145)
WBC # BLD AUTO: 7.83 THOUSAND/UL (ref 4.31–10.16)

## 2017-12-04 PROCEDURE — 84100 ASSAY OF PHOSPHORUS: CPT | Performed by: NURSE PRACTITIONER

## 2017-12-04 PROCEDURE — 82272 OCCULT BLD FECES 1-3 TESTS: CPT | Performed by: NURSE PRACTITIONER

## 2017-12-04 PROCEDURE — 82948 REAGENT STRIP/BLOOD GLUCOSE: CPT

## 2017-12-04 PROCEDURE — 85730 THROMBOPLASTIN TIME PARTIAL: CPT | Performed by: PHYSICIAN ASSISTANT

## 2017-12-04 PROCEDURE — 80048 BASIC METABOLIC PNL TOTAL CA: CPT | Performed by: NURSE PRACTITIONER

## 2017-12-04 PROCEDURE — 83930 ASSAY OF BLOOD OSMOLALITY: CPT | Performed by: INTERNAL MEDICINE

## 2017-12-04 PROCEDURE — 83735 ASSAY OF MAGNESIUM: CPT | Performed by: NURSE PRACTITIONER

## 2017-12-04 PROCEDURE — 84165 PROTEIN E-PHORESIS SERUM: CPT | Performed by: INTERNAL MEDICINE

## 2017-12-04 PROCEDURE — 83883 ASSAY NEPHELOMETRY NOT SPEC: CPT | Performed by: INTERNAL MEDICINE

## 2017-12-04 PROCEDURE — 82330 ASSAY OF CALCIUM: CPT | Performed by: NURSE PRACTITIONER

## 2017-12-04 PROCEDURE — 85025 COMPLETE CBC W/AUTO DIFF WBC: CPT | Performed by: NURSE PRACTITIONER

## 2017-12-04 PROCEDURE — 86334 IMMUNOFIX E-PHORESIS SERUM: CPT | Performed by: INTERNAL MEDICINE

## 2017-12-04 PROCEDURE — 85610 PROTHROMBIN TIME: CPT | Performed by: NURSE PRACTITIONER

## 2017-12-04 RX ADMIN — NYSTATIN: 100000 POWDER TOPICAL at 17:27

## 2017-12-04 RX ADMIN — INSULIN LISPRO 2 UNITS: 100 INJECTION, SOLUTION INTRAVENOUS; SUBCUTANEOUS at 12:13

## 2017-12-04 RX ADMIN — AMIODARONE HYDROCHLORIDE 200 MG: 200 TABLET ORAL at 21:40

## 2017-12-04 RX ADMIN — RENAGEL 800 MG: 800 TABLET ORAL at 17:25

## 2017-12-04 RX ADMIN — HEPARIN SODIUM 14 UNITS/KG/HR: 10000 INJECTION, SOLUTION INTRAVENOUS at 20:36

## 2017-12-04 RX ADMIN — Medication 20 MG: at 09:00

## 2017-12-04 RX ADMIN — INSULIN DETEMIR 2 UNITS: 100 INJECTION, SOLUTION SUBCUTANEOUS at 08:47

## 2017-12-04 RX ADMIN — WARFARIN SODIUM 5 MG: 5 TABLET ORAL at 17:24

## 2017-12-04 RX ADMIN — PSYLLIUM HUSK 1 PACKET: 3.4 POWDER ORAL at 08:50

## 2017-12-04 RX ADMIN — CHOLESTYRAMINE 4 G: 4 POWDER, FOR SUSPENSION ORAL at 17:26

## 2017-12-04 RX ADMIN — INSULIN DETEMIR 5 UNITS: 100 INJECTION, SOLUTION SUBCUTANEOUS at 21:52

## 2017-12-04 RX ADMIN — NEPHROCAP 1 CAPSULE: 1 CAP ORAL at 08:50

## 2017-12-04 RX ADMIN — NYSTATIN 1 APPLICATION: 100000 POWDER TOPICAL at 08:51

## 2017-12-04 RX ADMIN — CHOLESTYRAMINE 4 G: 4 POWDER, FOR SUSPENSION ORAL at 08:50

## 2017-12-04 RX ADMIN — INSULIN LISPRO 3 UNITS: 100 INJECTION, SOLUTION INTRAVENOUS; SUBCUTANEOUS at 08:48

## 2017-12-04 RX ADMIN — AMIODARONE HYDROCHLORIDE 200 MG: 200 TABLET ORAL at 08:46

## 2017-12-04 RX ADMIN — Medication 250 MG: at 17:24

## 2017-12-04 RX ADMIN — Medication 250 MG: at 08:46

## 2017-12-04 RX ADMIN — RENAGEL 800 MG: 800 TABLET ORAL at 12:15

## 2017-12-04 RX ADMIN — LEVOTHYROXINE SODIUM 137 MCG: 112 TABLET ORAL at 05:42

## 2017-12-04 RX ADMIN — INSULIN LISPRO 2 UNITS: 100 INJECTION, SOLUTION INTRAVENOUS; SUBCUTANEOUS at 08:48

## 2017-12-04 RX ADMIN — VANCOMYCIN 125 MG: KIT at 00:15

## 2017-12-04 RX ADMIN — INSULIN LISPRO 2 UNITS: 100 INJECTION, SOLUTION INTRAVENOUS; SUBCUTANEOUS at 17:30

## 2017-12-04 RX ADMIN — LISINOPRIL 5 MG: 5 TABLET ORAL at 08:46

## 2017-12-04 RX ADMIN — AMIODARONE HYDROCHLORIDE 200 MG: 200 TABLET ORAL at 17:24

## 2017-12-04 RX ADMIN — VANCOMYCIN 125 MG: KIT at 23:10

## 2017-12-04 RX ADMIN — GABAPENTIN 300 MG: 300 CAPSULE ORAL at 21:40

## 2017-12-04 RX ADMIN — RENAGEL 800 MG: 800 TABLET ORAL at 08:49

## 2017-12-04 RX ADMIN — METOPROLOL TARTRATE 25 MG: 25 TABLET ORAL at 21:40

## 2017-12-04 RX ADMIN — ANORECTAL OINTMENT: 15.7; .44; 24; 20.6 OINTMENT TOPICAL at 17:27

## 2017-12-04 RX ADMIN — EPOETIN ALFA 3000 UNITS: 3000 SOLUTION INTRAVENOUS; SUBCUTANEOUS at 14:25

## 2017-12-04 RX ADMIN — CEFEPIME HYDROCHLORIDE 1000 MG: 1 INJECTION, SOLUTION INTRAVENOUS at 19:24

## 2017-12-04 RX ADMIN — VANCOMYCIN 125 MG: KIT at 12:13

## 2017-12-04 RX ADMIN — ANORECTAL OINTMENT: 15.7; .44; 24; 20.6 OINTMENT TOPICAL at 08:51

## 2017-12-04 RX ADMIN — INSULIN LISPRO 1 UNITS: 100 INJECTION, SOLUTION INTRAVENOUS; SUBCUTANEOUS at 21:52

## 2017-12-04 RX ADMIN — LISINOPRIL 5 MG: 5 TABLET ORAL at 17:24

## 2017-12-04 RX ADMIN — INSULIN LISPRO 2 UNITS: 100 INJECTION, SOLUTION INTRAVENOUS; SUBCUTANEOUS at 12:14

## 2017-12-04 RX ADMIN — CINACALCET HYDROCHLORIDE 30 MG: 30 TABLET, COATED ORAL at 21:44

## 2017-12-04 RX ADMIN — VANCOMYCIN 125 MG: KIT at 17:30

## 2017-12-04 RX ADMIN — CITALOPRAM HYDROBROMIDE 10 MG: 20 TABLET ORAL at 08:46

## 2017-12-04 RX ADMIN — VANCOMYCIN 125 MG: KIT at 05:42

## 2017-12-04 RX ADMIN — METOPROLOL TARTRATE 25 MG: 25 TABLET ORAL at 08:46

## 2017-12-04 NOTE — PROGRESS NOTES
Cardiology Progress Note - Jamin Garcia 61 y o  male MRN: [id number]    Unit/Bed#:  Encounter: [id number]      Assessment:  Active Problems:    Type 1 diabetes mellitus with nephropathy (HCC)    ESRD (end stage renal disease) on dialysis (HCC)    Rapid atrial fibrillation (HCC)    HCAP (healthcare-associated pneumonia)    S/P percutaneous endoscopic gastrostomy (PEG) tube placement (HCC)    Pleural effusion on right    Chronic combined systolic and diastolic CHF (congestive heart failure) (HCC)    Pseudomonas urinary tract infection    Hyponatremia    Clostridium difficile infection    Hypoglycemia    Acquired hypothyroidism    Chronic kidney disease-mineral and bone disorder      Plan:    1  PAF - Has remained in sinus rhythm  We will continue a rhythm control strategy and the patient is now on an amiodarone load  Coumadin restarted for stroke prevention  No changes made today  Close outpatient follow-up, as the patient wants to now follow with our group  2  Cardiomyopathy - Ejection fraction 35% - Continue medical management for this  At discharge suggest changing over to Toprol-XL, and continuing lisinopril  Will require an ischemic evaluation but can do this as an outpatient  Volume management per hemodialysis  3   Prior history of aortic valve endocarditi - Patient completed antibiotics and there are no issues from this standpoint  Subjective:   Patient seen and examined  No significant events overnight  Patient feels well  Denies chest pain or shortness of breath  Remains in sinus rhythm  Objective:     Vitals: Blood pressure 111/59, pulse 64, temperature 97 9 °F (36 6 °C), temperature source Oral, resp  rate 16, height 5' 6" (1 676 m), weight 60 3 kg (132 lb 15 oz), SpO2 95 %  , Body mass index is 21 46 kg/m² , Orthostatic Blood Pressures    Flowsheet Row Most Recent Value   Blood Pressure  111/59 filed at 12/04/2017 0700   Patient Position - Orthostatic VS  Lying filed at 12/04/2017 0700            Intake/Output Summary (Last 24 hours) at 12/04/17 1020  Last data filed at 12/04/17 0400   Gross per 24 hour   Intake           1194 8 ml   Output                0 ml   Net           1194 8 ml       No significant arrhythmias seen on telemetry review  Sinus rhythm  Physical Exam:    GEN: Aravind Kitchen appears well, alert and oriented x 3, pleasant and cooperative   HEENT: pupils equal, round, and reactive to light; extraocular muscles intact  NECK: supple, no carotid bruits   HEART: regular rhythm, normal S1 and S2, soft systolic murmur, No clicks, gallops or rubs   LUNGS:  Diminished at bases bilaterally; no wheezes, rales, or rhonchi   ABDOMEN: normal bowel sounds, soft, no tenderness, no distention  EXTREMITIES: peripheral pulses diminished; no clubbing, cyanosis  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    Trace edema  NEURO: no focal findings   SKIN: normal without suspicious lesions on exposed skin    Medications:      Current Facility-Administered Medications:     acetaminophen (TYLENOL) tablet 650 mg, 650 mg, Oral, Q6H PRN, Ave Mistry PA-C    amiodarone tablet 200 mg, 200 mg, Oral, TID, Cait Oliver MD, 200 mg at 12/04/17 0846    b complex-vitamin C-folic acid (NEPHROCAPS) capsule 1 capsule, 1 capsule, Oral, Daily, Ave Mistry PA-C, 1 capsule at 12/04/17 0850    cefepime (MAXIPIME) IVPB (premix) 1,000 mg, 1,000 mg, Intravenous, Q24H, ALIE Rodriguez, Stopped at 12/04/17 0400    cholestyramine sugar free (QUESTRAN LIGHT) packet 4 g, 4 g, Oral, BID, Awa Alves PA-C, 4 g at 12/04/17 0850    cinacalcet (SENSIPAR) tablet 30 mg, 30 mg, Oral, HS, Ave Mistry PA-C, 30 mg at 12/03/17 2153    citalopram (CeleXA) tablet 10 mg, 10 mg, Oral, Daily, Catina Brink PA-C, 10 mg at 12/04/17 0846    dextrose 50 % IV solution 50 mL, 50 mL, Intravenous, PRN, Audrea Battiest, CRNP    epoetin manuela (EPOGEN,PROCRIT) injection 3,000 Units, 3,000 Units, Intravenous, Once per day on Mon Wed Fri, MeyALIE Florian   etomidate (AMIDATE) 2 mg/mL injection, , , Code/Trauma/Sedation Med, ALIE Evans, 20 mg at 11/29/17 0506    fentanyl citrate (PF) 100 MCG/2ML, , Intravenous, Code/Trauma/Sedation Med, ALIE Evans, 50 mcg at 11/29/17 6198    gabapentin (NEURONTIN) capsule 300 mg, 300 mg, Oral, HS, Travis Pimentel PA-C, 300 mg at 12/03/17 2150    heparin (porcine) 25,000 units in 250 mL infusion (premix), 3-20 Units/kg/hr (Order-Specific), Intravenous, Titrated, ALIE Rodriguez, Last Rate: 8 4 mL/hr at 12/04/17 0525, 14 Units/kg/hr at 12/04/17 0525    heparin (porcine) injection 1,800 Units, 1,800 Units, Intravenous, PRN, SUKUMAR EvansNP, 1,800 Units at 11/29/17 1443    heparin (porcine) injection 3,600 Units, 3,600 Units, Intravenous, PRN, RaySUKUMAR riosNP    insulin detemir (LEVEMIR) subcutaneous injection 2 Units, 2 Units, Subcutaneous, Daily, Taryn Guzman MD, 2 Units at 12/04/17 0847    insulin detemir (LEVEMIR) subcutaneous injection 4 Units, 4 Units, Subcutaneous, HS, Henrietta Nelson MD, 4 Units at 12/03/17 2149    insulin lispro (HumaLOG) 100 units/mL subcutaneous injection 1-3 Units, 1-3 Units, Subcutaneous, HS, Taryn Guzman MD, 2 Units at 12/03/17 2151    insulin lispro (HumaLOG) 100 units/mL subcutaneous injection 1-5 Units, 1-5 Units, Subcutaneous, TID AC, 3 Units at 12/04/17 0848 **AND** Fingerstick Glucose (POCT), , , TID AC, Taryn Guzman MD    insulin lispro (HumaLOG) 100 units/mL subcutaneous injection 2 Units, 2 Units, Subcutaneous, TID With Meals, Taryn Guzman MD, 2 Units at 12/04/17 0848    levothyroxine tablet 137 mcg, 137 mcg, Oral, Early Morning, Travis Pimentel PA-C, 137 mcg at 12/04/17 0542    lisinopril (ZESTRIL) tablet 5 mg, 5 mg, Oral, BID, Radha Sweeney MD, 5 mg at 12/04/17 0846    loperamide (IMODIUM) capsule 2 mg, 2 mg, Oral, 4x Daily PRN, ALIE Evans, 2 mg at 12/01/17 1408    menthol-zinc oxide (CALMOSEPTINE) 0 44-20 6 % ointment, , Topical, BID, Alisha Moreno PA-C    metoprolol (LOPRESSOR) injection 2 5 mg, 2 5 mg, Intravenous, Q6H PRN, ALIE Sylvester    metoprolol tartrate (LOPRESSOR) tablet 25 mg, 25 mg, Oral, Q12H [address], Albert Barone MD, 25 mg at 12/04/17 0846    midazolam (VERSED) injection, , , Code/Trauma/Sedation Med, ALIE Sylvester, 2 mg at 11/29/17 7386    nystatin (MYCOSTATIN) powder, , Topical, BID, Alisha Moreno PA-C, 1 application at 70/67/14 0851    omeprazole (PRILOSEC) suspension 2 mg/mL, 20 mg, Oral, Daily, ALIE Rodriguez, 20 mg at 12/03/17 0835    psyllium (METAMUCIL) 1 packet, 1 packet, Oral, Daily, ALIE MONZON, 1 packet at 12/04/17 0850    saccharomyces boulardii (FLORASTOR) capsule 250 mg, 250 mg, Oral, BID, Alexis Dinh PA-C, 250 mg at 12/04/17 0846    sevelamer (RENAGEL) tablet 800 mg, 800 mg, Oral, TID With Meals, Felipe Hernandez MD, 800 mg at 12/04/17 0849    vancomycin (VANCOCIN) oral solution 125 mg, 125 mg, Oral, Q6H [address], Alisha Moreno PA-C, 125 mg at 12/04/17 0542    warfarin (COUMADIN) tablet 5 mg, 5 mg, Oral, Daily (warfarin), ALIE Sylvester, 5 mg at 12/03/17 1700     Labs & Results:      Results from last 7 days  Lab Units 11/29/17  0521 11/28/17  2040 11/27/17  1140   TROPONIN I ng/mL <0 02 <0 02 <0 02       Results from last 7 days  Lab Units 12/04/17  0428 12/03/17  0444 12/02/17  0513   WBC Thousand/uL 7 83 7 93 9 13   HEMOGLOBIN g/dL 9 2* 8 6* 9 0*   HEMATOCRIT % 28 4* 27 3* 28 5*   PLATELETS Thousands/uL 243 231 298           Results from last 7 days  Lab Units 12/04/17  0428 12/03/17  0444 12/02/17  0513  12/01/17  0412  11/29/17  0521  11/28/17  2040   SODIUM mmol/L 122* 126* 125*  < > 126*  < > 125*  < > 118*   POTASSIUM mmol/L 5 3 4 2 4 5  < > 4 4  < > 3 7  < > 4 8   CHLORIDE mmol/L 88* 89* 89*  < > 91*  < > 92*  < > 86*   CO2 mmol/L 24 26 24  < > 25  < > 22  < > 22   BUN mg/dL 36* 24 42*  < > 28* < > 41*  < > 33*   CREATININE mg/dL 5 99* 4 38* 6 14*  < > 4 65*  < > 4 51*  < > 3 99*   CALCIUM mg/dL 8 0* 8 0* 8 3  < > 9 0  < > 8 4  < > 8 3   TOTAL PROTEIN g/dL  --   --   --   --  7 1  --  7 8  --  7 9   BILIRUBIN TOTAL mg/dL  --   --   --   --  0 50  --  0 60  --  0 80   ALK PHOS U/L  --   --   --   --  150*  --  171*  --  199*   ALT U/L  --   --   --   --  19  --  16  --  20   AST U/L  --   --   --   --  24  --  21  --  18   GLUCOSE RANDOM mg/dL 487* 459* 290*  < > 445*  < > 84  < > 735*   GLUCOSE, ISTAT   --   --   --   --   --   < >  --   < >  --    < > = values in this interval not displayed  Results from last 7 days  Lab Units 12/04/17 0443 12/04/17 0428 12/03/17  0540 12/03/17  0444  12/02/17  0513  11/29/17  0928   INR   --  1 81*  --   --   --  1 43*  --  1 58*   PTT seconds 68*  --  87* >210*  < > >210*  < > 65*   < > = values in this interval not displayed  Results from last 7 days  Lab Units 12/04/17 0428 12/03/17  0444 12/02/17  0513   MAGNESIUM mg/dL 1 9 2 0 2 3       EKG personally reviewed by Manoj Hassan MD   Sinus rhythm, right axis deviation, nonspecific ST and T-wave changes  ECHO:  LEFT VENTRICLE:  Systolic function was moderately reduced  Ejection fraction was estimated to be 35 %  There was moderate diffuse hypokinesis with more severe hypokinesis of anterior, anteroseptal and apical walls  Wall thickness was increased      RIGHT VENTRICLE:  The ventricle was dilated  Systolic function was reduced      MITRAL VALVE:  There was mild to moderate regurgitation      AORTIC VALVE:  There was trace regurgitation      PERICARDIUM:  A trace pericardial effusion was identified anterior to the heart  Counseling / Coordination of Care  Total floor / unit time spent today 25 minutes  Greater than 50% of total time was spent with the patient and / or family counseling and / or coordination of care

## 2017-12-04 NOTE — PROGRESS NOTES
[facility] Progress Note  Patient: Kellie Vizcarra 61 y o  male   MRN: [id number]  PCP: Amanuel Benavides DO  Unit/Bed#:  Encounter: [id number]  Date Of Visit: 12/04/17    Assessment:    Active Problems:    Type 1 diabetes mellitus with nephropathy [facility] )    ESRD (end stage renal disease) on dialysis (HCC)    Rapid atrial fibrillation (HCC)    HCAP (healthcare-associated pneumonia)    S/P percutaneous endoscopic gastrostomy (PEG) tube placement ([facility])    Pleural effusion on right    Chronic combined systolic and diastolic CHF (congestive heart failure) ([facility] )    Pseudomonas urinary tract infection    Hyponatremia    Clostridium difficile infection    Hypoglycemia    Acquired hypothyroidism    Chronic kidney disease-mineral and bone disorder      Plan:    · Encephalopathy toxicosis metabolic resolved  · Healthcare acquired pneumonia/Gram-negative bacterial on cefepime day 6/7  · Acute hypoxic respiratory failure resolved  · Labile blood glucose levels continue Levemir monitor Accu-Cheks our hypoglycemia endocrinology following  · End-stage renal disease on hemodialysis nephrology following  · Atrial fibrillation continue amiodarone Coumadin for anticoagulation, cardiology input noted  · Cardiomyopathy EF 35% continue lisinopril cardiology noted  · Chronic systolic and diastolic congestive heart failure stable  · History of C diff colitis on p o  vancomycin will be transition to home dose vancomycin after the discontinuation of cefepime  · Out of bed as able, physical therapy      VTE Pharmacologic Prophylaxis:   Pharmacologic: Warfarin (Coumadin)  Mechanical VTE Prophylaxis in Place: Yes    Patient Centered Rounds: I have performed bedside rounds with nursing staff today  Discussions with Specialists or Other Care Team Provider:     Education and Discussions with Family / Patient: pt, discussed with wife at bedside     Time Spent for Care: 30 minutes    More than 50% of total time spent on counseling and coordination of care as described above  Current Length of Stay: 7 day(s)    Current Patient Status: Inpatient   Certification Statement: The patient will continue to require additional inpatient hospital stay due to as mentioned    Discharge Plan / Estimated Discharge Date:     Code Status: Level 1 - Full Code      Subjective:     Comfortably lying in bed  Reports feeling better  Tolerating p o  feeds  Improved shortness of breath      Objective:     Vitals:   Temp (24hrs), Av 4 °F (36 9 °C), Min:97 9 °F (36 6 °C), Max:99 4 °F (37 4 °C)    HR:  [64-80] 67  Resp:  [13-18] 17  BP: (108-143)/(58-71) 114/59  SpO2:  [91 %-95 %] 95 %  Body mass index is 21 46 kg/m²  Input and Output Summary (last 24 hours):        Intake/Output Summary (Last 24 hours) at 17 1324  Last data filed at 17 1305   Gross per 24 hour   Intake             1342 ml   Output                0 ml   Net             1342 ml       Physical Exam:     Physical Exam     Awake alert obeys simple commands  No rash  Neck supple  Lungs diminished breath sounds at the bases no additional sounds  Heart sounds irregular no murmurs appreciable  Abdomen soft  Pulses present  Right BKA noted  No pedal edema      Additional Data:     Labs:      Results from last 7 days  Lab Units 17  0428   WBC Thousand/uL 7 83   HEMOGLOBIN g/dL 9 2*   HEMATOCRIT % 28 4*   PLATELETS Thousands/uL 243   NEUTROS PCT % 72   LYMPHS PCT % 14   MONOS PCT % 8   EOS PCT % 5       Results from last 7 days  Lab Units 178  17  0412   SODIUM mmol/L 122*  < > 126*   POTASSIUM mmol/L 5 3  < > 4 4   CHLORIDE mmol/L 88*  < > 91*   CO2 mmol/L 24  < > 25   BUN mg/dL 36*  < > 28*   CREATININE mg/dL 5 99*  < > 4 65*   CALCIUM mg/dL 8 0*  < > 9 0   TOTAL PROTEIN g/dL  --   --  7 1   BILIRUBIN TOTAL mg/dL  --   --  0 50   ALK PHOS U/L  --   --  150*   ALT U/L  --   --  19   AST U/L  --   --  24   GLUCOSE RANDOM mg/dL 487*  < > 445*   < > = values in this interval not displayed  Results from last 7 days  Lab Units 12/04/17  0428   INR  1 81*       * I Have Reviewed All Lab Data Listed Above  * Additional Pertinent Lab Tests Reviewed: All Labs Within Last 24 Hours Reviewed    Imaging:    Imaging Reports Reviewed Today Include:   Imaging Personally Reviewed by Myself Includes:     Recent Cultures (last 7 days):       Results from last 7 days  Lab Units 11/29/17  1351 11/29/17  0927 11/29/17  0912 11/28/17  1425 11/28/17  1024 11/27/17  1442   BLOOD CULTURE   --  No Growth After 5 Days  No Growth After 5 Days    --   --   --    SPUTUM CULTURE  1+ Growth of Candida sp  presumptively albicans*  1+ Growth of   --   --   --   --   --    GRAM STAIN RESULT  Rare Polys  Rare Gram negative rods  --   --   --   --   --    URINE CULTURE   --   --   --   --   --  >100,000 cfu/ml Pseudomonas aeruginosa*   INFLUENZA A PCR   --   --   --   --  None Detected  --    INFLUENZA B PCR   --   --   --   --  None Detected  --    RSV PCR   --   --   --   --  None Detected  --    C DIFF TOXIN B   --   --   --  NEGATIVE for C difficle toxin by PCR    --   --        Last 24 Hours Medication List:     amiodarone 200 mg Oral TID   b complex-vitamin C-folic acid 1 capsule Oral Daily   cefepime 1,000 mg Intravenous Q24H   cholestyramine sugar free 4 g Oral BID   cinacalcet 30 mg Oral HS   citalopram 10 mg Oral Daily   epoetin manuela 3,000 Units Intravenous Once per day on Mon Wed Fri   gabapentin 300 mg Oral HS   insulin detemir 2 Units Subcutaneous Daily   insulin detemir 4 Units Subcutaneous HS   insulin lispro 1-3 Units Subcutaneous HS   insulin lispro 1-5 Units Subcutaneous TID AC   insulin lispro 2 Units Subcutaneous TID With Meals   levothyroxine 137 mcg Oral Early Morning   lisinopril 5 mg Oral BID   menthol-zinc oxide  Topical BID   metoprolol tartrate 25 mg Oral Q12H CELIA   nystatin  Topical BID   omeprazole (PRILOSEC) suspension 2 mg/mL 20 mg Oral Daily   psyllium 1 packet Oral Daily   saccharomyces boulardii 250 mg Oral BID   sevelamer 800 mg Oral TID With Meals   vancomycin 125 mg Oral Q6H [address]   warfarin 5 mg Oral Daily (warfarin)        Today, Patient Was Seen By: Tayo Herring MD    ** Please Note: This note has been constructed using a voice recognition system   **

## 2017-12-04 NOTE — PROGRESS NOTES
Progress Note - Cheyenne Ingram 61 y o  male MRN: [id number]    Unit/Bed#:  Encounter: [id number]      CC: diabetes f/u    Subjective:   Cheyenne Ingram is a 61y o  year old male with type 1 diabetes  Feels well  No complaints  No hypoglycemia  Blood sugars are elevated,   Getting HD today   Denies nausea, vomiting   Eating 100 % meals     Objective:     Vitals: Blood pressure 132/71, pulse 75, temperature 98 °F (36 7 °C), temperature source Oral, resp  rate (!) 11, height 5' 6" (1 676 m), weight 60 3 kg (132 lb 15 oz), SpO2 95 %  ,Body mass index is 21 46 kg/m²  Intake/Output Summary (Last 24 hours) at 12/04/17 1553  Last data filed at 12/04/17 1305   Gross per 24 hour   Intake          1274 94 ml   Output                0 ml   Net          1274 94 ml       Physical Exam:  General Appearance: awake, appears stated age and cooperative  Head: Normocephalic, without obvious abnormality, atraumatic  Extremities: moves all extremities  Skin: Skin color and temperature normal    Pulm: no labored breathing    Lab, Imaging and other studies: I have personally reviewed pertinent reports        POC Glucose   Date Value   12/04/2017 355 mg/dl (H)   12/04/2017 408 mg/dl (H)   12/04/2017 417 mg/dl (H)   12/03/2017 495 mg/dl (H)   12/03/2017 430 mg/dl (H)   12/03/2017 294 mg/dl (H)   12/03/2017 403 mg/dl (H)   12/03/2017 426 mg/dl (H)   12/02/2017 490 mg/dl (H)   12/02/2017 >500 mg/dl (HH)   10/16/2015 217 mg/dL (H)   07/01/2015 323 mg/dL (H)       Assessment:  Type 1 diabetes with hyperglycemia and hypoglycemia, with CKD  Secondary to brittle diabetes, it is difficult to control blood sugars,  CKD- on HD    Plan:  - increase Levemir 4 units in the morning  -increase Levemir 5 units at evening  -continue Humalog 2 units with meals  - change in Humalog scale with meals, starting at 180 ,with ISF of 50   -hypoglycemia protocol is in place    Will follow, and make further changes, if needed      Portions of the record may have been created with voice recognition software

## 2017-12-04 NOTE — PROGRESS NOTES
NEPHROLOGY PROGRESS NOTE   Elizabet Pepper 61 y o  male MRN: [id number]  Unit/Bed#:  Encounter: [id number]  Reason for Consult:  End-stage renal disease on hemodialysis    ASSESSMENT and PLAN:  1  End-stage renal disease: On hemodialysis MWF  Hemodialysis today  2  Access:  Right arm AV fistula  No recent issues-good bruit and thrill  3  Hyponatremia:  Chronic  · Multifactorial:shifts due to hyperglycemia  Corrected sodium level 128 2  Suspect pseudohyponatremia possibly due to ? Paraprotein  Concern due to elevated total protein  · Serum osmolality, free light chain ratio, SPEP in progress  · Changed sodium bath to 138 mEq per L  4  Anemia:  Hemoglobin low but stable-currently 9 2  · Last outpatient Micera dose 11/13/2017  · Initiate Epogen on hemodialysis  5  Volume status/blood pressure, combined systolic/diastolic heart failure:    · Euvolemic  · Blood pressure variable but overall controlled  6  CKD MBD:  Phosphorus elevated 6 1  · Renal diet  · Recently started on Renvela  Continue to monitor  7  Recurrent C diff:  On vancomycin p o   8  Right pleural effusion:  Chronic  Last chest x-ray showed no increase  9  Recent VDRF:  Extubated 11/30  10  Type 1 Diabetes mellitus:  Patient relates that blood sugars have been very difficult to control for years and getting worse  11  Atrial fibrillation:  Rate controlled  Currently on heparin bridge to Coumadin    SUMMARY OF RECOMMENDATIONS:  · Hemodialysis today-sodium bath adjusted  · Start Epogen  · Monitor phosphorus level    SUBJECTIVE / INTERVAL HISTORY:  Patient states he feels okay this morning  His appetite is good  He denies shortness of breath or chest pain      OBJECTIVE:  Current Weight: Weight - Scale: 60 3 kg (132 lb 15 oz)  Vitals:    12/03/17 1900 12/03/17 2300 12/04/17 0300 12/04/17 0700   BP: 122/67 117/61 108/66 111/59   Pulse: 80 76 70 64   Resp: 14 13 18 16   Temp: 98 3 °F (36 8 °C) 99 4 °F (37 4 °C) 98 2 °F (36 8 °C) 97 9 °F (36 6 °C)   TempSrc: Oral Oral Oral Oral   SpO2: 91% 91% 94% 95%   Weight:       Height:           Intake/Output Summary (Last 24 hours) at 12/04/17 0757  Last data filed at 12/04/17 0400   Gross per 24 hour   Intake           1674 8 ml   Output                0 ml   Net           1674 8 ml     General:  No acute distress  Comfortably lying in bed  Skin:  Skin warm and dry, no rash, no jaundice  Eyes:  Sclera clear  ENT:  Oropharynx moist  Neck:  Supple no JVD  Chest:  Clear bilaterally decreased breath sounds right lower lobe  CVS:  Irregular rhythm  Abdomen:  Soft, nondistended, nontender, bowel sounds present  Extremities:   No edema left lower extremity, amputation right lower extremity  Neuro:  No deficits    Alert and oriented  Psych:  Appropriate    Medications:    Current Facility-Administered Medications:     acetaminophen (TYLENOL) tablet 650 mg, 650 mg, Oral, Q6H PRN, Ana Shafer PA-C    amiodarone tablet 200 mg, 200 mg, Oral, TID, Fatemeh Chisholm MD, 200 mg at 12/03/17 2150    b complex-vitamin C-folic acid (NEPHROCAPS) capsule 1 capsule, 1 capsule, Oral, Daily, Ana Shafer PA-C, 1 capsule at 12/03/17 0816    cefepime (MAXIPIME) IVPB (premix) 1,000 mg, 1,000 mg, Intravenous, Q24H, ALIE Rodriguez, Stopped at 12/04/17 0400    cholestyramine sugar free (QUESTRAN LIGHT) packet 4 g, 4 g, Oral, BID, Awa Alves PA-C, 4 g at 12/03/17 1702    cinacalcet (SENSIPAR) tablet 30 mg, 30 mg, Oral, HS, Ana Shafer PA-C, 30 mg at 12/03/17 2153    citalopram (CeleXA) tablet 10 mg, 10 mg, Oral, Daily, Catina Brink PA-C, 10 mg at 12/03/17 0827    dextrose 50 % IV solution 50 mL, 50 mL, Intravenous, PRN, ALIE Wang    etomidate (AMIDATE) 2 mg/mL injection, , , Code/Trauma/Sedation Med, ALIE Wang, 20 mg at 11/29/17 0506    fentanyl citrate (PF) 100 MCG/2ML, , Intravenous, Code/Trauma/Sedation Med, ALIE Wang, 50 mcg at 11/29/17 1465    gabapentin (NEURONTIN) capsule 300 mg, 300 mg, Oral, HS, Keenasa Grey PA-C, 300 mg at 12/03/17 2150    heparin (porcine) 25,000 units in 250 mL infusion (premix), 3-20 Units/kg/hr (Order-Specific), Intravenous, Titrated, ALIE Rodriguez, Last Rate: 8 4 mL/hr at 12/04/17 0525, 14 Units/kg/hr at 12/04/17 0525    heparin (porcine) injection 1,800 Units, 1,800 Units, Intravenous, PRN, Jacqualin Loss, CRNP, 1,800 Units at 11/29/17 1443    heparin (porcine) injection 3,600 Units, 3,600 Units, Intravenous, PRN, Jacqualin Loss, CRNP    insulin detemir (LEVEMIR) subcutaneous injection 2 Units, 2 Units, Subcutaneous, Daily, Suzi Isaac MD, 2 Units at 12/03/17 0836    insulin detemir (LEVEMIR) subcutaneous injection 4 Units, 4 Units, Subcutaneous, HS, Winter Almonte MD, 4 Units at 12/03/17 2149    insulin lispro (HumaLOG) 100 units/mL subcutaneous injection 1-3 Units, 1-3 Units, Subcutaneous, HS, Suzi Isaac MD, 2 Units at 12/03/17 2151    insulin lispro (HumaLOG) 100 units/mL subcutaneous injection 1-5 Units, 1-5 Units, Subcutaneous, TID AC, 3 Units at 12/03/17 1702 **AND** Fingerstick Glucose (POCT), , , TID AC, Suzi Isaac MD    levothyroxine tablet 137 mcg, 137 mcg, Oral, Early Morning, Keena Grey PA-C, 137 mcg at 12/04/17 0542    lisinopril (ZESTRIL) tablet 5 mg, 5 mg, Oral, BID, Gurvinder Langston MD, 5 mg at 12/03/17 1700    loperamide (IMODIUM) capsule 2 mg, 2 mg, Oral, 4x Daily PRN, Jacqualin Loss, CRNP, 2 mg at 12/01/17 1408    menthol-zinc oxide (CALMOSEPTINE) 0 44-20 6 % ointment, , Topical, BID, Alisha Moreno PA-C    metoprolol (LOPRESSOR) injection 2 5 mg, 2 5 mg, Intravenous, Q6H PRN, Arisqualin Loss, CRNP    metoprolol tartrate (LOPRESSOR) tablet 25 mg, 25 mg, Oral, Q12H [address], Gurvinder Langston MD, 25 mg at 12/03/17 2150    midazolam (VERSED) injection, , , Code/Trauma/Sedation Med, Jacqualin Loss, CRNP, 2 mg at 11/29/17 0752    nystatin (MYCOSTATIN) powder, , Topical, BID, Alisha Moreno PA-C    omeprazole (PRILOSEC) suspension 2 mg/mL, 20 mg, Oral, Daily, ALIE Rodriguez, 20 mg at 12/03/17 0835    psyllium (METAMUCIL) 1 packet, 1 packet, Oral, Daily, ALIE Bhakta, 1 packet at 12/03/17 4469    saccharomyces boulardii (FLORASTOR) capsule 250 mg, 250 mg, Oral, BID, Annie Chapman PA-C, 250 mg at 12/03/17 1701    sevelamer (RENAGEL) tablet 800 mg, 800 mg, Oral, TID With Meals, Sosa Ortiz MD, 800 mg at 12/03/17 1701    vancomycin (VANCOCIN) oral solution 125 mg, 125 mg, Oral, Q6H CELIA, Alisah Moreno PA-C, 125 mg at 12/04/17 0542    warfarin (COUMADIN) tablet 5 mg, 5 mg, Oral, Daily (warfarin), ALIE Lemons, 5 mg at 12/03/17 1700    Laboratory Results:    Results from last 7 days  Lab Units 12/04/17  0428 12/03/17  0444 12/02/17  0513 12/01/17  2210 12/01/17  0412 11/30/17  2230 11/30/17  0346 11/29/17  0928 11/29/17  0759 11/29/17  0522 11/29/17  0521  11/28/17  2040  11/27/17  1623 11/27/17  1140   WBC Thousand/uL 7 83 7 93 9 13  --  10 18*  --  9 59 7 29  --  9 61  --   < > 7 59  < >  --  11 96*   HEMOGLOBIN g/dL 9 2* 8 6* 9 0*  --  9 7*  --  10 9* 9 2*  --  10 5*  --   < > 11 0*  < >  --  11 1*   I STAT HEMOGLOBIN g/dl  --   --   --   --   --   --   --   --  10 5*  --   --   < >  --   < >  --   --    HEMATOCRIT % 28 4* 27 3* 28 5*  --  30 5*  --  33 7* 29 8*  --  33 2*  --   < > 34 7*  < >  --  34 7*   PLATELETS Thousands/uL 243 231 298  --  313  --  332 285  --  353  --   < > 258  < >  --  340   SODIUM mmol/L 122* 126* 125* 122* 126* 128* 120*  --   --   --  125*  < > 118*  < > 126*  --    POTASSIUM mmol/L 5 3 4 2 4 5 5 0 4 4 4 1 5 5*  --   --   --  3 7  < > 4 8  < >  --   --    CHLORIDE mmol/L 88* 89* 89* 88* 91* 94* 85*  --   --   --  92*  < > 86*  < > 91*  --    CO2 mmol/L 24 26 24 23 25 26 20*  --   --   --  22  < > 22  < > 22  --    BUN mg/dL 36* 24 42* 38* 28* 25 49*  --   --   --  41*  < > 33*  < > 98*  --    CREATININE mg/dL 5 99* 4 38* 6 14* 6 04* 4 65* 4 16* 5 95*  --   --   --  4 51*  < > 3 99*  < > 8 74*  --    CALCIUM mg/dL 8 0* 8 0* 8 3 8 5 9 0 8 4 8 2*  --   --   --  8 4  < > 8 3  < > 8 6  --    MAGNESIUM mg/dL 1 9 2 0 2 3  --  2 5 1 9  --   --   --   --  2 0  --   --   --   --  2 1   PHOSPHORUS mg/dL 6 1*  --  5 8*  --  5 5*  --   --   --   --   --  5 2*  --   --   --   --   --    ALBUMIN g/dL  --   --   --   --  1 9*  --   --   --   --   --  2 1*  --  2 1*  --  2 3*  --    TOTAL PROTEIN g/dL  --   --   --   --  7 1  --   --   --   --   --  7 8  --  7 9  --  8 2  --    GLUCOSE RANDOM mg/dL 487* 459* 290* 692* 445* 219* 415*  --   --   --  84  < > 735*  < > 209*  --    GLUCOSE, ISTAT mg/dl  --   --   --   --   --   --   --   --  82  --   --   < >  --   < >  --   --    < > = values in this interval not displayed    Previous work up:

## 2017-12-05 LAB
APTT PPP: 86 SECONDS (ref 23–35)
GLUCOSE SERPL-MCNC: 146 MG/DL (ref 65–140)
GLUCOSE SERPL-MCNC: 224 MG/DL (ref 65–140)
GLUCOSE SERPL-MCNC: 290 MG/DL (ref 65–140)
GLUCOSE SERPL-MCNC: 402 MG/DL (ref 65–140)
GLUCOSE SERPL-MCNC: 407 MG/DL (ref 65–140)
GLUCOSE SERPL-MCNC: 472 MG/DL (ref 65–140)
GLUCOSE SERPL-MCNC: 499 MG/DL (ref 65–140)
INR PPP: 1.88 (ref 0.86–1.16)
KAPPA LC FREE SER-MCNC: 664 MG/L (ref 3.3–19.4)
KAPPA LC FREE/LAMBDA FREE SER: 3.47 {RATIO} (ref 0.26–1.65)
LAMBDA LC FREE SERPL-MCNC: 191.2 MG/L (ref 5.7–26.3)
PROTHROMBIN TIME: 22.3 SECONDS (ref 12.1–14.4)

## 2017-12-05 PROCEDURE — 82948 REAGENT STRIP/BLOOD GLUCOSE: CPT

## 2017-12-05 PROCEDURE — G8978 MOBILITY CURRENT STATUS: HCPCS

## 2017-12-05 PROCEDURE — G8979 MOBILITY GOAL STATUS: HCPCS

## 2017-12-05 PROCEDURE — 97163 PT EVAL HIGH COMPLEX 45 MIN: CPT

## 2017-12-05 PROCEDURE — 92526 ORAL FUNCTION THERAPY: CPT

## 2017-12-05 PROCEDURE — 85730 THROMBOPLASTIN TIME PARTIAL: CPT | Performed by: PHYSICIAN ASSISTANT

## 2017-12-05 PROCEDURE — 85610 PROTHROMBIN TIME: CPT | Performed by: INTERNAL MEDICINE

## 2017-12-05 RX ADMIN — AMIODARONE HYDROCHLORIDE 200 MG: 200 TABLET ORAL at 08:27

## 2017-12-05 RX ADMIN — METOPROLOL TARTRATE 25 MG: 25 TABLET ORAL at 08:24

## 2017-12-05 RX ADMIN — VANCOMYCIN 125 MG: KIT at 07:59

## 2017-12-05 RX ADMIN — RENAGEL 800 MG: 800 TABLET ORAL at 20:55

## 2017-12-05 RX ADMIN — RENAGEL 800 MG: 800 TABLET ORAL at 16:35

## 2017-12-05 RX ADMIN — RENAGEL 800 MG: 800 TABLET ORAL at 08:26

## 2017-12-05 RX ADMIN — INSULIN LISPRO 2 UNITS: 100 INJECTION, SOLUTION INTRAVENOUS; SUBCUTANEOUS at 16:36

## 2017-12-05 RX ADMIN — Medication 250 MG: at 08:26

## 2017-12-05 RX ADMIN — GABAPENTIN 300 MG: 300 CAPSULE ORAL at 20:53

## 2017-12-05 RX ADMIN — INSULIN LISPRO 2 UNITS: 100 INJECTION, SOLUTION INTRAVENOUS; SUBCUTANEOUS at 08:40

## 2017-12-05 RX ADMIN — ANORECTAL OINTMENT: 15.7; .44; 24; 20.6 OINTMENT TOPICAL at 09:17

## 2017-12-05 RX ADMIN — INSULIN DETEMIR 5 UNITS: 100 INJECTION, SOLUTION SUBCUTANEOUS at 20:54

## 2017-12-05 RX ADMIN — CHOLESTYRAMINE 4 G: 4 POWDER, FOR SUSPENSION ORAL at 08:29

## 2017-12-05 RX ADMIN — NEPHROCAP 1 CAPSULE: 1 CAP ORAL at 08:24

## 2017-12-05 RX ADMIN — Medication 20 MG: at 08:29

## 2017-12-05 RX ADMIN — ANORECTAL OINTMENT: 15.7; .44; 24; 20.6 OINTMENT TOPICAL at 17:43

## 2017-12-05 RX ADMIN — INSULIN LISPRO 2 UNITS: 100 INJECTION, SOLUTION INTRAVENOUS; SUBCUTANEOUS at 22:02

## 2017-12-05 RX ADMIN — LEVOTHYROXINE SODIUM 137 MCG: 112 TABLET ORAL at 05:52

## 2017-12-05 RX ADMIN — RENAGEL 800 MG: 800 TABLET ORAL at 12:00

## 2017-12-05 RX ADMIN — LISINOPRIL 5 MG: 5 TABLET ORAL at 08:27

## 2017-12-05 RX ADMIN — PSYLLIUM HUSK 1 PACKET: 3.4 POWDER ORAL at 08:29

## 2017-12-05 RX ADMIN — NYSTATIN: 100000 POWDER TOPICAL at 08:28

## 2017-12-05 RX ADMIN — WARFARIN SODIUM 5 MG: 5 TABLET ORAL at 17:41

## 2017-12-05 RX ADMIN — METOPROLOL TARTRATE 25 MG: 25 TABLET ORAL at 20:53

## 2017-12-05 RX ADMIN — AMIODARONE HYDROCHLORIDE 200 MG: 200 TABLET ORAL at 16:35

## 2017-12-05 RX ADMIN — VANCOMYCIN 125 MG: KIT at 12:00

## 2017-12-05 RX ADMIN — INSULIN DETEMIR 4 UNITS: 100 INJECTION, SOLUTION SUBCUTANEOUS at 08:40

## 2017-12-05 RX ADMIN — CEFEPIME HYDROCHLORIDE 1000 MG: 1 INJECTION, SOLUTION INTRAVENOUS at 21:05

## 2017-12-05 RX ADMIN — CITALOPRAM HYDROBROMIDE 10 MG: 20 TABLET ORAL at 08:26

## 2017-12-05 RX ADMIN — AMIODARONE HYDROCHLORIDE 200 MG: 200 TABLET ORAL at 20:52

## 2017-12-05 RX ADMIN — Medication 250 MG: at 17:40

## 2017-12-05 RX ADMIN — INSULIN LISPRO 2 UNITS: 100 INJECTION, SOLUTION INTRAVENOUS; SUBCUTANEOUS at 17:43

## 2017-12-05 RX ADMIN — NYSTATIN: 100000 POWDER TOPICAL at 17:43

## 2017-12-05 RX ADMIN — INSULIN LISPRO 2 UNITS: 100 INJECTION, SOLUTION INTRAVENOUS; SUBCUTANEOUS at 12:01

## 2017-12-05 RX ADMIN — VANCOMYCIN 125 MG: KIT at 17:50

## 2017-12-05 RX ADMIN — LISINOPRIL 5 MG: 5 TABLET ORAL at 17:40

## 2017-12-05 RX ADMIN — CHOLESTYRAMINE 4 G: 4 POWDER, FOR SUSPENSION ORAL at 17:40

## 2017-12-05 RX ADMIN — CINACALCET HYDROCHLORIDE 30 MG: 30 TABLET, COATED ORAL at 20:58

## 2017-12-05 NOTE — PROGRESS NOTES
Cardiology Progress Note - Elizabeth Woodall 61 y o  male MRN: [id number]    Unit/Bed#: -01 Encounter: [id number]      Assessment:  Active Problems:    Type 1 diabetes mellitus with nephropathy ([address] )    ESRD (end stage renal disease) on dialysis (HCC)    Rapid atrial fibrillation (HCC)    HCAP (healthcare-associated pneumonia)    S/P percutaneous endoscopic gastrostomy (PEG) tube placement (HCC)    Pleural effusion on right    Chronic combined systolic and diastolic CHF (congestive heart failure) (HCC)    Pseudomonas urinary tract infection    Hyponatremia    Clostridium difficile infection    Hypoglycemia    Acquired hypothyroidism    Chronic kidney disease-mineral and bone disorder      Plan:  1  PAF - Has remained in sinus rhythm  We will continue a rhythm control strategy and the patient is now on an amiodarone  Coumadin restarted for stroke prevention  No changes made today  Close outpatient follow-up, as the patient wants to now follow with our group      2  Cardiomyopathy - Ejection fraction 35% - Continue medical management for this  At discharge suggest changing over to Toprol-XL, and continuing lisinopril  Will require an ischemic evaluation but can do this as an outpatient  Volume management per hemodialysis      3   Prior history of aortic valve endocarditis - Patient completed antibiotics and there are no issues from this standpoint        Subjective:   Patient seen and examined  No significant events overnight  Patient transferred out of ICU to telemetry  Remains in sinus rhythm  Objective:     Vitals: Blood pressure 126/76, pulse 65, temperature 97 6 °F (36 4 °C), temperature source Oral, resp  rate 18, height 5' 6" (1 676 m), weight 60 3 kg (132 lb 15 oz), SpO2 95 %  , Body mass index is 21 46 kg/m² , Orthostatic Blood Pressures    Flowsheet Row Most Recent Value   Blood Pressure  126/76 filed at 12/05/2017 0700   Patient Position - Orthostatic VS  Lying filed at 12/05/2017 0700 Intake/Output Summary (Last 24 hours) at 12/05/17 1426  Last data filed at 12/05/17 0900   Gross per 24 hour   Intake              240 ml   Output             3401 ml   Net            -3161 ml       No significant arrhythmias seen on telemetry review  Remains in sinus rhythm  Physical Exam:    GEN: Elizabeth Woodall appears well, alert and oriented x 3, pleasant and cooperative   HEENT: pupils equal, round, and reactive to light; extraocular muscles intact  NECK: supple, no carotid bruits  No significant JVD   HEART: regular rhythm, normal S1 and S2, soft systolic murmur, no clicks, gallops or rubs   LUNGS:  Diminished bilaterally; no wheezes, rales, or rhonchi   ABDOMEN: normal bowel sounds, soft, no tenderness, no distention  EXTREMITIES: peripheral pulses normal; no clubbing, cyanosis    Trace edema, S/P Rt BKA  NEURO: no focal findings   SKIN: normal without suspicious lesions on exposed skin    Medications:      Current Facility-Administered Medications:     acetaminophen (TYLENOL) tablet 650 mg, 650 mg, Oral, Q6H PRN, Annie Chapman PA-C    amiodarone tablet 200 mg, 200 mg, Oral, TID, Tona Candelaria MD, 200 mg at 12/05/17 0827    b complex-vitamin C-folic acid (NEPHROCAPS) capsule 1 capsule, 1 capsule, Oral, Daily, Annie Chapman PA-C, 1 capsule at 12/05/17 0824    cefepime (MAXIPIME) IVPB (premix) 1,000 mg, 1,000 mg, Intravenous, Q24H, ALIE Rodriguez, Last Rate: 100 mL/hr at 12/04/17 1924, 1,000 mg at 12/04/17 1924    cholestyramine sugar free (QUESTRAN LIGHT) packet 4 g, 4 g, Oral, BID, Awa Alves PA-C, 4 g at 12/05/17 0829    cinacalcet (SENSIPAR) tablet 30 mg, 30 mg, Oral, HS, Annie Chapman PA-C, 30 mg at 12/04/17 2144    citalopram (CeleXA) tablet 10 mg, 10 mg, Oral, Daily, Catina Brink PA-C, 10 mg at 12/05/17 0826    dextrose 50 % IV solution 50 mL, 50 mL, Intravenous, PRN, Gypsy Able, CRNP    epoetin manuela (EPOGEN,PROCRIT) injection 3,000 Units, 3,000 Units, Intravenous, Once per day on Mon Wed Fri, ALIE Hoang, 3,000 Units at 12/04/17 1425    etomidate (AMIDATE) 2 mg/mL injection, , , Code/Trauma/Sedation Med, ALIE Evans, 20 mg at 11/29/17 1283    fentanyl citrate (PF) 100 MCG/2ML, , Intravenous, Code/Trauma/Sedation Med, ALIE Evans, 50 mcg at 11/29/17 6727    gabapentin (NEURONTIN) capsule 300 mg, 300 mg, Oral, HS, Luciano Pitts PA-C, 300 mg at 12/04/17 2140    heparin (porcine) 25,000 units in 250 mL infusion (premix), 3-20 Units/kg/hr (Order-Specific), Intravenous, Titrated, ALIE Rodriguez, Last Rate: 8 4 mL/hr at 12/04/17 2036, 14 Units/kg/hr at 12/04/17 2036    heparin (porcine) injection 1,800 Units, 1,800 Units, Intravenous, PRN, ALIE Evans, 1,800 Units at 11/29/17 1443    heparin (porcine) injection 3,600 Units, 3,600 Units, Intravenous, PRN, ALIE Evans    insulin detemir (LEVEMIR) subcutaneous injection 4 Units, 4 Units, Subcutaneous, Daily, Vince Delvalle MD, 4 Units at 12/05/17 0840    insulin detemir (LEVEMIR) subcutaneous injection 5 Units, 5 Units, Subcutaneous, HS, Vince Delvalle MD, 5 Units at 12/04/17 2152    insulin lispro (HumaLOG) 100 units/mL subcutaneous injection 1-3 Units, 1-3 Units, Subcutaneous, HS, Vince Delvalle MD, 1 Units at 12/04/17 2152    insulin lispro (HumaLOG) 100 units/mL subcutaneous injection 1-5 Units, 1-5 Units, Subcutaneous, TID AC, 1 Units at 12/05/17 1201 **AND** Fingerstick Glucose (POCT), , , TID AC, Vince Delvalle MD    insulin lispro (HumaLOG) 100 units/mL subcutaneous injection 2 Units, 2 Units, Subcutaneous, TID With Meals, Vince Delvalle MD, 2 Units at 12/05/17 1201    levothyroxine tablet 137 mcg, 137 mcg, Oral, Early Morning, Luciano Pitts PA-C, 137 mcg at 12/05/17 0552    lisinopril (ZESTRIL) tablet 5 mg, 5 mg, Oral, BID, Pallavi Hurley MD, 5 mg at 12/05/17 0827    loperamide (IMODIUM) capsule 2 mg, 2 mg, Oral, 4x Daily PRN, Jordon Garcia, CRNP, 2 mg at 12/01/17 1408    menthol-zinc oxide (CALMOSEPTINE) 0 44-20 6 % ointment, , Topical, BID, Alisha Moreno PA-C    metoprolol (LOPRESSOR) injection 2 5 mg, 2 5 mg, Intravenous, Q6H PRN, Jordon Garcai, CRNP    metoprolol tartrate (LOPRESSOR) tablet 25 mg, 25 mg, Oral, Q12H [address], Alexi Klein MD, 25 mg at 12/05/17 0887    midazolam (VERSED) injection, , , Code/Trauma/Sedation Med, ALIE Knight, 2 mg at 11/29/17 6540    nystatin (MYCOSTATIN) powder, , Topical, BID, Alisha Moreno PA-C    omeprazole (PRILOSEC) suspension 2 mg/mL, 20 mg, Oral, Daily, ALIE Rodriguez, 20 mg at 12/05/17 5310    psyllium (METAMUCIL) 1 packet, 1 packet, Oral, Daily, ALIE West, 1 packet at 12/05/17 5458    saccharomyces boulardii (FLORASTOR) capsule 250 mg, 250 mg, Oral, BID, Mateo Ivy PA-C, 250 mg at 12/05/17 9793    sevelamer (RENAGEL) tablet 800 mg, 800 mg, Oral, TID With Meals, Clover Snell MD, 800 mg at 12/05/17 1200    vancomycin (VANCOCIN) oral solution 125 mg, 125 mg, Oral, Q6H [address], Alisha Moreno PA-C, 125 mg at 12/05/17 1200    warfarin (COUMADIN) tablet 5 mg, 5 mg, Oral, Daily (warfarin), ALIE Knight, 5 mg at 12/04/17 1724     Labs & Results:      Results from last 7 days  Lab Units 11/29/17  0521 11/28/17  2040   TROPONIN I ng/mL <0 02 <0 02       Results from last 7 days  Lab Units 12/04/17  0428 12/03/17  0444 12/02/17  0513   WBC Thousand/uL 7 83 7 93 9 13   HEMOGLOBIN g/dL 9 2* 8 6* 9 0*   HEMATOCRIT % 28 4* 27 3* 28 5*   PLATELETS Thousands/uL 243 231 298           Results from last 7 days  Lab Units 12/04/17  0428 12/03/17  0444 12/02/17  0513  12/01/17  0412  11/29/17  0521  11/28/17  2040   SODIUM mmol/L 122* 126* 125*  < > 126*  < > 125*  < > 118*   POTASSIUM mmol/L 5 3 4 2 4 5  < > 4 4  < > 3 7  < > 4 8   CHLORIDE mmol/L 88* 89* 89*  < > 91*  < > 92*  < > 86*   CO2 mmol/L 24 26 24  < > 25  < > 22  < > 22   BUN mg/dL 36* 24 42*  < > 28*  < > 41*  < > 33*   CREATININE mg/dL 5 99* 4 38* 6 14*  < > 4 65*  < > 4 51*  < > 3 99*   CALCIUM mg/dL 8 0* 8 0* 8 3  < > 9 0  < > 8 4  < > 8 3   TOTAL PROTEIN g/dL  --   --   --   --  7 1  --  7 8  --  7 9   BILIRUBIN TOTAL mg/dL  --   --   --   --  0 50  --  0 60  --  0 80   ALK PHOS U/L  --   --   --   --  150*  --  171*  --  199*   ALT U/L  --   --   --   --  19  --  16  --  20   AST U/L  --   --   --   --  24  --  21  --  18   GLUCOSE RANDOM mg/dL 487* 459* 290*  < > 445*  < > 84  < > 735*   GLUCOSE, ISTAT   --   --   --   --   --   < >  --   < >  --    < > = values in this interval not displayed  Results from last 7 days  Lab Units 12/05/17  0508 12/04/17  0443 12/04/17  0428 12/03/17  0540  12/02/17  0513   INR  1 88*  --  1 81*  --   --  1 43*   PTT seconds 86* 68*  --  87*  < > >210*   < > = values in this interval not displayed  Results from last 7 days  Lab Units 12/04/17  0428 12/03/17  0444 12/02/17  0513   MAGNESIUM mg/dL 1 9 2 0 2 3         EKG personally reviewed by Sanju Shankar MD   Sinus rhythm, right axis deviation with nonspecific ST and T-wave changes  ECHO:  LEFT VENTRICLE:  Systolic function was moderately reduced  Ejection fraction was estimated to be 35 %  There was moderate diffuse hypokinesis with more severe hypokinesis of anterior, anteroseptal and apical walls  Wall thickness was increased      RIGHT VENTRICLE:  The ventricle was dilated  Systolic function was reduced      MITRAL VALVE:  There was mild to moderate regurgitation      AORTIC VALVE:  There was trace regurgitation      PERICARDIUM:  A trace pericardial effusion was identified anterior to the heart        Counseling / Coordination of Care  Total floor / unit time spent today 20 minutes  Greater than 50% of total time was spent with the patient and / or family counseling and / or coordination of care

## 2017-12-05 NOTE — PLAN OF CARE
Problem: Potential for Falls  Goal: Patient will remain free of falls  INTERVENTIONS:  - Assess patient frequently for physical needs  -  Identify cognitive and physical deficits and behaviors that affect risk of falls  -  [place] fall precautions as indicated by assessment   - Educate patient/family on patient safety including physical limitations  - Instruct patient to call for assistance with activity based on assessment  - Modify environment to reduce risk of injury  - Consider OT/PT consult to assist with strengthening/mobility   Outcome: Progressing      Problem: Nutrition/Hydration-ADULT  Goal: Nutrient/Hydration intake appropriate for improving, restoring or maintaining nutritional needs  Monitor and assess patient's nutrition/hydration status for malnutrition (ex- brittle hair, bruises, dry skin, pale skin and conjunctiva, muscle wasting, smooth red tongue, and disorientation)  Collaborate with interdisciplinary team and initiate plan and interventions as ordered  Monitor patient's weight and dietary intake as ordered or per policy  Utilize nutrition screening tool and intervene per policy  Determine patient's food preferences and provide high-protein, high-caloric foods as appropriate       INTERVENTIONS:  - Monitor oral intake, urinary output, labs, and treatment plans  - Assess nutrition and hydration status and recommend course of action  - Evaluate amount of meals eaten  - Assist patient with eating if necessary   - Allow adequate time for meals  - Recommend/ encourage appropriate diets, oral nutritional supplements, and vitamin/mineral supplements  - Order, calculate, and assess calorie counts as needed  - Recommend, monitor, and adjust tube feedings and TPN/PPN based on assessed needs  - Assess need for intravenous fluids  - Provide specific nutrition/hydration education as appropriate  - Include patient/family/caregiver in decisions related to nutrition   Outcome: Progressing      Problem: Prexisting or High Potential for Compromised Skin Integrity  Goal: Skin integrity is maintained or improved  INTERVENTIONS:  - Identify patients at risk for skin breakdown  - Assess and monitor skin integrity  - Assess and monitor nutrition and hydration status  - Monitor labs (i e  albumin)  - Assess for incontinence   - Turn and reposition patient  - Assist with mobility/ambulation  - Relieve pressure over bony prominences  - Avoid friction and shearing  - Provide appropriate hygiene as needed including keeping skin clean and dry  - Evaluate need for skin moisturizer/barrier cream  - Collaborate with interdisciplinary team (i e  Nutrition, Rehabilitation, etc )   - Patient/family teaching   Outcome: Progressing      Problem: DISCHARGE PLANNING - CARE MANAGEMENT  Goal: Discharge to post-acute care or home with appropriate resources  INTERVENTIONS:  - Conduct assessment to determine patient/family and health care team treatment goals, and need for post-acute services based on payer coverage, community resources, and patient preferences, and barriers to discharge  - Address psychosocial, clinical, and financial barriers to discharge as identified in assessment in conjunction with the patient/family and health care team  - Arrange appropriate level of post-acute services according to patient's   needs and preference and payer coverage in collaboration with the physician and health care team  - Communicate with and update the patient/family, physician, and health care team regarding progress on the discharge plan  - Arrange appropriate transportation to post-acute venues   Outcome: Progressing      Problem: SAFETY,RESTRAINT: NV/NON-SELF DESTRUCTIVE BEHAVIOR  Goal: Remains free of harm/injury (restraint for non violent/non self-detsructive behavior)  INTERVENTIONS:  - Instruct patient/family regarding restraint use   - Assess and monitor physiologic and psychological status   - Provide interventions and comfort measures to meet assessed patient needs   - Identify and implement measures to help patient regain control  - Assess readiness for release of restraint    Outcome: Progressing    Goal: Returns to optimal restraint-free functioning  INTERVENTIONS:  - Assess the patient's behavior and symptoms that indicate continued need for restraint  - Identify and implement measures to help patient regain control  - Assess readiness for release of restraint    Outcome: Progressing

## 2017-12-05 NOTE — PROGRESS NOTES
Progress Note - Mae Figueroa 61 y o  male MRN: [id number]    Unit/Bed#: -01 Encounter: [id number]      CC: diabetes f/u    Subjective:   Mae Figueroa is a 61y o  year old male with type 1  diabetes  Feels well  No complaints  No hypoglycemia  amiodarone 200 mg Oral TID   b complex-vitamin C-folic acid 1 capsule Oral Daily   cefepime 1,000 mg Intravenous Q24H   cholestyramine sugar free 4 g Oral BID   cinacalcet 30 mg Oral HS   citalopram 10 mg Oral Daily   epoetin manuela 3,000 Units Intravenous Once per day on Mon Wed Fri   gabapentin 300 mg Oral HS   insulin detemir 4 Units Subcutaneous Daily   insulin detemir 5 Units Subcutaneous HS   insulin lispro 1-3 Units Subcutaneous HS   insulin lispro 1-5 Units Subcutaneous TID AC   insulin lispro 2 Units Subcutaneous TID With Meals   levothyroxine 137 mcg Oral Early Morning   lisinopril 5 mg Oral BID   menthol-zinc oxide  Topical BID   metoprolol tartrate 25 mg Oral Q12H CELIA   nystatin  Topical BID   omeprazole (PRILOSEC) suspension 2 mg/mL 20 mg Oral Daily   psyllium 1 packet Oral Daily   saccharomyces boulardii 250 mg Oral BID   sevelamer 800 mg Oral TID With Meals   vancomycin 125 mg Oral Q6H [address]   warfarin 5 mg Oral Daily (warfarin)       Objective:     Vitals: Blood pressure 122/52, pulse 68, temperature 98 3 °F (36 8 °C), temperature source Oral, resp  rate 18, height 5' 6" (1 676 m), weight 60 3 kg (132 lb 15 oz), SpO2 97 %  ,Body mass index is 21 46 kg/m²        Intake/Output Summary (Last 24 hours) at 12/05/17 1646  Last data filed at 12/05/17 1500   Gross per 24 hour   Intake              480 ml   Output             3401 ml   Net            -2921 ml       Physical Exam:  General Appearance: awake, appears stated age and cooperative  Head: Normocephalic, without obvious abnormality, atraumatic  Extremities: moves all extremities  Skin: Skin color and temperature normal    Pulm: no labored breathing    Lab, Imaging and other studies: I have personally reviewed pertinent reports  POC Glucose   Date Value   12/05/2017 407 mg/dl (H)   12/05/2017 224 mg/dl (H)   12/05/2017 146 mg/dl (H)   12/05/2017 290 mg/dl (H)   12/04/2017 333 mg/dl (H)   12/04/2017 146 mg/dl (H)   12/04/2017 355 mg/dl (H)   12/04/2017 408 mg/dl (H)   12/04/2017 417 mg/dl (H)   12/03/2017 495 mg/dl (H)   10/16/2015 217 mg/dL (H)   07/01/2015 323 mg/dL (H)       Assessment:  diabetes with hyperglycemia , most recent blood sugar 407, pt had a snack a short while ago, and ready to eat his dinner     Plan:  -Will give 2 units of humalog stat before dinner, he already got 6 units of humalog for dinner  -discussed to keep carbohydrates consistent with meals   -Increase Levemir to 5 units twice a day   -Continue Humalog 2 units with meals and scale as ordered   -Hypoglcyemia protocol is in place   -continue Fs monitoring QAC and QHS, and 2 AM         Portions of the record may have been created with voice recognition software

## 2017-12-05 NOTE — PHYSICAL THERAPY NOTE
PT EVALUATION    Pt  Name: Sujit Rankin  Pt  Age: 61 y o  Patient Active Problem List   Diagnosis    Type 1 diabetes mellitus with nephropathy ([place] [address] )    ESRD (end stage renal disease) on dialysis ([place] [address] )    Ambulatory dysfunction    Rapid atrial fibrillation ([place] [address] )    HCAP (healthcare-associated pneumonia)    Recurrent colitis due to Clostridium difficile    S/P percutaneous endoscopic gastrostomy (PEG) tube placement ([place] [address] )    Pleural effusion on right    Chronic combined systolic and diastolic CHF (congestive heart failure) ([place] [address] )    R Fibula fracture, proximal to stump    Pseudomonas urinary tract infection    DVT, lower extremity (HCC)    Hyponatremia    Clostridium difficile infection    Hypoglycemia    Anemia    Acquired hypothyroidism    Chronic kidney disease-mineral and bone disorder     ADMITTING Dx:  UTI (urinary tract infection) [N39 0]  Altered mental status [R41 82]  Fatigue [R53 83]  S/P percutaneous endoscopic gastrostomy (PEG) tube placement ([place] [address] ) [Z93 1]    Past Medical History:   Diagnosis Date    Acquired hypothyroidism     underactive    Atrial fibrillation (HCC)     Chronic kidney disease-mineral and bone disorder 11/27/2017    Clostridium difficile infection 04/2017    Diabetes mellitus ([place] [address] )     Dialysis patient ([place] [address] )     Diarrhea     ESRD (end stage renal disease) on dialysis ([place] [address] )     Hx of cardiac arrest     Hypertension     Neuropathy     Right lower lobe pneumonia ([place] [address] ) 2/20/2017    Sepsis ([place] [address] ) 04/2017    Polymicrobial MRSA, VRE    Systolic and diastolic CHF, chronic (HCC)     EF 35%, Grade II DD       Past Surgical History:   Procedure Laterality Date    CATARACT EXTRACTION      CHG GI ENDOSCOPIC ULTRASOUND N/A 3/10/2016    Procedure: LINEAR ENDOSCOPIC U/S;  Surgeon: Serge Sims MD;  [facility]: [facility];   Service: Gastroenterology    CHOLECYSTECTOMY      GASTROSTOMY TUBE PLACEMENT N/A 4/12/2017    Procedure: INSERTION PEG TUBE;  Surgeon: Link Velasquez Yuliya Wheeler MD;  [facility];  Service:     LEG AMPUTATION THROUGH LOWER TIBIA AND FIBULA Right 4/6/2017    Procedure: AMPUTATION BELOW KNEE (BKA); Surgeon: Licha Hamilton DO;  [facility];  Service:     TOE AMPUTATION  2000 12/05/17 1603   Note Type   Note type Eval only   Pain Assessment   Pain Score No Pain   Home Living   Type of [address] [facility] entrance; Able to live on main level with bedroom/bathroom; Two level   Bathroom Equipment Commode; [place] bed;Stair glide   Prior Function   Level of [place] Modified independent with wheelchair;Needs assistance with ADLs and functional mobility   Lives With Spouse   Receives Help From Family   ADL Assistance Needs assistance   Falls in the last 6 months 5 to 10  (8x)   Comments pt working w/ HHPT PTA   Restrictions/Precautions   Weight Bearing Precautions Per Order No   Other Precautions Contact/isolation;Multiple lines;Telemetry; Fall Risk  (R BKA)   General   Additional Pertinent History R BKA 4/6/17   Family/Caregiver Present Yes  (wife)   Cognition   Overall Cognitive Status WFL   Arousal/Participation Alert   Orientation Level Oriented to person;Oriented to place;Oriented to time   Following Commands Follows one step commands without difficulty   Comments wife reports pt still has occsional periods of disorientation   RUE Assessment   RUE Assessment WFL   RUE Strength   RUE Overall Strength Within Functional Limits - able to perform ADL tasks with strength   LUE Assessment   LUE Assessment WFL   LUE Strength   LUE Overall Strength Within Functional Limits - able to perform ADL tasks with strength   RLE Assessment   RLE Assessment WFL   Strength RLE   RLE Overall Strength 4/5   LLE Assessment   LLE Assessment WFL   Strength LLE   LLE Overall Strength 4/5   Coordination   Movements are Fluid and Coordinated 1   Sensation WFL   Bed Mobility   Supine to Sit 5  Supervision Additional items HOB elevated; Bedrails; Increased time required   Transfers   Sit to Stand 4  Minimal assistance  (w/ RW)   Additional items Assist x 1;Bedrails; Increased time required;Verbal cues   Stand to Sit 4  Minimal assistance  (w/ RW)   Additional items Assist x 1; Armrests; Increased time required;Verbal cues   Additional Comments cues for techniques   Ambulation/Elevation   Gait pattern Step to;Excessively slow  (hopping pattern)   Gait Assistance 3  Moderate assist   Additional items Assist x 1;Verbal cues; Tactile cues   Assistive Device Rolling walker   Distance 2'x1   Balance   Static Sitting Good   Static Standing Fair -   Ambulatory Poor   Activity Tolerance   Activity Tolerance Patient tolerated treatment well   Nurse Made Aware yes   Assessment   Prognosis Good   Problem List Decreased strength   Assessment Pt  66 y o male w/ PMhx of R BKA & ESRD, admitted for PAF & acute encephalopathy 2* to UTI  Pt referred to PT for mobility assessment & D/C planning  PTA, pt reports he is mostly w/c bound but working w/ HHPT for pre-prosthetic training including amb w/ RW; per wife, pt require S/A w/ ADL's & transfers; I w/ w/c mobility  On eval, pt demonstrate dec mobility, balance, endurance & amb 2* to deconditioning  Please see above for level of assistance to complete overall functional tasks  Pt able to ambulate w/ RW, hopping pattern, no gross LOB noted  Pt's wife reports that pt has well healing L foot ulcers that are normally dressed at home but not here in the hospital hence request to limit amb to limit WB to those undressed ulcers  RN notified  Will continue skilled PT to improve function & safety  Pt & wife expressed no concerns about returning home & wants to return home when medically cleared  At this time, will anticipate good progress in PT for safe D/C to home  Will recommend continued HHPT & family support at D/C  Pt will need to achieve PT goals prior to D/C for safe return to home   CM to follow  Pt tolerated OOB in chair at end of session w/o issues  Call bell in reach  Nsg staff to continue to mobilized pt (OOB in chair for all meals & to assist transfers to [facility] for bathroom needs) as tolerated to prevent further decline in function  Nsg notified  Goals   Patient Goals go home   STG Expiration Date 12/12/17   Short Term Goal #1 Goals to be met in 7 days: 1) inc strength & balance by 1/2 grade; 2) inc bed mobility to modified I; 3) inc transfers to S; 4) inc amb w/ RW approx  48' w/ minAx1; 5) inc barthel score to 65; 6) pt/caregiver ed   Treatment Day 0   Plan   Treatment/Interventions Functional transfer training;LE strengthening/ROM; Therapeutic exercise; Endurance training;Patient/family training;Bed mobility;Gait training;Spoke to nursing;Family   PT Frequency 5x/wk   Recommendation   Recommendation Home PT;24 hour supervision/assist;Home with family support   Equipment Recommended Wheelchair   Barthel Index   Feeding 10   Bathing 0   Grooming Score 5   Dressing Score 5   Bladder Score 10   Bowels Score 10   Toilet Use Score 5   Transfers (Bed/Chair) Score 10   Mobility (Level Surface) Score 0   Stairs Score 0   Barthel Index Score 55   Hx/personal factors: co-morbidities; fall risk; currently functioning below baseline; lines; use of AD;  BKA  Examination: dec mobility, dec balance, dec endurance, dec amb, high risk for falls  Clinical: unpredictable (ongoing medical status; abnormal lab values; fall risk)  Complexity: high    [place], PT

## 2017-12-05 NOTE — SPEECH THERAPY NOTE
Speech Language/Pathology                             SLP  Note  Patient Name: Jocelyn Franklin  Today's Date: 12/5/2017     Subjective:  Patient received laying in bed with wife and daughter present  Patient sat upright in bed for entirety of session  Objective:  Patient consumed ice chips and thin liquids via single and consecutive cup sips  No overt distress or s/s of aspiration observed  Assessment:  Patient is appropriate for upgrade to thin liquids at this time  Plan/Recommendations: Follow up x1 to ensure diet tolerance

## 2017-12-05 NOTE — PROGRESS NOTES
NEPHROLOGY PROGRESS NOTE   Jocelyn Franklin 61 y o  male MRN: [id number]  Unit/Bed#: -[place] Encounter: [id number]  Reason for Consult:  End-stage renal disease on hemodialysis    ASSESSMENT and PLAN:  1  End-stage renal disease: On hemodialysis MWF  Hemodialysis tomorrow  2  Access:  Right arm AV fistula  No recent issues-good bruit and thrill  3  Hyponatremia:  Chronic  Hyperosmolar  Serum osmolality 299  · Multifactorial:shifts due to hyperglycemia  Corrected sodium level 128 2 on 12/04  Suspect pseudohyponatremia possibly due to ? Paraprotein  Concern due to elevated total protein  · free light chain ratio, SPEP in progress  · Changed sodium bath to 138 mEq per L  4  Anemia:  Hemoglobin low but stable-currently 9 2  · Last outpatient Micera dose 11/13/2017  · Epogen started on hemodialysis 12/04/2017  5  Volume status/blood pressure, combined systolic/diastolic heart failure:    · Euvolemic  · Blood pressure acceptable  6  CKD MBD:  Phosphorus elevated 6 1  · Renal diet  · Recently started on Renvela  Continue to monitor  7  Recurrent C diff:  On vancomycin p o   8  Right pleural effusion:  Chronic  Last chest x-ray showed no increase  9  Recent VDRF:  Extubated 11/30  10  Type 1 Diabetes mellitus:  Patient relates that blood sugars have been very difficult to control for years and getting worse  · Endocrine following  11  Atrial fibrillation:  Rate controlled  Currently on heparin bridge to Coumadin    SUMMARY OF RECOMMENDATIONS:  Hemodialysis tomorrow  Check labs in am    SUBJECTIVE / INTERVAL HISTORY:  States that he feels fine today  No complaints      OBJECTIVE:  Current Weight: Weight - Scale: 60 3 kg (132 lb 15 oz)  Vitals:    12/04/17 1900 12/04/17 2140 12/04/17 2300 12/05/17 0700   BP: 130/65 124/58 120/70 126/76   Pulse: 78  75 65   Resp: 19  18 18   Temp: 97 8 °F (36 6 °C)   97 6 °F (36 4 °C)   TempSrc: Oral   Oral   SpO2: 90%  95% 95%   Weight:       Height:           Intake/Output Summary (Last 24 hours) at 12/05/17 1000  Last data filed at 12/05/17 0900   Gross per 24 hour   Intake            473 6 ml   Output             3401 ml   Net          -2927 4 ml     General:  No acute distress  Eating breakfast   Skin:  Skin warm and dry, no rash, no jaundice  Eyes:  Sclera clear  ENT:  Oropharynx moist  Neck:  Supple no JVD  Chest:  Clear bilaterally decreased breath sounds right lower lobe  CVS:   regular rhythm  Soft systolic murmur  No gallop  Abdomen:  Soft, nondistended, nontender, bowel sounds present  Extremities:   No edema left lower extremity, amputation right BKA  Neuro:  No deficits    Alert and oriented  Psych:  Appropriate    Medications:    Current Facility-Administered Medications:     acetaminophen (TYLENOL) tablet 650 mg, 650 mg, Oral, Q6H PRN, Long Billingsley PA-C    amiodarone tablet 200 mg, 200 mg, Oral, TID, Shelia Mena MD, 200 mg at 12/05/17 0827    b complex-vitamin C-folic acid (NEPHROCAPS) capsule 1 capsule, 1 capsule, Oral, Daily, Long Billingsley PA-C, 1 capsule at 12/05/17 0824    cefepime (MAXIPIME) IVPB (premix) 1,000 mg, 1,000 mg, Intravenous, Q24H, ALIE Rodriguez, Last Rate: 100 mL/hr at 12/04/17 1924, 1,000 mg at 12/04/17 1924    cholestyramine sugar free (QUESTRAN LIGHT) packet 4 g, 4 g, Oral, BID, Awa Alves PA-C, 4 g at 12/05/17 0829    cinacalcet (SENSIPAR) tablet 30 mg, 30 mg, Oral, HS, Long Billingsley PA-C, 30 mg at 12/04/17 2144    citalopram (CeleXA) tablet 10 mg, 10 mg, Oral, Daily, Catina Brink PA-C, 10 mg at 12/05/17 0826    dextrose 50 % IV solution 50 mL, 50 mL, Intravenous, PRN, ALIE House    epoetin manuela (EPOGEN,PROCRIT) injection 3,000 Units, 3,000 Units, Intravenous, Once per day on Mon Wed Fri, ALIE Rush, 3,000 Units at 12/04/17 1425    etomidate (AMIDATE) 2 mg/mL injection, , , Code/Trauma/Sedation Med, ALIE House, 20 mg at 11/29/17 0506    fentanyl citrate (PF) 100 MCG/2ML, , Intravenous, Code/Trauma/Sedation Med, ALIE Evans, 50 mcg at 11/29/17 3317    gabapentin (NEURONTIN) capsule 300 mg, 300 mg, Oral, HS, Noemi Gunderson PA-C, 300 mg at 12/04/17 2140    heparin (porcine) 25,000 units in 250 mL infusion (premix), 3-20 Units/kg/hr (Order-Specific), Intravenous, Titrated, ALIE Rodriguez, Last Rate: 8 4 mL/hr at 12/04/17 2036, 14 Units/kg/hr at 12/04/17 2036    heparin (porcine) injection 1,800 Units, 1,800 Units, Intravenous, PRN, ALIE Evans, 1,800 Units at 11/29/17 1443    heparin (porcine) injection 3,600 Units, 3,600 Units, Intravenous, PRN, ALIE Evans    insulin detemir (LEVEMIR) subcutaneous injection 4 Units, 4 Units, Subcutaneous, Daily, Augustina Roberto MD, 4 Units at 12/05/17 0840    insulin detemir (LEVEMIR) subcutaneous injection 5 Units, 5 Units, Subcutaneous, HS, Augustina Roberto MD, 5 Units at 12/04/17 2152    insulin lispro (HumaLOG) 100 units/mL subcutaneous injection 1-3 Units, 1-3 Units, Subcutaneous, HS, Augustina Roberto MD, 1 Units at 12/04/17 2152    insulin lispro (HumaLOG) 100 units/mL subcutaneous injection 1-5 Units, 1-5 Units, Subcutaneous, TID AC **AND** Fingerstick Glucose (POCT), , , TID AC, Augustina Roberto MD    insulin lispro (HumaLOG) 100 units/mL subcutaneous injection 2 Units, 2 Units, Subcutaneous, TID With Meals, Augustina Roberto MD, 2 Units at 12/05/17 0840    levothyroxine tablet 137 mcg, 137 mcg, Oral, Early Morning, Noemi Gunderson PA-C, 137 mcg at 12/05/17 2186    lisinopril (ZESTRIL) tablet 5 mg, 5 mg, Oral, BID, Daniel Alexandra MD, 5 mg at 12/05/17 0827    loperamide (IMODIUM) capsule 2 mg, 2 mg, Oral, 4x Daily PRN, ALIE Evans, 2 mg at 12/01/17 1408    menthol-zinc oxide (CALMOSEPTINE) 0 44-20 6 % ointment, , Topical, BID, Alisha Moreno PA-C    metoprolol (LOPRESSOR) injection 2 5 mg, 2 5 mg, Intravenous, Q6H PRN, ALIE Evans    metoprolol tartrate (LOPRESSOR) tablet 25 mg, 25 mg, Oral, Q12H [facility], Lieutenant Loulou MD, 25 mg at 12/05/17 8327    midazolam (VERSED) injection, , , Code/Trauma/Sedation Med, ALIE Cedillo, 2 mg at 11/29/17 3779    nystatin (MYCOSTATIN) powder, , Topical, BID, Alisha Moreno PA-C    omeprazole (PRILOSEC) suspension 2 mg/mL, 20 mg, Oral, Daily, ALIE Rodriguez, 20 mg at 12/05/17 9708    psyllium (METAMUCIL) 1 packet, 1 packet, Oral, Daily, ALIE Lal, 1 packet at 12/05/17 4870    saccharomyces boulardii (FLORASTOR) capsule 250 mg, 250 mg, Oral, BID, Hamzah Sena PA-C, 250 mg at 12/05/17 0124    sevelamer (RENAGEL) tablet 800 mg, 800 mg, Oral, TID With Meals, Rai Carlos MD, 800 mg at 12/05/17 0826    vancomycin (VANCOCIN) oral solution 125 mg, 125 mg, Oral, Q6H [facility], Alisha Moreno PA-C, 125 mg at 12/05/17 0759    warfarin (COUMADIN) tablet 5 mg, 5 mg, Oral, Daily (warfarin), ALIE Cedillo, 5 mg at 12/04/17 1724    Laboratory Results:    Results from last 7 days  Lab Units 12/04/17  0428 12/03/17  0444 12/02/17  0513 12/01/17  2210 12/01/17  0412 11/30/17  2230 11/30/17  0346 11/29/17  0928 11/29/17  0759 11/29/17  0522 11/29/17  0521  11/28/17  2040   WBC Thousand/uL 7 83 7 93 9 13  --  10 18*  --  9 59 7 29  --  9 61  --   < > 7 59   HEMOGLOBIN g/dL 9 2* 8 6* 9 0*  --  9 7*  --  10 9* 9 2*  --  10 5*  --   < > 11 0*   I STAT HEMOGLOBIN g/dl  --   --   --   --   --   --   --   --  10 5*  --   --   < >  --    HEMATOCRIT % 28 4* 27 3* 28 5*  --  30 5*  --  33 7* 29 8*  --  33 2*  --   < > 34 7*   PLATELETS Thousands/uL 243 231 298  --  313  --  332 285  --  353  --   < > 258   SODIUM mmol/L 122* 126* 125* 122* 126* 128* 120*  --   --   --  125*  < > 118*   POTASSIUM mmol/L 5 3 4 2 4 5 5 0 4 4 4 1 5 5*  --   --   --  3 7  < > 4 8   CHLORIDE mmol/L 88* 89* 89* 88* 91* 94* 85*  --   --   --  92*  < > 86*   CO2 mmol/L 24 26 24 23 25 26 20*  --   --   --  22  < > 22   BUN mg/dL 36* 24 42* 38* 28* 25 49*  --   --   --  41*  < > 33*   CREATININE mg/dL 5 99* 4 38* 6 14* 6 04* 4 65* 4 16* 5 95*  --   --   --  4 51*  < > 3 99*   CALCIUM mg/dL 8 0* 8 0* 8 3 8 5 9 0 8 4 8 2*  --   --   --  8 4  < > 8 3   MAGNESIUM mg/dL 1 9 2 0 2 3  --  2 5 1 9  --   --   --   --  2 0  --   --    PHOSPHORUS mg/dL 6 1*  --  5 8*  --  5 5*  --   --   --   --   --  5 2*  --   --    ALBUMIN g/dL  --   --   --   --  1 9*  --   --   --   --   --  2 1*  --  2 1*   TOTAL PROTEIN g/dL  --   --   --   --  7 1  --   --   --   --   --  7 8  --  7 9   GLUCOSE RANDOM mg/dL 487* 459* 290* 692* 445* 219* 415*  --   --   --  84  < > 735*   GLUCOSE, ISTAT mg/dl  --   --   --   --   --   --   --   --  82  --   --   < >  --    < > = values in this interval not displayed    Previous work up:

## 2017-12-05 NOTE — PLAN OF CARE
Problem: PHYSICAL THERAPY ADULT  Goal: Performs mobility at highest level of function for planned discharge setting  See evaluation for individualized goals  Treatment/Interventions: Functional transfer training, LE strengthening/ROM, Therapeutic exercise, Endurance training, Patient/family training, Bed mobility, Gait training, Spoke to nursing, Family  Equipment Recommended: Wheelchair       See flowsheet documentation for full assessment, interventions and recommendations  Outcome: Progressing  Prognosis: Good  Problem List: Decreased strength  Assessment: Pt  66 y o male w/ PMhx of R BKA & ESRD, admitted for PAF & acute encephalopathy 2* to UTI  Pt referred to PT for mobility assessment & D/C planning  PTA, pt reports he is mostly w/c bound but working w/ HHPT for pre-prosthetic training including amb w/ RW; per wife, pt require S/A w/ ADL's & transfers; I w/ w/c mobility  On eval, pt demonstrate dec mobility, balance, endurance & amb 2* to deconditioning  Please see above for level of assistance to complete overall functional tasks  Pt able to ambulate w/ RW, hopping pattern, no gross LOB noted  Pt's wife reports that pt has well healing L foot ulcers that are normally dressed at home but not here in the hospital hence request to limit amb to limit WB to those undressed ulcers  RN notified  Will continue skilled PT to improve function & safety  Pt & wife expressed no concerns about returning home & wants to return home when medically cleared  At this time, will anticipate good progress in PT for safe D/C to home  Will recommend continued HHPT & family support at D/C  Pt will need to achieve PT goals prior to D/C for safe return to home  CM to follow  Pt tolerated OOB in chair at end of session w/o issues  Call bell in reach  Nsg staff to continue to mobilized pt (OOB in chair for all meals & to assist transfers to [facility] for bathroom needs) as tolerated to prevent further decline in function  Nsg notified  Recommendation: Home PT, 24 hour supervision/assist, Home with family support          See flowsheet documentation for full assessment

## 2017-12-05 NOTE — PROGRESS NOTES
[address] Internal Medicine Progress Note  Patient: Nadir Danielle 61 y o  male   MRN: [id number]  PCP: Kluwant Mendoza DO  Unit/Bed#: -01 Encounter: [id number]  Date Of Visit: 12/05/17    Assessment:    Active Problems:    Type 1 diabetes mellitus with nephropathy ([facility] )    ESRD (end stage renal disease) on dialysis (HCC)    Rapid atrial fibrillation (HCC)    HCAP (healthcare-associated pneumonia)    S/P percutaneous endoscopic gastrostomy (PEG) tube placement ([facility])    Pleural effusion on right    Chronic combined systolic and diastolic CHF (congestive heart failure) ([facility] )    Pseudomonas urinary tract infection    Hyponatremia    Clostridium difficile infection    Hypoglycemia    Acquired hypothyroidism    Chronic kidney disease-mineral and bone disorder      Plan:    · Encephalopathy toxic-metabolic  · resolved  · Healthcare acquired pneumonia/Gram-negative bacteria  · Complete cefepime day 7/7  · Acute hypoxic respiratory failure resolved  · Labile blood glucose levels   · Improving but still elevated  · continue Levemir  ·  monitor Accu-Cheks   · endocrinology following  · End-stage renal disease on hemodialysis  · nephrology following  · Atrial fibrillation  · continue amiodarone 200mg TID   · Heparin to coumadin bridge   · INR daily 1 88  · cardiology input noted  · Cardiomyopathy EF 35%   · continue lisinopril   · cardiology noted  · Chronic systolic and diastolic congestive heart failure stable  · History of C diff colitis   · on p o  vancomycin will be transition to home dose vancomycin (250mg daily) after the discontinuation of cefepime  · Moderate protein calorie malnutrition  · Noted, e/b temporal wasting, depletion of muscle and fat       VTE Pharmacologic Prophylaxis:   Pharmacologic: Warfarin (Coumadin)  Mechanical VTE Prophylaxis in Place: Yes    Patient Centered Rounds: I have performed bedside rounds with nursing staff today      Discussions with Specialists or Other Care Team Provider: none    Education and Discussions with Family / Patient: patient     Time Spent for Care: 20 minutes  More than 50% of total time spent on counseling and coordination of care as described above  Current Length of Stay: 8 day(s)    Current Patient Status: Inpatient   Certification Statement: The patient will continue to require additional inpatient hospital stay due to elevated blood glucose with insulin adjustments     Discharge Plan / Estimated Discharge Date: TBD based on clinical course; pending improved BGs     Code Status: Level 1 - Full Code    Subjective:   Pt is feeling well today  No SOb or cough  No f/c  Appetite is good  Objective:     Vitals:   Temp (24hrs), Av 8 °F (36 6 °C), Min:97 6 °F (36 4 °C), Max:98 °F (36 7 °C)    HR:  [65-79] 65  Resp:  [10-19] 18  BP: (110-140)/(57-76) 126/76  SpO2:  [90 %-95 %] 95 %  Body mass index is 21 46 kg/m²  Input and Output Summary (last 24 hours): Intake/Output Summary (Last 24 hours) at 17 0824  Last data filed at 17 1700   Gross per 24 hour   Intake            233 6 ml   Output             3401 ml   Net          -3167 4 ml       Physical Exam:     Physical Exam   Constitutional: He is oriented to person, place, and time  No distress  Cardiovascular: Normal rate and regular rhythm  No murmur heard  Pulmonary/Chest: Breath sounds normal  No respiratory distress  He has no wheezes  Abdominal: Soft  Bowel sounds are normal  He exhibits no distension  There is no tenderness  Musculoskeletal: He exhibits no edema  Neurological: He is alert and oriented to person, place, and time  Skin: Skin is warm and dry  He is not diaphoretic  Psychiatric: He has a normal mood and affect  His behavior is normal  Thought content normal    Nursing note and vitals reviewed        Additional Data:     Labs:      Results from last 7 days  Lab Units 17  0428   WBC Thousand/uL 7 83   HEMOGLOBIN g/dL 9 2*   HEMATOCRIT % 28 4*   PLATELETS Thousands/uL 243   NEUTROS PCT % 72   LYMPHS PCT % 14   MONOS PCT % 8   EOS PCT % 5       Results from last 7 days  Lab Units 12/04/17  0428  12/01/17  0412   SODIUM mmol/L 122*  < > 126*   POTASSIUM mmol/L 5 3  < > 4 4   CHLORIDE mmol/L 88*  < > 91*   CO2 mmol/L 24  < > 25   BUN mg/dL 36*  < > 28*   CREATININE mg/dL 5 99*  < > 4 65*   CALCIUM mg/dL 8 0*  < > 9 0   TOTAL PROTEIN g/dL  --   --  7 1   BILIRUBIN TOTAL mg/dL  --   --  0 50   ALK PHOS U/L  --   --  150*   ALT U/L  --   --  19   AST U/L  --   --  24   GLUCOSE RANDOM mg/dL 487*  < > 445*   < > = values in this interval not displayed  Results from last 7 days  Lab Units 12/05/17  0508   INR  1 88*       * I Have Reviewed All Lab Data Listed Above  * Additional Pertinent Lab Tests Reviewed: All Labs Within Last 24 Hours Reviewed    Imaging:    Imaging Reports Reviewed Today Include:   Imaging Personally Reviewed by Myself Includes:      Recent Cultures (last 7 days):       Results from last 7 days  Lab Units 11/29/17  1351 11/29/17  0927 11/29/17  0912 11/28/17  1425 11/28/17  1024   BLOOD CULTURE   --  No Growth After 5 Days  No Growth After 5 Days    --   --    SPUTUM CULTURE  1+ Growth of Candida sp  presumptively albicans*  1+ Growth of   --   --   --   --    GRAM STAIN RESULT  Rare Polys  Rare Gram negative rods  --   --   --   --    INFLUENZA A PCR   --   --   --   --  None Detected   INFLUENZA B PCR   --   --   --   --  None Detected   RSV PCR   --   --   --   --  None Detected   C DIFF TOXIN B   --   --   --  NEGATIVE for C difficle toxin by PCR    --        Last 24 Hours Medication List:     amiodarone 200 mg Oral TID   b complex-vitamin C-folic acid 1 capsule Oral Daily   cefepime 1,000 mg Intravenous Q24H   cholestyramine sugar free 4 g Oral BID   cinacalcet 30 mg Oral HS   citalopram 10 mg Oral Daily   epoetin manuela 3,000 Units Intravenous Once per day on Mon Wed Fri   gabapentin 300 mg Oral HS   insulin detemir 4 Units Subcutaneous Daily insulin detemir 5 Units Subcutaneous HS   insulin lispro 1-3 Units Subcutaneous HS   insulin lispro 1-5 Units Subcutaneous TID AC   insulin lispro 2 Units Subcutaneous TID With Meals   levothyroxine 137 mcg Oral Early Morning   lisinopril 5 mg Oral BID   menthol-zinc oxide  Topical BID   metoprolol tartrate 25 mg Oral Q12H CELIA   nystatin  Topical BID   omeprazole (PRILOSEC) suspension 2 mg/mL 20 mg Oral Daily   psyllium 1 packet Oral Daily   saccharomyces boulardii 250 mg Oral BID   sevelamer 800 mg Oral TID With Meals   vancomycin 125 mg Oral Q6H [facility]   warfarin 5 mg Oral Daily (warfarin)        Today, Patient Was Seen By: Keila Louise MD    ** Please Note: This note has been constructed using a voice recognition system   **

## 2017-12-06 ENCOUNTER — APPOINTMENT (INPATIENT)
Dept: DIALYSIS | Facility: HOSPITAL | Age: 67
DRG: 208 | End: 2017-12-06
Payer: COMMERCIAL

## 2017-12-06 ENCOUNTER — GENERIC CONVERSION - ENCOUNTER (OUTPATIENT)
Dept: OTHER | Facility: OTHER | Age: 67
End: 2017-12-06

## 2017-12-06 LAB
ANION GAP SERPL CALCULATED.3IONS-SCNC: 13 MMOL/L (ref 4–13)
ANION GAP SERPL CALCULATED.3IONS-SCNC: 15 MMOL/L (ref 4–13)
APTT PPP: 104 SECONDS (ref 23–35)
APTT PPP: 69 SECONDS (ref 23–35)
BUN SERPL-MCNC: 42 MG/DL (ref 5–25)
BUN SERPL-MCNC: 43 MG/DL (ref 5–25)
CALCIUM SERPL-MCNC: 8.1 MG/DL (ref 8.3–10.1)
CALCIUM SERPL-MCNC: 8.1 MG/DL (ref 8.3–10.1)
CHLORIDE SERPL-SCNC: 87 MMOL/L (ref 100–108)
CHLORIDE SERPL-SCNC: 88 MMOL/L (ref 100–108)
CO2 SERPL-SCNC: 23 MMOL/L (ref 21–32)
CO2 SERPL-SCNC: 23 MMOL/L (ref 21–32)
CREAT SERPL-MCNC: 5.13 MG/DL (ref 0.6–1.3)
CREAT SERPL-MCNC: 5.34 MG/DL (ref 0.6–1.3)
GFR SERPL CREATININE-BSD FRML MDRD: 10 ML/MIN/1.73SQ M
GFR SERPL CREATININE-BSD FRML MDRD: 11 ML/MIN/1.73SQ M
GLUCOSE SERPL-MCNC: 139 MG/DL (ref 65–140)
GLUCOSE SERPL-MCNC: 162 MG/DL (ref 65–140)
GLUCOSE SERPL-MCNC: 189 MG/DL (ref 65–140)
GLUCOSE SERPL-MCNC: 193 MG/DL (ref 65–140)
GLUCOSE SERPL-MCNC: 213 MG/DL (ref 65–140)
GLUCOSE SERPL-MCNC: 215 MG/DL (ref 65–140)
GLUCOSE SERPL-MCNC: 219 MG/DL (ref 65–140)
GLUCOSE SERPL-MCNC: 266 MG/DL (ref 65–140)
GLUCOSE SERPL-MCNC: 81 MG/DL (ref 65–140)
INR PPP: 2.04 (ref 0.86–1.16)
POTASSIUM SERPL-SCNC: 4.8 MMOL/L (ref 3.5–5.3)
POTASSIUM SERPL-SCNC: 4.8 MMOL/L (ref 3.5–5.3)
PROTHROMBIN TIME: 23.8 SECONDS (ref 12.1–14.4)
SODIUM SERPL-SCNC: 124 MMOL/L (ref 136–145)
SODIUM SERPL-SCNC: 125 MMOL/L (ref 136–145)

## 2017-12-06 PROCEDURE — 80048 BASIC METABOLIC PNL TOTAL CA: CPT | Performed by: HOSPITALIST

## 2017-12-06 PROCEDURE — 85610 PROTHROMBIN TIME: CPT | Performed by: INTERNAL MEDICINE

## 2017-12-06 PROCEDURE — 82948 REAGENT STRIP/BLOOD GLUCOSE: CPT

## 2017-12-06 PROCEDURE — 85730 THROMBOPLASTIN TIME PARTIAL: CPT | Performed by: HOSPITALIST

## 2017-12-06 PROCEDURE — 92526 ORAL FUNCTION THERAPY: CPT

## 2017-12-06 RX ORDER — DEXTROSE MONOHYDRATE 25 G/50ML
INJECTION, SOLUTION INTRAVENOUS
Status: COMPLETED
Start: 2017-12-06 | End: 2017-12-06

## 2017-12-06 RX ADMIN — LISINOPRIL 5 MG: 5 TABLET ORAL at 17:29

## 2017-12-06 RX ADMIN — INSULIN LISPRO 2 UNITS: 100 INJECTION, SOLUTION INTRAVENOUS; SUBCUTANEOUS at 17:32

## 2017-12-06 RX ADMIN — DEXTROSE MONOHYDRATE 50 ML: 500 INJECTION PARENTERAL at 13:43

## 2017-12-06 RX ADMIN — INSULIN DETEMIR 5 UNITS: 100 INJECTION, SOLUTION SUBCUTANEOUS at 21:03

## 2017-12-06 RX ADMIN — INSULIN LISPRO 2 UNITS: 100 INJECTION, SOLUTION INTRAVENOUS; SUBCUTANEOUS at 10:31

## 2017-12-06 RX ADMIN — RENAGEL 800 MG: 800 TABLET ORAL at 14:38

## 2017-12-06 RX ADMIN — VANCOMYCIN 125 MG: KIT at 00:19

## 2017-12-06 RX ADMIN — CINACALCET HYDROCHLORIDE 30 MG: 30 TABLET, COATED ORAL at 21:04

## 2017-12-06 RX ADMIN — VANCOMYCIN 125 MG: KIT at 05:43

## 2017-12-06 RX ADMIN — METOPROLOL TARTRATE 25 MG: 25 TABLET ORAL at 21:03

## 2017-12-06 RX ADMIN — Medication 250 MG: at 17:28

## 2017-12-06 RX ADMIN — NYSTATIN 1 APPLICATION: 100000 POWDER TOPICAL at 17:33

## 2017-12-06 RX ADMIN — CHOLESTYRAMINE 4 G: 4 POWDER, FOR SUSPENSION ORAL at 14:40

## 2017-12-06 RX ADMIN — EPOETIN ALFA 3000 UNITS: 3000 SOLUTION INTRAVENOUS; SUBCUTANEOUS at 11:34

## 2017-12-06 RX ADMIN — NEPHROCAP 1 CAPSULE: 1 CAP ORAL at 14:37

## 2017-12-06 RX ADMIN — ANORECTAL OINTMENT 1 APPLICATION: 15.7; .44; 24; 20.6 OINTMENT TOPICAL at 17:33

## 2017-12-06 RX ADMIN — WARFARIN SODIUM 5 MG: 5 TABLET ORAL at 17:29

## 2017-12-06 RX ADMIN — DEXTROSE MONOHYDRATE 50 ML: 25 INJECTION, SOLUTION INTRAVENOUS at 13:43

## 2017-12-06 RX ADMIN — GABAPENTIN 300 MG: 300 CAPSULE ORAL at 21:03

## 2017-12-06 RX ADMIN — HEPARIN SODIUM 14 UNITS/KG/HR: 10000 INJECTION, SOLUTION INTRAVENOUS at 01:52

## 2017-12-06 RX ADMIN — LEVOTHYROXINE SODIUM 137 MCG: 112 TABLET ORAL at 05:43

## 2017-12-06 RX ADMIN — Medication 20 MG: at 14:46

## 2017-12-06 RX ADMIN — INSULIN LISPRO 2 UNITS: 100 INJECTION, SOLUTION INTRAVENOUS; SUBCUTANEOUS at 14:42

## 2017-12-06 RX ADMIN — LISINOPRIL 5 MG: 5 TABLET ORAL at 14:37

## 2017-12-06 RX ADMIN — ANORECTAL OINTMENT: 15.7; .44; 24; 20.6 OINTMENT TOPICAL at 13:45

## 2017-12-06 RX ADMIN — CITALOPRAM HYDROBROMIDE 10 MG: 20 TABLET ORAL at 14:37

## 2017-12-06 RX ADMIN — PSYLLIUM HUSK 1 PACKET: 3.4 POWDER ORAL at 14:41

## 2017-12-06 RX ADMIN — RENAGEL 800 MG: 800 TABLET ORAL at 17:29

## 2017-12-06 RX ADMIN — Medication 250 MG: at 14:37

## 2017-12-06 RX ADMIN — VANCOMYCIN 125 MG: KIT at 21:10

## 2017-12-06 RX ADMIN — METOPROLOL TARTRATE 25 MG: 25 TABLET ORAL at 14:38

## 2017-12-06 RX ADMIN — AMIODARONE HYDROCHLORIDE 200 MG: 200 TABLET ORAL at 21:03

## 2017-12-06 RX ADMIN — INSULIN DETEMIR 5 UNITS: 100 INJECTION, SOLUTION SUBCUTANEOUS at 10:31

## 2017-12-06 RX ADMIN — AMIODARONE HYDROCHLORIDE 200 MG: 200 TABLET ORAL at 17:28

## 2017-12-06 RX ADMIN — NYSTATIN: 100000 POWDER TOPICAL at 13:47

## 2017-12-06 NOTE — PHYSICAL THERAPY NOTE
Physical Therapy Cancellation Note  Pt currently receiving bedside dialysis  Will follow  Marleen Ren, PT

## 2017-12-06 NOTE — SOCIAL WORK
CM met with Pt and Pt's wife at bedside  Pt was sleeping  CM discussed IMM with pt's wife and setting up a PCP appointment  CM called Pt's PCP office and arranged an appointment for Monday Dec 11 at 9:20am  Pt's wife agreeable to time of appointment  NICOLAS also called Dr Eli Malik office because an appointment was made for 12/22/17 at 1:20 in which Pt will be at dialysis at that time  NICOLAS spoke to  at Dr Eli Malik office to make her aware that Pt would be unable to make that appointment, per  she will discuss with Dr Angelica Rodarte to see if another appointment time can be created as there are currently no other openings until January  Dr Eli Malik office reports they will contact Pt with another appointment time if one can be arranged, CM made Pt's wife aware

## 2017-12-06 NOTE — PROGRESS NOTES
NEPHROLOGY PROGRESS NOTE   Mae Figueroa 61 y o  male MRN: [id number]  Unit/Bed#: -01 Encounter: [id number]  Reason for Consult: ESRD on HD    ASSESSMENT and PLAN:  1  ESRD on HD ([facility]): HD today  2  Access: R arm AVF  3  Paroxysmal atrial fibrillation: on amiodarone  4  BP/volume: BP at goal  Euvolemic  5  Anemia: Continue Epogen on HD  6  Hyponatremia (chronic):   · Possibly pseudohyponatremia based on presence of an osmolal gap - KL ratio elevated  Awaiting SPEP  · TSH was elevated this admission and Levothyroxine dose increased  No adrenal insufficiency from August 2017 labs      7  Recurrent Cdiff: on oral Vancomycin  8  CHF: compensated now  9  MBD: continue Sensipar  Phos is high - restart Renvela  10  R sided pleural effusion, recent VDRF s/p extubation on 11/30/17  11  Brittle DM     SUMMARY OF RECOMMENDATIONS:  · HD today  · Follow up SPEP    SUBJECTIVE / INTERVAL HISTORY:  Ate well this morning  HEMODIALYSIS PROCEDURE NOTE  The patient was seen and examined on hemodialysis  Time: 4 hours  Sodium: 138 Blood flow: 400   Dialyzer: F160 Potassium: 2 Dialysate flow: 600   Access: R arm AVF Bicarbonate: 35 Ultrafiltration goal: 3 kg   Medications on HD: Epogen 3000 units  OBJECTIVE:  Current Weight: Weight - Scale: 60 3 kg (132 lb 15 oz)  Vitals:    12/05/17 1620 12/06/17 0638 12/06/17 0830 12/06/17 0900   BP: 122/52 96/54 152/98 101/58   Pulse: 68 59 98 59   Resp: 18 18 18    Temp: 98 3 °F (36 8 °C) 97 5 °F (36 4 °C) 97 8 °F (36 6 °C)    TempSrc: Oral Oral Oral    SpO2: 97% 94%     Weight:       Height:           Intake/Output Summary (Last 24 hours) at 12/06/17 0944  Last data filed at 12/06/17 0830   Gross per 24 hour   Intake             1020 ml   Output                0 ml   Net             1020 ml     General: conscious, cooperative, no distress  Chest/Lungs: decreased in bases  CVS: distinct heart sounds, normal rate, regular  Abdomen: soft, (+) PEG tube  Extremities: R BKA, no edema       Medications:    Current Facility-Administered Medications:     acetaminophen (TYLENOL) tablet 650 mg, 650 mg, Oral, Q6H PRN, Taviaolett Masker, PA-C    amiodarone tablet 200 mg, 200 mg, Oral, TID, Wilfrid Malik MD, 200 mg at 12/05/17 2052    b complex-vitamin C-folic acid (NEPHROCAPS) capsule 1 capsule, 1 capsule, Oral, Daily, Charolett Masker, PA-C, 1 capsule at 12/05/17 0824    cefepime (MAXIPIME) IVPB (premix) 1,000 mg, 1,000 mg, Intravenous, Q24H, ALIE Rodriguez, Last Rate: 100 mL/hr at 12/05/17 2105, 1,000 mg at 12/05/17 2105    cholestyramine sugar free (QUESTRAN LIGHT) packet 4 g, 4 g, Oral, BID, Awa Alves, PA-C, 4 g at 12/05/17 1740    cinacalcet (SENSIPAR) tablet 30 mg, 30 mg, Oral, HS, Charolett Masker, PA-C, 30 mg at 12/05/17 2058    citalopram (CeleXA) tablet 10 mg, 10 mg, Oral, Daily, Catina Brink, PA-C, 10 mg at 12/05/17 0826    dextrose 50 % IV solution 50 mL, 50 mL, Intravenous, PRN, ALIE Lynn    epoetin manuela (EPOGEN,PROCRIT) injection 3,000 Units, 3,000 Units, Intravenous, Once per day on Mon Wed Fri, ALIE Vallejo, 3,000 Units at 12/04/17 1425    etomidate (AMIDATE) 2 mg/mL injection, , , Code/Trauma/Sedation MedRonan CRNP, 20 mg at 11/29/17 0506    fentanyl citrate (PF) 100 MCG/2ML, , Intravenous, Code/Trauma/Sedation MedRonan CRNP, 50 mcg at 11/29/17 8741    gabapentin (NEURONTIN) capsule 300 mg, 300 mg, Oral, HS, Charolett Masker, PA-C, 300 mg at 12/05/17 2053    heparin (porcine) 25,000 units in 250 mL infusion (premix), 3-20 Units/kg/hr (Order-Specific), Intravenous, Titrated, ALIE Rodriguez, Last Rate: 7 2 mL/hr at 12/06/17 0544, 12 Units/kg/hr at 12/06/17 0544    heparin (porcine) injection 1,800 Units, 1,800 Units, Intravenous, PRN, ALIE Lynn, 1,800 Units at 11/29/17 1443    heparin (porcine) injection 3,600 Units, 3,600 Units, Intravenous, PRN, Jordis Angry, ALIE    insulin detemir (LEVEMIR) subcutaneous injection 5 Units, 5 Units, Subcutaneous, HS, Socorro Musa MD, 5 Units at 12/05/17 2054    insulin detemir (LEVEMIR) subcutaneous injection 5 Units, 5 Units, Subcutaneous, Daily, Socorro Musa MD    insulin lispro (HumaLOG) 100 units/mL subcutaneous injection 1-3 Units, 1-3 Units, Subcutaneous, HS, Socorro Musa MD, 2 Units at 12/05/17 2202    insulin lispro (HumaLOG) 100 units/mL subcutaneous injection 1-5 Units, 1-5 Units, Subcutaneous, TID AC, 4 Units at 12/05/17 1635 **AND** Fingerstick Glucose (POCT), , , TID AC, Socorro Musa MD    insulin lispro (HumaLOG) 100 units/mL subcutaneous injection 2 Units, 2 Units, Subcutaneous, TID With Meals, Socorro Musa MD, 2 Units at 12/05/17 1636    levothyroxine tablet 137 mcg, 137 mcg, Oral, Early Morning, Amanda Krueger PA-C, 137 mcg at 12/06/17 0543    lisinopril (ZESTRIL) tablet 5 mg, 5 mg, Oral, BID, Ashley Velázquez MD, 5 mg at 12/05/17 1740    loperamide (IMODIUM) capsule 2 mg, 2 mg, Oral, 4x Daily PRN, ALIE Bernstein, 2 mg at 12/01/17 1408    menthol-zinc oxide (CALMOSEPTINE) 0 44-20 6 % ointment, , Topical, BID, Alisha Moreno PA-C    metoprolol (LOPRESSOR) injection 2 5 mg, 2 5 mg, Intravenous, Q6H PRN, ALIE Bernstein    metoprolol tartrate (LOPRESSOR) tablet 25 mg, 25 mg, Oral, Q12H [facility], Ashley Velázquez MD, 25 mg at 12/05/17 2053    midazolam (VERSED) injection, , , Code/Trauma/Sedation Med, ALIE Bernstein, 2 mg at 11/29/17 0768    nystatin (MYCOSTATIN) powder, , Topical, BID, Alisha Moreno PA-C    omeprazole (PRILOSEC) suspension 2 mg/mL, 20 mg, Oral, Daily, ALIE Rodriguez, 20 mg at 12/05/17 9251    psyllium (METAMUCIL) 1 packet, 1 packet, Oral, Daily, ALIE Slater, 1 packet at 12/05/17 8918    saccharomyces boulardii (FLORASTOR) capsule 250 mg, 250 mg, Oral, BID, Amanda Krueger PA-C, 250 mg at 12/05/17 6290   sevelamer (RENAGEL) tablet 800 mg, 800 mg, Oral, TID With Meals, Gabe Plata MD, 800 mg at 12/05/17 2055    vancomycin [facility]) oral solution 125 mg, 125 mg, Oral, Q6H [facility], Alisha Moreno PA-C, 125 mg at 12/06/17 0543    warfarin (COUMADIN) tablet 5 mg, 5 mg, Oral, Daily (warfarin), ALIE Mukherjee, 5 mg at 12/05/17 1741    Laboratory Results:    Results from last 7 days  Lab Units 12/06/17  0457 12/04/17  0428 12/03/17  0444 12/02/17  0513 12/01/17  2210 12/01/17  0412 11/30/17  2230 11/30/17  0346   WBC Thousand/uL  --  7 83 7 93 9 13  --  10 18*  --  9 59   HEMOGLOBIN g/dL  --  9 2* 8 6* 9 0*  --  9 7*  --  10 9*   HEMATOCRIT %  --  28 4* 27 3* 28 5*  --  30 5*  --  33 7*   PLATELETS Thousands/uL  --  243 231 298  --  313  --  332   SODIUM mmol/L 124*  125* 122* 126* 125* 122* 126* 128* 120*   POTASSIUM mmol/L 4 8  4 8 5 3 4 2 4 5 5 0 4 4 4 1 5 5*   CHLORIDE mmol/L 88*  87* 88* 89* 89* 88* 91* 94* 85*   CO2 mmol/L 23  23 24 26 24 23 25 26 20*   BUN mg/dL 43*  42* 36* 24 42* 38* 28* 25 49*   CREATININE mg/dL 5 13*  5 34* 5 99* 4 38* 6 14* 6 04* 4 65* 4 16* 5 95*   CALCIUM mg/dL 8 1*  8 1* 8 0* 8 0* 8 3 8 5 9 0 8 4 8 2*   MAGNESIUM mg/dL  --  1 9 2 0 2 3  --  2 5 1 9  --    PHOSPHORUS mg/dL  --  6 1*  --  5 8*  --  5 5*  --   --    ALBUMIN g/dL  --   --   --   --   --  1 9*  --   --    TOTAL PROTEIN g/dL  --   --   --   --   --  7 1  --   --    GLUCOSE RANDOM mg/dL 219*  215* 487* 459* 290* 692* 445* 219* 415*     Previous work up:

## 2017-12-06 NOTE — PROGRESS NOTES
[address] Internal Medicine Progress Note  Patient: Jamin Garcia 61 y o  male   MRN: [id number]  PCP: Clair Moss DO  Unit/Bed#: -[facility] Encounter: [id number]  Date Of Visit: 12/06/17    Assessment:    Active Problems:    Type 1 diabetes mellitus with nephropathy ([facility] )    ESRD (end stage renal disease) on dialysis (HCC)    Rapid atrial fibrillation (HCC)    HCAP (healthcare-associated pneumonia)    S/P percutaneous endoscopic gastrostomy (PEG) tube placement ([facility])    Pleural effusion on right    Chronic combined systolic and diastolic CHF (congestive heart failure) ([facility] )    Pseudomonas urinary tract infection    Hyponatremia    Clostridium difficile infection    Hypoglycemia    Acquired hypothyroidism    Chronic kidney disease-mineral and bone disorder      Plan:    · Encephalopathy toxic-metabolic  ? resolved  · Healthcare acquired pneumonia/Gram-negative bacteria  ? Completed cefepime day 7/7  · Acute hypoxic respiratory failure resolved  · Labile blood glucose levels    ? Improving but still elevated  ? continue Levemir  ? monitor Accu-Cheks   ? endocrinology following  · End-stage renal disease on hemodialysis  ? nephrology following  · Atrial fibrillation  ? continue amiodarone 200mg TID   ? DC heparin as pt at goal   ? INR: 2 02  ? cardiology input noted  · Cardiomyopathy EF 35%   ? continue lisinopril   ? cardiology noted  · Chronic systolic and diastolic congestive heart failure   ? stable  · History of C diff colitis   ? on p o  vancomycin will be transition to home dose vancomycin (250mg daily) after the discontinuation of cefepime  · Moderate protein calorie malnutrition  ? Noted, e/b temporal wasting, depletion of muscle and fat     VTE Pharmacologic Prophylaxis:   Pharmacologic: Heparin  Mechanical VTE Prophylaxis in Place: Yes    Patient Centered Rounds: I have performed bedside rounds with nursing staff today      Discussions with Specialists or Other Care Team Provider: nephrology    Education and Discussions with Family / Patient: patient     Time Spent for Care: 20 minutes  More than 50% of total time spent on counseling and coordination of care as described above  Current Length of Stay: 9 day(s)    Current Patient Status: Inpatient   Certification Statement: The patient will continue to require additional inpatient hospital stay due to labile blood glucose with initiation of regular diet; brittle type I DM  Discharge Plan / Estimated Discharge Date: TBD based on clinical course; pt is very high risk for admission; will reassess this afternoon as pt was lethargic while examined on HD  More likely discharge tomorrow as insulin regimen is refined  Code Status: Level 1 - Full Code    Subjective:   Pt is complaining of chills  No specific complaint other than feeling cold  Also says he is sweating under the blanket  Objective:     Vitals:   Temp (24hrs), Av 9 °F (36 6 °C), Min:97 5 °F (36 4 °C), Max:98 3 °F (36 8 °C)    HR:  [59-98] 76  Resp:  [18] 18  BP: ()/(46-98) 134/62  SpO2:  [94 %-97 %] 94 %  Body mass index is 21 46 kg/m²  Input and Output Summary (last 24 hours): Intake/Output Summary (Last 24 hours) at 17 1145  Last data filed at 17 0830   Gross per 24 hour   Intake             1020 ml   Output                0 ml   Net             1020 ml       Physical Exam:     Physical Exam   Constitutional: No distress  Cardiovascular: Normal rate and regular rhythm  No murmur heard  Pulmonary/Chest: Effort normal and breath sounds normal  No respiratory distress  Abdominal: Soft  Bowel sounds are normal  He exhibits no distension  There is no tenderness  There is no rebound and no guarding  Musculoskeletal: He exhibits no edema or tenderness  Neurological:   Somnolent, but arousable    Skin: No rash noted  He is diaphoretic  No pallor  Nursing note and vitals reviewed      Additional Data:     Labs:      Results from last 7 days  Lab Units 12/04/17  0428   WBC Thousand/uL 7 83   HEMOGLOBIN g/dL 9 2*   HEMATOCRIT % 28 4*   PLATELETS Thousands/uL 243   NEUTROS PCT % 72   LYMPHS PCT % 14   MONOS PCT % 8   EOS PCT % 5       Results from last 7 days  Lab Units 12/06/17  0457  12/01/17  0412   SODIUM mmol/L 124*  125*  < > 126*   POTASSIUM mmol/L 4 8  4 8  < > 4 4   CHLORIDE mmol/L 88*  87*  < > 91*   CO2 mmol/L 23  23  < > 25   BUN mg/dL 43*  42*  < > 28*   CREATININE mg/dL 5 13*  5 34*  < > 4 65*   CALCIUM mg/dL 8 1*  8 1*  < > 9 0   TOTAL PROTEIN g/dL  --   --  7 1   BILIRUBIN TOTAL mg/dL  --   --  0 50   ALK PHOS U/L  --   --  150*   ALT U/L  --   --  19   AST U/L  --   --  24   GLUCOSE RANDOM mg/dL 219*  215*  < > 445*   < > = values in this interval not displayed  Results from last 7 days  Lab Units 12/06/17  0457   INR  2 04*       * I Have Reviewed All Lab Data Listed Above  * Additional Pertinent Lab Tests Reviewed:  All Labs Within Last 24 Hours Reviewed    Imaging:    Imaging Reports Reviewed Today Include:   Imaging Personally Reviewed by Myself Includes:      Recent Cultures (last 7 days):       Results from last 7 days  Lab Units 11/29/17  1351   SPUTUM CULTURE  1+ Growth of Candida sp  presumptively albicans*  1+ Growth of    GRAM STAIN RESULT  Rare Polys  Rare Gram negative rods       Last 24 Hours Medication List:     amiodarone 200 mg Oral TID   b complex-vitamin C-folic acid 1 capsule Oral Daily   cholestyramine sugar free 4 g Oral BID   cinacalcet 30 mg Oral HS   citalopram 10 mg Oral Daily   epoetin manuela 3,000 Units Intravenous Once per day on Mon Wed Fri   gabapentin 300 mg Oral HS   insulin detemir 5 Units Subcutaneous HS   insulin detemir 5 Units Subcutaneous Daily   insulin lispro 1-3 Units Subcutaneous HS   insulin lispro 1-5 Units Subcutaneous TID AC   insulin lispro 2 Units Subcutaneous TID With Meals   levothyroxine 137 mcg Oral Early Morning   lisinopril 5 mg Oral BID   menthol-zinc oxide  Topical BID   metoprolol tartrate 25 mg Oral Q12H CELIA   nystatin  Topical BID   omeprazole (PRILOSEC) suspension 2 mg/mL 20 mg Oral Daily   psyllium 1 packet Oral Daily   saccharomyces boulardii 250 mg Oral BID   sevelamer 800 mg Oral TID With Meals   vancomycin 125 mg Oral Q6H [address]   warfarin 5 mg Oral Daily (warfarin)        Today, Patient Was Seen By: Ceasar Valiente MD    ** Please Note: This note has been constructed using a voice recognition system   **

## 2017-12-06 NOTE — SPEECH THERAPY NOTE
Speech Language/Pathology                             SLP  Note  Patient Name: Xiomara Bojorquez  [id number] Date: 12/6/2017    Subjective:  Patient received asleep in bed- he reportedly just finished dialysis- RN reported his sugar was low and just gave him meds  Patient was repositioned upright in bed and required verbal and tactile cues to rouse  Patient was lethargic but alert throughout meal     Objective:  Patient consumed regular grilled chicken sandwich with tomatoes, onions, and lettuce with thin liquids via straw sip  Patient with mildly prolonged yet functional oral phase  Good bolus control and clearance noted   Patient with timely swallow initiation and adequate HLE  No overt distress or s/s of aspiration observed  Assessment:  Patient's oropharyngeal swallow WFL for all consistencies  Plan/Recommendations:  ST no longer indicated at this time

## 2017-12-06 NOTE — PLAN OF CARE
Problem: DISCHARGE PLANNING - CARE MANAGEMENT  Goal: Discharge to post-acute care or home with appropriate resources  INTERVENTIONS:  - Conduct assessment to determine patient/family and health care team treatment goals, and need for post-acute services based on payer coverage, community resources, and patient preferences, and barriers to discharge  - Address psychosocial, clinical, and financial barriers to discharge as identified in assessment in conjunction with the patient/family and health care team  - Arrange appropriate level of post-acute services according to patient's   needs and preference and payer coverage in collaboration with the physician and health care team  - Communicate with and update the patient/family, physician, and health care team regarding progress on the discharge plan  - Arrange appropriate transportation to post-acute venues   Outcome: Adequate for Discharge  CM met with Pt and Pt's wife at bedside  Pt was sleeping  CM discussed IMM with pt's wife and setting up a PCP appointment  CM called Pt's PCP office and arranged an appointment for Monday Dec 11 at 9:20am  Pt's wife agreeable to time of appointment  CM also called Dr Nidhi Hart office because an appointment was made for 12/22/17 at 1:20 in which Pt will be at dialysis at that time  CM spoke to  at Dr Nidhi Hart office to make her aware that Pt would be unable to make that appointment, per  she will discuss with Dr Mohamud Gomez to see if another appointment time can be created as there are currently no other openings until January  Dr Nidhi Hart office reports they will contact Pt with another appointment time if one can be arranged, CM made Pt's wife aware

## 2017-12-06 NOTE — OCCUPATIONAL THERAPY NOTE
Occupational Therapy Cancellation Note        Patient Name: Jax Yañez  YXIWJ'R Date: 12/6/2017    OT orders received and chart review completed  Pt currently receiving bedside dialysis  Will continue to follow pt in acute care as appropriate to complete evaluation    Marcos Paige OT

## 2017-12-06 NOTE — PLAN OF CARE
Problem: SLP ADULT - SWALLOWING, IMPAIRED  Goal: Advance to least restrictive diet without signs or symptoms of aspiration for planned discharge setting  See evaluation for individualized goals    Outcome: Completed Date Met: 12/06/17

## 2017-12-07 VITALS
HEIGHT: 66 IN | HEART RATE: 70 BPM | SYSTOLIC BLOOD PRESSURE: 104 MMHG | BODY MASS INDEX: 21.36 KG/M2 | WEIGHT: 132.94 LBS | OXYGEN SATURATION: 93 % | TEMPERATURE: 97.7 F | RESPIRATION RATE: 18 BRPM | DIASTOLIC BLOOD PRESSURE: 50 MMHG

## 2017-12-07 PROBLEM — I48.91 RAPID ATRIAL FIBRILLATION (HCC): Status: RESOLVED | Noted: 2017-02-18 | Resolved: 2017-12-07

## 2017-12-07 PROBLEM — J18.9 HCAP (HEALTHCARE-ASSOCIATED PNEUMONIA): Status: RESOLVED | Noted: 2017-02-20 | Resolved: 2017-12-07

## 2017-12-07 PROBLEM — E16.2 HYPOGLYCEMIA: Status: RESOLVED | Noted: 2017-09-20 | Resolved: 2017-12-07

## 2017-12-07 LAB
ALBUMIN SERPL ELPH-MCNC: 2.77 G/DL (ref 3.5–5)
ALBUMIN SERPL ELPH-MCNC: 39.6 % (ref 52–65)
ALPHA1 GLOB SERPL ELPH-MCNC: 0.48 G/DL (ref 0.1–0.4)
ALPHA1 GLOB SERPL ELPH-MCNC: 6.9 % (ref 2.5–5)
ALPHA2 GLOB SERPL ELPH-MCNC: 0.92 G/DL (ref 0.4–1.2)
ALPHA2 GLOB SERPL ELPH-MCNC: 13.2 % (ref 7–13)
ANION GAP SERPL CALCULATED.3IONS-SCNC: 11 MMOL/L (ref 4–13)
BETA GLOB ABNORMAL SERPL ELPH-MCNC: 0.38 G/DL (ref 0.4–0.8)
BETA1 GLOB SERPL ELPH-MCNC: 5.4 % (ref 5–13)
BETA2 GLOB SERPL ELPH-MCNC: 9.7 % (ref 2–8)
BETA2+GAMMA GLOB SERPL ELPH-MCNC: 0.68 G/DL (ref 0.2–0.5)
BUN SERPL-MCNC: 28 MG/DL (ref 5–25)
CALCIUM SERPL-MCNC: 7.9 MG/DL (ref 8.3–10.1)
CHLORIDE SERPL-SCNC: 91 MMOL/L (ref 100–108)
CO2 SERPL-SCNC: 27 MMOL/L (ref 21–32)
CREAT SERPL-MCNC: 3.94 MG/DL (ref 0.6–1.3)
GAMMA GLOB ABNORMAL SERPL ELPH-MCNC: 1.76 G/DL (ref 0.5–1.6)
GAMMA GLOB SERPL ELPH-MCNC: 25.2 % (ref 12–22)
GFR SERPL CREATININE-BSD FRML MDRD: 15 ML/MIN/1.73SQ M
GLUCOSE SERPL-MCNC: 173 MG/DL (ref 65–140)
GLUCOSE SERPL-MCNC: 208 MG/DL (ref 65–140)
GLUCOSE SERPL-MCNC: 238 MG/DL (ref 65–140)
GLUCOSE SERPL-MCNC: 249 MG/DL (ref 65–140)
IGG/ALB SER: 0.66 {RATIO} (ref 1.1–1.8)
INR PPP: 1.62 (ref 0.86–1.16)
INTERPRETATION UR IFE-IMP: NORMAL
POTASSIUM SERPL-SCNC: 4.3 MMOL/L (ref 3.5–5.3)
PROT SERPL-MCNC: 7 G/DL (ref 6.4–8.2)
PROTHROMBIN TIME: 19.8 SECONDS (ref 12.1–14.4)
SODIUM SERPL-SCNC: 129 MMOL/L (ref 136–145)

## 2017-12-07 PROCEDURE — 80048 BASIC METABOLIC PNL TOTAL CA: CPT | Performed by: HOSPITALIST

## 2017-12-07 PROCEDURE — 85610 PROTHROMBIN TIME: CPT | Performed by: HOSPITALIST

## 2017-12-07 PROCEDURE — 82948 REAGENT STRIP/BLOOD GLUCOSE: CPT

## 2017-12-07 RX ORDER — CHOLESTYRAMINE LIGHT 4 G/5.7G
4 POWDER, FOR SUSPENSION ORAL 2 TIMES DAILY
Qty: 60 PACKET | Refills: 0 | Status: SHIPPED | OUTPATIENT
Start: 2017-12-07 | End: 2018-02-28

## 2017-12-07 RX ORDER — LEVOTHYROXINE SODIUM 137 UG/1
137 TABLET ORAL
Qty: 30 TABLET | Refills: 0 | Status: SHIPPED | OUTPATIENT
Start: 2017-12-08 | End: 2018-02-28

## 2017-12-07 RX ORDER — LISINOPRIL 5 MG/1
5 TABLET ORAL 2 TIMES DAILY
Qty: 60 TABLET | Refills: 0 | Status: SHIPPED | OUTPATIENT
Start: 2017-12-07 | End: 2017-12-29 | Stop reason: HOSPADM

## 2017-12-07 RX ORDER — AMIODARONE HYDROCHLORIDE 200 MG/1
200 TABLET ORAL 3 TIMES DAILY
Qty: 90 TABLET | Refills: 0 | Status: ON HOLD | OUTPATIENT
Start: 2017-12-07 | End: 2018-02-09

## 2017-12-07 RX ORDER — WARFARIN SODIUM 5 MG/1
5 TABLET ORAL
Refills: 0 | Status: ON HOLD
Start: 2017-12-07 | End: 2018-02-09

## 2017-12-07 RX ORDER — NYSTATIN 100000 [USP'U]/G
POWDER TOPICAL 2 TIMES DAILY
Qty: 15 G | Refills: 0 | Status: SHIPPED | OUTPATIENT
Start: 2017-12-07

## 2017-12-07 RX ADMIN — ANORECTAL OINTMENT 1 APPLICATION: 15.7; .44; 24; 20.6 OINTMENT TOPICAL at 09:54

## 2017-12-07 RX ADMIN — Medication 250 MG: at 17:00

## 2017-12-07 RX ADMIN — PSYLLIUM HUSK 1 PACKET: 3.4 POWDER ORAL at 09:46

## 2017-12-07 RX ADMIN — RENAGEL 800 MG: 800 TABLET ORAL at 17:00

## 2017-12-07 RX ADMIN — AMIODARONE HYDROCHLORIDE 200 MG: 200 TABLET ORAL at 09:44

## 2017-12-07 RX ADMIN — CITALOPRAM HYDROBROMIDE 10 MG: 20 TABLET ORAL at 09:43

## 2017-12-07 RX ADMIN — NYSTATIN 1 APPLICATION: 100000 POWDER TOPICAL at 17:01

## 2017-12-07 RX ADMIN — NYSTATIN 1 APPLICATION: 100000 POWDER TOPICAL at 09:54

## 2017-12-07 RX ADMIN — VANCOMYCIN 125 MG: KIT at 09:42

## 2017-12-07 RX ADMIN — RENAGEL 800 MG: 800 TABLET ORAL at 09:43

## 2017-12-07 RX ADMIN — METOPROLOL TARTRATE 25 MG: 25 TABLET ORAL at 09:44

## 2017-12-07 RX ADMIN — WARFARIN SODIUM 5 MG: 5 TABLET ORAL at 17:00

## 2017-12-07 RX ADMIN — Medication 20 MG: at 09:57

## 2017-12-07 RX ADMIN — INSULIN LISPRO 2 UNITS: 100 INJECTION, SOLUTION INTRAVENOUS; SUBCUTANEOUS at 16:57

## 2017-12-07 RX ADMIN — INSULIN DETEMIR 5 UNITS: 100 INJECTION, SOLUTION SUBCUTANEOUS at 09:42

## 2017-12-07 RX ADMIN — NEPHROCAP 1 CAPSULE: 1 CAP ORAL at 09:43

## 2017-12-07 RX ADMIN — CHOLESTYRAMINE 4 G: 4 POWDER, FOR SUSPENSION ORAL at 09:43

## 2017-12-07 RX ADMIN — Medication 250 MG: at 09:44

## 2017-12-07 RX ADMIN — INSULIN LISPRO 2 UNITS: 100 INJECTION, SOLUTION INTRAVENOUS; SUBCUTANEOUS at 09:48

## 2017-12-07 RX ADMIN — INSULIN LISPRO 2 UNITS: 100 INJECTION, SOLUTION INTRAVENOUS; SUBCUTANEOUS at 12:52

## 2017-12-07 RX ADMIN — AMIODARONE HYDROCHLORIDE 200 MG: 200 TABLET ORAL at 17:00

## 2017-12-07 RX ADMIN — LEVOTHYROXINE SODIUM 137 MCG: 112 TABLET ORAL at 06:05

## 2017-12-07 RX ADMIN — ANORECTAL OINTMENT 1 APPLICATION: 15.7; .44; 24; 20.6 OINTMENT TOPICAL at 17:01

## 2017-12-07 RX ADMIN — RENAGEL 800 MG: 800 TABLET ORAL at 12:51

## 2017-12-07 NOTE — PLAN OF CARE
Problem: Nutrition/Hydration-ADULT  Goal: Nutrient/Hydration intake appropriate for improving, restoring or maintaining nutritional needs  Monitor and assess patient's nutrition/hydration status for malnutrition (ex- brittle hair, bruises, dry skin, pale skin and conjunctiva, muscle wasting, smooth red tongue, and disorientation)  Collaborate with interdisciplinary team and initiate plan and interventions as ordered  Monitor patient's weight and dietary intake as ordered or per policy  Utilize nutrition screening tool and intervene per policy  Determine patient's food preferences and provide high-protein, high-caloric foods as appropriate       INTERVENTIONS:  - Monitor oral intake, urinary output, labs, and treatment plans  - Assess nutrition and hydration status and recommend course of action  - Evaluate amount of meals eaten  - Assist patient with eating if necessary   - Allow adequate time for meals  - Recommend/ encourage appropriate diets, oral nutritional supplements, and vitamin/mineral supplements  - Order, calculate, and assess calorie counts as needed  - Recommend, monitor, and adjust tube feedings and TPN/PPN based on assessed needs  - Assess need for intravenous fluids  - Provide specific nutrition/hydration education as appropriate  - Include patient/family/caregiver in decisions related to nutrition   Outcome: Adequate for Discharge  All recent meals 100% completion, per ST 12/6 no need for texture modification

## 2017-12-07 NOTE — DISCHARGE SUMMARY
Discharge Summary - [address] [facility]    Patient Information: Kaley Ventura 61 y o  male MRN: [id number]  Unit/Bed#: -01 Encounter: [id number]    Discharging Physician / Practitioner: Renee Gomez MD  PCP: Hollis Ferrara DO  Admission Date: 11/27/2017  Discharge Date: 12/07/17    Reason for Admission: acute encephalopathy     Discharge Diagnoses:     Principal Problem (Resolved):    Acute encephalopathy  Active Problems:    Type 1 diabetes mellitus with nephropathy ([address] )    ESRD (end stage renal disease) on dialysis ([address] )    S/P percutaneous endoscopic gastrostomy (PEG) tube placement (HCC)    Pleural effusion on right    Chronic combined systolic and diastolic CHF (congestive heart failure) ([address] )    Pseudomonas urinary tract infection    Hyponatremia    Acquired hypothyroidism    Chronic kidney disease-mineral and bone disorder  Resolved Problems:    Acute respiratory failure with hypoxia ([address] )    Rapid atrial fibrillation ([address] )    HCAP (healthcare-associated pneumonia)    Hypoglycemia    Elevated INR    Hyperkalemia    Hypotension    Consultations During Hospital Stay:  · Endocrinology   · Cardiology   · Gastroenterology   · Nephrology   · Wound care    Procedures Performed:     · Intubation (11/29/17)    Significant Findings / Test Results:     CT head: (11/27/17)  IMPRESSION:  No acute intracranial abnormality  Microangiopathic changes  CT abd/pelvis (11/27/17)  IMPRESSION:  1  Moderate right pleural effusion with adjacent somewhat rounded opacities possibly atelectasis versus aspiration or pneumonia      Incidental Findings:   · none     Test Results Pending at Discharge (will require follow up):   · none     Outpatient Tests Requested:  None     Complications:  None     Hospital Course:     Kaley Ventura is a 61 y o  male patient h/o ESRD on HD, brittle DM who originally presented to the hospital on 11/27/2017 due to acute encephalopathy with acidosis initially due to HCAP +/- UTI; pt was started on cefepime  On day 2 of admission rapid response was called for acute change in mental status  Overnight his encephalopathy worsened and he required intubation; was able to extubated 2 days later  Course was also complicated by afib with RVR for which amiodarone load was started and eventually converted to oral amio 200mg TID  Pt will follow-up with Dr Maki Garsia from cardiology for this issue  He was continued on couamdin for [address]  Along with possible UTI, pt also likely had a HCAP  Cefepime was continued alone and he completed a 7 day course  Endocrinology was consulted to assist with management of his diabetes  Briefly required insulin gtt, but was able to be converted to a basal-bolus regimen of levemir 5units BID and humalog 2units with meals  Of note, pt noted to have chronic hyponatremia for which nephrology was following  Completed multiple myleoma work-up without any monoclonal gammopathy but an increased free light chain ration  Pt will follow-up in hematology office for further evaluation and/or work-up  Per discussion with hematology, this is likely related to his ESRD  Plan to repeat free light chain ratio in a few months  Condition at Discharge: stable     Discharge Day Visit / Exam:     Subjective:  Pt is feeling well  No complaints at this time  Vitals: Blood Pressure: 104/52 (12/07/17 0756)  Pulse: 58 (12/07/17 0756)  Temperature: 97 9 °F (36 6 °C) (12/07/17 0756)  Temp Source: Axillary (12/07/17 0756)  Respirations: 19 (12/07/17 0756)  Height: 5' 6" (167 6 cm) (11/29/17 0600)  Weight - Scale: 60 3 kg (132 lb 15 oz) (11/29/17 0600)  SpO2: 94 % (12/07/17 0756)  Exam:   Physical Exam   Constitutional: He is oriented to person, place, and time  No distress  HENT:   Mouth/Throat: Oropharynx is clear and moist  No oropharyngeal exudate  Cardiovascular: Normal rate and regular rhythm  No murmur heard    Pulmonary/Chest: Effort normal and breath sounds normal  No respiratory distress  He has no wheezes  Abdominal: Soft  Bowel sounds are normal  He exhibits no distension  There is no tenderness  Neurological: He is alert and oriented to person, place, and time  Skin: Skin is warm and dry  He is not diaphoretic  Nursing note and vitals reviewed  Discussion with Family: wife at bedside     Discharge instructions/Information to patient and family:   See after visit summary for information provided to patient and family  Provisions for Follow-Up Care:  See after visit summary for information related to follow-up care and any pertinent home health orders  Disposition:     Home with [facility]     For Discharges to [facility] SNF:   · Not Applicable to this Patient - Not Applicable to this Patient    Planned Readmission:      Discharge Statement:  I spent 50 minutes discharging the patient  This time was spent on the day of discharge  I had direct contact with the patient on the day of discharge  Greater than 50% of the total time was spent examining patient, answering all patient questions, arranging and discussing plan of care with patient as well as directly providing post-discharge instructions  Additional time then spent on discharge activities  Discharge Medications:  See after visit summary for reconciled discharge medications provided to patient and family        ** Please Note: This note has been constructed using a voice recognition system **

## 2017-12-07 NOTE — PROGRESS NOTES
Progress Note - Nephrology   Toya Shrestha 61 y o  male MRN: [id number]  Unit/Bed#: -01 Encounter: [id number]    ASSESSMENT AND PLAN:  1  ESRD at [facility]:  Patient will receive hemodialysis in a m  2   Volume:  He appears euvolemic  3   Hypertension:  Low normal   Hold parameters  4   Electrolytes:  Chronic hyponatremia compatible with possible fluid overload in the setting of ESRD, hyperglycemia, pseudohyponatremia from elevated kappa lambda light chain ratio awaiting SPEP  Also hypothyroidism  Recommendations:  -immunofixation is negative; I would consider hematology evaluation given significantly elevated kappa lambda light chain ratio of over 3 which is higher than 1 would even expect with ESRD  -fluid restriction  -HD  -TSH was treated with an adjustment of levothyroxine  -no adrenal insufficiency from August labs  5  Mineral bone disorder:  On Sensipar for secondary hyperparathyroidism  Renal fellow placed for hyperphosphatemia  6  Anemia:  Continue MAR with hemodialysis  7   Other issues:  -recurrent C difficile on oral vancomycin  -compensated CHF  -right-sided pleural effusion, status post VDRF status post extubation 11/30/2017  -brittle diabetes mellitus  -paroxysmally atrial fibrillation on amiodarone        Subjective:   Patient is asymptomatic  No chest pain or shortness of breath  No nausea vomiting or diarrhea today  Objective:     Vitals: Blood pressure 104/52, pulse 58, temperature 97 9 °F (36 6 °C), temperature source Axillary, resp  rate 19, height 5' 6" (1 676 m), weight 60 3 kg (132 lb 15 oz), SpO2 94 %  ,Body mass index is 21 46 kg/m²      Weight (last 2 days)     None            Intake/Output Summary (Last 24 hours) at 12/07/17 0933  Last data filed at 12/06/17 1401   Gross per 24 hour   Intake              500 ml   Output             3391 ml   Net            -2891 ml            Physical Exam: General:  No acute distress  Skin:  No acute rash  Eyes:  No scleral icterus  ENT:  Moist mucous membranes  Neck:  Supple, no jugular venous distention  Chest:  Clear to auscultation   CVS:  No rubs or gallops appreciated  Abdomen:  Soft and nontender with normal bowel sounds  Extremities:  No edema, AV fistula with a good bruit and thrill  Neuro:  Grossly intact  Psych:  Alert and oriented                Medications:    Scheduled Meds:  amiodarone 200 mg Oral TID   b complex-vitamin C-folic acid 1 capsule Oral Daily   cholestyramine sugar free 4 g Oral BID   cinacalcet 30 mg Oral HS   citalopram 10 mg Oral Daily   epoetin manuela 3,000 Units Intravenous Once per day on Mon Wed Fri   gabapentin 300 mg Oral HS   insulin detemir 5 Units Subcutaneous HS   insulin detemir 5 Units Subcutaneous Daily   insulin lispro 1-3 Units Subcutaneous HS   insulin lispro 1-5 Units Subcutaneous TID AC   insulin lispro 2 Units Subcutaneous TID With Meals   levothyroxine 137 mcg Oral Early Morning   lisinopril 5 mg Oral BID   menthol-zinc oxide  Topical BID   metoprolol tartrate 25 mg Oral Q12H CELIA   nystatin  Topical BID   omeprazole (PRILOSEC) suspension 2 mg/mL 20 mg Oral Daily   psyllium 1 packet Oral Daily   saccharomyces boulardii 250 mg Oral BID   sevelamer 800 mg Oral TID With Meals   vancomycin 125 mg Oral Q12H [facility]   warfarin 5 mg Oral Daily (warfarin)       PRN Meds:   acetaminophen    dextrose    etomidate    fentanyl citrate (PF)    loperamide    metoprolol    midazolam    Continuous Infusions:     Lab, Imaging and other studies: I have personally reviewed pertinent labs    Laboratory Results:    Results from last 7 days  Lab Units 12/07/17  0620 12/06/17  0457 12/04/17  0428 12/03/17  0444 12/02/17  0513 12/01/17  2210 12/01/17  0412 11/30/17  2230   WBC Thousand/uL  --   --  7 83 7 93 9 13  --  10 18*  --    HEMOGLOBIN g/dL  --   --  9 2* 8 6* 9 0*  --  9 7*  --    HEMATOCRIT %  --   --  28 4* 27 3* 28 5*  --  30 5*  --    PLATELETS Thousands/uL  --   --  243 231 298  --  313  -- SODIUM mmol/L 129* 124*  125* 122* 126* 125* 122* 126* 128*   POTASSIUM mmol/L 4 3 4 8  4 8 5 3 4 2 4 5 5 0 4 4 4 1   CHLORIDE mmol/L 91* 88*  87* 88* 89* 89* 88* 91* 94*   CO2 mmol/L 27 23  23 24 26 24 23 25 26   BUN mg/dL 28* 43*  42* 36* 24 42* 38* 28* 25   CREATININE mg/dL 3 94* 5 13*  5 34* 5 99* 4 38* 6 14* 6 04* 4 65* 4 16*   CALCIUM mg/dL 7 9* 8 1*  8 1* 8 0* 8 0* 8 3 8 5 9 0 8 4   MAGNESIUM mg/dL  --   --  1 9 2 0 2 3  --  2 5 1 9   PHOSPHORUS mg/dL  --   --  6 1*  --  5 8*  --  5 5*  --    ALBUMIN g/dL  --   --   --   --   --   --  1 9*  --    TOTAL PROTEIN g/dL  --   --  7 0  --   --   --  7 1  --    GLUCOSE RANDOM mg/dL 208* 219*  215* 487* 459* 290* 692* 445* 219*     Urinalysis: Lab Results   Component Value Date    COLORU Yellow 11/27/2017    COLORU Yellow 07/02/2015    CLARITYU Turbid 11/27/2017    CLARITYU Clear 07/02/2015    SPECGRAV 1 025 11/27/2017    SPECGRAV 1 012 07/02/2015    PHUR 6 5 11/27/2017    PHUR 6 5 07/02/2015    LEUKOCYTESUR Large (A) 11/27/2017    LEUKOCYTESUR Negative 07/02/2015    NITRITE Positive (A) 11/27/2017    NITRITE Negative 07/02/2015    PROTEINUA 100 (2+) (A) 11/27/2017    PROTEINUA 100 (2+) (A) 07/02/2015    GLUCOSEU Negative 11/27/2017    GLUCOSEU 250 (1/4%) (A) 07/02/2015    KETONESU Trace (A) 11/27/2017    KETONESU Negative 07/02/2015    BILIRUBINUR Small (A) 11/27/2017    BILIRUBINUR Negative 07/02/2015    BLOODU Moderate (A) 11/27/2017    BLOODU Trace (A) 07/02/2015     ABGs: No results found for: [address]  Radiology review:     Portions of the record may have been created with voice recognition software   Occasional wrong word or "sound a like" substitutions may have occurred due to the inherent limitations of voice recognition software   Read the chart carefully and recognize, using context, where substitutions have occurred

## 2017-12-07 NOTE — CONSULTS
Consultation -  Hematology/Oncology   Yoli Manriquez 61 y o  male MRN: [id number]  Unit/Bed#: -01 Encounter: [id number]    Physician Requesting Consult: Stefania Mccoy MD  Reason for Consult: Elevated Kappa lambda light chain ratio    HPI: Yoli Manriquez is a 61y o  year old male with a history of  ESRD on dialysis, HTN, chronic hyponatremia,hyperglycemia, hypothyroidism,T1DM hyperparathyroidism, anemia of chronic disease, CHF, cardiomyopathy and AFib who was admitted 11/27 for acute encephalopathy subsequently requiring intubation followed by successful extubation after 2 days  He is being managed by Nephrology for ESRD and metabolic abnormalities  He is receiving HD this admission    We are consulted for evaluation of elevated free light chain ratio seen on workup for otherwise negative multiple myeloma workup by Nephrology  Assessment/Plan:   #1 Elevated FLC ratio, suspect 2/2 CKD  -Immunoglobulin free LT chain ratio is elevated at 3 47 (IgK FLC-664, IgL )   -Serum immunofixation shows no monoclonal gammopathy  -the significance of the elevated ratio is unclear at this time, however elevations in this range can be seen in CKD  -since multiple myeloma workup with the was otherwise negative with no monoclonal bands identified with recommend follow-up of free light chain studies and 8 weeks time  At that time if ratio is stable to improved he will no longer require hematologic follow-up  If free light chain ratio is trending upward further workup will be discussed at that time  -we will arrange for a follow-up appointment in Hematology in 8 weeks with an immunoglobulin free light chain study prior to his appointment  We will send the appointment information to his home  Our recommendations were communicated to the primary team (SHAISTA-Dr Can Calhoun) on this date      He is okay for discharge from a hematologic standpoint when deemed medically stable with primary team     We will continue to follow this admission  Please contact us if you have any questions regarding this consult  Thank you for this consult  Past Medical History:   Diagnosis Date    Acquired hypothyroidism     underactive    Atrial fibrillation ([place] [address] )     Chronic kidney disease-mineral and bone disorder 11/27/2017    Clostridium difficile infection 04/2017    Diabetes mellitus ([place] [address] )     Dialysis patient ([place] [address] )     Diarrhea     ESRD (end stage renal disease) on dialysis ([place] [address] )     Hx of cardiac arrest     Hypertension     Neuropathy     Right lower lobe pneumonia ([place] [address] ) 2/20/2017    Sepsis ([place] [address] ) 04/2017    Polymicrobial MRSA, VRE    Systolic and diastolic CHF, chronic (HCC)     EF 35%, Grade II DD     Family History   Problem Relation Age of Onset    Diabetes Father     Cancer Other     Lupus Mother      Social History     Social History    Marital status: /Civil Union     Spouse name: N/A    Number of children: N/A    Years of education: N/A     Occupational History    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     Social History Main Topics    Smoking status: Former Smoker     Packs/day: 1 00     Years: 46 00     Types: Cigarettes     Start date: 1970     Quit date: 3/1/2017    Smokeless tobacco: Never Used    Alcohol use No    Drug use: No    Sexual activity: No     Other Topics Concern    None     Social History Narrative    None     No Known Allergies    Subjective:  Mr Sanjiv Love does not have any complaints today  He is anticipating going home soon and is looking forward to that  Review of Systems   Constitutional: Negative for chills and fever  Respiratory: Negative for cough and shortness of breath  Cardiovascular: Negative for chest pain and leg swelling  Gastrointestinal: Negative for abdominal pain  Psychiatric/Behavioral: Negative for confusion         Objective:  /52   Pulse 58   Temp 97 9 °F (36 6 °C) (Axillary)   Resp 19   Ht 5' 6" (1 676 m)   Wt 60 3 kg (132 lb 15 oz)   SpO2 94%   BMI 21 46 kg/m²       Current Facility-Administered Medications:     acetaminophen (TYLENOL) tablet 650 mg, 650 mg, Oral, Q6H PRN, Hamzah Sena PA-C    amiodarone tablet 200 mg, 200 mg, Oral, TID, Kei Mercer MD, 200 mg at 12/07/17 0944    b complex-vitamin C-folic acid (NEPHROCAPS) capsule 1 capsule, 1 capsule, Oral, Daily, Hamzah Sena PA-C, 1 capsule at 12/07/17 0943    cholestyramine sugar free (QUESTRAN LIGHT) packet 4 g, 4 g, Oral, BID, Awa Alves PA-C, 4 g at 12/07/17 0943    cinacalcet (SENSIPAR) tablet 30 mg, 30 mg, Oral, HS, Hamzah Sena PA-C, 30 mg at 12/06/17 2104    citalopram (CeleXA) tablet 10 mg, 10 mg, Oral, Daily, Catina Brink PA-C, 10 mg at 12/07/17 0943    dextrose 50 % IV solution 50 mL, 50 mL, Intravenous, PRN, Darrell Bojorquez, SUKUMARNP, 50 mL at 12/06/17 1343    epoetin manuela (EPOGEN,PROCRIT) injection 3,000 Units, 3,000 Units, Intravenous, Once per day on Mon Wed Fri, Brea [place], CRNP, 3,000 Units at 12/06/17 1134    etomidate (AMIDATE) 2 mg/mL injection, , , Code/Trauma/Sedation Med, Darrell Bojorquez CRNP, 20 mg at 11/29/17 0506    fentanyl citrate (PF) 100 MCG/2ML, , Intravenous, Code/Trauma/Sedation Med, Darrell Bojorquez, CRNP, 50 mcg at 11/29/17 9009    gabapentin (NEURONTIN) capsule 300 mg, 300 mg, Oral, HS, Hamzah Sena PA-C, 300 mg at 12/06/17 2103    insulin detemir (LEVEMIR) subcutaneous injection 5 Units, 5 Units, Subcutaneous, HS, Michaela Yung MD, 5 Units at 12/06/17 2103    insulin detemir (LEVEMIR) subcutaneous injection 5 Units, 5 Units, Subcutaneous, Daily, Michaela Yung MD, 5 Units at 12/07/17 0942    insulin lispro (HumaLOG) 100 units/mL subcutaneous injection 1-3 Units, 1-3 Units, Subcutaneous, HS, Michaela Yung MD, 2 Units at 12/05/17 2202    insulin lispro (HumaLOG) 100 units/mL subcutaneous injection 1-5 Units, 1-5 Units, Subcutaneous, TID AC, 2 Units at 12/07/17 1251 **AND** Fingerstick Glucose (POCT), , , TID AC, Cherlynn Lefort, MD    insulin lispro (HumaLOG) 100 units/mL subcutaneous injection 2 Units, 2 Units, Subcutaneous, TID With Meals, Cherlynn Lefort, MD, 2 Units at 12/07/17 1252    levothyroxine tablet 137 mcg, 137 mcg, Oral, Early Morning, Rock Miguelito PA-C, 137 mcg at 12/07/17 0605    lisinopril (ZESTRIL) tablet 5 mg, 5 mg, Oral, BID, Bertin Posadas MD, 5 mg at 12/06/17 1729    loperamide (IMODIUM) capsule 2 mg, 2 mg, Oral, 4x Daily PRN, ALIE Ramirez, 2 mg at 12/01/17 1408    menthol-zinc oxide (CALMOSEPTINE) 0 44-20 6 % ointment, , Topical, BID, Alisha Moreno PA-C, 1 application at 24/99/59 0954    metoprolol (LOPRESSOR) injection 2 5 mg, 2 5 mg, Intravenous, Q6H PRN, ALIE Ramirez    metoprolol tartrate (LOPRESSOR) tablet 25 mg, 25 mg, Oral, Q12H [address], Bertin Posadas MD, 25 mg at 12/07/17 0944    midazolam (VERSED) injection, , , Code/Trauma/Sedation Med, ALIE Ramirez, 2 mg at 11/29/17 5338    nystatin (MYCOSTATIN) powder, , Topical, BID, Alisha Moreno PA-C, 1 application at 52/35/29 0954    omeprazole (PRILOSEC) suspension 2 mg/mL, 20 mg, Oral, Daily, ALIE Rodriguez, 20 mg at 12/07/17 0957    psyllium (METAMUCIL) 1 packet, 1 packet, Oral, Daily, An ParoALIE lozoya, 1 packet at 12/07/17 6727    saccharomyces boulardii (FLORASTOR) capsule 250 mg, 250 mg, Oral, BID, Rock Miguelito PA-C, 250 mg at 12/07/17 0944    sevelamer (RENAGEL) tablet 800 mg, 800 mg, Oral, TID With Meals, Roshni Ng MD, 800 mg at 12/07/17 1251    vancomycin (VANCOCIN) oral solution 125 mg, 125 mg, Oral, Q12H [address], Odilia Hanna MD, 125 mg at 12/07/17 5027    warfarin (COUMADIN) tablet 5 mg, 5 mg, Oral, Daily (warfarin), ALIE Ramirez, 5 mg at 12/06/17 1729    Recent Labs      12/06/17   0457  12/07/17   0620   CREATININE  5 13*  5 34*  3 94*     Laboratory studies were reviewed    Imaging:   -11/27 CT Head: CT head:  No acute intracranial abnormality   Microangiopathic changes      -11/27 CT  Abd/pelvis:  Moderate right pleural effusion with adjacent somewhat rounded opacities possibly atelectasis versus aspiration or pneumonia  Pathology: None    Physical Exam   HENT:   Mouth/Throat: Oropharynx is clear and moist    Eyes: No scleral icterus  Cardiovascular: Normal rate  Pulmonary/Chest: Effort normal  No respiratory distress  Musculoskeletal: He exhibits no edema or tenderness  Lymphadenopathy:     He has no cervical adenopathy  Skin: Skin is warm and dry  Code Status: Level 1 - Full Code    Counseling / Coordination of Care  Total floor / unit time spent today 30 minutes  Greater than 50% of total time was spent with the patient and / or family counseling and / or coordination of care

## 2017-12-07 NOTE — DISCHARGE INSTRUCTIONS
We have made several changes to your medications     For your atrial fibrillation:   continue amiodarone 200mg three times daily until seen by Dr Arce Latin warfarin 5mg daily until repeat INR check as directed by coumadin clinic     For your diabetes:  Insulin levemir 5units twice daily   Insulin lispro 2 units with meals    For your blood pressure:  Lisinopril 5mg twice daily    For your hypothyroidism:  Levothyroxine 137mcg (increased from 125mcg)    For loose stool and history of C diff  Return to the once daily vancomycin dosing  Cholestyramine packets twice daily to help with stool bulk  Anti-diarrheals as needed

## 2017-12-07 NOTE — SOCIAL WORK
CM spoke to [facility] at [facility] to make her aware Pt was being d/c and DCP

## 2017-12-08 NOTE — CASE MANAGEMENT
Notification of Discharge  This is a Notification of Discharge from our facility [address]  Please be advised that this patient has been discharge from our facility  Below you will find the admission and discharge date and time including the patients disposition  PRESENTATION DATE: 11/27/2017 11:08 AM  IP ADMISSION DATE: 11/27/17 1506  DISCHARGE DATE: 12/7/2017  8:01 PM  DISPOSITION: Home with [address] [place] [address] in the [facility] by [address] for 2017  Network Utilization Review Department  Phone: [phone]; Fax [phone]  ATTENTION: The Network Utilization Review Department is now centralized for our 7 Facilities  Make a note that we have a new phone and fax numbers for our Department  Please call with any questions or concerns to [phone] and carefully follow the prompts so that you are directed to the right person  All voicemails are confidential  Fax any determinations, approvals, denials, and requests for initial or continue stay review clinical to [phone]  Due to HIGH CALL volume, it would be easier if you could please send faxed requests to expedite your requests and in part, help us provide discharge notifications faster

## 2017-12-28 ENCOUNTER — HOSPITAL ENCOUNTER (OUTPATIENT)
Facility: HOSPITAL | Age: 67
Setting detail: OBSERVATION
Discharge: HOME | End: 2017-12-29
Attending: INTERNAL MEDICINE | Admitting: INTERNAL MEDICINE
Payer: COMMERCIAL

## 2017-12-28 ENCOUNTER — APPOINTMENT (EMERGENCY)
Dept: RADIOLOGY | Facility: HOSPITAL | Age: 67
End: 2017-12-28
Payer: COMMERCIAL

## 2017-12-28 DIAGNOSIS — N18.6 ESRD (END STAGE RENAL DISEASE) ON DIALYSIS (HCC): ICD-10-CM

## 2017-12-28 DIAGNOSIS — J90 PLEURAL EFFUSION: ICD-10-CM

## 2017-12-28 DIAGNOSIS — E87.5 HYPERKALEMIA: Primary | ICD-10-CM

## 2017-12-28 DIAGNOSIS — Z99.2 ESRD (END STAGE RENAL DISEASE) ON DIALYSIS (HCC): ICD-10-CM

## 2017-12-28 DIAGNOSIS — E03.9 HYPOTHYROIDISM: ICD-10-CM

## 2017-12-28 LAB
ANION GAP BLD CALC-SCNC: 14 MMOL/L (ref 4–13)
ANION GAP SERPL CALCULATED.3IONS-SCNC: 13 MMOL/L (ref 4–13)
ANION GAP SERPL CALCULATED.3IONS-SCNC: 8 MMOL/L (ref 4–13)
APTT PPP: 79 SECONDS (ref 23–35)
ATRIAL RATE: 54 BPM
BASOPHILS # BLD AUTO: 0.12 THOUSANDS/ΜL (ref 0–0.1)
BASOPHILS NFR BLD AUTO: 2 % (ref 0–1)
BUN BLD-MCNC: 43 MG/DL (ref 5–25)
BUN SERPL-MCNC: 41 MG/DL (ref 5–25)
BUN SERPL-MCNC: 48 MG/DL (ref 5–25)
CA-I BLD-SCNC: 0.95 MMOL/L (ref 1.12–1.32)
CALCIUM SERPL-MCNC: 7.8 MG/DL (ref 8.3–10.1)
CALCIUM SERPL-MCNC: 8.1 MG/DL (ref 8.3–10.1)
CHLORIDE BLD-SCNC: 95 MMOL/L (ref 100–108)
CHLORIDE SERPL-SCNC: 94 MMOL/L (ref 100–108)
CHLORIDE SERPL-SCNC: 95 MMOL/L (ref 100–108)
CO2 SERPL-SCNC: 26 MMOL/L (ref 21–32)
CO2 SERPL-SCNC: 30 MMOL/L (ref 21–32)
CREAT BLD-MCNC: 5 MG/DL (ref 0.6–1.3)
CREAT SERPL-MCNC: 5.07 MG/DL (ref 0.6–1.3)
CREAT SERPL-MCNC: 5.91 MG/DL (ref 0.6–1.3)
EOSINOPHIL # BLD AUTO: 0.64 THOUSAND/ΜL (ref 0–0.61)
EOSINOPHIL NFR BLD AUTO: 8 % (ref 0–6)
ERYTHROCYTE [DISTWIDTH] IN BLOOD BY AUTOMATED COUNT: 16.2 % (ref 11.6–15.1)
GFR SERPL CREATININE-BSD FRML MDRD: 11 ML/MIN/1.73SQ M
GFR SERPL CREATININE-BSD FRML MDRD: 11 ML/MIN/1.73SQ M
GFR SERPL CREATININE-BSD FRML MDRD: 9 ML/MIN/1.73SQ M
GLUCOSE SERPL-MCNC: 202 MG/DL (ref 65–140)
GLUCOSE SERPL-MCNC: 209 MG/DL (ref 65–140)
GLUCOSE SERPL-MCNC: 257 MG/DL (ref 65–140)
GLUCOSE SERPL-MCNC: 454 MG/DL (ref 65–140)
HCT VFR BLD AUTO: 34.5 % (ref 36.5–49.3)
HCT VFR BLD CALC: 34 % (ref 36.5–49.3)
HGB BLD-MCNC: 10.5 G/DL (ref 12–17)
HGB BLDA-MCNC: 11.6 G/DL (ref 12–17)
INR PPP: 3.32 (ref 0.86–1.16)
LYMPHOCYTES # BLD AUTO: 0.9 THOUSANDS/ΜL (ref 0.6–4.47)
LYMPHOCYTES NFR BLD AUTO: 11 % (ref 14–44)
MAGNESIUM SERPL-MCNC: 2 MG/DL (ref 1.6–2.6)
MCH RBC QN AUTO: 29.6 PG (ref 26.8–34.3)
MCHC RBC AUTO-ENTMCNC: 30.4 G/DL (ref 31.4–37.4)
MCV RBC AUTO: 97 FL (ref 82–98)
MONOCYTES # BLD AUTO: 0.98 THOUSAND/ΜL (ref 0.17–1.22)
MONOCYTES NFR BLD AUTO: 12 % (ref 4–12)
NEUTROPHILS # BLD AUTO: 5.32 THOUSANDS/ΜL (ref 1.85–7.62)
NEUTS SEG NFR BLD AUTO: 67 % (ref 43–75)
P AXIS: 63 DEGREES
PCO2 BLD: 29 MMOL/L (ref 21–32)
PLATELET # BLD AUTO: 273 THOUSANDS/UL (ref 149–390)
PMV BLD AUTO: 8.6 FL (ref 8.9–12.7)
POTASSIUM BLD-SCNC: 6 MMOL/L (ref 3.5–5.3)
POTASSIUM SERPL-SCNC: 5.6 MMOL/L (ref 3.5–5.3)
POTASSIUM SERPL-SCNC: 6.3 MMOL/L (ref 3.5–5.3)
PR INTERVAL: 240 MS
PROTHROMBIN TIME: 35 SECONDS (ref 12.1–14.4)
QRS AXIS: -22 DEGREES
QRSD INTERVAL: 118 MS
QT INTERVAL: 508 MS
QTC INTERVAL: 481 MS
RBC # BLD AUTO: 3.55 MILLION/UL (ref 3.88–5.62)
SODIUM BLD-SCNC: 132 MMOL/L (ref 136–145)
SODIUM SERPL-SCNC: 132 MMOL/L (ref 136–145)
SODIUM SERPL-SCNC: 134 MMOL/L (ref 136–145)
SPECIMEN SOURCE: ABNORMAL
T WAVE AXIS: 140 DEGREES
TSH SERPL DL<=0.05 MIU/L-ACNC: 33.97 UIU/ML (ref 0.36–3.74)
VENTRICULAR RATE: 54 BPM
WBC # BLD AUTO: 7.96 THOUSAND/UL (ref 4.31–10.16)

## 2017-12-28 PROCEDURE — 85730 THROMBOPLASTIN TIME PARTIAL: CPT | Performed by: PHYSICIAN ASSISTANT

## 2017-12-28 PROCEDURE — 71020 HB CHEST X-RAY 2VW FRONTAL&LATL: CPT

## 2017-12-28 PROCEDURE — 85025 COMPLETE CBC W/AUTO DIFF WBC: CPT | Performed by: PHYSICIAN ASSISTANT

## 2017-12-28 PROCEDURE — 94640 AIRWAY INHALATION TREATMENT: CPT

## 2017-12-28 PROCEDURE — 93005 ELECTROCARDIOGRAM TRACING: CPT | Performed by: PHYSICIAN ASSISTANT

## 2017-12-28 PROCEDURE — 94760 N-INVAS EAR/PLS OXIMETRY 1: CPT

## 2017-12-28 PROCEDURE — 93005 ELECTROCARDIOGRAM TRACING: CPT

## 2017-12-28 PROCEDURE — 82948 REAGENT STRIP/BLOOD GLUCOSE: CPT

## 2017-12-28 PROCEDURE — 99285 EMERGENCY DEPT VISIT HI MDM: CPT

## 2017-12-28 PROCEDURE — 94644 CONT INHLJ TX 1ST HOUR: CPT

## 2017-12-28 PROCEDURE — 85610 PROTHROMBIN TIME: CPT | Performed by: PHYSICIAN ASSISTANT

## 2017-12-28 PROCEDURE — 80048 BASIC METABOLIC PNL TOTAL CA: CPT | Performed by: PHYSICIAN ASSISTANT

## 2017-12-28 PROCEDURE — 80048 BASIC METABOLIC PNL TOTAL CA: CPT | Performed by: INTERNAL MEDICINE

## 2017-12-28 PROCEDURE — 85014 HEMATOCRIT: CPT

## 2017-12-28 PROCEDURE — 84443 ASSAY THYROID STIM HORMONE: CPT | Performed by: PHYSICIAN ASSISTANT

## 2017-12-28 PROCEDURE — 80047 BASIC METABLC PNL IONIZED CA: CPT

## 2017-12-28 PROCEDURE — 36415 COLL VENOUS BLD VENIPUNCTURE: CPT | Performed by: PHYSICIAN ASSISTANT

## 2017-12-28 PROCEDURE — 83735 ASSAY OF MAGNESIUM: CPT | Performed by: PHYSICIAN ASSISTANT

## 2017-12-28 RX ORDER — CHOLESTYRAMINE LIGHT 4 G/5.7G
4 POWDER, FOR SUSPENSION ORAL 2 TIMES DAILY
Status: DISCONTINUED | OUTPATIENT
Start: 2017-12-28 | End: 2017-12-29 | Stop reason: HOSPADM

## 2017-12-28 RX ORDER — WARFARIN SODIUM 5 MG/1
5 TABLET ORAL
Status: DISCONTINUED | OUTPATIENT
Start: 2017-12-28 | End: 2017-12-28

## 2017-12-28 RX ORDER — NYSTATIN 100000 [USP'U]/G
POWDER TOPICAL 2 TIMES DAILY
Status: DISCONTINUED | OUTPATIENT
Start: 2017-12-28 | End: 2017-12-29 | Stop reason: HOSPADM

## 2017-12-28 RX ORDER — DEXTROSE MONOHYDRATE 25 G/50ML
25 INJECTION, SOLUTION INTRAVENOUS ONCE
Status: COMPLETED | OUTPATIENT
Start: 2017-12-28 | End: 2017-12-28

## 2017-12-28 RX ORDER — GABAPENTIN 300 MG/1
300 CAPSULE ORAL
Status: DISCONTINUED | OUTPATIENT
Start: 2017-12-28 | End: 2017-12-29 | Stop reason: HOSPADM

## 2017-12-28 RX ORDER — ONDANSETRON 2 MG/ML
4 INJECTION INTRAMUSCULAR; INTRAVENOUS EVERY 6 HOURS PRN
Status: DISCONTINUED | OUTPATIENT
Start: 2017-12-28 | End: 2017-12-29 | Stop reason: HOSPADM

## 2017-12-28 RX ORDER — SODIUM POLYSTYRENE SULFONATE 15 G/60ML
15 SUSPENSION ORAL; RECTAL ONCE
Status: COMPLETED | OUTPATIENT
Start: 2017-12-28 | End: 2017-12-28

## 2017-12-28 RX ORDER — CITALOPRAM 20 MG/1
10 TABLET ORAL DAILY
Status: DISCONTINUED | OUTPATIENT
Start: 2017-12-29 | End: 2017-12-29 | Stop reason: HOSPADM

## 2017-12-28 RX ORDER — DIPHENOXYLATE HYDROCHLORIDE AND ATROPINE SULFATE 2.5; .025 MG/1; MG/1
1 TABLET ORAL 4 TIMES DAILY PRN
Status: DISCONTINUED | OUTPATIENT
Start: 2017-12-28 | End: 2017-12-29 | Stop reason: HOSPADM

## 2017-12-28 RX ORDER — CINACALCET 30 MG/1
30 TABLET, FILM COATED ORAL
Status: DISCONTINUED | OUTPATIENT
Start: 2017-12-28 | End: 2017-12-29

## 2017-12-28 RX ORDER — DOCUSATE SODIUM 100 MG/1
100 CAPSULE, LIQUID FILLED ORAL 2 TIMES DAILY
Status: DISCONTINUED | OUTPATIENT
Start: 2017-12-28 | End: 2017-12-29 | Stop reason: HOSPADM

## 2017-12-28 RX ORDER — CHOLECALCIFEROL (VITAMIN D3) 10 MCG
1 TABLET ORAL DAILY
Status: DISCONTINUED | OUTPATIENT
Start: 2017-12-29 | End: 2017-12-29 | Stop reason: HOSPADM

## 2017-12-28 RX ORDER — ACETAMINOPHEN 325 MG/1
650 TABLET ORAL EVERY 6 HOURS PRN
Status: DISCONTINUED | OUTPATIENT
Start: 2017-12-28 | End: 2017-12-29 | Stop reason: HOSPADM

## 2017-12-28 RX ORDER — AMIODARONE HYDROCHLORIDE 200 MG/1
200 TABLET ORAL
Status: DISCONTINUED | OUTPATIENT
Start: 2017-12-28 | End: 2017-12-29 | Stop reason: HOSPADM

## 2017-12-28 RX ORDER — TRAMADOL HYDROCHLORIDE 50 MG/1
50 TABLET ORAL EVERY 6 HOURS PRN
Status: DISCONTINUED | OUTPATIENT
Start: 2017-12-28 | End: 2017-12-29 | Stop reason: HOSPADM

## 2017-12-28 RX ORDER — ALBUTEROL SULFATE 2.5 MG/3ML
10 SOLUTION RESPIRATORY (INHALATION) ONCE
Status: COMPLETED | OUTPATIENT
Start: 2017-12-28 | End: 2017-12-28

## 2017-12-28 RX ORDER — MAGNESIUM HYDROXIDE/ALUMINUM HYDROXICE/SIMETHICONE 120; 1200; 1200 MG/30ML; MG/30ML; MG/30ML
30 SUSPENSION ORAL EVERY 6 HOURS PRN
Status: DISCONTINUED | OUTPATIENT
Start: 2017-12-28 | End: 2017-12-29 | Stop reason: HOSPADM

## 2017-12-28 RX ADMIN — CALCIUM ACETATE 1334 MG: 667 CAPSULE ORAL at 18:57

## 2017-12-28 RX ADMIN — ALBUTEROL SULFATE 10 MG: 2.5 SOLUTION RESPIRATORY (INHALATION) at 16:09

## 2017-12-28 RX ADMIN — INSULIN LISPRO 2 UNITS: 100 INJECTION, SOLUTION INTRAVENOUS; SUBCUTANEOUS at 18:52

## 2017-12-28 RX ADMIN — GABAPENTIN 300 MG: 300 CAPSULE ORAL at 22:09

## 2017-12-28 RX ADMIN — CINACALCET HYDROCHLORIDE 30 MG: 30 TABLET, COATED ORAL at 22:10

## 2017-12-28 RX ADMIN — CHOLESTYRAMINE 4 G: 4 POWDER, FOR SUSPENSION ORAL at 18:52

## 2017-12-28 RX ADMIN — AMIODARONE HYDROCHLORIDE 200 MG: 200 TABLET ORAL at 18:52

## 2017-12-28 RX ADMIN — INSULIN HUMAN 10 UNITS: 100 INJECTION, SOLUTION PARENTERAL at 16:24

## 2017-12-28 RX ADMIN — SODIUM POLYSTYRENE SULFONATE 15 G: 15 SUSPENSION ORAL; RECTAL at 16:30

## 2017-12-28 RX ADMIN — VANCOMYCIN 125 MG: KIT at 22:13

## 2017-12-28 RX ADMIN — CALCIUM GLUCONATE 1 G: 94 INJECTION, SOLUTION INTRAVENOUS at 15:56

## 2017-12-28 RX ADMIN — INSULIN DETEMIR 5 UNITS: 100 INJECTION, SOLUTION SUBCUTANEOUS at 18:52

## 2017-12-28 RX ADMIN — DEXTROSE MONOHYDRATE 25 ML: 500 INJECTION PARENTERAL at 16:23

## 2017-12-28 RX ADMIN — METOPROLOL TARTRATE 25 MG: 25 TABLET ORAL at 22:09

## 2017-12-28 NOTE — ED PROVIDER NOTES
History  Chief Complaint   Patient presents with    Abnormal Lab     had elevated potassium yesterday of 7 6 before dialysis  was told to come in today for eval  pt was also vomiting last nigh     This is 79year-old white male presents emergency room for evaluation for an elevated potassium  Went for dialysis yesterday and had routine labs that are drawn every month and it demonstrated a potassium of 7 6  He was asked to come to the emergency room for evaluation  When he was dialyzed yesterday, he had a 15 lb weight gain and was very lethargic and short of breath  His wife said that they took a lot of fluid off  Patient is feeling much better today  He does complain of a burning with urination it and only urinates 1-2 times per week  His wife was concerned that he may have a urinary tract infection so she had extra Cipro at home so she was medicating him with Cipro twice a day for the past 3 days  She did speak to his physician who called the prescription in for Keflex  They have not started the antibiotic as of yet  Was also notified that his INR was elevated at 4  He mom uses Coumadin for atrial fibrillation  He takes 6 mg per day  He denies any chest pain or abdominal pain  He denies any black tarry stools or bright red blood per rectum  Has not noticed any hematuria in his urine when he urinates  Has no excessive bruising or headache  Past medical history is positive for hypothyroidism atrial fibrillation chronic kidney disease, clustered him difficile, diabetes mellitus, end-stage renal disease, pneumonia neuropathy, cardiac arrest, sepsis, sepsis, MRSA, VRE, CHF-EF 35 %        History provided by:  Patient  Difficulty Urinating   Presenting symptoms: dysuria    Context: during urination    Relieved by:  Nothing  Worsened by:  Urination  Ineffective treatments: Cipro    Associated symptoms: no abdominal pain, no diarrhea, no fever, no flank pain, no genital itching, no genital lesions, no genital rash, no groin pain, no hematuria, no nausea, no penile redness, no penile swelling, no scrotal swelling, no urinary frequency, no urinary hesitation, no urinary incontinence, no urinary retention and no vomiting    Risk factors: recent infection    Risk factors: no bladder surgery, no change in medication, no erectile dysfunction, no foreign body, no HIV, no kidney stones, does not have multiple sexual partners, no new sexual partner, not currently sexually active, no sickle cell disease, no STI exposure, no unprotected sex and no urinary catheter        Prior to Admission Medications   Prescriptions Last Dose Informant Patient Reported? Taking?    Probiotic Product (PRO-BIOTIC BLEND) CAPS   Yes No   Sig: Take 1 capsule by mouth daily   acetaminophen (TYLENOL) 325 mg tablet   Yes No   Sig: Take 650 mg by mouth every 6 (six) hours as needed for mild pain   amiodarone 200 mg tablet   No No   Sig: Take 1 tablet by mouth 3 (three) times a day for 30 days   b complex-vitamin C-folic acid (RENAL) 1 mg   Yes No   Sig: Take 1 mg by mouth daily   calcium citrate (CALCITRATE) 950 MG tablet   Yes No   Sig: Take 2,400 mg by mouth 3 (three) times a day with meals   cholestyramine sugar free (QUESTRAN LIGHT) 4 g packet   No No   Sig: Take 1 packet by mouth 2 (two) times a day for 30 days   cinacalcet (SENSIPAR) 30 mg tablet   Yes No   Sig: Take 30 mg by mouth daily at bedtime   citalopram (CeleXA) 10 mg tablet   Yes No   Sig: Take 10 mg by mouth daily     diphenoxylate-atropine (LOMOTIL) 2 5-0 025 mg/5 mL liquid   Yes No   Sig: Take 5 mL by mouth 4 (four) times a day as needed for diarrhea   gabapentin (NEURONTIN) 300 mg capsule   Yes No   Sig: Take 300 mg by mouth daily at bedtime   insulin detemir (LEVEMIR) 100 units/mL subcutaneous injection   No No   Sig: Inject 5 Units under the skin 2 (two) times a day for 30 days   insulin lispro (HumaLOG) 100 units/mL injection   No No   Sig: Inject 2 Units under the skin 3 (three) times a day before meals for 30 days   levothyroxine 137 mcg tablet   No No   Sig: Take 1 tablet by mouth daily in the early morning for 30 days   lisinopril (ZESTRIL) 5 mg tablet   No No   Sig: Take 1 tablet by mouth 2 (two) times a day   metoprolol tartrate (LOPRESSOR) 25 mg tablet   No No   Sig: Take 1 tablet by mouth every 12 (twelve) hours for 30 days   nystatin (MYCOSTATIN) powder   No No   Sig: Apply topically 2 (two) times a day   sevelamer carbonate (RENVELA) 800 mg tablet   Yes No   Sig: Take 800 mg by mouth 3 (three) times a day as needed     traMADol (ULTRAM) 50 mg tablet   Yes No   Sig: Take 50 mg by mouth every 6 (six) hours as needed for moderate pain   vancomycin (VANCOCIN) 50mg/mL SOLN   No No   Sig: Take 2 5 mL by mouth daily   warfarin (COUMADIN) 5 mg tablet   No No   Sig: Take 1 tablet by mouth daily      Facility-Administered Medications: None       Past Medical History:   Diagnosis Date    Acquired hypothyroidism     underactive    Atrial fibrillation (HCC)     Chronic kidney disease-mineral and bone disorder 11/27/2017    Clostridium difficile infection 04/2017    [facility] [address] [facility]     Dialysis patient [facility] [address] [facility]     Diarrhea     ESRD (end stage renal disease) on dialysis ([facility] [address] [facility]     Hx of cardiac arrest     Hypertension     Neuropathy     Right lower lobe pneumonia ([facility] [address] ) 2/20/2017    Sepsis ([facility] [address] [facility] 04/2017    Polymicrobial MRSA, VRE    Systolic and diastolic CHF, chronic (HCC)     EF 35%, Grade II DD       Past Surgical History:   Procedure Laterality Date    CATARACT EXTRACTION      CHG GI ENDOSCOPIC ULTRASOUND N/A 3/10/2016    Procedure: LINEAR ENDOSCOPIC U/S;  Surgeon: Maurizio Amaro MD;  Location: [facility];   Service: Gastroenterology    CHOLECYSTECTOMY      GASTROSTOMY TUBE PLACEMENT N/A 4/12/2017    Procedure: INSERTION PEG TUBE;  Surgeon: Usha Novak MD;  [facility];  Service:     LEG AMPUTATION THROUGH LOWER TIBIA AND FIBULA Right 4/6/2017 Procedure: AMPUTATION BELOW KNEE (BKA); Surgeon: Kerrie Chandler DO;  [facility]: [facility];  Service:     TOE AMPUTATION  2000       Family History   Problem Relation Age of Onset    Diabetes Father     Cancer Other     Lupus Mother      I have reviewed and agree with the history as documented  Social History   Substance Use Topics    Smoking status: Former Smoker     Packs/day: 1 00     Years: 46 00     Types: Cigarettes     Start date: 1970     Quit date: 3/1/2017    Smokeless tobacco: Never Used    Alcohol use No        Review of Systems   Constitutional: Negative for activity change, appetite change, chills, diaphoresis, fatigue and fever  HENT: Negative for congestion, dental problem, ear pain, mouth sores, postnasal drip, rhinorrhea and sore throat  Eyes: Negative for photophobia, pain, discharge, redness, itching and visual disturbance  Respiratory: Negative for cough, chest tightness, shortness of breath and wheezing  Cardiovascular: Negative for chest pain, palpitations and leg swelling  Gastrointestinal: Negative for abdominal distention, abdominal pain, anal bleeding, blood in stool, diarrhea, nausea and vomiting  Endocrine: Negative for cold intolerance, heat intolerance, polydipsia and polyphagia  Genitourinary: Positive for dysuria  Negative for bladder incontinence, flank pain, frequency, hematuria, hesitancy, penile swelling and scrotal swelling  Musculoskeletal: Positive for gait problem  Right below-knee amputation   Skin: Negative for color change, pallor, rash and wound  Neurological: Negative for speech difficulty, weakness and headaches  Hematological: Negative for adenopathy  Does not bruise/bleed easily  Psychiatric/Behavioral: Negative for confusion  All other systems reviewed and are negative        Physical Exam  ED Triage Vitals [12/28/17 1247]   Temperature Pulse Respirations Blood Pressure SpO2   97 9 °F (36 6 °C) 56 18 118/57 96 %      Temp Source Heart Rate Source Patient Position - Orthostatic VS BP Location FiO2 (%)   Oral Monitor Sitting Left arm --      Pain Score       No Pain           Orthostatic Vital Signs  Vitals:    12/28/17 1247 12/28/17 1417 12/28/17 1600   BP: 118/57 137/64 120/57   Pulse: 56 55 56   Patient Position - Orthostatic VS: Sitting Sitting        Physical Exam    ED Medications  Medications   calcium gluconate 1 g in sodium chloride 0 9 % 100 mL IVPB (1 g Intravenous New Bag 12/28/17 1556)   dextrose 50 % IV solution 25 mL (not administered)   sodium polystyrene sulfonate (KAYEXALATE) oral suspension 15 g (not administered)   insulin regular (HumuLIN R,NovoLIN R) injection 10 Units (not administered)   albuterol inhalation solution 10 mg (10 mg Nebulization Given 12/28/17 1609)       Diagnostic Studies  Results Reviewed     Procedure Component Value Units Date/Time    POCT Chem 8+ [[id number]]  (Abnormal) Collected:  12/28/17 1508    Lab Status:  Final result Updated:  12/28/17 1513     SODIUM, I-STAT 132 (L) mmol/l      Potassium, i-STAT 6 0 (H) mmol/L      Chloride, istat 95 (L) mmol/L      CO2, i-STAT 29 mmol/L      Anion Gap, Istat 14 (H) mmol/L      Calcium, Ionized i-STAT 0 95 (L) mmol/L      BUN, I-STAT 43 (H) mg/dl      Creatinine, i-STAT 5 0 (H) mg/dl      eGFR 11 ml/min/1 73sq m      Glucose, i-STAT 209 (H) mg/dl      Hct, i-STAT 34 (L) %      Hgb, i-STAT 11 6 (L) g/dl      Specimen Type VENOUS    Basic metabolic panel [[id number]]  (Abnormal) Collected:  12/28/17 1416    Lab Status:  Final result Specimen:  Blood from Arm, Left Updated:  12/28/17 1453     Sodium 132 (L) mmol/L      Potassium 6 3 (HH) mmol/L      Chloride 94 (L) mmol/L      CO2 30 mmol/L      Anion Gap 8 mmol/L      BUN 41 (H) mg/dL      Creatinine 5 07 (H) mg/dL      Glucose 202 (H) mg/dL      Calcium 8 1 (L) mg/dL      eGFR 11 ml/min/1 73sq m     Narrative:         National Kidney Disease Education Program recommendations are as follows:  GFR calculation is accurate only with a steady state creatinine  Chronic Kidney disease less than 60 ml/min/1 73 sq  meters  Kidney failure less than 15 ml/min/1 73 sq  meters  TSH [[id number]]  (Abnormal) Collected:  12/28/17 1416    Lab Status:  Final result Specimen:  Blood from Arm, Left Updated:  12/28/17 1451     TSH 3RD GENERATON 33 965 (H) uIU/mL     Narrative:         Patients undergoing fluorescein dye angiography may retain small amounts of fluorescein in the body for 48-72 hours post procedure  Samples containing fluorescein can produce falsely depressed TSH values  If the patient had this procedure,a specimen should be resubmitted post fluorescein clearance  Protime-INR [[id number]]  (Abnormal) Collected:  12/28/17 1416    Lab Status:  Final result Specimen:  Blood from Arm, Left Updated:  12/28/17 1445     Protime 35 0 (H) seconds      INR 3 32 (H)    APTT [[id number]]  (Abnormal) Collected:  12/28/17 1416    Lab Status:  Final result Specimen:  Blood from Arm, Left Updated:  12/28/17 1445     PTT 79 (H) seconds     Narrative:          Therapeutic Heparin Range = 60-90 seconds    Magnesium [[id number]]  (Normal) Collected:  12/28/17 1416    Lab Status:  Final result Specimen:  Blood from Arm, Left Updated:  12/28/17 1444     Magnesium 2 0 mg/dL     CBC and differential [[id number]]  (Abnormal) Collected:  12/28/17 1416    Lab Status:  Final result Specimen:  Blood from Arm, Left Updated:  12/28/17 1423     WBC 7 96 Thousand/uL      RBC 3 55 (L) Million/uL      Hemoglobin 10 5 (L) g/dL      Hematocrit 34 5 (L) %      MCV 97 fL      MCH 29 6 pg      MCHC 30 4 (L) g/dL      RDW 16 2 (H) %      MPV 8 6 (L) fL      Platelets 631 Thousands/uL      Neutrophils Relative 67 %      Lymphocytes Relative 11 (L) %      Monocytes Relative 12 %      Eosinophils Relative 8 (H) %      Basophils Relative 2 (H) %      Neutrophils Absolute 5 32 Thousands/µL      Lymphocytes Absolute 0 90 Thousands/µL      Monocytes Absolute 0 98 Thousand/µL      Eosinophils Absolute 0 64 (H) Thousand/µL      Basophils Absolute 0 12 (H) Thousands/µL                  XR chest 2 views   ED Interpretation by Lj Zaragoza PA-C (12/28 9555)   Worsening effusion right base  Cardiomegaly  Final Result by Almita Nunez MD (12/28 7638)   Mild central congestion with right basilar pleural effusion and associated airspace opacity either reflecting atelectasis or infiltrate           Workstation performed: [id number]                    Procedures  ECG 12 Lead Documentation  Date/Time: 12/28/2017 1:55 PM  Performed by: Sherlean Gowers  Authorized by: Sherlean Gowers     Indications / Diagnosis:  Hyperkalemia  ECG reviewed by me, the ED Provider: yes    Patient location:  ED  Previous ECG:     Previous ECG:  Compared to current    Comparison ECG info:  11/30/2017    Similarity:  Changes noted    Comparison to cardiac monitor: Yes    Rate:     ECG rate:  54    ECG rate assessment: bradycardic    Rhythm:     Rhythm: sinus bradycardia    Ectopy:     Ectopy: none    QRS:     QRS axis:  Normal    QRS intervals:  Normal  Conduction:     Conduction: abnormal      Abnormal conduction: 1st degree    ST segments:     ST segments:  Normal  T waves:     T waves: normal             Phone Contacts  ED Phone Contact    ED Course  ED Course                                MDM  Number of Diagnoses or Management Options  ESRD (end stage renal disease) on dialysis [facility]): new and requires workup  Hyperkalemia: new and requires workup  Hypothyroidism: new and requires workup  Pleural effusion: new and requires workup     Amount and/or Complexity of Data Reviewed  Clinical lab tests: ordered and reviewed  Tests in the radiology section of CPT®: ordered and reviewed  Tests in the medicine section of CPT®: ordered and reviewed    Risk of Complications, Morbidity, and/or Mortality  Presenting problems: high  Diagnostic procedures: high  Management options: high  General comments: Patient presents emergency room due to an increased potassium during dialysis yesterday  He was found by his physician instructed to follow up with the emergency room for repeat evaluation  Mom he was complaining of dysuria to his wife even though he only urinates 1 to 2 times a week  She started him on Cipro  His INR was also elevated secondary to the antibiotic use  His wife did speak to their primary care provider and Keflex was called in to the pharmacy  Was seen and evaluated in the emergency room  He did have an elevated potassium that was verified on 2 specimens  He had no EKG changes of hyperkalemia  He was treated with the hyperkalemia protocol  He was given Kayexalate for removal   A consult was placed for renal for dialysis in the morning  He was admitted to [facility] service      Patient Progress  Patient progress: stable    CritCare Time    Disposition  Final diagnoses:   Hyperkalemia   ESRD (end stage renal disease) on dialysis ([facility])   Hypothyroidism   Pleural effusion - Chronic right pleural effusion     Time reflects when diagnosis was documented in both MDM as applicable and the Disposition within this note     Time User Action Codes Description Comment    12/28/2017  3:42 PM Antonio Songster Add [E87 5] Hyperkalemia     12/28/2017  3:42 PM Antonio Songster Add [N18 6,  Z99 2] ESRD (end stage renal disease) on dialysis ([facility] [address] )     12/28/2017  3:42 PM Antonio Songster Add [E03 9] Hypothyroidism     12/28/2017  3:42 PM Antonio Songster Add [J90] Pleural effusion     12/28/2017  3:43 PM Antonio Songster Modify [J90] Pleural effusion Chronic right pleural effusion    12/28/2017  3:44 PM Antonio Songster Add [E03 9] Acquired hypothyroidism     12/28/2017  3:44 PM Antonio Songster Modify [E03 9] Acquired hypothyroidism     12/28/2017  3:44 PM Antonio Songster Modify [E03 9] Acquired hypothyroidism     12/28/2017  3:44 PM Gutzweiler, Kaylyn Pence Remove [E03 9] Acquired hypothyroidism ED Disposition     ED Disposition Condition Comment    Admit  Case was discussed with Dr Gavin Moraes and the patient's admission status was agreed to be Admission Status: observation status to the service of Dr Gavin Moraes   Follow-up Information    None       Patient's Medications   Discharge Prescriptions    No medications on file     No discharge procedures on file      ED Provider  Electronically Signed by           Nicole Walls PA-C  12/28/17 5077

## 2017-12-28 NOTE — H&P
History and Physical - 58 [address] [facility] Internal Medicine    Patient Information: Brenda Galvan 74 y o  male MRN: [id number]  Unit/Bed#: ED 13 Encounter: [id number]  Admitting Physician: Bright Yuen MD  PCP: Allyn Sicard, DO  Date of Admission:  12/28/17    Assessment/Plan:    Hospital Problem List:     Principal Problem:    Hyperkalemia  Active Problems:    Type 1 diabetes mellitus with nephropathy (HCC)    ESRD (end stage renal disease) on dialysis [facility])    Ambulatory dysfunction    Chronic combined systolic and diastolic CHF (congestive heart failure) ([facility] [address] )    Anemia    Acquired hypothyroidism    Chronic kidney disease-mineral and bone disorder      Plan for the Primary Problem(s):    · Hyperkalemia patient is provided with IV insulin dextrose solution, calcium gluconate, Kayexalate, albuterol inhalation, monitor potassium levels closely  · End-stage renal disease on hemodialysis nephrology consultation      Plan for Additional Problems:   · Chronic systolic and diastolic congestive heart failure euvolemic monitor  · Chronic history of C diff colitis on oral vancomycin prophylaxis  · History of atrial fibrillation continue Coumadin for anticoagulation  · Diabetes mellitus type 1 monitor Accu-Cheks closely continue insulin  · Ambulatory dysfunction    The patient and his wife who is at the bedside were provided with a detailed updated, they are agreeable with ongoing management and consult requested    VTE Prophylaxis: Warfarin (Coumadin)  / sequential compression device   Code Status:  Full code  POLST: There is no POLST form on file for this patient (pre-hospital)    Anticipated Length of Stay:  Patient will be admitted on an Observation basis with an anticipated length of stay of  Less than 2 midnights  Chief Complaint:       Abnormal lab tests - "I was told I have hyperkalemia"    History of Present Illness:    Brenda Galvan is a 74 y o  male who presents with hypokalemia    Patient reports dietary in discretions the last few weeks, he had dialysis done last couple of days and 14 lb were removed per the wife at bedside  Today he was told by his dialysis center nurse that his potassium is high and was requested to present to the hospital   The wife called the cardiologist requested the patient to present to the hospital for further evaluation  In the ED, he was noted to have potassium of 6 3 he is receiving IV calcium gluconate, albuterol inhalation and insulin dextrose IV  Patient is comfortably lying in bed, he denies chest pain palpitations presyncope syncope  Denies fever chills sweats or constitutional symptoms  No history of abdominal pain nausea vomiting or diarrhea  Review of Systems:    Review of Systems   All other systems reviewed and are negative  Past Medical and Surgical History:     Past Medical History:   Diagnosis Date    Acquired hypothyroidism     underactive    Atrial fibrillation ([place] [address] )     Chronic kidney disease-mineral and bone disorder 11/27/2017    Clostridium difficile infection 04/2017    Diabetes mellitus ([place] [address] )     Dialysis patient ([place] [address] )     Diarrhea     ESRD (end stage renal disease) on dialysis ([place] [address] )     Hx of cardiac arrest     Hypertension     Neuropathy     Right lower lobe pneumonia ([place] [address] ) 2/20/2017    Sepsis ([place] [address] ) 04/2017    Polymicrobial MRSA, VRE    Systolic and diastolic CHF, chronic (HCC)     EF 35%, Grade II DD       Past Surgical History:   Procedure Laterality Date    CATARACT EXTRACTION      CHG GI ENDOSCOPIC ULTRASOUND N/A 3/10/2016    Procedure: LINEAR ENDOSCOPIC U/S;  Surgeon: Cameron Quiroz MD;  Location: [facility]; Service: Gastroenterology    CHOLECYSTECTOMY      GASTROSTOMY TUBE PLACEMENT N/A 4/12/2017    Procedure: INSERTION PEG TUBE;  Surgeon: Dylan Cai MD;  Location[facility];  Service:    Fina Mare LEG AMPUTATION THROUGH LOWER TIBIA AND FIBULA Right 4/6/2017    Procedure: AMPUTATION BELOW KNEE (BKA);   Surgeon: Maki Payan DO; Location: [facility];  Service:     TOE AMPUTATION  2000       Meds/Allergies:    Prior to Admission medications    Medication Sig Start Date End Date Taking?  Authorizing Provider   acetaminophen (TYLENOL) 325 mg tablet Take 650 mg by mouth every 6 (six) hours as needed for mild pain    Historical Provider, MD   amiodarone 200 mg tablet Take 1 tablet by mouth 3 (three) times a day for 30 days 12/7/17 1/6/18  Priscilla Ascencio MD   b complex-vitamin C-folic acid (RENAL) 1 mg Take 1 mg by mouth daily    Historical Provider, MD   calcium citrate (CALCITRATE) 950 MG tablet Take 2,400 mg by mouth 3 (three) times a day with meals    Historical Provider, MD   cholestyramine sugar free (QUESTRAN LIGHT) 4 g packet Take 1 packet by mouth 2 (two) times a day for 30 days 12/7/17 1/6/18  Priscilla Ascencio MD   cinacalcet (SENSIPAR) 30 mg tablet Take 30 mg by mouth daily at bedtime    Historical Provider, MD   citalopram (CeleXA) 10 mg tablet Take 10 mg by mouth daily      Historical Provider, MD   diphenoxylate-atropine (LOMOTIL) 2 5-0 025 mg/5 mL liquid Take 5 mL by mouth 4 (four) times a day as needed for diarrhea    Historical Provider, MD   gabapentin (NEURONTIN) 300 mg capsule Take 300 mg by mouth daily at bedtime    Historical Provider, MD   insulin detemir (LEVEMIR) 100 units/mL subcutaneous injection Inject 5 Units under the skin 2 (two) times a day for 30 days 12/7/17 1/6/18  Priscilla Ascencio MD   insulin lispro (HumaLOG) 100 units/mL injection Inject 2 Units under the skin 3 (three) times a day before meals for 30 days 12/7/17 1/6/18  Priscilla Ascencio MD   levothyroxine 137 mcg tablet Take 1 tablet by mouth daily in the early morning for 30 days 12/8/17 1/7/18  Priscilla Ascencio MD   lisinopril (ZESTRIL) 5 mg tablet Take 1 tablet by mouth 2 (two) times a day 12/7/17   Priscilla Ascencio MD   metoprolol tartrate (LOPRESSOR) 25 mg tablet Take 1 tablet by mouth every 12 (twelve) hours for 30 days 12/7/17 1/6/18  Priscilla Ascencio MD   nystatin (MYCOSTATIN) powder Apply topically 2 (two) times a day 12/7/17   Arben Price MD   Probiotic Product (PRO-BIOTIC BLEND) CAPS Take 1 capsule by mouth daily    Historical Provider, MD   sevelamer carbonate (RENVELA) 800 mg tablet Take 800 mg by mouth 3 (three) times a day as needed      Historical Provider, MD   traMADol (ULTRAM) 50 mg tablet Take 50 mg by mouth every 6 (six) hours as needed for moderate pain    Historical Provider, MD   vancomycin (VANCOCIN) 50mg/mL SOLN Take 2 5 mL by mouth daily 12/7/17   Arben Price MD   warfarin (COUMADIN) 5 mg tablet Take 1 tablet by mouth daily 12/7/17   Arben Price MD     I have reviewed home medications with patient personally  Allergies: No Known Allergies    Social History:     Marital Status: /Civil Union   Occupation:   Patient Pre-hospital Living Situation: home  Patient Pre-hospital Level of Mobility:  Ambulatory dysfunction  Patient Pre-hospital Diet Restrictions:  Dialysis diet  Substance Use History:   History   Alcohol Use No     History   Smoking Status    Former Smoker    Packs/day: 1 00    Years: 46 00    Types: Cigarettes    Start date: 5    Quit date: 3/1/2017   Smokeless Tobacco    Never Used     History   Drug Use No       Family History:    Family History   Problem Relation Age of Onset    Diabetes Father     Cancer Other     Lupus Mother        Physical Exam:     Vitals:   Blood Pressure: 120/57 (12/28/17 1600)  Pulse: 56 (12/28/17 1600)  Temperature: 97 9 °F (36 6 °C) (12/28/17 1247)  Temp Source: Oral (12/28/17 1247)  Respirations: 16 (12/28/17 1417)  Weight - Scale: 64 kg (141 lb) (12/28/17 1247)  SpO2: 92 % (12/28/17 1610)    Physical Exam   Constitutional: He is oriented to person, place, and time  No distress  HENT:   Head: Normocephalic  Mouth/Throat: No oropharyngeal exudate  Eyes: Pupils are equal, round, and reactive to light  Right eye exhibits no discharge  Left eye exhibits no discharge  No scleral icterus  Neck: Normal range of motion  No JVD present  No tracheal deviation present  No thyromegaly present  Cardiovascular: Normal rate  No murmur heard  Pulmonary/Chest: Effort normal  No respiratory distress  He has no wheezes  He has no rales  He exhibits no tenderness  Abdominal: Soft  He exhibits no distension and no mass  There is no tenderness  There is no rebound and no guarding  Musculoskeletal: Normal range of motion  Right lower extremity below-knee amputation noted   Lymphadenopathy:     He has no cervical adenopathy  Neurological: He is alert and oriented to person, place, and time  Skin: Skin is warm  No rash noted  He is not diaphoretic  Vitals reviewed  Additional Data:     Lab Results: I have personally reviewed pertinent reports  Results from last 7 days  Lab Units 12/28/17  1508 12/28/17  1416   WBC Thousand/uL  --  7 96   HEMOGLOBIN g/dL  --  10 5*   I STAT HEMOGLOBIN g/dl 11 6*  --    HEMATOCRIT %  --  34 5*   PLATELETS Thousands/uL  --  273   NEUTROS PCT %  --  67   LYMPHS PCT %  --  11*   MONOS PCT %  --  12   EOS PCT %  --  8*       Results from last 7 days  Lab Units 12/28/17  1508 12/28/17  1416   SODIUM mmol/L  --  132*   POTASSIUM mmol/L  --  6 3*   CHLORIDE mmol/L  --  94*   CO2 mmol/L  --  30   BUN mg/dL  --  41*   CREATININE mg/dL  --  5 07*   CALCIUM mg/dL  --  8 1*   GLUCOSE RANDOM mg/dL  --  202*   GLUCOSE, ISTAT mg/dl 209*  --        Results from last 7 days  Lab Units 12/28/17  1416   INR  3 32*       Imaging: I have personally reviewed pertinent reports  Xr Chest Portable    Result Date: 11/29/2017  Narrative: CHEST INDICATION:  Endotracheal tube  Altered mental status, unresponsive  COMPARISON:  None VIEWS:   AP frontal IMAGES:  2 FINDINGS:  Endotracheal tube tip is in satisfactory position above the martín  External pacer/defibrillator pads overlying the chest bilaterally noted  Cardiomediastinal silhouette is stable   Unchanged right basilar opacity representing pleural effusion with underlying consolidation  Left lung is clear  Visualized osseous structures appear within normal limits for the patient's age  Impression: Endotracheal tube in satisfactory position  Persistent right pleural effusion with underlying consolidation  Workstation performed: [id number]     Xr Chest 2 Views    Result Date: 12/28/2017  Narrative: CHEST INDICATION:  Dyspnea COMPARISON:  11/30/2017 VIEWS:  Frontal and lateral projections IMAGES:  2 FINDINGS:     Cardiomediastinal silhouette appears unremarkable  Mild central congestion  Moderate right basilar pleural effusion with associated airspace opacity, likely atelectasis with pneumonitis not excluded  Lungs otherwise clear  No pneumothorax  Visualized osseous structures appear within normal limits for the patient's age  Impression: Mild central congestion with right basilar pleural effusion and associated airspace opacity either reflecting atelectasis or infiltrate  Workstation performed: [id number]     Ct Head Wo Contrast    Result Date: 11/29/2017  Narrative: CT BRAIN - WITHOUT CONTRAST INDICATION:  NEURO DEFICIT(S), SUBACUTE, PROGRESSIVE OR FLUCTUATING  History taken directly from the electronic ordering system  Altered mental status, unresponsive  COMPARISON:  11/27/2017 TECHNIQUE:  CT examination of the brain was performed  In addition to axial images, coronal reformatted images were created and submitted for interpretation  Radiation dose length product (DLP) for this visit:  1326 mGy-cm   This examination, like all CT scans performed in the [facility], was performed utilizing techniques to minimize radiation dose exposure, including the use of iterative reconstruction and automated exposure control  IMAGE QUALITY:  Diagnostic  FINDINGS:  PARENCHYMA:  No intracranial mass, mass effect or midline shift  No CT signs of acute infarction  There is no parenchymal hemorrhage  VENTRICLES AND EXTRA-AXIAL SPACES:  Normal for patient's age  VISUALIZED ORBITS AND PARANASAL SINUSES:  Unremarkable  Partially imaged endotracheal tube noted  CALVARIUM AND EXTRACRANIAL SOFT TISSUES:   Normal      Impression: No acute intracranial abnormality  Workstation performed: [id number]     Xr Chest Portable Icu    Result Date: 11/30/2017  Narrative: CHEST INDICATION:  Follow-up, pneumonia COMPARISON:  11/29/2017 VIEWS:   AP frontal IMAGES:  1 FINDINGS:     Heart shadow is enlarged but unchanged from prior exam  Persistent right-sided pleural effusion is seen, similar in size to prior study with underlying right basilar airspace disease, again similar  Left hemithorax unremarkable  Endotracheal tube tip partially obscured by EKG leads but felt to be in satisfactory position approximately 2 7 cm above the martín  No pneumothorax Visualized osseous structures appear within normal limits for the patient's age  Impression: Unchanged size of right pleural effusion with underlying right basilar airspace disease similar to prior study  Endotracheal tube positioning appropriate Workstation performed: [id number]       EKG, Pathology, and Other Studies Reviewed on Admission:   · EKG:  Sinus bradycardia, no ST-T changes    Allscripts / Epic Records Reviewed: Yes     ** Please Note: This note has been constructed using a voice recognition system   **

## 2017-12-29 ENCOUNTER — APPOINTMENT (OUTPATIENT)
Dept: DIALYSIS | Facility: HOSPITAL | Age: 67
End: 2017-12-29
Payer: COMMERCIAL

## 2017-12-29 VITALS
DIASTOLIC BLOOD PRESSURE: 52 MMHG | TEMPERATURE: 97.6 F | BODY MASS INDEX: 22.76 KG/M2 | RESPIRATION RATE: 18 BRPM | OXYGEN SATURATION: 98 % | HEART RATE: 65 BPM | WEIGHT: 141 LBS | SYSTOLIC BLOOD PRESSURE: 109 MMHG

## 2017-12-29 PROBLEM — I48.91 ATRIAL FIBRILLATION (HCC): Status: ACTIVE | Noted: 2017-12-29

## 2017-12-29 LAB
ANION GAP SERPL CALCULATED.3IONS-SCNC: 12 MMOL/L (ref 4–13)
ATRIAL RATE: 54 BPM
BUN SERPL-MCNC: 51 MG/DL (ref 5–25)
CALCIUM SERPL-MCNC: 7.7 MG/DL (ref 8.3–10.1)
CHLORIDE SERPL-SCNC: 95 MMOL/L (ref 100–108)
CO2 SERPL-SCNC: 26 MMOL/L (ref 21–32)
CREAT SERPL-MCNC: 6.24 MG/DL (ref 0.6–1.3)
ERYTHROCYTE [DISTWIDTH] IN BLOOD BY AUTOMATED COUNT: 15.8 % (ref 11.6–15.1)
GFR SERPL CREATININE-BSD FRML MDRD: 9 ML/MIN/1.73SQ M
GLUCOSE SERPL-MCNC: 123 MG/DL (ref 65–140)
GLUCOSE SERPL-MCNC: 244 MG/DL (ref 65–140)
GLUCOSE SERPL-MCNC: 261 MG/DL (ref 65–140)
HCT VFR BLD AUTO: 30.4 % (ref 36.5–49.3)
HGB BLD-MCNC: 9.2 G/DL (ref 12–17)
MAGNESIUM SERPL-MCNC: 1.8 MG/DL (ref 1.6–2.6)
MCH RBC QN AUTO: 29.1 PG (ref 26.8–34.3)
MCHC RBC AUTO-ENTMCNC: 30.3 G/DL (ref 31.4–37.4)
MCV RBC AUTO: 96 FL (ref 82–98)
P AXIS: 63 DEGREES
PHOSPHATE SERPL-MCNC: 6.1 MG/DL (ref 2.3–4.1)
PLATELET # BLD AUTO: 260 THOUSANDS/UL (ref 149–390)
PMV BLD AUTO: 8.3 FL (ref 8.9–12.7)
POTASSIUM SERPL-SCNC: 6.3 MMOL/L (ref 3.5–5.3)
PR INTERVAL: 240 MS
QRS AXIS: -22 DEGREES
QRSD INTERVAL: 118 MS
QT INTERVAL: 508 MS
QTC INTERVAL: 481 MS
RBC # BLD AUTO: 3.16 MILLION/UL (ref 3.88–5.62)
SODIUM SERPL-SCNC: 133 MMOL/L (ref 136–145)
T WAVE AXIS: 140 DEGREES
VENTRICULAR RATE: 54 BPM
WBC # BLD AUTO: 9.52 THOUSAND/UL (ref 4.31–10.16)

## 2017-12-29 PROCEDURE — G0257 UNSCHED DIALYSIS ESRD PT HOS: HCPCS

## 2017-12-29 PROCEDURE — 85027 COMPLETE CBC AUTOMATED: CPT | Performed by: INTERNAL MEDICINE

## 2017-12-29 PROCEDURE — 82948 REAGENT STRIP/BLOOD GLUCOSE: CPT

## 2017-12-29 PROCEDURE — 80048 BASIC METABOLIC PNL TOTAL CA: CPT | Performed by: INTERNAL MEDICINE

## 2017-12-29 PROCEDURE — 84100 ASSAY OF PHOSPHORUS: CPT | Performed by: INTERNAL MEDICINE

## 2017-12-29 PROCEDURE — 83735 ASSAY OF MAGNESIUM: CPT | Performed by: INTERNAL MEDICINE

## 2017-12-29 RX ORDER — CINACALCET 30 MG/1
30 TABLET, FILM COATED ORAL
Status: DISCONTINUED | OUTPATIENT
Start: 2017-12-29 | End: 2017-12-29 | Stop reason: HOSPADM

## 2017-12-29 RX ADMIN — AMIODARONE HYDROCHLORIDE 200 MG: 200 TABLET ORAL at 13:27

## 2017-12-29 RX ADMIN — NEPHROCAP 1 CAPSULE: 1 CAP ORAL at 13:27

## 2017-12-29 RX ADMIN — CHOLESTYRAMINE 4 G: 4 POWDER, FOR SUSPENSION ORAL at 13:26

## 2017-12-29 RX ADMIN — CALCIUM ACETATE 1334 MG: 667 CAPSULE ORAL at 13:26

## 2017-12-29 RX ADMIN — CITALOPRAM HYDROBROMIDE 10 MG: 20 TABLET ORAL at 13:27

## 2017-12-29 RX ADMIN — NYSTATIN: 100000 POWDER TOPICAL at 13:30

## 2017-12-29 RX ADMIN — INSULIN DETEMIR 5 UNITS: 100 INJECTION, SOLUTION SUBCUTANEOUS at 13:34

## 2017-12-29 RX ADMIN — INSULIN LISPRO 2 UNITS: 100 INJECTION, SOLUTION INTRAVENOUS; SUBCUTANEOUS at 13:28

## 2017-12-29 RX ADMIN — INSULIN LISPRO 2 UNITS: 100 INJECTION, SOLUTION INTRAVENOUS; SUBCUTANEOUS at 09:30

## 2017-12-29 RX ADMIN — VANCOMYCIN 125 MG: KIT at 13:34

## 2017-12-29 RX ADMIN — METOPROLOL TARTRATE 25 MG: 25 TABLET ORAL at 13:28

## 2017-12-29 NOTE — ASSESSMENT & PLAN NOTE
SBPs stable 120-135  Due to hyperkalemia and soft Bps, stop home med lisinopril 5 mg bid  Continue metoprolol 25 mg bid

## 2017-12-29 NOTE — SOCIAL WORK
CM met with patient at bedside and reviewed OBS status and had GUADALUPE signed  Patient is not currently a bundle   CM met with patient and his wife Valdemar Green at bedside  Patient lives with his wife Valdemar Green and their daughter and son in law  Dariel has RX coverage and uses [facility] on [address] with no issues   Patient goes to dialysis at [facility] at 12:45 PM prior to him going to the [address] patient's wife was transporting him to his dialysis  Colie Pj does not work   Patient's wife provides all needed transport  Patient is currently open with [facility] and a referral for [facility] will be sent in  Patient has the following DME at home: shower chair, hospital bed, WC, RW, BSC, Life Chair, Ramp, and over the bed table  Patient reported he did not need CM to set up follow up PCP appointment for him       CM reviewed d/c planning process including the following: identifying help at home, patient preference for d/c planning needs, [facility] Meds to Bed program, availability of treatment team to discuss questions or concerns patient and/or family may have regarding diagnosis, plan for care, old or new medications and discharge planning   CM name and role reviewed   CM will follow needs at discharge

## 2017-12-29 NOTE — ASSESSMENT & PLAN NOTE
Dialysis MWF  Continue Phoslo, Renvela, Sensipar  Cr in range 3 9-8 7, Cr on admission 5 0, 6 3 today 12/29

## 2017-12-29 NOTE — ASSESSMENT & PLAN NOTE
AOCD, Hgb 11 6, close to goal   On [place] as outpatient, receives weekly Venofer  No active signs of bleeding

## 2017-12-29 NOTE — ASSESSMENT & PLAN NOTE
Monitor on tele  Continue amiodarone, metoprolol  Continue Coumadin for anticoagulation, INR 3 32 on 12/28

## 2017-12-29 NOTE — DISCHARGE SUMMARY
Discharge- Madelaine Andino 1950, 74 y o  male MRN: [id number]    Unit/Bed#: -01 Encounter: [id number]    Primary Care Provider: Drake Morales DO   Date and time admitted to hospital: 12/28/2017 12:59 PM        * Hyperkalemia   Assessment & Plan    Etiology ESRD vs  Dietary indescretion  Potassium at dialysis preadmission was 7 6, in ED noted to have potassium of 6 3  Received IV calcium gluconate, albuterol inhalation, kayexalate,  and insulin dextrose Iv  Potassium 5 6 12/28, K overnight lindsey to 6 3 with no po intake as per pt  HD today 12/29, 1K bath, ok for discharge after dialysis as per nephrology  Next HD Sunday due to the holiday  D/c lisinopril as per nephrology  ESRD (end stage renal disease) on dialysis [facility])   Assessment & Plan    Dialysis MWF  Continue Phoslo, Renvela, Sensipar  Cr in range 3 9-8 7, Cr on admission 5 0, 6 3 today 12/29  Type 1 diabetes mellitus with nephropathy ([facility] [address] )   Assessment & Plan    On ISS, monitor fingersticks  Continue home insulin regimen at d/c  A1c 9 4 11/2017  Atrial fibrillation [facility])   Assessment & Plan    Monitor on tele  Continue amiodarone, metoprolol  Continue Coumadin for anticoagulation, INR 3 32 on 12/28  Chronic kidney disease-mineral and bone disorder   Assessment & Plan    Continue phoslo TID with meals, Sensipar 30 mg qd with dinner          Acquired hypothyroidism   Assessment & Plan    Continue levothyroxine  TSH 33 9 in ED  Anemia   Assessment & Plan    AOCD, Hgb 11 6, close to goal   On [place] as outpatient, receives weekly Venofer  No active signs of bleeding  Chronic combined systolic and diastolic CHF (congestive heart failure) (HCC)   Assessment & Plan    Monitor volume status, appears euvolemic  D/c lisinopril, continue metoprolol  Ambulatory dysfunction   Assessment & Plan    POA, stable  No further intervention necessary           H/O Clostridium difficile infection   Assessment & Plan    History of chronic c  Diff colitis   Contact precautions, on po vanco prophylaxis    Continue Lomotil prn for diarrhea, pt currently denies diarrhea  Hypertension   Assessment & Plan    SBPs stable 120-135  Due to hyperkalemia and soft Bps, stop home med lisinopril 5 mg bid  Continue metoprolol 25 mg bid  Consultations During Hospital Stay:  · nephrology    Procedures Performed:     · none    Significant Findings / Test Results:     · Hyperkalemia on admission, now resolved    Incidental Findings:   · None     Test Results Pending at Discharge (will require follow up):   · none     Outpatient Tests Requested:  · none    Complications:  None     Reason for Admission: Hyperkalemia     Hospital Course:     Nancy Vuong is a 74 y o  male patient who originally presented to the hospital on 12/28/2017 due to hyperkalemia  Pt came from dialysis, potassium was 6 7 in Ed  Given albuterol, kayexelate, calcium gluconate, and IV insulin dextrose, K decreased to 6 0 Nephrology consulted to assist in ESRD management  Please see above list of diagnoses and related plan for additional information  Condition at Discharge: stable     Discharge Day Visit / Exam:     Subjective:  Mr May West: Blood Pressure: 109/52 (12/29/17 1230)  Pulse: 65 (12/29/17 1230)  Temperature: 97 6 °F (36 4 °C) (12/28/17 2300)  Temp Source: Oral (12/28/17 2300)  Respirations: 18 (12/28/17 2300)  Weight - Scale: 64 kg (141 lb) (12/28/17 1247)  SpO2: 98 % (12/28/17 2300)  Exam:   Physical Exam   Constitutional: He is oriented to person, place, and time  He appears well-nourished  No distress  HENT:   Head: Normocephalic  Neck: Neck supple  Cardiovascular: Normal rate, regular rhythm and normal heart sounds  Pulmonary/Chest: Effort normal and breath sounds normal  He has no wheezes  Abdominal: Soft  He exhibits no distension  There is no tenderness     Neurological: He is alert and oriented to person, place, and time  Skin: Skin is warm and dry  Psychiatric: He has a normal mood and affect  Discussion with Family: discussed with wife    Discharge instructions/Information to patient and family:   See after visit summary for information provided to patient and family  Provisions for Follow-Up Care:  See after visit summary for information related to follow-up care and any pertinent home health orders  Disposition:     Home      Planned Readmission: none     Discharge Statement:  I spent 60 minutes discharging the patient  This time was spent on the day of discharge  I had direct contact with the patient on the day of discharge  Greater than 50% of the total time was spent examining patient, answering all patient questions, arranging and discussing plan of care with patient as well as directly providing post-discharge instructions  Additional time then spent on discharge activities  Discharge Medications:  See after visit summary for reconciled discharge medications provided to patient and family        ** Please Note: This note has been constructed using a voice recognition system **

## 2017-12-29 NOTE — DISCHARGE INSTRUCTIONS
Hyperkalemia   WHAT YOU NEED TO KNOW:   Hyperkalemia is a high level of potassium in your blood  Potassium helps control how your muscles, heart, and digestive system work  DISCHARGE INSTRUCTIONS:   Medicines:   · Medicines  will be given to remove potassium from your body  This will lower your potassium levels  This medicine may be given as a pill or an enema  · Take your medicine as directed  Contact your healthcare provider if you think your medicine is not helping or if you have side effects  Tell him of her if you are allergic to any medicine  Keep a list of the medicines, vitamins, and herbs you take  Include the amounts, and when and why you take them  Bring the list or the pill bottles to follow-up visits  Carry your medicine list with you in case of an emergency  Limit the amount of potassium you eat:  Foods that are high in potassium include bananas, tomatoes, oranges, turkey, and milk  Orange juice, citrus juices, and tomato juice are also high in potassium  Do not use salt substitutes  You may need to meet with a dietitian to help plan the best meals for you  Follow up with your healthcare provider as directed:  Write down your questions so you remember to ask them during your visits  Contact your healthcare provider if:   · You have nausea or are vomiting  · You have numbness or tingling in your arms or legs  · Your symptoms do not go away or they get worse  · You have questions or concerns about your condition or care  Seek care immediately or call 911 if:   · You have trouble breathing  · You have an irregular heartbeat  · You have trouble controlling your muscles  · You are too tired or weak to stand up  © 2017 [employer]  Information is for End User's use only and may not be sold, redistributed or otherwise used for commercial purposes   All illustrations and images included in CareNotes® are the copyrighted property of [employer]  or [employer]  The above information is an  only  It is not intended as medical advice for individual conditions or treatments  Talk to your doctor, nurse or pharmacist before following any medical regimen to see if it is safe and effective for you

## 2017-12-29 NOTE — ASSESSMENT & PLAN NOTE
Etiology ESRD vs  Dietary indescretion  Potassium at dialysis preadmission was 7 6, in ED noted to have potassium of 6 3  Received IV calcium gluconate, albuterol inhalation, kayexalate,  and insulin dextrose Iv  Potassium 5 6 12/28, K overnight lindsey to 6 3 with no po intake as per pt  HD today 12/29, 1K [place], ok for discharge after dialysis as per nephrology  Next HD Sunday due to the holiday  D/c lisinopril as per nephrology

## 2017-12-29 NOTE — CASE MANAGEMENT
Initial Clinical Review    Admission: Date/Time/Statement: 12/28/2017  1545 OBSERVATION    Orders Placed This Encounter   Procedures    Place in Observation (expected length of stay for this patient is less than two midnights)     Standing Status:   Standing     Number of Occurrences:   1     Order Specific Question:   Admitting Physician     Answer:   Steven Desai     Order Specific Question:   Level of Care     Answer:   Med Surg [16]         ED: Date/Time/Mode of Arrival:   ED Arrival Information     Expected Arrival Acuity Means of Arrival Escorted By Service Admission Type    - 12/28/2017 12:16 Urgent Wheelchair Family Member General Medicine Urgent    Arrival Complaint    abnormal lab potassium high          Chief Complaint:   Chief Complaint   Patient presents with    Abnormal Lab     had elevated potassium yesterday of 7 6 before dialysis  was told to come in today for eval  pt was also vomiting last nigh       History of Illness: 74 y o  male who presents with hyperkalemia  Patient reports dietary in discretions the last few weeks, he had dialysis done last couple of days and 14 lb were removed per the wife at bedside  Today he was told by his dialysis center nurse that his potassium is high and was requested to present to the hospital   The wife called the cardiologist requested the patient to present to the hospital for further evaluation  In the ED, he was noted to have potassium of 6 3 he is receiving IV calcium gluconate, albuterol inhalation and insulin dextrose IV      ED Vital Signs:   ED Triage Vitals [12/28/17 1247]   Temperature Pulse Respirations Blood Pressure SpO2   97 9 °F (36 6 °C) 56 18 118/57 96 %      Temp Source Heart Rate Source Patient Position - Orthostatic VS BP Location FiO2 (%)   Oral Monitor Sitting Left arm --      Pain Score       No Pain        Wt Readings from Last 1 Encounters:   12/28/17 64 kg (141 lb)       Vital Signs (abnormal):  Low pulse 54  Abnormal Labs/Diagnostic Test Results:   Na 132, K 6 3  Slightly hemolyzed  Cl 94  Bun 41  Creatinine 5 07  Glucose 202  Calcium 8 1    TSH 3rd generation 33 965  INR 3 32  PTT 79  hgb 10 5, hct 34 5  CxR- Mild central congestion with right basilar pleural effusion and associated airspace opacity either reflecting atelectasis or infiltrate  EKG - Sinus bradycardia with 1st degree A-V block  Incomplete right bundle branch block  Anteroseptal infarct , age undetermined  Abnormal ECG  When compared with ECG of 30-NOV-2017 06:47,  Significant changes have occurred    1821- glucose 257    2304- na 134, K 5 6 cl 95  Bun 48  Creatinine 5 91  Glucose 454  Calcium 7 8  Labs 12/29 am - na 133, K 6 3, cl 95  Bun 51  Creatinine 6 24  Calcium 7  7    hgb 9 2, hct 30 4  Phosphorous 6 1      ED Treatment:   Medication Administration from 12/28/2017 1216 to 12/28/2017 1659       Date/Time Order Dose Route Action Action by Comments     12/28/2017 1620 calcium gluconate 1 g in sodium chloride 0 9 % 100 mL IVPB 0 g Intravenous Stopped Ana Avalos RN      12/28/2017 1556 calcium gluconate 1 g in sodium chloride 0 9 % 100 mL IVPB 1 g Intravenous [address] Ana Avalos RN      12/28/2017 1607 insulin regular (HumuLIN R,NovoLIN R) injection 10 Units 10 Units Subcutaneous Not Given Ana Avalos RN      12/28/2017 1623 dextrose 50 % IV solution 25 mL 25 mL Intravenous Given Ana Avalos RN      12/28/2017 1630 sodium polystyrene sulfonate (KAYEXALATE) oral suspension 15 g 15 g Oral Given Ana Avalos RN      12/28/2017 1609 albuterol inhalation solution 10 mg 10 mg Nebulization Given Jia Boyer, RT      12/28/2017 1624 insulin regular (HumuLIN R,NovoLIN R) injection 10 Units 10 Units Intravenous Given Ana Avalos RN           Past Medical/Surgical History:    Active Ambulatory Problems     Diagnosis Date Noted    Type 1 diabetes mellitus with nephropathy ([facility] [address]     ESRD (end stage renal disease) on dialysis [facility])     Ambulatory dysfunction 12/12/2016    Recurrent colitis due to Clostridium difficile 02/22/2017    S/P percutaneous endoscopic gastrostomy (PEG) tube placement (HCC) 06/16/2017    Pleural effusion on right 06/16/2017    Chronic combined systolic and diastolic CHF (congestive heart failure) ([facility] [address] ) 06/16/2017    R Fibula fracture, proximal to stump 06/17/2017    Pseudomonas urinary tract infection 06/19/2017    DVT, lower extremity ([address] ) 08/08/2017    Hyponatremia 08/08/2017    Clostridium difficile infection 09/18/2017    Anemia 09/20/2017    Acquired hypothyroidism     Chronic kidney disease-mineral and bone disorder 11/27/2017    Hyperkalemia 11/28/2017     Resolved Ambulatory Problems     Diagnosis Date Noted    Hypertension     Hypothyroidism     Neuropathy     Sepsis ([facility] [address] ) 09/10/2016    Encephalopathy 09/10/2016    H/O Clostridium difficile infection 09/13/2016    Cellulitis of left thigh 11/10/2016    Cellulitis of right lower extremity 12/11/2016    Hyperglycemia 12/11/2016    Leg pain 12/11/2016    Stage 2 skin ulcer of sacral region 12/12/2016    Hyponatremia 12/12/2016    Tobacco abuse 12/12/2016    Diabetic ulcer of right foot associated with type 1 diabetes mellitus ([facility] [address] ) 12/12/2016    Hemoptysis 12/13/2016    Acute respiratory failure with hypoxia ([facility] [address] ) 12/13/2016    Bursitis of left elbow 12/30/2016    Hypomagnesemia 12/30/2016    Lactic acidosis 12/30/2016    Encephalopathy acute 02/18/2017    Lethargy 02/18/2017    Cellulitis of right leg 02/18/2017    Rapid atrial fibrillation ([facility] [address] ) 02/18/2017    Foot ulcer ([facility] [address] ) 02/18/2017    HCAP (healthcare-associated pneumonia) 02/20/2017    Bacteremia 04/02/2017    Cardiac arrest ([address] ) 04/02/2017    Dysphagia 06/16/2017    Wound of right leg on R BKA stump 06/16/2017    Transaminitis 06/16/2017    Syncope 07/07/2017    Epigastric pain 07/11/2017    acute Encephalopathy, suspected metabolic 83/76/2915    HTN (hypertension) 08/08/2017    Goals of care, counseling/discussion 08/08/2017    Acute encephalopathy 08/10/2017    DKA (diabetic ketoacidoses) ([place] [address] ) 78/46/3566    Metabolic acidosis, increased anion gap 09/17/2017    Hypoglycemia 09/20/2017    Thrombocytopenia (HCC) 09/22/2017    Hyponatremia 09/24/2017    Elevated INR 11/28/2017    Hypotension 11/29/2017     Past Medical History:   Diagnosis Date    Acquired hypothyroidism     Atrial fibrillation ([place] [address] )     Chronic kidney disease-mineral and bone disorder 11/27/2017    Clostridium difficile infection 04/2017    Diabetes mellitus ([place] [address] )     Dialysis patient ([place] [address] )     Diarrhea     ESRD (end stage renal disease) on dialysis ([place] [address] )     Hx of cardiac arrest     Hypertension     Neuropathy     Right lower lobe pneumonia ([place] [address] ) 2/20/2017    Sepsis ([place] [address] ) 06/2386    Systolic and diastolic CHF, chronic (HCC)        Admitting Diagnosis: Hyperkalemia [E87 5]  Hypothyroidism [E03 9]  Pleural effusion [J90]  Abnormal laboratory test [R89 9]  ESRD (end stage renal disease) on dialysis ([place] [address] ) [N18 6, Z99 2]    Age/Sex: 74 y o  male  Assessment/Plan: Hyperkalemia patient is provided with IV insulin dextrose solution, calcium gluconate, Kayexalate, albuterol inhalation, monitor potassium levels closely  · End-stage renal disease on hemodialysis nephrology consultation        Plan for Additional Problems:   · Chronic systolic and diastolic congestive heart failure euvolemic monitor  · Chronic history of C diff colitis on oral vancomycin prophylaxis  · History of atrial fibrillation continue Coumadin for anticoagulation  · Diabetes mellitus type 1 monitor Accu-Cheks closely continue insulin  Ambulatory dysfunction    Admission Orders:  TELE  12/28/2017  9760 OBSERVATION  Scheduled Meds:   amiodarone 200 mg Oral TID With Meals   b complex-vitamin C-folic acid 1 capsule Oral Daily   calcium acetate 1,334 mg Oral TID With Meals cholestyramine sugar free 4 g Oral BID   cinacalcet 30 mg Oral HS   citalopram 10 mg Oral Daily   docusate sodium 100 mg Oral BID   gabapentin 300 mg Oral HS   insulin detemir 5 Units Subcutaneous BID   insulin lispro 2 Units Subcutaneous TID AC   levothyroxine 137 mcg Oral Early Morning   metoprolol tartrate 25 mg Oral Q12H [facility]   nystatin  Topical BID   vancomycin 125 mg Oral Q12H     Continuous Infusions:    PRN Meds: not used:   acetaminophen    aluminum-magnesium hydroxide-simethicone    diphenoxylate-atropine    ondansetron    traMADol     OTHER ORDERS: scds  Renal diet  Consult nephrology      Thank you,  [facility] in the [facility] for 2017  Network Utilization Review Department  Phone: [phone]; Fax [phone]  ATTENTION: The Network Utilization Review Department is now centralized for our 7 Facilities  Make a note that we have a new phone and fax numbers for our Department  Please call with any questions or concerns to [phone] and carefully follow the prompts so that you are directed to the right person  All voicemails are confidential  Fax any determinations, approvals, denials, and requests for initial or continue stay review clinical to [phone]  Due to HIGH CALL volume, it would be easier if you could please send faxed requests to expedite your requests and in part, help us provide discharge notifications faster

## 2017-12-29 NOTE — CONSULTS
Consultation - Nephrology   Teofilo Koch 74 y o  male MRN: [id number]  Unit/Bed#: -01 Encounter: [id number]    ASSESSMENT:  1  ESRD on HD (MWF @ [address]): His last treatment was on Wednesday  Apparently, they took off 13 pounds that treatment  - he has 5kg on today but his BP is soft  - he admits to drinking a lot of fluids  2  Access: Right AVF  3  Hypertension: BP soft  4  Anemia: hgb close to goal  - on micera as an outpatient  - receives weekly venofer  5  Mineral and bone disease  - his wife made him cut out dark soda recently to decrease his phosphorus  - continue phoslo TID with meals  - continue sensipar 30mg daily with dinner  6  Hyperkalemia: patient denies dietary indiscretion (denies eating potatoes, tomatoes, oranges, bananas)  - on his last admission, he was eating the BRAT diet  - 1K potassium bath  7  Diarrhea: on oral vancomycin    PLAN:  HD today  Next HD Sunday due to the holiday  1K bath    HISTORY OF PRESENT ILLNESS:  Requesting Physician: José Miguel Wyatt MD  Reason for Consult: ESRD on HD    Teofilo Koch is a 74y o  year old male who was admitted to [facility] after presenting with hyperkalemia of >7 seen by the dialysis center  The patient had already completed his treatment so they called him to tell him to come to the ER  The patient arrived at the ER after dialysis treatment the previous day and his potassium was improved to 6 1  He was given medical treatments and his K improve to 5 6 however overnight without any po intake per the patient, his potassium again lindsey to 6 3  A renal consultation is requested today for assistance in the management of ESRD  Teofilo Koch is a known ESRD patient who undergoes maintenance hemodialysis at [facility] on MWF      PAST MEDICAL HISTORY:  Past Medical History:   Diagnosis Date    Acquired hypothyroidism     underactive    Atrial fibrillation ([address] )     Chronic kidney disease-mineral and bone disorder 11/27/2017    Clostridium difficile infection 04/2017    Diabetes mellitus [facility]     Dialysis patient ([place] [address] )     Diarrhea     ESRD (end stage renal disease) on dialysis ([facility] )     Hx of cardiac arrest     Hypertension     Neuropathy     Right lower lobe pneumonia ([place] [address] ) 2/20/2017    Sepsis ([place] [address] ) 04/2017    Polymicrobial MRSA, VRE    Systolic and diastolic CHF, chronic (HCC)     EF 35%, Grade II DD       PAST SURGICAL HISTORY:  Past Surgical History:   Procedure Laterality Date    CATARACT EXTRACTION      CHG GI ENDOSCOPIC ULTRASOUND N/A 3/10/2016    Procedure: LINEAR ENDOSCOPIC U/S;  Surgeon: Benoit Kinney MD;  Location: [facility]; Service: Gastroenterology    CHOLECYSTECTOMY      GASTROSTOMY TUBE PLACEMENT N/A 4/12/2017    Procedure: INSERTION PEG TUBE;  Surgeon: Maggie Blanc MD;  [facility];  Service:    Earna Kesha LEG AMPUTATION THROUGH LOWER TIBIA AND FIBULA Right 4/6/2017    Procedure: AMPUTATION BELOW KNEE (BKA);   Surgeon: Harinder Zaragoza DO;  Location: [facility];  Service:     TOE AMPUTATION  2000       ALLERGIES:  No Known Allergies    SOCIAL HISTORY:  History   Alcohol Use No     History   Drug Use No     History   Smoking Status    Former Smoker    Packs/day: 1 00    Years: 46 00    Types: Cigarettes    Start date: 5    Quit date: 3/1/2017   Smokeless Tobacco    Never Used       FAMILY HISTORY:  Family History   Problem Relation Age of Onset    Diabetes Father     Cancer Other     Lupus Mother        MEDICATIONS:    Current Facility-Administered Medications:     acetaminophen (TYLENOL) tablet 650 mg, 650 mg, Oral, Q6H PRN, Katie Hu MD    aluminum-magnesium hydroxide-simethicone (MYLANTA) 200-200-20 mg/5 mL oral suspension 30 mL, 30 mL, Oral, Q6H PRN, Katie Hu MD    amiodarone tablet 200 mg, 200 mg, Oral, TID With Meals, Katie Hu MD, 200 mg at 12/28/17 1852    b complex-vitamin C-folic acid (NEPHROCAPS) capsule 1 capsule, 1 capsule, Oral, Daily, Caryle Figures, MD    calcium acetate (PHOSLO) capsule 1,334 mg, 1,334 mg, Oral, TID With Meals, Caryle Figures, MD, 1,334 mg at 12/28/17 1857    cholestyramine sugar free (QUESTRAN LIGHT) packet 4 g, 4 g, Oral, BID, Caryle Figures, MD, 4 g at 12/28/17 1852    cinacalcet (SENSIPAR) tablet 30 mg, 30 mg, Oral, HS, Caryle Figures, MD, 30 mg at 12/28/17 2210    citalopram (CeleXA) tablet 10 mg, 10 mg, Oral, Daily, Caryle Figures, MD    diphenoxylate-atropine (LOMOTIL) 2 5-0 025 mg per tablet 1 tablet, 1 tablet, Oral, 4x Daily PRN, Caryle Figures, MD    docusate sodium (COLACE) capsule 100 mg, 100 mg, Oral, BID, Caryle Figures, MD    gabapentin (NEURONTIN) capsule 300 mg, 300 mg, Oral, HS, Caryle Figures, MD, 300 mg at 12/28/17 2209    insulin detemir (LEVEMIR) subcutaneous injection 5 Units, 5 Units, Subcutaneous, BID, Caryle Figures, MD, 5 Units at 12/28/17 1852    insulin lispro (HumaLOG) 100 units/mL subcutaneous injection 2 Units, 2 Units, Subcutaneous, TID AC, Caryle Figures, MD, 2 Units at 12/29/17 0930    levothyroxine tablet 137 mcg, 137 mcg, Oral, Early Morning, Caryle Figures, MD    metoprolol tartrate (LOPRESSOR) tablet 25 mg, 25 mg, Oral, Q12H [address], Caryle Figures, MD, 25 mg at 12/28/17 2209    nystatin (MYCOSTATIN) powder, , Topical, BID, Caryle Figures, MD    ondansetron [facility]) injection 4 mg, 4 mg, Intravenous, Q6H PRN, Caryle Figures, MD    traMADol (ULTRAM) tablet 50 mg, 50 mg, Oral, Q6H PRN, Caryle Figures, MD    vancomycin (VANCOCIN) oral solution 125 mg, 125 mg, Oral, Q12H, Caryle Figures, MD, 125 mg at 12/28/17 2213    REVIEW OF SYSTEMS:  General: No fevers, chills  Cardiovascular: No chest pain, shortness of breath, palpitations, leg edema  Respiratory: No cough, sputum production, shortness of breath  Gastrointestinal: No nausea, vomiting, abdominal pain, diarrhea    Genitourinary: No hematuria      PHYSICAL EXAM:  Current Weight: Weight - Scale: 64 kg (141 lb)  First Weight: Weight - Scale: 64 kg (141 lb)  Vitals:    12/28/17 1610 12/28/17 2300 12/29/17 0835 12/29/17 0900   BP:  135/60 115/64 115/62   Pulse:  (!) 54 60 65   Resp:  18     Temp:  97 6 °F (36 4 °C)     TempSrc:  Oral     SpO2: 92% 98%     Weight:           Intake/Output Summary (Last 24 hours) at 12/29/17 0936  Last data filed at 12/29/17 0835   Gross per 24 hour   Intake              200 ml   Output                0 ml   Net              200 ml     General: NAD  Skin: no rash  HEENT: normocephalic atraumatic  Neck: supple  Chest: bibasilar crackles  CVS: RRR  Abdomen: soft, nt, nd  Extremities: no edema  Neuro: alert awake  Psych:  Mood and affect appropriate    Invasive Devices:      Lab Results:     Results from last 7 days  Lab Units 12/29/17  0626 12/28/17  2304 12/28/17  1508 12/28/17  1416   WBC Thousand/uL 9 52  --   --  7 96   HEMOGLOBIN g/dL 9 2*  --   --  10 5*   I STAT HEMOGLOBIN g/dl  --   --  11 6*  --    HEMATOCRIT % 30 4*  --   --  34 5*   PLATELETS Thousands/uL 260  --   --  273   SODIUM mmol/L 133* 134*  --  132*   POTASSIUM mmol/L 6 3* 5 6*  --  6 3*   CHLORIDE mmol/L 95* 95*  --  94*   CO2 mmol/L 26 26  --  30   BUN mg/dL 51* 48*  --  41*   CREATININE mg/dL 6 24* 5 91*  --  5 07*   CALCIUM mg/dL 7 7* 7 8*  --  8 1*   MAGNESIUM mg/dL 1 8  --   --  2 0   PHOSPHORUS mg/dL 6 1*  --   --   --    GLUCOSE RANDOM mg/dL 261* 454*  --  202*   GLUCOSE, ISTAT mg/dl  --   --  209*  --      Lab Results   Component Value Date    CALCIUM 7 7 (L) 12/29/2017    CAION 1 04 (L) 12/04/2017    PHOS 6 1 (H) 12/29/2017

## 2017-12-29 NOTE — ASSESSMENT & PLAN NOTE
History of chronic c  Diff colitis   Contact precautions, on po vanco prophylaxis    Continue Lomotil prn for diarrhea, pt currently denies diarrhea

## 2018-01-09 NOTE — RESULT NOTES
Discussion/Summary   Please inform the patient that his elevated alkaline phosphatase is likely due to his liver issue  He should follow up with his gastroenterologist and discuss ERCP with them       Verified Results  (1) ALKALINE PHOSPHATASE ISOENZYMES [id number] 03:09PM CapserleoJustin hendrickson     Test Name Result Flag Reference   ALK PHOSPHATAS 192 IU/L H 39 - 117   BONE ISOENZYMES 14 %  12 - 68   INTEST ISOENZYM 9 %  0 - 18   Performed at:  [facility] [place] [address]  [id number]  : Betina Mayberry MD, Phone:  [phone]  Performed at:  [address]  [id number]  : Gennaro Riley MD, Phone:  [phone]   LIVER ISOENZ 77 %  13 - 88

## 2018-01-09 NOTE — PROCEDURES
Procedures by Simone Hayward at 11/29/2017  5:15 AM      Author:  ALIE Walker Service:  Critical Care/ICU Author Type:  Nurse Practitioner    Filed:  11/29/2017  7:39 AM Date of Service:  11/29/2017  5:15 AM Status:  Signed    :  Simone Hayward (Nurse Practitioner)  Cosigner:  Stephanie Inman DO at 11/29/2017 10:43 AM      Procedure Orders:       1  INTUBATION [[id number]] ordered by ALIE Walker at 11/29/17 4373                 Post-procedure Diagnoses:       1  Acute encephalopathy [G93 40]                 Intubation  Date/Time: 11/29/2017 7:38 AM  Performed by: Brooke Boyd by: Aidan Wahl     Patient location:  Bedside  Other Assisting Provider:  No    Consent:     Consent obtained:  Emergent situation  Universal protocol:     Immediately prior to procedure, a time out was called: yes      Patient identity confirmed:  Arm band  Pre-procedure details:     Patient status:  Unresponsive    Pretreatment medications:  Etomidate (20mg)    Paralytics:  None  Indications:     Indications for intubation: airway protection and hypoxemia    Procedure details:     Preoxygenation:  Bag valve mask    CPR in progress: no      Intubation method:  Oral    Oral intubation technique:  Direct    Laryngoscope blade: Mac 3    Tube size (mm):  8 0    Tube type:  Cuffed    Number of attempts:  2    Ventilation between attempts: yes      Cricoid pressure: yes      Tube visualized through cords: yes    Placement assessment:     ETT to teeth:  26    Tube secured with:  ETT harper    Breath sounds:  Equal and absent over the epigastrium    Placement verification: chest rise, condensation, CXR verification, direct visualization, equal breath sounds, ETCO2 detector and tube exhalation       CXR findings:  ETT in proper place    Ventilator settings: Ac 450/12/5/100  Post-procedure details:     Patient tolerance of procedure:   Tolerated well, no immediate complications                     Received for:Provider  EPIC   Nov 29 2017 10:43AM [facility]in Standard Time

## 2018-01-10 LAB — GLUCOSE SERPL-MCNC: 287 MG/DL (ref 65–140)

## 2018-01-10 NOTE — PROCEDURES
Procedures by Velasquez Yung MD at 9/22/2017  11:48 AM      Author:  Velasquez Yung MD Service:  Nephrology Author Type:  Physician    Filed:  9/22/2017 11:50 AM Date of Service:  9/22/2017 11:48 AM Status:  Signed    :  Velasquez Yung MD (Physician)         The patient was seen and examined on hemodialysis  Tolerating HD  /65    Time:  4 hours  Qb: 400  Sodium:  137  Dialyzer: F160  Qd: 600  Potassium:  3  Access: Av fistula  UF goal:  3 L Bicarbonate:  35    Continue to challenge estimated dry weight                 Received Shady CORREA    Sep 22 2017 11:50AM [facility] Standard Time

## 2018-01-10 NOTE — PROCEDURES
Procedures by Trey Martines PA-C at 4/2/2017  4:56 AM      Author:  Trey Martines PA-C Service:  Critical Care/ICU Author Type:  Physician Assistant    Filed:  4/2/2017  4:59 AM Date of Service:  4/2/2017  4:56 AM Status:  Signed    :  Trey Martines PA-C (Physician Assistant)  Cosigner:  Irish Frank MD at 4/2/2017  2:53 PM      Procedure Orders:       1  INTUBATION [[id number]] ordered by Trey Martines PA-C at 04/02/17 0456                 Post-procedure Diagnoses:       1  Acute encephalopathy [G93 40]                   Intubation  Date/Time: 4/2/2017 4:56 AM  Performed by: Hilda Nassar by: Jennifer Velasquez     Patient location:  Bedside  Other Assisting Provider:  Yes (comment)    Consent:     Consent obtained:  Emergent situation  Universal protocol:     Procedure explained and questions answered to patient or proxy's satisfaction: no      Relevant documents present and verified: no      Test results available and properly labeled: no      Imaging  studies available: no      Required blood products, implants, devices, and special equipment available: no      Site/side marked: no      Immediately prior to procedure, a time out was called: no      Patient identity confirmed:  Hospital-assigned identification number, arm band and anonymous protocol, patient vented/unresponsive  Pre-procedure details:     Patient status:  Unresponsive    Mallampati score:  3    Pretreatment medications:  None    Paralytics:  None  Indications:     Indications for intubation: respiratory failure    Procedure details:     Preoxygenation:  Bag valve mask    CPR in progress: yes      Intubation method:  Oral    Oral intubation technique:  Direct    Laryngoscope blade:   Mac 4    Tube size (mm):  7 5    Tube type:  Cuffed    Number of attempts:  1    Ventilation between attempts: no      Cricoid pressure: yes      Tube visualized through cords: yes    Placement assessment: ETT to lip:  23    Tube secured with:  ETT harper    Breath sounds:  Equal    Placement verification: chest rise, condensation, CXR verification, equal breath sounds, ETCO2 detector and tube exhalation      CXR findings:  ETT in proper place  Post-procedure details:     Patient tolerance of procedure:   Tolerated well, no immediate complications                     Received for:Provider  [facility]   Apr 2 2017  2:53PM [facility] Standard Time

## 2018-01-10 NOTE — PROCEDURES
Procedures by Tera Goldmann, DO at  4/2/2017 11:23 PM      Author:  Tera Goldmann, DO Service:  Critical Care/ICU Author Type:  Physician    Filed:  4/2/2017 11:40 PM Date of Service:  4/2/2017 11:23 PM Status:  Attested    :  Tera Goldmann, DO (Physician)  Cosigner:  Parmjit Pearson MD at 4/3/2017  6:23 AM      Procedure Orders:       1  CENTRAL LINE [[id number]] ordered by Tera Goldmann, DO at 04/02/17 2323                 Post-procedure Diagnoses:       1  Sepsis, due to unspecified organism [A41 9]              Attestation signed by Parmjit Pearson MD at 4/3/2017  6:23 AM           THis note reflects care delivered on 4/2  I was present for and participated in all aspects of the placement of the central line  NO apparent complications  CXR reviewed  Central Line Insertion  Date/Time: 4/2/2017 11:23 PM  Performed by: Soo Hassan by: Radha Rivera     Patient location:  Bedside  Other Assisting Provider:  No    Consent:     Consent obtained:  Verbal    Consent given by:  Spouse    Risks discussed:  Arterial puncture, bleeding, incorrect placement, infection, pneumothorax and nerve damage    Alternatives discussed:  Alternative treatment  Universal protocol:     Procedure explained and questions answered to patient or proxy's satisfaction: yes      Relevant documents present and verified: yes      Imaging studies available: yes      Site/side marked:  yes      Immediately prior to procedure, a time out was called: yes      Patient identity confirmed:  Arm band and hospital-assigned identification number  Pre-procedure details:     Hand hygiene: Hand hygiene performed prior to insertion      Sterile barrier technique:  All elements of maximal sterile technique followed      Skin preparation:  2% chlorhexidine    Skin preparation agent: Skin preparation agent completely dried prior to procedure    Indications:     Central line indications: vascular access    Sedation:     Sedation type: Fentanyl 50 mcg IV once  Anesthesia (see MAR for exact dosages): Anesthesia method:  Local infiltration    Local anesthetic:  Lidocaine 1% w/o epi  Procedure details:     Location:  Right internal jugular    Vessel type: vein      Laterality:  Right    Approach: percutaneous technique used      Patient position:  Trendelenburg    Catheter type:  Triple lumen 16cm    Catheter size:  7 Fr    Landmarks identified: yes      Ultrasound guidance: yes      Sterile ultrasound techniques: Sterile gel and sterile probe covers were used      Number of attempts:  1    Successful placement: yes    Post-procedure details:     Post-procedure:  Dressing applied and line sutured    Assessment:  Blood return through all ports, no pneumothorax on x-ray and placement verified by x-ray    Patient tolerance of procedure:   Tolerated well, no immediate complications                     Received for:Provider  EPIC   Apr  3 2017  6:23AM [facility] Standard Time

## 2018-01-11 NOTE — MISCELLANEOUS
Chief Complaint  Chief Complaint Free Text Note Form: LOTUS: Pt was admitted to [facility] from 03/15/2016 through 03/16/2016  DX: Abdominal pain & diarrhea, acute pancreatitis, C Diff diarrhea, end stage renal disease on hemodialysis, Type 2 DM, Hypothyroidism, essential HTN  D/C summary reports pt signed himself out AMA  Spoke with Peter Cheney- Pt's friend who reports pt went to [place] [address] and was admitted post family intervention  He is currently at [facility] as an inpatient  History of Present Illness  TCM Communication [facility]: The patient is being contacted for follow-up after hospitalization and 03/17/2016  He was hospitalized at [facility]  The date of admission: 03/15/2016, date of discharge: 03/16/2016  Diagnosis: see note  He was discharged to home  Medications were not reviewed today  He did not schedule a follow up appointment  Follow-up appointments with other specialists: Pt is currently admitted at [address] [facility]  Counseling was provided to patient's caretaker  Peter Cheney  Topics counseled included importance of compliance with treatment  Communication performed and completed by Maryann Ingram      Active Problems    1  Abnormal chest x-ray (793 2) (R93 8)   2  Benign essential hypertension (401 1) (I10)   3  Cellulitis (682 9) (L03 90)   4  Cellulitis of foot (682 7) (L03 119)   5  Chronic diarrhea (787 91) (K52 9)   6  Dilated cbd, acquired (576 8) (K83 8)   7  Dyslipidemia (272 4) (E78 5)   8  Dyspepsia (536 8) (K30)   9  Hemorrhoids (455 6) (K64 9)   10  Herpes zoster (053 9) (B02 9)   11  History of colon polyps (V12 72) (Z86 010)   12  Hyperlipidemia (272 4) (E78 5)   13  Hypothyroidism (244 9) (E03 9)   14  Insomnia (780 52) (G47 00)   15  Murmur (785 2) (R01 1)   16  Neck pain (723 1) (M54 2)   17  Peripheral neuropathy (356 9) (G62 9)   18  Type 2 diabetes mellitus (250 00) (E11 9)    Surgical History    1  History of Cataract Surgery   2   History of Cholecystectomy    Family History    1  Family history of Pancreatic Lymphoma   2  Family history of Renal Disease    3  Family history of Bladder Cancer (V16 52)    Social History    · Being A Social Drinker   · Current Smoker (305 1)    Current Meds   1  AmLODIPine Besylate 10 MG Oral Tablet; Take 1 tablet daily; Therapy: 21PXQ4276 to (Evaluate:33Pfv1137)  Requested for: 16ZBX3462; Last   Rx:98Wmu9220 Ordered   2  Aspirin 81 MG Oral Tablet; TAKE 1 TABLET DAILY; Therapy: (Recorded:11Nov2014) to Recorded   3  Creon 52580 UNIT Oral Capsule Delayed Release Particles; take 2 capsule 3 times   daily; Therapy: 71LNQ1269 to 21 )  Requested for: 23VXU9045; Last   Rx:78Otj6133 Ordered   4  [employer] Vitamin D CAPS; TAKE CAPSULE Daily; Therapy: (Recorded:11Nov2014) to Recorded   5  Daily Multivitamin TABS; TAKE 1 TABLET DAILY; Therapy: (Recorded:11Nov2014) to Recorded   6  Diphenoxylate-Atropine 2 5-0 025 MG Oral Tablet; TAKE 1 TABLET FOUR TIMES A DAY AS   NEEDED FOR DIARRHEA; Therapy: 80NCO5727 to (Evaluate:29Ydn7929)  Requested for: 81YZF9672; Last   Rx:11Nov2015 Ordered   7  Florastor 250 MG Oral Capsule; TAKE 1 CAPSULE TWICE DAILY; Therapy: 44TRT5608 to ([facility])  Requested for: 23QEV2049; Last   Rx:38Jep0501 Ordered   8  Gabapentin 300 MG Oral Capsule; TAKE 1 CAPSULE BY MOUTH 3 TIMES A DAY; Therapy: 84QCT6888 to (Evaluate:19Cfr5998)  Requested for: 78RJR9160; Last   Rx:10Qbb4902 Ordered   9  Hydrochlorothiazide 25 MG Oral Tablet; Take 1 tablet daily; Therapy: 44LRJ3727 to (Evaluate:97Jnk5721)  Requested for: 17Dxr4544; Last   Rx:14Bkt6863 Ordered   10  Levothyroxine Sodium 150 MCG Oral Tablet; Therapy: 95IXD0160 to Recorded   11  Metoprolol Tartrate 25 MG Oral Tablet; Therapy: 56KFN9065 to Recorded   12  NovoLOG 100 UNIT/ML Subcutaneous Solution; USE AS DIRECTED; Therapy: 79MTG0538 to Recorded   13   Pantoprazole Sodium 40 MG Oral Tablet Delayed Release; TAKE 1 TABLET DAILY; Therapy: 34UWL9128 to (Evaluate:23Kak5124)  Requested for: 070-073-058; Last    Rx:27Oct2015 Ordered   14  Simvastatin 40 MG Oral Tablet; TAKE 1 TABLET DAILY; Therapy: 82TUH3113 to (Maynor Nieves)  Requested for: 90Tjk1367; Last    Rx:42Clx8607; Status: ACTIVE - Renewal Denied Ordered   15  Synthroid 175 MCG Oral Tablet; Take 1 tablet daily; Therapy: 09RYC2027 to Recorded   16  ValACYclovir HCl - 1 GM Oral Tablet; TAKE 1 TABLET 3 times daily; Therapy: 80Bng1352 to (Last Rx:93Iba5249)  Requested for: 23Vgo8028 Ordered   17  ValACYclovir HCl - 500 MG Oral Tablet; Therapy: 18JAY0401 to Recorded   18  Vancomycin HCl - 250 MG Oral Capsule; TAKE 1 CAPSULE 4 TIMES DAILY; Therapy: 62KDN5423 to (Agnieszka Maverick)  Requested for: 31Nzd1285; Last    Rx:23Qrb9387 Ordered    Allergies    1  No Known Drug Allergies    Health Management  Chronic diarrhea   COLONOSCOPY (GI, SURG); every 3 years; Last 61Ytl0446; Next Due: 87Byp4700;  Active    Signatures   Electronically signed by : EDNA Chavez ; Mar 17 6512  9:42AM EST                       (Author)

## 2018-01-12 VITALS — SYSTOLIC BLOOD PRESSURE: 148 MMHG | HEART RATE: 80 BPM | DIASTOLIC BLOOD PRESSURE: 64 MMHG | RESPIRATION RATE: 12 BRPM

## 2018-01-12 NOTE — PROGRESS NOTES
Chief Complaint  Chief Complaint Free Text Note Form: Follow-up for MRSA/VRE sepsis secondary to right foot cellulitis and C  difficile colitis  History of Present Illness  HPI: Patient was admitted at [facility], but from campus, last month with right foot cellulitis/osteomyelitis and secondary to MRSA/VRE sepsis  He had right BKA done  He did well with IV daptomycin for MRSA/VRE  Patient also developed C  difficile colitis, treated with p o  vancomycin  He was discharged to SNF for rehabilitation  Overall, patient is doing better  He still has diarrhea but he has this chronically  He usually takes Imodium at home  He is currently not on Imodium at SNF, although he was on it in the hospital  No abdominal pain  He is tolerating daptomycin well  No nausea/vomiting  No muscle weakness  Patient also have a sacral decubitus ulcer  This is stable  For the last 2 weeks, patient has been having dysuria  He had a urine culture done at [facility] in early May  Patient's wife tells me that the culture was negative  Patient is not ambulating well yet  He still has PEG in place but is eating better  He remains on O2 due to desaturation at night  Review of Systems  Complete-Male:   Constitutional: feeling poorly and feeling tired  Eyes: No complaints of eye pain, no red eyes, no discharge from eyes, no itchy eyes  ENT: no complaints of earache, no hearing loss, no nosebleeds, no nasal discharge, no sore throat, no hoarseness  Cardiovascular: No complaints of slow heart rate, no fast heart rate, no chest pain, no palpitations, no leg claudication, no lower extremity  Respiratory: shortness of breath  Gastrointestinal: diarrhea  Genitourinary: dysuria  Integumentary: sacral ulcer  , but as noted in HPI  Active Problems    1  Abnormal chest x-ray (793 2) (R93 8)   2  At risk for impaired skin integrity (V49 89) (Z91 89)   3  Atrial fibrillation (427 31) (I48 91)   4   Below knee amputation status (V49 75) (Z89 519)   5  Benign essential hypertension (401 1) (I10)   6  Cellulitis (682 9) (L03 90)   7  Cellulitis of foot (682 7) (L03 119)   8  Chronic diarrhea (787 91) (K52 9)   9  Depression (311) (F32 9)   10  Dilated cbd, acquired (576 8) (K83 8)   11  Dyslipidemia (272 4) (E78 5)   12  Dyspepsia (536 8) (K30)   13  Hemorrhoids (455 6) (K64 9)   14  Herpes zoster (053 9) (B02 9)   15  History of colon polyps (V12 72) (Z86 010)   16  Hyperlipidemia (272 4) (E78 5)   17  Hypothyroidism (244 9) (E03 9)   18  Insomnia (780 52) (G47 00)   19  Lobar pneumonia, unspecified organism (481) (J18 1)   20  Murmur (785 2) (R01 1)   21  Neck pain (723 1) (M54 2)   22  Peripheral neuropathy (356 9) (G62 9)   23  Polymicrobial sepsis (038 9,995 91) (A41 9)   24  Postoperative examination (V67 00) (Z09)   25  Status post below knee amputation of right lower extremity (V49 75) (Z89 511)   26  Type 2 diabetes mellitus (250 00) (E11 9)   27  Vitamin deficiency (269 2) (E56 9)    Past Medical History    1  Cellulitis of foot (682 7) (L03 119)    Surgical History    1  History of Amputation Of Leg Below Knee   2  History of Cataract Surgery   3  History of Cholecystectomy    Family History    1  Family history of Pancreatic Lymphoma   2  Family history of Renal Disease    3  Family history of Bladder Cancer (V16 52)    Social History    · Being A Social Drinker   · Current Smoker (305 1)   ·    · Retired    Current Meds   1  Albuterol Sulfate (2 5 MG/3ML) 0 083% Inhalation Nebulization Solution; USE 1 UNIT   DOSE EVERY 4-6 HOURS AS NEEDED FOR WHEEZING ; Therapy: (Recorded:96Gep1996) to Recorded   2  Ambien 5 MG Oral Tablet; TAKE 1 TABLET AT  BEDTIME AS NEEDED FOR INSOMNIA; Therapy: (Recorded:24Wbg0587) to Recorded   3  B & C B-Complex TABS; TAKE 1 TABLET DAILY AS DIRECTED; Therapy: (Recorded:70Osd2030) to Recorded   4   Calmoseptine 0 44-20 6 % External Ointment; apply to sacrum topically every shift for maintanence; Therapy: (Recorded:72Hea0296) to Recorded   5  CeleXA 10 MG Oral Tablet; TAKE 1 TABLET DAILY; Therapy: (Recorded:15May2017) to Recorded   6  Coumadin 6 MG Oral Tablet; TAKE 1 TABLET DAILY at bedtime  Weekly PT/INRs drawn at   HD and dose managed by facility MD;   Therapy: (Recorded:15May2017) to Recorded   7  Creon 21191 UNIT Oral Capsule Delayed Release Particles; take 2 capsule 3 times   daily; Therapy: 06FOX1851 to 61 23 68)  Requested for: 08GPG6839; Last   Rx:36Rjb1630 Ordered   8  [employer]S Vitamin D CAPS; TAKE CAPSULE Daily; Therapy: (Recorded:11Nov2014) to Recorded   9  Daily Multivitamin TABS; TAKE 1 TABLET DAILY; Therapy: (Recorded:11Nov2014) to Recorded   10  DAPTOmycin SOLR; 575mg IV at bedtime every other day Recorded   11  Diphenoxylate-Atropine 2 5-0 025 MG Oral Tablet; TAKE 1 TABLET FOUR TIMES A DAY AS    NEEDED FOR DIARRHEA; Therapy: 75EOC0485 to (Evaluate:45Tun2061)  Requested for: 27Rix9717; Last    Rx:83Vkc5146 Ordered   12  Florastor 250 MG Oral Capsule; TAKE 1 CAPSULE TWICE DAILY; Therapy: 66ZKQ0217 to (Crystal Husk)  Requested for: 63TKR5642; Last    Rx:00Pch5836 Ordered   13  Gabapentin 300 MG Oral Capsule; TAKE 1 CAPSULE BY MOUTH 3 TIMES A DAY; Therapy: 50EEI8207 to (Evaluate:39Pdx4073)  Requested for: 09ARN0304; Last    Rx:36Keb8204 Ordered   14  HydroCHLOROthiazide 25 MG Oral Tablet; Take 1 tablet daily; Therapy: 92MJW6024 to (Evaluate:96Lwj5870)  Requested for: 71Ubj9605; Last    Rx:24Cub7249 Ordered   15  Levothyroxine Sodium 150 MCG Oral Tablet; Therapy: 24HWX9719 to Recorded   16  Metoprolol Tartrate 25 MG Oral Tablet; Therapy: 91NEG8115 to Recorded   17  NovoLOG 100 UNIT/ML Subcutaneous Solution; USE AS DIRECTED; Therapy: 46SYE6687 to Recorded   18  Pantoprazole Sodium 40 MG Oral Tablet Delayed Release; TAKE 1 TABLET DAILY; Therapy: 14XZH0664 to (Evaluate:82Iiy4610)  Requested for: 070-073-058; Last    Rx:27Oct2015 Ordered   19  Simvastatin 40 MG Oral Tablet; TAKE 1 TABLET DAILY; Therapy: 45SQN7862 to (Bethel Nirmala)  Requested for: 30Mky5614; Last    Rx:07Pit6628; Status: ACTIVE - Renewal Denied Ordered   20  Synthroid 175 MCG Oral Tablet; Take 1 tablet daily; Therapy: 33MHS0456 to Recorded   21  ValACYclovir HCl - 1 GM Oral Tablet; TAKE 1 TABLET 3 times daily; Therapy: 92Awt9626 to (Last Rx:15Awk8736)  Requested for: 23Wlx3283 Ordered   22  ValACYclovir HCl - 500 MG Oral Tablet; Therapy: 47NRO9697 to Recorded   23  Vancomycin HCl 250 MG/5ML SOLR; SWALLOW 2 5 ML Twice daily; Therapy: (Recorded:82Bjy0229) to Recorded    Allergies    1  No Known Drug Allergies    Vitals  Signs   Recorded: 08HEH4639 03:14PM   Temperature: 96 1 F  Heart Rate: 72  Respiration: 14  Systolic: 131  Diastolic: 62  Height Unobtainable: Yes  Weight Unobtainable: Yes    Physical Exam    Constitutional   General appearance: Abnormal   chronically ill, underweight, appears tired, acutely exhausted and appears older than stated age  Pulmonary   Respiratory effort: No increased work of breathing or signs of respiratory distress  Respiratory rate: normal  Assessment of respiratory effort revealed shallow respirations  Auscultation of lungs: Clear to auscultation, equal breath sounds bilaterally, no wheezes, no rales, no rhonci  no rales or crackles were heard bilaterally  no rhonchi  no friction rub  no wheezing  Cardiovascular   Auscultation of heart: Normal rate and rhythm, normal S1 and S2, without murmurs  The heart rate was normal  The rhythm was regular  Heart sounds: normal S1 and normal S2  no murmurs were heard  Abdomen   Abdomen: Non-tender, no masses  The abdomen was flat  Bowel sounds were normal  The abdomen was soft and nontender  PEG site clean  Liver and spleen: No hepatomegaly or splenomegaly  No hepatosplenomegaly  Assessment    1   Sepsis due to methicillin resistant Staphylococcus aureus (MRSA) with metabolic encephalopathy (070 79,714 20) (A41 02,G93 41)   2  Sepsis due to vancomycin resistant Enterococcus species (038 8,V09 80)   (A41 81,Z16 21)   3  C  difficile colitis (008 45) (A04 7)   4  Osteomyelitis of right foot (730 27) (M86 9)   5  Dysuria (788 1) (R30 0)   6  Chronic diarrhea (787 91) (K52 9)   7  Below knee amputation status (V49 75) (Z89 519)    Plan    1  Follow-up visit in 2 months Evaluation and Treatment  Follow-up  Status: Hold For -   Scheduling  Requested for: 04GJP0164    Discussion/Summary  Discussion Summary:   1  MRSA/VRE sepsis  Patient is doing well  He is on day 42 of daptomycin  Clinically, there is no further evidence of sepsis  Patient can complete the last dose of daptomycin tonight  PICC can be removed afterwards  Patient needs to have surveillance blood culture done in 2 weeks  2  right foot osteomyelitis, status post BKA  3  C  difficile colitis  Patient is currently on p o  vancomycin  Now that he is off systemic antibiotics, p o  vancomycin can be weaned as follows: 250 mg t i d  Ã1 month, followed by 250 mg b i d  Ã1 month, followed by 250 mg daily Ã1 month  Recommend the patient stays on contact precaution until diarrhea resolves  This is the more reason to start him on Imodium, as in below  4  chronic diarrhea  Patient had been on chronic Imodium  At this point, with C  difficile colitis well under control, Imodium can and should be restarted  5  dysuria  Consider checking UA C&S  Complete daptomycin today  Removed PICC after tonight's daptomycin dose  Start p o  vancomycin taper, as in above  Recommend restarting Imodium  Consider checking urine for UA C&S  Okay for discharge from ID viewpoint  Patient should be okay to go outside while at [facility]  Follow-up with me in 2 months  Health Management  Chronic diarrhea   COLONOSCOPY (GI, SURG); every 3 years; Last 12Aug2015; Next Due: 12Aug2018; Active    Signatures   Electronically signed by :  EDNA Naranjo ; May 15 2017  3:53PM EST                       (Author)

## 2018-01-12 NOTE — RESULT NOTES
Verified Results  (1) WOUND CULTURE 04Apr2017 08:05AM Laura Paredes     Test Name Result Flag Reference   CLINICAL REPORT (Report) A    Test:        Wound culture and Gram stain  Specimen Source:  Toe, Right  Specimen Type: Wound  Result Date:    4/4/2017 8:05 AM  Result Status:   Final result  Abnormal:      Yes  Resulting Lab:   [facility] [address] [place] [address]            Tel: [phone]      CULTURE                                       ------------------                                   4+ Growth of Vancomycin Resistant Enterococcus faecalis (Abnormal)    3+ Growth of Methicillin Resistant Staphylococcus aureus (Abnormal)     *** Please note: This patient requires contact precautions   ***      STAIN                                        ------------------                                   Rare Polys    4+ Gram positive cocci in pairs, chains and clusters    4+ Gram variable rods      SUSCEPTIBILITY                                   ------------------                                                       Vancomycin Resistant                       Enterococcus faecalis  METHOD                 JANA  -------------------------------------  ---------------------------  AMOXICILLIN + CLAVULANATE                 AMPICILLIN ($$)             <=2 00 ug/ml Susceptible  AMPICILLIN + SULBACTAM ($)                CEFAZOLIN ($)                       CLINDAMYCIN ($)                      ERYTHROMYCIN ($$$$)                    GENTAMICIN ($$)                      GENTAMICIN SYNERGY           >500 ug/ml  Resistant  LINEZOLID ($$$$)            2 ug/ml    Susceptible  OXACILLIN                         STREPTOMYCIN SYNERGY          >1000 ug/ml  Resistant  TETRACYCLINE                       TRIMETHOPRIM + SULFAMETHOXAZOLE ($$$)           VANCOMYCIN ($)             >16 00 ug/ml Resistant                        Methicillin Resistant                       Staphylococcus aureus  METHOD                 JANA  -------------------------------------  ---------------------------  AMOXICILLIN + CLAVULANATE        <=4/2 ug/ml  Resistant  AMPICILLIN ($$)             >8 00 ug/ml  Resistant  AMPICILLIN + SULBACTAM ($)       <=8/4 ug/ml  Resistant  CEFAZOLIN ($)              <=8 00 ug/ml Resistant  CLINDAMYCIN ($)             <=0 50 ug/ml Resistant [1]  ERYTHROMYCIN ($$$$)           >4 00 ug/ml  Resistant  GENTAMICIN ($$)             >8 ug/ml   Resistant  GENTAMICIN SYNERGY                    LINEZOLID ($$$$)                     OXACILLIN                >2 00 ug/ml  Resistant  STREPTOMYCIN SYNERGY                   TETRACYCLINE              <=4 ug/ml   Susceptible  TRIMETHOPRIM + SULFAMETHOXAZOLE ($$$)  >2/38 ug/ml  Resistant  VANCOMYCIN ($)             2 00 ug/ml  Susceptible         *** [1] The D-zone Test is Positive  This isolate is presumed to be resistant      based on inducible Clindamycin resistance  Clindamycin may still be      effective in some patieints   ***

## 2018-01-12 NOTE — RESULT NOTES
Verified Results  (1) NON-GYNECOLOGIC CYTOLOGY 44Mqu1467 09:29AM Alicia Caldwell     Test Name Result Flag Reference   LAB AP CASE REPORT (Report)     Non-gynecologic Cytology             Case: [id number]                  Authorizing Provider: Carlene Stephens DO      Collected:      12/16/2016 0468        Ordering Location:   [facility]    Received:      12/16/2016 9339                    [facility] 2nd Floor Med                                         Surg Unit                                   Pathologist:      Yas Arana MD                              Specimens:  A) - Pleural, Right                                          B) - Pleural, Right, cell block   LAB AP CYTO FINAL DIAGNOSIS (Report)     A,B  Pleural fluid, right (ThinPrep and cell block preparations):  Negative for malignancy  Benign mesothelial cells & lymphocytes  Satisfactory for evaluation  Interpretation performed at [facility]  Electronically signed by Yas Arana MD on 12/20/2016 at 6:04 PM   LAB AP GROSS DESCRIPTION      A  Pleural, Right, : 50ml  Yellow, mucoid, received in CytoLyt    B  Pleural, Right, cell block: Minimal cell button, white   LAB AP NONGYN ADDITIONAL INFORMATION (Report)     [facility] FDA approved ,  and ThinPrep Imaging System are   utilized with strict adherence to the 's instruction manual to   prepare gynecologic and non-gynecologic cytology specimens for the   production of ThinPrep slides as well as for gynecologic ThinPrep imaging  These processes have been validated by our laboratory and/or by the     These tests were developed and their performance characteristics   determined by [facility] or [address] [place] [address]  They may not be cleared or approved by the [place]  Food and   Drug Administration  The FDA has determined that such clearance or   approval is not necessary   These tests are used for clinical purposes  They should not be regarded as investigational or for research  This   laboratory has been approved by [place] 88, designated as a high-complexity   laboratory and is qualified to perform these tests  Pleural Fluid Right     (1) CULTURE, BODY FLUID [id number] 09:28AM Curlee Lock     Test Name Result Flag Reference   CLINICAL REPORT (Report)     Test:        Body fluid culture (Pleural Fluid Culture) and Gram stain  Specimen Source:  Pleural, Right  Specimen Type:    Body Fluid  Specimen Date:   12/16/2016 9:28 AM  Result Date:    12/19/2016 7:24 AM  Result Status:   Final result  Resulting Lab:   [facility] [address] [facility] [address]            Tel: [phone]      CULTURE                                       ------------------                                   No growth      STAIN                                        ------------------                                   No Polys or Bacteria seen   Pleural Fluid-Right

## 2018-01-13 VITALS
TEMPERATURE: 96 F | RESPIRATION RATE: 16 BRPM | WEIGHT: 130 LBS | HEIGHT: 66 IN | BODY MASS INDEX: 20.89 KG/M2 | HEART RATE: 62 BPM | DIASTOLIC BLOOD PRESSURE: 60 MMHG | OXYGEN SATURATION: 99 % | SYSTOLIC BLOOD PRESSURE: 100 MMHG

## 2018-01-13 VITALS
RESPIRATION RATE: 14 BRPM | SYSTOLIC BLOOD PRESSURE: 107 MMHG | DIASTOLIC BLOOD PRESSURE: 62 MMHG | TEMPERATURE: 96.1 F | HEART RATE: 72 BPM

## 2018-01-13 VITALS
DIASTOLIC BLOOD PRESSURE: 70 MMHG | SYSTOLIC BLOOD PRESSURE: 114 MMHG | TEMPERATURE: 97.5 F | HEART RATE: 61 BPM | RESPIRATION RATE: 16 BRPM

## 2018-01-13 NOTE — PROCEDURES
Procedures by Mary Bhakta RN at  4/10/2017  1:54 PM      Author:  Mary Bhakta RN Service:  (none) Author Type:  Registered Nurse     Filed:  4/10/2017  1:57 PM Date of Service:  4/10/2017  1:54 PM Status:  Signed     :  Mary Bhakta RN (Registered Nurse)         Procedure Orders:       1  Insert PICC line [[id number]] ordered by Cande Flores MD at 04/10/17 0901                    Insert PICC line  Date/Time: 4/10/2017 1:54 PM  Performed by: PIPO LUGO  Authorized by: Fina Sarmiento     Patient location:  Bedside  Other Assisting Provider:  Yes (comment)    Consent:     Consent obtained:  Verbal (obtained by physician)    Consent given by:  Spouse  Universal protocol:     Procedure explained and questions answered to patient or proxy's satisfaction: yes      Relevant documents present and verified: yes      Test results available and properly labeled: yes       Imaging studies available: yes      Required blood products, implants, devices, and special equipment available: yes      Site/side marked: yes      Immediately prior to procedure, a time out was called: yes      Patient identity confirmed:  Arm band  Pre-procedure details:     Hand hygiene: Hand hygiene performed prior to insertion      Sterile barrier technique: All elements of maximal sterile technique followed      Skin preparation:  ChloraPrep  Indications:     PICC line indications: long term antibiotics    Anesthesia (see MAR for exact dosages):      Anesthesia method:  Local infiltration    Local anesthetic:  Lidocaine 1% w/o epi  Procedure details:     Location:  Basilic    Vessel type: vein      Laterality:  Left    Approach: percutaneous technique used      Patient position:  Flat    Procedural supplies:  Double lumen    Catheter size:  5 Fr    Landmarks identified: yes      Ultrasound guidance: yes      Sterile ultrasound techniques: Sterile gel and sterile probe covers were used      Number of attempts:  1    Successful placement: yes      Vessel of catheter tip end:  Sherlock 3CG confirmed    Total catheter length (cm):  47    Catheter out on skin (cm):  0    Max flow rate:  999ml/hr    Arm circumference:  24  Post-procedure details:     Post-procedure:  Dressing applied and securement device placed    Assessment:  Blood return through all ports and free fluid flow    Patient tolerance of procedure:   Tolerated well, no immediate complications    Observer: Yes      Observer name:  Baljinder Lindsay                     Received for:Provider  [facility]   Apr 10 2017  1:57PM [facility] Standard Time

## 2018-01-13 NOTE — PROGRESS NOTES
Chief Complaint  Chief Complaint Free Text Note Form: Follow-up from recent hospitalization for C  difficile colitis  History of Present Illness  HPI: Patient has multiple medical problems, was hospitalized in the  hospital last month for recurrent C  difficile colitis  Patient did well and was discharged to rehabilitation  He was discharged home from rehabilitation late last week  Since he has been home, according to patient, patient has been more confused and unable to take care of himself  His diarrhea did resolve  Patient's wife feels that she can no longer take care of him at home  Patient himself states he feels well  He has no pain  No discomfort  He has dyspnea  According to the patient, he is confused and unable an unwilling to take care of himself  He has not been eating or drinking over the last few days  No fever/chills  Review of Systems  Complete-Male:   Constitutional: feeling poorly and feeling tired  Eyes: No complaints of eye pain, no red eyes, no discharge from eyes, no itchy eyes  ENT: no complaints of earache, no hearing loss, no nosebleeds, no nasal discharge, no sore throat, no hoarseness  Cardiovascular: No complaints of slow heart rate, no fast heart rate, no chest pain, no palpitations, no leg claudication, no lower extremity  Respiratory: No complaints of shortness of breath, no wheezing, no cough, no SOB on exertion, no orthopnea or PND  Gastrointestinal: No complaints of abdominal pain, no constipation, no nausea or vomiting, no diarrhea or bloody stools  Genitourinary: No complaints of dysuria, no incontinence, no hesitancy, no nocturia, no genital lesion, no testicular pain  Musculoskeletal: No complaints of arthralgia, no myalgias, no joint swelling or stiffness, no limb pain or swelling  Integumentary: No complaints of skin rash or skin lesions, no itching, no skin wound, no dry skin     Neurological: No compliants of headache, no confusion, no convulsions, no numbness or tingling, no dizziness or fainting, no limb weakness, no difficulty walking  Active Problems    1  Abnormal chest x-ray (793 2) (R93 8)   2  At risk for impaired skin integrity (V49 89) (Z91 89)   3  Atrial fibrillation (427 31) (I48 91)   4  Below knee amputation status (V49 75) (Z89 519)   5  Benign essential hypertension (401 1) (I10)   6  C  difficile colitis (008 45) (A04 7)   7  C  difficile colitis (008 45) (A04 7)   8  Cellulitis (682 9) (L03 90)   9  Cellulitis of foot (682 7) (L03 119)   10  Chronic diarrhea (787 91) (K52 9)   11  Depression (311) (F32 9)   12  Dilated cbd, acquired (576 8) (K83 8)   13  Dyslipidemia (272 4) (E78 5)   14  Dyspepsia (536 8) (K30)   15  Dysuria (788 1) (R30 0)   16  Hemorrhoids (455 6) (K64 9)   17  Herpes zoster (053 9) (B02 9)   18  History of colon polyps (V12 72) (Z86 010)   19  Hyperlipidemia (272 4) (E78 5)   20  Hypothyroidism (244 9) (E03 9)   21  Insomnia (780 52) (G47 00)   22  Lobar pneumonia, unspecified organism (481) (J18 1)   23  Murmur (785 2) (R01 1)   24  Neck pain (723 1) (M54 2)   25  Nonhealing amputation stump (997 69) (T87 89)   26  Osteomyelitis of right foot (730 27) (M86 9)   27  Peripheral neuropathy (356 9) (G62 9)   28  Polymicrobial sepsis (038 9,995 91) (A41 9)   29  Postoperative examination (V67 00) (Z09)   30  Sepsis due to methicillin resistant Staphylococcus aureus (MRSA) with metabolic    encephalopathy (038 12,348 31) (A41 02,G93 41)   31  Sepsis due to vancomycin resistant Enterococcus species (038 8,V09 80)    (A41 81,Z16 21)   32  Status post below knee amputation of right lower extremity (V49 75) (Z89 511)   33  Type 2 diabetes mellitus (250 00) (E11 9)   34  Vitamin deficiency (269 2) (E56 9)    Past Medical History    1  Cellulitis of foot (682 7) (L03 119)    Surgical History    1  History of Amputation Of Leg Below Knee   2  History of Cataract Surgery   3   History of Cholecystectomy    Family History    1  Family history of Pancreatic Lymphoma   2  Family history of Renal Disease    3  Family history of Bladder Cancer (V16 52)    Social History    · Being A Social Drinker   · Current Smoker (305 1)   ·    · Retired    Current Meds   1  Albuterol Sulfate (2 5 MG/3ML) 0 083% Inhalation Nebulization Solution; USE 1 UNIT   DOSE EVERY 4-6 HOURS AS NEEDED FOR WHEEZING ; Therapy: (Recorded:15May2017) to Recorded   2  B & C B-Complex TABS; TAKE 1 TABLET DAILY AS DIRECTED; Therapy: (Recorded:15May2017) to Recorded   3  Calmoseptine 0 44-20 6 % External Ointment; apply to sacrum topically every shift for   maintanence; Therapy: (Recorded:15May2017) to Recorded   4  CeleXA 10 MG Oral Tablet; TAKE 1 TABLET DAILY; Therapy: (Recorded:15May2017) to Recorded   5  Coumadin 6 MG Oral Tablet; TAKE 1 TABLET DAILY at bedtime  Weekly PT/INRs drawn at   HD and dose managed by facility MD;   Therapy: (Recorded:15May2017) to Recorded   6  Creon 50718 UNIT Oral Capsule Delayed Release Particles; TAKE 3 CAPSULE 3 times   daily; Therapy: ([place] Art) to Recorded   7  [employer] Vitamin D CAPS; TAKE CAPSULE Daily; Therapy: (Recorded:11Nov2014) to Recorded   8  Daily Multivitamin TABS; TAKE 1 TABLET DAILY; Therapy: (Recorded:11Nov2014) to Recorded   9  Diphenoxylate-Atropine 2 5-0 025 MG Oral Tablet; TAKE 1 TABLET FOUR TIMES A DAY AS   NEEDED FOR DIARRHEA; Therapy: 50OED6705 to (Evaluate:66Djo1138)  Requested for: 25Apr2016; Last   Rx:[id number] Ordered   10  Florastor 250 MG Oral Capsule; TAKE 1 CAPSULE TWICE DAILY; Therapy: 87MJS8881 to (Thyra Muta)  Requested for: 24FYF1818; Last    Rx:64Pry7533 Ordered   11  Gabapentin 100 MG TABS; Take 1 tablet twice daily; Therapy: ([place] Art) to Recorded   12  Levothyroxine Sodium 200 MCG Oral Tablet; TAKE 1 TABLET DAILY; Therapy: 39TTJ1929 to Recorded   13  Lopressor 100 MG Oral Tablet; TAKE 1 TABLET TWICE DAILY;     Therapy: 45AAP6759 to Recorded   14  LORazepam 0 5 MG Oral Tablet; TAKE 1 TABLET Every 4 hours PRN; Therapy: (Terrance Li) to Recorded   15  NovoLOG 100 UNIT/ML Subcutaneous Solution; USE AS DIRECTED; Therapy: 53BRG2816 to Recorded   16  OxyCODONE HCl - 5 MG Oral Capsule; TAKE 1 CAPSULE EVERY 4 HOURS AS    NEEDED; Therapy: (Recorded:48Ird2572) to Recorded   17  Pantoprazole Sodium 40 MG Oral Tablet Delayed Release; TAKE 1 TABLET DAILY; Therapy: 16RVN5064 to (Evaluate:48Fck9027)  Requested for: [id number]; Last    Rx:[id number] Ordered   18  SEROquel 25 MG Oral Tablet; TAKE 1 TABLET AT BEDTIME; Therapy: (Recorded:29Wba2001) to Recorded    Allergies    1  No Known Drug Allergies    Vitals  Signs   Recorded: 03Aug2017 02:14PM   Temperature: 97 5 F  Heart Rate: 61  Respiration: 16  Systolic: 758  Diastolic: 70  Weight Unobtainable: Yes    Physical Exam    Constitutional   General appearance: No acute distress, well appearing and well nourished  chronically ill, underweight, disheveled clothing, appears tired, poorly hydrated and appears older than stated age  Pulmonary   Respiratory effort: No increased work of breathing or signs of respiratory distress  Respiratory rate: normal  Assessment of respiratory effort revealed shallow respirations  Auscultation of lungs: Clear to auscultation, equal breath sounds bilaterally, no wheezes, no rales, no rhonci  Auscultation of the lungs revealed decreased breath sounds diffusely  no rales or crackles were heard bilaterally  no rhonchi  no friction rub  no wheezing  Cardiovascular   Auscultation of heart: Normal rate and rhythm, normal S1 and S2, without murmurs  The heart rate was normal  The rhythm was regular  Heart sounds: normal S1 and normal S2  no murmurs were heard  Abdomen   Abdomen: Non-tender, no masses  The abdomen was flat  Bowel sounds were normal  The abdomen was soft and nontender  Liver and spleen: No hepatomegaly or splenomegaly    No hepatosplenomegaly  Assessment    1  C  difficile colitis (008 45) (A04 7)   2  Encephalopathy (348 30) (G93 40)    Discussion/Summary  Discussion Summary:   1  C  difficile colitis  Patient is clinically much improved  Diarrhea is resolved  Abdominal exam is benign  He can continue with p o  vancomycin taper  2  Encephalopathy with failure to thrive  Etiology is multifactorial  Consider dehydration  Consider renal failure  Consider occult infection  At this point, patient needs to return to the ER to be evaluated for etiology of this  He may need to be admitted  It appears the patient's wife is unable to take care of him at home  He may need long-term SNF stay  Continue p o  vancomycin taper  Recommend that patient goes to ER for evaluation  Follow-up with lenny webb  Health Management  Chronic diarrhea   COLONOSCOPY (GI, SURG); every 3 years; Last 12Aug2015; Next Due: 12Aug2018; Active    Future Appointments    Date/Time Provider Specialty Site   09/26/2017 03:40 PM Janis Dunlap,  Pulmonary Medicine [facility] [address] [place] [address]     Signatures   Electronically signed by :  EDNA Bolden ; Aug  3 2017  2:34PM EST                       (Author)

## 2018-01-13 NOTE — MISCELLANEOUS
Message  GI Reminder Recall [address] [place] [address] [address]:   Date: 04/06/2016   Dear Kyra Villegas:     Review of our records shows you are due for the following: Follow Up Visit  Please call the following office to schedule your appointment:   [address] [facility], [address], [address] [facility] [phone]  We look forward to hearing from you!      Sincerely,         Signatures   Electronically signed by : Andrés Bartlett, ; Apr 6 2016  1:49PM EST                       (Author)

## 2018-01-14 VITALS
HEIGHT: 66 IN | DIASTOLIC BLOOD PRESSURE: 84 MMHG | TEMPERATURE: 97.4 F | HEART RATE: 76 BPM | RESPIRATION RATE: 12 BRPM | SYSTOLIC BLOOD PRESSURE: 132 MMHG

## 2018-01-14 VITALS — DIASTOLIC BLOOD PRESSURE: 68 MMHG | SYSTOLIC BLOOD PRESSURE: 142 MMHG

## 2018-01-14 VITALS — DIASTOLIC BLOOD PRESSURE: 78 MMHG | RESPIRATION RATE: 12 BRPM | HEART RATE: 76 BPM | SYSTOLIC BLOOD PRESSURE: 124 MMHG

## 2018-01-14 NOTE — PROCEDURES
Procedures by Zakia Shaffer MD at 4/3/2017   3:39 PM      Author:  Zakia Shaffer MD Service:  Neurology Author Type:  Physician     Filed:  4/3/2017  3:46 PM Date of Service:  4/3/2017  3:39 PM Status:  Signed     :  Zakia Shaffer MD (Physician)            Continuous Video EEG Monitoring       Patient Name:  Xiomara Bojorquez  MRN: [id number]   :  1950 File #: LTM 16 - 56   Age: 61 y o  Encounter #: [id number]   Date performed: 2017 - 4/3/2017            Report date: 4/3/2017          Study type: Continuous video EEG    ICD 10 diagnosis: Spells/Fit NOS R56 9 and Coma R40 2    Start time: 2017: 17:25 End time: 4/3/2017 07:59     -------------------------------------------------------------------------------------------------------------------   Patient History: This recording was observed in a 61 y o  male with multiple medical problems,  with recent PEA arrest to evaluate for subclinical seizure as the cause of altered mental status and determine if spells are seizures  Medications include:   b complex-vitamin C-folic acid 1 capsule Oral Daily   cefepime 1,000 mg Intravenous Q24H   chlorhexidine 15 mL Swish & Spit Q12H [address]   DAPTOmycin 10 mg/kg Intravenous Once per day on    [START ON 2017] levETIRAcetam 500 mg Oral Daily   levothyroxine 175 mcg Oral Every Other Day   menthol-zinc oxide 1 application Topical TID   pantoprazole 40 mg Intravenous Q24H [address]       -------------------------------------------------------------------------------------------------------------------   Description of Procedure:  ·  32 channel digital recording with electrodes placed according to the International 10-20 system with additional  T1/T2 electrodes, EOG, EKG, and simultaneous  video  A monitoring technologist supervised the continuous recording   The recording was technically satisfactory  -------------------------------------------------------------------------------------------------------------------   Results:   Manual Review: The recording was obtained with the patient intubated and ventilated  No age or state appropriate activities were seen  Instead, background activities consisted of reactive, diffuse,  low amplitude, semi-rhythmic, 5-6 cps theta activities with intermixed low amplitude  delta activities  Following stimulation, activities increased in amplitude, becoming medium amplitude, and there were more prominent diffuse, delta activities  No epileptiform discharges or electrographic seizures were seen  Other findings: The single lead ECG demonstrated a regular rhythm  Events:   No push buttons were activated  -------------------------------------------------------------------------------------------------------------------   Interpretation: This prolonged, continuous video-EEG recording is abnormal  Background activities of minimally reactive diffuse theta and delta activities  indicate moderate to severe diffuse bilateral cerebral dysfunction of non-specific etiology  No diagnostic  clinical events were captured  No electrographic seizures or interictal epileptiform discharges were seen  Mariella Holcomb MD   [address] [facility]                   Received for:Regan CORREA    Apr  3 2017  3:46PM [facility] Standard Time

## 2018-01-14 NOTE — PROCEDURES
Procedures by Yuli Rouse MD at 2017  10:06 AM      Author:  Yuli Rouse MD Service:  Neurology Author Type:  Physician     Filed:  2017 10:26 AM Date of Service:  2017 10:06 AM Status:  Signed     :  Yuli Rouse MD (Physician)            Continuous Video EEG Monitoring       Patient Name:  Gennaro Kline  MRN: [id number]   :  1950 File #: [place] 15-26   Age: 61 y o  Encounter #: [id number]   Study Start: 2017 08:00  Study End: 2017 10:32           Report date: 2017          Study type: Continuous video EEG    ICD 10 diagnosis: Spells/Fit NOS R56 9 and Coma R40 2    -------------------------------------------------  Patient History: This recording was observed in a 61 y o  male  to with multiple medical problems, with recent PEA arrest to evaluate for subclinical seizure as the cause of altered mental status and determine if spells are seizures  Medications include:     b complex-vitamin C-folic acid 1 capsule Oral Daily   cefepime 1,000 mg Intravenous Q24H   chlorhexidine 15 mL Swish & Spit Q12H [address]   DAPTOmycin 10 mg/kg Intravenous Once per day on    fentanyl citrate (PF) 50 mcg Intravenous Once   levETIRAcetam 500 mg Oral Daily   levothyroxine 175 mcg Oral Every Other Day   menthol-zinc oxide 1 application Topical TID   pantoprazole 40 mg Intravenous Q24H [address]   vancomycin 125 mg Oral Q6H [address]       -------------------------------------------------  Description of Procedure:  32 channel digital recording with electrodes placed according to the International 10-20 system with additional T1/T2 electrodes,  EOG, EKG, and simultaneous video  A monitoring technologist supervised the continuous recording  The recording was technically satisfactory  -------------------------------------------------  Results:   Manual Review: The recording was obtained with the patient intubated and ventilated  No age or state appropriate activities were seen    During clinical quiescence (lack of muscle) there were often diffuse low amptilude 6-7 cps theta and occasional delta activities best seen over the vertex and parasagittal regions that were occasionally interrupted by brief diffuse suppression  With stimulation  and relative arousal amplitudes increased  There were low to medium amplitude frontally dominant mixed frequency delta activities near 2 cps  There were frequent, medium to occasionally high amplitude, diffuse, frontally dominant, quasi-periodic sharp  discharges with triphasic morphology and often anterior to posterior lag at times occurring at 1-1 5 cps  Other findings:  Samples of the single channel ECG demonstrated a regular rhythm  Events:   No push buttons were activated  -------------------------------------------------  Interpretation: This prolonged, continuous video-EEG recording is abnormal  A background of reactive diffuse theta/delta slowing and pseudoperiodic triphasic  waves indicates nonspecific moderate diffuse cerebral dysfunction  No diagnostic clinical events are captured  No electrographic  seizures are seen  Barbra Briseno MD   [address] [facility] Jakob CORREA    Apr 5 2017 10:26AM [facility] Standard Time

## 2018-01-15 NOTE — MISCELLANEOUS
Provider Comments  Provider Comments:   PATIENT WAS A NO SHOW FOR APPOINTMENT WITH ARMOND MARTINEZ        Signatures   Electronically signed by : Janelle Marshall [facility]; Dec 28 2016  1:30PM EST                       (Author)    Electronically signed by : Janelle Marshall [facility]; Dec 28 2016  1:31PM EST                       (Author)

## 2018-01-15 NOTE — PROCEDURES
Procedures by Rudy Groves DO at 12/30/2016   3:50 PM      Author:  Rudy Groves DO Service:  Interventional Radiology  Author Type:  Physician     Filed:  12/30/2016  3:51 PM Date of Service:  12/30/2016  3:50 PM Status:  Signed     :  Rudy Groves DO (Physician)         Procedure Orders:       1  CENTRAL LINE [[id number]] ordered by Rudy Groves DO at 12/30/16 [address] [place] [address] Insertion  Date/Time: 12/30/2016 3:50 PM  Performed by: Nelly Fontaine by: Milvia Mejia     Patient location:  Bedside  Consent:     Consent obtained:  Verbal and written    Consent given by:  Patient    Risks discussed:  Bleeding and infection    Alternatives discussed:  No treatment  Universal protocol:     Procedure explained and questions answered to patient or proxy's satisfaction: yes      Relevant documents present and verified: yes      Test results available and properly labeled: yes       Imaging studies available: yes      Required blood products, implants, devices, and special equipment available: yes      Site/side marked: yes      Immediately prior to procedure, a time out was called: yes      Patient identity confirmed:  Verbally with patient and arm band  Pre-procedure details:     Hand hygiene: Hand hygiene performed prior to insertion      Sterile barrier technique: All elements of maximal sterile technique followed      Skin preparation:  2% chlorhexidine    Skin preparation agent: Skin preparation agent completely dried prior to procedure    Indications:     Site selection rationale:  Hemodialysis  Anesthesia (see MAR for exact dosages):      Anesthesia method:  Local infiltration    Local anesthetic:  Lidocaine 1% w/o epi  Procedure details:     Location:  Right internal jugular    Approach: percutaneous technique used      Patient position:  Flat    Catheter size:  14 Fr    Ultrasound guidance: yes      Sterile ultrasound techniques: Sterile gel and sterile probe covers were used      Number of attempts:  1    Successful placement: yes      Vessel of catheter tip end:  Cavoatrial junction  Post-procedure details:     Post-procedure:  Dressing applied and line sutured    Assessment:  Free fluid flow and blood return through all ports    Post-procedure complications: none      Patient tolerance of procedure:   Tolerated well, no immediate complications                 Received for:Akira Vargas DO  Dec 30 2016  3:52PM [facility] Standard Time

## 2018-01-15 NOTE — PROCEDURES
Procedures by Margaret Christianson MD at 2017   3:36 PM      Author:  Margaret Christianson MD Service:  Neurology Author Type:  Physician     Filed:  2017  3:57 PM Date of Service:  2017  3:36 PM Status:  Signed     :  Margaret Christianson MD (Physician)            Continuous Video EEG Monitoring       Patient Name:  Leanne Reese  MRN: [id number]   :  1950 File #: Casey Palacio 12-46   Age: 61 y o  Encounter #: [id number]   Study Start: 4/3/2017 08:00  Study End: 2017 08:00           Report date: 2017          Study type: Continuous video EEG    ICD 10 diagnosis: Spells/Fit NOS R56 9 and Coma R40 2    -------------------------------------------------  Patient History: This recording was observed in a 61 y o  male to with multiple medical problems,  with recent PEA arrest to evaluate for subclinical seizure as the cause of altered mental status and determine if spells are seizures  Medications include:     b complex-vitamin C-folic acid 1 capsule Oral Daily   cefepime 1,000 mg Intravenous Q24H   chlorhexidine 15 mL Swish & Spit Q12H [address]   DAPTOmycin 10 mg/kg Intravenous Once per day on    levETIRAcetam 500 mg Oral Daily   levothyroxine 175 mcg Oral Every Other Day   menthol-zinc oxide 1 application Topical TID   pantoprazole 40 mg Intravenous Q24H [address]   vancomycin 125 mg Oral Q6H [address]       -------------------------------------------------  Description of Procedure:  32 channel digital recording with electrodes placed according to the International 10-20 system with additional T1/T2 electrodes,  EOG, EKG, and simultaneous video  A monitoring technologist supervised the continuous recording  The recording was technically satisfactory  -------------------------------------------------  Results:   Manual Review: The recording was obtained with the patient intubated and ventilated  No age or state appropriate activities were seen    During clinical quiescence (lack of muscle) there were often diffuse low amptilude 6-7 cps theta and occasional delta activities best seen over the vertex and parasagittal regions that were occasionally interrupted by brief diffuse suppression  With stimulation  and relative arousal amplitudes increased  There were low to medium amplitude frontally dominant mixed frequency delta activities near 2 cps  There were frequent, medium to occasionally high amplitude, diffuse, frontally dominant, quasi-periodic sharp  discharges with triphasic morphology and often anterior to posterior lag at times occurring at 1-1 5 cps  Other findings:  Samples of the single channel ECG demonstrated a regular rhythm  Events:   No push buttons were activated  -------------------------------------------------  Interpretation: This prolonged, continuous video-EEG recording is abnormal  A background of reactive diffuse theta/delta slowing and pseudoperiodic triphasic  waves indicates nonspecific moderate diffuse cerebral dysfunction  No diagnostic clinical events are captured  No electrographic  seizures are seen  Justice Munoz MD   [address] [facility] Neurology Pilar CORREA    Apr 4 2017  3:57PM [facility] Standard Time

## 2018-01-16 NOTE — PROCEDURES
Procedures by Jesus Alberto Lopez MD at  4/7/2017  4:09 PM      Author:  Jesus Alberto Lopez MD Service:  Cardiology Author Type:  Fellow    Filed:  4/7/2017  5:40 PM Date of Service:  4/7/2017  4:09 PM Status:  Attested    :  Jesus Alberto Lopez MD (Fellow)  Cosigner:  Polly Mcdaniels DO at 4/13/2017  7:33 AM      Pre-procedure Diagnoses:       1  Sepsis [A41 9]                Post-procedure Diagnoses:       1  Endocarditis [I38]                Procedures:       1  MARIA [VKO99 (Custom)]              Attestation signed by Polly Mcdaniels DO at 4/13/2017  7:33 AM                  Teaching Physician Statement  I performed history and exam of patient  I discussed with the fellow  I agree with the fellow's documented findings and plan of care  I was present and supervised the entire procedure  Procedure: transesophageal echocardiogram    Indication: MRSA bacteremia    Location: bedside    Cardiology fellow: Jesus Alberto Lopez    Attending: Polly Mcdaniels    Anesthesia: propofol managed by CCM    Intubation: Probe insertion was easy, Attempts x1, no blood on probe on withdrawal    Findings:  -- Mobile, small, echodensity noted on the right coronary cusp of the aortic valve in certain views which probably represents a vegetation  -- No abscess noted  -- At least moderate MR  -- Lv dilation with dysfucntion  -- Please see detailed MARIA report for full details  Complications:  None   Patient tolerated the procedure well           Received Yohana Salazar MD  Apr 13 2017  7:33AM [place] Standard Time

## 2018-01-16 NOTE — PROCEDURES
Procedures by Franco Yepez MD at 9/20/2017  12:14 PM      Author:  Franco Yepez MD Service:  Nephrology Author Type:  Physician    Filed:  9/20/2017 12:16 PM Date of Service:  9/20/2017 12:14 PM Status:  Signed    :  Franco Yepez MD (Physician)         The patient was seen and examined on hemodialysis  Tolerating HD  /69    Time:  2 5 hours  Qb: 450  Sodium:  138  Dialyzer: F160  Qd: 600  Potassium: 4  Access:  AV fistula  UF goal:  3 L Bicarbonate:  35    Patient's treatment being cut short because of an emergency requirement for another patient  Will reassess in a m  for probable hemodialysis  Lion CORREA    Sep 20 2017 12:16PM [facility] Standard Time

## 2018-01-17 NOTE — RESULT NOTES
Message   Please inform patient that the biopsies from his upper endoscopy were normal, no evidence of celiac sprue no infection with H  pylori  His endoscopic ultrasound showed a dilated common bile duct and he should follow-up with Dr Neville Gomez  Verified Results  (1) TISSUE EXAM [id number] 09:54AM Frankie Gun     Test Name Result Flag Reference   LAB AP CASE REPORT (Report)     Surgical Pathology Report             Case: [id number]                   Authorizing Provider: Mitra Alston MD      Collected:      03/10/2016 0954        Ordering Location:   [address] [place] [address]   Received:      03/10/2016 [facility]                               Pathologist:      Ashtyn Rojas MD                              Specimens:  A) - Duodenum, R/O celiac                                       B) - Stomach, Body; r/o gastritis   LAB AP FINAL DIAGNOSIS (Report)     A  Small bowel, duodenum, biopsy:        - Small bowel mucosa with no significant pathologic alteration         - No villous blunting, intraepithelial lymphocytosis or crypt   hyperplasia is identified to suggest malabsorptive enteropathy         - No acute or peptic duodenitis is identified         - No dysplasia or malignancy is identified  B  Stomach, body, biopsy:        - Antral and oxyntic mucosa with chronic inactive gastritis  - No Helicobacter pylori organisms are identified on the   immunohistochemical stain, performed with an appropriate positive control         - No intestinal metaplasia, dysplasia or malignancy is   identified  LAB AP NOTE      Interpretation performed at [place] [address]   [place] [address]   LAB AP SURGICAL ADDITIONAL INFORMATION (Report)     These tests were developed and their performance characteristics   determined by [facility] or [address] [place] [address]  They may not be cleared or approved by the [place]  Food and   Drug Administration   The FDA has determined that such clearance or   approval is not necessary  These tests are used for clinical purposes  They should not be regarded as investigational or for research  This   laboratory has been approved by [facility], designated as a high-complexity   laboratory and is qualified to perform these tests  LAB AP GROSS DESCRIPTION (Report)     A  The specimen is received in formalin, labeled with the patient's name   and hospital number, and is designated duodenum rule out celiac  The   specimen consists of multiple tan soft tissue fragments measuring in   aggregate 0 6 x 0 5 x 0 1 cm  Entirely submitted  One cassette  B  The specimen is received in formalin, labeled with the patient's name   and hospital number, and is designated stomach body rule out gastritis  The specimen consists of multiple tan soft tissue fragments measuring in   aggregate 0 6 x 0 6 x 0 1 cm  Entirely submitted  One cassette  Note: The estimated total formalin fixation time based upon information   provided by the submitting clinician and the standard processing schedule   is 18 5 hours      MAC       Signatures   Electronically signed by : EDNA Dotson ; Mar 28 2016 12:56PM EST                       (Author)

## 2018-01-17 NOTE — PROCEDURES
Procedures by Claudia Hopson DO at  6/17/2017 12:11 PM      Author:  Claudia Hopson DO Service:  Critical Care/ICU Author Type:  Physician    Filed:  6/17/2017 12:14 PM Date of Service:  6/17/2017 12:11 PM Status:  Signed    :  Claudia Hopson DO (Physician)        Procedure Orders:       1  Thoracentesis [[id number]] ordered by Claudia Hopson DO at 06/17/17 1211                 Post-procedure Diagnoses:       1  Pleural effusion on right [J90]                 Thoracentesis  Date/Time: 6/16/2017 4:10 PM  Performed by: Justen Palma by: Gregory Stewart     Patient location:  ICU and bedside  Consent:     Consent obtained:  Written    Consent given by:  Spouse    Risks discussed:  Bleeding, infection, nerve damage, incomplete drainage, pain and pneumothorax    Alternatives discussed:  No treatment and observation  Universal protocol:     Procedure explained and questions answered to patient or proxy's satisfaction: yes      Relevant documents present and verified: yes      Imaging studies available: yes      Site/side marked:  yes      Immediately prior to procedure a time out was called: yes      Patient identity confirmed:  Arm band  Indications:     Procedure Purpose: diagnostic and therapeutic      Indications: pleural effusion    Anesthesia (see MAR for exact dosages): Anesthesia method:  Local infiltration    Local anesthetic:  Lidocaine 1% w/o epi (4cc)  Procedure details:     Preparation: Patient was prepped and draped in usual sterile fashion      Standard thoracentesis cath kit used: Yes      Patient position:  Sitting    Laterality:  Right    Location:  Midscapular line    Intercostal space:  6th    Puncture method:  Over-the-needle catheter    Ultrasound guidance: yes      Reason for ultrasound: Identify fluid collection and guide cathetar placement        Images stored locally on hard drive      Indwelling  catheter placed: no      Number of attempts:  1    Needle gauge:  22 Catheter size:  6 Fr    Drainage color:  Clear and yellow    Drainage characteristics:  Clear    Fluid removed amount:  1600  Post-procedure details:     Chest x-ray performed: yes      Chest x-ray findings:  Pleural effusion improved    Patient tolerance of procedure:   Tolerated well, no immediate complications                     Received for:Billy Garcia DO  Jun 17 2017 12:15PM [facility] Standard Time

## 2018-01-18 NOTE — PROCEDURES
Procedures by Heidi Saxena DO at 4/2/2017  12:02 PM      Author:  Heidi Saxena DO  Service:  Critical Care/ICU Author Type:  Physician     Filed:  4/11/2017  3:58 PM  Date of Service:  4/2/2017 12:02 PM Status:  Addendum     :  Heidi Saxena DO (Physician)        Related Notes: Original Note by Heidi Saxena DO (Physician) filed at 4/2/2017 12:04 PM         Procedure Orders:       1  Bronchoscopy [[id number]] ordered by Heidi Saxena DO at 04/02/17 1202                 Post-procedure Diagnoses:       1  HCAP (healthcare-associated pneumonia) [J18 9]                    Bronchoscopy  Date/Time: 4/2/2017 12:02 PM  Performed by: Anne-Marie Mckenzie by: Matt De Los Santos     Patient location:  ED  Consent:     Consent obtained:  Written    Consent given by:  Spouse    Alternatives discussed:  No treatment, delayed treatment and observation  Universal protocol:     Procedure explained and questions answered to patient or proxy's satisfaction: yes      Relevant documents present and verified: yes      Test results available and properly labeled: yes       Imaging studies available: yes      Patient identity confirmed:  Arm band and provided demographic data  Indications:     Procedure Purpose: diagnostic      Indications: pneumonia/infiltrate    Anesthesia (see MAR for exact dosages):      Anesthesia method:  None  Airway:     Airway:  Minimal bloody yellow secretions throughout right lower lobe washings                       Received for:Eli Brown DO  Apr 11 2017  3:58PM [facility] Standard Time

## 2018-01-26 ENCOUNTER — HOSPITAL ENCOUNTER (EMERGENCY)
Facility: HOSPITAL | Age: 68
Discharge: HOME | End: 2018-01-26
Attending: EMERGENCY MEDICINE | Admitting: EMERGENCY MEDICINE
Payer: COMMERCIAL

## 2018-01-26 VITALS
BODY MASS INDEX: 24.23 KG/M2 | RESPIRATION RATE: 19 BRPM | HEART RATE: 66 BPM | DIASTOLIC BLOOD PRESSURE: 80 MMHG | WEIGHT: 150.13 LBS | SYSTOLIC BLOOD PRESSURE: 147 MMHG | TEMPERATURE: 97.4 F | OXYGEN SATURATION: 94 %

## 2018-01-26 DIAGNOSIS — R31.0 GROSS HEMATURIA: Primary | ICD-10-CM

## 2018-01-26 LAB
ANION GAP SERPL CALCULATED.3IONS-SCNC: 12 MMOL/L (ref 4–13)
BACTERIA UR QL AUTO: ABNORMAL /HPF
BASOPHILS # BLD MANUAL: 0.38 THOUSAND/UL (ref 0–0.1)
BASOPHILS NFR MAR MANUAL: 4 % (ref 0–1)
BILIRUB UR QL STRIP: ABNORMAL
BUN SERPL-MCNC: 58 MG/DL (ref 5–25)
CALCIUM SERPL-MCNC: 8.4 MG/DL (ref 8.3–10.1)
CHLORIDE SERPL-SCNC: 94 MMOL/L (ref 100–108)
CLARITY UR: ABNORMAL
CO2 SERPL-SCNC: 26 MMOL/L (ref 21–32)
COLOR UR: ABNORMAL
CREAT SERPL-MCNC: 7.15 MG/DL (ref 0.6–1.3)
EOSINOPHIL # BLD MANUAL: 0.38 THOUSAND/UL (ref 0–0.4)
EOSINOPHIL NFR BLD MANUAL: 4 % (ref 0–6)
ERYTHROCYTE [DISTWIDTH] IN BLOOD BY AUTOMATED COUNT: 14.2 % (ref 11.6–15.1)
GFR SERPL CREATININE-BSD FRML MDRD: 7 ML/MIN/1.73SQ M
GLUCOSE SERPL-MCNC: 199 MG/DL (ref 65–140)
GLUCOSE UR STRIP-MCNC: ABNORMAL MG/DL
HCT VFR BLD AUTO: 36.2 % (ref 36.5–49.3)
HGB BLD-MCNC: 11.5 G/DL (ref 12–17)
HGB UR QL STRIP.AUTO: ABNORMAL
INR PPP: 4.1 (ref 0.86–1.16)
KETONES UR STRIP-MCNC: ABNORMAL MG/DL
LEUKOCYTE ESTERASE UR QL STRIP: ABNORMAL
LYMPHOCYTES # BLD AUTO: 0.76 THOUSAND/UL (ref 0.6–4.47)
LYMPHOCYTES # BLD AUTO: 8 % (ref 14–44)
MCH RBC QN AUTO: 28.8 PG (ref 26.8–34.3)
MCHC RBC AUTO-ENTMCNC: 31.8 G/DL (ref 31.4–37.4)
MCV RBC AUTO: 91 FL (ref 82–98)
MONOCYTES # BLD AUTO: 0.38 THOUSAND/UL (ref 0–1.22)
MONOCYTES NFR BLD: 4 % (ref 4–12)
NEUTROPHILS # BLD MANUAL: 7.64 THOUSAND/UL (ref 1.85–7.62)
NEUTS BAND NFR BLD MANUAL: 2 % (ref 0–8)
NEUTS SEG NFR BLD AUTO: 78 % (ref 43–75)
NITRITE UR QL STRIP: ABNORMAL
NON-SQ EPI CELLS URNS QL MICRO: ABNORMAL /HPF
PLATELET # BLD AUTO: 250 THOUSANDS/UL (ref 149–390)
PLATELET BLD QL SMEAR: ADEQUATE
PMV BLD AUTO: 8.8 FL (ref 8.9–12.7)
POTASSIUM SERPL-SCNC: 4.9 MMOL/L (ref 3.5–5.3)
PROT UR STRIP-MCNC: ABNORMAL MG/DL
PROTHROMBIN TIME: 41.4 SECONDS (ref 12.1–14.4)
RBC # BLD AUTO: 3.99 MILLION/UL (ref 3.88–5.62)
RBC #/AREA URNS AUTO: ABNORMAL /HPF
SODIUM SERPL-SCNC: 132 MMOL/L (ref 136–145)
TOTAL CELLS COUNTED SPEC: 100
UROBILINOGEN UR QL STRIP.AUTO: ABNORMAL E.U./DL
WBC # BLD AUTO: 9.55 THOUSAND/UL (ref 4.31–10.16)
WBC #/AREA URNS AUTO: ABNORMAL /HPF

## 2018-01-26 PROCEDURE — 96375 TX/PRO/DX INJ NEW DRUG ADDON: CPT

## 2018-01-26 PROCEDURE — 85027 COMPLETE CBC AUTOMATED: CPT | Performed by: EMERGENCY MEDICINE

## 2018-01-26 PROCEDURE — 85610 PROTHROMBIN TIME: CPT | Performed by: EMERGENCY MEDICINE

## 2018-01-26 PROCEDURE — 80048 BASIC METABOLIC PNL TOTAL CA: CPT | Performed by: EMERGENCY MEDICINE

## 2018-01-26 PROCEDURE — 36415 COLL VENOUS BLD VENIPUNCTURE: CPT | Performed by: EMERGENCY MEDICINE

## 2018-01-26 PROCEDURE — 81001 URINALYSIS AUTO W/SCOPE: CPT | Performed by: EMERGENCY MEDICINE

## 2018-01-26 PROCEDURE — 51798 US URINE CAPACITY MEASURE: CPT

## 2018-01-26 PROCEDURE — 85007 BL SMEAR W/DIFF WBC COUNT: CPT | Performed by: EMERGENCY MEDICINE

## 2018-01-26 PROCEDURE — 99283 EMERGENCY DEPT VISIT LOW MDM: CPT

## 2018-01-26 PROCEDURE — 96365 THER/PROPH/DIAG IV INF INIT: CPT

## 2018-01-26 RX ORDER — MORPHINE SULFATE 4 MG/ML
4 INJECTION, SOLUTION INTRAMUSCULAR; INTRAVENOUS ONCE
Status: COMPLETED | OUTPATIENT
Start: 2018-01-26 | End: 2018-01-26

## 2018-01-26 RX ADMIN — DESMOPRESSIN ACETATE 20.4 MCG: 4 SOLUTION INTRAVENOUS at 18:12

## 2018-01-26 RX ADMIN — MORPHINE SULFATE 4 MG: 4 INJECTION INTRAVENOUS at 18:10

## 2018-01-26 NOTE — ED NOTES
Bladder irrigation stopped per Dr Cinthya Rutledge verbal order  Doctor at bedside to see patients urine that had been draining  1,500mL flushed through irrigation system  urine color light peach  No clots noted  1,450mL output       [facility]  01/26/18 1819       Rafael Dempsey  01/26/18 [address]375 [address]  01/26/18 1820

## 2018-01-26 NOTE — ED NOTES
Dr Ryan Patient aware of light peach colored urine with no clots  Patient c/o abdominal pain and distention  approx 1400 cc noted in urine drainage bag        Yessenia Sarmiento RN  01/26/18 3308

## 2018-01-26 NOTE — ED NOTES
Charge SHABBIR Gómez aware CBI to be initiated  Will notify supervisor at this time        Lucy Winston RN  01/26/18 2431

## 2018-01-27 NOTE — ED NOTES
Dr Ryan Patient aware that patient is complaining of 10/10 abdominal/ penis pain  Aware that 1500ml CBI infused was returned  No drainage in mcclelland bag at this time  Patient's abdomen remains distended  Verbal order to bladder scan pateint; Dr Ryan Patient requested to remove 3 way mcclelland if bladder scan shows no urine        Yessenia Sarmiento RN  01/26/18 8044

## 2018-01-27 NOTE — DISCHARGE INSTRUCTIONS
Diagnosis: hematuria    -  expect some blood in urine- if blood becomes profuse out of penis- not stopping- please return to  The er     - please also return to  The er for any increasing lower abdominal pain / fevers- temp > 100 4/ any flank pain/peristent vomiting    - again - go to dialysis tomorrow    - your inr- coumadin level was -- hold coumadin for 1 day- then resume at regular dose

## 2018-01-27 NOTE — ED NOTES
This RN present while BLANCA HUMPHRIES removed three way mcclelland catheter  Patient tolerated well  States pain is now down to 5/10        Gerald Plata, RN  01/26/18 1921

## 2018-01-27 NOTE — ED PROVIDER NOTES
History  Chief Complaint   Patient presents with    Painful Urination     Pt states that he has been having painful urination and the nurse straight cathed him and recieved blood clots  Pt is on dialysis and goes M, W, F missing todays appoiintment  74 yr male with esrd on hd- missed hd today -- has been having dysuria fro months - recently on antibx-   Makes a small amount of urine-- had vn come to house today to get urine- pt straigth cathed- with hematuria- clots- with lower abd pain- sent to er for eval- no flank pain/fevers- vomiting/ systemic signs-- - pt is on coumadin        History provided by:  Patient and spouse   used: No        Prior to Admission Medications   Prescriptions Last Dose Informant Patient Reported? Taking?    Probiotic Product (PRO-BIOTIC BLEND) CAPS   Yes No   Sig: Take 1 capsule by mouth daily   acetaminophen (TYLENOL) 325 mg tablet   Yes No   Sig: Take 650 mg by mouth every 6 (six) hours as needed for mild pain   amiodarone 200 mg tablet   No No   Sig: Take 1 tablet by mouth 3 (three) times a day for 30 days   b complex-vitamin C-folic acid (RENAL) 1 mg   Yes No   Sig: Take 1 mg by mouth daily   calcium citrate (CALCITRATE) 950 MG tablet   Yes No   Sig: Take 2,400 mg by mouth 3 (three) times a day with meals   cholestyramine sugar free (QUESTRAN LIGHT) 4 g packet   No No   Sig: Take 1 packet by mouth 2 (two) times a day for 30 days   cinacalcet (SENSIPAR) 30 mg tablet   Yes No   Sig: Take 30 mg by mouth daily at bedtime   citalopram (CeleXA) 10 mg tablet   Yes No   Sig: Take 10 mg by mouth daily     diphenoxylate-atropine (LOMOTIL) 2 5-0 025 mg/5 mL liquid   Yes No   Sig: Take 5 mL by mouth 4 (four) times a day as needed for diarrhea   gabapentin (NEURONTIN) 300 mg capsule   Yes No   Sig: Take 300 mg by mouth daily at bedtime   insulin detemir (LEVEMIR) 100 units/mL subcutaneous injection   No No   Sig: Inject 5 Units under the skin 2 (two) times a day for 30 days   insulin lispro (HumaLOG) 100 units/mL injection   No No   Sig: Inject 2 Units under the skin 3 (three) times a day before meals for 30 days   levothyroxine 137 mcg tablet   No No   Sig: Take 1 tablet by mouth daily in the early morning for 30 days   metoprolol tartrate (LOPRESSOR) 25 mg tablet   No No   Sig: Take 1 tablet by mouth every 12 (twelve) hours for 30 days   nystatin (MYCOSTATIN) powder   No No   Sig: Apply topically 2 (two) times a day   sevelamer carbonate (RENVELA) 800 mg tablet   Yes No   Sig: Take 800 mg by mouth 3 (three) times a day as needed     traMADol (ULTRAM) 50 mg tablet   Yes No   Sig: Take 50 mg by mouth every 6 (six) hours as needed for moderate pain   vancomycin (VANCOCIN) 50mg/mL SOLN   No No   Sig: Take 2 5 mL by mouth daily   warfarin (COUMADIN) 5 mg tablet   No No   Sig: Take 1 tablet by mouth daily   Patient taking differently: Take 6 mg by mouth daily        Facility-Administered Medications: None       Past Medical History:   Diagnosis Date    Acquired hypothyroidism     underactive    Atrial fibrillation (HCC)     Chronic kidney disease-mineral and bone disorder 11/27/2017    Clostridium difficile infection 04/2017    Diabetes mellitus [facility]     [facility]     Diarrhea     [facility]end stage renal [facility]     Hx of cardiac arrest     Hypertension     Neuropathy     Right lower lobe pneumonia ([facility] ) 2/20/2017    Sepsis ([facility] ) 04/2017    Polymicrobial MRSA, VRE    Systolic and diastolic CHF, chronic (HCC)     EF 35%, Grade II DD       Past Surgical History:   Procedure Laterality Date    CATARACT EXTRACTION      CHG GI ENDOSCOPIC ULTRASOUND N/A 3/10/2016    Procedure: LINEAR ENDOSCOPIC U/S;  Surgeon: Wilber Roa MD;  Location: [facility];   Service: Gastroenterology    CHOLECYSTECTOMY      GASTROSTOMY TUBE PLACEMENT N/A 4/12/2017    Procedure: INSERTION PEG TUBE;  Surgeon: Shantel Franklin MD;  [facility];  Service:    Fadumo ANN AMPUTATION THROUGH LOWER TIBIA AND FIBULA Right 4/6/2017    Procedure: AMPUTATION BELOW KNEE (BKA); Surgeon: Sascha Willams DO;  Location: [facility];  Service:     TOE AMPUTATION  2000       Family History   Problem Relation Age of Onset    Diabetes Father     Cancer Other     Lupus Mother      I have reviewed and agree with the history as documented  Social History   Substance Use Topics    Smoking status: Former Smoker     Packs/day: 1 00     Years: 46 00     Types: Cigarettes     Start date: 1970     Quit date: 3/1/2017    Smokeless tobacco: Never Used    Alcohol use No        Review of Systems   Constitutional: Negative  HENT: Negative  Eyes: Negative  Respiratory: Negative  Cardiovascular: Negative  Gastrointestinal: Negative  Endocrine: Negative  Genitourinary: Positive for dysuria and hematuria  Negative for decreased urine volume, difficulty urinating, discharge, enuresis, flank pain, frequency, genital sores, penile pain, penile swelling, scrotal swelling and testicular pain  Musculoskeletal: Negative  Skin: Negative  Allergic/Immunologic: Negative  Neurological: Negative  Hematological: Negative  Psychiatric/Behavioral: Negative  Physical Exam  ED Triage Vitals [01/26/18 1359]   Temperature Pulse Respirations Blood Pressure SpO2   (!) 97 4 °F (36 3 °C) 57 16 143/73 94 %      Temp Source Heart Rate Source Patient Position - Orthostatic VS BP Location FiO2 (%)   Oral Monitor Sitting Left arm --      Pain Score       8           Orthostatic Vital Signs  Vitals:    01/26/18 1359 01/26/18 1645 01/26/18 1800 01/26/18 1900   BP: 143/73 144/74 166/89 147/80   Pulse: 57 64 66 66   Patient Position - Orthostatic VS: Sitting Lying Lying Lying       Physical Exam   Constitutional: He is oriented to person, place, and time  No distress  avss--  Pulse ox 94 % on ra- intepretation  Is normal- no intervention   HENT:   Head: Normocephalic and atraumatic     Eyes: Conjunctivae and EOM are normal  Pupils are equal, round, and reactive to light  Right eye exhibits no discharge  Left eye exhibits no discharge  No scleral icterus  Mm pink   Neck: Normal range of motion  No JVD present  No tracheal deviation present  No thyromegaly present  Cardiovascular: Normal rate, regular rhythm, normal heart sounds and intact distal pulses  Exam reveals no gallop and no friction rub  No murmur heard  Pulmonary/Chest: Effort normal and breath sounds normal  No stridor  No respiratory distress  He has no wheezes  He has no rales  He exhibits no tenderness  Abdominal: Soft  Bowel sounds are normal  He exhibits no distension and no mass  There is no tenderness  There is no rebound and no guarding  No hernia  Genitourinary:   Genitourinary Comments: Coming from ureteral meatus   Musculoskeletal: He exhibits edema  He exhibits no tenderness or deformity  r bka- 1 plus lle pretibial edema- brownish skin discoloration - left great toe wound healing- dressed as per wife- getting better   Lymphadenopathy:     He has no cervical adenopathy  Neurological: He is alert and oriented to person, place, and time  No cranial nerve deficit or sensory deficit  He exhibits normal muscle tone  Coordination normal    Skin: Skin is warm  Capillary refill takes less than 2 seconds  No rash noted  He is not diaphoretic  No erythema  No pallor  Psychiatric: He has a normal mood and affect  His behavior is normal  Judgment and thought content normal    Nursing note and vitals reviewed        ED Medications  Medications   desmopressin (DDAVP) 20 4 mcg in sodium chloride 0 9 % 50 mL IVPB (0 mcg/kg × 68 1 kg Intravenous Stopped 1/26/18 1842)   morphine (PF) 4 mg/mL injection 4 mg (4 mg Intravenous Given 1/26/18 1810)       Diagnostic Studies  Results Reviewed     Procedure Component Value Units Date/Time    Urinalysis with microscopic [[id number]]  (Abnormal) Collected:  01/26/18 8317    Lab Status:  Final result Specimen:  Urine from Urine, Indwelling Vanegas Catheter Updated:  01/26/18 1953     Clarity, UA Cloudy     Color, UA Red     Specific Gravity, UA --     pH, UA --     Glucose, UA  mg/dl      Interference- unable to analyze     Ketones, UA  mg/dl      Interference- unable to analyze (A)     Blood, UA Interference- unable to analyze (A)     Protein, UA  mg/dl      Interference- unable to analyze (A)     Nitrite, UA Interference- unable to analyze     Bilirubin, UA Interference- unable to analyze (A)     Urobilinogen, UA  E U /dl      Interference-unable to analyze     Leukocytes, UA Interference- unable to analyze (A)     WBC, UA Innumerable (A) /hpf      RBC, UA Innumerable (A) /hpf      Bacteria, UA Moderate (A) /hpf      Epithelial Cells Occasional /hpf     Narrative:       Unspun microscopic urine, QNS for concentrated microscopic  CBC and differential [[id number]]  (Abnormal) Collected:  01/26/18 1725    Lab Status:  Final result Specimen:  Blood from Arm, Left Updated:  01/26/18 1817     WBC 9 55 Thousand/uL      RBC 3 99 Million/uL      Hemoglobin 11 5 (L) g/dL      Hematocrit 36 2 (L) %      MCV 91 fL      MCH 28 8 pg      MCHC 31 8 g/dL      RDW 14 2 %      MPV 8 8 (L) fL      Platelets 707 Thousands/uL     Narrative: This is an appended report  These results have been appended to a previously verified report      Protime-INR [[id number]]  (Abnormal) Collected:  01/26/18 1725    Lab Status:  Final result Specimen:  Blood from Arm, Left Updated:  01/26/18 1805     Protime 41 4 (H) seconds      INR 4 10 (H)    Basic metabolic panel [[id number]]  (Abnormal) Collected:  01/26/18 1725    Lab Status:  Final result Specimen:  Blood from Arm, Left Updated:  01/26/18 1750     Sodium 132 (L) mmol/L      Potassium 4 9 mmol/L      Chloride 94 (L) mmol/L      CO2 26 mmol/L      Anion Gap 12 mmol/L      BUN 58 (H) mg/dL      Creatinine 7 15 (H) mg/dL      Glucose 199 (H) mg/dL      Calcium 8 4 mg/dL      eGFR 7 ml/min/1 73sq m     Narrative:         National Kidney Disease Education Program recommendations are as follows:  GFR calculation is accurate only with a steady state creatinine  Chronic Kidney disease less than 60 ml/min/1 73 sq  meters  Kidney failure less than 15 ml/min/1 73 sq  meters  No orders to display              Procedures  Procedures       Phone Contacts  ED Phone Contact    ED Course  ED Course as of Jan 27 0155 Fri Jan 26, 2018   9749 Er md note- bladder irrigated to clear- will stop irrigation and let bladder drain     1941 Er md note- pt- re-evaluated feels improved--  with bladder-- wife has made arrangements for hd tomorrow- aware of waiting for ua results                                MDM  CritCare Time    Disposition  Final diagnoses:   Gross hematuria     Time reflects when diagnosis was documented in both MDM as applicable and the Disposition within this note     Time User Action Codes Description Comment    1/26/2018  7:58 PM Hang Wills Add [R31 0] Gross hematuria       ED Disposition     ED Disposition Condition Comment    Discharge  Ragini Laurent discharge to home/self care      Condition at discharge: Good        Follow-up Information    None       Discharge Medication List as of 1/26/2018  8:01 PM      CONTINUE these medications which have NOT CHANGED    Details   acetaminophen (TYLENOL) 325 mg tablet Take 650 mg by mouth every 6 (six) hours as needed for mild pain, Historical Med      amiodarone 200 mg tablet Take 1 tablet by mouth 3 (three) times a day for 30 days, Starting u 12/7/2017, Until Sat 1/6/2018, Normal      b complex-vitamin C-folic acid (RENAL) 1 mg Take 1 mg by mouth daily, Historical Med      calcium citrate (CALCITRATE) 950 MG tablet Take 2,400 mg by mouth 3 (three) times a day with meals, Historical Med      cholestyramine sugar free (QUESTRAN LIGHT) 4 g packet Take 1 packet by mouth 2 (two) times a day for 30 days, Starting u 12/7/2017, Until Sat 1/6/2018, Normal      cinacalcet (SENSIPAR) 30 mg tablet Take 30 mg by mouth daily at bedtime, Historical Med      citalopram (CeleXA) 10 mg tablet Take 10 mg by mouth daily  , Historical Med      diphenoxylate-atropine (LOMOTIL) 2 5-0 025 mg/5 mL liquid Take 5 mL by mouth 4 (four) times a day as needed for diarrhea, Historical Med      gabapentin (NEURONTIN) 300 mg capsule Take 300 mg by mouth daily at bedtime, Historical Med      insulin detemir (LEVEMIR) 100 units/mL subcutaneous injection Inject 5 Units under the skin 2 (two) times a day for 30 days, Starting Thu 12/7/2017, Until Sat 1/6/2018, Normal      insulin lispro (HumaLOG) 100 units/mL injection Inject 2 Units under the skin 3 (three) times a day before meals for 30 days, Starting Thu 12/7/2017, Until Sat 1/6/2018, Normal      levothyroxine 137 mcg tablet Take 1 tablet by mouth daily in the early morning for 30 days, Starting Fri 12/8/2017, Until Sun 1/7/2018, Normal      metoprolol tartrate (LOPRESSOR) 25 mg tablet Take 1 tablet by mouth every 12 (twelve) hours for 30 days, Starting Thu 12/7/2017, Until Sat 1/6/2018, Normal      nystatin (MYCOSTATIN) powder Apply topically 2 (two) times a day, Starting Thu 12/7/2017, Normal      Probiotic Product (PRO-BIOTIC BLEND) CAPS Take 1 capsule by mouth daily, Historical Med      sevelamer carbonate (RENVELA) 800 mg tablet Take 800 mg by mouth 3 (three) times a day as needed  , Historical Med      traMADol (ULTRAM) 50 mg tablet Take 50 mg by mouth every 6 (six) hours as needed for moderate pain, Historical Med      vancomycin (VANCOCIN) 50mg/mL SOLN Take 2 5 mL by mouth daily, Starting Thu 12/7/2017, Print      warfarin (COUMADIN) 5 mg tablet Take 1 tablet by mouth daily, Starting Thu 12/7/2017, No Print           No discharge procedures on file      ED Provider  Electronically Signed by           Pam Matthews MD  01/27/18 0846

## 2018-01-27 NOTE — ED NOTES
50ml clear, peach colored urine remaining in [facility] drainage bag; totaling 1500ml that was inserted        Kelsey Calle RN  01/26/18 1911

## 2018-01-30 ENCOUNTER — APPOINTMENT (EMERGENCY)
Dept: RADIOLOGY | Facility: HOSPITAL | Age: 68
DRG: 871 | End: 2018-01-30
Payer: COMMERCIAL

## 2018-01-30 ENCOUNTER — HOSPITAL ENCOUNTER (INPATIENT)
Facility: HOSPITAL | Age: 68
LOS: 10 days | Discharge: HOME HEALTH | DRG: 871 | End: 2018-02-09
Attending: INTERNAL MEDICINE | Admitting: INTERNAL MEDICINE
Payer: COMMERCIAL

## 2018-01-30 DIAGNOSIS — Z99.2 ESRD (END STAGE RENAL DISEASE) ON DIALYSIS (HCC): Chronic | ICD-10-CM

## 2018-01-30 DIAGNOSIS — R94.31 PROLONGED Q-T INTERVAL ON ECG: ICD-10-CM

## 2018-01-30 DIAGNOSIS — R41.82 ALTERED MENTAL STATUS: ICD-10-CM

## 2018-01-30 DIAGNOSIS — I50.42 CHRONIC COMBINED SYSTOLIC AND DIASTOLIC CHF (CONGESTIVE HEART FAILURE) (HCC): Chronic | ICD-10-CM

## 2018-01-30 DIAGNOSIS — N18.6 ESRD (END STAGE RENAL DISEASE) ON DIALYSIS (HCC): Chronic | ICD-10-CM

## 2018-01-30 DIAGNOSIS — R31.0 GROSS HEMATURIA: ICD-10-CM

## 2018-01-30 DIAGNOSIS — R51.9 HEADACHE: ICD-10-CM

## 2018-01-30 DIAGNOSIS — A41.9 SEPSIS (HCC): Primary | ICD-10-CM

## 2018-01-30 DIAGNOSIS — S90.415A TOE ABRASION, LEFT, INITIAL ENCOUNTER: ICD-10-CM

## 2018-01-30 PROBLEM — A04.71 RECURRENT COLITIS DUE TO CLOSTRIDIUM DIFFICILE: Status: RESOLVED | Noted: 2017-02-22 | Resolved: 2018-01-30

## 2018-01-30 LAB
ALBUMIN SERPL BCP-MCNC: 2.5 G/DL (ref 3.5–5)
ALP SERPL-CCNC: 185 U/L (ref 46–116)
ALT SERPL W P-5'-P-CCNC: 25 U/L (ref 12–78)
ANION GAP SERPL CALCULATED.3IONS-SCNC: 10 MMOL/L (ref 4–13)
ANISOCYTOSIS BLD QL SMEAR: PRESENT
APPEARANCE CSF: CLEAR
APTT PPP: 57 SECONDS (ref 23–35)
AST SERPL W P-5'-P-CCNC: 29 U/L (ref 5–45)
ATRIAL RATE: 89 BPM
BACTERIA UR QL AUTO: ABNORMAL /HPF
BASOPHILS # BLD MANUAL: 0 THOUSAND/UL (ref 0–0.1)
BASOPHILS NFR MAR MANUAL: 0 % (ref 0–1)
BILIRUB SERPL-MCNC: 0.38 MG/DL (ref 0.2–1)
BILIRUB UR QL STRIP: ABNORMAL
BUN SERPL-MCNC: 32 MG/DL (ref 5–25)
CALCIUM SERPL-MCNC: 8 MG/DL (ref 8.3–10.1)
CHLORIDE SERPL-SCNC: 97 MMOL/L (ref 100–108)
CLARITY UR: ABNORMAL
CO2 SERPL-SCNC: 26 MMOL/L (ref 21–32)
COLOR UR: ABNORMAL
CREAT SERPL-MCNC: 4.65 MG/DL (ref 0.6–1.3)
EOSINOPHIL # BLD MANUAL: 0.3 THOUSAND/UL (ref 0–0.4)
EOSINOPHIL NFR BLD MANUAL: 2 % (ref 0–6)
ERYTHROCYTE [DISTWIDTH] IN BLOOD BY AUTOMATED COUNT: 14.4 % (ref 11.6–15.1)
GFR SERPL CREATININE-BSD FRML MDRD: 12 ML/MIN/1.73SQ M
GLUCOSE CSF-MCNC: 138 MG/DL (ref 50–80)
GLUCOSE SERPL-MCNC: 131 MG/DL (ref 65–140)
GLUCOSE SERPL-MCNC: 357 MG/DL (ref 65–140)
GLUCOSE SERPL-MCNC: 380 MG/DL (ref 65–140)
GLUCOSE UR STRIP-MCNC: ABNORMAL MG/DL
GRAM STN SPEC: NORMAL
GRAM STN SPEC: NORMAL
HCT VFR BLD AUTO: 36.9 % (ref 36.5–49.3)
HGB BLD-MCNC: 12.1 G/DL (ref 12–17)
HGB UR QL STRIP.AUTO: ABNORMAL
INR PPP: 3.8 (ref 0.86–1.16)
KETONES UR STRIP-MCNC: ABNORMAL MG/DL
LACTATE SERPL-SCNC: 1.3 MMOL/L (ref 0.5–2)
LACTATE SERPL-SCNC: 2.1 MMOL/L (ref 0.5–2)
LEUKOCYTE ESTERASE UR QL STRIP: ABNORMAL
LYMPHOCYTES # BLD AUTO: 0.15 THOUSAND/UL (ref 0.6–4.47)
LYMPHOCYTES # BLD AUTO: 1 % (ref 14–44)
LYMPHOCYTES NFR CSF MANUAL: 38 %
MCH RBC QN AUTO: 30.3 PG (ref 26.8–34.3)
MCHC RBC AUTO-ENTMCNC: 32.8 G/DL (ref 31.4–37.4)
MCV RBC AUTO: 92 FL (ref 82–98)
METAMYELOCYTES NFR BLD MANUAL: 1 % (ref 0–1)
MONOCYTES # BLD AUTO: 0.91 THOUSAND/UL (ref 0–1.22)
MONOCYTES NFR BLD: 6 % (ref 4–12)
MONOS+MACROS CSF MANUAL: 13 %
NEUTROPHILS # BLD MANUAL: 13.59 THOUSAND/UL (ref 1.85–7.62)
NEUTROPHILS NFR CSF MANUAL: 50 %
NEUTS BAND NFR BLD MANUAL: 9 % (ref 0–8)
NEUTS SEG NFR BLD AUTO: 81 % (ref 43–75)
NITRITE UR QL STRIP: ABNORMAL
NON-SQ EPI CELLS URNS QL MICRO: ABNORMAL /HPF
NRBC BLD AUTO-RTO: 0 /100 WBCS
P AXIS: 93 DEGREES
PLATELET # BLD AUTO: 213 THOUSANDS/UL (ref 149–390)
PLATELET BLD QL SMEAR: ADEQUATE
PMV BLD AUTO: 9.5 FL (ref 8.9–12.7)
POTASSIUM SERPL-SCNC: 3.7 MMOL/L (ref 3.5–5.3)
PR INTERVAL: 194 MS
PROT CSF-MCNC: 58 MG/DL (ref 15–45)
PROT SERPL-MCNC: 9.1 G/DL (ref 6.4–8.2)
PROT UR STRIP-MCNC: ABNORMAL MG/DL
PROTHROMBIN TIME: 38.1 SECONDS (ref 12.1–14.4)
QRS AXIS: -49 DEGREES
QRSD INTERVAL: 106 MS
QT INTERVAL: 406 MS
QTC INTERVAL: 493 MS
RBC # BLD AUTO: 4 MILLION/UL (ref 3.88–5.62)
RBC #/AREA URNS AUTO: ABNORMAL /HPF
RBC MORPH BLD: PRESENT
SODIUM SERPL-SCNC: 133 MMOL/L (ref 136–145)
SP GR UR STRIP.AUTO: 1.02 (ref 1–1.03)
T WAVE AXIS: 99 DEGREES
TOTAL CELLS COUNTED BLD: NO
TOTAL CELLS COUNTED SPEC: 16
TROPONIN I SERPL-MCNC: <0.02 NG/ML
TUBE # CSF: 4
UROBILINOGEN UR QL STRIP.AUTO: ABNORMAL E.U./DL
VENTRICULAR RATE: 89 BPM
WBC # BLD AUTO: 15.1 THOUSAND/UL (ref 4.31–10.16)
WBC # CSF AUTO: 1 /UL (ref 0–5)
WBC #/AREA URNS AUTO: ABNORMAL /HPF

## 2018-01-30 PROCEDURE — 82948 REAGENT STRIP/BLOOD GLUCOSE: CPT

## 2018-01-30 PROCEDURE — 85007 BL SMEAR W/DIFF WBC COUNT: CPT | Performed by: INTERNAL MEDICINE

## 2018-01-30 PROCEDURE — 96374 THER/PROPH/DIAG INJ IV PUSH: CPT

## 2018-01-30 PROCEDURE — 89051 BODY FLUID CELL COUNT: CPT | Performed by: EMERGENCY MEDICINE

## 2018-01-30 PROCEDURE — 99285 EMERGENCY DEPT VISIT HI MDM: CPT

## 2018-01-30 PROCEDURE — 87086 URINE CULTURE/COLONY COUNT: CPT | Performed by: INTERNAL MEDICINE

## 2018-01-30 PROCEDURE — 87077 CULTURE AEROBIC IDENTIFY: CPT | Performed by: INTERNAL MEDICINE

## 2018-01-30 PROCEDURE — 85027 COMPLETE CBC AUTOMATED: CPT | Performed by: INTERNAL MEDICINE

## 2018-01-30 PROCEDURE — 36415 COLL VENOUS BLD VENIPUNCTURE: CPT

## 2018-01-30 PROCEDURE — 84484 ASSAY OF TROPONIN QUANT: CPT | Performed by: INTERNAL MEDICINE

## 2018-01-30 PROCEDURE — 85610 PROTHROMBIN TIME: CPT | Performed by: INTERNAL MEDICINE

## 2018-01-30 PROCEDURE — 87498 ENTEROVIRUS PROBE&REVRS TRNS: CPT | Performed by: EMERGENCY MEDICINE

## 2018-01-30 PROCEDURE — 81001 URINALYSIS AUTO W/SCOPE: CPT | Performed by: INTERNAL MEDICINE

## 2018-01-30 PROCEDURE — 87532 HHV-6 DNA AMP PROBE: CPT | Performed by: EMERGENCY MEDICINE

## 2018-01-30 PROCEDURE — 99223 1ST HOSP IP/OBS HIGH 75: CPT | Performed by: INTERNAL MEDICINE

## 2018-01-30 PROCEDURE — 83605 ASSAY OF LACTIC ACID: CPT | Performed by: INTERNAL MEDICINE

## 2018-01-30 PROCEDURE — 009U3ZX DRAINAGE OF SPINAL CANAL, PERCUTANEOUS APPROACH, DIAGNOSTIC: ICD-10-PCS | Performed by: EMERGENCY MEDICINE

## 2018-01-30 PROCEDURE — 87070 CULTURE OTHR SPECIMN AEROBIC: CPT | Performed by: EMERGENCY MEDICINE

## 2018-01-30 PROCEDURE — 87653 STREP B DNA AMP PROBE: CPT | Performed by: EMERGENCY MEDICINE

## 2018-01-30 PROCEDURE — 87529 HSV DNA AMP PROBE: CPT | Performed by: EMERGENCY MEDICINE

## 2018-01-30 PROCEDURE — 87040 BLOOD CULTURE FOR BACTERIA: CPT | Performed by: INTERNAL MEDICINE

## 2018-01-30 PROCEDURE — 84157 ASSAY OF PROTEIN OTHER: CPT | Performed by: EMERGENCY MEDICINE

## 2018-01-30 PROCEDURE — 87186 SC STD MICRODIL/AGAR DIL: CPT | Performed by: INTERNAL MEDICINE

## 2018-01-30 PROCEDURE — 87798 DETECT AGENT NOS DNA AMP: CPT | Performed by: EMERGENCY MEDICINE

## 2018-01-30 PROCEDURE — 80053 COMPREHEN METABOLIC PANEL: CPT | Performed by: INTERNAL MEDICINE

## 2018-01-30 PROCEDURE — 85730 THROMBOPLASTIN TIME PARTIAL: CPT | Performed by: INTERNAL MEDICINE

## 2018-01-30 PROCEDURE — 87496 CYTOMEG DNA AMP PROBE: CPT | Performed by: EMERGENCY MEDICINE

## 2018-01-30 PROCEDURE — 93005 ELECTROCARDIOGRAM TRACING: CPT

## 2018-01-30 PROCEDURE — 71045 X-RAY EXAM CHEST 1 VIEW: CPT

## 2018-01-30 PROCEDURE — 82945 GLUCOSE OTHER FLUID: CPT | Performed by: EMERGENCY MEDICINE

## 2018-01-30 PROCEDURE — 96361 HYDRATE IV INFUSION ADD-ON: CPT

## 2018-01-30 PROCEDURE — 93010 ELECTROCARDIOGRAM REPORT: CPT | Performed by: INTERNAL MEDICINE

## 2018-01-30 RX ORDER — AMIODARONE HYDROCHLORIDE 200 MG/1
200 TABLET ORAL DAILY
Status: DISCONTINUED | OUTPATIENT
Start: 2018-01-31 | End: 2018-02-08

## 2018-01-30 RX ORDER — NYSTATIN 100000 [USP'U]/G
POWDER TOPICAL 2 TIMES DAILY
Status: DISCONTINUED | OUTPATIENT
Start: 2018-01-30 | End: 2018-02-09 | Stop reason: HOSPADM

## 2018-01-30 RX ORDER — GABAPENTIN 300 MG/1
300 CAPSULE ORAL
Status: DISCONTINUED | OUTPATIENT
Start: 2018-01-30 | End: 2018-01-31

## 2018-01-30 RX ORDER — TRAMADOL HYDROCHLORIDE 50 MG/1
50 TABLET ORAL EVERY 6 HOURS PRN
Status: DISCONTINUED | OUTPATIENT
Start: 2018-01-30 | End: 2018-02-09 | Stop reason: HOSPADM

## 2018-01-30 RX ORDER — VANCOMYCIN HYDROCHLORIDE 1 G/200ML
15 INJECTION, SOLUTION INTRAVENOUS ONCE
Status: DISCONTINUED | OUTPATIENT
Start: 2018-01-30 | End: 2018-01-30

## 2018-01-30 RX ORDER — CINACALCET 30 MG/1
30 TABLET, FILM COATED ORAL
Status: DISCONTINUED | OUTPATIENT
Start: 2018-01-30 | End: 2018-02-09 | Stop reason: HOSPADM

## 2018-01-30 RX ORDER — CHOLESTYRAMINE LIGHT 4 G/5.7G
4 POWDER, FOR SUSPENSION ORAL 2 TIMES DAILY
Status: DISCONTINUED | OUTPATIENT
Start: 2018-01-30 | End: 2018-02-09 | Stop reason: HOSPADM

## 2018-01-30 RX ORDER — DIPHENOXYLATE HYDROCHLORIDE AND ATROPINE SULFATE 2.5; .025 MG/1; MG/1
1 TABLET ORAL 4 TIMES DAILY PRN
Status: DISCONTINUED | OUTPATIENT
Start: 2018-01-30 | End: 2018-02-01

## 2018-01-30 RX ORDER — CITALOPRAM 10 MG/1
10 TABLET ORAL DAILY
Status: DISCONTINUED | OUTPATIENT
Start: 2018-01-31 | End: 2018-02-06

## 2018-01-30 RX ORDER — CHOLECALCIFEROL (VITAMIN D3) 10 MCG
1 TABLET ORAL DAILY
Status: DISCONTINUED | OUTPATIENT
Start: 2018-01-31 | End: 2018-02-09 | Stop reason: HOSPADM

## 2018-01-30 RX ORDER — LIDOCAINE HYDROCHLORIDE 10 MG/ML
5 INJECTION, SOLUTION EPIDURAL; INFILTRATION; INTRACAUDAL; PERINEURAL ONCE
Status: COMPLETED | OUTPATIENT
Start: 2018-01-30 | End: 2018-01-30

## 2018-01-30 RX ORDER — ACETAMINOPHEN 650 MG/1
650 SUPPOSITORY RECTAL ONCE
Status: COMPLETED | OUTPATIENT
Start: 2018-01-30 | End: 2018-01-30

## 2018-01-30 RX ORDER — ACETAMINOPHEN 325 MG/1
650 TABLET ORAL EVERY 6 HOURS PRN
Status: DISCONTINUED | OUTPATIENT
Start: 2018-01-30 | End: 2018-02-09 | Stop reason: HOSPADM

## 2018-01-30 RX ADMIN — NYSTATIN: 100000 POWDER TOPICAL at 18:24

## 2018-01-30 RX ADMIN — GABAPENTIN 300 MG: 300 CAPSULE ORAL at 23:40

## 2018-01-30 RX ADMIN — CHOLESTYRAMINE 4 G: 4 POWDER, FOR SUSPENSION ORAL at 18:24

## 2018-01-30 RX ADMIN — INSULIN LISPRO 6 UNITS: 100 INJECTION, SOLUTION INTRAVENOUS; SUBCUTANEOUS at 18:23

## 2018-01-30 RX ADMIN — ACETAMINOPHEN 650 MG: 650 SUPPOSITORY RECTAL at 13:55

## 2018-01-30 RX ADMIN — CEFEPIME HYDROCHLORIDE 2000 MG: 2 INJECTION, POWDER, FOR SOLUTION INTRAVENOUS at 13:55

## 2018-01-30 RX ADMIN — CINACALCET HYDROCHLORIDE 30 MG: 30 TABLET, COATED ORAL at 23:40

## 2018-01-30 RX ADMIN — INSULIN LISPRO 2 UNITS: 100 INJECTION, SOLUTION INTRAVENOUS; SUBCUTANEOUS at 18:23

## 2018-01-30 RX ADMIN — INSULIN DETEMIR 5 UNITS: 100 INJECTION, SOLUTION SUBCUTANEOUS at 23:41

## 2018-01-30 RX ADMIN — ACETAMINOPHEN 650 MG: 325 TABLET, FILM COATED ORAL at 20:12

## 2018-01-30 RX ADMIN — INSULIN DETEMIR 5 UNITS: 100 INJECTION, SOLUTION SUBCUTANEOUS at 18:23

## 2018-01-30 RX ADMIN — VANCOMYCIN HYDROCHLORIDE 1500 MG: 1 INJECTION, POWDER, LYOPHILIZED, FOR SOLUTION INTRAVENOUS at 14:43

## 2018-01-30 RX ADMIN — INSULIN LISPRO 4 UNITS: 100 INJECTION, SOLUTION INTRAVENOUS; SUBCUTANEOUS at 23:42

## 2018-01-30 RX ADMIN — LIDOCAINE HYDROCHLORIDE 5 ML: 10 INJECTION, SOLUTION EPIDURAL; INFILTRATION; INTRACAUDAL; PERINEURAL at 13:15

## 2018-01-30 RX ADMIN — SODIUM CHLORIDE 1000 ML: 0.9 INJECTION, SOLUTION INTRAVENOUS at 12:00

## 2018-01-30 RX ADMIN — METOPROLOL TARTRATE 25 MG: 25 TABLET ORAL at 23:39

## 2018-01-30 NOTE — ED NOTES
Wife states that patient had dialysis yesterday and believes he was more tired than normal  Patient was coming of dizziness and ringing in his ears  Also was complaining of headache and nausea  Was given Tramadol and Zofran and diabetic medication and fell asleep after  Was up in the middle of the night and this morning patient wasn't answering questions for wife  Patient appeared to be more lethargic  States he was at the hospital on Friday at [facility] and had a irrigation of the bladder and still has bleeding and pain since        iYsel Segal RN  01/30/18 9231

## 2018-01-30 NOTE — ASSESSMENT & PLAN NOTE
· Continue metoprolol  · Heart rate is stable  · INR is greater than 3  · Hold Coumadin for tonight  · Reassess in the morning

## 2018-01-30 NOTE — H&P
H&P- Maria Isabel Kumar 1950, 74 y o  male MRN: [id number]    Unit/Bed#: ED 01 Encounter: [id number]    Primary Care Provider: Franco Dunlap DO   Date and time admitted to hospital: 1/30/2018 10:12 AM      * Sepsis [facility])   Assessment & Plan    · Present on admission and likely secondary to urinary source  · Continue cefepime   · Follow up on urine culture and blood cultures  · Received 1 5 L in the ED, but would hold off on further fluids given end-stage renal on hemodialysis  ESRD (end stage renal disease) on dialysis [facility])   Assessment & Plan    · Consult Nephrology for hemodialysis        Type 1 diabetes mellitus with nephropathy ([facility] )   Assessment & Plan    · Continue Lantus as prior to admission  · Add blood sugar checks q i d  with sliding scale insulin        Clostridium difficile infection   Assessment & Plan    · Patient takes oral suppressive vancomycin  · Will continue        Chronic combined systolic and diastolic CHF (congestive heart failure) ([facility] )   Assessment & Plan    · Appears euvolemic today  · Continue home medication regimen and follow volume status closely given fluid resuscitation  Atrial fibrillation (HCC)   Assessment & Plan    · Continue metoprolol  · Heart rate is stable  · INR is greater than 3  · Hold Coumadin for tonight  · Reassess in the morning          VTE Prophylaxis: Warfarin (Coumadin)  / sequential compression device   Code Status:  Full code  POLST: There is no POLST form on file for this patient (pre-hospital)  Discussion with family:  Discussed with wife Fidel Toscano    Anticipated Length of Stay:  Patient will be admitted on an Inpatient basis with an anticipated length of stay of  greater than 2 midnights  Justification for Hospital Stay:  Sepsis likely caused by urinary tract infection    Total Time for Visit, including Counseling / Coordination of Care: 20 minutes    Greater than 50% of this total time spent on direct patient counseling and coordination of care  Chief Complaint:   Increased lethargy and fever    History of Present Illness:    Brenda Galvan is a 74 y o  male who presents with sudden onset of increased lethargy and fever  His wife is at bedside and provides most of the HPI  She states that he began with dysuria on Friday and they reported to [facility] where he was catheterized for bloody urine and then sent home  He had been fine through the weekend and then last night started with increased lethargy and change in his mentation behavior  She then brought him in  He does not complain of any cough but feels overall tired and weak  He did have a fever of 101  He had no rigors  He denies chills  He does have bloody urine but denies abdominal pain today  He has no back pain  He complained of neck pain and an lumbar puncture was performed in the emergency department  Review of Systems:    Review of Systems   Constitutional: Positive for activity change, fatigue and fever  Negative for chills  HENT: Negative  Eyes: Negative  Respiratory: Negative for cough and shortness of breath  Cardiovascular: Negative  Gastrointestinal: Negative  Endocrine: Negative  Genitourinary: Positive for hematuria and penile pain  Musculoskeletal: Negative  Skin: Negative  Neurological: Negative  Hematological: Negative  Psychiatric/Behavioral: Negative          Past Medical and Surgical History:     Past Medical History:   Diagnosis Date    Acquired hypothyroidism     underactive    Atrial fibrillation ([place] [address] )     Chronic kidney disease-mineral and bone disorder 11/27/2017    Clostridium difficile infection 04/2017    Diabetes mellitus ([place] [address] )     Dialysis patient ([place] [address] )     Diarrhea     ESRD (end stage renal disease) on dialysis ([place] [address] )     Hx of cardiac arrest     Hypertension     Neuropathy     Right lower lobe pneumonia ([place] [address] ) 2/20/2017    Sepsis ([place] [address] ) 04/2017    Polymicrobial MRSA, VRE    Systolic and diastolic CHF, chronic (HCC)     EF 35%, Grade II DD       Past Surgical History:   Procedure Laterality Date    CATARACT EXTRACTION      CHG GI ENDOSCOPIC ULTRASOUND N/A 3/10/2016    Procedure: LINEAR ENDOSCOPIC U/S;  Surgeon: Vivien Guidry MD;  Location: [facility]; Service: Gastroenterology    CHOLECYSTECTOMY      GASTROSTOMY TUBE PLACEMENT N/A 4/12/2017    Procedure: INSERTION PEG TUBE;  Surgeon: Sheryl Cross MD;  Location[facility];  Service:    [facility] LEG AMPUTATION THROUGH LOWER TIBIA AND FIBULA Right 4/6/2017    Procedure: AMPUTATION BELOW KNEE (BKA); Surgeon: Freddie Griffith DO;  Location[facility];  Service:     TOE AMPUTATION  2000       Meds/Allergies:    Prior to Admission medications    Medication Sig Start Date End Date Taking?  Authorizing Provider   amiodarone 200 mg tablet Take 1 tablet by mouth 3 (three) times a day for 30 days  Patient taking differently: Take 200 mg by mouth daily   12/7/17 1/30/18 Yes Sonido Taveras MD   b complex-vitamin C-folic acid (RENAL) 1 mg Take 1 mg by mouth daily   Yes Historical Provider, MD   calcium citrate (CALCITRATE) 950 MG tablet Take 2,400 mg by mouth 3 (three) times a day with meals   Yes Historical Provider, MD   cholestyramine sugar free (QUESTRAN LIGHT) 4 g packet Take 1 packet by mouth 2 (two) times a day for 30 days 12/7/17 1/30/18 Yes Sonido Taveras MD   cinacalcet (SENSIPAR) 30 mg tablet Take 30 mg by mouth daily at bedtime   Yes Historical Provider, MD   citalopram (CeleXA) 10 mg tablet Take 10 mg by mouth daily     Yes Historical Provider, MD   gabapentin (NEURONTIN) 300 mg capsule Take 300 mg by mouth daily at bedtime   Yes Historical Provider, MD   insulin detemir (LEVEMIR) 100 units/mL subcutaneous injection Inject 5 Units under the skin 2 (two) times a day for 30 days 12/7/17 1/30/18 Yes Sonido Taveras MD   insulin lispro (HumaLOG) 100 units/mL injection Inject 2 Units under the skin 3 (three) times a day before meals for 30 days  Patient taking differently: Inject 2 Units under the skin 3 (three) times a day before meals Sliding scale plus 3 units at meals  12/7/17 1/30/18 Yes Santo Bamberger, MD   levothyroxine 137 mcg tablet Take 1 tablet by mouth daily in the early morning for 30 days  Patient taking differently: Take 150 mcg by mouth daily in the early morning   12/8/17 1/30/18 Yes Santo Bamberger, MD   metoprolol tartrate (LOPRESSOR) 25 mg tablet Take 1 tablet by mouth every 12 (twelve) hours for 30 days 12/7/17 1/30/18 Yes Santo Bamberger, MD   Probiotic Product (PRO-BIOTIC BLEND) CAPS Take 1 capsule by mouth daily   Yes Historical Provider, MD   traMADol (ULTRAM) 50 mg tablet Take 50 mg by mouth every 6 (six) hours as needed for moderate pain   Yes Historical Provider, MD   vancomycin (VANCOCIN) 50mg/mL SOLN Take 2 5 mL by mouth daily 12/7/17  Yes Santo Bamberger, MD   warfarin (COUMADIN) 5 mg tablet Take 1 tablet by mouth daily  Patient taking differently: Take 6 mg by mouth daily As directed by coumadin clinic  12/7/17  Yes Santo Bamberger, MD   acetaminophen (TYLENOL) 325 mg tablet Take 650 mg by mouth every 6 (six) hours as needed for mild pain    Historical Provider, MD   diphenoxylate-atropine (LOMOTIL) 2 5-0 025 mg/5 mL liquid Take 5 mL by mouth 4 (four) times a day as needed for diarrhea    Historical Provider, MD   nystatin (MYCOSTATIN) powder Apply topically 2 (two) times a day 12/7/17   Santo Bamberger, MD   sevelamer carbonate (RENVELA) 800 mg tablet Take 800 mg by mouth 3 (three) times a day as needed    1/30/18  Historical Provider, MD     I have reviewed home medications with patient family member      Allergies: No Known Allergies    Social History:     Marital Status: /Civil Union   Occupation:  Disabled  Patient Pre-hospital Living Situation:  Lives at home with his wife  Patient Pre-hospital Level of Mobility:  Uses a wheelchair  Patient Pre-hospital Diet Restrictions:  Has a PEG to given past critical hospitalizations but does not have tube feedings at this time and eats well  Substance Use History:   History   Alcohol Use No     History   Smoking Status    Former Smoker    Packs/day: 1 00    Years: 46 00    Types: Cigarettes    Start date: 5    Quit date: 3/1/2017   Smokeless Tobacco    Never Used     History   Drug Use No       Family History:    Diabetes and heart disease  Physical Exam:     Vitals:   Blood Pressure: 111/56 (01/30/18 1530)  Pulse: 74 (01/30/18 1530)  Temperature: 100 5 °F (38 1 °C) (01/30/18 1357)  Temp Source: Rectal (01/30/18 1357)  Respirations: 14 (01/30/18 1530)  Height: 5' 6" (167 6 cm) (01/30/18 1026)  Weight - Scale: 74 8 kg (165 lb) (01/30/18 1026)  SpO2: 95 % (01/30/18 1530)    Physical Exam   Constitutional: He appears well-nourished  No distress  HENT:   Head: Normocephalic  Eyes: Pupils are equal, round, and reactive to light  Neck: Normal range of motion  Neck supple  Cardiovascular: Normal rate, regular rhythm and normal heart sounds  Pulmonary/Chest: No respiratory distress  He has no wheezes  Crackles to the bases   Abdominal: Soft  Bowel sounds are normal    Musculoskeletal: Normal range of motion  He exhibits no edema  Neurological: He is alert  Lethargic but arousable   Skin: Skin is warm  Right great toe ulceration dry without evidence of infection   Psychiatric: He has a normal mood and affect  Nursing note and vitals reviewed  Additional Data:     Lab Results: I have personally reviewed pertinent reports          Results from last 7 days  Lab Units 01/30/18  1038   WBC Thousand/uL 15 10*   HEMOGLOBIN g/dL 12 1   HEMATOCRIT % 36 9   PLATELETS Thousands/uL 213   LYMPHO PCT % 1*   MONO PCT MAN % 6   EOSINO PCT MANUAL % 2       Results from last 7 days  Lab Units 01/30/18  1038   SODIUM mmol/L 133*   POTASSIUM mmol/L 3 7   CHLORIDE mmol/L 97*   CO2 mmol/L 26   BUN mg/dL 32*   CREATININE mg/dL 4 65*   CALCIUM mg/dL 8 0*   TOTAL PROTEIN g/dL 9 1*   BILIRUBIN TOTAL mg/dL 0 38   ALK PHOS U/L 185*   ALT U/L 25   AST U/L 29   GLUCOSE RANDOM mg/dL 131       Results from last 7 days  Lab Units 01/30/18  1038   INR  3 80*       Imaging: I have personally reviewed pertinent reports  XR chest 1 view portable   Final Result by Janine Lion DO (01/30 1217)   1  Mild vascular congestion  2   Persistent fluid and/or infiltrate right lower lobe  Workstation performed: [id number]             EKG, Pathology, and Other Studies Reviewed on Admission:   · EKG:      Allscripts / Epic Records Reviewed: Yes     ** Please Note: This note has been constructed using a voice recognition system   **

## 2018-01-30 NOTE — ASSESSMENT & PLAN NOTE
· Appears euvolemic today  · Continue home medication regimen and follow volume status closely given fluid resuscitation

## 2018-01-30 NOTE — ASSESSMENT & PLAN NOTE
· Present on admission and likely secondary to urinary source  · Continue cefepime   · Follow up on urine culture and blood cultures  · Received 1 5 L in the ED, but would hold off on further fluids given end-stage renal on hemodialysis

## 2018-01-30 NOTE — ED PROVIDER NOTES
History  Chief Complaint   Patient presents with    Fever - 9 weeks to 74 years     Patient comes to ER via ambulance from home for increase fevers, letheragy and weakness  Patient is alert and oriented but confused at times  Patient is a 63-year-old male with an extensive medical history including DM1, ESRD on hemodialysis, peg tube, CHF, DVT in AFib on warfarin, recurrent C diff on vancomycin p o , hypertension, history of cardiac arrest, multiple recent admissions for urinary tract infections and sepsis including the in December  He presents with 2 days headache, neck pain and 1 day lethargy, confusion, weakness, and fever  On presentation, the patient is very lethargic and not able to answer questions  History is provided by wife  She reports that he has been complaining of headache and neck stiffness for 2 days  These are not typical symptoms for him  Yesterday after dialysis, he became increasingly lethargic, weak, and confused  She reports that he typically has altered mental status likely this when he has sepsis  He was seen on 1/26 for dysuria and hematuria  UA showed gross blood  His bladder was irrigated and he was discharged  He has continued to complain of dysuria and gross hematuria at home  He does not make much urine 2/2 renal failure  Wife notes a mild chronic cough that has not changed from baseline  She has not noted shortness of breath or complaints of chest pain  No complaints of nausea, vomiting, diarrhea, constipation, blood in stool, abdominal pain  He has chronic ulcers on his left foot that are healing well  He has chronic diarrhea that is stable from baseline  Prior to Admission Medications   Prescriptions Last Dose Informant Patient Reported? Taking?    Probiotic Product (PRO-BIOTIC BLEND) CAPS 1/29/2018 at Unknown time  Yes Yes   Sig: Take 1 capsule by mouth daily   acetaminophen (TYLENOL) 325 mg tablet Unknown at Unknown time  Yes No   Sig: Take 650 mg by mouth every 6 (six) hours as needed for mild pain   amiodarone 200 mg tablet 1/29/2018 at Unknown time  No Yes   Sig: Take 1 tablet by mouth 3 (three) times a day for 30 days   Patient taking differently: Take 200 mg by mouth daily     b complex-vitamin C-folic acid (RENAL) 1 mg 1/29/2018 at Unknown time  Yes Yes   Sig: Take 1 mg by mouth daily   calcium citrate (CALCITRATE) 950 MG tablet 1/29/2018 at Unknown time  Yes Yes   Sig: Take 2,400 mg by mouth 3 (three) times a day with meals   cholestyramine sugar free (QUESTRAN LIGHT) 4 g packet 1/29/2018 at Unknown time  No Yes   Sig: Take 1 packet by mouth 2 (two) times a day for 30 days   cinacalcet (SENSIPAR) 30 mg tablet 1/29/2018 at Unknown time  Yes Yes   Sig: Take 30 mg by mouth daily at bedtime   citalopram (CeleXA) 10 mg tablet 1/29/2018 at Unknown time  Yes Yes   Sig: Take 10 mg by mouth daily     diphenoxylate-atropine (LOMOTIL) 2 5-0 025 mg/5 mL liquid Unknown at Unknown time  Yes No   Sig: Take 5 mL by mouth 4 (four) times a day as needed for diarrhea   gabapentin (NEURONTIN) 300 mg capsule 1/29/2018 at Unknown time  Yes Yes   Sig: Take 300 mg by mouth daily at bedtime   insulin detemir (LEVEMIR) 100 units/mL subcutaneous injection 1/29/2018 at Unknown time  No Yes   Sig: Inject 5 Units under the skin 2 (two) times a day for 30 days   insulin lispro (HumaLOG) 100 units/mL injection 1/29/2018 at Unknown time  No Yes   Sig: Inject 2 Units under the skin 3 (three) times a day before meals for 30 days   Patient taking differently: Inject 2 Units under the skin 3 (three) times a day before meals Sliding scale plus 3 units at meals    levothyroxine 137 mcg tablet 1/29/2018 at Unknown time  No Yes   Sig: Take 1 tablet by mouth daily in the early morning for 30 days   Patient taking differently: Take 150 mcg by mouth daily in the early morning     metoprolol tartrate (LOPRESSOR) 25 mg tablet 1/29/2018 at Unknown time  No Yes   Sig: Take 1 tablet by mouth every 12 (twelve) hours for 30 days   nystatin (MYCOSTATIN) powder   No No   Sig: Apply topically 2 (two) times a day   traMADol (ULTRAM) 50 mg tablet 1/29/2018 at Unknown time  Yes Yes   Sig: Take 50 mg by mouth every 6 (six) hours as needed for moderate pain   vancomycin (VANCOCIN) 50mg/mL SOLN 1/29/2018 at Unknown time  No Yes   Sig: Take 2 5 mL by mouth daily   warfarin (COUMADIN) 5 mg tablet 1/29/2018 at Unknown time  No Yes   Sig: Take 1 tablet by mouth daily   Patient taking differently: Take 6 mg by mouth daily As directed by coumadin clinic       Facility-Administered Medications: None       Past Medical History:   Diagnosis Date    Acquired hypothyroidism     underactive    Atrial fibrillation ([place] [address] )     Chronic kidney disease-mineral and bone disorder 11/27/2017    Clostridium difficile infection 04/2017    Diabetes mellitus ([place] [address] )     Dialysis patient ([place] [address] )     Diarrhea     ESRD (end stage renal disease) on dialysis ([place] [address] )     Hx of cardiac arrest     Hypertension     Neuropathy     Right lower lobe pneumonia ([place] [address] ) 2/20/2017    Sepsis ([place] [address] ) 04/2017    Polymicrobial MRSA, VRE    Systolic and diastolic CHF, chronic (HCC)     EF 35%, Grade II DD       Past Surgical History:   Procedure Laterality Date    CATARACT EXTRACTION      CHG GI ENDOSCOPIC ULTRASOUND N/A 3/10/2016    Procedure: LINEAR ENDOSCOPIC U/S;  Surgeon: Radha Gibbons MD;  [facility]; Service: Gastroenterology    CHOLECYSTECTOMY      GASTROSTOMY TUBE PLACEMENT N/A 4/12/2017    Procedure: INSERTION PEG TUBE;  Surgeon: Deborah Renee MD;  [facility];  Service:    [facility] LEG AMPUTATION THROUGH LOWER TIBIA AND FIBULA Right 4/6/2017    Procedure: AMPUTATION BELOW KNEE (BKA);   Surgeon: Joesph Vogt DO;  [facility];  Service:     TOE AMPUTATION  2000       Family History   Problem Relation Age of Onset    Diabetes Father     Cancer Other     Lupus Mother      I have reviewed and agree with the history as documented  Social History   Substance Use Topics    Smoking status: Former Smoker     Packs/day: 1 00     Years: 46 00     Types: Cigarettes     Start date: 1970     Quit date: 3/1/2017    Smokeless tobacco: Never Used    Alcohol use No        Review of Systems   Unable to perform ROS: Mental status change   All other systems reviewed and are negative  Physical Exam  ED Triage Vitals [01/30/18 1026]   Temperature Pulse Respirations Blood Pressure SpO2   (!) 101 8 °F (38 8 °C) 89 18 137/63 (!) 83 %      Temp Source Heart Rate Source Patient Position - Orthostatic VS BP Location FiO2 (%)   Rectal Monitor Lying Left arm --      Pain Score       No Pain           Orthostatic Vital Signs  Vitals:    01/30/18 1200 01/30/18 1357 01/30/18 1430 01/30/18 1530   BP: 112/57 118/54 (!) 101/49 111/56   Pulse: 80 77 76 74   Patient Position - Orthostatic VS: Lying Lying Lying Lying       Physical Exam   Constitutional: He appears lethargic  No distress  Nasal cannula in place  Febrile, chronically ill-appearing  Lethargic, minimally responsive to verbal stimuli  HENT:   Head: Normocephalic and atraumatic  Right Ear: Tympanic membrane normal    Nose: Nose normal    Mouth/Throat: Mucous membranes are dry  Eyes: Conjunctivae are normal  Pupils are unequal    Pupils round and reactive  Anisocoria R>L  Neck: Full passive range of motion without pain  No neck rigidity  Cardiovascular: Normal rate, regular rhythm, normal heart sounds and normal pulses  Pulmonary/Chest: No accessory muscle usage  Tachypnea noted  No respiratory distress  He has wheezes in the right lower field and the left lower field  Abdominal: Soft  Bowel sounds are normal  There is no tenderness  PEG tube in place  Small amount dried blood on surrounding bandage  No erythema of surrounding skin  Genitourinary:         Musculoskeletal:   Right BKA  Skin of right stump intact  Left foot ulcers healing without drainage or erythema  Neurological: He appears lethargic  GCS eye subscore is 3  GCS verbal subscore is 2  GCS motor subscore is 6  Patient is lethargic and mumbles incomprehensibly with loud verbal stimulus  Moves all extremities and has symmetric strength  No sensory deficit  Skin: He is not diaphoretic  ED Medications  Medications   vancomycin (VANCOCIN) 1,500 mg in sodium chloride 0 9 % 250 mL IVPB (1,500 mg Intravenous New Bag 1/30/18 1443)   cefepime (MAXIPIME) 2 g/50 mL dextrose IVPB (0 mg Intravenous Stopped 1/30/18 1442)   sodium chloride 0 9 % bolus 1,000 mL (0 mL Intravenous Stopped 1/30/18 1533)   lidocaine (PF) (XYLOCAINE-MPF) 1 % injection 5 mL (5 mL Infiltration Given 1/30/18 1315)   acetaminophen (TYLENOL) rectal suppository 650 mg (650 mg Rectal Given 1/30/18 1355)       Diagnostic Studies  Results Reviewed     Procedure Component Value Units Date/Time    Lactic Acid x2 [[id number]] Collected:  01/30/18 1549    Lab Status: In process Specimen:  Blood from Arm, Left Updated:  01/30/18 1554    CSF white cell count with differential [[id number]] Collected:  01/30/18 1340    Lab Status:  Final result Specimen:  Cerebrospinal Fluid from Lumbar Puncture Updated:  01/30/18 1518     Appearance, CSF clear     Tube Number, CSF 4     WBC, CSF 1 /uL      Xanthochromia No    CSF Diff [[id number]] Collected:  01/30/18 1340    Lab Status:  Final result Specimen:  Cerebrospinal Fluid from Lumbar Puncture Updated:  01/30/18 1518     Total Counted 16     Neutrophils % (CSF) 50 %      Lymphs % CSF 38 %      Monocytes % (CSF) 13 %     STAT Gram Stain [[id number]] Collected:  01/30/18 1447    Lab Status:   In process Specimen:  Other Updated:  01/30/18 1447    UA w Reflex to Microscopic w Reflex to Culture [[id number]]  (Abnormal) Collected:  01/30/18 1200    Lab Status:  Final result Specimen:  Urine from Urine, Straight Cath Updated:  01/30/18 1423     Color, UA Brown     Clarity, UA Turbid     Specific Gravity, UA 1 021     pH, UA -- Leukocytes, UA Interference- unable to analyze (A)     Nitrite, UA Interference- unable to analyze     Protein, UA  mg/dl      Interference- unable to analyze (A)     Glucose, UA  mg/dl      Interference- unable to analyze     Ketones, UA  mg/dl      Interference- unable to analyze (A)     Urobilinogen, UA  E U /dl      Interference-unable to analyze     Bilirubin, UA Interference- unable to analyze (A)     Blood, UA Interference- unable to analyze (A)    Urine Microscopic [[id number]]  (Abnormal) Collected:  01/30/18 1200    Lab Status:  Final result Specimen:  Urine from Urine, Straight Cath Updated:  01/30/18 1423     RBC, UA Innumerable (A) /hpf      WBC, UA Innumerable (A) /hpf      Epithelial Cells None Seen /hpf      Bacteria, UA Innumerable (A) /hpf     Urine culture [[id number]] Collected:  01/30/18 1200    Lab Status: In process Specimen:  Urine from Urine, Straight Cath Updated:  01/30/18 1423    CSF Glucose [[id number]]  (Abnormal) Collected:  01/30/18 1340    Lab Status:  Final result Specimen:  Cerebrospinal Fluid from Lumbar Puncture Updated:  01/30/18 1416     Glucose,  (H) mg/dL     CSF Total Protein [[id number]]  (Abnormal) Collected:  01/30/18 1340    Lab Status:  Final result Specimen:  Cerebrospinal Fluid from Lumbar Puncture Updated:  01/30/18 1416     Protein, CSF 58 (H) mg/dL     Lactic Acid x2 [[id number]] Collected:  01/30/18 1406    Lab Status:  No result Specimen:  Blood from Arm, Left     CSF culture and Gram stain [[id number]] Collected:  01/30/18 1340    Lab Status: In process Specimen:  Cerebrospinal Fluid from Lumbar Puncture Updated:  01/30/18 1348    Comprehensive PCR, CSF [[id number]] Collected:  01/30/18 1340    Lab Status:   In process Specimen:  Cerebrospinal Fluid from Lumbar Puncture Updated:  01/30/18 1348    CBC and differential [[id number]]  (Abnormal) Collected:  01/30/18 1038    Lab Status:  Final result Specimen:  Blood from Arm, Left Updated:  01/30/18 1151     WBC 15 10 (H) Thousand/uL      RBC 4 00 Million/uL      Hemoglobin 12 1 g/dL      Hematocrit 36 9 %      MCV 92 fL      MCH 30 3 pg      MCHC 32 8 g/dL      RDW 14 4 %      MPV 9 5 fL      Platelets 038 Thousands/uL      nRBC 0 /100 WBCs     Narrative: This is an appended report  These results have been appended to a previously verified report  Lactic Acid x2 [[id number]]  (Abnormal) Collected:  01/30/18 1038    Lab Status:  Final result Specimen:  Blood from Arm, Left Updated:  01/30/18 1134     LACTIC ACID 2 1 (HH) mmol/L     Narrative:         Result may be elevated if tourniquet was used during collection  Comprehensive metabolic panel [[id number]]  (Abnormal) Collected:  01/30/18 1038    Lab Status:  Final result Specimen:  Blood from Arm, Left Updated:  01/30/18 1126     Sodium 133 (L) mmol/L      Potassium 3 7 mmol/L      Chloride 97 (L) mmol/L      CO2 26 mmol/L      Anion Gap 10 mmol/L      BUN 32 (H) mg/dL      Creatinine 4 65 (H) mg/dL      Glucose 131 mg/dL      Calcium 8 0 (L) mg/dL      AST 29 U/L      ALT 25 U/L      Alkaline Phosphatase 185 (H) U/L      Total Protein 9 1 (H) g/dL      Albumin 2 5 (L) g/dL      Total Bilirubin 0 38 mg/dL      eGFR 12 ml/min/1 73sq m     Narrative:         National Kidney Disease Education Program recommendations are as follows:  GFR calculation is accurate only with a steady state creatinine  Chronic Kidney disease less than 60 ml/min/1 73 sq  meters  Kidney failure less than 15 ml/min/1 73 sq  meters      Troponin I [[id number]]  (Normal) Collected:  01/30/18 1038    Lab Status:  Final result Specimen:  Blood from Arm, Left Updated:  01/30/18 1120     Troponin I <0 02 ng/mL     Narrative:         [employer] Chemistry analyzer 99% cutoff is > 0 04 ng/mL in network labs    o cTnI 99% cutoff is useful only when applied to patients in the clinical setting of myocardial ischemia  o cTnI 99% cutoff should be interpreted in the context of clinical history, ECG findings and possibly cardiac imaging to establish correct diagnosis  o cTnI 99% cutoff may be suggestive but clearly not indicative of a coronary event without the clinical setting of myocardial ischemia  APTT [[id number]]  (Abnormal) Collected:  01/30/18 1038    Lab Status:  Final result Specimen:  Blood from Arm, Left Updated:  01/30/18 1104     PTT 57 (H) seconds     Narrative: Therapeutic Heparin Range = 60-90 seconds    Protime-INR [[id number]]  (Abnormal) Collected:  01/30/18 1038    Lab Status:  Final result Specimen:  Blood from Arm, Left Updated:  01/30/18 1104     Protime 38 1 (H) seconds      INR 3 80 (H)    Blood culture #2 [[id number]] Collected:  01/30/18 1051    Lab Status: In process Specimen:  Blood from Arm, Left Updated:  01/30/18 1055    Blood culture #1 [[id number]] Collected:  01/30/18 1038    Lab Status: In process Specimen:  Blood from Arm, Left Updated:  01/30/18 1048                 XR chest 1 view portable   Final Result by Byron Saavedra DO (01/30 1217)   1  Mild vascular congestion  2   Persistent fluid and/or infiltrate right lower lobe  Workstation performed: [id number]               Procedures  Procedures      Phone Consults  ED Phone Contact    ED Course  ED Course            Identification of Seniors at [facility] Most Recent Value   (ISAR) Identification of Seniors at Risk   Before the illness or injury that brought you to the Emergency, did you need someone to help you on a regular basis? 1 Filed at: 01/30/2018 1028   In the last 24 hours, have you needed more help than usual?  1 Filed at: 01/30/2018 1028   Have you been hospitalized for one or more nights during the past 6 months? 1 Filed at: 01/30/2018 1028   In general, do you see well? 1 Filed at: 01/30/2018 1028   In general, do you have serious problems with your memory? 0 Filed at: 01/30/2018 1028   Do you take more than three different medications every day?   1 Filed at: 01/30/2018 1028   ISAR Score 5 Filed at: 01/30/2018 1028                    Initial Sepsis Screening     Row Name 01/30/18 1129                Is the patient's history suggestive of a new or worsening infection? (!)  Yes (Proceed)  -VH        Suspected source of infection suspect infection, source unknown  -VH        Are two or more of the following signs & symptoms of infection both present and new to the patient? (!)  Yes (Proceed)  -VH        Indicate SIRS criteria Leukocytosis (WBC > 18555 IJL); Altered mental status; Hyperthemia > 38 3C (100 9F)  -VH        If the answer is yes to both questions, suspicion of sepsis is present          If severe sepsis is present AND tissue hypoperfusion perists in the hour after fluid resuscitation or lactate > 4, the patient meets criteria for SEPTIC SHOCK          Are any of the following organ dysfunction criteria present within 6 hours of suspected infection and SIRS criteria that are NOT considered to be chronic conditions? (!)  Yes  -VH        Organ dysfunction Lactate > 2 0 mmol/L  -VH        Date of presentation of severe sepsis 01/30/18  -        Time of presentation of severe sepsis 1137  -VH        Tissue hypoperfusion persists in the hour after crystalloid fluid administration, evidenced, by either:          Was hypotension present within one hour of the conclusion of crystalloid fluid administration?         Date of presentation of septic shock          Time of presentation of septic shock            User Key  (r) = Recorded By, (t) = Taken By, (c) = Cosigned By    [address] Name Provider Type    Antonio Reyna MD Resident           Default Flowsheet Data (last 720 hours)      Sepsis Reassessment     Row Name 01/30/18 9898                   Volume Status and Tissue Perfusion Post Fluid Resuscitation- Must Document ALL of the Following:    Vital Signs Reviewed Yes  -REGINALDO        Cardio Normal S1/S2; Regular rate and rhythm; No murmor  -REGINALDO        Pulmonary Normal effort;Clear to auscultation  -REGINALDO        Capillary Refill Sluggish  -REGINALDO        Peripheral Pulses Radial  -REGINALDO        Peripheral Pulse +1  -REGINALDO        Skin Cool  -REGINALDO           *OR*   Intensive Monitoring- Must Document Two * of the Following Four *:    Vital Signs Reviewed          * Central Venous Pressure (CVP or RAP)          * Central Venous Oxygen (SVO2, ScvO2 or Oxygen saturation via central catheter)          * Bedside Cardiovascular US in IVC diameter and % collapse          * Passive Leg Raise OR Crystalloid Challenge            User Key  (r) = Recorded By, (t) = Taken By, (c) = Cosigned By    Initials Name Provider Type    Inez Dillon MD Physician                MDM  Number of Diagnoses or Management Options  Altered mental status:   Gross hematuria:   Headache:   Sepsis ([facility] [address] ):   Diagnosis management comments: Patient is a 62-year-old male with an extensive medical history including DM1, ESRD on hemodialysis, peg tube, CHF, DVT in AFib on warfarin, recurrent C diff on vancomycin p o , hypertension, history of cardiac arrest, multiple recent admissions for urinary tract infections and sepsis including the in December who presents with 2 days headache, neck pain and 1 day lethargy, confusion, weakness, and fever  Febrile and hypoxic on room air but satting 99% on 3 L nasal cannula  Normotensive, not tachycardic  On exam, he is lethargic and mumbles incomprehensibly to verbal stimulus but will follow basic commands for motor movements  Occasional wheezes in the bilateral lower lung fields  Abdomen soft nontender  Dried blood noted at the external meatus  Sepsis workup initiated and shows a white count of 19, lactate of 2 1  CXR shows "Mild vascular congestion  Persistent fluid and/or infiltrate right lower lobe "  UA shows gross blood and innumerable bacteria  Lumbar puncture performed  Treated with broad-spectrum antibiotics and admitted to [address] [place] [address] Time    Disposition  Final diagnoses:   Sepsis [facility])   Altered mental status   Gross hematuria   Headache     Time reflects when diagnosis was documented in both MDM as applicable and the Disposition within this note     Time User Action Codes Description Comment    1/30/2018  2:06 PM Airam Jonatan Add [A41 9] Sepsis ([facility] )     1/30/2018  2:06 PM Milly Paris Add [R41 82] Altered mental status     1/30/2018  2:06 PM Wen Paris Add [R31 0] Gross hematuria     1/30/2018  2:06 PM Milly Paris Add [R51] Headache     1/30/2018  3:54 PM Olga Pew Add [N18 6,  Z99 2] ESRD (end stage renal disease) on dialysis ([place] [address] )     1/30/2018  3:54 PM Olga Pew Modify [N18 6,  Z99 2] ESRD (end stage renal disease) on dialysis ([place] [address] )     1/30/2018  3:54 PM Olga Pew Modify [N18 6,  Z99 2] ESRD (end stage renal disease) on dialysis [facility])       ED Disposition     ED Disposition Condition Comment    Admit  Case was discussed with Dr Cornelius Becker and the patient's admission status was agreed to be Admission Status: inpatient status to the service of Dr Cornelius Becker   Follow-up Information    None       Patient's Medications   Discharge Prescriptions    No medications on file     No discharge procedures on file  ED Provider  Attending physically available and evaluated Madelaine Andino I managed the patient along with the ED Attending      Electronically Signed by         Charly Conn MD  01/30/18 9292

## 2018-01-30 NOTE — ED ATTENDING ATTESTATION
Tami Lowry MD, saw and evaluated the patient  I have discussed the patient with the resident/non-physician practitioner and agree with the resident's/non-physician practitioner's findings, Plan of Care, and MDM as documented in the resident's/non-physician practitioner's note, except where noted  All available labs and Radiology studies were reviewed  At this point I agree with the current assessment done in the Emergency Department  I have conducted an independent evaluation of this patient a history and physical is as follows:    History is mostly provided by the patient's wife  The patient's wife reports that yesterday the patient had his dialysis and he was very tired after his dialysis but this is very typical for him especially on Mondays  The patient slept the night but this morning the patient was found to be more sleepy than usual and difficult to arouse  The patient's wife reports that he is acting just like he normally does when he has flexion  There has been no focal signs of infection  The patient did complain of a headache and neck pain over the last 2 days  Physical exam demonstrates a male who appears to be older than his stated age with a right knee amputation  HEENT exam demonstrated no evidence of trauma and the neck was supple and nontender  Lungs are clear with equal breath sounds  The heart had a regular rate rhythm  The abdomen is soft and nontender  There is no rebound or guarding  Extremities had no abnormality  The patient would sleep when undisturbed but was able to answer questions briefly  There was mild erythema to the buttocks with minimal skin breakdown      Critical Care Time  CritCare Time    Procedures

## 2018-01-31 ENCOUNTER — APPOINTMENT (INPATIENT)
Dept: DIALYSIS | Facility: HOSPITAL | Age: 68
DRG: 871 | End: 2018-01-31
Payer: COMMERCIAL

## 2018-01-31 PROBLEM — D63.1 ANEMIA IN END-STAGE RENAL DISEASE (HCC): Status: ACTIVE | Noted: 2018-01-31

## 2018-01-31 PROBLEM — N18.6 BENIGN HYPERTENSION WITH END-STAGE RENAL DISEASE (HCC): Status: ACTIVE | Noted: 2018-01-31

## 2018-01-31 PROBLEM — I12.0 BENIGN HYPERTENSION WITH END-STAGE RENAL DISEASE (HCC): Status: ACTIVE | Noted: 2018-01-31

## 2018-01-31 PROBLEM — E87.1 HYPONATREMIA WITH EXCESS EXTRACELLULAR FLUID VOLUME: Status: ACTIVE | Noted: 2018-01-31

## 2018-01-31 PROBLEM — N18.6 ANEMIA IN END-STAGE RENAL DISEASE (HCC): Status: ACTIVE | Noted: 2018-01-31

## 2018-01-31 LAB
ANION GAP SERPL CALCULATED.3IONS-SCNC: 9 MMOL/L (ref 4–13)
BUN SERPL-MCNC: 50 MG/DL (ref 5–25)
C GATTII+NEOFOR DNA CSF QL NAA+NON-PROBE: NOT DETECTED
CALCIUM SERPL-MCNC: 7.5 MG/DL (ref 8.3–10.1)
CHLORIDE SERPL-SCNC: 94 MMOL/L (ref 100–108)
CMV DNA CSF QL NAA+NON-PROBE: NOT DETECTED
CO2 SERPL-SCNC: 24 MMOL/L (ref 21–32)
CREAT SERPL-MCNC: 5.76 MG/DL (ref 0.6–1.3)
E COLI K1 DNA CSF QL NAA+NON-PROBE: NOT DETECTED
ERYTHROCYTE [DISTWIDTH] IN BLOOD BY AUTOMATED COUNT: 14.3 % (ref 11.6–15.1)
EV RNA CSF QL NAA+NON-PROBE: NOT DETECTED
GFR SERPL CREATININE-BSD FRML MDRD: 9 ML/MIN/1.73SQ M
GLUCOSE SERPL-MCNC: 118 MG/DL (ref 65–140)
GLUCOSE SERPL-MCNC: 150 MG/DL (ref 65–140)
GLUCOSE SERPL-MCNC: 194 MG/DL (ref 65–140)
GLUCOSE SERPL-MCNC: 200 MG/DL (ref 65–140)
GLUCOSE SERPL-MCNC: 212 MG/DL (ref 65–140)
GLUCOSE SERPL-MCNC: 213 MG/DL (ref 65–140)
GLUCOSE SERPL-MCNC: 93 MG/DL (ref 65–140)
GP B STREP DNA CSF QL NAA+NON-PROBE: NOT DETECTED
HAEM INFLU DNA CSF QL NAA+NON-PROBE: NOT DETECTED
HCT VFR BLD AUTO: 29 % (ref 36.5–49.3)
HGB BLD-MCNC: 9.6 G/DL (ref 12–17)
HHV6 DNA CSF QL NAA+NON-PROBE: NOT DETECTED
HSV1 DNA CSF QL NAA+NON-PROBE: NOT DETECTED
HSV2 DNA CSF QL NAA+NON-PROBE: NOT DETECTED
INR PPP: 3.95 (ref 0.86–1.16)
L MONOCYTOG DNA CSF QL NAA+NON-PROBE: NOT DETECTED
MCH RBC QN AUTO: 30 PG (ref 26.8–34.3)
MCHC RBC AUTO-ENTMCNC: 33.1 G/DL (ref 31.4–37.4)
MCV RBC AUTO: 91 FL (ref 82–98)
N MEN DNA CSF QL NAA+NON-PROBE: NOT DETECTED
PARECHOVIRUS A RNA CSF QL NAA+NON-PROBE: NOT DETECTED
PLATELET # BLD AUTO: 178 THOUSANDS/UL (ref 149–390)
PMV BLD AUTO: 9.2 FL (ref 8.9–12.7)
POTASSIUM SERPL-SCNC: 4.1 MMOL/L (ref 3.5–5.3)
PROTHROMBIN TIME: 39.3 SECONDS (ref 12.1–14.4)
RBC # BLD AUTO: 3.2 MILLION/UL (ref 3.88–5.62)
S PNEUM DNA CSF QL NAA+NON-PROBE: NOT DETECTED
SODIUM SERPL-SCNC: 127 MMOL/L (ref 136–145)
VZV DNA CSF QL NAA+NON-PROBE: NOT DETECTED
WBC # BLD AUTO: 12.39 THOUSAND/UL (ref 4.31–10.16)

## 2018-01-31 PROCEDURE — 90935 HEMODIALYSIS ONE EVALUATION: CPT | Performed by: INTERNAL MEDICINE

## 2018-01-31 PROCEDURE — 82948 REAGENT STRIP/BLOOD GLUCOSE: CPT

## 2018-01-31 PROCEDURE — 85610 PROTHROMBIN TIME: CPT | Performed by: HOSPITALIST

## 2018-01-31 PROCEDURE — 5A1D70Z PERFORMANCE OF URINARY FILTRATION, INTERMITTENT, LESS THAN 6 HOURS PER DAY: ICD-10-PCS | Performed by: INTERNAL MEDICINE

## 2018-01-31 PROCEDURE — 99222 1ST HOSP IP/OBS MODERATE 55: CPT | Performed by: INTERNAL MEDICINE

## 2018-01-31 PROCEDURE — 80048 BASIC METABOLIC PNL TOTAL CA: CPT | Performed by: NURSE PRACTITIONER

## 2018-01-31 PROCEDURE — 85027 COMPLETE CBC AUTOMATED: CPT | Performed by: NURSE PRACTITIONER

## 2018-01-31 PROCEDURE — 99232 SBSQ HOSP IP/OBS MODERATE 35: CPT | Performed by: HOSPITALIST

## 2018-01-31 RX ORDER — GABAPENTIN 100 MG/1
200 CAPSULE ORAL
Status: DISCONTINUED | OUTPATIENT
Start: 2018-01-31 | End: 2018-02-09 | Stop reason: HOSPADM

## 2018-01-31 RX ADMIN — AMIODARONE HYDROCHLORIDE 200 MG: 200 TABLET ORAL at 08:55

## 2018-01-31 RX ADMIN — INSULIN LISPRO 2 UNITS: 100 INJECTION, SOLUTION INTRAVENOUS; SUBCUTANEOUS at 17:54

## 2018-01-31 RX ADMIN — INSULIN DETEMIR 5 UNITS: 100 INJECTION, SOLUTION SUBCUTANEOUS at 08:56

## 2018-01-31 RX ADMIN — LEVOTHYROXINE SODIUM 137 MCG: 112 TABLET ORAL at 06:20

## 2018-01-31 RX ADMIN — METOPROLOL TARTRATE 25 MG: 25 TABLET ORAL at 21:53

## 2018-01-31 RX ADMIN — CHOLESTYRAMINE 4 G: 4 POWDER, FOR SUSPENSION ORAL at 17:55

## 2018-01-31 RX ADMIN — INSULIN LISPRO 2 UNITS: 100 INJECTION, SOLUTION INTRAVENOUS; SUBCUTANEOUS at 11:47

## 2018-01-31 RX ADMIN — INSULIN LISPRO 2 UNITS: 100 INJECTION, SOLUTION INTRAVENOUS; SUBCUTANEOUS at 08:56

## 2018-01-31 RX ADMIN — INSULIN DETEMIR 10 UNITS: 100 INJECTION, SOLUTION SUBCUTANEOUS at 17:54

## 2018-01-31 RX ADMIN — CITALOPRAM HYDROBROMIDE 10 MG: 10 TABLET ORAL at 08:55

## 2018-01-31 RX ADMIN — NEPHROCAP 1 CAPSULE: 1 CAP ORAL at 08:56

## 2018-01-31 RX ADMIN — GABAPENTIN 200 MG: 100 CAPSULE ORAL at 21:52

## 2018-01-31 RX ADMIN — TRAMADOL HYDROCHLORIDE 50 MG: 50 TABLET ORAL at 16:30

## 2018-01-31 RX ADMIN — Medication 100 MG: at 13:50

## 2018-01-31 RX ADMIN — VANCOMYCIN 125 MG: KIT at 11:47

## 2018-01-31 RX ADMIN — CINACALCET HYDROCHLORIDE 30 MG: 30 TABLET, COATED ORAL at 21:52

## 2018-01-31 RX ADMIN — CEFEPIME HYDROCHLORIDE 1000 MG: 1 INJECTION, SOLUTION INTRAVENOUS at 16:26

## 2018-01-31 RX ADMIN — CHOLESTYRAMINE 4 G: 4 POWDER, FOR SUSPENSION ORAL at 08:56

## 2018-01-31 RX ADMIN — INSULIN LISPRO 1 UNITS: 100 INJECTION, SOLUTION INTRAVENOUS; SUBCUTANEOUS at 22:36

## 2018-01-31 NOTE — SOCIAL WORK
DC planning:     Pt with recent BKA, limited mobility at home and weakness d/t current illness  CM spoke with Dr Donato Anaya and requested PT/OT orders

## 2018-01-31 NOTE — ASSESSMENT & PLAN NOTE
Blood sugars running high  Will increase lantus and consider adding standing tid with meals short acting insulin   For now continue with sliding scale coverage

## 2018-01-31 NOTE — CASE MANAGEMENT
Initial Clinical Review    Thank you,  [facility] in the [facility] by Graeme Michael for 2017  Network Utilization Review Department  Phone: [phone]; Fax [phone]  ATTENTION: The Network Utilization Review Department is now centralized for our 7 Facilities  Make a note that we have a new phone and fax numbers for our Department  Please call with any questions or concerns to [phone] and carefully follow the prompts so that you are directed to the right person  All voicemails are confidential  Fax any determinations, approvals, denials, and requests for initial or continue stay review clinical to [phone]  Due to HIGH CALL volume, it would be easier if you could please send faxed requests to expedite your requests and in part, help us provide discharge notifications faster  Admission: Date/Time/Statement: 1/30/18 @ 1406     Orders Placed This Encounter   Procedures    Inpatient Admission (expected length of stay for this patient is greater than two midnights)     Standing Status:   Standing     Number of Occurrences:   1     Order Specific Question:   Admitting Physician     Answer:   Annelise Vann [[id number]]     Order Specific Question:   Level of Care     Answer:   Med Surg [16]     Order Specific Question:   Estimated length of stay     Answer:   More than 2 Midnights     Order Specific Question:   Certification     Answer:   I certify that inpatient services are medically necessary for this patient for a duration of greater than two midnights  See H&P and MD Progress Notes for additional information about the patient's course of treatment           ED: Date/Time/Mode of Arrival:   ED Arrival Information     Expected Arrival Acuity Means of Arrival Escorted By Service Admission Type    - 1/30/2018 10:11 Emergent Ambulance [address] Emergency    Arrival Complaint    shorty          Chief Complaint:   Chief Complaint   Patient presents with    Fever - 9 weeks to 74 years     Patient comes to ER via ambulance from home for increase fevers, letheragy and weakness  Patient is alert and oriented but confused at times  History of Illness: Dustin Pat is a 74 y o  male who presents with sudden onset of increased lethargy and fever  His wife is at bedside and provides most of the HPI  She states that he began with dysuria on Friday and they reported to [facility] ER where he was catheterized for bloody urine and then sent home  He had been fine through the weekend and then last night started with increased lethargy and change in his mentation and behavior  She then brought him in  He does not complain of any cough but feels overall tired and weak  He did have a fever of 101  He had no rigors  He denies chills  He does have bloody urine but denies abdominal pain today  He has no back pain  He complained of neck pain and an lumbar puncture was performed in the emergency department      ED Vital Signs:   ED Triage Vitals [01/30/18 1026]   Temperature Pulse Respirations Blood Pressure SpO2   (!) 101 8 °F (38 8 °C) 89 18 137/63 (!) 83 % RA       Temp Source Heart Rate Source Patient Position - Orthostatic VS BP Location FiO2 (%)   Rectal Monitor Lying Left arm --      Pain Score       No Pain        Wt Readings from Last 1 Encounters:   01/30/18 66 4 kg (146 lb 6 2 oz)       Vital Signs (abnormal):  BP range 101/49 - 137/63 - 3 L NC O2 sat to 97%     Abnormal Labs/Diagnostic Test Results:      Ref Range & Units 1/31/18 0449 1/30/18 1038   WBC 4 31 - 10 16 Thousand/uL 12 39   15 10     RBC 3 88 - 5 62 Million/uL 3 20   4 00    Hemoglobin 12 0 - 17 0 g/dL 9 6   12 1    Hematocrit 36 5 - 49 3 % 29 0   36 9    MCV 82 - 98 fL 91  92    MCH 26 8 - 34 3 pg 30 0  30 3    MCHC 31 4 - 37 4 g/dL 33 1  32 8    RDW 11 6 - 15 1 % 14 3  14 4    Platelets 477 - 116 Thousands/uL 178  213    MPV 8 9 - 12 7 fL 9 2  9 5              Ref Range & Units 1/31/18 0449 1/30/18 1038   Sodium 136 - 145 mmol/L 127   133     Potassium 3 5 - 5 3 mmol/L 4 1  3 7CM    Chloride 100 - 108 mmol/L 94   97     CO2 21 - 32 mmol/L 24  26    Anion Gap 4 - 13 mmol/L 9  10    BUN 5 - 25 mg/dL 50   32     Creatinine 0 60 - 1 30 mg/dL 5 76   4 65CM     Glucose 65 - 140 mg/dL 212   131CM    Calcium 8 3 - 10 1 mg/dL 7 5   8 0     eGFR ml/min/1 73sq m 9  12            Urine - Bacteria - innumerable - Brown - Turbid     CXR - IMPRESSION:  1  Mild vascular congestion  2   Persistent fluid and/or infiltrate right lower lobe  ED Treatment:   Medication Administration from 01/30/2018 1011 to 01/30/2018 1650       Date/Time Order Dose Route Action     01/30/2018 1200 sodium chloride 0 9 % bolus 1,000 mL 1,000 mL Intravenous New Bag     01/30/2018 1315 lidocaine (PF) (XYLOCAINE-MPF) 1 % injection 5 mL 5 mL Infiltration Given     01/30/2018 1443 vancomycin (VANCOCIN) 1,500 mg in sodium chloride 0 9 % 250 mL IVPB 1,500 mg Intravenous New Bag     01/30/2018 1355 cefepime (MAXIPIME) 2 g/50 mL dextrose IVPB 2,000 mg Intravenous New Bag     01/30/2018 1355 acetaminophen (TYLENOL) rectal suppository 650 mg 650 mg Rectal Given       Past Medical/Surgical History:   Past Medical History:   Diagnosis Date    Acquired hypothyroidism     [facility]     Chronic kidney disease-mineral and bone disorder 11/27/2017    Clostridium difficile infection 04/2017    Diabetes [facility]     Dialysis patient [facility]     Diarrhea     ESRD (end stage renal disease) on dialysis ([facility] )     Hx of cardiac arrest     Hypertension     Neuropathy     Right lower lobe pneumonia ([facility] ) 2/20/2017    Sepsis ([facility] ) 25/5168    Systolic and diastolic CHF, chronic (HCC)      Past Surgical History:   Procedure Laterality Date    CATARACT EXTRACTION        CHG GI ENDOSCOPIC ULTRASOUND N/A 3/10/2016     Procedure: LINEAR ENDOSCOPIC U/S;  Surgeon: Rafael Robles MD;  Location: [facility];   Service: Gastroenterology    CHOLECYSTECTOMY        GASTROSTOMY TUBE PLACEMENT N/A 4/12/2017     Procedure: INSERTION PEG TUBE;  Surgeon: Mack Arnold MD;  Location[facility];  Service:     LEG AMPUTATION THROUGH LOWER TIBIA AND FIBULA Right 4/6/2017     Procedure: AMPUTATION BELOW KNEE (BKA); Surgeon: Rianna Pizarro DO;  Location: [facility];  Service:     TOE AMPUTATION           Admitting Diagnosis: Altered mental status [R41 82]  Weakness [R53 1]  Gross hematuria [R31 0]  ESRD (end stage renal disease) on dialysis ([facility] ) [N18 6, Z99 2]  Sepsis ([place] [address] ) [A41 9]  Headache [R51]    Age/Sex: 74 y o  male    Assessment/Plan:       * Sepsis ([place] [address] )   Assessment & Plan     · Present on admission and likely secondary to urinary source  · Continue cefepime   · Follow up on urine culture and blood cultures  · Received 1 5 L in the ED, but would hold off on further fluids given end-stage renal on hemodialysis          ESRD (end stage renal disease) on dialysis [facility])   Assessment & Plan     · Consult Nephrology for hemodialysis          Type 1 diabetes mellitus with nephropathy ([place] [address] )   Assessment & Plan     · Continue Lantus as prior to admission  · Add blood sugar checks q i d  with sliding scale insulin          Clostridium difficile infection   Assessment & Plan     · Patient takes oral suppressive vancomycin  · Will continue          Chronic combined systolic and diastolic CHF (congestive heart failure) (HCC)   Assessment & Plan     · Appears euvolemic today  · Continue home medication regimen and follow volume status closely given fluid resuscitation           Atrial fibrillation (HCC)   Assessment & Plan     · Continue metoprolol  · Heart rate is stable  · INR is greater than 3  ? Hold Coumadin for tonight  ?  Reassess in the morning                Admission Orders:  Scheduled Meds:     acetaminophen 650 mg Oral Q6H PRN   amiodarone 200 mg Oral Daily   b complex-vitamin C-folic acid 1 capsule Oral Daily   cefepime 1,000 mg Intravenous Q24H   cholestyramine sugar free 4 g Oral BID   cinacalcet 30 mg Oral HS   citalopram 10 mg Oral Daily   diphenoxylate-atropine 1 tablet Oral 4x Daily PRN   gabapentin 300 mg Oral HS   insulin detemir 5 Units Subcutaneous BID   insulin lispro 1-5 Units Subcutaneous HS   insulin lispro 1-6 Units Subcutaneous TID AC   insulin lispro 2 Units Subcutaneous TID AC   levothyroxine 137 mcg Oral Early Morning   metoprolol tartrate 25 mg Oral Q12H CELIA   nystatin  Topical BID   traMADol 50 mg Oral Q6H PRN   vancomycin 125 mg Oral Daily     Continuous Infusions:    PRN Meds:   Acetaminophen po 1/30 x 1    diphenoxylate-atropine    traMADol    Nursing orders - VS q 4 - Up with assistance - I & O q shift - Diet cons carb -     Nephrology consult - ESRD on HD

## 2018-01-31 NOTE — SOCIAL WORK
Initial interview and DC Dash:     CM met with the patient and his wife to review the CM role and discuss possible dc needs  Pt lives with his wife, dtr and son-in-law in a 3 story home in [place], [place]  Ramp to entry  Stair glide to bottom level / pt's bedroom  Pt uses wheelchair for mobility but is able to stand and pivot with a RW  Pt has a hospital bed, shower chair and bedside commode  Pt is open to [facility] [place] for RN, PT/OT and HHA services  Pt has hx of [address], [address] and [address]  Pt denied any history of drugs/etoh or mental health  Pt stated that he does not have a POA or LW  Prescriptions are filled at [facility] in [place]  Pt goes to [facility] in [facility] with a 12:45pm chair time and is transported by his wife  Main contact: Wife Jarocho Ellis [phone]    Admit Dx Urosepsis, ESRD  DM1, C Diff, S/D CHF, A Fib  R BKA  Pt and wife expressed understanding of his diagnoses and treatment regimens  Pt reported that his wife and family are avail to help him at home  Pt is pend PT/OT evals for dc recommendations  CM reviewed d/c planning process including the following: identifying help at home, patient preference for d/c planning needs, Discharge Lounge, [facility] Meds to Bed program, availability of treatment team to discuss questions or concerns patient and/or family may have regarding understanding medications and recognizing signs and symptoms once discharged  CM also encouraged patient to follow up with all recommended appointments after discharge  Patient advised of importance for patient and family to participate in managing patients medical well being

## 2018-01-31 NOTE — RESTORATIVE TECHNICIAN NOTE
Restorative Specialist Mobility Note       Activity: Other (Comment) (Pt currently off the unit at dialysis per nursing )       Fabian ARIAS, Restorative Technician, [employer] [facility]

## 2018-01-31 NOTE — CONSULTS
Consultation - Nephrology   Izabella Monet 74 y o  male MRN: [id number]  Unit/Bed#: [facility] 627-01 Encounter: [id number]      ASSESSMENT / PLAN:     1  End-stage renal disease  · The patient was seen examined on hemodialysis  Initially, the patient's blood pressure was low with a systolic in the 26I  However, with low temperature dialysate, this did improve  He is at least 4 kg above his estimated dry weight  He is 6 kg above his recorded estimated dry weight but his last off dialysis weight was 2 kg above his estimated dry weight  His blood pressure did rebound to the 677 systolic and therefore 4 kg will be removed as long as his blood pressure does not drop in the setting of sepsis  His access blood flow is acceptable  Orders were reviewed with the bedside hemodialysis nurse  The patient was seen on hemodialysis at 12:55 p m   2  Volume overload  · Volume removal with dialysis today as tolerated by his blood pressure  3  Anemia -last dose of Micera was 100 mcg and is given on every 4 week schedule  Last dose was on 02/05  · Trend hemoglobin for now  4  Sepsis  · Started on cefepime by primary service for presumed urosepsis  5  Hyponatremia  · Secondary to volume overload  6  Hypertension  · Acceptable for now  7  C diff infection  · On Vancomycin suppressive therapy  8  Diabetes mellitus  9  Neuropathy  10  Cardiomyopathy with history of systolic and diastolic congestive heart failure  11  Atrial fibrillation  12  Confusion/myoclonic jerking-this could be a manifestation of Neurontin toxicity  Neurontin dose has been decreased  He had been on ciprofloxacin prior to admission and this too can cause confusion in dialysis patients but this was discontinued 7 days ago  Of course, sepsis can cause confusion as well  Will monitor for now with decrease Neurontin dose and treatment of his sepsis    If the confusion persists, as well as myoclonic jerking, would check urea reduction ratio                  History of Present Illness   Physician Requesting Consult: Howie Pro MD  Reason for Consult / Principal Problem -  end-stage renal disease and need for dialysis   JACQUELIN Monet is a 74 y o  y o  male who we are asked to see because of end-stage renal disease and need for dialysis  The patient presented with lethargy which had increased and fever  His temperature was up to 101 8  He recently has been diagnosed with a urinary tract infection  He was treated initially with Keflex and then Ciprofloxacin  His last dose of ciprofloxacin was approximately 1 week ago per his wife  Because of the aforementioned symptoms, he was brought to the emergency room  He does have a bloody urine  He denies any vomiting, nauseousness, abdominal pain, rigors, muscle aches, joint pain, skin rash, recent travel, sore throat, ear pain  He has not been started on any new medications  His dialysis treatments have been going well  His right upper extremity arm access has not had any discharge  Today, he feels improved  His wife confirms this at the bedside  PVR was measured at 0 mL  Lumbar puncture did not reveal overt infection  Urine culture is positive and ID is pending, greater than 100,000 oxidase positive gram-negative rods  History obtained from spouse, chart review and the patient    Consults    Review of Systems   Constitutional: Positive for fatigue and fever  Negative for appetite change and chills  HENT: Negative for sinus pressure  Eyes: Negative for redness and visual disturbance  Respiratory: Negative for cough, shortness of breath and wheezing  Cardiovascular: Negative for chest pain, palpitations and leg swelling  Gastrointestinal: Negative for abdominal pain, constipation, diarrhea, nausea and vomiting  Endocrine: Negative for polyuria  Genitourinary: Positive for dysuria and hematuria  Negative for decreased urine volume, difficulty urinating, flank pain, frequency and urgency  Musculoskeletal: Negative for arthralgias, back pain and joint swelling  Skin: Negative for rash  Neurological: Negative for dizziness, syncope and headaches  Intermittent myoclonic jerking which has been present for several months   Hematological: Does not bruise/bleed easily  Psychiatric/Behavioral: Positive for confusion  Negative for sleep disturbance  Historical Information   Patient Active Problem List   Diagnosis    Hypertension    Type 1 diabetes mellitus with nephropathy ([place] [address] )    ESRD (end stage renal disease) on dialysis ([place] [address] )    Sepsis ([place] [address] )    Ambulatory dysfunction    S/P percutaneous endoscopic gastrostomy ([facility]) tube placement [facility]    Pleural effusion on right    Chronic combined systolic and diastolic CHF (congestive heart failure) ([place] [address] )    R Fibula fracture, proximal to stump    Pseudomonas urinary tract infection    DVT, lower extremity ([facility])    Hyponatremia    Clostridium difficile infection    Anemia    Acquired hypothyroidism    Chronic kidney disease-mineral and bone disorder    Hyperkalemia    Atrial fibrillation ([place] [address] )     Past Medical History:   Diagnosis Date    Acquired hypothyroidism     underactive    Atrial fibrillation ([facility])     Chronic kidney disease-mineral and bone disorder 11/27/2017    Clostridium difficile infection 04/2017    Diabetes mellitus ([place] [address] )     Dialysis patient ([place] [address] )     Diarrhea     ESRD (end stage renal disease) [facility] ([facility] )     Hx of cardiac arrest     Hypertension     Neuropathy     Right lower lobe pneumonia ([place] [address] ) 2/20/2017    Sepsis ([place] [address] ) 04/2017    Polymicrobial MRSA, VRE    Systolic and diastolic CHF, chronic (HCC)     EF 35%, Grade II DD     Past Surgical History:   Procedure Laterality Date    CATARACT EXTRACTION      CHG GI ENDOSCOPIC ULTRASOUND N/A 3/10/2016    Procedure: LINEAR ENDOSCOPIC U/S;  Surgeon: Katya Sanchez MD;  [facility]: [facility];   Service: Gastroenterology    CHOLECYSTECTOMY     Yesica Bruner GASTROSTOMY TUBE PLACEMENT N/A 4/12/2017    Procedure: INSERTION PEG TUBE;  Surgeon: Mack Arnold MD;  [facility];  Service:     LEG AMPUTATION THROUGH LOWER TIBIA AND FIBULA Right 4/6/2017    Procedure: AMPUTATION BELOW KNEE (BKA);   Surgeon: Rianna Pizarro DO;  [facility];  Service:     TOE AMPUTATION  2000     Social History   History   Alcohol Use No     History   Drug Use No     History   Smoking Status    Former Smoker    Packs/day: 1 00    Years: 46 00    Types: Cigarettes    Start date: 5    Quit date: 3/1/2017   Smokeless Tobacco    Never Used     Family History   Problem Relation Age of Onset    Diabetes Father     Cancer Other     Lupus Mother        Meds/Allergies   all current active meds have been reviewed, current meds:   Current Facility-Administered Medications   Medication Dose Route Frequency    acetaminophen (TYLENOL) tablet 650 mg  650 mg Oral Q6H PRN    amiodarone tablet 200 mg  200 mg Oral Daily    b complex-vitamin C-folic acid (NEPHROCAPS) capsule 1 capsule  1 capsule Oral Daily    cefepime (MAXIPIME) IVPB (premix) 1,000 mg  1,000 mg Intravenous Q24H    cholestyramine sugar free (QUESTRAN LIGHT) packet 4 g  4 g Oral BID    cinacalcet (SENSIPAR) tablet 30 mg  30 mg Oral HS    citalopram (CeleXA) tablet 10 mg  10 mg Oral Daily    diphenoxylate-atropine (LOMOTIL) 2 5-0 025 mg per tablet 1 tablet  1 tablet Oral 4x Daily PRN    gabapentin (NEURONTIN) capsule 200 mg  200 mg Oral HS    insulin detemir (LEVEMIR) subcutaneous injection 10 Units  10 Units Subcutaneous BID    insulin lispro (HumaLOG) 100 units/mL subcutaneous injection 1-5 Units  1-5 Units Subcutaneous HS    insulin lispro (HumaLOG) 100 units/mL subcutaneous injection 1-6 Units  1-6 Units Subcutaneous TID AC    insulin lispro (HumaLOG) 100 units/mL subcutaneous injection 2 Units  2 Units Subcutaneous TID AC    iron sucrose (VENOFER) 100 mg in sodium chloride 0 9 % 100 mL IVPB  100 mg Intravenous After Dialysis    levothyroxine tablet 137 mcg  137 mcg Oral Early Morning    metoprolol tartrate (LOPRESSOR) tablet 25 mg  25 mg Oral Q12H CELIA    nystatin (MYCOSTATIN) powder   Topical BID    traMADol (ULTRAM) tablet 50 mg  50 mg Oral Q6H PRN    vancomycin (VANCOCIN) oral solution 125 mg  125 mg Oral Daily    and PTA meds:   Prior to Admission Medications   Prescriptions Last Dose Informant Patient Reported? Taking?    Probiotic Product (PRO-BIOTIC BLEND) CAPS 1/29/2018 at Unknown time  Yes Yes   Sig: Take 1 capsule by mouth daily   acetaminophen (TYLENOL) 325 mg tablet Unknown at Unknown time  Yes No   Sig: Take 650 mg by mouth every 6 (six) hours as needed for mild pain   amiodarone 200 mg tablet 1/29/2018 at Unknown time  No Yes   Sig: Take 1 tablet by mouth 3 (three) times a day for 30 days   Patient taking differently: Take 200 mg by mouth daily     b complex-vitamin C-folic acid (RENAL) 1 mg 1/29/2018 at Unknown time  Yes Yes   Sig: Take 1 mg by mouth daily   calcium citrate (CALCITRATE) 950 MG tablet 1/29/2018 at Unknown time  Yes Yes   Sig: Take 2,400 mg by mouth 3 (three) times a day with meals   cholestyramine sugar free (QUESTRAN LIGHT) 4 g packet 1/29/2018 at Unknown time  No Yes   Sig: Take 1 packet by mouth 2 (two) times a day for 30 days   cinacalcet (SENSIPAR) 30 mg tablet 1/29/2018 at Unknown time  Yes Yes   Sig: Take 30 mg by mouth daily at bedtime   citalopram (CeleXA) 10 mg tablet 1/29/2018 at Unknown time  Yes Yes   Sig: Take 10 mg by mouth daily     diphenoxylate-atropine (LOMOTIL) 2 5-0 025 mg/5 mL liquid Unknown at Unknown time  Yes No   Sig: Take 5 mL by mouth 4 (four) times a day as needed for diarrhea   gabapentin (NEURONTIN) 300 mg capsule 1/29/2018 at Unknown time  Yes Yes   Sig: Take 300 mg by mouth daily at bedtime   insulin detemir (LEVEMIR) 100 units/mL subcutaneous injection 1/29/2018 at Unknown time  No Yes   Sig: Inject 5 Units under the skin 2 (two) times a day for 30 days   insulin lispro (HumaLOG) 100 units/mL injection 1/29/2018 at Unknown time  No Yes   Sig: Inject 2 Units under the skin 3 (three) times a day before meals for 30 days   Patient taking differently: Inject 2 Units under the skin 3 (three) times a day before meals Sliding scale plus 3 units at meals    levothyroxine 137 mcg tablet 1/29/2018 at Unknown time  No Yes   Sig: Take 1 tablet by mouth daily in the early morning for 30 days   Patient taking differently: Take 150 mcg by mouth daily in the early morning     metoprolol tartrate (LOPRESSOR) 25 mg tablet 1/29/2018 at Unknown time  No Yes   Sig: Take 1 tablet by mouth every 12 (twelve) hours for 30 days   nystatin (MYCOSTATIN) powder   No No   Sig: Apply topically 2 (two) times a day   traMADol (ULTRAM) 50 mg tablet 1/29/2018 at Unknown time  Yes Yes   Sig: Take 50 mg by mouth every 6 (six) hours as needed for moderate pain   vancomycin (VANCOCIN) 50mg/mL SOLN 1/29/2018 at Unknown time  No Yes   Sig: Take 2 5 mL by mouth daily   warfarin (COUMADIN) 5 mg tablet 1/29/2018 at Unknown time  No Yes   Sig: Take 1 tablet by mouth daily   Patient taking differently: Take 6 mg by mouth daily As directed by coumadin clinic       Facility-Administered Medications: None       Scheduled Meds:  Current Facility-Administered Medications:  acetaminophen 650 mg Oral Q6H PRN Cloteal Jacob, CRNP    amiodarone 200 mg Oral Daily Cloteal Jacob, CRNP    b complex-vitamin C-folic acid 1 capsule Oral Daily Cloteal Jacob, CRNP    cefepime 1,000 mg Intravenous Q24H Cloteal Jacob, CRNP    cholestyramine sugar free 4 g Oral BID Cloteal Jacob, CRNP    cinacalcet 30 mg Oral HS Cloteal Jacob, CRNP    citalopram 10 mg Oral Daily Cloteal Jacob, CRNP    diphenoxylate-atropine 1 tablet Oral 4x Daily PRN Cloteal Jacob, CRNP    gabapentin 200 mg Oral HS Yazmin Caldwell MD    insulin detemir 10 Units Subcutaneous BID Triston Devries MD    insulin lispro 1-5 Units Subcutaneous HS Oralee Elks, CRNP    insulin lispro 1-6 Units Subcutaneous TID AC Prema Suareze Blush, CRNP    insulin lispro 2 Units Subcutaneous TID AC Oralee Elks, CRNP    iron sucrose 100 mg Intravenous After Dialysis Garrett Mattson MD Last Rate: 100 mg (18 1350)   levothyroxine 137 mcg Oral Early Morning Oralee Elks, CRNP    metoprolol tartrate 25 mg Oral Q12H [address] Oralee Elks, CRNP    nystatin  Topical BID Oralee Elks, CRNP    traMADol 50 mg Oral Q6H PRN Oralee Elks, CRNP    vancomycin 125 mg Oral Daily Oralee Elks, CRNP        PRN Meds:   acetaminophen    diphenoxylate-atropine    iron sucrose    traMADol    Continuous Infusions:     No Known Allergies          /71   Pulse 73   Temp 99 8 °F (37 7 °C) (Oral)   Resp 20   Ht 5' 6" (1 676 m)   Wt 66 4 kg (146 lb 6 2 oz)   SpO2 97%   BMI 23 63 kg/m²   Temp (24hrs), Av 6 °F (37 °C), Min:98 1 °F (36 7 °C), Max:99 8 °F (37 7 °C)      Objective     Intake/Output Summary (Last 24 hours) at 18 1427  Last data filed at 18 1240   Gross per 24 hour   Intake             1875 ml   Output                0 ml   Net             1875 ml       I/O last 24 hours: In: 5258 [P O :675; I V :200; IV Piggyback:1000]  Out: -       Current Weight: Weight - Scale: 66 4 kg (146 lb 6 2 oz)  First Weight: Weight - Scale: 74 8 kg (165 lb)    PHYSICAL EXAM:     General -the patient is awake, alert but somewhat slow to respond at times and mildly confused at times    He is cooperative and in no apparent distress  HENT  -mucous membranes were moist, normocephalic/atraumatic, poor dentition  Eyes    -no scleral icterus was noted  Neck    -no overt jugular venous distention was noted, no thyromegaly was noted, trachea midline  Chest  -clear to auscultation percussion, decreased breath sounds, decreased inspiratory effort, no rales/rhonchi or wheezing were noted  CVS  -S1-S2, no pericardial friction rub or S3 were noted, no peripheral edema was noted  Abdomen -soft, nontender, no rebound or guarding was noted, normoactive bowel sounds  Extremities-no edema was noted, no cyanosis was noted, good bruit and thrill in right upper extremity dialysis access  Skin   -no rash was noted  Neuro   -alert and oriented but somewhat confused at times, occasional myoclonic jerking was noted in his upper extremities   Psych   -mood affect were appropriate      Invasive Devices     Peripheral Intravenous Line            Peripheral IV 01/30/18 Left Antecubital 1 day          Line            Hemodialysis AV Fistula  Right Forearm -- days    Hemodialysis AV Fistula Right Forearm -- days          Drain            Gastrostomy/Enterostomy -- days    Gastrostomy/Enterostomy Percutaneous endoscopic gastrostomy (PEG) 20 Fr   days                Lab Results:      Results from last 7 days  Lab Units 01/31/18  0449 01/30/18  1038 01/26/18  1725   WBC Thousand/uL 12 39* 15 10* 9 55   HEMOGLOBIN g/dL 9 6* 12 1 11 5*   HEMATOCRIT % 29 0* 36 9 36 2*   PLATELETS Thousands/uL 178 213 250   LYMPHO PCT %  --  1* 8*   MONO PCT MAN %  --  6 4   EOSINO PCT MANUAL %  --  2 4   SODIUM mmol/L 127* 133* 132*   POTASSIUM mmol/L 4 1 3 7 4 9   CHLORIDE mmol/L 94* 97* 94*   CO2 mmol/L 24 26 26   BUN mg/dL 50* 32* 58*   CREATININE mg/dL 5 76* 4 65* 7 15*   CALCIUM mg/dL 7 5* 8 0* 8 4   TOTAL PROTEIN g/dL  --  9 1*  --    BILIRUBIN TOTAL mg/dL  --  0 38  --    ALK PHOS U/L  --  185*  --    ALT U/L  --  25  --    AST U/L  --  29  --    GLUCOSE RANDOM mg/dL 212* 131 199*       CUTURES:  Lab Results   Component Value Date    BLOODCX No Growth at 24 hrs  01/30/2018    BLOODCX No Growth at 24 hrs  01/30/2018    BLOODCX No Growth After 5 Days  11/29/2017    BLOODCX No Growth After 5 Days  11/29/2017    BLOODCX No Growth After 5 Days  09/17/2017    BLOODCX No Growth After 5 Days  09/17/2017    BLOODCX No Growth After 5 Days   08/03/2017    URINECX >100,000 cfu/ml Oxidase Positive gram negative jose alfredo (A) 01/30/2018    URINECX >100,000 cfu/ml Pseudomonas aeruginosa (A) 11/27/2017    URINECX >100,000 cfu/ml Pseudomonas aeruginosa 09/17/2017    URINECX  09/17/2017     8491-1583 cfu/ml Oxidase Positive gram negative jose alfredo    URINECX >100,000 cfu/ml Pseudomonas aeruginosa 06/16/2017    URINECX No Growth <1000 cfu/mL 09/10/2016         EKG, Pathology, and Other Studies -all pertinent studies were reviewed          Portions of the record may have been created with voice recognition software  Occasional wrong word or "sound a like" substitutions may have occurred due to the inherent limitations of voice recognition software  Read the chart carefully and recognize, using context, where substitutions have occurred  If you have any questions, please contact the dictating provider

## 2018-01-31 NOTE — PROGRESS NOTES
Progress Note - Josee Dave 1950, 74 y o  male MRN: [id number]    Unit/Bed#: [facility] 627-01 Encounter: [id number]    Primary Care Provider: Perla Conti DO   Date and time admitted to hospital: 1/30/2018 10:12 AM        Atrial fibrillation (HCC)   Assessment & Plan    · Continue metoprolol  · Repeat pt/inr  · Dose coumadin if < = 3 0          Clostridium difficile infection   Assessment & Plan    On chronic suppressive therapy with oral vancomycin        Chronic combined systolic and diastolic CHF (congestive heart failure) ([facility])   Assessment & Plan    · Appears euvolemic today  · Continue home medication regimen and follow volume status closely given fluid resuscitation  ESRD (end stage renal disease) on dialysis [facility])   Assessment & Plan    For HD today  Monitor electrolytes and volumes status        Type 1 diabetes mellitus with nephropathy ([facility])   Assessment & Plan    Blood sugars running high  Will increase lantus and consider adding standing tid with meals short acting insulin   For now continue with sliding scale coverage        * Sepsis ([facility] )   Assessment & Plan    Suspect 2/2 UTI  Failed OP tx with keflex and cipro  On cefepime  HD stable  Await cx results          VTE Pharmacologic Prophylaxis:   Pharmacologic: Warfarin (Coumadin)  Mechanical VTE Prophylaxis in Place: Yes    Patient Centered Rounds: I have performed bedside rounds with nursing staff today  Education and Discussions with Family / Patient: patient and wife    Time Spent for Care: 30 minutes  More than 50% of total time spent on counseling and coordination of care as described above      Current Length of Stay: 1 day(s)    Current Patient Status: Inpatient   Certification Statement: The patient will continue to require additional inpatient hospital stay due to IV abx, tx sepsis      Code Status: Prior      Subjective:   No acute events overnight  Reports feeling better  No fevers or chills since admission    Objective: Vitals:   Temp (24hrs), Av 9 °F (37 2 °C), Min:98 1 °F (36 7 °C), Max:100 5 °F (38 1 °C)    HR:  [72-83] 74  Resp:  [14-20] 20  BP: (100-119)/(49-76) 100/56  SpO2:  [95 %-99 %] 97 %  Body mass index is 23 63 kg/m²  Input and Output Summary (last 24 hours): Intake/Output Summary (Last 24 hours) at 18 1205  Last data filed at 18 0300   Gross per 24 hour   Intake             1555 ml   Output                0 ml   Net             1555 ml       Physical Exam:     Physical Exam  No distress, wife at bedside  Weak appearing  S1, S2  Clear b/l  Soft, +bs  Right BKA, no edema  Calm and cooperative    Additional Data:     Labs:      Results from last 7 days  Lab Units 18  0449 18  1038   WBC Thousand/uL 12 39* 15 10*   HEMOGLOBIN g/dL 9 6* 12 1   HEMATOCRIT % 29 0* 36 9   PLATELETS Thousands/uL 178 213   LYMPHO PCT %  --  1*   MONO PCT MAN %  --  6   EOSINO PCT MANUAL %  --  2       Results from last 7 days  Lab Units 18  0449 18  1038   SODIUM mmol/L 127* 133*   POTASSIUM mmol/L 4 1 3 7   CHLORIDE mmol/L 94* 97*   CO2 mmol/L 24 26   BUN mg/dL 50* 32*   CREATININE mg/dL 5 76* 4 65*   CALCIUM mg/dL 7 5* 8 0*   TOTAL PROTEIN g/dL  --  9 1*   BILIRUBIN TOTAL mg/dL  --  0 38   ALK PHOS U/L  --  185*   ALT U/L  --  25   AST U/L  --  29   GLUCOSE RANDOM mg/dL 212* 131       Results from last 7 days  Lab Units 18  1038   INR  3 80*       * I Have Reviewed All Lab Data Listed Above  * Additional Pertinent Lab Tests Reviewed:  [address] Admission Reviewed    Imaging:    Imaging Reports Reviewed Today Include: CXR: stable findings    Recent Cultures (last 7 days):       Results from last 7 days  Lab Units 18  1447 18  1200   GRAM STAIN RESULT  1+ Polys  No No bacteria seen  --    URINE CULTURE   --  >100,000 cfu/ml Oxidase Positive gram negative jose alfredo*       Last 24 Hours Medication List:     Current Facility-Administered Medications:  acetaminophen 650 mg Oral Q6H PRN ALIE Mari   amiodarone 200 mg Oral Daily ALIE Mari   b complex-vitamin C-folic acid 1 capsule Oral Daily ALIE Mari   cefepime 1,000 mg Intravenous Q24H ALIE Mari   cholestyramine sugar free 4 g Oral BID ALIE Mari   cinacalcet 30 mg Oral HS ALIE Mari   citalopram 10 mg Oral Daily ALIE Mari   diphenoxylate-atropine 1 tablet Oral 4x Daily PRN ALIE Mari   gabapentin 300 mg Oral HS ALIE Mari   insulin detemir 10 Units Subcutaneous BID José Luis Millard MD   insulin lispro 1-5 Units Subcutaneous HS ALIE Mari   insulin lispro 1-6 Units Subcutaneous TID AC Prema Gómez, ALIE   insulin lispro 2 Units Subcutaneous TID AC ALIE Mari   levothyroxine 137 mcg Oral Early Morning ALIE Mari   metoprolol tartrate 25 mg Oral Q12H [address] Tessa Mcclure, ALIE   nystatin  Topical BID ALIE Mari   traMADol 50 mg Oral Q6H PRN ALIE Mari   vancomycin 125 mg Oral Daily ALIE Mari        Today, Patient Was Seen By: José Luis Millard MD    ** Please Note: Dictation voice to text software may have been used in the creation of this document   **

## 2018-01-31 NOTE — PLAN OF CARE
Problem: Potential for Falls  Goal: Patient will remain free of falls  INTERVENTIONS:  - Assess patient frequently for physical needs  -  Identify cognitive and physical deficits and behaviors that affect risk of falls  -  [place] fall precautions as indicated by assessment   - Educate patient/family on patient safety including physical limitations  - Instruct patient to call for assistance with activity based on assessment  - Modify environment to reduce risk of injury  - Consider OT/PT consult to assist with strengthening/mobility   Outcome: Progressing      Problem: Nutrition/Hydration-ADULT  Goal: Nutrient/Hydration intake appropriate for improving, restoring or maintaining nutritional needs  Monitor and assess patient's nutrition/hydration status for malnutrition (ex- brittle hair, bruises, dry skin, pale skin and conjunctiva, muscle wasting, smooth red tongue, and disorientation)  Collaborate with interdisciplinary team and initiate plan and interventions as ordered  Monitor patient's weight and dietary intake as ordered or per policy  Utilize nutrition screening tool and intervene per policy  Determine patient's food preferences and provide high-protein, high-caloric foods as appropriate       INTERVENTIONS:  - Monitor oral intake, urinary output, labs, and treatment plans  - Assess nutrition and hydration status and recommend course of action  - Evaluate amount of meals eaten  - Assist patient with eating if necessary   - Allow adequate time for meals  - Recommend/ encourage appropriate diets, oral nutritional supplements, and vitamin/mineral supplements  - Order, calculate, and assess calorie counts as needed  - Recommend, monitor, and adjust tube feedings and TPN/PPN based on assessed needs  - Assess need for intravenous fluids  - Provide specific nutrition/hydration education as appropriate  - Include patient/family/caregiver in decisions related to nutrition   Outcome: Progressing

## 2018-02-01 PROBLEM — E87.70 VOLUME OVERLOAD: Status: ACTIVE | Noted: 2018-02-01

## 2018-02-01 LAB
ANION GAP SERPL CALCULATED.3IONS-SCNC: 10 MMOL/L (ref 4–13)
BACTERIA UR CULT: ABNORMAL
BASOPHILS # BLD AUTO: 0.04 THOUSANDS/ΜL (ref 0–0.1)
BASOPHILS NFR BLD AUTO: 0 % (ref 0–1)
BUN SERPL-MCNC: 28 MG/DL (ref 5–25)
CALCIUM SERPL-MCNC: 7.6 MG/DL (ref 8.3–10.1)
CHLORIDE SERPL-SCNC: 92 MMOL/L (ref 100–108)
CO2 SERPL-SCNC: 27 MMOL/L (ref 21–32)
CREAT SERPL-MCNC: 4.04 MG/DL (ref 0.6–1.3)
EOSINOPHIL # BLD AUTO: 0.48 THOUSAND/ΜL (ref 0–0.61)
EOSINOPHIL NFR BLD AUTO: 4 % (ref 0–6)
ERYTHROCYTE [DISTWIDTH] IN BLOOD BY AUTOMATED COUNT: 14.4 % (ref 11.6–15.1)
GFR SERPL CREATININE-BSD FRML MDRD: 15 ML/MIN/1.73SQ M
GLUCOSE SERPL-MCNC: 241 MG/DL (ref 65–140)
GLUCOSE SERPL-MCNC: 312 MG/DL (ref 65–140)
GLUCOSE SERPL-MCNC: 344 MG/DL (ref 65–140)
GLUCOSE SERPL-MCNC: 414 MG/DL (ref 65–140)
GLUCOSE SERPL-MCNC: 57 MG/DL (ref 65–140)
GLUCOSE SERPL-MCNC: 70 MG/DL (ref 65–140)
GLUCOSE SERPL-MCNC: 94 MG/DL (ref 65–140)
HCT VFR BLD AUTO: 29 % (ref 36.5–49.3)
HGB BLD-MCNC: 9.5 G/DL (ref 12–17)
INR PPP: 1.84 (ref 0.86–1.16)
LYMPHOCYTES # BLD AUTO: 0.96 THOUSANDS/ΜL (ref 0.6–4.47)
LYMPHOCYTES NFR BLD AUTO: 9 % (ref 14–44)
MCH RBC QN AUTO: 29.7 PG (ref 26.8–34.3)
MCHC RBC AUTO-ENTMCNC: 32.8 G/DL (ref 31.4–37.4)
MCV RBC AUTO: 91 FL (ref 82–98)
MONOCYTES # BLD AUTO: 1.55 THOUSAND/ΜL (ref 0.17–1.22)
MONOCYTES NFR BLD AUTO: 14 % (ref 4–12)
NEUTROPHILS # BLD AUTO: 7.89 THOUSANDS/ΜL (ref 1.85–7.62)
NEUTS SEG NFR BLD AUTO: 73 % (ref 43–75)
NRBC BLD AUTO-RTO: 0 /100 WBCS
PLATELET # BLD AUTO: 183 THOUSANDS/UL (ref 149–390)
PMV BLD AUTO: 9.5 FL (ref 8.9–12.7)
POTASSIUM SERPL-SCNC: 3.7 MMOL/L (ref 3.5–5.3)
PROTHROMBIN TIME: 21.4 SECONDS (ref 12.1–14.4)
RBC # BLD AUTO: 3.2 MILLION/UL (ref 3.88–5.62)
SODIUM SERPL-SCNC: 129 MMOL/L (ref 136–145)
WBC # BLD AUTO: 10.95 THOUSAND/UL (ref 4.31–10.16)

## 2018-02-01 PROCEDURE — 80048 BASIC METABOLIC PNL TOTAL CA: CPT | Performed by: HOSPITALIST

## 2018-02-01 PROCEDURE — G8988 SELF CARE GOAL STATUS: HCPCS

## 2018-02-01 PROCEDURE — 99232 SBSQ HOSP IP/OBS MODERATE 35: CPT | Performed by: INTERNAL MEDICINE

## 2018-02-01 PROCEDURE — 99232 SBSQ HOSP IP/OBS MODERATE 35: CPT | Performed by: HOSPITALIST

## 2018-02-01 PROCEDURE — 85025 COMPLETE CBC W/AUTO DIFF WBC: CPT | Performed by: HOSPITALIST

## 2018-02-01 PROCEDURE — 85610 PROTHROMBIN TIME: CPT | Performed by: HOSPITALIST

## 2018-02-01 PROCEDURE — 82948 REAGENT STRIP/BLOOD GLUCOSE: CPT

## 2018-02-01 PROCEDURE — G8987 SELF CARE CURRENT STATUS: HCPCS

## 2018-02-01 PROCEDURE — 97167 OT EVAL HIGH COMPLEX 60 MIN: CPT

## 2018-02-01 RX ORDER — DIPHENOXYLATE HYDROCHLORIDE AND ATROPINE SULFATE 2.5; .025 MG/1; MG/1
2 TABLET ORAL 4 TIMES DAILY
Status: DISCONTINUED | OUTPATIENT
Start: 2018-02-01 | End: 2018-02-09 | Stop reason: HOSPADM

## 2018-02-01 RX ORDER — LANOLIN ALCOHOL/MO/W.PET/CERES
3 CREAM (GRAM) TOPICAL
Status: DISCONTINUED | OUTPATIENT
Start: 2018-02-01 | End: 2018-02-09 | Stop reason: HOSPADM

## 2018-02-01 RX ORDER — WARFARIN SODIUM 5 MG/1
5 TABLET ORAL
Status: DISCONTINUED | OUTPATIENT
Start: 2018-02-01 | End: 2018-02-03

## 2018-02-01 RX ADMIN — INSULIN DETEMIR 8 UNITS: 100 INJECTION, SOLUTION SUBCUTANEOUS at 17:36

## 2018-02-01 RX ADMIN — CHOLESTYRAMINE 4 G: 4 POWDER, FOR SUSPENSION ORAL at 10:19

## 2018-02-01 RX ADMIN — NYSTATIN: 100000 POWDER TOPICAL at 18:22

## 2018-02-01 RX ADMIN — INSULIN LISPRO 6 UNITS: 100 INJECTION, SOLUTION INTRAVENOUS; SUBCUTANEOUS at 18:14

## 2018-02-01 RX ADMIN — CITALOPRAM HYDROBROMIDE 10 MG: 10 TABLET ORAL at 10:19

## 2018-02-01 RX ADMIN — INSULIN LISPRO 5 UNITS: 100 INJECTION, SOLUTION INTRAVENOUS; SUBCUTANEOUS at 13:42

## 2018-02-01 RX ADMIN — NEPHROCAP 1 CAPSULE: 1 CAP ORAL at 10:18

## 2018-02-01 RX ADMIN — MELATONIN 3 MG: 3 TAB ORAL at 21:21

## 2018-02-01 RX ADMIN — INSULIN LISPRO 2 UNITS: 100 INJECTION, SOLUTION INTRAVENOUS; SUBCUTANEOUS at 18:12

## 2018-02-01 RX ADMIN — CEFEPIME HYDROCHLORIDE 1000 MG: 1 INJECTION, SOLUTION INTRAVENOUS at 02:30

## 2018-02-01 RX ADMIN — WARFARIN SODIUM 5 MG: 5 TABLET ORAL at 18:11

## 2018-02-01 RX ADMIN — NYSTATIN: 100000 POWDER TOPICAL at 10:45

## 2018-02-01 RX ADMIN — INSULIN LISPRO 3 UNITS: 100 INJECTION, SOLUTION INTRAVENOUS; SUBCUTANEOUS at 21:22

## 2018-02-01 RX ADMIN — GABAPENTIN 200 MG: 100 CAPSULE ORAL at 21:21

## 2018-02-01 RX ADMIN — DIPHENOXYLATE HYDROCHLORIDE AND ATROPINE SULFATE 2 TABLET: 2.5; .025 TABLET ORAL at 21:21

## 2018-02-01 RX ADMIN — CINACALCET HYDROCHLORIDE 30 MG: 30 TABLET, COATED ORAL at 21:21

## 2018-02-01 RX ADMIN — LEVOTHYROXINE SODIUM 137 MCG: 112 TABLET ORAL at 06:02

## 2018-02-01 RX ADMIN — VANCOMYCIN 125 MG: KIT at 13:47

## 2018-02-01 RX ADMIN — METOPROLOL TARTRATE 25 MG: 25 TABLET ORAL at 21:34

## 2018-02-01 RX ADMIN — AMIODARONE HYDROCHLORIDE 200 MG: 200 TABLET ORAL at 10:18

## 2018-02-01 RX ADMIN — INSULIN LISPRO 2 UNITS: 100 INJECTION, SOLUTION INTRAVENOUS; SUBCUTANEOUS at 13:43

## 2018-02-01 RX ADMIN — CHOLESTYRAMINE 4 G: 4 POWDER, FOR SUSPENSION ORAL at 18:11

## 2018-02-01 RX ADMIN — DIPHENOXYLATE HYDROCHLORIDE AND ATROPINE SULFATE 2 TABLET: 2.5; .025 TABLET ORAL at 13:47

## 2018-02-01 NOTE — ASSESSMENT & PLAN NOTE
On chronic suppressive therapy with oral vancomycin  On lomotil atc at home for chronic diarrhea, will order

## 2018-02-01 NOTE — PHYSICAL THERAPY NOTE
Physical Therapy Cancellation Note:    Pt refused participation in PT evaluation due to the fact that he has been experiencing frequent diarrhea today  Pt did not want to be mobile for fear of causing further diarrhea  Pt educated on how to perform ankle pumps on his one intact leg for DVT prevention and on elevating HOB periodically for improved lung expansion     Vipul Wright, PT

## 2018-02-01 NOTE — PROGRESS NOTES
Progress Note - Johnson Starr 1950, 74 y o  male MRN: [id number]    Unit/Bed#: [facility] 627-01 Encounter: [id number]    Primary Care Provider: Bethany Rubio DO   Date and time admitted to hospital: 1/30/2018 10:12 AM        Benign hypertension with end-stage renal disease [facility])   Assessment & Plan    On metoprolol        Anemia in end-stage renal disease ([facility] [address] )   Assessment & Plan    Recently received Larene Code recently, monthly dosing        Atrial fibrillation (HCC)   Assessment & Plan    · Continue metoprolol  · Repeat pt/inr  · Resume coumadin INR 1 84          Clostridium difficile infection   Assessment & Plan    On chronic suppressive therapy with oral vancomycin  On lomotil atc at home for chronic diarrhea, will order        Chronic combined systolic and diastolic CHF (congestive heart failure) ([facility])   Assessment & Plan    · Appears euvolemic today  · Continue home medication regimen and follow volume status closely given fluid resuscitation  ESRD (end stage renal disease) on dialysis [facility])   Assessment & Plan    For HD tomorrow  Monitor electrolytes and volumes status        Type 1 diabetes mellitus with nephropathy ([facility])   Assessment & Plan    Low blood sugar this morning  Will decrease lantus dose and continue sliding scale coverage        * Sepsis ([facility] [address] )   Assessment & Plan    Suspect 2/2 UTI  Failed OP tx with keflex and cipro  On cefepime  HD stable  Await cx results            VTE Pharmacologic Prophylaxis:   Pharmacologic: Warfarin (Coumadin)  Mechanical VTE Prophylaxis in Place: Yes    Patient Centered Rounds: I have performed bedside rounds with nursing staff today  Education and Discussions with Family / Patient: patient and wife    Time Spent for Care: 20 minutes  More than 50% of total time spent on counseling and coordination of care as described above      Current Length of Stay: 2 day(s)    Current Patient Status: Inpatient   Certification Statement: The patient will continue to require additional inpatient hospital stay due to IV abx, tx sepsis    Code Status: Prior      Subjective:   +diarrhea today  Reports not getting lomotil (its ordered as needed, take atc at home will change)  Feeling better otherwise    Objective:     Vitals:   Temp (24hrs), Av 7 °F (37 1 °C), Min:98 2 °F (36 8 °C), Max:99 1 °F (37 3 °C)    HR:  [59-80] 59  Resp:  [16-20] 20  BP: ()/(50-76) 108/66  SpO2:  [95 %-97 %] 96 %  Body mass index is 23 63 kg/m²  Input and Output Summary (last 24 hours): Intake/Output Summary (Last 24 hours) at 18 1229  Last data filed at 18 0548   Gross per 24 hour   Intake              940 ml   Output             4500 ml   Net            -3560 ml       Physical Exam:     Physical Exam  More awake and alert  Less confused  Tremors better  S1, S2  Clear b/l  Soft, +bs, nontender  Right BKA, no edema  Calm and cooperative    Additional Data:     Labs:      Results from last 7 days  Lab Units 18  0448   WBC Thousand/uL 10 95*   HEMOGLOBIN g/dL 9 5*   HEMATOCRIT % 29 0*   PLATELETS Thousands/uL 183   NEUTROS PCT % 73   LYMPHS PCT % 9*   MONOS PCT % 14*   EOS PCT % 4       Results from last 7 days  Lab Units 18  0448  18  1038   SODIUM mmol/L 129*  < > 133*   POTASSIUM mmol/L 3 7  < > 3 7   CHLORIDE mmol/L 92*  < > 97*   CO2 mmol/L 27  < > 26   BUN mg/dL 28*  < > 32*   CREATININE mg/dL 4 04*  < > 4 65*   CALCIUM mg/dL 7 6*  < > 8 0*   TOTAL PROTEIN g/dL  --   --  9 1*   BILIRUBIN TOTAL mg/dL  --   --  0 38   ALK PHOS U/L  --   --  185*   ALT U/L  --   --  25   AST U/L  --   --  29   GLUCOSE RANDOM mg/dL 94  < > 131   < > = values in this interval not displayed  Results from last 7 days  Lab Units 188   INR  1 84*       * I Have Reviewed All Lab Data Listed Above  * Additional Pertinent Lab Tests Reviewed:  All [place] Admission Reviewed    Recent Cultures (last 7 days):       Results from last 7 days  Lab Units 01/30/18  1447 01/30/18  1200 01/30/18  1051 01/30/18  1038   BLOOD CULTURE   --   --  No Growth at 24 hrs  No Growth at 24 hrs  GRAM STAIN RESULT  1+ Polys  No No bacteria seen  --   --   --    URINE CULTURE   --  >100,000 cfu/ml Oxidase Positive gram negative jose alfredo*  --   --        Last 24 Hours Medication List:     Current Facility-Administered Medications:  acetaminophen 650 mg Oral Q6H PRN Alvena Bright, CRNP    amiodarone 200 mg Oral Daily Alvena Bright, CRNP    b complex-vitamin C-folic acid 1 capsule Oral Daily Alvena Bright, CRNP    cefepime 1,000 mg Intravenous Q24H Alvena Bright, CRNP Last Rate: Stopped (01/31/18 1705)   cholestyramine sugar free 4 g Oral BID Alvena Bright, CRNP    cinacalcet 30 mg Oral HS Alvena Bright, CRNP    citalopram 10 mg Oral Daily Alvena Bright, CRNP    diphenoxylate-atropine 2 tablet Oral 4x Daily Howie Pro MD    gabapentin 200 mg Oral HS Markel Lgaunas MD    insulin detemir 10 Units Subcutaneous BID Howie Pro MD    insulin lispro 1-5 Units Subcutaneous HS Alvena Bright, CRNP    insulin lispro 1-6 Units Subcutaneous TID AC Prema Gómez, ALIE    insulin lispro 2 Units Subcutaneous TID ALIE Perkins    iron sucrose 100 mg Intravenous After Dialysis Markle Lagunas MD Last Rate: 100 mg (01/31/18 1350)   levothyroxine 137 mcg Oral Early Morning Alvena Bright, CRNP    metoprolol tartrate 25 mg Oral Q12H [address] Alvena Bright, CRNP    nystatin  Topical BID Alvena Bright, CRNP    traMADol 50 mg Oral Q6H PRN Alvena Bright, CRNP    vancomycin 125 mg Oral Daily Alvena Bright, CRNP         Today, Patient Was Seen By: Howie Pro MD    ** Please Note: Dictation voice to text software may have been used in the creation of this document   **

## 2018-02-01 NOTE — PLAN OF CARE
Problem: OCCUPATIONAL THERAPY ADULT  Goal: Performs self-care activities at highest level of function for planned discharge setting  See evaluation for individualized goals  Treatment Interventions: ADL retraining, Functional transfer training, Endurance training, Patient/family training, Equipment evaluation/education, Compensatory technique education, Energy conservation          See flowsheet documentation for full assessment, interventions and recommendations  Limitation: Decreased ADL status, Decreased cognition, Decreased endurance, Decreased self-care trans, Decreased high-level ADLs  Prognosis: Good  Assessment: Pt is a 74 y o  male who was admitted to [facility] on 1/30/2018 with sudden onset of increased lethargy and fever  Pt's diagnoses include sepsis, diarrhea, and infection including VRE and MRSA  Pt's PMH includes R BKA w/ prosthetic, hypertension, Type I diabetes, ESRD on dialysis, amb dysfunction, hypothyroidism, A-Fib, hx of cardiac arrest, and neuropathy  At baseline pt reports receiving assist PRN for ADLs and IADLs  (-) driving  Uses RW in home and w/c in community  Pt lives w/ spouse, dtr, and son-in-law  His bed and bath on bottom level w/ stair glide to enter  Currently pt requires mod A for overall ADLS  Will further assess functional mobility/transfers when spouse brings in pt's prosthesis or w/ Ax2 with use of RW or w/c  Pt currently presents with impairments in the following categories -difficulty performing ADLS, difficulty performing IADLS  and limited insight into deficits activity tolerance, endurance, standing balance/tolerance, memory, safety , judgement  and attention    These impairments, as well as pt's fatigue, risk for falls and generalized weakness  limit pt's ability to safely engage in all baseline areas of occupation, includingbathing, dressing, toileting, functional mobility/transfers and leisure activities  From OT standpoint, recommend inpt rehab vs home w/ home services and family support pending further assessment of transfers and functional mobility, as well as pt's progress upon D/C  OT will continue to follow to address the below stated goals        OT Discharge Recommendation: Other (Comment) (inpt rehab vs home w/ home services)  OT - OK to Discharge: Yes (when medically stable to inpt rehab vs home w/ services)

## 2018-02-01 NOTE — PROGRESS NOTES
Patient states he is not feeling well, c/o of "stomach ache "      Blood pressure 122/65, pulse 80, temperature 99 °F (37 2 °C), temperature source Oral, resp  rate 18, height 5' 6" (1 676 m), weight 66 4 kg (146 lb 6 2 oz), SpO2 97 %  Lungs clear but diminished, good bowel sounds, skin warm and dry  Patient has recently returned from HD  Will continue to monitor

## 2018-02-01 NOTE — PLAN OF CARE

## 2018-02-01 NOTE — PROGRESS NOTES
Progress Note - Nephrology   Reilly Cohen 74 y o  male MRN: [id number]  Unit/Bed#: [facility] 627-01 Encounter: [id number]      Assessment / Plan:    1  End-stage renal disease  · Next he would dialysis treatment is planned for 2/2  2  Volume overload  · Volume removal with dialysis S tolerated by blood pressure  3  Anemia -last dose of Micera was 100 mcg and is given on every 4 week schedule  Last dose was on 02/05  · Trend hemoglobin for now  · Continue Venofer on dialysis  4  Sepsis  · Started on cefepime by primary service for presumed urosepsis  5  Hyponatremia  · Secondary to volume overload  · Sodium level somewhat improved today after volume removal with dialysis  · Add fluid restriction to current diet  · Note that Celexa can cause hyponatremia as well  6  Hypertension  · Acceptable to low at times  · Trend for now and adjust metoprolol if needed  7  C diff infection  · On Vancomycin suppressive therapy  8  Diabetes mellitus  9  Neuropathy  10  Cardiomyopathy with history of systolic and diastolic congestive heart failure  11  Atrial fibrillation  12  Confusion/myoclonic jerking-this could be a manifestation of Neurontin toxicity  Neurontin dose has been decreased from 300-200 mg as of 1/31  He had been on ciprofloxacin prior to admission and this too can cause confusion in dialysis patients but this was discontinued 7 days ago  Of course, sepsis can cause confusion as well  overall, mental status has improved      Subjective: The patient was seen examined earlier today  Overall, he looks to be much improved  He denies any shortness of breath, chest pain or pressure, abdominal pain, vomiting, diarrhea, chills or sweats  He felt well after his hemodialysis treatment yesterday  He still has some myoclonic jerking and this has not changed since yesterday  Dysuria persist      Objective:     Vitals: Blood pressure 108/66, pulse 59, temperature 98 2 °F (36 8 °C), temperature source Oral, resp   rate 20, height 5' 6" (1 676 m), weight 66 4 kg (146 lb 6 2 oz), SpO2 96 %  ,Body mass index is 23 63 kg/m²  Temp (24hrs), Av 7 °F (37 1 °C), Min:98 2 °F (36 8 °C), Max:99 1 °F (37 3 °C)      Weight (last 2 days)     Date/Time   Weight    18 1655  66 4 (146 39)    18 1026  74 8 (165)                     Intake/Output Summary (Last 24 hours) at 18 1204  Last data filed at 18 0548   Gross per 24 hour   Intake              940 ml   Output             4500 ml   Net            -3560 ml     I/O last 24 hours:   In: 1060 [P O :560; I V :500]  Out: 4500 [Other:4500]        Physical Exam    Gen -awake, alert and in no apparent distress  Cardiovascular -S1-S2, no pericardial friction rub or S3 were appreciated, 0 to trace edema bilaterally in the lower extremities  Respiratory -clear to auscultation and percussion with some decreased breath sounds at the bases, no rales/rhonchi or wheezing was noted  GI - Abdomen -soft, nontender, no rebound or guarding was noted, normoactive bowel sounds        Invasive Devices     Peripheral Intravenous Line            Peripheral IV 18 Left Antecubital 2 days          Line            Hemodialysis AV Fistula  Right Forearm -- days    Hemodialysis AV Fistula Right Forearm -- days          Drain            Gastrostomy/Enterostomy -- days    Gastrostomy/Enterostomy Percutaneous endoscopic gastrostomy (PEG) 20 Fr   days                Medications:    Scheduled Meds:  Current Facility-Administered Medications:  acetaminophen 650 mg Oral Q6H PRN Barabara Thien, CRNP    amiodarone 200 mg Oral Daily Barabara Thien, CRNP    b complex-vitamin C-folic acid 1 capsule Oral Daily Barabara Thien, CRNP    cefepime 1,000 mg Intravenous Q24H Barabara Thien, CRNP Last Rate: Stopped (18 1705)   cholestyramine sugar free 4 g Oral BID Barabara Craven, CRNP    cinacalcet 30 mg Oral HS Barabara Craven, CRNP    citalopram 10 mg Oral Daily Barabara Thien, CRNP diphenoxylate-atropine 1 tablet Oral 4x Daily PRN ALIE Enriquez    gabapentin 200 mg Oral HS Reino Merlin, MD    insulin detemir 10 Units Subcutaneous BID Prabhakar Ashraf MD    insulin lispro 1-5 Units Subcutaneous HS ALIE Enriquez    insulin lispro 1-6 Units Subcutaneous TID AC ALIE Enriquez    insulin lispro 2 Units Subcutaneous TID AC ALIE Enriquez    iron sucrose 100 mg Intravenous After Dialysis Reino Merlin, MD Last Rate: 100 mg (01/31/18 1350)   levothyroxine 137 mcg Oral Early Morning ALIE Enriquez    metoprolol tartrate 25 mg Oral Q12H [address] ALIE Enriquez    nystatin  Topical BID ALIE Enriquez    traMADol 50 mg Oral Q6H PRN ALIE Enriquez    vancomycin 125 mg Oral Daily ALIE Enriquez        PRN Meds:   acetaminophen    diphenoxylate-atropine    iron sucrose    traMADol    Continuous Infusions:         LAB RESULTS:        Results from last 7 days  Lab Units 02/01/18  0448 01/31/18  0449 01/30/18  1038 01/26/18  1725   WBC Thousand/uL 10 95* 12 39* 15 10* 9 55   HEMOGLOBIN g/dL 9 5* 9 6* 12 1 11 5*   HEMATOCRIT % 29 0* 29 0* 36 9 36 2*   PLATELETS Thousands/uL 183 178 213 250   NEUTROS PCT % 73  --   --   --    LYMPHS PCT % 9*  --   --   --    LYMPHO PCT %  --   --  1* 8*   MONOS PCT % 14*  --   --   --    MONO PCT MAN %  --   --  6 4   EOS PCT % 4  --   --   --    EOSINO PCT MANUAL %  --   --  2 4   SODIUM mmol/L 129* 127* 133* 132*   POTASSIUM mmol/L 3 7 4 1 3 7 4 9   CHLORIDE mmol/L 92* 94* 97* 94*   CO2 mmol/L 27 24 26 26   BUN mg/dL 28* 50* 32* 58*   CREATININE mg/dL 4 04* 5 76* 4 65* 7 15*   CALCIUM mg/dL 7 6* 7 5* 8 0* 8 4   TOTAL PROTEIN g/dL  --   --  9 1*  --    BILIRUBIN TOTAL mg/dL  --   --  0 38  --    ALK PHOS U/L  --   --  185*  --    ALT U/L  --   --  25  --    AST U/L  --   --  29  --    GLUCOSE RANDOM mg/dL 94 212* 131 199*       CUTURES:  Lab Results   Component Value Date    BLOODCX No Growth at 24 hrs  01/30/2018    BLOODCX No Growth at 24 hrs  01/30/2018    BLOODCX No Growth After 5 Days  11/29/2017    BLOODCX No Growth After 5 Days  11/29/2017    BLOODCX No Growth After 5 Days  09/17/2017    BLOODCX No Growth After 5 Days  09/17/2017    BLOODCX No Growth After 5 Days  08/03/2017    URINECX >100,000 cfu/ml Oxidase Positive gram negative jose alfredo (A) 01/30/2018    URINECX >100,000 cfu/ml Pseudomonas aeruginosa (A) 11/27/2017    URINECX >100,000 cfu/ml Pseudomonas aeruginosa 09/17/2017    URINECX  09/17/2017     7287-2805 cfu/ml Oxidase Positive gram negative jose alfredo    URINECX >100,000 cfu/ml Pseudomonas aeruginosa 06/16/2017    URINECX No Growth <1000 cfu/mL 09/10/2016                 Portions of the record may have been created with voice recognition software  Occasional wrong word or "sound a like" substitutions may have occurred due to the inherent limitations of voice recognition software  Read the chart carefully and recognize, using context, where substitutions have occurred  If you have any questions, please contact the dictating provider

## 2018-02-01 NOTE — OCCUPATIONAL THERAPY NOTE
[address]  Evaluation     Patient Name: Yaima Gregory  Today's Date: 2/1/2018  Problem List  Patient Active Problem List   Diagnosis    Hypertension    Type 1 diabetes mellitus with nephropathy ([facility]    ESRD (end stage renal disease) on dialysis ([facility]    Sepsis ([facility]    Ambulatory dysfunction    S/P percutaneous endoscopic gastrostomy (PEG) tube placement ([facility]    Pleural effusion on right    Chronic combined systolic and diastolic CHF (congestive heart failure) ([facility]    R Fibula fracture, proximal to stump    Pseudomonas urinary tract infection    DVT, lower extremity (HCC)    Hyponatremia    Clostridium difficile infection    Anemia    Acquired hypothyroidism    Chronic kidney disease-mineral and bone disorder    Hyperkalemia    Atrial fibrillation (HCC)    Anemia in end-stage renal disease ([facility]    Hyponatremia with excess extracellular fluid volume    Benign hypertension with end-stage renal disease (HCC)    Volume overload     Past Medical History  Past Medical History:   Diagnosis Date    Acquired hypothyroidism     underactive    Atrial fibrillation (HCC)     Chronic kidney disease-mineral and bone disorder 11/27/2017    Clostridium difficile infection 04/2017    Diabetes mellitus ([facility]     Dialysis patient ([facility]     Diarrhea     ESRD (end stage renal disease) on dialysis ([facility]     Hx of cardiac arrest     Hypertension     Neuropathy     Right lower lobe pneumonia ([facility] ) 2/20/2017    [facility]    Polymicrobial MRSA, VRE    Systolic and diastolic CHF, chronic (HCC)     EF 35%, Grade II DD     Past Surgical History  Past Surgical History:   Procedure Laterality Date    CATARACT EXTRACTION      CHG GI ENDOSCOPIC ULTRASOUND N/A 3/10/2016    Procedure: LINEAR ENDOSCOPIC U/S;  Surgeon: Benoit Kinney MD;  [facility];   Service: Gastroenterology    CHOLECYSTECTOMY      GASTROSTOMY TUBE PLACEMENT N/A 4/12/2017    Procedure: INSERTION PEG TUBE; Surgeon: Maggie Blanc MD;  [facility];  Service:     LEG AMPUTATION THROUGH LOWER TIBIA AND FIBULA Right 4/6/2017    Procedure: AMPUTATION BELOW KNEE (BKA); Surgeon: Harinder Zaragoza DO;  [facility];  Service:     TOE AMPUTATION  2000 02/01/18 1600   Note Type   Note type Eval/Treat   Restrictions/Precautions   Weight Bearing Precautions Per Order No   Braces or Orthoses Prosthesis  (R BKA- Prosthesis not in hospital)   Other Precautions Cognitive; Fall Risk;O2   Pain Assessment   Pain Assessment No/denies pain   Pain Score No Pain   Home Living   Type of Home House   Home Layout Multi-level; Other (Comment)  (Bed and bath downstairs)   Bathroom Shower/Tub Tub/shower unit   Bathroom Toilet Standard   Bathroom Equipment Grab bars in shower; Shower chair;Commode;Grab bars around toilet   [facility]; Wheelchair-manual;Stair glide; Hospital bed   Prior Function   Level of Green Needs assistance with ADLs and functional mobility   Lives With Spouse; Family   Receives Help From Family   ADL Assistance Needs assistance   IADLs Needs assistance   Falls in the last 6 months 0   Vocational Retired   Lifestyle   Autonomy Pt reports that he is mostly I with ADLs, however his wife assists w/ washing LE's  Family assists w/ IADLs  (-) driving  Uses a RW in home and w/c for community  Uses stair glide for stair mobility  Reciprocal Relationships Lives w/ spouse, dtr, and son-in-law  Family is available to assist PRN     Service to Others No longer working   Intrinsic Gratification drawing, used to enjoy bowling before R BKA   Psychosocial   Psychosocial (WDL) WDL   ADL   Where Assessed Edge of bed   Eating Assistance 6  Modified independent   Grooming Assistance 5  Supervision/Setup   UB Bathing Assistance 5  Supervision/Setup   LB Bathing Assistance 3  Moderate Assistance   [facility] 3 Moderate Assistance   Toileting Assistance  3  Moderate Assistance   Functional Assistance 3  Moderate Assistance   Bed Mobility   Supine to Sit 4  Minimal assistance   Additional items Assist x 1;HOB elevated   Sit to Supine 5  Supervision   Additional items Assist x 1; Increased time required   Transfers   Additional Comments Unable to attempt sit<->stand transfer at this time  Pt reports he does not want to attempt w/o prosthesis  Educated pt on having family bring prosthesis to hospital for pt to use during therapy  Pt agreed and reported that he would inform his wife to bring it  Balance   Static Sitting Fair +   Dynamic Sitting Fair   Activity Tolerance   Activity Tolerance Treatment limited secondary to medical complications (Comment)  (C diff and lack of prosthesis available )   Nurse Made Aware Okay to see per Ruma SHEA Assessment   RUE Assessment WFL   LUE Assessment   LUE Assessment WFL   Hand Function   Gross Motor Coordination Functional   Fine Motor Coordination Functional   Cognition   Overall Cognitive Status Impaired   Arousal/Participation Alert; Responsive; Cooperative   Attention Attends with cues to redirect   Orientation Level Oriented X4   Memory Decreased recall of recent events;Decreased short term memory   Following Commands Follows one step commands without difficulty   Comments Pt is pleasant and cooperative, however he reports that he cannot remember why he was admitted to the hospital and reports difficulty w/ attention and focusing  Assessment   Limitation Decreased ADL status; Decreased cognition;Decreased endurance;Decreased self-care trans;Decreased high-level ADLs   Prognosis Good   Assessment Pt is a 74 y o  male who was admitted to [facility] on 1/30/2018 with sudden onset of increased lethargy and fever  Pt's diagnoses include sepsis, diarrhea, and infection including VRE and MRSA   Pt's PMH includes R BKA w/ prosthetic, hypertension, Type I diabetes, ESRD on dialysis, amb dysfunction, hypothyroidism, A-Fib, hx of cardiac arrest, and neuropathy  At baseline pt reports receiving assist PRN for ADLs and IADLs  (-) driving  Uses RW in home and w/c in community  Pt lives w/ spouse, dtr, and son-in-law  His bed and bath on bottom level w/ stair glide to enter  Currently pt requires mod A for overall ADLS  Will further assess functional mobility/transfers when spouse brings in pt's prosthesis or w/ Ax2 with use of RW or w/c  Pt currently presents with impairments in the following categories -difficulty performing ADLS, difficulty performing IADLS, limited insight into deficits, activity tolerance, endurance, standing balance/tolerance, memory, safety , judgement  and attention  These impairments, as well as pt's fatigue, risk for falls, and generalized weakness  limit pt's ability to safely engage in all baseline areas of occupation, includingbathing, dressing, toileting, functional mobility/transfers and leisure activities  From OT standpoint, recommend inpt rehab vs home w/ home services and family support pending further assessment of transfers and functional mobility, as well as pt's progress upon D/C  OT will continue to follow to address the below stated goals  Goals   Patient Goals none stated   LTG Time Frame 7-10   Long Term Goal see below goals    Plan   Treatment Interventions ADL retraining;Functional transfer training; Endurance training;Patient/family training;Equipment evaluation/education; Compensatory technique education; Energy conservation   Goal Expiration Date 02/11/18   OT Frequency 3-5x/wk   Recommendation   OT Discharge Recommendation Other (Comment)  (inpt rehab vs home w/ home services)   OT - OK to Discharge Yes  (when medically stable to inpt rehab vs home w/ services)   Barthel Index   Feeding 10   Bathing 0   Grooming Score 5   Dressing Score 5   Bladder Score 10   Bowels Score 5   Toilet Use Score 5   Transfers (Bed/Chair) Score 5   Mobility (Level Surface) Score 0   Stairs Score 0   Barthel Index Score 45   Modified [place] Scale   Modified [place] Scale 4     Pt will perform UB ADL's at mod I level and LB ADLs at a min A level with good balance  Pt will perform toileting at mod I level w/ use of DME and BSC  Pt will demonstrate good carry over of DME safety and energy conservation techniques  Pt will improve activity tolerance to G for 30 min treatment session  Pt will participate in further cog assessment w/ 100% attention to task to assist w/ safe d/c planning  *Functional mobility/transfers goal will be established post assessment of transfers         Estelle Londono OTR/L

## 2018-02-02 ENCOUNTER — APPOINTMENT (INPATIENT)
Dept: DIALYSIS | Facility: HOSPITAL | Age: 68
DRG: 871 | End: 2018-02-02
Attending: INTERNAL MEDICINE
Payer: COMMERCIAL

## 2018-02-02 LAB
BACTERIA CSF CULT: NO GROWTH
BASOPHILS # BLD AUTO: 0.06 THOUSANDS/ΜL (ref 0–0.1)
BASOPHILS NFR BLD AUTO: 1 % (ref 0–1)
EOSINOPHIL # BLD AUTO: 0.44 THOUSAND/ΜL (ref 0–0.61)
EOSINOPHIL NFR BLD AUTO: 5 % (ref 0–6)
ERYTHROCYTE [DISTWIDTH] IN BLOOD BY AUTOMATED COUNT: 14.2 % (ref 11.6–15.1)
GLUCOSE SERPL-MCNC: 160 MG/DL (ref 65–140)
GLUCOSE SERPL-MCNC: 171 MG/DL (ref 65–140)
GLUCOSE SERPL-MCNC: 215 MG/DL (ref 65–140)
GLUCOSE SERPL-MCNC: 317 MG/DL (ref 65–140)
GLUCOSE SERPL-MCNC: 395 MG/DL (ref 65–140)
GLUCOSE SERPL-MCNC: 94 MG/DL (ref 65–140)
HCT VFR BLD AUTO: 29 % (ref 36.5–49.3)
HGB BLD-MCNC: 9.8 G/DL (ref 12–17)
INR PPP: 1.54 (ref 0.86–1.16)
LYMPHOCYTES # BLD AUTO: 1.43 THOUSANDS/ΜL (ref 0.6–4.47)
LYMPHOCYTES NFR BLD AUTO: 15 % (ref 14–44)
MCH RBC QN AUTO: 29.8 PG (ref 26.8–34.3)
MCHC RBC AUTO-ENTMCNC: 33.8 G/DL (ref 31.4–37.4)
MCV RBC AUTO: 88 FL (ref 82–98)
MONOCYTES # BLD AUTO: 0.94 THOUSAND/ΜL (ref 0.17–1.22)
MONOCYTES NFR BLD AUTO: 10 % (ref 4–12)
NEUTROPHILS # BLD AUTO: 6.94 THOUSANDS/ΜL (ref 1.85–7.62)
NEUTS SEG NFR BLD AUTO: 69 % (ref 43–75)
NRBC BLD AUTO-RTO: 0 /100 WBCS
PLATELET # BLD AUTO: 179 THOUSANDS/UL (ref 149–390)
PMV BLD AUTO: 9.2 FL (ref 8.9–12.7)
PROTHROMBIN TIME: 18.6 SECONDS (ref 12.1–14.4)
RBC # BLD AUTO: 3.29 MILLION/UL (ref 3.88–5.62)
WBC # BLD AUTO: 9.84 THOUSAND/UL (ref 4.31–10.16)

## 2018-02-02 PROCEDURE — 82948 REAGENT STRIP/BLOOD GLUCOSE: CPT

## 2018-02-02 PROCEDURE — 97163 PT EVAL HIGH COMPLEX 45 MIN: CPT

## 2018-02-02 PROCEDURE — 97535 SELF CARE MNGMENT TRAINING: CPT

## 2018-02-02 PROCEDURE — 5A1D70Z PERFORMANCE OF URINARY FILTRATION, INTERMITTENT, LESS THAN 6 HOURS PER DAY: ICD-10-PCS | Performed by: INTERNAL MEDICINE

## 2018-02-02 PROCEDURE — G8982 BODY POS GOAL STATUS: HCPCS

## 2018-02-02 PROCEDURE — 90935 HEMODIALYSIS ONE EVALUATION: CPT | Performed by: INTERNAL MEDICINE

## 2018-02-02 PROCEDURE — 99232 SBSQ HOSP IP/OBS MODERATE 35: CPT | Performed by: HOSPITALIST

## 2018-02-02 PROCEDURE — 85025 COMPLETE CBC W/AUTO DIFF WBC: CPT | Performed by: HOSPITALIST

## 2018-02-02 PROCEDURE — 85610 PROTHROMBIN TIME: CPT | Performed by: HOSPITALIST

## 2018-02-02 PROCEDURE — G8981 BODY POS CURRENT STATUS: HCPCS

## 2018-02-02 RX ADMIN — INSULIN LISPRO 3 UNITS: 100 INJECTION, SOLUTION INTRAVENOUS; SUBCUTANEOUS at 22:08

## 2018-02-02 RX ADMIN — CHOLESTYRAMINE 4 G: 4 POWDER, FOR SUSPENSION ORAL at 18:22

## 2018-02-02 RX ADMIN — METOPROLOL TARTRATE 12.5 MG: 25 TABLET ORAL at 22:07

## 2018-02-02 RX ADMIN — MELATONIN 3 MG: 3 TAB ORAL at 22:08

## 2018-02-02 RX ADMIN — GABAPENTIN 200 MG: 100 CAPSULE ORAL at 22:08

## 2018-02-02 RX ADMIN — VANCOMYCIN 125 MG: KIT at 13:21

## 2018-02-02 RX ADMIN — CHOLESTYRAMINE 4 G: 4 POWDER, FOR SUSPENSION ORAL at 13:20

## 2018-02-02 RX ADMIN — DIPHENOXYLATE HYDROCHLORIDE AND ATROPINE SULFATE 2 TABLET: 2.5; .025 TABLET ORAL at 13:19

## 2018-02-02 RX ADMIN — INSULIN LISPRO 6 UNITS: 100 INJECTION, SOLUTION INTRAVENOUS; SUBCUTANEOUS at 18:21

## 2018-02-02 RX ADMIN — INSULIN DETEMIR 10 UNITS: 100 INJECTION, SOLUTION SUBCUTANEOUS at 18:20

## 2018-02-02 RX ADMIN — WARFARIN SODIUM 5 MG: 5 TABLET ORAL at 20:33

## 2018-02-02 RX ADMIN — CITALOPRAM HYDROBROMIDE 10 MG: 10 TABLET ORAL at 13:20

## 2018-02-02 RX ADMIN — NEPHROCAP 1 CAPSULE: 1 CAP ORAL at 13:20

## 2018-02-02 RX ADMIN — DIPHENOXYLATE HYDROCHLORIDE AND ATROPINE SULFATE 2 TABLET: 2.5; .025 TABLET ORAL at 18:20

## 2018-02-02 RX ADMIN — NYSTATIN: 100000 POWDER TOPICAL at 13:22

## 2018-02-02 RX ADMIN — CINACALCET HYDROCHLORIDE 30 MG: 30 TABLET, COATED ORAL at 22:08

## 2018-02-02 RX ADMIN — LEVOTHYROXINE SODIUM 137 MCG: 112 TABLET ORAL at 05:50

## 2018-02-02 RX ADMIN — MEROPENEM 500 MG: 1 INJECTION, POWDER, FOR SOLUTION INTRAVENOUS at 13:21

## 2018-02-02 RX ADMIN — INSULIN LISPRO 2 UNITS: 100 INJECTION, SOLUTION INTRAVENOUS; SUBCUTANEOUS at 13:21

## 2018-02-02 RX ADMIN — AMIODARONE HYDROCHLORIDE 200 MG: 200 TABLET ORAL at 13:20

## 2018-02-02 RX ADMIN — DIPHENOXYLATE HYDROCHLORIDE AND ATROPINE SULFATE 2 TABLET: 2.5; .025 TABLET ORAL at 22:08

## 2018-02-02 NOTE — PLAN OF CARE
Problem: OCCUPATIONAL THERAPY ADULT  Goal: Performs self-care activities at highest level of function for planned discharge setting  See evaluation for individualized goals  Treatment Interventions: ADL retraining, Functional transfer training, Endurance training, Patient/family training, Equipment evaluation/education, Compensatory technique education, Energy conservation          See flowsheet documentation for full assessment, interventions and recommendations  Outcome: Progressing  Limitation: Decreased ADL status, Decreased Safe judgement during ADL, Decreased endurance, Decreased self-care trans, Decreased high-level ADLs  Prognosis: Good  Assessment: Pt seen for OT treatment session to further assess functional transfers and for self care  Pt required min Ax2 for SPT from bed to chair, however pt stood impulsively and did not have a controlled stand -> sit transfer  On second attempt, s/u RW and educated pt on RW safety  Pt performed SPT w/ Rw, requiring vc and min Ax2  Pt demonstrated decreased insight into current level of functioning and judgement of safety  Once seated in chair, pt participated in grooming activity, including oral hygiene  Pt attempted to apply toothpaste to back of toothbrush and required vc to correct  Pt tolerated 20+ minutes of therapy w/ good activity tolerance  Pt is progressing, however presents with limitations previously outlined during evaluation  Rec inpatient rehab upon d/c  Will cont to follow to address previously determined goals        OT Discharge Recommendation: Short Term Rehab  OT - OK to Discharge: Yes (when medically stable to inpt rehab vs home w/ services)

## 2018-02-02 NOTE — OCCUPATIONAL THERAPY NOTE
[address] Progress Note     Patient Name: Gregg Marcus  Today's Date: 2/2/2018  Problem List  Patient Active Problem List   Diagnosis    Hypertension    Type [facility]ropathy [facility]    ESRD (end stage renal disease) on dialysis ([facility]    Sepsis ([facility]    Ambulatory dysfunction    [facility]    Pleural effusion on right    Chronic combined systolic and diastolic CHF (congestive heart failure) ([facility]    R Fibula fracture, proximal to stump    Pseudomonas urinary tract infection    DVT[facility] lower extremity [facility]    Hyponatremia    Clostridium difficile infection    Anemia    Acquired hypothyroidism    Chronic kidney disease-mineral and bone disorder    Hyperkalemia    Atrial fibrillation (HCC)    Anemia in end-stage renal disease [facility]    Hyponatremia with excess extracellular fluid volume    Benign hypertension with end-stage renal disease ([facility]    Volume overload           02/02/18 1521   Note Type   Note type Progress   Restrictions/Precautions   Weight Bearing Precautions Per Order No   Braces or Orthoses Prosthesis  (not in hospital)   Other Precautions Cognitive; Fall Risk;O2   Pain Assessment   Pain Assessment No/denies pain   Pain Score No Pain   ADL   Where Assessed Chair   Grooming Assistance 5  Supervision/Setup   Grooming Deficit Setup;Verbal cueing; Increased time to complete   Bed Mobility   Supine to Sit 4  Minimal assistance   Additional items Assist x 1   Transfers   Sit to Stand 4  Minimal assistance   Additional items Assist x 2   Stand to Sit 4  Minimal assistance   Additional items Assist x 2   Stand pivot 4  Minimal assistance   Additional items Assist x 2   Balance   Static Sitting Good   Dynamic Sitting Fair   Static Standing Poor   Dynamic Standing Poor   Activity Tolerance   Activity Tolerance Patient tolerated treatment well   Medical Staff Made Aware Teresa Mendoza, PT-  co-treat appropriate 2* pt's first time being out of bed and no having his prosthesis, which is what he uses at home  PT focused on balance and tranfer quality, while OT focused on functional transfer and light ADL   Nurse Made Aware Okay to see per RN   Cognition   Overall Cognitive Status Impaired   Arousal/Participation Responsive; Cooperative   Attention Attends with cues to redirect   Orientation Level Oriented X4   Following Commands Follows one step commands with increased time or repetition   Assessment   Limitation Decreased ADL status; Decreased Safe judgement during ADL;Decreased endurance;Decreased self-care trans;Decreased high-level ADLs   Prognosis Good   Assessment Pt seen for OT treatment session to further assess functional transfers and for self care  Pt required min Ax2 for SPT from bed to chair, however pt stood impulsively and did not have a controlled stand -> sit transfer  On second attempt, s/u RW and educated pt on RW safety  Pt performed SPT w/ Rw, requiring vc and min Ax2  Pt demonstrated decreased insight into current level of functioning and judgement of safety  Once seated in chair, pt participated in grooming activity, including oral hygiene  Pt attempted to apply toothpaste to back of toothbrush and required vc to correct  Pt tolerated 20+ minutes of therapy w/ good activity tolerance  Pt is progressing, however presents with limitations previously outlined during evaluation  Rec inpatient rehab upon d/c  Will cont to follow to address previously determined goals      Recommendation   OT Discharge Recommendation Short Term Rehab   Barthel Index   Feeding 10   Bathing 0   Grooming Score 0   Dressing Score 5   Bladder Score 10   Bowels Score 10   Toilet Use Score 5   Transfers (Bed/Chair) Score 5   Mobility (Level Surface) Score 0   Stairs Score 0   Barthel Index Score 45   Modified [place] Scale   Modified [place] Scale 4     Transfer goal:  Pt will perform functional transfers, including toilet transfer, at a S level w/ use of RW and good balance         Alin Helton, OTR/L

## 2018-02-02 NOTE — RESTORATIVE TECHNICIAN NOTE
Restorative Specialist Mobility Note       Activity: Other (Comment) (Pt currently off the unit )          Dariana Ariza BS, Restorative Technician, [employer]

## 2018-02-02 NOTE — ASSESSMENT & PLAN NOTE
2/2 UTI, pseudomonas, sensitivities reviewed, will change abx to meropenem, had confusion with quinolones, would avoid  Failed OP tx with keflex and cipro

## 2018-02-02 NOTE — PLAN OF CARE
Problem: PHYSICAL THERAPY ADULT  Goal: Performs mobility at highest level of function for planned discharge setting  See evaluation for individualized goals  Treatment/Interventions: Functional transfer training, LE strengthening/ROM, Therapeutic exercise, Endurance training, Cognitive reorientation, Patient/family training, Equipment eval/education, Bed mobility, Gait training, Spoke to case management          See flowsheet documentation for full assessment, interventions and recommendations  Prognosis: Fair  Problem List: Decreased strength, Decreased endurance, Impaired balance, Decreased coordination, Decreased mobility, Decreased cognition, Impaired judgement, Decreased safety awareness  Assessment: Pt is 74 y o  male seen for PT evaluation s/p admit to [place] [id number] on 1/30/2018 w/ Sepsis ([address] ), diarrhea, VRE/MRSA infections  PT consulted to assess pt's functional mobility and d/c needs  Order placed for PT eval and tx, w/ up w/ A order  Comorbidities affecting pt's physical performance at time of assessment include: 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, diabetes w/ neuropathy, ESRD on HD, CHF, HTN, hypothyroidism  PTA, pt was requiring A for mobility  Personal factors affecting pt at time of IE include: lives in 2 story house, inability to ambulate household distances, decreased cognition and limited insight into impairments  Please find objective findings from PT assessment regarding body systems outlined above with impairments and limitations including weakness, impaired balance, decreased endurance, impaired coordination, gait deviations, decreased activity tolerance, decreased functional mobility tolerance, impaired judgement, fall risk and decreased cognition  The following objective measures performed on IE also reveal limitations: Barthel Index: 45/100  Pt's clinical presentation is currently unstable/unpredictable seen in pt's presentation of fall risk, active infection, abnormal labs, IV meds   Pt currently Is[place]ora bed mobility, modA for stand-pivot transfers with RW due to poor safety awareness and impulsivity  Pt to benefit from continued PT tx to address deficits as defined above and maximize level of functional independent mobility and consistency  From PT/mobility standpoint, recommendation at time of d/c would be IP rehab pending progress in order to facilitate return to PLOF  Spoke to CM/SW re: clarification of level off assist at home and PLOF  Recommendation: Post acute IP rehab     PT - OK to Discharge:  (to rehab when medically stable)    See flowsheet documentation for full assessment

## 2018-02-02 NOTE — PHYSICAL THERAPY NOTE
PHYSICAL THERAPY EVALUATION      Patient Name: Seema Moffett  Today's Date: 2/2/2018         Patient Active Problem List   Diagnosis    Hypertension    Type 1 diabetes mellitus with nephropathy ([facility]    ESRD (end stage renal disease) on dialysis ([facility] )    Sepsis ([facility] )    Ambulatory dysfunction    S/P percutaneous endoscopic gastrostomy (PEG) tube placement ([facility] )    Pleural effusion on right    Chronic combined systolic and diastolic CHF (congestive heart failure) ([facility] )    R Fibula fracture, proximal to stump    Pseudomonas urinary tract infection    DVT, lower extremity (HCC)    Hyponatremia    Clostridium difficile infection    Anemia    Acquired hypothyroidism    Chronic kidney disease-mineral and bone disorder    Hyperkalemia    Atrial fibrillation (HCC)    Anemia in end-stage renal disease ([facility]    Hyponatremia with excess extracellular fluid volume    Benign hypertension with end-stage renal disease (HCC)    Volume overload       Past Medical History:   Diagnosis Date    Acquired hypothyroidism     underactive    Atrial fibrillation (HCC)     Chronic kidney disease-mineral and bone disorder 11/27/2017    Clostridium difficile infection 04/2017    Diabetes mellitus ([facility]     Dialysis patient ([facility]     Diarrhea     ESRD (end stage renal disease) on dialysis ([facility]     Hx of cardiac arrest     Hypertension     Hyponatremia 8/8/2017    Neuropathy     Right lower lobe pneumonia ([facility] ) 2/20/2017    Sepsis ([facility] ) 04/2017    Polymicrobial MRSA, VRE    Systolic and diastolic CHF, chronic (HCC)     EF 35%, Grade II DD       Past Surgical History:   Procedure Laterality Date    CATARACT EXTRACTION      CHG GI ENDOSCOPIC ULTRASOUND N/A 3/10/2016    Procedure: LINEAR ENDOSCOPIC U/S;  Surgeon: Tiffani Hyatt MD;  [facility];   Service: Gastroenterology    CHOLECYSTECTOMY      GASTROSTOMY TUBE PLACEMENT N/A 4/12/2017    Procedure: INSERTION PEG TUBE;  Surgeon: Andrew Mendoza MD; [facility];  Service:     LEG AMPUTATION THROUGH LOWER TIBIA AND FIBULA Right 4/6/2017    Procedure: AMPUTATION BELOW KNEE (BKA); Surgeon: Maki Payan DO;  [facility];  Service:     TOE AMPUTATION  2000 02/02/18 1520   Note Type   Note type Eval only   Pain Assessment   Pain Assessment No/denies pain   Pain Score No Pain   Home Living   Type of [address]; Wheelchair-manual;Hospital bed;Stair glide   Prior Function   Level of [place] Needs assistance with ADLs and functional mobility   Lives With Spouse; Family   Receives Help From Family   ADL Assistance Needs assistance   IADLs Needs assistance   Falls in the last 6 months 0   Restrictions/Precautions   Weight Bearing Precautions Per Order No   Braces or Orthoses Prosthesis  (wife states she will bring it in)   Other Precautions Contact/isolation; Chair Alarm; Bed Alarm; Fall Risk;Multiple lines;Cognitive   General   Family/Caregiver Present Yes   Cognition   Overall Cognitive Status Impaired   Arousal/Participation Cooperative   Attention Attends with cues to redirect   Orientation Level Oriented X4   Following Commands Follows one step commands with increased time or repetition   RUE Assessment   RUE Assessment WFL   LUE Assessment   LUE Assessment WFL   RLE Assessment   RLE Assessment X   Strength RLE   RLE Overall Strength 3+/5  (residual limb)   LLE Assessment   LLE Assessment X  (AROM appears WFL)   Strength LLE   LLE Overall Strength 4/5   Coordination   Sensation WFL   Light Touch   RLE Light Touch Grossly intact   LLE Light Touch Grossly intact   Bed Mobility   Supine to Sit 4  Minimal assistance   Additional items Assist x 1; Increased time required;Verbal cues;HOB elevated   Additional Comments supervision EOB sitting   Transfers   Sit to Stand 3  Moderate assistance   Additional items Assist x 1; Increased time required;Verbal cues  (cues for hand placement)   Stand to Sit 4 Minimal assistance   Additional items Assist x 1; Increased time required;Verbal cues  (cues for hand placement)   Stand pivot 3  Moderate assistance  (RW)   Additional items Assist x 1; Increased time required;Verbal cues; Impulsive   Additional Comments decreased safety awareness with transfers   Ambulation/Elevation   Gait pattern Not appropriate   Balance   Static Sitting Fair   Dynamic Sitting Fair -   Static Standing Poor +   Dynamic Standing Poor   Endurance Deficit   Endurance Deficit Yes   Activity Tolerance   Activity Tolerance Patient tolerated treatment well  (mild fatigue after dialysis)   Nurse Made Aware RN confirmed pt appropriate for PT eval   Assessment   Prognosis Fair   Problem List Decreased strength;Decreased endurance; Impaired balance;Decreased coordination;Decreased mobility; Decreased cognition; Impaired judgement;Decreased safety awareness   Assessment Pt is 74 y o  male seen for PT evaluation s/p admit to [facility] on 1/30/2018 w/ Sepsis ([facility] ), diarrhea, VRE/MRSA infections  PT consulted to assess pt's functional mobility and d/c needs  Order placed for PT eval and tx, w/ up w/ A order  Comorbidities affecting pt's physical performance at time of assessment include: 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, diabetes w/ neuropathy, ESRD on HD, CHF, HTN, hypothyroidism  PTA, pt was requiring A for mobility  Personal factors affecting pt at time of IE include: lives in 2 story house, inability to ambulate household distances, decreased cognition and limited insight into impairments  Please find objective findings from PT assessment regarding body systems outlined above with impairments and limitations including weakness, impaired balance, decreased endurance, impaired coordination, gait deviations, decreased activity tolerance, decreased functional mobility tolerance, impaired judgement, fall risk and decreased cognition   The following objective measures performed on IE also reveal limitations: Barthel Index: 45/100  Pt's clinical presentation is currently unstable/unpredictable seen in pt's presentation of fall risk, active infection, abnormal labs, IV meds  Pt currently Isadora bed mobility, modA for stand-pivot transfers with RW due to poor safety awareness and impulsivity  Pt to benefit from continued PT tx to address deficits as defined above and maximize level of functional independent mobility and consistency  From PT/mobility standpoint, recommendation at time of d/c would be IP rehab pending progress in order to facilitate return to PLOF  Spoke to CM/SW re: clarification of level off assist at home and PLOF  Goals   Patient Goals none stated   LTG Expiration Date 02/16/18   Long Term Goal #1 In 14 days: Pt will perform bed mobility mod I  Pt will perform sit<>stand and stand-pivot transfers mod I with RW while demonstrating good safety awareness  PT to assess amb with RLE prosthesis if appropriate (unclear if pt was ambulatory at baseline)   Treatment Day 0   Plan   Treatment/Interventions Functional transfer training;LE strengthening/ROM; Therapeutic exercise; Endurance training;Cognitive reorientation;Patient/family training;Equipment eval/education; Bed mobility;Gait training;Spoke to case management   PT Frequency 5x/wk   Recommendation   Recommendation Post acute IP rehab   PT - OK to Discharge (to rehab when medically stable)   Barthel Index   Feeding 10   Bathing 0   Grooming Score 5   Dressing Score 5   Bladder Score 10   Bowels Score 5   Toilet Use Score 5   Transfers (Bed/Chair) Score 5   Mobility (Level Surface) Score 0   Stairs Score 0   Barthel Index Score 45         Deena Jenkins, PT

## 2018-02-02 NOTE — PROGRESS NOTES
Progress Note - Harshal Davis 1950, 74 y o  male MRN: [id number]    Unit/Bed#: [facility] 627-01 Encounter: [id number]    Primary Care Provider: Teo Ryan,    Date and time admitted to hospital: 1/30/2018 10:12 AM        Benign hypertension with end-stage renal disease [facility])   Assessment & Plan    On metoprolol        Anemia in end-stage renal disease ([facility] )   Assessment & Plan    Recently received Mariya Hoang recently, monthly dosing        Atrial fibrillation ([facility])   Assessment & Plan    · Continue metoprolol  · INR 1 54 today, dose coumadin and repeat pt/inr in am          Clostridium difficile infection   Assessment & Plan    On chronic suppressive therapy with oral vancomycin  On lomotil atc at home for chronic diarrhea        Chronic combined systolic and diastolic CHF (congestive heart failure) ([facility])   Assessment & Plan    · Appears euvolemic today  · Continue home medication regimen and follow volume status closely given fluid resuscitation  ESRD (end stage renal disease) on dialysis [facility])   Assessment & Plan    Currently on HD  Monitor electrolytes and volumes status        Type 1 diabetes mellitus with nephropathy ([facility])   Assessment & Plan    Blood sugars running high  Increase lantus and premeal short acting insulin        * Sepsis ([facility])   Assessment & Plan     2/2 UTI, pseudomonas, sensitivities reviewed, will change abx to meropenem, had confusion with quinolones, would avoid  Failed OP tx with keflex and cipro            VTE Pharmacologic Prophylaxis:   Pharmacologic: Warfarin (Coumadin)  Mechanical VTE Prophylaxis in Place: Yes    Patient Centered Rounds: seen on HD    Education and Discussions with Family / Patient: patient    Time Spent for Care: 20 minutes  More than 50% of total time spent on counseling and coordination of care as described above      Current Length of Stay: 3 day(s)    Current Patient Status: Inpatient   Certification Statement: The patient will continue to require additional inpatient hospital stay due to IV abx    Code Status: Level 1 - Full Code      Subjective:   Pt seen on HD, +diarrhea this am did not receive lomotil today  No fevers or chills    Objective:     Vitals:   Temp (24hrs), Av 3 °F (36 3 °C), Min:96 9 °F (36 1 °C), Max:97 5 °F (36 4 °C)    HR:  [54-69] 57  Resp:  [16-20] 16  BP: ()/(50-63) 98/52  SpO2:  [99 %-100 %] 99 %  Body mass index is 23 63 kg/m²  Input and Output Summary (last 24 hours): Intake/Output Summary (Last 24 hours) at 18 1050  Last data filed at 18 0820   Gross per 24 hour   Intake              680 ml   Output              400 ml   Net              280 ml       Physical Exam:     Physical Exam  Sleeping but arousable, ON HD  S1, S2  Clear anteriorly  Soft, +bs nontender  Right BKA  No edema  No rash  No tremors    Additional Data:     Labs:      Results from last 7 days  Lab Units 18  0617   WBC Thousand/uL 9 84   HEMOGLOBIN g/dL 9 8*   HEMATOCRIT % 29 0*   PLATELETS Thousands/uL 179   NEUTROS PCT % 69   LYMPHS PCT % 15   MONOS PCT % 10   EOS PCT % 5       Results from last 7 days  Lab Units 18  0448  18  1038   SODIUM mmol/L 129*  < > 133*   POTASSIUM mmol/L 3 7  < > 3 7   CHLORIDE mmol/L 92*  < > 97*   CO2 mmol/L 27  < > 26   BUN mg/dL 28*  < > 32*   CREATININE mg/dL 4 04*  < > 4 65*   CALCIUM mg/dL 7 6*  < > 8 0*   TOTAL PROTEIN g/dL  --   --  9 1*   BILIRUBIN TOTAL mg/dL  --   --  0 38   ALK PHOS U/L  --   --  185*   ALT U/L  --   --  25   AST U/L  --   --  29   GLUCOSE RANDOM mg/dL 94  < > 131   < > = values in this interval not displayed  Results from last 7 days  Lab Units 18  0617   INR  1 54*       * I Have Reviewed All Lab Data Listed Above  * Additional Pertinent Lab Tests Reviewed:  [address] Admission Reviewed    Recent Cultures (last 7 days):       Results from last 7 days  Lab Units 18  1447 18  1200 18  1051 18  1038   BLOOD CULTURE   --   --  No Growth at 48 hrs  No Growth at 48 hrs  GRAM STAIN RESULT  1+ Polys  No No bacteria seen  --   --   --    URINE CULTURE   --  >100,000 cfu/ml Pseudomonas fluorescens/putida*  --   --        Last 24 Hours Medication List:     Current Facility-Administered Medications:  acetaminophen 650 mg Oral Q6H PRN Elizabeth Crigler, CRNP    amiodarone 200 mg Oral Daily Elizabeth Crigler, CRNP    b complex-vitamin C-folic acid 1 capsule Oral Daily Elizabeth Crigler, CRNP    cholestyramine sugar free 4 g Oral BID Elizabeth Crigler, CRNP    cinacalcet 30 mg Oral HS Elizabeth Crigler, CRNP    citalopram 10 mg Oral Daily Elizabeth Crigler, CRNP    diphenoxylate-atropine 2 tablet Oral 4x Daily Prabha Alanis MD    gabapentin 200 mg Oral HS Evert Grant MD    insulin detemir 10 Units Subcutaneous BID Prabha Alanis MD    insulin lispro 1-5 Units Subcutaneous HS Elizabeth Crigler, CRNP    insulin lispro 1-6 Units Subcutaneous TID AC ALIE Rincon    insulin lispro 4 Units Subcutaneous TID AC Prabha Alanis MD    iron sucrose 100 mg Intravenous After Dialysis Evert Grant MD Last Rate: 100 mg (01/31/18 1350)   levothyroxine 137 mcg Oral Early Morning Elizabeth Crigler, CRNP    melatonin 3 mg Oral HS Prabha Alanis MD    meropenem 500 mg Intravenous Q24H Prabha Alanis MD    metoprolol tartrate 25 mg Oral Q12H [address] Elizabeth Crigler, CRNP    nystatin  Topical BID Elizabeth Crigler, CRNP    traMADol 50 mg Oral Q6H PRN Elizabeth Crigler, CRNP    vancomycin 125 mg Oral Daily Elizabeth Crigler, CRNP    warfarin 5 mg Oral Daily (warfarin) Prabha Alanis MD         Today, Patient Was Seen By: Prabha Alanis MD    ** Please Note: Dictation voice to text software may have been used in the creation of this document   **

## 2018-02-03 LAB
ANION GAP SERPL CALCULATED.3IONS-SCNC: 10 MMOL/L (ref 4–13)
BASOPHILS # BLD AUTO: 0.06 THOUSANDS/ΜL (ref 0–0.1)
BASOPHILS NFR BLD AUTO: 1 % (ref 0–1)
BUN SERPL-MCNC: 39 MG/DL (ref 5–25)
CALCIUM SERPL-MCNC: 7.4 MG/DL (ref 8.3–10.1)
CHLORIDE SERPL-SCNC: 93 MMOL/L (ref 100–108)
CO2 SERPL-SCNC: 25 MMOL/L (ref 21–32)
CREAT SERPL-MCNC: 4.45 MG/DL (ref 0.6–1.3)
EOSINOPHIL # BLD AUTO: 0.47 THOUSAND/ΜL (ref 0–0.61)
EOSINOPHIL NFR BLD AUTO: 6 % (ref 0–6)
ERYTHROCYTE [DISTWIDTH] IN BLOOD BY AUTOMATED COUNT: 14.1 % (ref 11.6–15.1)
GFR SERPL CREATININE-BSD FRML MDRD: 13 ML/MIN/1.73SQ M
GLUCOSE SERPL-MCNC: 111 MG/DL (ref 65–140)
GLUCOSE SERPL-MCNC: 173 MG/DL (ref 65–140)
GLUCOSE SERPL-MCNC: 193 MG/DL (ref 65–140)
GLUCOSE SERPL-MCNC: 207 MG/DL (ref 65–140)
GLUCOSE SERPL-MCNC: 260 MG/DL (ref 65–140)
HCT VFR BLD AUTO: 31.6 % (ref 36.5–49.3)
HGB BLD-MCNC: 10.5 G/DL (ref 12–17)
INR PPP: 1.47 (ref 0.86–1.16)
LYMPHOCYTES # BLD AUTO: 1.11 THOUSANDS/ΜL (ref 0.6–4.47)
LYMPHOCYTES NFR BLD AUTO: 13 % (ref 14–44)
MCH RBC QN AUTO: 29.7 PG (ref 26.8–34.3)
MCHC RBC AUTO-ENTMCNC: 33.2 G/DL (ref 31.4–37.4)
MCV RBC AUTO: 89 FL (ref 82–98)
MONOCYTES # BLD AUTO: 0.86 THOUSAND/ΜL (ref 0.17–1.22)
MONOCYTES NFR BLD AUTO: 10 % (ref 4–12)
NEUTROPHILS # BLD AUTO: 5.97 THOUSANDS/ΜL (ref 1.85–7.62)
NEUTS SEG NFR BLD AUTO: 70 % (ref 43–75)
NRBC BLD AUTO-RTO: 0 /100 WBCS
PLATELET # BLD AUTO: 199 THOUSANDS/UL (ref 149–390)
PMV BLD AUTO: 9.5 FL (ref 8.9–12.7)
POTASSIUM SERPL-SCNC: 4.5 MMOL/L (ref 3.5–5.3)
PROTHROMBIN TIME: 17.9 SECONDS (ref 12.1–14.4)
RBC # BLD AUTO: 3.54 MILLION/UL (ref 3.88–5.62)
SODIUM SERPL-SCNC: 128 MMOL/L (ref 136–145)
WBC # BLD AUTO: 8.51 THOUSAND/UL (ref 4.31–10.16)

## 2018-02-03 PROCEDURE — 99232 SBSQ HOSP IP/OBS MODERATE 35: CPT | Performed by: NURSE PRACTITIONER

## 2018-02-03 PROCEDURE — 99232 SBSQ HOSP IP/OBS MODERATE 35: CPT | Performed by: PHYSICIAN ASSISTANT

## 2018-02-03 PROCEDURE — 85610 PROTHROMBIN TIME: CPT | Performed by: HOSPITALIST

## 2018-02-03 PROCEDURE — 80048 BASIC METABOLIC PNL TOTAL CA: CPT | Performed by: HOSPITALIST

## 2018-02-03 PROCEDURE — 85025 COMPLETE CBC W/AUTO DIFF WBC: CPT | Performed by: HOSPITALIST

## 2018-02-03 PROCEDURE — 82948 REAGENT STRIP/BLOOD GLUCOSE: CPT

## 2018-02-03 RX ADMIN — METOPROLOL TARTRATE 12.5 MG: 25 TABLET ORAL at 21:05

## 2018-02-03 RX ADMIN — LEVOTHYROXINE SODIUM 137 MCG: 112 TABLET ORAL at 05:42

## 2018-02-03 RX ADMIN — MELATONIN 3 MG: 3 TAB ORAL at 21:05

## 2018-02-03 RX ADMIN — NEPHROCAP 1 CAPSULE: 1 CAP ORAL at 10:39

## 2018-02-03 RX ADMIN — INSULIN LISPRO 1 UNITS: 100 INJECTION, SOLUTION INTRAVENOUS; SUBCUTANEOUS at 10:37

## 2018-02-03 RX ADMIN — INSULIN LISPRO 2 UNITS: 100 INJECTION, SOLUTION INTRAVENOUS; SUBCUTANEOUS at 21:35

## 2018-02-03 RX ADMIN — CHOLESTYRAMINE 4 G: 4 POWDER, FOR SUSPENSION ORAL at 10:39

## 2018-02-03 RX ADMIN — GABAPENTIN 200 MG: 100 CAPSULE ORAL at 21:05

## 2018-02-03 RX ADMIN — CITALOPRAM HYDROBROMIDE 10 MG: 10 TABLET ORAL at 10:35

## 2018-02-03 RX ADMIN — INSULIN LISPRO 2 UNITS: 100 INJECTION, SOLUTION INTRAVENOUS; SUBCUTANEOUS at 12:54

## 2018-02-03 RX ADMIN — NYSTATIN: 100000 POWDER TOPICAL at 18:43

## 2018-02-03 RX ADMIN — WARFARIN SODIUM 6 MG: 5 TABLET ORAL at 18:41

## 2018-02-03 RX ADMIN — AMIODARONE HYDROCHLORIDE 200 MG: 200 TABLET ORAL at 10:35

## 2018-02-03 RX ADMIN — NYSTATIN: 100000 POWDER TOPICAL at 10:45

## 2018-02-03 RX ADMIN — DIPHENOXYLATE HYDROCHLORIDE AND ATROPINE SULFATE 2 TABLET: 2.5; .025 TABLET ORAL at 10:34

## 2018-02-03 RX ADMIN — CHOLESTYRAMINE 4 G: 4 POWDER, FOR SUSPENSION ORAL at 18:42

## 2018-02-03 RX ADMIN — DIPHENOXYLATE HYDROCHLORIDE AND ATROPINE SULFATE 2 TABLET: 2.5; .025 TABLET ORAL at 21:05

## 2018-02-03 RX ADMIN — INSULIN DETEMIR 10 UNITS: 100 INJECTION, SOLUTION SUBCUTANEOUS at 18:41

## 2018-02-03 RX ADMIN — CINACALCET HYDROCHLORIDE 30 MG: 30 TABLET, COATED ORAL at 21:05

## 2018-02-03 RX ADMIN — DIPHENOXYLATE HYDROCHLORIDE AND ATROPINE SULFATE 2 TABLET: 2.5; .025 TABLET ORAL at 12:48

## 2018-02-03 RX ADMIN — MEROPENEM 500 MG: 1 INJECTION, POWDER, FOR SOLUTION INTRAVENOUS at 12:40

## 2018-02-03 RX ADMIN — DIPHENOXYLATE HYDROCHLORIDE AND ATROPINE SULFATE 2 TABLET: 2.5; .025 TABLET ORAL at 18:41

## 2018-02-03 RX ADMIN — VANCOMYCIN 125 MG: KIT at 12:41

## 2018-02-03 RX ADMIN — INSULIN DETEMIR 10 UNITS: 100 INJECTION, SOLUTION SUBCUTANEOUS at 10:35

## 2018-02-03 NOTE — ASSESSMENT & PLAN NOTE
· On chronic suppressive therapy with oral vancomycin  · On lomotil atc at home for chronic diarrhea

## 2018-02-03 NOTE — ASSESSMENT & PLAN NOTE
· C/w monitoring blood sugars with increased lantus and premeal short acting insulin- am glucose 173

## 2018-02-03 NOTE — PROGRESS NOTES
Progress Note - Keith Ortega 1950, 74 y o  male MRN: [id number]    Unit/Bed#: [facility] 627-01 Encounter: [id number]    Primary Care Provider: Cristal Lopez DO   Date and time admitted to hospital: 1/30/2018 10:12 AM        * Sepsis [facility])   Assessment & Plan    · 2/2 UTI (+) pseudomonas  · Started on meropenum yesterday  · Experienced confusion with quinolones previously   · Failed OP tx with keflex and cipro  · Blood cultures (-) 72hrs          Benign hypertension with end-stage renal disease ([facility] )   Assessment & Plan    · On metoprolol        Anemia in end-stage renal disease ([facility])   Assessment & Plan    · micera 100mcg q4 weeks outpatient; venofer with HD        Atrial fibrillation ([facility])   Assessment & Plan    · Continue metoprolol/amio  · INR 1 47 today, increase coumadin to 6mg tonight and repeat am INR           Clostridium difficile infection   Assessment & Plan    · On chronic suppressive therapy with oral vancomycin  · On lomotil atc at home for chronic diarrhea        Hyponatremia   Assessment & Plan    · Possibly d/t volume overload and likely a component of hyperglycemia   · Corrected sodium in the setting of hyperglycemia = 130  · Nephrology following and placed on a fluid restriction 1200ml/day  · Check am BMP  · Pt is on a SSRI, may need to consider discontinuing if this persists         Chronic combined systolic and diastolic CHF (congestive heart failure) ([facility] )   Assessment & Plan    · Continue home medication regimen and follow volume status closely given fluid resuscitation          ESRD (end stage renal disease) on dialysis [facility])   Assessment & Plan    · Currently on HD  · Monitor electrolytes and volumes status        Type 1 diabetes mellitus with nephropathy ([facility])   Assessment & Plan    · C/w monitoring blood sugars with increased lantus and premeal short acting insulin- am glucose 173            VTE Pharmacologic Prophylaxis:   Pharmacologic: Warfarin (Coumadin)  Mechanical VTE Prophylaxis in Place: Yes    Patient Centered Rounds: I have performed bedside rounds with nursing staff today  Discussions with Specialists or Other Care Team Provider: d/w RN     Education and Discussions with Family / Patient: d/w patient and wife     Time Spent for Care: 30 minutes  More than 50% of total time spent on counseling and coordination of care as described above  Current Length of Stay: 4 day(s)    Current Patient Status: Inpatient   Certification Statement: The patient will continue to require additional inpatient hospital stay due to iv abxs     Discharge Plan: not stable for discharge     Code Status: Level 1 - Full Code      Subjective:   Pt reports some loose stools  Denies any other complaints  Upset because he wants to be discharged today  Objective:     Vitals:   Temp (24hrs), Av °F (36 7 °C), Min:97 5 °F (36 4 °C), Max:98 2 °F (36 8 °C)    HR:  [59-68] 61  Resp:  [16-20] 18  BP: ()/(48-82) 102/62  SpO2:  [97 %-98 %] 97 %  Body mass index is 23 63 kg/m²  Input and Output Summary (last 24 hours): Intake/Output Summary (Last 24 hours) at 18 1139  Last data filed at 18 1043   Gross per 24 hour   Intake             1190 ml   Output             2825 ml   Net            -1635 ml       Physical Exam:     Physical Exam   Constitutional: He is oriented to person, place, and time  No distress  Cardiovascular: Normal rate, regular rhythm, normal heart sounds and intact distal pulses  No murmur heard  Pulmonary/Chest: Effort normal and breath sounds normal  No respiratory distress  He has no wheezes  He has no rales  Abdominal: Soft  Bowel sounds are normal  He exhibits no distension  There is no tenderness  There is no rebound  Musculoskeletal: He exhibits no edema or tenderness  Right BKA   Neurological: He is alert and oriented to person, place, and time  No cranial nerve deficit  Skin: Skin is warm and dry  He is not diaphoretic     Psychiatric: He has a normal mood and affect  Frustrated       Additional Data:     Labs:      Results from last 7 days  Lab Units 02/03/18  0543   WBC Thousand/uL 8 51   HEMOGLOBIN g/dL 10 5*   HEMATOCRIT % 31 6*   PLATELETS Thousands/uL 199   NEUTROS PCT % 70   LYMPHS PCT % 13*   MONOS PCT % 10   EOS PCT % 6       Results from last 7 days  Lab Units 02/03/18  0543  01/30/18  1038   SODIUM mmol/L 128*  < > 133*   POTASSIUM mmol/L 4 5  < > 3 7   CHLORIDE mmol/L 93*  < > 97*   CO2 mmol/L 25  < > 26   BUN mg/dL 39*  < > 32*   CREATININE mg/dL 4 45*  < > 4 65*   CALCIUM mg/dL 7 4*  < > 8 0*   TOTAL PROTEIN g/dL  --   --  9 1*   BILIRUBIN TOTAL mg/dL  --   --  0 38   ALK PHOS U/L  --   --  185*   ALT U/L  --   --  25   AST U/L  --   --  29   GLUCOSE RANDOM mg/dL 193*  < > 131   < > = values in this interval not displayed  Results from last 7 days  Lab Units 02/03/18  0543   INR  1 47*       * I Have Reviewed All Lab Data Listed Above  * Additional Pertinent Lab Tests Reviewed: All Labs Within Last 24 Hours Reviewed    Imaging:    Imaging Reports Reviewed Today Include: chest xray    Recent Cultures (last 7 days):       Results from last 7 days  Lab Units 01/30/18  1447 01/30/18  1200 01/30/18  1051 01/30/18  1038   BLOOD CULTURE   --   --  No Growth at 72 hrs  No Growth at 72 hrs     GRAM STAIN RESULT  1+ Polys  No No bacteria seen  --   --   --    URINE CULTURE   --  >100,000 cfu/ml Pseudomonas fluorescens/putida*  --   --        Last 24 Hours Medication List:     Current Facility-Administered Medications:  acetaminophen 650 mg Oral Q6H PRN ALIE Mari    amiodarone 200 mg Oral Daily ALIE Mari    b complex-vitamin C-folic acid 1 capsule Oral Daily ALIE Mari    cholestyramine sugar free 4 g Oral BID ALIE Mari    cinacalcet 30 mg Oral HS ALIE Mari    citalopram 10 mg Oral Daily ALIE Mari    diphenoxylate-atropine 2 tablet Oral 4x Daily José Luis Millard MD    gabapentin 200 mg Oral HS Marcelo Saldaña MD    insulin detemir 10 Units Subcutaneous BID Marla Klinefelter, MD    insulin lispro 1-5 Units Subcutaneous HS Tho [place], ALIE    insulin lispro 1-6 Units Subcutaneous TID AC ALIE Rincon    insulin lispro 4 Units Subcutaneous TID AC Marla Klinefelter, MD    iron sucrose 100 mg Intravenous After Dialysis Marcelo Saldaña MD Last Rate: 100 mg (01/31/18 1350)   levothyroxine 137 mcg Oral Early Morning Nettles Meline, CRNP    melatonin 3 mg Oral HS Marla Klinefelter, MD    meropenem 500 mg Intravenous Q24H Marla Klinefelter, MD Last Rate: Stopped (02/02/18 1351)   metoprolol tartrate 12 5 mg Oral Q12H [address] Marcelo Saldaña MD    nystatin  Topical BID Nettles Meline, CRNP    traMADol 50 mg Oral Q6H PRN Nettles Meline, CRNP    vancomycin 125 mg Oral Daily Nettles Meline, CRNP    warfarin 5 mg Oral Daily (warfarin) Marla Klinefelter, MD         Today, Patient Was Seen By: ALIE Pablo    ** Please Note: Dictation voice to text software may have been used in the creation of this document   **

## 2018-02-03 NOTE — ASSESSMENT & PLAN NOTE
· Possibly d/t volume overload and likely a component of hyperglycemia   · Corrected sodium in the setting of hyperglycemia = 130  · Nephrology following and placed on a fluid restriction 1200ml/day  · Check am BMP  · Pt is on a SSRI, may need to consider discontinuing if this persists

## 2018-02-03 NOTE — ASSESSMENT & PLAN NOTE
· 2/2 UTI (+) pseudomonas  · Started on meropenum yesterday  · Experienced confusion with quinolones previously   · Failed OP tx with keflex and cipro  · Blood cultures (-) 72hrs

## 2018-02-03 NOTE — PROGRESS NOTES
NEPHROLOGY PROGRESS NOTE   Heidi Chandler 74 y o  male MRN: [id number]  Unit/Bed#: [facility] 627-01 Encounter: [id number]  Reason for Consult: ESRD on HD    ASSESSMENT/PLAN:  1  ESRD on HD MWF @ [address] [facility] [address]  2  Anemia- micera 100mcg q4 weeks outpatient; venofer with HD  3  Volume Overload- manage with HD  - EDW is 59kg  4  Hyponatremia- secondary to volume overload  5  Hypertension- BP soft, montior  6  C diff- on oral vancomycin  7  Sepsis- secondary to UTI, per primary team    Disposition:  Stable from a renal standpoint    SUBJECTIVE:  Patient asking when he is going home      OBJECTIVE:  Current Weight: Weight - Scale: 66 4 kg (146 lb 6 2 oz)  Vitals:    02/02/18 1500 02/02/18 2207 02/02/18 2300 02/03/18 0700   BP: (!) 94/48 122/82 100/50 102/62   BP Location:    Left arm   Pulse: 68 62 60 61   Resp: 20 17 18   Temp: 97 5 °F (36 4 °C)  98 2 °F (36 8 °C) 98 2 °F (36 8 °C)   TempSrc: Oral   Oral   SpO2:   98% 97%   Weight:       Height:           Intake/Output Summary (Last 24 hours) at 02/03/18 1016  Last data filed at 02/03/18 0500   Gross per 24 hour   Intake              710 ml   Output             2825 ml   Net            -2115 ml     General: NAD  Skin: no rash  HEENT: normocephalic atraumatic  Neck: supple  Chest: CTAB  Heart: RRR  Abdomen: soft, nd, nt  Extremities: no edema  Neuro: alert awake  Psych: mood and affect appropriate    Medications:    Current Facility-Administered Medications:     acetaminophen (TYLENOL) tablet 650 mg, 650 mg, Oral, Q6H PRN, ALIE Mejia, 650 mg at 01/30/18 2012    amiodarone tablet 200 mg, 200 mg, Oral, Daily, ALIE Mejia, 200 mg at 02/02/18 1320    b complex-vitamin C-folic acid (NEPHROCAPS) capsule 1 capsule, 1 capsule, Oral, Daily, ALIE Mejia, 1 capsule at 02/02/18 1320    cholestyramine sugar free (QUESTRAN LIGHT) packet 4 g, 4 g, Oral, BID, ALIE Mejia, 4 g at 02/02/18 1822    cinacalcet (SENSIPAR) tablet 30 mg, 30 mg, Oral, HS, ALIE Garcia, 30 mg at 02/02/18 2208    citalopram (CeleXA) tablet 10 mg, 10 mg, Oral, Daily, ALIE Garcia, 10 mg at 02/02/18 1320    diphenoxylate-atropine (LOMOTIL) 2 5-0 025 mg per tablet 2 tablet, 2 tablet, Oral, 4x Daily, Noemi Syed MD, 2 tablet at 02/02/18 2208    gabapentin (NEURONTIN) capsule 200 mg, 200 mg, Oral, HS, Janina Lesch, MD, 200 mg at 02/02/18 2208    insulin detemir (LEVEMIR) subcutaneous injection 10 Units, 10 Units, Subcutaneous, BID, Noemi Syed MD, 10 Units at 02/02/18 1820    insulin lispro (HumaLOG) 100 units/mL subcutaneous injection 1-5 Units, 1-5 Units, Subcutaneous, HS, ALIE Garcia, 3 Units at 02/02/18 2208    insulin lispro (HumaLOG) 100 units/mL subcutaneous injection 1-6 Units, 1-6 Units, Subcutaneous, TID AC, 6 Units at 02/02/18 1821 **AND** Fingerstick Glucose (POCT), , , TID ACNeo CRNP    insulin lispro (HumaLOG) 100 units/mL subcutaneous injection 4 Units, 4 Units, Subcutaneous, TID AC, Noemi Syed MD, 4 Units at 02/02/18 1822    iron sucrose (VENOFER) 100 mg in sodium chloride 0 9 % 100 mL IVPB, 100 mg, Intravenous, After Dialysis, Janina Lesch, MD, Last Rate: 105 mL/hr at 01/31/18 1350, 100 mg at 01/31/18 1350    levothyroxine tablet 137 mcg, 137 mcg, Oral, Early Morning, ALIE Garcia, 137 mcg at 02/03/18 0542    melatonin tablet 3 mg, 3 mg, Oral, HS, Noemi Syed MD, 3 mg at 02/02/18 2208    meropenem (MERREM) 500 mg in sodium chloride 0 9 % 50 mL IVPB, 500 mg, Intravenous, Q24H, Noemi Syed MD, Stopped at 02/02/18 1351    metoprolol tartrate (LOPRESSOR) partial tablet 12 5 mg, 12 5 mg, Oral, Q12H [address], Janina Lesch, MD, 12 5 mg at 02/02/18 2207    nystatin (MYCOSTATIN) powder, , Topical, BID, Neo Ceballos, ALIE    traMADol (ULTRAM) tablet 50 mg, 50 mg, Oral, Q6H PRN, Neo Ceballos, ALIE, 50 mg at 01/31/18 1630    vancomycin (VANCOCIN) oral solution 125 mg, 125 mg, Oral, Daily, ALIE Mari, 125 mg at 02/02/18 1321    warfarin (COUMADIN) tablet 5 mg, 5 mg, Oral, Daily (warfarin), José Luis Millard MD, 5 mg at 02/02/18 2033    Laboratory Results:    Results from last 7 days  Lab Units 02/03/18  0543 02/02/18  0617 02/01/18  0448 01/31/18  0449 01/30/18  1038   WBC Thousand/uL 8 51 9 84 10 95* 12 39* 15 10*   HEMOGLOBIN g/dL 10 5* 9 8* 9 5* 9 6* 12 1   HEMATOCRIT % 31 6* 29 0* 29 0* 29 0* 36 9   PLATELETS Thousands/uL 199 179 183 178 213   SODIUM mmol/L 128*  --  129* 127* 133*   POTASSIUM mmol/L 4 5  --  3 7 4 1 3 7   CHLORIDE mmol/L 93*  --  92* 94* 97*   CO2 mmol/L 25  --  27 24 26   BUN mg/dL 39*  --  28* 50* 32*   CREATININE mg/dL 4 45*  --  4 04* 5 76* 4 65*   CALCIUM mg/dL 7 4*  --  7 6* 7 5* 8 0*   TOTAL PROTEIN g/dL  --   --   --   --  9 1*   GLUCOSE RANDOM mg/dL 193*  --  94 212* 131

## 2018-02-04 LAB
ANION GAP SERPL CALCULATED.3IONS-SCNC: 12 MMOL/L (ref 4–13)
BACTERIA BLD CULT: NORMAL
BACTERIA BLD CULT: NORMAL
BUN SERPL-MCNC: 58 MG/DL (ref 5–25)
CALCIUM SERPL-MCNC: 7.6 MG/DL (ref 8.3–10.1)
CHLORIDE SERPL-SCNC: 94 MMOL/L (ref 100–108)
CO2 SERPL-SCNC: 24 MMOL/L (ref 21–32)
CREAT SERPL-MCNC: 6.02 MG/DL (ref 0.6–1.3)
ERYTHROCYTE [DISTWIDTH] IN BLOOD BY AUTOMATED COUNT: 14.3 % (ref 11.6–15.1)
GFR SERPL CREATININE-BSD FRML MDRD: 9 ML/MIN/1.73SQ M
GLUCOSE SERPL-MCNC: 181 MG/DL (ref 65–140)
GLUCOSE SERPL-MCNC: 237 MG/DL (ref 65–140)
GLUCOSE SERPL-MCNC: 248 MG/DL (ref 65–140)
GLUCOSE SERPL-MCNC: 78 MG/DL (ref 65–140)
GLUCOSE SERPL-MCNC: 89 MG/DL (ref 65–140)
HCT VFR BLD AUTO: 31.7 % (ref 36.5–49.3)
HGB BLD-MCNC: 10.3 G/DL (ref 12–17)
INR PPP: 1.74 (ref 0.86–1.16)
MCH RBC QN AUTO: 29.3 PG (ref 26.8–34.3)
MCHC RBC AUTO-ENTMCNC: 32.5 G/DL (ref 31.4–37.4)
MCV RBC AUTO: 90 FL (ref 82–98)
PLATELET # BLD AUTO: 197 THOUSANDS/UL (ref 149–390)
PMV BLD AUTO: 9.5 FL (ref 8.9–12.7)
POTASSIUM SERPL-SCNC: 5 MMOL/L (ref 3.5–5.3)
PROTHROMBIN TIME: 20.5 SECONDS (ref 12.1–14.4)
RBC # BLD AUTO: 3.51 MILLION/UL (ref 3.88–5.62)
SODIUM SERPL-SCNC: 130 MMOL/L (ref 136–145)
WBC # BLD AUTO: 7.79 THOUSAND/UL (ref 4.31–10.16)

## 2018-02-04 PROCEDURE — 82948 REAGENT STRIP/BLOOD GLUCOSE: CPT

## 2018-02-04 PROCEDURE — 85027 COMPLETE CBC AUTOMATED: CPT | Performed by: NURSE PRACTITIONER

## 2018-02-04 PROCEDURE — 85610 PROTHROMBIN TIME: CPT | Performed by: NURSE PRACTITIONER

## 2018-02-04 PROCEDURE — 80048 BASIC METABOLIC PNL TOTAL CA: CPT | Performed by: NURSE PRACTITIONER

## 2018-02-04 PROCEDURE — 99232 SBSQ HOSP IP/OBS MODERATE 35: CPT | Performed by: NURSE PRACTITIONER

## 2018-02-04 PROCEDURE — 99232 SBSQ HOSP IP/OBS MODERATE 35: CPT | Performed by: PHYSICIAN ASSISTANT

## 2018-02-04 RX ADMIN — INSULIN DETEMIR 10 UNITS: 100 INJECTION, SOLUTION SUBCUTANEOUS at 08:54

## 2018-02-04 RX ADMIN — INSULIN DETEMIR 10 UNITS: 100 INJECTION, SOLUTION SUBCUTANEOUS at 17:55

## 2018-02-04 RX ADMIN — INSULIN LISPRO 3 UNITS: 100 INJECTION, SOLUTION INTRAVENOUS; SUBCUTANEOUS at 18:12

## 2018-02-04 RX ADMIN — WARFARIN SODIUM 6 MG: 5 TABLET ORAL at 17:55

## 2018-02-04 RX ADMIN — CITALOPRAM HYDROBROMIDE 10 MG: 10 TABLET ORAL at 08:54

## 2018-02-04 RX ADMIN — DIPHENOXYLATE HYDROCHLORIDE AND ATROPINE SULFATE 2 TABLET: 2.5; .025 TABLET ORAL at 17:55

## 2018-02-04 RX ADMIN — LEVOTHYROXINE SODIUM 137 MCG: 112 TABLET ORAL at 05:16

## 2018-02-04 RX ADMIN — DIPHENOXYLATE HYDROCHLORIDE AND ATROPINE SULFATE 2 TABLET: 2.5; .025 TABLET ORAL at 22:01

## 2018-02-04 RX ADMIN — MEROPENEM 500 MG: 1 INJECTION, POWDER, FOR SOLUTION INTRAVENOUS at 12:34

## 2018-02-04 RX ADMIN — DIPHENOXYLATE HYDROCHLORIDE AND ATROPINE SULFATE 2 TABLET: 2.5; .025 TABLET ORAL at 09:01

## 2018-02-04 RX ADMIN — INSULIN LISPRO 1 UNITS: 100 INJECTION, SOLUTION INTRAVENOUS; SUBCUTANEOUS at 14:36

## 2018-02-04 RX ADMIN — INSULIN LISPRO 2 UNITS: 100 INJECTION, SOLUTION INTRAVENOUS; SUBCUTANEOUS at 22:05

## 2018-02-04 RX ADMIN — AMIODARONE HYDROCHLORIDE 200 MG: 200 TABLET ORAL at 08:54

## 2018-02-04 RX ADMIN — CHOLESTYRAMINE 4 G: 4 POWDER, FOR SUSPENSION ORAL at 17:55

## 2018-02-04 RX ADMIN — NEPHROCAP 1 CAPSULE: 1 CAP ORAL at 08:54

## 2018-02-04 RX ADMIN — CINACALCET HYDROCHLORIDE 30 MG: 30 TABLET, COATED ORAL at 22:06

## 2018-02-04 RX ADMIN — MELATONIN 3 MG: 3 TAB ORAL at 22:01

## 2018-02-04 RX ADMIN — DIPHENOXYLATE HYDROCHLORIDE AND ATROPINE SULFATE 2 TABLET: 2.5; .025 TABLET ORAL at 12:33

## 2018-02-04 RX ADMIN — METOPROLOL TARTRATE 12.5 MG: 25 TABLET ORAL at 20:22

## 2018-02-04 RX ADMIN — GABAPENTIN 200 MG: 100 CAPSULE ORAL at 22:01

## 2018-02-04 RX ADMIN — VANCOMYCIN 125 MG: KIT at 12:34

## 2018-02-04 RX ADMIN — CHOLESTYRAMINE 4 G: 4 POWDER, FOR SUSPENSION ORAL at 08:54

## 2018-02-04 NOTE — PLAN OF CARE
Problem: Potential for Falls  Goal: Patient will remain free of falls  INTERVENTIONS:  - Assess patient frequently for physical needs  -  Identify cognitive and physical deficits and behaviors that affect risk of falls    -  [place]hart fall precautions as indicated by assessment   - Educate patient/family on patient safety including physical limitations  - Instruct patient to call for assistance with activity based on assessment  - Modify environment to reduce risk of injury  - Consider OT/PT consult to assist with strengthening/mobility   Outcome: Progressing      Problem: Prexisting or High Potential for Compromised Skin Integrity  Goal: Skin integrity is maintained or improved  INTERVENTIONS:  - Identify patients at risk for skin breakdown  - Assess and monitor skin integrity  - Assess and monitor nutrition and hydration status  - Monitor labs (i e  albumin)  - Assess for incontinence   - Turn and reposition patient  - Assist with mobility/ambulation  - Relieve pressure over bony prominences  - Avoid friction and shearing  - Provide appropriate hygiene as needed including keeping skin clean and dry  - Evaluate need for skin moisturizer/barrier cream  - Collaborate with interdisciplinary team (i e  Nutrition, Rehabilitation, etc )   - Patient/family teaching   Outcome: Progressing

## 2018-02-04 NOTE — PROGRESS NOTES
Progress Note - Tara Fuentes 1950, 74 y o  male MRN: [id number]    Unit/Bed#: [facility] 627-01 Encounter: [id number]    Primary Care Provider: Rosy Reid DO   Date and time admitted to hospital: 1/30/2018 10:12 AM        * Sepsis [facility])   Assessment & Plan    · 2/2 UTI (+) pseudomonas  · meropenum day #3  · Experienced confusion with quinolones previously   · Failed OP tx with keflex and cipro  · Blood cultures (-) 4 days        Benign hypertension with end-stage renal disease ([facility] [address] )   Assessment & Plan    · On metoprolol        Anemia in end-stage renal disease ([facility])   Assessment & Plan    · micera 100mcg q4 weeks outpatient; venofer with HD        Atrial fibrillation ([facility] )   Assessment & Plan    · Continue metoprolol/amio  · INR 1 74 today, increased coumadin to 6mg yesterday  Recommend continuing with this dose and repeat am INR           Clostridium difficile infection   Assessment & Plan    · On chronic suppressive therapy with oral vancomycin  · On lomotil atc at home for chronic diarrhea        Hyponatremia   Assessment & Plan    · Possibly d/t volume overload and likely a component of hyperglycemia   · Sodium improved today to 130   · Nephrology following and placed on a fluid restriction 1200ml/day  · Check am BMP  · Pt is on a SSRI, may need to consider discontinuing if this persists         Chronic combined systolic and diastolic CHF (congestive heart failure) ([facility])   Assessment & Plan    · Continue home medication regimen and follow volume status closely given fluid resuscitation          ESRD (end stage renal disease) on dialysis [facility])   Assessment & Plan    · Currently on HD  · Monitor electrolytes and volumes status        Type 1 diabetes mellitus with nephropathy ([facility])   Assessment & Plan    · C/w monitoring blood sugars  · Glucose improved with increased insulin regimen 2 days ago: 173; 207; 111; 260          VTE Pharmacologic Prophylaxis:   Pharmacologic: Warfarin (Coumadin)  Mechanical VTE Prophylaxis in Place: No    Patient Centered Rounds: I have performed bedside rounds with nursing staff today  Discussions with Specialists or Other Care Team Provider: d/w RN     Education and Discussions with Family / Patient: d/w patient     Time Spent for Care: 30 minutes  More than 50% of total time spent on counseling and coordination of care as described above  Current Length of Stay: 5 day(s)    Current Patient Status: Inpatient   Certification Statement: The patient will continue to require additional inpatient hospital stay due to iv abxs    Discharge Plan: not stable for discharge     Code Status: Level 1 - Full Code      Subjective:   Per RN pts urinated this am and urine was brown, thick and purulent  Pt is still reporting painful urination  Pt denies any complaints  Objective:     Vitals:   Temp (24hrs), Av 1 °F (36 7 °C), Min:98 °F (36 7 °C), Max:98 1 °F (36 7 °C)    HR:  [60-70] 60  Resp:  [16] 16  BP: ()/(46-63) 92/46  SpO2:  [94 %-95 %] 95 %  Body mass index is 23 63 kg/m²  Input and Output Summary (last 24 hours): Intake/Output Summary (Last 24 hours) at 18 0856  Last data filed at 18 8786   Gross per 24 hour   Intake              740 ml   Output                0 ml   Net              740 ml       Physical Exam:     Physical Exam   Constitutional: He is oriented to person, place, and time  No distress  Cardiovascular: Normal rate, regular rhythm, normal heart sounds and intact distal pulses  No murmur heard  Pulmonary/Chest: Effort normal and breath sounds normal  No respiratory distress  He has no wheezes  He has no rales  Abdominal: Soft  Bowel sounds are normal  He exhibits no distension  There is no tenderness  There is no rebound  Musculoskeletal: He exhibits no edema or tenderness  Right BKA   Neurological: He is alert and oriented to person, place, and time  No cranial nerve deficit  Skin: Skin is warm and dry  No rash noted   He is not diaphoretic  No erythema  Psychiatric: He has a normal mood and affect  Additional Data:     Labs:      Results from last 7 days  Lab Units 02/04/18  0513 02/03/18  0543   WBC Thousand/uL 7 79 8 51   HEMOGLOBIN g/dL 10 3* 10 5*   HEMATOCRIT % 31 7* 31 6*   PLATELETS Thousands/uL 197 199   NEUTROS PCT %  --  70   LYMPHS PCT %  --  13*   MONOS PCT %  --  10   EOS PCT %  --  6       Results from last 7 days  Lab Units 02/04/18  0513  01/30/18  1038   SODIUM mmol/L 130*  < > 133*   POTASSIUM mmol/L 5 0  < > 3 7   CHLORIDE mmol/L 94*  < > 97*   CO2 mmol/L 24  < > 26   BUN mg/dL 58*  < > 32*   CREATININE mg/dL 6 02*  < > 4 65*   CALCIUM mg/dL 7 6*  < > 8 0*   TOTAL PROTEIN g/dL  --   --  9 1*   BILIRUBIN TOTAL mg/dL  --   --  0 38   ALK PHOS U/L  --   --  185*   ALT U/L  --   --  25   AST U/L  --   --  29   GLUCOSE RANDOM mg/dL 89  < > 131   < > = values in this interval not displayed  Results from last 7 days  Lab Units 02/04/18  0513   INR  1 74*       * I Have Reviewed All Lab Data Listed Above  * Additional Pertinent Lab Tests Reviewed: All Labs Within Last 24 Hours Reviewed      Recent Cultures (last 7 days):       Results from last 7 days  Lab Units 01/30/18  1447 01/30/18  1200 01/30/18  1051 01/30/18  1038   BLOOD CULTURE   --   --  No Growth After 4 Days  No Growth After 4 Days     GRAM STAIN RESULT  1+ Polys  No No bacteria seen  --   --   --    URINE CULTURE   --  >100,000 cfu/ml Pseudomonas fluorescens/putida*  --   --        Last 24 Hours Medication List:     Current Facility-Administered Medications:  acetaminophen 650 mg Oral Q6H PRN ALIE Mari    amiodarone 200 mg Oral Daily ALIE Mari    b complex-vitamin C-folic acid 1 capsule Oral Daily ALIE Mari    cholestyramine sugar free 4 g Oral BID ALIE Mari    cinacalcet 30 mg Oral HS ALIE Mari    citalopram 10 mg Oral Daily Zarate Kami, CRNP    diphenoxylate-atropine 2 tablet Oral 4x Daily Corwin Kruse MD    gabapentin 200 mg Oral HS Dario Galvan MD    insulin detemir 10 Units Subcutaneous BID Corwin Kruse MD    insulin lispro 1-5 Units Subcutaneous HS ALIE Gracia    insulin lispro 1-6 Units Subcutaneous TID AC Prema Gómez, ALIE    insulin lispro 4 Units Subcutaneous TID AC Corwin Kruse MD    iron sucrose 100 mg Intravenous After Dialysis Dario Galvan MD Last Rate: 100 mg (01/31/18 1350)   levothyroxine 137 mcg Oral Early Morning Destini Carbajal, ALIE    melatonin 3 mg Oral HS Corwin Kruse MD    meropenem 500 mg Intravenous Q24H Corwin Kruse MD Last Rate: 500 mg (02/03/18 1240)   metoprolol tartrate 12 5 mg Oral Q12H [address] Dario Galvan MD    nystatin  Topical BID Destini Carbajal, SUKUMARNP    traMADol 50 mg Oral Q6H PRN Destini Body, CRNP    vancomycin 125 mg Oral Daily Destini Body, CRNP    warfarin 6 mg Oral Daily (warfarin) ALIE Butler         Today, Patient Was Seen By: ALIE Butler    ** Please Note: Dictation voice to text software may have been used in the creation of this document   **

## 2018-02-04 NOTE — PROGRESS NOTES
NEPHROLOGY PROGRESS NOTE   Madelaine Andino 74 y o  male MRN: [id number]  Unit/Bed#: [facility] 627-01 Encounter: [id number]  Reason for Consult: ESRD    ASSESSMENT/PLAN:  1  ESRD on HD MWF @ [address]  2  Anemia- micera 100mcg q4 weeks outpatient; venofer with HD  3  Volume Overload- manage with HD  - EDW is 59kg  4  Hyponatremia- secondary to volume overload  - he is on a fluid restriction  5  Hypertension- BP soft, montior  6  C diff- on oral vancomycin  7  Sepsis- secondary to UTI, per primary team     Disposition:  Stable from a renal standpoint    SUBJECTIVE:  Patient wants to go home  Denies SOB  States diarrhea comes and goes      OBJECTIVE:  Current Weight: Weight - Scale: 66 4 kg (146 lb 6 2 oz)  Vitals:    02/03/18 0700 02/03/18 2105 02/03/18 2300 02/04/18 0726   BP: 102/62 127/63 98/55 (!) 92/46   BP Location: Left arm      Pulse: 61 70 68 60   Resp: 18  16 16   Temp: 98 2 °F (36 8 °C)  98 °F (36 7 °C) 98 1 °F (36 7 °C)   TempSrc: Oral  Oral Oral   SpO2: 97%  94% 95%   Weight:       Height:           Intake/Output Summary (Last 24 hours) at 02/04/18 0949  Last data filed at 02/04/18 4314   Gross per 24 hour   Intake              740 ml   Output                0 ml   Net              740 ml     General: NAD  Skin: no rash  HEENT: normocephalic atraumatic  Neck: supple  Chest: CTAB  Heart: RRR  Abdomen: soft, nt, nd  Extremities: no edema  Neuro: alert awake  Psych: mood and affect appropriate    Medications:    Current Facility-Administered Medications:     acetaminophen (TYLENOL) tablet 650 mg, 650 mg, Oral, Q6H PRN, ALIE Faria, 650 mg at 01/30/18 2012    amiodarone tablet 200 mg, 200 mg, Oral, Daily, ALIE Faria, 200 mg at 02/04/18 0854    b complex-vitamin C-folic acid (NEPHROCAPS) capsule 1 capsule, 1 capsule, Oral, Daily, ALIE Faria, 1 capsule at 02/04/18 0854    cholestyramine sugar free (QUESTRAN LIGHT) packet 4 g, 4 g, Oral, BID, ALIE Faria, 4 g at 02/04/18 0854    cinacalcet (SENSIPAR) tablet 30 mg, 30 mg, Oral, HS, ALIE Brantley, 30 mg at 02/03/18 2105    citalopram (CeleXA) tablet 10 mg, 10 mg, Oral, Daily, ALIE Brantley, 10 mg at 02/04/18 0854    diphenoxylate-atropine (LOMOTIL) 2 5-0 025 mg per tablet 2 tablet, 2 tablet, Oral, 4x Daily, Marilu Alfred MD, 2 tablet at 02/04/18 0901    gabapentin (NEURONTIN) capsule 200 mg, 200 mg, Oral, HS, Lio Mckeon MD, 200 mg at 02/03/18 2105    insulin detemir (LEVEMIR) subcutaneous injection 10 Units, 10 Units, Subcutaneous, BID, Marilu Alfred MD, 10 Units at 02/04/18 0854    insulin lispro (HumaLOG) 100 units/mL subcutaneous injection 1-5 Units, 1-5 Units, Subcutaneous, HS, ALIE Brantley, 2 Units at 02/03/18 2135    insulin lispro (HumaLOG) 100 units/mL subcutaneous injection 1-6 Units, 1-6 Units, Subcutaneous, TID AC, 2 Units at 02/03/18 1254 **AND** Fingerstick Glucose (POCT), , , TID ACJuan Manuel CRNP    insulin lispro (HumaLOG) 100 units/mL subcutaneous injection 4 Units, 4 Units, Subcutaneous, TID AC, Marilu Alfred MD, 4 Units at 02/03/18 1842    iron sucrose (VENOFER) 100 mg in sodium chloride 0 9 % 100 mL IVPB, 100 mg, Intravenous, After Dialysis, Lio Mckeon MD, Last Rate: 105 mL/hr at 01/31/18 1350, 100 mg at 01/31/18 1350    levothyroxine tablet 137 mcg, 137 mcg, Oral, Early Morning, ALIE Brantley, 137 mcg at 02/04/18 0516    melatonin tablet 3 mg, 3 mg, Oral, HS, Marilu Alfred MD, 3 mg at 02/03/18 2105    meropenem (MERREM) 500 mg in sodium chloride 0 9 % 50 mL IVPB, 500 mg, Intravenous, Q24H, Marilu Alfred MD, Last Rate: 100 mL/hr at 02/03/18 1240, 500 mg at 02/03/18 1240    metoprolol tartrate (LOPRESSOR) partial tablet 12 5 mg, 12 5 mg, Oral, Q12H [address], Lio Mckeon MD, 12 5 mg at 02/03/18 2105    nystatin (MYCOSTATIN) powder, , Topical, BID, ALIE Brantley    traMADol (ULTRAM) tablet 50 mg, 50 mg, Oral, Q6H PRN, Oralee Maynor, CRNP, 50 mg at 01/31/18 1630    vancomycin (VANCOCIN) oral solution 125 mg, 125 mg, Oral, Daily, Oralee Elks, CRNP, 125 mg at 02/03/18 1241    warfarin (COUMADIN) tablet 6 mg, 6 mg, Oral, Daily (warfarin), Josefinaiqra Deluna, CRNP, 6 mg at 02/03/18 1841    Laboratory Results:    Results from last 7 days  Lab Units 02/04/18  0513 02/03/18  0543 02/02/18  0617 02/01/18  0448 01/31/18  0449 01/30/18  1038   WBC Thousand/uL 7 79 8 51 9 84 10 95* 12 39* 15 10*   HEMOGLOBIN g/dL 10 3* 10 5* 9 8* 9 5* 9 6* 12 1   HEMATOCRIT % 31 7* 31 6* 29 0* 29 0* 29 0* 36 9   PLATELETS Thousands/uL 197 199 179 183 178 213   SODIUM mmol/L 130* 128*  --  129* 127* 133*   POTASSIUM mmol/L 5 0 4 5  --  3 7 4 1 3 7   CHLORIDE mmol/L 94* 93*  --  92* 94* 97*   CO2 mmol/L 24 25  --  27 24 26   BUN mg/dL 58* 39*  --  28* 50* 32*   CREATININE mg/dL 6 02* 4 45*  --  4 04* 5 76* 4 65*   CALCIUM mg/dL 7 6* 7 4*  --  7 6* 7 5* 8 0*   TOTAL PROTEIN g/dL  --   --   --   --   --  9 1*   GLUCOSE RANDOM mg/dL 89 193*  --  94 212* 131

## 2018-02-04 NOTE — ASSESSMENT & PLAN NOTE
· C/w monitoring blood sugars  · Glucose improved with increased insulin regimen 2 days ago: 173; 207; 111; 260

## 2018-02-04 NOTE — ASSESSMENT & PLAN NOTE
· Continue metoprolol/amio  · INR 1 74 today, increased coumadin to 6mg yesterday   Recommend continuing with this dose and repeat am INR

## 2018-02-04 NOTE — ASSESSMENT & PLAN NOTE
· 2/2 UTI (+) pseudomonas  · meropenum day #3  · Experienced confusion with quinolones previously   · Failed OP tx with keflex and cipro  · Blood cultures (-) 4 days

## 2018-02-04 NOTE — ASSESSMENT & PLAN NOTE
· Possibly d/t volume overload and likely a component of hyperglycemia   · Sodium improved today to 130   · Nephrology following and placed on a fluid restriction 1200ml/day  · Check am BMP  · Pt is on a SSRI, may need to consider discontinuing if this persists

## 2018-02-05 ENCOUNTER — APPOINTMENT (INPATIENT)
Dept: DIALYSIS | Facility: HOSPITAL | Age: 68
DRG: 871 | End: 2018-02-05
Payer: COMMERCIAL

## 2018-02-05 PROBLEM — G25.3 MYOCLONIC JERKING: Status: ACTIVE | Noted: 2018-02-05

## 2018-02-05 LAB
GLUCOSE SERPL-MCNC: 103 MG/DL (ref 65–140)
GLUCOSE SERPL-MCNC: 129 MG/DL (ref 65–140)
GLUCOSE SERPL-MCNC: 186 MG/DL (ref 65–140)
GLUCOSE SERPL-MCNC: 238 MG/DL (ref 65–140)
GLUCOSE SERPL-MCNC: 85 MG/DL (ref 65–140)

## 2018-02-05 PROCEDURE — 82948 REAGENT STRIP/BLOOD GLUCOSE: CPT

## 2018-02-05 PROCEDURE — 97116 GAIT TRAINING THERAPY: CPT

## 2018-02-05 PROCEDURE — 97530 THERAPEUTIC ACTIVITIES: CPT

## 2018-02-05 PROCEDURE — 97535 SELF CARE MNGMENT TRAINING: CPT

## 2018-02-05 PROCEDURE — 90935 HEMODIALYSIS ONE EVALUATION: CPT | Performed by: INTERNAL MEDICINE

## 2018-02-05 PROCEDURE — 99232 SBSQ HOSP IP/OBS MODERATE 35: CPT | Performed by: GENERAL PRACTICE

## 2018-02-05 PROCEDURE — 80171 DRUG SCREEN QUANT GABAPENTIN: CPT | Performed by: GENERAL PRACTICE

## 2018-02-05 RX ORDER — PANTOPRAZOLE SODIUM 40 MG/1
1 TABLET, DELAYED RELEASE ORAL DAILY
COMMUNITY
Start: 2015-10-27

## 2018-02-05 RX ORDER — INSULIN DETEMIR 100 [IU]/ML
INJECTION, SOLUTION SUBCUTANEOUS
Refills: 0 | COMMUNITY
Start: 2017-12-07 | End: 2018-02-09 | Stop reason: HOSPADM

## 2018-02-05 RX ORDER — ALBUTEROL SULFATE 2.5 MG/3ML
1 SOLUTION RESPIRATORY (INHALATION)
COMMUNITY
End: 2018-03-27 | Stop reason: CLARIF

## 2018-02-05 RX ORDER — SACCHAROMYCES BOULARDII 250 MG
1 CAPSULE ORAL 2 TIMES DAILY
COMMUNITY
Start: 2015-07-30 | End: 2018-04-20

## 2018-02-05 RX ORDER — OXYCODONE HYDROCHLORIDE 5 MG/1
1 CAPSULE ORAL EVERY 4 HOURS PRN
COMMUNITY

## 2018-02-05 RX ORDER — MIDODRINE HYDROCHLORIDE 2.5 MG/1
1 TABLET ORAL 2 TIMES DAILY
COMMUNITY
Start: 2018-01-15 | End: 2018-04-20

## 2018-02-05 RX ORDER — CITALOPRAM 10 MG/1
1 TABLET ORAL DAILY
COMMUNITY
End: 2018-02-09 | Stop reason: HOSPADM

## 2018-02-05 RX ORDER — ERGOCALCIFEROL (VITAMIN D2) 10 MCG
1 TABLET ORAL DAILY
COMMUNITY

## 2018-02-05 RX ORDER — INSULIN LISPRO 100 [IU]/ML
INJECTION, SOLUTION INTRAVENOUS; SUBCUTANEOUS
Refills: 0 | COMMUNITY
Start: 2017-12-07 | End: 2018-02-09 | Stop reason: HOSPADM

## 2018-02-05 RX ORDER — DIPHENOXYLATE HYDROCHLORIDE AND ATROPINE SULFATE 2.5; .025 MG/1; MG/1
TABLET ORAL
COMMUNITY
Start: 2017-12-22

## 2018-02-05 RX ORDER — CHOLECALCIFEROL (VITAMIN D3) 25 MCG
CAPSULE ORAL DAILY
COMMUNITY

## 2018-02-05 RX ADMIN — CINACALCET HYDROCHLORIDE 30 MG: 30 TABLET, COATED ORAL at 21:24

## 2018-02-05 RX ADMIN — MEROPENEM 500 MG: 1 INJECTION, POWDER, FOR SOLUTION INTRAVENOUS at 13:56

## 2018-02-05 RX ADMIN — MELATONIN 3 MG: 3 TAB ORAL at 21:24

## 2018-02-05 RX ADMIN — NYSTATIN: 100000 POWDER TOPICAL at 14:02

## 2018-02-05 RX ADMIN — INSULIN DETEMIR 10 UNITS: 100 INJECTION, SOLUTION SUBCUTANEOUS at 18:14

## 2018-02-05 RX ADMIN — CHOLESTYRAMINE 4 G: 4 POWDER, FOR SUSPENSION ORAL at 17:39

## 2018-02-05 RX ADMIN — INSULIN LISPRO 2 UNITS: 100 INJECTION, SOLUTION INTRAVENOUS; SUBCUTANEOUS at 21:25

## 2018-02-05 RX ADMIN — DIPHENOXYLATE HYDROCHLORIDE AND ATROPINE SULFATE 2 TABLET: 2.5; .025 TABLET ORAL at 21:24

## 2018-02-05 RX ADMIN — VANCOMYCIN 125 MG: KIT at 13:50

## 2018-02-05 RX ADMIN — CHOLESTYRAMINE 4 G: 4 POWDER, FOR SUSPENSION ORAL at 13:49

## 2018-02-05 RX ADMIN — DIPHENOXYLATE HYDROCHLORIDE AND ATROPINE SULFATE 2 TABLET: 2.5; .025 TABLET ORAL at 13:48

## 2018-02-05 RX ADMIN — INSULIN DETEMIR 10 UNITS: 100 INJECTION, SOLUTION SUBCUTANEOUS at 13:49

## 2018-02-05 RX ADMIN — INSULIN LISPRO 1 UNITS: 100 INJECTION, SOLUTION INTRAVENOUS; SUBCUTANEOUS at 18:13

## 2018-02-05 RX ADMIN — AMIODARONE HYDROCHLORIDE 200 MG: 200 TABLET ORAL at 13:48

## 2018-02-05 RX ADMIN — METOPROLOL TARTRATE 12.5 MG: 25 TABLET ORAL at 13:48

## 2018-02-05 RX ADMIN — LEVOTHYROXINE SODIUM 137 MCG: 112 TABLET ORAL at 06:47

## 2018-02-05 RX ADMIN — CITALOPRAM HYDROBROMIDE 10 MG: 10 TABLET ORAL at 13:48

## 2018-02-05 RX ADMIN — WARFARIN SODIUM 6 MG: 5 TABLET ORAL at 17:39

## 2018-02-05 RX ADMIN — GABAPENTIN 200 MG: 100 CAPSULE ORAL at 21:24

## 2018-02-05 RX ADMIN — NEPHROCAP 1 CAPSULE: 1 CAP ORAL at 13:48

## 2018-02-05 RX ADMIN — DIPHENOXYLATE HYDROCHLORIDE AND ATROPINE SULFATE 2 TABLET: 2.5; .025 TABLET ORAL at 17:39

## 2018-02-05 NOTE — PROGRESS NOTES
Rounded with Dr Kim Pascal, patient needs 3 more days of IV Abx  Today is day 4, total of 7 days  Discharge to home after abx treatment

## 2018-02-05 NOTE — OCCUPATIONAL THERAPY NOTE
[address] Progress Note     Patient Name: Brodie Villa  Today's Date: 2/5/2018  Problem List  Patient Active Problem List   Diagnosis    Hypertension    Type 1 diabetes mellitus with nephropathy ([facility] [address] )    ESRD (end stage renal disease) on dialysis ([facility] [address] )    Sepsis ([facility] [address] )    Ambulatory dysfunction    S/P percutaneous endoscopic gastrostomy (PEG) tube placement ([facility] [address] )    Pleural effusion on right    Chronic combined systolic and diastolic CHF (congestive heart failure) ([facility] [address] )    R Fibula fracture, proximal to stump    Pseudomonas urinary tract infection    DVT, lower extremity (HCC)    Hyponatremia    Clostridium difficile infection    Anemia    Acquired hypothyroidism    Chronic kidney disease-mineral and bone disorder    Hyperkalemia    Atrial fibrillation (HCC)    Anemia in end-stage renal disease ([facility] [address] )    Hyponatremia with excess extracellular fluid volume    Benign hypertension with end-stage renal disease (HCC)    Volume overload    Myoclonic jerking           02/05/18 1527   Note Type   Note type Progress   Restrictions/Precautions   Braces or Orthoses Prosthesis  (RLE)   Other Precautions Contact/isolation;Cognitive;O2;Fall Risk   Pain Assessment   Pain Assessment No/denies pain   Pain Score No Pain   Bed Mobility   Supine to Sit 3  Moderate assistance   Additional items Assist x 2;HOB elevated   Transfers   Sit to Stand 3  Moderate assistance   Additional items Assist x 2   Stand to Sit 3  Moderate assistance   Additional items Assist x 2   Stand pivot 3  Moderate assistance   Additional items Assist x 2   Balance   Static Sitting Fair   Dynamic Sitting Fair -   Static Standing Poor   Dynamic Standing Poor   Activity Tolerance   Activity Tolerance Patient limited by fatigue   Medical Staff Made Aware Nicola DOWELL   Nurse Made Aware Okay to see per RN   Cognition   Overall Cognitive Status Impaired   Arousal/Participation Arousable;Lethargic   Attention Attends with cues to redirect Orientation Level Oriented to person;Oriented to place   Memory Decreased recall of precautions;Decreased recall of recent events   Following Commands Follows one step commands with increased time or repetition   Assessment   Limitation Decreased ADL status; Decreased Safe judgement during ADL;Decreased cognition;Decreased endurance;Decreased self-care trans;Decreased high-level ADLs   Assessment Pt seen for OT and PT co-treatment session focusing on functional mobility and self care  Co-treat appropriate at this time 2* pt's availability and fatigue 2* dialysis treatment, as well as pt requiring skilled Ax2 for this session 2* it being his first time being treated w/ use of prosthesis while in the hospital  Pt  lethargic t/o session, requiring cues to remain alert and engaged in session  Pt required mod Ax2 for supine -> sit EOB and was dependent to don prosthetic leg  Spouse present during session and reported she helps pt don his prosthesis at home  Pt unable to maintain sitting balance while donning prosthesis, but was able to sit unsupported before sit -> stand transfer  Pt required mod Ax2 for sit ->stand and for amb in room w/ Rw  Pt required increased vc for safety and support w/ turns and pivoting w/ Rw  Once seated in chair, pt participated in oral care task  Pt attempted to use mouthwash as toothpaste and did not self correct until cued that he was using the incorrect item  Pt overall slow to process and perform oral care, requiring vc to sequence through each step  Pt also unable to squeeze toothpaste demonstrating poor L hand strength  Pt required cueing to terminate activity as well  Spouse reported that pt performs better cognitively at home  Will cont to assess pt's cognitive status for improvement or decline  Pt is progressing but presents w/ limitations compared to his baseline, therefore rec inpatient reahb upon d/c  Will cont to follow to address previously determined goals      Recommendation OT Discharge Recommendation Short Term Rehab   Barthel Index   Feeding 10   Bathing 0   Grooming Score 0   Dressing Score 5   Bladder Score 10   Bowels Score 10   Toilet Use Score 5   Transfers (Bed/Chair) Score 5   Mobility (Level Surface) Score 0   Stairs Score 0   Barthel Index Score 45   Modified Yas Scale   Modified [place] Scale 4     Joan Pat, OTR/L

## 2018-02-05 NOTE — ASSESSMENT & PLAN NOTE
Check neurontin level - recent increase in dose  Now back down to 200 mg daily    If neurontin level normal, consider neuro consult

## 2018-02-05 NOTE — ASSESSMENT & PLAN NOTE
· Possibly d/t volume overload and likely a component of hyperglycemia   · Nephrology following and placed on a fluid restriction 1200ml/day  · Check am BMP  · Pt is on a SSRI, may need to consider discontinuing if this persists

## 2018-02-05 NOTE — PROGRESS NOTES
Progress Note - Dustin Pat 1950, 74 y o  male MRN: [id number]    Unit/Bed#: [facility] 627-01 Encounter: [id number]    Primary Care Provider: Laura Soares DO   Date and time admitted to hospital: 1/30/2018 10:12 AM        * Sepsis [facility])   Assessment & Plan    · 2/2 UTI (+) pseudomonas  · meropenum day #4 - needs 7 days total  · Experienced confusion with quinolones previously   · Failed OP tx with keflex and cipro  · Blood cultures negative        Myoclonic jerking   Assessment & Plan    Check neurontin level  If neurontin level normal, consider neuro consult        Benign hypertension with end-stage renal disease ([facility] )   Assessment & Plan    · On metoprolol        Anemia in end-stage renal disease (HCC)   Assessment & Plan    · micera 100mcg q4 weeks outpatient; venofer with HD        Atrial fibrillation ([facility] )   Assessment & Plan    · Continue metoprolol/amio  · Daily INRs  · Increased coumadin to 6mg 2/3  Clostridium difficile infection   Assessment & Plan    · On chronic suppressive therapy with oral vancomycin  · On lomotil atc at home for chronic diarrhea        Hyponatremia   Assessment & Plan    · Possibly d/t volume overload and likely a component of hyperglycemia   · Nephrology following and placed on a fluid restriction 1200ml/day  · Check am BMP  · Pt is on a SSRI, may need to consider discontinuing if this persists         Chronic combined systolic and diastolic CHF (congestive heart failure) ([facility] )   Assessment & Plan    · Continue home medication regimen and follow volume status closely given fluid resuscitation          ESRD (end stage renal disease) on dialysis [facility])   Assessment & Plan    · Currently on HD  · Monitor electrolytes and volumes status        Type 1 diabetes mellitus with nephropathy (HCC)   Assessment & Plan    · C/w monitoring blood sugars  · Glucose improved with increased insulin regimen          VTE Pharmacologic Prophylaxis:   Pharmacologic: Warfarin (Coumadin)  Mechanical VTE Prophylaxis in Place: Yes    Patient Centered Rounds: I have performed bedside rounds with nursing staff today  Discussions with Specialists or Other Care Team Provider: no    Education and Discussions with Family / Patient: pt and wife    Time Spent for Care: 30 minutes  More than 50% of total time spent on counseling and coordination of care as described above  Current Length of Stay: 6 day(s)    Current Patient Status: Inpatient   Certification Statement: The patient will continue to require additional inpatient hospital stay due to need for 7 days of IV Merrem    Discharge Plan: after 7 days of IV Merrem    Code Status: Level 1 - Full Code      Subjective:   Pt seen and examined  C/o occasional twitches in his arms and dropping things from prior to admission  Objective:     Vitals:   Temp (24hrs), Av 2 °F (33 4 °C), Min:65 °F (18 3 °C), Max:98 °F (36 7 °C)    HR:  [62-72] 72  Resp:  [17-20] 20  BP: ()/(45-67) 92/50  SpO2:  [92 %-93 %] 93 %  Body mass index is 23 63 kg/m²  Input and Output Summary (last 24 hours): Intake/Output Summary (Last 24 hours) at 18 1743  Last data filed at 18 1238   Gross per 24 hour   Intake              860 ml   Output             2451 ml   Net            -1591 ml       Physical Exam:     Physical Exam   Constitutional: He is oriented to person, place, and time  No distress  HENT:   Head: Normocephalic and atraumatic  Eyes: Conjunctivae and EOM are normal    Neck: Normal range of motion  Neck supple  Cardiovascular: Normal rate and regular rhythm  Pulmonary/Chest: Effort normal and breath sounds normal  He has no wheezes  He has no rales  Abdominal: Soft  Bowel sounds are normal  He exhibits no distension  There is no tenderness  Musculoskeletal: Normal range of motion  He exhibits no edema  Neurological: He is alert and oriented to person, place, and time  Skin: Skin is warm and dry   He is not diaphoretic  Additional Data:     Labs:      Results from last 7 days  Lab Units 02/04/18  0513 02/03/18  0543   WBC Thousand/uL 7 79 8 51   HEMOGLOBIN g/dL 10 3* 10 5*   HEMATOCRIT % 31 7* 31 6*   PLATELETS Thousands/uL 197 199   NEUTROS PCT %  --  70   LYMPHS PCT %  --  13*   MONOS PCT %  --  10   EOS PCT %  --  6       Results from last 7 days  Lab Units 02/04/18  0513  01/30/18  1038   SODIUM mmol/L 130*  < > 133*   POTASSIUM mmol/L 5 0  < > 3 7   CHLORIDE mmol/L 94*  < > 97*   CO2 mmol/L 24  < > 26   BUN mg/dL 58*  < > 32*   CREATININE mg/dL 6 02*  < > 4 65*   CALCIUM mg/dL 7 6*  < > 8 0*   TOTAL PROTEIN g/dL  --   --  9 1*   BILIRUBIN TOTAL mg/dL  --   --  0 38   ALK PHOS U/L  --   --  185*   ALT U/L  --   --  25   AST U/L  --   --  29   GLUCOSE RANDOM mg/dL 89  < > 131   < > = values in this interval not displayed  Results from last 7 days  Lab Units 02/04/18  0513   INR  1 74*       * I Have Reviewed All Lab Data Listed Above  * Additional Pertinent Lab Tests Reviewed: [facility] Admission Reviewed        Recent Cultures (last 7 days):       Results from last 7 days  Lab Units 01/30/18  1447 01/30/18  1200 01/30/18  1051 01/30/18  1038   BLOOD CULTURE   --   --  No Growth After 5 Days  No Growth After 5 Days     GRAM STAIN RESULT  1+ Polys  No No bacteria seen  --   --   --    URINE CULTURE   --  >100,000 cfu/ml Pseudomonas fluorescens/putida*  --   --        Last 24 Hours Medication List:     Current Facility-Administered Medications:  acetaminophen 650 mg Oral Q6H PRN Neo Lin, CRNP    amiodarone 200 mg Oral Daily Neo Lin, CRNP    b complex-vitamin C-folic acid 1 capsule Oral Daily Neo Lin, CRNP    cholestyramine sugar free 4 g Oral BID Neo Lin, CRNP    cinacalcet 30 mg Oral HS Neo Lin, CRNP    citalopram 10 mg Oral Daily Neo Lin, CRNP    diphenoxylate-atropine 2 tablet Oral 4x Daily Noemi Syed MD    gabapentin 200 mg Oral HS Kaley Westbrook MD    insulin detemir 10 Units Subcutaneous BID Jose M Galvez MD    insulin lispro 1-5 Units Subcutaneous HS ALIE Barry    insulin lispro 1-6 Units Subcutaneous TID AC ALIE Rincon    insulin lispro 4 Units Subcutaneous TID AC Jose M Galvez MD    iron sucrose 100 mg Intravenous After Dialysis Kaley Westbrook MD Last Rate: Stopped (02/04/18 2023)   levothyroxine 137 mcg Oral Early Morning ALIE Barry    melatonin 3 mg Oral HS Jose M Galvez MD    meropenem 500 mg Intravenous Q24H Jose M Galvez MD Last Rate: 500 mg (02/05/18 1356)   metoprolol tartrate 12 5 mg Oral Q12H [facility] Kaley Westbrook MD    nystatin  Topical BID ALIE Barry    traMADol 50 mg Oral Q6H PRN ALIE Barry    vancomycin 125 mg Oral Daily ALIE Barry    warfarin 6 mg Oral Daily (warfarin) ALIE Knapp         Today, Patient Was Seen By: Jarod Horton DO    ** Please Note: Dictation voice to text software may have been used in the creation of this document   **

## 2018-02-05 NOTE — PHYSICAL THERAPY NOTE
Physical Therapy Cancellation Note:    Pt currently off floor at HD, cancel PT services for today  Will cont to follow as able to cont therapy intervention

## 2018-02-05 NOTE — ASSESSMENT & PLAN NOTE
· 2/2 UTI (+) pseudomonas  · meropenum day #4 - needs 7 days total  · Experienced confusion with quinolones previously   · Failed OP tx with keflex and cipro  · Blood cultures negative single

## 2018-02-05 NOTE — PHYSICAL THERAPY NOTE
Physical Therapy Tx Session:    2 forms of pt ID verified: name,birthdate and pt ID [email or web address]       02/05/18 2715   Pain Assessment   Pain Assessment No/denies pain   Pain Score No Pain   Restrictions/Precautions   Braces or Orthoses Prosthesis  (personal RLE prosthesis with sleeves)   Other Precautions Contact/isolation;Cognitive; Chair Alarm;Limb alert;Multiple lines; Fall Risk   General   Chart Reviewed Yes   Additional Pertinent History pt returning from HD recently   Family/Caregiver Present Yes  (wife)   Cognition   Overall Cognitive Status Impaired   Arousal/Participation Arousable   Attention Attends with cues to redirect  (lethargic at times with verbal/physical instruction needed)   Orientation Level Oriented to person;Oriented to place  (needed instruction and cueing for date and day of week)   Following Commands (2* lethargic at times,weakness,fatigue)   Subjective   Subjective pt supine in bed resting comfortably;pt willing and agreeable to work with PT and to participate in therapy intervention;"I can try";RLE prosthesis available to don during therapy intervention   Bed Mobility   Supine to Sit 3  Moderate assistance   Additional items Assist x 2;HOB elevated; Bedrails; Increased time required;Verbal cues;LE management   Additional Comments dependent for donning RLE prosthesis and sleeves;poor static sit balance while prosthesis was being donned   Transfers   Sit to Stand 3  Moderate assistance   Additional items Assist x 2;Bedrails; Increased time required;Verbal cues   Stand to Sit 3  Moderate assistance   Additional items Assist x 2;Armrests; Increased time required;Verbal cues  (for safety,education and control descent)   Stand pivot 3  Moderate assistance   Additional items Assist x 2; Increased time required;Verbal cues  (for manuvering of RW,weight shifting BLE,advancement of BLE)   Additional Comments during turns and stand pivot needing modAx2 for manuvering RW, perform weight shifting BLE and for advancement of BLE to complete mobility;needing modAX2 for gait sequencing   Ambulation/Elevation   Gait pattern Poor UE support; Improper Weight shift;Narrow VETO; Forward Flexion;Decreased R stance;Decreased L stance; Inconsistent matthew; Foward flexed; Short stride; Ataxia; Step to;Excessively slow   Gait Assistance 3  Moderate assist   Additional items Assist x 2;Verbal cues; Tactile cues   Assistive Device Rolling walker   Distance 15 feet total with use of Rw and RLE prosthesis on tile and hardwood edi;B lateral lean at times with retropulsion and posterior lean; dec dynamic and static stand balance   Balance   Static Sitting (poor at EOB with donning of prosthesis;fair during pretransf)   Dynamic Sitting (zero)   Static Standing Zero   Dynamic Standing (zero)   Ambulatory Zero   Endurance Deficit   Endurance Deficit Yes   Endurance Deficit Description lethargic at times,recent return from HD treatment,fatigue,weakness,unsteady mobility and gait   Activity Tolerance   Activity Tolerance Patient limited by fatigue  (fair)   Medical Staff Made Aware NICOLAS Miles), Jennifer Ramos (OT)   Nurse Made Aware yes   Assessment   Prognosis Fair   Problem List Decreased strength;Decreased endurance; Impaired balance;Decreased mobility; Decreased coordination;Decreased cognition; Impaired judgement;Decreased safety awareness; Impaired hearing;Decreased skin integrity   Assessment Pt able to inc ambulation distance to 15 feet total with use of RW and RLE personal prosthesis needing modAX2  Pt needed modAx2 for verbal and physical instruction and cueing for manuvering RW especially during turns,gait sequence,advancement of BLE during turns,weight shifting BLE and placement of BLE during stance phase  Pt had B lateral lean,retropulsion and posterior lean throughout upright mobility with (+)LOB  Pt needed modAX2 for transfers and BM   Dependent donning of RLE prosthesis at EOB needing modAX1 to remain static sit at EOB during don of prosthesis  Pre transfer pt was fair static sit balance  Pt would cont to benefit from skilled inpt PT services to maximize functional independence   Goals   Patient Goals to get ready to go home   LTG Expiration Date 02/16/18   Long Term Goal #1 pt will be able to ambulate >100 feet with use of RLE personal prosthesis and RW on various surfaces minAx1->S with chair follow as needed   Treatment Day 1   Plan   Treatment/Interventions Functional transfer training; Endurance training;Patient/family training;Equipment eval/education; Bed mobility;Gait training;Spoke to nursing;Spoke to case management; Family;OT   Progress Slow progress, decreased activity tolerance   PT Frequency 5x/wk   Recommendation   Recommendation Other (Comment)  (inpt rhb)   Equipment Recommended Walker  (RW for mobility and RLE prosthesis)   Skilled PT recommended while in hospital and upon DC to progress pt toward treatment goals

## 2018-02-05 NOTE — PLAN OF CARE
Problem: PHYSICAL THERAPY ADULT  Goal: Performs mobility at highest level of function for planned discharge setting  See evaluation for individualized goals  Treatment/Interventions: Functional transfer training, LE strengthening/ROM, Therapeutic exercise, Endurance training, Cognitive reorientation, Patient/family training, Equipment eval/education, Bed mobility, Gait training, Spoke to case management          See flowsheet documentation for full assessment, interventions and recommendations  Outcome: Progressing  Prognosis: Fair  Problem List: Decreased strength, Decreased endurance, Impaired balance, Decreased mobility, Decreased coordination, Decreased cognition, Impaired judgement, Decreased safety awareness, Impaired hearing, Decreased skin integrity  Assessment: Pt able to inc ambulation distance to 15 feet total with use of RW and RLE personal prosthesis needing modAX2  Pt needed modAx2 for verbal and physical instruction and cueing for manuvering RW especially during turns,gait sequence,advancement of BLE during turns,weight shifting BLE and placement of BLE during stance phase  Pt had B lateral lean,retropulsion and posterior lean throughout upright mobility with (+)LOB  Pt needed modAX2 for transfers and BM  Dependent donning of RLE prosthesis at EOB needing modAX1 to remain static sit at EOB during don of prosthesis  Pre transfer pt was fair static sit balance  Pt would cont to benefit from skilled inpt PT services to maximize functional independence        Recommendation: Other (Comment) (inpt rhb)     PT - OK to Discharge:  (to rehab when medically stable)    See flowsheet documentation for full assessment

## 2018-02-05 NOTE — SOCIAL WORK
NICOLAS attempted to meet w/pt  Pt not on floor  T/c with wife Konstantin Ramirez to discuss PT/OT STR recommendation  Wife stated she will discuss w/pt but will likely decline, prefers pt to continue home PT/OT with [facility]  Advised SNF list left @ bedside  [facility] referral sent to [facility] for resumption of care

## 2018-02-05 NOTE — PLAN OF CARE
Problem: OCCUPATIONAL THERAPY ADULT  Goal: Performs self-care activities at highest level of function for planned discharge setting  See evaluation for individualized goals  Treatment Interventions: ADL retraining, Functional transfer training, Endurance training, Patient/family training, Equipment evaluation/education, Compensatory technique education, Energy conservation          See flowsheet documentation for full assessment, interventions and recommendations  Outcome: Progressing  Limitation: Decreased ADL status, Decreased Safe judgement during ADL, Decreased cognition, Decreased endurance, Decreased self-care trans, Decreased high-level ADLs  Prognosis: Good  Assessment: Pt seen for OT and PT co-treatment session focusing on functional mobility and self care  Co-treat appropriate at this time 2* pt's availability and fatigue 2* dialysis treatment, as well as pt requiring skilled Ax2 for this session 2* it being his first time being treated w/ use of prosthesis while in the hospital  Pt  lethargic t/o session, requiring cues to remain alert and engaged in session  Pt required mod Ax2 for supine -> sit EOB and was dependent to don prosthetic leg  Spouse present during session and reported she helps pt don his prosthesis at home  Pt unable to maintain sitting balance while donning prosthesis, but was able to sit unsupported before sit -> stand transfer  Pt required mod Ax2 for sit ->stand and for amb in room w/ Rw  Pt required increased vc for safety and support w/ turns and pivoting w/ Rw  Once seated in chair, pt participated in oral care task  Pt attempted to use mouthwash as toothpaste and did not self correct until cued that he was using the incorrect item  Pt overall slow to process and perform oral care, requiring vc to sequence through each step  Pt also unable to squeeze toothpaste demonstrating poor L hand strength  Pt required cueing to terminate activity as well   Spouse reported that pt performs better cognitively at home  Will cont to assess pt's cognitive status for improvement or decline  Pt is progressing but presents w/ limitations compared to his baseline, therefore rec inpatient reahb upon d/c  Will cont to follow to address previously determined goals        OT Discharge Recommendation: Short Term Rehab  OT - OK to Discharge: Yes (when medically stable to inpt rehab vs home w/ services)

## 2018-02-06 LAB
ANION GAP SERPL CALCULATED.3IONS-SCNC: 9 MMOL/L (ref 4–13)
BUN SERPL-MCNC: 36 MG/DL (ref 5–25)
CALCIUM SERPL-MCNC: 7.8 MG/DL (ref 8.3–10.1)
CHLORIDE SERPL-SCNC: 91 MMOL/L (ref 100–108)
CO2 SERPL-SCNC: 27 MMOL/L (ref 21–32)
CREAT SERPL-MCNC: 4.96 MG/DL (ref 0.6–1.3)
ERYTHROCYTE [DISTWIDTH] IN BLOOD BY AUTOMATED COUNT: 14 % (ref 11.6–15.1)
GFR SERPL CREATININE-BSD FRML MDRD: 11 ML/MIN/1.73SQ M
GLUCOSE SERPL-MCNC: 141 MG/DL (ref 65–140)
GLUCOSE SERPL-MCNC: 285 MG/DL (ref 65–140)
GLUCOSE SERPL-MCNC: 60 MG/DL (ref 65–140)
GLUCOSE SERPL-MCNC: 69 MG/DL (ref 65–140)
GLUCOSE SERPL-MCNC: 89 MG/DL (ref 65–140)
GLUCOSE SERPL-MCNC: 90 MG/DL (ref 65–140)
HCT VFR BLD AUTO: 29.3 % (ref 36.5–49.3)
HGB BLD-MCNC: 9.5 G/DL (ref 12–17)
INR PPP: 1.85 (ref 0.86–1.16)
MCH RBC QN AUTO: 29 PG (ref 26.8–34.3)
MCHC RBC AUTO-ENTMCNC: 32.4 G/DL (ref 31.4–37.4)
MCV RBC AUTO: 89 FL (ref 82–98)
PHOSPHATE SERPL-MCNC: 5.7 MG/DL (ref 2.3–4.1)
PLATELET # BLD AUTO: 204 THOUSANDS/UL (ref 149–390)
PMV BLD AUTO: 9.4 FL (ref 8.9–12.7)
POTASSIUM SERPL-SCNC: 4.5 MMOL/L (ref 3.5–5.3)
PROTHROMBIN TIME: 21.5 SECONDS (ref 12.1–14.4)
RBC # BLD AUTO: 3.28 MILLION/UL (ref 3.88–5.62)
SODIUM SERPL-SCNC: 127 MMOL/L (ref 136–145)
WBC # BLD AUTO: 9.81 THOUSAND/UL (ref 4.31–10.16)

## 2018-02-06 PROCEDURE — 99232 SBSQ HOSP IP/OBS MODERATE 35: CPT | Performed by: INTERNAL MEDICINE

## 2018-02-06 PROCEDURE — 85610 PROTHROMBIN TIME: CPT | Performed by: GENERAL PRACTICE

## 2018-02-06 PROCEDURE — 80048 BASIC METABOLIC PNL TOTAL CA: CPT | Performed by: INTERNAL MEDICINE

## 2018-02-06 PROCEDURE — 85027 COMPLETE CBC AUTOMATED: CPT | Performed by: INTERNAL MEDICINE

## 2018-02-06 PROCEDURE — 84100 ASSAY OF PHOSPHORUS: CPT | Performed by: INTERNAL MEDICINE

## 2018-02-06 PROCEDURE — 82948 REAGENT STRIP/BLOOD GLUCOSE: CPT

## 2018-02-06 RX ORDER — LANTHANUM CARBONATE 500 MG/1
500 TABLET, CHEWABLE ORAL
Status: DISCONTINUED | OUTPATIENT
Start: 2018-02-06 | End: 2018-02-09

## 2018-02-06 RX ORDER — CITALOPRAM 10 MG/1
5 TABLET ORAL DAILY
Status: DISCONTINUED | OUTPATIENT
Start: 2018-02-07 | End: 2018-02-09 | Stop reason: HOSPADM

## 2018-02-06 RX ADMIN — CHOLESTYRAMINE 4 G: 4 POWDER, FOR SUSPENSION ORAL at 09:35

## 2018-02-06 RX ADMIN — INSULIN LISPRO 4 UNITS: 100 INJECTION, SOLUTION INTRAVENOUS; SUBCUTANEOUS at 18:04

## 2018-02-06 RX ADMIN — WARFARIN SODIUM 6 MG: 5 TABLET ORAL at 18:02

## 2018-02-06 RX ADMIN — NEPHROCAP 1 CAPSULE: 1 CAP ORAL at 09:33

## 2018-02-06 RX ADMIN — AMIODARONE HYDROCHLORIDE 200 MG: 200 TABLET ORAL at 09:33

## 2018-02-06 RX ADMIN — DIPHENOXYLATE HYDROCHLORIDE AND ATROPINE SULFATE 2 TABLET: 2.5; .025 TABLET ORAL at 18:02

## 2018-02-06 RX ADMIN — NYSTATIN: 100000 POWDER TOPICAL at 09:41

## 2018-02-06 RX ADMIN — INSULIN DETEMIR 10 UNITS: 100 INJECTION, SOLUTION SUBCUTANEOUS at 09:34

## 2018-02-06 RX ADMIN — METOPROLOL TARTRATE 12.5 MG: 25 TABLET ORAL at 21:30

## 2018-02-06 RX ADMIN — VANCOMYCIN 125 MG: KIT at 13:00

## 2018-02-06 RX ADMIN — NYSTATIN: 100000 POWDER TOPICAL at 18:03

## 2018-02-06 RX ADMIN — MELATONIN 3 MG: 3 TAB ORAL at 21:30

## 2018-02-06 RX ADMIN — GABAPENTIN 200 MG: 100 CAPSULE ORAL at 21:30

## 2018-02-06 RX ADMIN — CHOLESTYRAMINE 4 G: 4 POWDER, FOR SUSPENSION ORAL at 18:02

## 2018-02-06 RX ADMIN — CITALOPRAM HYDROBROMIDE 10 MG: 10 TABLET ORAL at 09:33

## 2018-02-06 RX ADMIN — DIPHENOXYLATE HYDROCHLORIDE AND ATROPINE SULFATE 2 TABLET: 2.5; .025 TABLET ORAL at 09:33

## 2018-02-06 RX ADMIN — DIPHENOXYLATE HYDROCHLORIDE AND ATROPINE SULFATE 2 TABLET: 2.5; .025 TABLET ORAL at 12:59

## 2018-02-06 RX ADMIN — CINACALCET HYDROCHLORIDE 30 MG: 30 TABLET, COATED ORAL at 22:12

## 2018-02-06 RX ADMIN — MEROPENEM 500 MG: 1 INJECTION, POWDER, FOR SOLUTION INTRAVENOUS at 13:00

## 2018-02-06 RX ADMIN — LEVOTHYROXINE SODIUM 137 MCG: 112 TABLET ORAL at 05:32

## 2018-02-06 RX ADMIN — LANTHANUM CARBONATE 500 MG: 500 TABLET, CHEWABLE ORAL at 13:58

## 2018-02-06 RX ADMIN — METOPROLOL TARTRATE 12.5 MG: 25 TABLET ORAL at 09:39

## 2018-02-06 RX ADMIN — LANTHANUM CARBONATE 500 MG: 500 TABLET, CHEWABLE ORAL at 18:01

## 2018-02-06 NOTE — ASSESSMENT & PLAN NOTE
· Patient found to be hypoglycemic  · Reduce Lantus/Levemir 5 units b i d  as patient home dose  · Continue with monitoring

## 2018-02-06 NOTE — SOCIAL WORK
[facility] Extended Stay Systemic or Infectious Condition GRG  Optimal GLOS: 8  Hospital Day: 7 days   DC Readiness:  · Activity level acceptable  · Floor to discharge  · Complete discharge planning · Hemodynamic stability  · Respiratory status acceptable  · Neurologic status acceptable  · Temperature status acceptable  · No infection, or status acceptable  · No neutropenia, or status acceptable  · Special infection or injury situations absent  · Electrolyte status acceptable  · General Discharge Criteria met · Intake acceptable  · No inpatient interventions needed     Identified barriers for extended stay:   Discussion Date (Time): 02/06/18 with

## 2018-02-06 NOTE — PROGRESS NOTES
Progress Note - Nephrology   Jeannette Adkins 74 y o  male MRN: [id number]  [facility] Encounter: [id number]      Assessment / Plan:    1  End-stage renal disease  · Hemodialysis on Monday Wednesday Friday  Next hemodialysis treatment will be 2/7  2  Volume overload  · Volume removal with dialysis as tolerated by blood pressure  3  Anemia -last dose of Micera was 100 mcg and is given on every 4 week schedule   Last dose was on 02/05  · Trend hemoglobin for now, stable overall  · Continue Venofer on dialysis  4  Sepsis  · On meropenem by primary service for Pseudomonas UTI  · Failed outpatient Keflex and Cipro  Has a history of confusion with quinolones  · Blood cultures negative  5  Hyponatremia  · Chronic in nature  · Likely in part secondary to volume overload  · Continue fluid restriction   · Note that Celexa can cause hyponatremia as well and if the sodium level does not significantly improve or normalize, may need to rethink use of this medicine  However, will defer to his primary nephrology team, Dr Ariela Kiran al   For now, will decrease dose of Celexa from 10 to 5 mg  The patient states he has not been depressed and does not quite sure why he is on this  6  Hypertension  · Acceptable to low at times  7  The patient's heart rate was running in the 50s    Metoprolol dose was decreased on 02/02 and heart rate has improved somewhat  8  C diff infection  · On Vancomycin suppressive therapy  8  Diabetes mellitus  9  Neuropathy  10  Cardiomyopathy with history of systolic and diastolic congestive heart failure -chronic and compensated  11  Atrial fibrillation  12   Confusion/myoclonic jerking- this could be a manifestation of Neurontin toxicity   Neurontin dose has been decreased from 300-200 mg as of 1/31  Harinder Erwin had been on ciprofloxacin prior to admission and this too can cause confusion in dialysis patients but this was discontinued 7 days prior to admission   Of course, sepsis can cause confusion as well    Overall, mental status has improved but myoclonic jerking continues  Check Neurontin level  If level is acceptable, would check URR, and ? seek neurologic input   13  Hyperphosphatemia  -will start phosphate binder    14  Prolonged QT interval  -Celexa with amiodarone can cause this  -Celexa dose has been decreased  -further management per primary team        Subjective: The patient was seen examined earlier today  He denied any shortness of breath, chills, sweats, cough, dysuria, abdominal pain, vomiting  He does have occasional loose bowel movements  Overall, he feels improved  His myoclonic jerking has not changed  Objective:     Vitals: Blood pressure 120/58, pulse 64, temperature 98 6 °F (37 °C), resp  rate 18, height 5' 6" (1 676 m), weight 67 4 kg (148 lb 9 4 oz), SpO2 94 %  ,Body mass index is 23 98 kg/m²  Temp (24hrs), Av 9 °F (36 6 °C), Min:96 9 °F (36 1 °C), Max:98 6 °F (37 °C)      Weight (last 2 days)     Date/Time   Weight    18 0721  67 4 (148 59)                     Intake/Output Summary (Last 24 hours) at 18 1209  Last data filed at 18 1920   Gross per 24 hour   Intake              450 ml   Output             2451 ml   Net            -2001 ml     I/O last 24 hours: In: 200 [P O :270;  I V :500]  Out: 2451 [Other:2451]        Physical Exam    Gen -awake, alert and in no apparent distress  Cardiovascular -S1-S2, no pericardial friction rub or S3 were appreciated  Respiratory -clear to auscultation and percussion, no rales/rhonchi or wheezing was noted, good inspiratory effort  GI - Abdomen -soft, nontender, no rebound or guarding was noted        Invasive Devices     Peripheral Intravenous Line            Peripheral IV 18 Left Arm 3 days          Line            Hemodialysis AV Fistula  Right Forearm -- days    Hemodialysis AV Fistula Right Forearm -- days          Drain            Gastrostomy/Enterostomy -- days    Gastrostomy/Enterostomy Percutaneous endoscopic gastrostomy (PEG) 20 Fr   days                Medications:    Scheduled Meds:  Current Facility-Administered Medications:  acetaminophen 650 mg Oral Q6H PRN Cloteal Jacob, CRNP    amiodarone 200 mg Oral Daily Cloteal Jacob, CRNP    b complex-vitamin C-folic acid 1 capsule Oral Daily Cloteal Jacob, CRNP    cholestyramine sugar free 4 g Oral BID Cloteal Jacob, CRNP    cinacalcet 30 mg Oral HS Cloteal Jacob, CRNP    citalopram 10 mg Oral Daily Cloteal Jacob, CRNP    diphenoxylate-atropine 2 tablet Oral 4x Daily Triston Devries MD    gabapentin 200 mg Oral HS Yazmin Caldwell MD    insulin detemir 10 Units Subcutaneous BID Triston Devries MD    insulin lispro 1-5 Units Subcutaneous HS Cloteal Jacob, CRNP    insulin lispro 1-6 Units Subcutaneous TID AC Prema Bates, CRNP    insulin lispro 4 Units Subcutaneous TID AC Triston Devries MD    iron sucrose 100 mg Intravenous After Dialysis Yazmin Caldwell MD Last Rate: Stopped (02/04/18 2023)   levothyroxine 137 mcg Oral Early Morning Cloteal Jacob, CRNP    melatonin 3 mg Oral HS Triston Devries MD    meropenem 500 mg Intravenous Q24H Triston Devries MD Last Rate: 500 mg (02/05/18 1356)   metoprolol tartrate 12 5 mg Oral Q12H [facility] Yazmin Caldwell MD    nystatin  Topical BID Cloteal Jacob, CRNP    traMADol 50 mg Oral Q6H PRN Cloteal Jacob, CRNP    vancomycin 125 mg Oral Daily Cloteal Jacob, CRNP    warfarin 6 mg Oral Daily (warfarin) ALIE Gooden        PRN Meds:   acetaminophen    iron sucrose    traMADol    Continuous Infusions:         LAB RESULTS:        Results from last 7 days  Lab Units 02/06/18  0543 02/04/18  0513 02/03/18  0543 02/02/18  0617 02/01/18  0448 01/31/18  0449   WBC Thousand/uL 9 81 7 79 8 51 9 84 10 95* 12 39*   HEMOGLOBIN g/dL 9 5* 10 3* 10 5* 9 8* 9 5* 9 6*   HEMATOCRIT % 29 3* 31 7* 31 6* 29 0* 29 0* 29 0*   PLATELETS Thousands/uL 204 197 199 179 183 178   NEUTROS PCT %  --   --  70 69 73  --    LYMPHS PCT %  --   --  13* 15 9*  --    MONOS PCT %  --   --  10 10 14*  --    EOS PCT %  --   --  6 5 4  --    SODIUM mmol/L 127* 130* 128*  --  129* 127*   POTASSIUM mmol/L 4 5 5 0 4 5  --  3 7 4 1   CHLORIDE mmol/L 91* 94* 93*  --  92* 94*   CO2 mmol/L 27 24 25  --  27 24   BUN mg/dL 36* 58* 39*  --  28* 50*   CREATININE mg/dL 4 96* 6 02* 4 45*  --  4 04* 5 76*   CALCIUM mg/dL 7 8* 7 6* 7 4*  --  7 6* 7 5*   GLUCOSE RANDOM mg/dL 90 89 193*  --  94 212*   PHOSPHORUS mg/dL 5 7*  --   --   --   --   --        CUTURES:  Lab Results   Component Value Date    BLOODCX No Growth After 5 Days  01/30/2018    BLOODCX No Growth After 5 Days  01/30/2018    BLOODCX No Growth After 5 Days  11/29/2017    BLOODCX No Growth After 5 Days  11/29/2017    BLOODCX No Growth After 5 Days  09/17/2017    BLOODCX No Growth After 5 Days  09/17/2017    BLOODCX No Growth After 5 Days  08/03/2017    URINECX >100,000 cfu/ml Pseudomonas fluorescens/putida (A) 01/30/2018    URINECX >100,000 cfu/ml Pseudomonas aeruginosa (A) 11/27/2017    URINECX >100,000 cfu/ml Pseudomonas aeruginosa 09/17/2017    URINECX  09/17/2017     3635-7611 cfu/ml Oxidase Positive gram negative jose alfredo    URINECX >100,000 cfu/ml Pseudomonas aeruginosa 06/16/2017    URINECX No Growth <1000 cfu/mL 09/10/2016                 Portions of the record may have been created with voice recognition software  Occasional wrong word or "sound a like" substitutions may have occurred due to the inherent limitations of voice recognition software  Read the chart carefully and recognize, using context, where substitutions have occurred  If you have any questions, please contact the dictating provider

## 2018-02-06 NOTE — ASSESSMENT & PLAN NOTE
· Noted on February 5th, resolved after reducing above pending dose to prior home dose after recent increase  · Neurology level is pending at this time likely not to be needed  · Monitor

## 2018-02-06 NOTE — PROGRESS NOTES
Progress Note - Lorraine Buenrostro 1950, 74 y o  male MRN: [id number]    Unit/Bed#: [facility] 627-01 Encounter: [id number]    Primary Care Provider:  Sarah Mcgill DO   Date and time admitted to hospital: 1/30/2018 10:12 AM        Myoclonic jerking   Assessment & Plan    · Noted on February 5th, resolved after reducing above pending dose to prior home dose after recent increase  · Neurology level is pending at this time likely not to be needed  · Monitor        Benign hypertension with end-stage renal disease ([place] [address] )   Assessment & Plan    · On metoprolol        Anemia in end-stage renal disease (HCC)   Assessment & Plan    · micera 100mcg q4 weeks outpatient; venofer with HD        Atrial fibrillation ([place] [address] )   Assessment & Plan    · unspecified  · Continue with amiodarone and metoprolol, anticoagulation with Coumadin, INR today 1 85 and rising, continue with Coumadin 6 mg tonight          Clostridium difficile infection   Assessment & Plan    · On chronic suppressive therapy with oral vancomycin  · Continue with the same        Chronic combined systolic and diastolic CHF (congestive heart failure) ([place] [address] )   Assessment & Plan    · Volume management through dialysis  · Continue with beta-blockers        ESRD (end stage renal disease) on dialysis [facility])   Assessment & Plan    · Dialysis as per Nephrology schedule        Type 1 diabetes mellitus with nephropathy ([place] [address] )   Assessment & Plan    · Patient found to be hypoglycemic  · Reduce Lantus/Levemir 5 units b i d  as patient home dose  · Continue with monitoring        * Sepsis ([place] [address] )   Assessment & Plan    · Likely related to UTI secondary to Pseudomonas with multiple drug resistance  · Stable on meropenem day 5 of of 7  · Will continue antibiotic in the hospital and then discharged home          VTE Pharmacologic Prophylaxis:  Yes  Pharmacologic: Warfarin (Coumadin)  Mechanical VTE Prophylaxis in Place: Yes    Patient Centered Rounds: I have performed bedside rounds with nursing staff today  Discussions with Specialists or Other Care Team Provider:  No    Education and Discussions with Family / Patient:  Patient and wife    Time Spent for Care: 45 minutes  More than 50% of total time spent on counseling and coordination of care as described above  Current Length of Stay: 7 day(s)    Current Patient Status: Inpatient   Certification Statement: The patient will continue to require additional inpatient hospital stay due to Refer to above    Discharge Plan:  Home in 2 days after antibiotics completed    Code Status: Level 1 - Full Code      Subjective:   Patient states that he feels very well  Denies complaints  Objective:     Vitals:   Temp (24hrs), Av 1 °F (36 7 °C), Min:97 5 °F (36 4 °C), Max:98 6 °F (37 °C)    HR:  [64-67] 67  Resp:  [17-18] 17  BP: (101-120)/(51-58) 116/54  SpO2:  [94 %] 94 %  Body mass index is 23 98 kg/m²  Input and Output Summary (last 24 hours): Intake/Output Summary (Last 24 hours) at 18 1814  Last data filed at 18 1430   Gross per 24 hour   Intake              697 ml   Output                0 ml   Net              697 ml       Physical Exam:     Physical Exam   Constitutional: He is oriented to person, place, and time  He appears well-developed  Cardiovascular: Normal rate, regular rhythm and normal heart sounds  Exam reveals no friction rub  No murmur heard  Pulmonary/Chest: Effort normal and breath sounds normal  No respiratory distress  He has no wheezes  He has no rales  Abdominal: Soft  He exhibits no distension  There is no tenderness  There is no rebound  Musculoskeletal:   Chronic right BKA   Neurological: He is alert and oriented to person, place, and time  He exhibits normal muscle tone  Skin: Skin is warm  Psychiatric: He has a normal mood and affect         Additional Data:     Labs:      Results from last 7 days  Lab Units 18  0543  18  0543   WBC Thousand/uL 9 81  < > 8 51   HEMOGLOBIN g/dL 9 5*  < > 10 5*   HEMATOCRIT % 29 3*  < > 31 6*   PLATELETS Thousands/uL 204  < > 199   NEUTROS PCT %  --   --  70   LYMPHS PCT %  --   --  13*   MONOS PCT %  --   --  10   EOS PCT %  --   --  6   < > = values in this interval not displayed  Results from last 7 days  Lab Units 02/06/18  0543   SODIUM mmol/L 127*   POTASSIUM mmol/L 4 5   CHLORIDE mmol/L 91*   CO2 mmol/L 27   BUN mg/dL 36*   CREATININE mg/dL 4 96*   CALCIUM mg/dL 7 8*   GLUCOSE RANDOM mg/dL 90       Results from last 7 days  Lab Units 02/06/18  0543   INR  1 85*       * I Have Reviewed All Lab Data Listed Above  * Additional Pertinent Lab Tests Reviewed:  All Labs Within Last 24 Hours Reviewed    Imaging:    Imaging Reports Reviewed Today Include:  None  Imaging Personally Reviewed by Myself Includes:  None    Recent Cultures (last 7 days):           Last 24 Hours Medication List:     Current Facility-Administered Medications:  acetaminophen 650 mg Oral Q6H PRN ALIE Harris    amiodarone 200 mg Oral Daily ALIE Harris    b complex-vitamin C-folic acid 1 capsule Oral Daily ALIE Harris    cholestyramine sugar free 4 g Oral BID ALIE Harris    cinacalcet 30 mg Oral HS ALIE Harris    [START ON 2/7/2018] citalopram 5 mg Oral Daily Antonio Jain MD    diphenoxylate-atropine 2 tablet Oral 4x Daily Benjie Rodriguez MD    gabapentin 200 mg Oral HS Antonio Jain MD    insulin detemir 5 Units Subcutaneous BID [employer] MD    insulin lispro 1-5 Units Subcutaneous HS ALIE Harris    insulin lispro 1-6 Units Subcutaneous TID AC ALIE Rincon    insulin lispro 3 Units Subcutaneous TID AC [employer] MD    iron sucrose 100 mg Intravenous After Dialysis Antonio Jain MD Last Rate: Stopped (02/04/18 2023)   lanthanum 500 mg Oral TID With Meals Antonio Jain MD    levothyroxine 137 mcg Oral Early Morning ALIE Harris    melatonin 3 mg Oral HS Ivanjon Andry Jordan MD    meropenem 500 mg Intravenous Q24H Elio Hill MD Last Rate: Stopped (02/06/18 1340)   metoprolol tartrate 12 5 mg Oral Q12H [address] Kati Stewart MD    nystatin  Topical BID ALIE Moody    traMADol 50 mg Oral Q6H PRN ALIE Moody    vancomycin 125 mg Oral Daily ALIE Moody    warfarin 6 mg Oral Daily (warfarin) ALIE Quarles         Today, Patient Was Seen By: Froilan Peck MD    ** Please Note: Dragon 360 Dictation voice to text software may have been used in the creation of this document   **

## 2018-02-06 NOTE — PLAN OF CARE
METABOLIC, FLUID AND ELECTROLYTES - ADULT     Electrolytes maintained within normal limits Not Progressing     Fluid balance maintained Not Progressing          DISCHARGE PLANNING - CARE MANAGEMENT     Discharge to post-acute care or home with appropriate resources Progressing        INFECTION - ADULT     Absence or prevention of progression during hospitalization Progressing        Potential for Falls     Patient will remain free of falls Progressing        Prexisting or High Potential for Compromised Skin Integrity     Skin integrity is maintained or improved Progressing

## 2018-02-06 NOTE — ASSESSMENT & PLAN NOTE
· unspecified  · Continue with amiodarone and metoprolol, anticoagulation with Coumadin, INR today 1 85 and rising, continue with Coumadin 6 mg tonight

## 2018-02-06 NOTE — ASSESSMENT & PLAN NOTE
· Likely related to UTI secondary to Pseudomonas with multiple drug resistance  · Stable on meropenem day 5 of of 7  · Will continue antibiotic in the hospital and then discharged home

## 2018-02-07 ENCOUNTER — APPOINTMENT (INPATIENT)
Dept: DIALYSIS | Facility: HOSPITAL | Age: 68
DRG: 871 | End: 2018-02-07
Attending: INTERNAL MEDICINE
Payer: COMMERCIAL

## 2018-02-07 PROBLEM — D64.9 ANEMIA: Status: RESOLVED | Noted: 2017-09-20 | Resolved: 2018-02-07

## 2018-02-07 PROBLEM — E87.1 HYPONATREMIA: Chronic | Status: RESOLVED | Noted: 2017-08-08 | Resolved: 2018-02-07

## 2018-02-07 PROBLEM — E87.5 HYPERKALEMIA: Status: RESOLVED | Noted: 2017-11-28 | Resolved: 2018-02-07

## 2018-02-07 PROBLEM — E87.1 HYPONATREMIA WITH EXCESS EXTRACELLULAR FLUID VOLUME: Status: RESOLVED | Noted: 2018-01-31 | Resolved: 2018-02-07

## 2018-02-07 PROBLEM — E87.70 VOLUME OVERLOAD: Status: RESOLVED | Noted: 2018-02-01 | Resolved: 2018-02-07

## 2018-02-07 LAB
GABAPENTIN SERPLBLD-MCNC: 11.3 UG/ML (ref 4–16)
GLUCOSE SERPL-MCNC: 142 MG/DL (ref 65–140)
GLUCOSE SERPL-MCNC: 176 MG/DL (ref 65–140)
GLUCOSE SERPL-MCNC: 179 MG/DL (ref 65–140)
GLUCOSE SERPL-MCNC: 239 MG/DL (ref 65–140)
INR PPP: 1.78 (ref 0.86–1.16)
PROTHROMBIN TIME: 20.9 SECONDS (ref 12.1–14.4)

## 2018-02-07 PROCEDURE — 82948 REAGENT STRIP/BLOOD GLUCOSE: CPT

## 2018-02-07 PROCEDURE — 90935 HEMODIALYSIS ONE EVALUATION: CPT | Performed by: INTERNAL MEDICINE

## 2018-02-07 PROCEDURE — 85610 PROTHROMBIN TIME: CPT | Performed by: INTERNAL MEDICINE

## 2018-02-07 PROCEDURE — 99232 SBSQ HOSP IP/OBS MODERATE 35: CPT | Performed by: INTERNAL MEDICINE

## 2018-02-07 RX ADMIN — LEVOTHYROXINE SODIUM 137 MCG: 112 TABLET ORAL at 05:32

## 2018-02-07 RX ADMIN — NEPHROCAP 1 CAPSULE: 1 CAP ORAL at 07:53

## 2018-02-07 RX ADMIN — DIPHENOXYLATE HYDROCHLORIDE AND ATROPINE SULFATE 2 TABLET: 2.5; .025 TABLET ORAL at 00:36

## 2018-02-07 RX ADMIN — MEROPENEM 500 MG: 1 INJECTION, POWDER, FOR SOLUTION INTRAVENOUS at 13:32

## 2018-02-07 RX ADMIN — NYSTATIN: 100000 POWDER TOPICAL at 21:25

## 2018-02-07 RX ADMIN — DIPHENOXYLATE HYDROCHLORIDE AND ATROPINE SULFATE 2 TABLET: 2.5; .025 TABLET ORAL at 13:36

## 2018-02-07 RX ADMIN — CITALOPRAM 5 MG: 10 TABLET ORAL at 08:03

## 2018-02-07 RX ADMIN — DIPHENOXYLATE HYDROCHLORIDE AND ATROPINE SULFATE 2 TABLET: 2.5; .025 TABLET ORAL at 18:44

## 2018-02-07 RX ADMIN — Medication 100 MG: at 08:22

## 2018-02-07 RX ADMIN — LANTHANUM CARBONATE 500 MG: 500 TABLET, CHEWABLE ORAL at 13:35

## 2018-02-07 RX ADMIN — METOPROLOL TARTRATE 12.5 MG: 25 TABLET ORAL at 07:52

## 2018-02-07 RX ADMIN — CHOLESTYRAMINE 4 G: 4 POWDER, FOR SUSPENSION ORAL at 18:44

## 2018-02-07 RX ADMIN — VANCOMYCIN 125 MG: KIT at 13:31

## 2018-02-07 RX ADMIN — INSULIN DETEMIR 5 UNITS: 100 INJECTION, SOLUTION SUBCUTANEOUS at 21:21

## 2018-02-07 RX ADMIN — WARFARIN SODIUM 7 MG: 5 TABLET ORAL at 18:44

## 2018-02-07 RX ADMIN — LANTHANUM CARBONATE 500 MG: 500 TABLET, CHEWABLE ORAL at 18:44

## 2018-02-07 RX ADMIN — DIPHENOXYLATE HYDROCHLORIDE AND ATROPINE SULFATE 2 TABLET: 2.5; .025 TABLET ORAL at 21:20

## 2018-02-07 RX ADMIN — INSULIN LISPRO 1 UNITS: 100 INJECTION, SOLUTION INTRAVENOUS; SUBCUTANEOUS at 18:48

## 2018-02-07 RX ADMIN — INSULIN LISPRO 2 UNITS: 100 INJECTION, SOLUTION INTRAVENOUS; SUBCUTANEOUS at 21:21

## 2018-02-07 RX ADMIN — AMIODARONE HYDROCHLORIDE 200 MG: 200 TABLET ORAL at 07:53

## 2018-02-07 RX ADMIN — LANTHANUM CARBONATE 500 MG: 500 TABLET, CHEWABLE ORAL at 07:53

## 2018-02-07 RX ADMIN — MELATONIN 3 MG: 3 TAB ORAL at 21:21

## 2018-02-07 RX ADMIN — GABAPENTIN 200 MG: 100 CAPSULE ORAL at 21:21

## 2018-02-07 RX ADMIN — CINACALCET HYDROCHLORIDE 30 MG: 30 TABLET, COATED ORAL at 22:25

## 2018-02-07 RX ADMIN — METOPROLOL TARTRATE 12.5 MG: 25 TABLET ORAL at 21:20

## 2018-02-07 NOTE — OCCUPATIONAL THERAPY NOTE
OT CANCELLATION NOTE    Attempted to see pt this afternoon, however pt reports that he is too fatigued to participate 2* dialysis this morning  Will cont to follow as able       Zari Butts, OTR/L

## 2018-02-07 NOTE — PLAN OF CARE
DISCHARGE PLANNING - CARE MANAGEMENT     Discharge to post-acute care or home with appropriate resources Progressing        INFECTION - ADULT     Absence or prevention of progression during hospitalization Progressing        METABOLIC, FLUID AND ELECTROLYTES - ADULT     Electrolytes maintained within normal limits Progressing     Fluid balance maintained Progressing        Potential for Falls     Patient will remain free of falls Progressing        Prexisting or High Potential for Compromised Skin Integrity     Skin integrity is maintained or improved Progressing

## 2018-02-07 NOTE — PROGRESS NOTES
Progress Note - Yaima Gregory 1950, 74 y o  male MRN: [id number]    Unit/Bed#: [facility] 627-01 Encounter: [id number]    Primary Care Provider: Ly Ocasio DO   Date and time admitted to hospital: 1/30/2018 10:12 AM        Myoclonic jerking   Assessment & Plan    · Noted on February 5th, resolved after reducing above pending dose to prior home dose after recent increase  · Neurontin level found to be within normal limits/therapeutic range  · Monitor        Benign hypertension with end-stage renal disease (HCC)   Assessment & Plan    · On metoprolol        Anemia in end-stage renal disease ([facility])   Assessment & Plan    · micera 100mcg q4 weeks outpatient; venofer with HD        Atrial fibrillation ([facility])   Assessment & Plan    · unspecified  · Continue with amiodarone and metoprolol, anticoagulation with Coumadin, INR today 1 78, Coumadin increased to 7 mg tonight          Clostridium difficile infection   Assessment & Plan    · On chronic suppressive therapy with oral vancomycin  · Continue with the same        Chronic combined systolic and diastolic CHF (congestive heart failure) ([facility])   Assessment & Plan    · Volume management through dialysis  · Continue with beta-blockers        ESRD (end stage renal disease) on dialysis [facility])   Assessment & Plan    · Dialysis as per Nephrology schedule  · Uneventful hemodialysis today        Type 1 diabetes mellitus with nephropathy ([facility] [address] )   Assessment & Plan    · Hypoglycemia resolved, continue with insulin and current dose, continue with monitoring        * Sepsis ([facility] [address] )   Assessment & Plan    · Likely related to UTI secondary to Pseudomonas with multiple drug resistance, present on admission resolved  · Stable on meropenem day 6 of of 7  · Will continue antibiotic in the hospital and then discharged home          VTE Pharmacologic Prophylaxis:  Yes  Pharmacologic: Warfarin (Coumadin)  Mechanical VTE Prophylaxis in Place:  Yes     Patient Centered Rounds: I have performed bedside rounds with nursing staff today      Discussions with Specialists or Other Care Team Provider:  No     Education and Discussions with Family / Patient:  Patient      Time Spent for Care: 45 minutes  More than 50% of total time spent on counseling and coordination of care as described above      Current Length of Stay: 8 day(s)     Current Patient Status: Inpatient   Certification Statement: The patient will continue to require additional inpatient hospital stay due to Refer to above     Discharge Plan:  Home in 1 days after antibiotics completed ()     Code Status: Level 1 - Full Code    Subjective:   Patient feels well, tired after dialysis but without complaint    Objective:     Vitals:   Temp (24hrs), Av 5 °F (36 9 °C), Min:98 2 °F (36 8 °C), Max:98 9 °F (37 2 °C)    HR:  [65-84] 84  Resp:  [18-20] 20  BP: ()/(48-66) 104/58  SpO2:  [90 %-94 %] 92 %  Body mass index is 23 98 kg/m²  Input and Output Summary (last 24 hours): Intake/Output Summary (Last 24 hours) at 18 1713  Last data filed at 18 1423   Gross per 24 hour   Intake             1390 ml   Output             4000 ml   Net            -2610 ml       Physical Exam:     Physical Exam   Constitutional: He is oriented to person, place, and time  He appears well-developed  Cardiovascular: Normal rate, regular rhythm and normal heart sounds  Exam reveals no friction rub  No murmur heard  Pulmonary/Chest: Effort normal  No respiratory distress  He has no wheezes  He has no rales  Abdominal: Soft  He exhibits no distension  There is no tenderness  There is no rebound  Musculoskeletal:   Right BKA   Neurological: He is alert and oriented to person, place, and time  Skin: Skin is warm  Psychiatric: He has a normal mood and affect         Additional Data:     Labs:      Results from last 7 days  Lab Units 18  0543  18  0543   WBC Thousand/uL 9 81  < > 8 51   HEMOGLOBIN g/dL 9 5*  < > 10 5* HEMATOCRIT % 29 3*  < > 31 6*   PLATELETS Thousands/uL 204  < > 199   NEUTROS PCT %  --   --  70   LYMPHS PCT %  --   --  13*   MONOS PCT %  --   --  10   EOS PCT %  --   --  6   < > = values in this interval not displayed  Results from last 7 days  Lab Units 02/06/18  0543   SODIUM mmol/L 127*   POTASSIUM mmol/L 4 5   CHLORIDE mmol/L 91*   CO2 mmol/L 27   BUN mg/dL 36*   CREATININE mg/dL 4 96*   CALCIUM mg/dL 7 8*   GLUCOSE RANDOM mg/dL 90       Results from last 7 days  Lab Units 02/07/18  0612   INR  1 78*       * I Have Reviewed All Lab Data Listed Above  * Additional Pertinent Lab Tests Reviewed:  All Labs Within Last 24 Hours Reviewed    Imaging:    Imaging Reports Reviewed Today Include:  None  Imaging Personally Reviewed by Myself Includes:  None    Recent Cultures (last 7 days):           Last 24 Hours Medication List:     Current Facility-Administered Medications:  acetaminophen 650 mg Oral Q6H PRN ALIE Mathias    amiodarone 200 mg Oral Daily ALIE Mathias    b complex-vitamin C-folic acid 1 capsule Oral Daily ALIE Mathias    cholestyramine sugar free 4 g Oral BID ALIE Mathias    cinacalcet 30 mg Oral HS ALIE Mathias    citalopram 5 mg Oral Daily Saintclair Merle, MD    diphenoxylate-atropine 2 tablet Oral 4x Daily Lianne Silverman MD    gabapentin 200 mg Oral HS Saintclair Merle, MD    insulin detemir 5 Units Subcutaneous Q12H [facility] Silke Miller MD    insulin lispro 1-5 Units Subcutaneous HS ALIE Mathias    insulin lispro 1-6 Units Subcutaneous TID AC ALIE Rincon    insulin lispro 3 Units Subcutaneous TID AC Silke Miller MD    iron sucrose 100 mg Intravenous After Dialysis Saintclair Merle, MD Last Rate: Stopped (02/07/18 90)   lanthanum 500 mg Oral TID With Meals Saintclair Merle, MD    levothyroxine 137 mcg Oral Early Morning ALIE Mathias    melatonin 3 mg Oral HS Lianne Silverman MD    meropenem 500 mg Intravenous Q24H Issac Jules MD Last Rate: 500 mg (02/07/18 1332)   metoprolol tartrate 12 5 mg Oral Q12H [address] Jamison Neal MD    nystatin  Topical BID ALIE Grover    traMADol 50 mg Oral Q6H PRN ALIE Grover    vancomycin 125 mg Oral Daily ALIE Grover    warfarin 7 mg Oral Daily (warfarin) Emi Villa MD         Today, Patient Was Seen By: Emi Villa MD    ** Please Note: Dragon 360 Dictation voice to text software may have been used in the creation of this document   **

## 2018-02-07 NOTE — ASSESSMENT & PLAN NOTE
· unspecified  · Continue with amiodarone and metoprolol, anticoagulation with Coumadin, INR today 1 78, Coumadin increased to 7 mg tonight

## 2018-02-07 NOTE — ASSESSMENT & PLAN NOTE
· Likely related to UTI secondary to Pseudomonas with multiple drug resistance, present on admission resolved  · Stable on meropenem day 6 of of 7  · Will continue antibiotic in the hospital and then discharged home

## 2018-02-07 NOTE — PROGRESS NOTES
NEPHROLOGY PROGRESS NOTE   Madelaine Andino 74 y o  male MRN: [id number]  Unit/Bed#: [facility] 627-01 Encounter: [id number]  Reason for Consult:  End-stage renal disease    ASSESSMENT/PLAN:  1  End-stage renal disease, maintenance hemodialysis Monday Wednesday Friday  2  Volume overload, challenge dry weight as tolerated  3  Sepsis, Pseudomonas UTI, currently on meropenem  4  Hyponatremia, secondary to volume  5  Anemia of chronic disease, outpatient MAR  6  Cardiomyopathy  7  Confusion, it appears improved, Neurontin adjusted    PLAN:  · Hemodialysis today, ultrafiltrate 1-2 L  · Blood pressure appears acceptable  · To complete antibiotic treatment over the next few days  · Appears clinically stable    SUBJECTIVE:  Seen examined  Currently on hemodialysis  Denies any active chest pain, shortness of Breath  No reports of nausea or vomiting or abdominal pain  Review of Systems    OBJECTIVE:  Current Weight: Weight - Scale: 67 4 kg (148 lb 9 4 oz)  Vitals:    02/07/18 0900 02/07/18 0930 02/07/18 1000 02/07/18 1030   BP: 115/57 110/61 119/53 113/60   BP Location:       Pulse: 66 65 66 66   Resp:       Temp:       TempSrc:       SpO2:       Weight:       Height:           Intake/Output Summary (Last 24 hours) at 02/07/18 1101  Last data filed at 02/07/18 1039   Gross per 24 hour   Intake              987 ml   Output                0 ml   Net              987 ml       Physical Exam   Constitutional: He is oriented to person, place, and time  He appears well-developed  No distress  HENT:   Head: Normocephalic  Mouth/Throat: Oropharynx is clear and moist    Eyes: Conjunctivae are normal  No scleral icterus  Neck: Neck supple  No JVD present  Cardiovascular: Normal rate, regular rhythm and normal heart sounds  Pulmonary/Chest: Effort normal and breath sounds normal  No respiratory distress  He has no wheezes  He has no rales  Abdominal: Soft  There is no tenderness  Musculoskeletal: He exhibits no edema  Neurological: He is alert and oriented to person, place, and time  Skin: Skin is warm and dry  No rash noted  Psychiatric: He has a normal mood and affect  His behavior is normal    Nursing note and vitals reviewed        Medications:    Current Facility-Administered Medications:     acetaminophen (TYLENOL) tablet 650 mg, 650 mg, Oral, Q6H PRN, ALIE Dominguez, 650 mg at 01/30/18 2012    amiodarone tablet 200 mg, 200 mg, Oral, Daily, ALIE Dominguez, 200 mg at 02/07/18 0753    b complex-vitamin C-folic acid (NEPHROCAPS) capsule 1 capsule, 1 capsule, Oral, Daily, ALIE Dominguez, 1 capsule at 02/07/18 0753    cholestyramine sugar free (QUESTRAN LIGHT) packet 4 g, 4 g, Oral, BID, ALIE Dominguez, 4 g at 02/06/18 1802    cinacalcet (SENSIPAR) tablet 30 mg, 30 mg, Oral, HS, ALIE Dominguez, 30 mg at 02/06/18 2212    citalopram (CeleXA) tablet 5 mg, 5 mg, Oral, Daily, Carolina Medina MD, 5 mg at 02/07/18 0803    diphenoxylate-atropine (LOMOTIL) 2 5-0 025 mg per tablet 2 tablet, 2 tablet, Oral, 4x Daily, Dillon Rosales MD, 2 tablet at 02/07/18 0036    gabapentin (NEURONTIN) capsule 200 mg, 200 mg, Oral, HS, Carolina Medina MD, 200 mg at 02/06/18 2130    insulin detemir (LEVEMIR) subcutaneous injection 5 Units, 5 Units, Subcutaneous, Q12H [address], Eugune Libman, MD    insulin lispro (HumaLOG) 100 units/mL subcutaneous injection 1-5 Units, 1-5 Units, Subcutaneous, HS, ALIE Dominguez, 2 Units at 02/05/18 2125    insulin lispro (HumaLOG) 100 units/mL subcutaneous injection 1-6 Units, 1-6 Units, Subcutaneous, TID AC, 4 Units at 02/06/18 1804 **AND** Fingerstick Glucose (POCT), , , TID AC, ALIE Dominguez    insulin lispro (HumaLOG) 100 units/mL subcutaneous injection 3 Units, 3 Units, Subcutaneous, TID AC, Eugune Libman, MD, 3 Units at 02/06/18 1805    iron sucrose (VENOFER) 100 mg in sodium chloride 0 9 % 100 mL IVPB, 100 mg, Intravenous, After Dialysis, Mari Pierce MD, Stopped at 02/07/18 2100    lanthanum (FOSRENOL) chewable tablet 500 mg, 500 mg, Oral, TID With Meals, Mari Pierce MD, 500 mg at 02/07/18 0753    levothyroxine tablet 137 mcg, 137 mcg, Oral, Early Morning, ALIE Chavez, 137 mcg at 02/07/18 0532    melatonin tablet 3 mg, 3 mg, Oral, HS, Yanet Pruett MD, 3 mg at 02/06/18 2130    meropenem (MERREM) 500 mg in sodium chloride 0 9 % 50 mL IVPB, 500 mg, Intravenous, Q24H, Yanet Pruett MD, Stopped at 02/06/18 1340    metoprolol tartrate (LOPRESSOR) partial tablet 12 5 mg, 12 5 mg, Oral, Q12H [address], Mari Pierce MD, 12 5 mg at 02/07/18 5936    nystatin (MYCOSTATIN) powder, , Topical, BID, ALIE Chavez    traMADol (ULTRAM) tablet 50 mg, 50 mg, Oral, Q6H PRN, ALIE Chavez, 50 mg at 01/31/18 1630    vancomycin (VANCOCIN) oral solution 125 mg, 125 mg, Oral, Daily, ALIE Chavez, 125 mg at 02/06/18 1300    warfarin (COUMADIN) tablet 7 mg, 7 mg, Oral, Daily (warfarin), Vitor South MD    Laboratory Results:    Results from last 7 days  Lab Units 02/06/18  0543 02/04/18  0513 02/03/18  0543 02/02/18  0617 02/01/18  0448   WBC Thousand/uL 9 81 7 79 8 51 9 84 10 95*   HEMOGLOBIN g/dL 9 5* 10 3* 10 5* 9 8* 9 5*   HEMATOCRIT % 29 3* 31 7* 31 6* 29 0* 29 0*   PLATELETS Thousands/uL 204 197 199 179 183   SODIUM mmol/L 127* 130* 128*  --  129*   POTASSIUM mmol/L 4 5 5 0 4 5  --  3 7   CHLORIDE mmol/L 91* 94* 93*  --  92*   CO2 mmol/L 27 24 25  --  27   BUN mg/dL 36* 58* 39*  --  28*   CREATININE mg/dL 4 96* 6 02* 4 45*  --  4 04*   CALCIUM mg/dL 7 8* 7 6* 7 4*  --  7 6*   PHOSPHORUS mg/dL 5 7*  --   --   --   --    GLUCOSE RANDOM mg/dL 90 89 193*  --  94

## 2018-02-07 NOTE — ASSESSMENT & PLAN NOTE
· Noted on February 5th, resolved after reducing above pending dose to prior home dose after recent increase  · Neurontin level found to be within normal limits/therapeutic range  · Monitor

## 2018-02-08 PROBLEM — R94.31 PROLONGED Q-T INTERVAL ON ECG: Status: ACTIVE | Noted: 2018-02-08

## 2018-02-08 LAB
GLUCOSE SERPL-MCNC: 138 MG/DL (ref 65–140)
GLUCOSE SERPL-MCNC: 274 MG/DL (ref 65–140)
GLUCOSE SERPL-MCNC: 420 MG/DL (ref 65–140)
GLUCOSE SERPL-MCNC: 60 MG/DL (ref 65–140)
INR PPP: 1.69 (ref 0.86–1.16)
PROTHROMBIN TIME: 20 SECONDS (ref 12.1–14.4)

## 2018-02-08 PROCEDURE — 97535 SELF CARE MNGMENT TRAINING: CPT

## 2018-02-08 PROCEDURE — 97530 THERAPEUTIC ACTIVITIES: CPT

## 2018-02-08 PROCEDURE — 99232 SBSQ HOSP IP/OBS MODERATE 35: CPT | Performed by: INTERNAL MEDICINE

## 2018-02-08 PROCEDURE — 85610 PROTHROMBIN TIME: CPT | Performed by: INTERNAL MEDICINE

## 2018-02-08 PROCEDURE — 97116 GAIT TRAINING THERAPY: CPT

## 2018-02-08 PROCEDURE — 82948 REAGENT STRIP/BLOOD GLUCOSE: CPT

## 2018-02-08 RX ADMIN — GABAPENTIN 200 MG: 100 CAPSULE ORAL at 21:55

## 2018-02-08 RX ADMIN — DIPHENOXYLATE HYDROCHLORIDE AND ATROPINE SULFATE 2 TABLET: 2.5; .025 TABLET ORAL at 09:12

## 2018-02-08 RX ADMIN — INSULIN LISPRO 4 UNITS: 100 INJECTION, SOLUTION INTRAVENOUS; SUBCUTANEOUS at 18:33

## 2018-02-08 RX ADMIN — NYSTATIN: 100000 POWDER TOPICAL at 18:34

## 2018-02-08 RX ADMIN — LANTHANUM CARBONATE 500 MG: 500 TABLET, CHEWABLE ORAL at 17:35

## 2018-02-08 RX ADMIN — AMIODARONE HYDROCHLORIDE 200 MG: 200 TABLET ORAL at 08:58

## 2018-02-08 RX ADMIN — CHOLESTYRAMINE 4 G: 4 POWDER, FOR SUSPENSION ORAL at 17:28

## 2018-02-08 RX ADMIN — METOPROLOL TARTRATE 12.5 MG: 25 TABLET ORAL at 21:55

## 2018-02-08 RX ADMIN — MEROPENEM 500 MG: 1 INJECTION, POWDER, FOR SOLUTION INTRAVENOUS at 12:58

## 2018-02-08 RX ADMIN — INSULIN LISPRO 4 UNITS: 100 INJECTION, SOLUTION INTRAVENOUS; SUBCUTANEOUS at 21:46

## 2018-02-08 RX ADMIN — INSULIN DETEMIR 5 UNITS: 100 INJECTION, SOLUTION SUBCUTANEOUS at 11:10

## 2018-02-08 RX ADMIN — DIPHENOXYLATE HYDROCHLORIDE AND ATROPINE SULFATE 2 TABLET: 2.5; .025 TABLET ORAL at 18:33

## 2018-02-08 RX ADMIN — CITALOPRAM 5 MG: 10 TABLET ORAL at 08:58

## 2018-02-08 RX ADMIN — INSULIN DETEMIR 5 UNITS: 100 INJECTION, SOLUTION SUBCUTANEOUS at 21:55

## 2018-02-08 RX ADMIN — MELATONIN 3 MG: 3 TAB ORAL at 21:55

## 2018-02-08 RX ADMIN — DIPHENOXYLATE HYDROCHLORIDE AND ATROPINE SULFATE 2 TABLET: 2.5; .025 TABLET ORAL at 21:54

## 2018-02-08 RX ADMIN — NEPHROCAP 1 CAPSULE: 1 CAP ORAL at 08:59

## 2018-02-08 RX ADMIN — WARFARIN SODIUM 8 MG: 5 TABLET ORAL at 18:33

## 2018-02-08 RX ADMIN — LANTHANUM CARBONATE 500 MG: 500 TABLET, CHEWABLE ORAL at 08:59

## 2018-02-08 RX ADMIN — LEVOTHYROXINE SODIUM 137 MCG: 112 TABLET ORAL at 06:21

## 2018-02-08 RX ADMIN — NYSTATIN 1 APPLICATION: 100000 POWDER TOPICAL at 09:07

## 2018-02-08 RX ADMIN — VANCOMYCIN 125 MG: KIT at 12:58

## 2018-02-08 RX ADMIN — CHOLESTYRAMINE 4 G: 4 POWDER, FOR SUSPENSION ORAL at 08:59

## 2018-02-08 RX ADMIN — CINACALCET HYDROCHLORIDE 30 MG: 30 TABLET, COATED ORAL at 21:45

## 2018-02-08 RX ADMIN — LANTHANUM CARBONATE 500 MG: 500 TABLET, CHEWABLE ORAL at 12:59

## 2018-02-08 RX ADMIN — METOPROLOL TARTRATE 12.5 MG: 25 TABLET ORAL at 08:58

## 2018-02-08 RX ADMIN — DIPHENOXYLATE HYDROCHLORIDE AND ATROPINE SULFATE 2 TABLET: 2.5; .025 TABLET ORAL at 12:58

## 2018-02-08 NOTE — PROGRESS NOTES
Progress Note - Nephrology   Delmi Ramey 74 y o  male MRN: [id number]  [facility] Encounter: [id number]      Assessment / Plan:    1  End-stage renal disease  · Hemodialysis on Monday Wednesday Friday  Next hemodialysis treatment will be 2/9  2  Volume overload  · Volume removal with dialysis as tolerated by blood pressure  · Overall weight is down significantly  3  Anemia -last dose of Micera was 100 mcg and is given on every 4 week schedule   Last dose was on 02/05  · Trend hemoglobin for now, stable overall  · Continue Venofer on dialysis  4  Sepsis  · On meropenem by primary service for Pseudomonas UTI  · Failed outpatient Keflex and Cipro  Has a history of confusion with quinolones  · Blood cultures negative  5  Hyponatremia  · Chronic in nature  · Likely in part secondary to volume overload  · Continue fluid restriction  · Recheck sodium level on 2/10 after volume removal on dialysis 2/9   · Note that Celexa can cause hyponatremia as well and if the sodium level does not significantly improve or normalize, may need to rethink use of this medicine   However, will defer to his primary nephrology team, Dr Royal Cassette   For now, will decrease dose of Celexa from 10 to 5 mg  The patient states he has not been depressed and does not quite sure why he is on this  6  Hypertension  · Acceptable to low at times  7  The patient's heart rate was running in the 50s    Metoprolol dose was decreased on 02/02 and heart rate has improved and now in the 60s  8  C diff infection  · On Vancomycin suppressive therapy  8  Diabetes mellitus  9  Neuropathy  10  Cardiomyopathy with history of systolic and diastolic congestive heart failure -chronic and compensated  11  Atrial fibrillation  12   Confusion/myoclonic jerking- this could be a manifestation of Neurontin toxicity   Neurontin dose has been decreased from 300-200 mg as of 1/31  Urvashi Rowan had been on ciprofloxacin prior to admission and this too can cause confusion in dialysis patients but this was discontinued 7 days prior to admission   Of course, sepsis can cause confusion as well    Overall, mental status has improved but myoclonic jerking continues   Neurontin level is 11 3 with normal 4-16, however, this was drawn on a decreased dose of Neurontin  Will check URR  Myoclonic jerking has improved somewhat   13  Hyperphosphatemia  -Phosphate binder initiated  -will order follow-up phosphorus level     14  Prolonged QT interval  -Celexa with amiodarone can cause this  -Celexa dose has been decreased  -further management per primary team        Subjective: The patient was seen and examined  He denies any shortness of breath, chest pain or pressure, abdominal pain, vomiting, chills, sweats, paroxysmal nocturnal dyspnea, orthopnea  He felt well last p m  after his hemodialysis      Objective:     Vitals: Blood pressure 116/70, pulse 68, temperature 97 8 °F (36 6 °C), temperature source Oral, resp  rate 16, height 5' 6" (1 676 m), weight 66 2 kg (145 lb 14 4 oz), SpO2 94 %  ,Body mass index is 23 55 kg/m²  Temp (24hrs), Av °F (36 7 °C), Min:97 8 °F (36 6 °C), Max:98 2 °F (36 8 °C)      Weight (last 2 days)     Date/Time   Weight    18 0600  66 2 (145 9)    18 0600  67 4 (148 59)    18 0721  67 4 (148 59)                     Intake/Output Summary (Last 24 hours) at 18 0958  Last data filed at 18 0640   Gross per 24 hour   Intake             1373 ml   Output             4100 ml   Net            -2727 ml     I/O last 24 hours: In: 7393 [P O :1020; I V :503; Other:50]  Out: 4100 [Urine:100;  Other:4000]        Physical Exam    Gen -awake, alert and in no apparent distress  Cardiovascular -S1-S2, no pericardial friction rub or S3 were appreciated, trace peripheral edema bilaterally in the lower extremities  Respiratory -clear to auscultation percussion, adequate inspiratory effort, no rales/rhonchi or wheezing was noted  GI - Abdomen -soft, nontender, no rebound or guarding was noted        Invasive Devices     Peripheral Intravenous Line            Peripheral IV 02/07/18 Left Forearm less than 1 day          Line            Hemodialysis AV Fistula  Right Forearm -- days    Hemodialysis AV Fistula Right Forearm -- days          Drain            Gastrostomy/Enterostomy -- days    Gastrostomy/Enterostomy Percutaneous endoscopic gastrostomy (PEG) 20 Fr   days                Medications:    Scheduled Meds:  Current Facility-Administered Medications:  acetaminophen 650 mg Oral Q6H PRN ALIE Mcgovern    amiodarone 200 mg Oral Daily ALIE Mcgovern    b complex-vitamin C-folic acid 1 capsule Oral Daily ALIE Mcgovern    cholestyramine sugar free 4 g Oral BID ALIE Mcgovern    cinacalcet 30 mg Oral HS ALIE Mcgovern    citalopram 5 mg Oral Daily Darryn Judge MD    diphenoxylate-atropine 2 tablet Oral 4x Daily Zaida Penn MD    gabapentin 200 mg Oral HS Darryn Judge MD    insulin detemir 5 Units Subcutaneous Q12H [address] Maureen Dunaway MD    insulin lispro 1-5 Units Subcutaneous HS ALIE Mcgovern    insulin lispro 1-6 Units Subcutaneous TID AC ALIE Rincon    insulin lispro 3 Units Subcutaneous TID AC Maureen Dunaway MD    iron sucrose 100 mg Intravenous After Dialysis Darryn Judge MD Last Rate: Stopped (02/07/18 5020)   lanthanum 500 mg Oral TID With Meals Darryn Judge MD    levothyroxine 137 mcg Oral Early Morning ALIE Mcgovern    melatonin 3 mg Oral HS Zaida Penn MD    meropenem 500 mg Intravenous Q24H Zaida Penn MD Last Rate: 500 mg (02/07/18 1332)   metoprolol tartrate 12 5 mg Oral Q12H [address] Darryn Judge MD    nystatin  Topical BID ALIE Mcgovern    traMADol 50 mg Oral Q6H PRN ALIE Mcgovern    vancomycin 125 mg Oral Daily ALIE Mcgovern    warfarin 8 mg Oral Daily (warfarin) Maureen Dunaway MD        PRN Meds:   acetaminophen    iron sucrose    traMADol    Continuous Infusions:         LAB RESULTS:        Results from last 7 days  Lab Units 02/06/18  0543 02/04/18  0513 02/03/18  0543 02/02/18  0617   WBC Thousand/uL 9 81 7 79 8 51 9 84   HEMOGLOBIN g/dL 9 5* 10 3* 10 5* 9 8*   HEMATOCRIT % 29 3* 31 7* 31 6* 29 0*   PLATELETS Thousands/uL 204 197 199 179   NEUTROS PCT %  --   --  70 69   LYMPHS PCT %  --   --  13* 15   MONOS PCT %  --   --  10 10   EOS PCT %  --   --  6 5   SODIUM mmol/L 127* 130* 128*  --    POTASSIUM mmol/L 4 5 5 0 4 5  --    CHLORIDE mmol/L 91* 94* 93*  --    CO2 mmol/L 27 24 25  --    BUN mg/dL 36* 58* 39*  --    CREATININE mg/dL 4 96* 6 02* 4 45*  --    CALCIUM mg/dL 7 8* 7 6* 7 4*  --    GLUCOSE RANDOM mg/dL 90 89 193*  --    PHOSPHORUS mg/dL 5 7*  --   --   --        CUTURES:  Lab Results   Component Value Date    BLOODCX No Growth After 5 Days  01/30/2018    BLOODCX No Growth After 5 Days  01/30/2018    BLOODCX No Growth After 5 Days  11/29/2017    BLOODCX No Growth After 5 Days  11/29/2017    BLOODCX No Growth After 5 Days  09/17/2017    BLOODCX No Growth After 5 Days  09/17/2017    BLOODCX No Growth After 5 Days  08/03/2017    URINECX >100,000 cfu/ml Pseudomonas fluorescens/putida (A) 01/30/2018    URINECX >100,000 cfu/ml Pseudomonas aeruginosa (A) 11/27/2017    URINECX >100,000 cfu/ml Pseudomonas aeruginosa 09/17/2017    URINECX  09/17/2017     6405-5653 cfu/ml Oxidase Positive gram negative jose alfredo    URINECX >100,000 cfu/ml Pseudomonas aeruginosa 06/16/2017    URINECX No Growth <1000 cfu/mL 09/10/2016                 Portions of the record may have been created with voice recognition software  Occasional wrong word or "sound a like" substitutions may have occurred due to the inherent limitations of voice recognition software  Read the chart carefully and recognize, using context, where substitutions have occurred  If you have any questions, please contact the dictating provider

## 2018-02-08 NOTE — PROGRESS NOTES
QTc noted 493, previous 480  Reduce celexa  Consult cardiology as patient on amiodarone, hold for now    EKG in am

## 2018-02-08 NOTE — ASSESSMENT & PLAN NOTE
· unspecified  · Continue with amiodarone and metoprolol, anticoagulation with Coumadin, INR today 1 69, Coumadin increased to 8 mg tonight

## 2018-02-08 NOTE — ASSESSMENT & PLAN NOTE
· Dialysis as per Nephrology schedule  · For dialysis tomorrow then he will be discharged to home if no acute events during the treatment

## 2018-02-08 NOTE — SOCIAL WORK
PT recommendation for rolling walker  CM called spoke with patient and asked if he needed a rw at home  Patient reports he is not sure, and asked that I contact wife  CM called and spoke to pts wife Katie Lamar, reports they have a brand new rw

## 2018-02-08 NOTE — PLAN OF CARE
Problem: PHYSICAL THERAPY ADULT  Goal: Performs mobility at highest level of function for planned discharge setting  See evaluation for individualized goals  Treatment/Interventions: Functional transfer training, LE strengthening/ROM, Therapeutic exercise, Endurance training, Cognitive reorientation, Patient/family training, Equipment eval/education, Bed mobility, Gait training, Spoke to case management          See flowsheet documentation for full assessment, interventions and recommendations  Outcome: Progressing  Prognosis: Fair  Problem List: Decreased strength, Decreased endurance, Impaired balance, Decreased mobility, Decreased cognition, Decreased safety awareness, Impaired vision (prosthesis rt leg)  Assessment: received pt  supine  in bed   He is lethrgic  but  easily arouseable   pt  did complete   bed  mob   c min A x2  he was able to  sit on the EOB unsupported   pt  requires  mod A  to  don prothesis (wife assisted )  pt was confused  on sequence of  donning  sock,  and sleeve  upon stnading he was incontinent od BM  pt  was dependen t  to clean in standing   pt  did  amb into  BR  to toilet     pt  than  did  amb 60 ft using  rw   gt is  unsteady  and  retropulsive initially  and  than c fatigue   pt will benefit  form  continued  PT intervention   to  improve  strength and activity  endurnace   Barriers to Discharge: None     Recommendation:  (rehab)     PT - OK to Discharge:  (to rehab when medically stable)    See flowsheet documentation for full assessment

## 2018-02-08 NOTE — SOCIAL WORK
MCG Guide Used for Initial Round:  Systemic or Infectious Condition GRG  Optimal GLOS: [address] [place] [address] Day:   9 days  DC Readiness:   · Activity level acceptable  · Floor to discharge  · Complete discharge planning · Hemodynamic stability  · Respiratory status acceptable  · Neurologic status acceptable  · Temperature status acceptable  · No infection, or status acceptable  · No neutropenia, or status acceptable  · Special infection or injury situations absent  · Electrolyte status acceptable  · General Discharge Criteria met · Intake acceptable  · No inpatient interventions needed       Identified Barriers:    Discussion Date (Time): 02/08/18 with

## 2018-02-08 NOTE — ASSESSMENT & PLAN NOTE
· Likely related to UTI secondary to Pseudomonas with multiple drug resistance, present on admission resolved  · Stable on meropenem day 7 of of 7  · Last dose of antibiotics tonight, then discharged home tomorrow morning

## 2018-02-08 NOTE — PHYSICAL THERAPY NOTE
Physical Therapy Progress Note     02/08/18 1200   Pain Assessment   Pain Assessment No/denies pain   Restrictions/Precautions   Weight Bearing Precautions Per Order No   Braces or Orthoses Prosthesis   Other Precautions Contact/isolation;Cognitive; Chair Alarm; Fall Risk   General   Chart Reviewed Yes   Family/Caregiver Present Yes   Cognition   Overall Cognitive Status Impaired   Arousal/Participation Arousable; Cooperative;Lethargic   Attention Attends with cues to redirect   Orientation Level Oriented to person;Oriented to place   Following Commands Follows one step commands with increased time or repetition   Subjective   Subjective no c/o   Bed Mobility   Supine to Sit 4  Minimal assistance   Additional items Assist x 1;HOB elevated; Bedrails; Increased time required;Verbal cues   Transfers   Sit to Stand 4  Minimal assistance   Additional items Assist x 2; Increased time required; Impulsive;Verbal cues   Stand to Sit 4  Minimal assistance   Additional items Assist x 2; Increased time required; Impulsive;Verbal cues   Ambulation/Elevation   Gait pattern Narrow VETO; Decreased foot clearance; Short stride; Excessively slow   Gait Assistance 4  Minimal assist   Additional items Assist x 1;Verbal cues; Tactile cues   Assistive Device Rolling walker   Distance 10ft, 50ft   Balance   Static Sitting Fair   Static Standing Poor   Ambulatory Poor -   Endurance Deficit   Endurance Deficit Yes   Endurance Deficit Description lethargy   Activity Tolerance   Activity Tolerance Patient limited by fatigue   Nurse Made Aware Starla rn    Assessment   Prognosis Fair   Problem List Decreased strength;Decreased endurance; Impaired balance;Decreased mobility; Decreased cognition;Decreased safety awareness; Impaired vision  (prosthesis rt leg)   Assessment received pt  supine  in bed   He is lethrgic  but  easily arouseable   pt  did complete   bed  mob   c min A x2  he was able to  sit on the EOB unsupported    pt  requires  mod A  to  don prothesis (wife assisted )  pt was confused  on sequence of  donning  sock,  and sleeve  upon stnading he was incontinent od BM  pt  was dependen t  to clean in standing   pt  did  amb into  BR  to toilet     pt  than  did  amb 60 ft using  rw   gt is  unsteady  and  retropulsive initially  and  than c fatigue   pt will benefit  form  continued  PT intervention   to  improve  strength and activity  endurnace      Barriers to Discharge None   Goals   Patient Goals none stated   LTG Expiration Date 02/16/18   Treatment Day 2   Plan   Treatment/Interventions Functional transfer training;Bed mobility;Gait training;Spoke to nursing   Progress Slow progress, medical status limitations   PT Frequency 5x/wk   Recommendation   Recommendation (rehab)   Equipment Recommended Ginny Rios   PT - OK to Discharge (to  rehab  when  med ready)     Monique Dougherty, PTA

## 2018-02-08 NOTE — OCCUPATIONAL THERAPY NOTE
[address] Progress Note     Patient Name: Hernan Jalloh  Today's Date: 2/8/2018  Problem List  Patient Active Problem List   Diagnosis    Hypertension    Type 1 diabetes mellitus with nephropathy ([facility] )    ESR[facility] (end stage renal disease) [facility]    Sepsis ([facility] )    Ambulatory dysfunction    S/P percutaneous endoscopic gastrostomy (PEG) tube placement ([facility] )    Pleural effusion on right    Chronic combined systolic and diastolic CHF (congestive heart failure) ([facility] )    R Fibula fracture, proximal to stump    Pseudomonas urinary tract infection    DVT, lower extremity (HCC)    Clostridium difficile infection    Hyponatremia    Acquired hypothyroidism    Chronic kidney disease-mineral and bone disorder    Atrial fibrillation (HCC)    Anemia in end-stage renal disease ([facility]    Benign hypertension with end-stage renal disease (HCC)    Myoclonic jerking    Prolonged Q-T interval on ECG           02/08/18 1240   Note Type   Note type Progress   Restrictions/Precautions   Weight Bearing Precautions Per Order No   Braces or Orthoses Prosthesis   Other Precautions Contact/isolation;Cognitive; Fall Risk   Pain Assessment   Pain Assessment No/denies pain   Pain Score No Pain   ADL   Where Assessed Chair   Grooming Assistance 5  Supervision/Setup   Grooming Deficit Verbal cueing; Increased time to complete   UB Bathing Assistance 5  Supervision/Setup   UB Bathing Deficit Verbal cueing; Increased time to complete   LB Bathing Assistance 4  Minimal Assistance   LB Bathing Deficit Steadying; Buttocks   UB Dressing Assistance 5  Supervision/Setup   UB Dressing Deficit Increased time to complete   Transfers   Sit to Stand 4  Minimal assistance   Additional items Assist x 1;Verbal cues   Stand to Sit 4  Minimal assistance   Additional items Assist x 1;Verbal cues   Balance   Static Sitting Fair   Dynamic Sitting Fair   Static Standing Poor   Dynamic Standing Poor   Activity Tolerance   Activity Tolerance Patient tolerated treatment well   Nurse Made Aware Okay to see per RNStarla   Cognition   Overall Cognitive Status Impaired   Arousal/Participation Responsive; Cooperative   Attention Attends with cues to redirect   Orientation Level Oriented X4   Memory Decreased recall of precautions   Following Commands Follows one step commands with increased time or repetition   Assessment   Limitation Decreased ADL status; Decreased Safe judgement during ADL;Decreased endurance;Decreased self-care trans;Decreased high-level ADLs; Decreased cognition   Assessment Pt seen for OT treatment session focusing on self care  Pt more alert and engaged this afternoon  He participated in bathing ADL w/ S for safety and vc w/ UB and min A for LB bathing  Pt able to wash LEs and perineal area, however required assist for steadying while standing  Pt demonstrated decreased safety awareness during session  He was leaning to L side and attempted to use table for support while standing, requiring vc to correct  During grooming activity, pt attempted to use hand  as mouthwash 2* decreased vision, however he did not attempt to compensate for low vision w/ thoroughness of checking for correct item use  Educated pt and spouse on pt's cont participation in self care while in the hospital and once home, as well as safety w/ transfers and while performing  ADLs  Pt is progress towards goals, however continues to present w/ limitations from baseline  Rec inpatient rehab upon d/c, however spouse present during session and reported that she and pt decided he would be returning home upon d/c  Spouse reported pt is functioning slightly below baseline, however she believes she will be able to manage care taking and would be interested in home therapy  If pt and family refuse rehab, rec home OT  Will cont to follow to address previously determined goals      Recommendation   OT Discharge Recommendation Short Term Rehab   Barthel Index   Feeding 10 Bathing 0   Grooming Score 0   Dressing Score 5   Bladder Score 10   Bowels Score 5   Toilet Use Score 5   Transfers (Bed/Chair) Score 5   Mobility (Level Surface) Score 0   Stairs Score 0   Barthel Index Score 40   Modified Yas Scale   Modified Canyon Scale 4     Joan Pat, OTR/L

## 2018-02-08 NOTE — PLAN OF CARE
Problem: OCCUPATIONAL THERAPY ADULT  Goal: Performs self-care activities at highest level of function for planned discharge setting  See evaluation for individualized goals  Treatment Interventions: ADL retraining, Functional transfer training, Endurance training, Patient/family training, Equipment evaluation/education, Compensatory technique education, Energy conservation          See flowsheet documentation for full assessment, interventions and recommendations  Outcome: Progressing  Limitation: Decreased ADL status, Decreased Safe judgement during ADL, Decreased endurance, Decreased self-care trans, Decreased high-level ADLs, Decreased cognition  Prognosis: Good  Assessment: Pt seen for OT treatment session focusing on self care  Pt more alert and engaged this afternoon  He participated in bathing ADL w/ S for safety and vc w/ UB and min A for LB bathing  Pt able to wash LEs and perineal area, however required assist for steadying while standing  Pt demonstrated decreased safety awareness during session  He was leaning to L side and attempted to use table for support while standing, requiring vc to correct  During grooming activity, pt attempted to use hand  as mouthwash 2* decreased vision, however he did not attempt to compensate for low vision w/ thoroughness of checking for correct item use  Educated pt and spouse on pt's cont participation in self care while in the hospital and once home, as well as safety w/ transfers and while performing  ADLs  Pt is progress towards goals, however continues to present w/ limitations from baseline  Rec inpatient rehab upon d/c, however spouse present during session and reported that she and pt decided he would be returning home upon d/c  Spouse reported pt is functioning slightly below baseline, however she believes she will be able to manage care taking and would be interested in home therapy  If pt and family refuse rehab, rec home OT   Will cont to follow to address previously determined goals        OT Discharge Recommendation: Short Term Rehab  OT - OK to Discharge: Yes (when medically stable to inpt rehab vs home w/ services)

## 2018-02-09 ENCOUNTER — APPOINTMENT (INPATIENT)
Dept: DIALYSIS | Facility: HOSPITAL | Age: 68
DRG: 871 | End: 2018-02-09
Attending: INTERNAL MEDICINE
Payer: COMMERCIAL

## 2018-02-09 VITALS
DIASTOLIC BLOOD PRESSURE: 57 MMHG | WEIGHT: 145.9 LBS | SYSTOLIC BLOOD PRESSURE: 108 MMHG | TEMPERATURE: 97.8 F | HEART RATE: 65 BPM | OXYGEN SATURATION: 90 % | RESPIRATION RATE: 16 BRPM | HEIGHT: 66 IN | BODY MASS INDEX: 23.45 KG/M2

## 2018-02-09 LAB
ANION GAP SERPL CALCULATED.3IONS-SCNC: 12 MMOL/L (ref 4–13)
ATRIAL RATE: 74 BPM
BUN BLD-MCNC: 13 MG/DL (ref 5–25)
BUN SERPL-MCNC: 51 MG/DL (ref 5–25)
CALCIUM SERPL-MCNC: 7.7 MG/DL (ref 8.3–10.1)
CHLORIDE SERPL-SCNC: 90 MMOL/L (ref 100–108)
CO2 SERPL-SCNC: 24 MMOL/L (ref 21–32)
CREAT SERPL-MCNC: 6.14 MG/DL (ref 0.6–1.3)
GFR SERPL CREATININE-BSD FRML MDRD: 9 ML/MIN/1.73SQ M
GLUCOSE SERPL-MCNC: 102 MG/DL (ref 65–140)
GLUCOSE SERPL-MCNC: 142 MG/DL (ref 65–140)
GLUCOSE SERPL-MCNC: 148 MG/DL (ref 65–140)
INR PPP: 1.62 (ref 0.86–1.16)
P AXIS: 64 DEGREES
PHOSPHATE SERPL-MCNC: 6.8 MG/DL (ref 2.3–4.1)
POTASSIUM SERPL-SCNC: 4.7 MMOL/L (ref 3.5–5.3)
PR INTERVAL: 216 MS
PROTHROMBIN TIME: 19.4 SECONDS (ref 12.1–14.4)
QRS AXIS: -20 DEGREES
QRSD INTERVAL: 120 MS
QT INTERVAL: 468 MS
QTC INTERVAL: 519 MS
SODIUM SERPL-SCNC: 126 MMOL/L (ref 136–145)
T WAVE AXIS: 95 DEGREES
URR: 75 % (ref 65–100)
VENTRICULAR RATE: 74 BPM

## 2018-02-09 PROCEDURE — 90935 HEMODIALYSIS ONE EVALUATION: CPT | Performed by: INTERNAL MEDICINE

## 2018-02-09 PROCEDURE — 99239 HOSP IP/OBS DSCHRG MGMT >30: CPT | Performed by: INTERNAL MEDICINE

## 2018-02-09 PROCEDURE — 85610 PROTHROMBIN TIME: CPT | Performed by: INTERNAL MEDICINE

## 2018-02-09 PROCEDURE — 84520 ASSAY OF UREA NITROGEN: CPT | Performed by: INTERNAL MEDICINE

## 2018-02-09 PROCEDURE — 93005 ELECTROCARDIOGRAM TRACING: CPT | Performed by: INTERNAL MEDICINE

## 2018-02-09 PROCEDURE — 80048 BASIC METABOLIC PNL TOTAL CA: CPT | Performed by: INTERNAL MEDICINE

## 2018-02-09 PROCEDURE — 84100 ASSAY OF PHOSPHORUS: CPT | Performed by: INTERNAL MEDICINE

## 2018-02-09 PROCEDURE — 99222 1ST HOSP IP/OBS MODERATE 55: CPT | Performed by: INTERNAL MEDICINE

## 2018-02-09 PROCEDURE — 93010 ELECTROCARDIOGRAM REPORT: CPT | Performed by: INTERNAL MEDICINE

## 2018-02-09 PROCEDURE — 82948 REAGENT STRIP/BLOOD GLUCOSE: CPT

## 2018-02-09 RX ORDER — AMIODARONE HYDROCHLORIDE 200 MG/1
200 TABLET ORAL
Status: DISCONTINUED | OUTPATIENT
Start: 2018-02-10 | End: 2018-02-09 | Stop reason: HOSPADM

## 2018-02-09 RX ORDER — LANTHANUM CARBONATE 1000 MG/1
1000 TABLET, CHEWABLE ORAL
Qty: 30 TABLET | Refills: 0 | Status: SHIPPED | OUTPATIENT
Start: 2018-02-09

## 2018-02-09 RX ORDER — WARFARIN SODIUM 5 MG/1
10 TABLET ORAL
Qty: 15 TABLET | Refills: 0 | Status: SHIPPED | OUTPATIENT
Start: 2018-02-09

## 2018-02-09 RX ORDER — GABAPENTIN 100 MG/1
200 CAPSULE ORAL
Qty: 10 CAPSULE | Refills: 0 | Status: SHIPPED | OUTPATIENT
Start: 2018-02-09

## 2018-02-09 RX ORDER — BACITRACIN, NEOMYCIN, POLYMYXIN B 400; 3.5; 5 [USP'U]/G; MG/G; [USP'U]/G
1 OINTMENT TOPICAL 2 TIMES DAILY
Status: DISCONTINUED | OUTPATIENT
Start: 2018-02-09 | End: 2018-02-09 | Stop reason: HOSPADM

## 2018-02-09 RX ORDER — LANTHANUM CARBONATE 500 MG/1
1000 TABLET, CHEWABLE ORAL
Status: DISCONTINUED | OUTPATIENT
Start: 2018-02-09 | End: 2018-02-09 | Stop reason: HOSPADM

## 2018-02-09 RX ORDER — LANOLIN ALCOHOL/MO/W.PET/CERES
3 CREAM (GRAM) TOPICAL
Qty: 15 TABLET | Refills: 0 | Status: SHIPPED | OUTPATIENT
Start: 2018-02-09 | End: 2018-03-27 | Stop reason: CLARIF

## 2018-02-09 RX ORDER — AMIODARONE HYDROCHLORIDE 200 MG/1
200 TABLET ORAL DAILY
Qty: 30 TABLET | Refills: 0 | Status: SHIPPED | OUTPATIENT
Start: 2018-02-09 | End: 2018-03-27 | Stop reason: CLARIF

## 2018-02-09 RX ORDER — CITALOPRAM 10 MG/1
5 TABLET ORAL DAILY
Qty: 10 TABLET | Refills: 0 | Status: SHIPPED | OUTPATIENT
Start: 2018-02-09

## 2018-02-09 RX ORDER — WARFARIN SODIUM 5 MG/1
10 TABLET ORAL
Status: DISCONTINUED | OUTPATIENT
Start: 2018-02-09 | End: 2018-02-09 | Stop reason: HOSPADM

## 2018-02-09 RX ADMIN — DIPHENOXYLATE HYDROCHLORIDE AND ATROPINE SULFATE 2 TABLET: 2.5; .025 TABLET ORAL at 07:49

## 2018-02-09 RX ADMIN — LANTHANUM CARBONATE 500 MG: 500 TABLET, CHEWABLE ORAL at 07:51

## 2018-02-09 RX ADMIN — INSULIN DETEMIR 5 UNITS: 100 INJECTION, SOLUTION SUBCUTANEOUS at 08:11

## 2018-02-09 RX ADMIN — LEVOTHYROXINE SODIUM 137 MCG: 112 TABLET ORAL at 05:25

## 2018-02-09 RX ADMIN — NEPHROCAP 1 CAPSULE: 1 CAP ORAL at 07:52

## 2018-02-09 RX ADMIN — CITALOPRAM 5 MG: 10 TABLET ORAL at 07:49

## 2018-02-09 NOTE — OCCUPATIONAL THERAPY NOTE
OT CANCELLATION NOTE    PT PREPARING FOR D/C AT THIS TIME AND REPORTS HE IS VERY FATIGUED FROM DIALYSIS THIS MORNING, THEREFORE DENIES PARTICIPATING IN THERAPY BEFORE D/C  SPOUSE PRESENT AND DENIES ANY CONCERNS ABOUT PT RETURNING HOME FROM AN OT STANDPOINT       Carl Mistry, OTR/L

## 2018-02-09 NOTE — NUTRITION
Follow-up today; summary and recs noted below     02/09/18 6525   Recommendations/Interventions   Summary meal completion good limits; daily po includes 1 nepro supplement; increased PO4 noted; current diet does not include low PO4 features   Interventions Diet: continued as ordered  (will defer irving diet changes to physician at this time)   Nutrition Recommendations Continue diet as ordered  (monitor PO4+ labs; if continues above NL, suggest add low PO4 features to current diet)

## 2018-02-09 NOTE — PLAN OF CARE
INFECTION - ADULT     Absence or prevention of progression during hospitalization Progressing        METABOLIC, FLUID AND ELECTROLYTES - ADULT     Electrolytes maintained within normal limits Progressing     Fluid balance maintained Progressing        Potential for Falls     Patient will remain free of falls Progressing        Prexisting or High Potential for Compromised Skin Integrity     Skin integrity is maintained or improved Progressing

## 2018-02-09 NOTE — ASSESSMENT & PLAN NOTE
· Noted on February 5th, resolved after reducing above pending dose to prior home dose after recent increase  · Neurontin level found to be within normal limits/therapeutic range  · Discharged with prior dose of 200 mg HS

## 2018-02-09 NOTE — CONSULTS
Consultation - Cardiology   Corie Ledesma 74 y o  male MRN: [id number]  [facility] Encounter: [id number]    Assessment/Plan     1  Prolonged Qtc  Patient had a Qtc of 490 on 1/30/2018 , patient's baseline appears to be 480  [place] Patient on Celexa and Amiodarone which do prolong Qtc  Celexa dose was decreased and Amiodarone was held yesterday however QTC remains elevated at 519 this morning  Would resume Amiodarone at this time  Based on patient's clinical presentation and chronicity of his prolonged Qtc he is stable for discharge from Cardiology standpoint  Will need outpatient follow up with his cardiologist    2  Paroxysmal atrial fibrillation  EKG normal sinus rhythm, with first-degree AV block, rate controlled   Last echocardiogram November 29, 2017 with the LVEF of 35% with moderate diffuse hypokinesis with more severe hypokinesis of anterior, anteroseptal, and apical walls  No diastolic dysfunction noted  Patient on amiodarone 200 mg 3 times a day, metoprolol tartrate 12 5 mg 2 times a day, and warfarin 5 mg daily  INR this morning 1 68    3  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  Patient has a LVEF of 35% from November of 2017  Paient had a MARIA performed in April of 2017 that revealed a LVEF of 35% and Grade 1 diastolic dysfunction  Continue metoprolol  Patient not volume overloaded  3   Hypertension  Well controlled this admission systolic blood pressure in the 110s     Metoprolol home dose was decreased to 12 5 twice daily from 25 mg twice daily as patient was having low heart rate in the 50s    History of Present Illness   Physician Requesting Consult: Eugune Libman, MD  Reason for Consult / Principal Problem: Prolonged Qtc   HPI: Corie Ledesma is a 74y o  year old male with a past medical history significant for paroxysmal atrial fibrillation now rate controlled status post MARIA cardioversion in 2016 on Coumadin, ESRD on hemodialysis, history of DVT on Coumadin, Peg tube placement, type 1 diabetes mellitus, right-sided BKA, hypothyroidism, hypertension, chronic C diff, and major depressive disorder who presented to the hospital for altered mental status likely secondary to sepsis secondary to UTI  Patient is improved from a sepsis standpoint however patient was noted to have a prolonged QTC and so Cardiology was consulted  Patient is on amiodarone and Celexa as an outpatient and both were decreased  however this morning QTC remains prolonged at 519  Patient denies any chest pain, palpitations, shortness of breath, abdominal pain, headache, or cough  He admits to nausea, chronic blurry vision due to being legally blind, and feeling tired  When reviewing prior EKGs patient has had prolonged QTC chronically  Patient follows with cardiology as outpatient but cannot remember name and no notes found in Care Everywhere  Patient follows with Cardiology at [place]       Inpatient consult to Cardiology     Performed by  Avelino Avila by Sujata Caballero              Review of Systems   Constitutional: Negative for activity change (Chronic), appetite change, chills, fatigue, fever and unexpected weight change  Respiratory: Negative for apnea, cough, choking, shortness of breath and wheezing  Cardiovascular: Negative for chest pain, palpitations and leg swelling  Gastrointestinal: Positive for nausea  Negative for abdominal pain, constipation, diarrhea and vomiting  Genitourinary: Negative for dysuria  Musculoskeletal: Negative for arthralgias  Skin: Negative for color change  Neurological: Negative for weakness and headaches  Hematological: Negative for adenopathy  Psychiatric/Behavioral: Negative for agitation and behavioral problems         Historical Information   Past Medical History:   Diagnosis Date    Acquired hypothyroidism     underactive    Atrial fibrillation (HCC)     Chronic kidney disease-mineral and bone disorder 11/27/2017    Clostridium difficile infection 04/2017    Diabetes mellitus ([facility] )     Dialysis patient ([place] [address] )     Diarrhea     ESRD (end stage renal disease) on dialysis ([place] [address] )     Hx of cardiac arrest     Hypertension     Hyponatremia 8/8/2017    Neuropathy     Right lower lobe pneumonia ([facility] ) 2/20/2017    Sepsis ([place] [address] ) 04/2017    Polymicrobial MRSA, VRE    Systolic and diastolic CHF, chronic (HCC)     EF 35%, Grade II DD     Past Surgical History:   Procedure Laterality Date    CATARACT EXTRACTION      CHG GI ENDOSCOPIC ULTRASOUND N/A 3/10/2016    Procedure: LINEAR ENDOSCOPIC U/S;  Surgeon: Akin Garza MD;  [facility]: [facility]; Service: Gastroenterology    CHOLECYSTECTOMY      GASTROSTOMY TUBE PLACEMENT N/A 4/12/2017    Procedure: INSERTION PEG TUBE;  Surgeon: Johnnie Garcia MD;  [facility];  Service:    Siria Wilkes LEG AMPUTATION THROUGH LOWER TIBIA AND FIBULA Right 4/6/2017    Procedure: AMPUTATION BELOW KNEE (BKA);   Surgeon: Salomón Oliva DO;  [facility]: [facility];  Service:     TOE AMPUTATION  2000     History   Alcohol Use No     History   Drug Use No     History   Smoking Status    Former Smoker    Packs/day: 1 00    Years: 46 00    Types: Cigarettes    Start date: 5    Quit date: 3/1/2017   Smokeless Tobacco    Never Used     Family History:   Family History   Problem Relation Age of Onset    Diabetes Father     Cancer Other     Lupus Mother        Meds/Allergies   all current active meds have been reviewed, current meds:   Current Facility-Administered Medications   Medication Dose Route Frequency    acetaminophen (TYLENOL) tablet 650 mg  650 mg Oral Q6H PRN    b complex-vitamin C-folic acid (NEPHROCAPS) capsule 1 capsule  1 capsule Oral Daily    cholestyramine sugar free (QUESTRAN LIGHT) packet 4 g  4 g Oral BID    cinacalcet (SENSIPAR) tablet 30 mg  30 mg Oral HS    citalopram (CeleXA) tablet 5 mg  5 mg Oral Daily    diphenoxylate-atropine (LOMOTIL) 2 5-0 025 mg per tablet 2 tablet  2 tablet Oral 4x Daily    gabapentin (NEURONTIN) capsule 200 mg  200 mg Oral HS    insulin detemir (LEVEMIR) subcutaneous injection 5 Units  5 Units Subcutaneous Q12H [address]    insulin lispro (HumaLOG) 100 units/mL subcutaneous injection 1-5 Units  1-5 Units Subcutaneous HS    insulin lispro (HumaLOG) 100 units/mL subcutaneous injection 1-6 Units  1-6 Units Subcutaneous TID AC    insulin lispro (HumaLOG) 100 units/mL subcutaneous injection 3 Units  3 Units Subcutaneous TID AC    iron sucrose (VENOFER) 100 mg in sodium chloride 0 9 % 100 mL IVPB  100 mg Intravenous After Dialysis    lanthanum (FOSRENOL) chewable tablet 500 mg  500 mg Oral TID With Meals    levothyroxine tablet 137 mcg  137 mcg Oral Early Morning    melatonin tablet 3 mg  3 mg Oral HS    meropenem (MERREM) 500 mg in sodium chloride 0 9 % 50 mL IVPB  500 mg Intravenous Q24H    metoprolol tartrate (LOPRESSOR) partial tablet 12 5 mg  12 5 mg Oral Q12H CELIA    nystatin (MYCOSTATIN) powder   Topical BID    traMADol (ULTRAM) tablet 50 mg  50 mg Oral Q6H PRN    vancomycin (VANCOCIN) oral solution 125 mg  125 mg Oral Daily    warfarin (COUMADIN) tablet 10 mg  10 mg Oral Daily (warfarin)    and PTA meds:   Prior to Admission Medications   Prescriptions Last Dose Informant Patient Reported? Taking?    Cholecalciferol (VITAMIN D-3) 1000 units CAPS   Yes No   Sig: Take by mouth daily   HUMALOG KWIKPEN 100 UNIT/ML SOPN   Yes No   Sig: INJECT 2 UNITS UNDER THE SKIN 3 (THREE) TIMES A DAY BEFORE MEALS FOR 30 DAYS   LEVEMIR FLEXTOUCH subcutaneous injection pen 100 units/mL   Yes No   Sig: INJECT 5 UNITS UNDER THE SKIN 2 (TWO) TIMES A DAY FOR 30 DAYS   Multiple Vitamin (DAILY VALUE MULTIVITAMIN) TABS   Yes No   Sig: Take 1 tablet by mouth daily   Probiotic Product (PRO-BIOTIC BLEND) CAPS 1/29/2018 at Unknown time  Yes Yes   Sig: Take 1 capsule by mouth daily   acetaminophen (TYLENOL) 325 mg tablet Unknown at Unknown time  Yes No   Sig: Take 650 mg by mouth every 6 (six) hours as needed for mild pain   albuterol (2 5 mg/3 mL) 0 083 % nebulizer solution   Yes No   Sig: Inhale 1 each   amiodarone 200 mg tablet 1/29/2018 at Unknown time  No Yes   Sig: Take 1 tablet by mouth 3 (three) times a day for 30 days   Patient taking differently: Take 200 mg by mouth daily     b complex-vitamin C-folic acid (RENAL) 1 mg 1/29/2018 at Unknown time  Yes Yes   Sig: Take 1 mg by mouth daily   calcium citrate (CALCITRATE) 950 MG tablet 1/29/2018 at Unknown time  Yes Yes   Sig: Take 2,400 mg by mouth 3 (three) times a day with meals   cholestyramine sugar free (QUESTRAN LIGHT) 4 g packet 1/29/2018 at Unknown time  No Yes   Sig: Take 1 packet by mouth 2 (two) times a day for 30 days   cinacalcet (SENSIPAR) 30 mg tablet 1/29/2018 at Unknown time  Yes Yes   Sig: Take 30 mg by mouth daily at bedtime   citalopram (CELEXA) 10 mg tablet   Yes No   Sig: Take 1 tablet by mouth daily   citalopram (CeleXA) 10 mg tablet 1/29/2018 at Unknown time  Yes Yes   Sig: Take 10 mg by mouth daily     diphenoxylate-atropine (LOMOTIL) 2 5-0 025 mg per tablet   Yes No   Sig: TAKE 1 TABLET FOUR TIMES A DAY AS NEEDED FOR DIARRHEA     diphenoxylate-atropine (LOMOTIL) 2 5-0 025 mg/5 mL liquid Unknown at Unknown time  Yes No   Sig: Take 5 mL by mouth 4 (four) times a day as needed for diarrhea   gabapentin (NEURONTIN) 300 mg capsule 1/29/2018 at Unknown time  Yes Yes   Sig: Take 300 mg by mouth daily at bedtime   insulin aspart (NOVOLOG) 100 units/mL injection   Yes No   Sig: Inject under the skin   insulin detemir (LEVEMIR) 100 units/mL subcutaneous injection 1/29/2018 at Unknown time  No Yes   Sig: Inject 5 Units under the skin 2 (two) times a day for 30 days   insulin lispro (HumaLOG) 100 units/mL injection 1/29/2018 at Unknown time  No Yes   Sig: Inject 2 Units under the skin 3 (three) times a day before meals for 30 days   Patient taking differently: Inject 2 Units under the skin 3 (three) times a day before meals Sliding scale plus 3 units at meals    levothyroxine 137 mcg tablet 1/29/2018 at Unknown time  No Yes   Sig: Take 1 tablet by mouth daily in the early morning for 30 days   Patient taking differently: Take 150 mcg by mouth daily in the early morning     menthol-zinc oxide (CALMOSEPTINE) 0 44-20 6 % OINT   Yes No   Sig: Apply topically   metoprolol tartrate (LOPRESSOR) 25 mg tablet 1/29/2018 at Unknown time  No Yes   Sig: Take 1 tablet by mouth every 12 (twelve) hours for 30 days   metoprolol tartrate (LOPRESSOR) 25 mg tablet   Yes No   Sig: Take one tab/cap by mouth twice daily   midodrine (PROAMATINE) 2 5 mg tablet   Yes No   Sig: Take 1 tablet by mouth 2 (two) times a day   nystatin (MYCOSTATIN) powder   No No   Sig: Apply topically 2 (two) times a day   oxyCODONE (OXY-IR) 5 MG capsule   Yes No   Sig: Take 1 capsule by mouth every 4 (four) hours as needed   pancrelipase, Lip-Prot-Amyl, (CREON) 24,000 units   Yes No   Sig: Take 3 capsules by mouth 3 (three) times a day   pantoprazole (PROTONIX) 40 mg tablet   Yes No   Sig: Take 1 tablet by mouth daily   saccharomyces boulardii (FLORASTOR) 250 mg capsule   Yes No   Sig: Take 1 capsule by mouth 2 (two) times a day   traMADol (ULTRAM) 50 mg tablet 1/29/2018 at Unknown time  Yes Yes   Sig: Take 50 mg by mouth every 6 (six) hours as needed for moderate pain   vancomycin (VANCOCIN) 50mg/mL SOLN 1/29/2018 at Unknown time  No Yes   Sig: Take 2 5 mL by mouth daily   warfarin (COUMADIN) 5 mg tablet 1/29/2018 at Unknown time  No Yes   Sig: Take 1 tablet by mouth daily   Patient taking differently: Take 6 mg by mouth daily As directed by coumadin clinic       Facility-Administered Medications: None     No Known Allergies    Objective   Vitals: Blood pressure 131/75, pulse 73, temperature (!) 96 9 °F (36 1 °C), temperature source Tympanic, resp  rate 20, height 5' 6" (1 676 m), weight 66 2 kg (145 lb 14 4 oz), SpO2 90 %    Orthostatic Blood Pressures Flowsheet Row Most Recent Value   Blood Pressure  131/75 filed at 02/09/2018 4893   Patient Position - Orthostatic VS  Sitting filed at 02/09/2018 6044            Intake/Output Summary (Last 24 hours) at 02/09/18 0825  Last data filed at 02/09/18 4916   Gross per 24 hour   Intake             1610 ml   Output                0 ml   Net             1610 ml       Invasive Devices     Peripheral Intravenous Line            Peripheral IV 02/07/18 Left Forearm 1 day          Line            Hemodialysis AV Fistula  Right Forearm -- days    Hemodialysis AV Fistula Right Forearm -- days          Drain            Gastrostomy/Enterostomy -- days    Gastrostomy/Enterostomy Percutaneous endoscopic gastrostomy (PEG) 20 Fr   days                Physical Exam   Constitutional: He is oriented to person, place, and time  He appears well-developed and well-nourished  No distress  HENT:   Head: Normocephalic and atraumatic  Mouth/Throat: No oropharyngeal exudate  Eyes: Conjunctivae and EOM are normal  Pupils are equal, round, and reactive to light  No scleral icterus  Neck: Normal range of motion  Cardiovascular: Normal rate, regular rhythm, normal heart sounds and intact distal pulses  Exam reveals no gallop and no friction rub  No murmur heard  Pulmonary/Chest: Effort normal and breath sounds normal  No respiratory distress  He has no wheezes  He has no rales  He exhibits no tenderness  Abdominal: Soft  Bowel sounds are normal  He exhibits no distension and no mass  There is no tenderness  There is no rebound and no guarding  Musculoskeletal: He exhibits deformity (Right sided BKA)  He exhibits no edema or tenderness  Lymphadenopathy:     He has no cervical adenopathy  Neurological: He is alert and oriented to person, place, and time  No cranial nerve deficit  Skin: Skin is warm and dry  No rash noted  He is not diaphoretic  No erythema  No pallor  Psychiatric: He has a normal mood and affect   His behavior is normal    Vitals reviewed  Lab Results:   I have personally reviewed pertinent lab results  CBC with diff:   Results from last 7 days  Lab Units 02/06/18  0543   WBC Thousand/uL 9 81   RBC Million/uL 3 28*   HEMOGLOBIN g/dL 9 5*   HEMATOCRIT % 29 3*   MCV fL 89   MCH pg 29 0   MCHC g/dL 32 4   RDW % 14 0   MPV fL 9 4   PLATELETS Thousands/uL 204     CMP:   Results from last 7 days  Lab Units 02/09/18  0515   SODIUM mmol/L 126*   POTASSIUM mmol/L 4 7   CHLORIDE mmol/L 90*   CO2 mmol/L 24   ANION GAP mmol/L 12   BUN mg/dL 51*   CREATININE mg/dL 6 14*   GLUCOSE RANDOM mg/dL 142*   CALCIUM mg/dL 7 7*   EGFR ml/min/1 73sq m 9     Troponin:   0  Lab Value Date/Time   TROPONINI <0 02 01/30/2018 1038   TROPONINI <0 02 11/29/2017 0521   TROPONINI <0 02 11/28/2017 2040   TROPONINI <0 02 11/27/2017 1140   TROPONINI <0 02 09/17/2017 1514   TROPONINI 0 04 07/08/2017 0112   TROPONINI 0 04 07/07/2017 1510   TROPONINI 0 04 (H) 04/02/2017 1304   TROPONINI 0 04 (H) 04/02/2017 0939   TROPONINI 0 02 04/02/2017 0342   TROPONINI <0 02 03/31/2017 2055   TROPONINI <0 02 09/10/2016 1728     BNP:   Results from last 7 days  Lab Units 02/09/18  0515   SODIUM mmol/L 126*   POTASSIUM mmol/L 4 7   CHLORIDE mmol/L 90*   CO2 mmol/L 24   ANION GAP mmol/L 12   BUN mg/dL 51*   CREATININE mg/dL 6 14*   GLUCOSE RANDOM mg/dL 142*   CALCIUM mg/dL 7 7*   EGFR ml/min/1 73sq m 9     Coags:   Results from last 7 days  Lab Units 02/09/18  0515   INR  1 62*     Imaging: I have personally reviewed pertinent reports  EKG: Prolonged Qtc 219  VTE Prophylaxis: Warfarin (Coumadin)    Code Status: Level 1 - Full Code    Counseling / Coordination of Care  Total floor / unit time spent today 30   minutes  Greater than 50% of total time was spent with the patient and / or family counseling and / or coordination of care

## 2018-02-09 NOTE — PLAN OF CARE
Problem: Nutrition/Hydration-ADULT  Goal: Nutrient/Hydration intake appropriate for improving, restoring or maintaining nutritional needs  Abnormal PO4 lab noted     INTERVENTIONS:  - Monitor PO4 lab  - physician increased fosrenol  - would benefit low PO4 diet features if PO4 lab continues to be elevated      Outcome: Progressing

## 2018-02-09 NOTE — CONSULTS
Arrived to see patient for skin integrity issues, LE under podiatry's care, buttock with POA stage 1 per nursing documentation  Patient however is being discharge this afternoon, changing in home clothing in anticipation of leasing soon  We recommend discharging patient with hydraguard TID and PRN to sacrum, buttocks  If discharge plan changes, please notify wound care for full assessment      Ramon Hale RN,BSN, [facility] [employer]

## 2018-02-09 NOTE — DISCHARGE SUMMARY
Discharge- Gregg Marcus 1950, 74 y o  male MRN: [id number]    [facility] Encounter: [id number]    Primary Care Provider: Luiza Pascual DO   Date and time admitted to hospital: 1/30/2018 10:12 AM        Myoclonic jerking   Assessment & Plan    · Noted on February 5th, resolved after reducing above pending dose to prior home dose after recent increase  · Neurontin level found to be within normal limits/therapeutic range  · Discharged with prior dose of 200 mg HS          Benign hypertension with end-stage renal disease ([facility] )   Assessment & Plan    · On metoprolol        Anemia in end-stage renal disease ([place] [address] )   Assessment & Plan    · micera 100mcg q4 weeks outpatient; venofer with HD        Atrial fibrillation ([place] [address] )   Assessment & Plan    · unspecified  · Last INR today 1 62, suggested that patient 60 mg of Coumadin at home today and tomorrow, repeat INR tomorrow, 5 mg of Coumadin on Sunday, await for Cardiology instruction on Monday  This was communicated to both patient and wife prior to discharge and also provided as per written instruction in after visit summary  · Patient amiodarone was held for 24 hours given QTC prolongation at 493  Patient baseline   For this reason was evaluated by cardiologist who suggested to resume amiodarone  Patient was started on loading dose on December 4, 2017 and was still taking 100 mg 3 times a day, discussed with Cardiology reduce the dose to 200 mg daily and also reduced dose of Celexa to 5 mg daily  Follow-up as outpatient with Cardiology    Continue with beta-blocker          Clostridium difficile infection   Assessment & Plan    · On chronic suppressive therapy with oral vancomycin  · Continue with the same upon discharge        Chronic combined systolic and diastolic CHF (congestive heart failure) ([facility])   Assessment & Plan    · Volume management through dialysis  · Continue with beta-blockers        ESRD (end stage renal disease) on dialysis ([facility] ) Assessment & Plan    · Patient received dialysis today prior to discharge, uneventful, patient will follow up as outpatient with dialysis center on Monday  Follow-up as outpatient with Nephrology        Type 1 diabetes mellitus with nephropathy [facility])   Assessment & Plan    · Patient had an episode of hypoglycemia while in the hospital, resolved the for now greater than 48 hours, patient will resume his home dose of insulin upon discharge and follow-up as outpatient with primary care physician  * Sepsis ([address] )   Assessment & Plan    · Patient was admitted with sepsis, and this was found to be secondary to urinary tract infection  · On culture, Pseudomonas isolated with multiple drug resistance  · For this reason patient was started on meropenem that he has completed on February 8, total of 7 days   · Sepsis has resolved at time of discharge, patient remained hemodynamically stable and ready to be discharged home  · He declined inpatient rehabilitation, he will be discharged with visiting nurse and home PT and OT          Consultations During Hospital Stay:  · Nephrology, cardiology    Procedures Performed:     · Hemodialysis    Significant Findings / Test Results:     · None    Incidental Findings:   · None     Test Results Pending at Discharge (will require follow up): · None     Outpatient Tests Requested:  · INR with visiting nurse tomorrow    Complications:  None    Reason for Admission:  None    Hospital Course:     Brenda Galvan is a 74 y o  male patient who originally presented to the hospital on 1/30/2018 due to sepsis  Please see above list of diagnoses and related plan for additional information  Condition at Discharge: good     Discharge Day Visit / Exam:     Subjective:  Patient feels well, he wishes to go home today      Vitals: Blood Pressure: 108/57 (02/09/18 1200)  Pulse: 65 (02/09/18 1200)  Temperature: 97 8 °F (36 6 °C) (02/09/18 1200)  Temp Source: Tympanic (02/09/18 1200)  Respirations: 16 (02/09/18 1200)  Height: 5' 6" (167 6 cm) (02/06/18 0721)  Weight - Scale: 66 2 kg (145 lb 14 4 oz) (02/08/18 0600)  SpO2: 90 % (02/09/18 0649)     Exam:   Physical Exam   Constitutional: He is oriented to person, place, and time  He appears well-developed  Cardiovascular: Normal rate, regular rhythm and normal heart sounds  Exam reveals no friction rub  No murmur heard  Pulmonary/Chest: Effort normal  No respiratory distress  He has no wheezes  Abdominal: Soft  He exhibits no distension  There is no tenderness  There is no rebound  Musculoskeletal: He exhibits no edema  Right BKA   Neurological: He is alert and oriented to person, place, and time  He exhibits normal muscle tone  Skin: Skin is warm  Psychiatric: He has a normal mood and affect  Discussion with Family:  Wife at bedside    Discharge instructions/Information to patient and family:   See after visit summary for information provided to patient and family  Provisions for Follow-Up Care:  See after visit summary for information related to follow-up care and any pertinent home health orders  Disposition:     Home with VNA Services (Reminder: Complete face to face encounter) refused post acute rehab    For Discharges to [facility] SNF:   · Not Applicable to this Patient - Not Applicable to this Patient    Planned Readmission:  No     Discharge Statement:  I spent greater than 30 minutes discharging the patient  This time was spent on the day of discharge  I had direct contact with the patient on the day of discharge  Greater than 50% of the total time was spent examining patient, answering all patient questions, arranging and discussing plan of care with patient as well as directly providing post-discharge instructions  Additional time then spent on discharge activities  Discharge Medications:  See after visit summary for reconciled discharge medications provided to patient and family  ** Please Note: This note has been constructed using a voice recognition system **

## 2018-02-09 NOTE — ASSESSMENT & PLAN NOTE
· unspecified  · Last INR today 1 62, suggested that patient 60 mg of Coumadin at home today and tomorrow, repeat INR tomorrow, 5 mg of Coumadin on Sunday, await for Cardiology instruction on Monday  This was communicated to both patient and wife prior to discharge and also provided as per written instruction in after visit summary  · Patient amiodarone was held for 24 hours given QTC prolongation at 493  Patient baseline   For this reason was evaluated by cardiologist who suggested to resume amiodarone  Patient was started on loading dose on December 4, 2017 and was still taking 100 mg 3 times a day, discussed with Cardiology reduce the dose to 200 mg daily and also reduced dose of Celexa to 5 mg daily  Follow-up as outpatient with Cardiology    Continue with beta-blocker

## 2018-02-09 NOTE — DISCHARGE INSTRUCTIONS
Podiatry Instructions:  - Please follow up with Dr Cassandra Garcia at his office within one week of discharge  Please check an INR tomorrow morning, and follow up with your cardiologist office as done prior to admission  For now he has been discharged on 10 mg of Coumadin  Take 10 mg February 9 and February 10, take 5 mg February 11 and then on February 12 call Cardiology office for further directions  A-fib (Atrial Fibrillation)   WHAT YOU NEED TO KNOW:   A-fib may come and go, or it may be a long-term condition  A-fib can cause blood clots, stroke, or heart failure  These conditions may become life-threatening  It is important to treat and manage a-fib to help prevent a blood clot, stroke, or heart failure  DISCHARGE INSTRUCTIONS:   Call 911 for any of the following:   · You have any of the following signs of a heart attack:      ¨ Squeezing, pressure, or pain in your chest that lasts longer than 5 minutes or returns    ¨ Discomfort or pain in your back, neck, jaw, stomach, or arm     ¨ Trouble breathing    ¨ Nausea or vomiting    ¨ Lightheadedness or a sudden cold sweat, especially with chest pain or trouble breathing    · You have any of the following signs of a stroke:      ¨ Numbness or drooping on one side of your face     ¨ Weakness in an arm or leg    ¨ Confusion or difficulty speaking    ¨ Dizziness, a severe headache, or vision loss  Seek care immediately if:  You have any of the following signs of a blood clot:  · You feel lightheaded, are short of breath, and have chest pain  · You cough up blood  · You have swelling, redness, pain, or warmth in your arm or leg  Contact your cardiologist or healthcare provider if:   · Your heart rate is higher than your healthcare provider said it should be  · You have new or worsening swelling in your legs, feet, ankles, or abdomen       · You are short of breath, even at rest      · You have questions or concerns about your condition or care   Medicines: You may need any of the following:  · Heart medicines  help control your heart rate and rhythm  You may need more than one medicine to treat your symptoms  · Blood thinners    help prevent blood clots  Examples of blood thinners include heparin and warfarin  Clots can cause strokes, heart attacks, and death  The following are general safety guidelines to follow while you are taking a blood thinner:    ¨ Watch for bleeding and bruising while you take blood thinners  Watch for bleeding from your gums or nose  Watch for blood in your urine and bowel movements  Use a soft washcloth on your skin, and a soft toothbrush to brush your teeth  This can keep your skin and gums from bleeding  If you shave, use an electric shaver  Do not play contact sports  ¨ Tell your dentist and other healthcare providers that you take anticoagulants  Wear a bracelet or necklace that says you take this medicine  ¨ Do not start or stop any medicines unless your healthcare provider tells you to  Many medicines cannot be used with blood thinners  ¨ Tell your healthcare provider right away if you forget to take the medicine, or if you take too much  ¨ Warfarin  is a blood thinner that you may need to take  The following are things you should be aware of if you take warfarin  § Foods and medicines can affect the amount of warfarin in your blood  Do not make major changes to your diet while you take warfarin  Warfarin works best when you eat about the same amount of vitamin K every day  Vitamin K is found in green leafy vegetables and certain other foods  Ask for more information about what to eat when you are taking warfarin  § You will need to see your healthcare provider for follow-up visits when you are on warfarin  You will need regular blood tests  These tests are used to decide how much medicine you need  · Antiplatelets , such as aspirin, help prevent blood clots   Take your antiplatelet medicine exactly as directed  These medicines make it more likely for you to bleed or bruise  If you are told to take aspirin, do not take acetaminophen or ibuprofen instead  · Take your medicine as directed  Contact your healthcare provider if you think your medicine is not helping or if you have side effects  Tell him or her if you are allergic to any medicine  Keep a list of the medicines, vitamins, and herbs you take  Include the amounts, and when and why you take them  Bring the list or the pill bottles to follow-up visits  Carry your medicine list with you in case of an emergency  Follow up with your cardiologist as directed: You will need regular blood tests and monitoring  Write down your questions so you remember to ask them during your visits  Manage A-fib:   · Know your target heart rate  Learn how to take your pulse and monitor your heart rate  · Manage other health conditions  This includes high blood pressure, sleep apnea, thyroid disease, diabetes, and other heart conditions  Take medicine as directed and follow your treatment plan  · Limit or do not drink alcohol  Alcohol can make a-fib hard to manage  Ask your healthcare provider if it is safe for you to drink alcohol  A drink of alcohol is 12 ounces of beer, 5 ounces of wine, or 1½ ounces of liquor  · Do not smoke  Nicotine and other chemicals in cigarettes and cigars can cause heart and lung damage  Ask your healthcare provider for information if you currently smoke and need help to quit  E-cigarettes or smokeless tobacco still contain nicotine  Talk to your healthcare provider before you use these products  · Eat heart-healthy foods  Heart healthy foods will help keep your cholesterol low  These include fruits, vegetables, whole-grain breads, low-fat dairy products, beans, lean meats, and fish  Replace butter and margarine with heart-healthy oils such as olive oil and canola oil  · Maintain a healthy weight    Ask your healthcare provider how much you should weigh  Ask him to help you create a weight loss plan if you are overweight  · Exercise for 30 minutes  most days of the week  Ask your healthcare provider about the best exercise plan for you  © 2017 [address] [employer] Information is for End User's use only and may not be sold, redistributed or otherwise used for commercial purposes  All illustrations and images included in CareNotes® are the copyrighted property of [employer]  or Graeme Michael  The above information is an  only  It is not intended as medical advice for individual conditions or treatments  Talk to your doctor, nurse or pharmacist before following any medical regimen to see if it is safe and effective for you  Warfarin (By mouth)   Warfarin (WAR-far-in)  Prevents and treats blood clots  May lower the risk of serious complications after a heart attack  This medicine is a blood thinner  Brand Name(s): Coumadin, Jantoven   There may be other brand names for this medicine  When This Medicine Should Not Be Used: This medicine is not right for everyone  Do not use it if you had an allergic reaction to warfarin, if you are pregnant, or if you have health problems that could cause bleeding  How to Use This Medicine:   Tablet  · Take your medicine as directed  Your dose may need to be changed several times to find what works best for you  · This medicine should come with a Medication Guide  Ask your pharmacist for a copy if you do not have one  · Missed dose: Take a dose as soon as you remember  If it is almost time for your next dose, wait until then and take a regular dose  Do not take extra medicine to make up for a missed dose  · Store the medicine in a closed container at room temperature, away from heat, moisture, and direct light    Drugs and Foods to Avoid:   Ask your doctor or pharmacist before using any other medicine, including over-the-counter medicines, vitamins, and herbal products  · Many medicines and foods can affect how warfarin works and may affect the PT/INR test results  Tell your doctor before you start or stop any medicine, especially the following:   ¨ Ginkgo, echinacea, goldenseal, or Niki's wort  ¨ Another blood thinner, including apixaban, argatroban, bivalirudin, cilostazol, clopidogrel, dabigatran, desirudin, dipyridamole, heparin, lepirudin, prasugrel, rivaroxaban, ticlopidine  ¨ Medicine to treat depression or anxiety, including citalopram, desvenlafaxine, duloxetine, escitalopram, fluoxetine, fluvoxamine, milnacipran, paroxetine, sertraline, venlafaxine, vilazodone  ¨ Medicine to treat an infection  ¨ NSAID pain or arthritis medicine, including aspirin, celecoxib, diclofenac, diflunisal, fenoprofen, ibuprofen, indomethacin, ketoprofen, ketorolac, mefenamic acid, naproxen, oxaprozin, piroxicam, sulindac  Check labels for over-the-counter medicines to find out if they contain an NSAID  ¨ Steroid medicine, including dexamethasone, hydrocortisone, methylprednisolone, prednisolone, prednisone  · Warfarin works best if you eat about the same amount of vitamin K every day  Foods high in vitamin K include asparagus, broccoli, brussels sprouts, cabbage, green leafy vegetables, plums, rhubarb, and canola oil  Talk to your doctor before you make changes to your normal diet  · Do not eat grapefruit or drink grapefruit juice while you are using this medicine  Warnings While Using This Medicine:   · It is not safe to take this medicine during pregnancy  It could harm an unborn baby  Tell your doctor right away if you become pregnant  Use an effective form of birth control to keep from getting pregnant during treatment and for at least 1 month after your last dose    · Tell your doctor if you are breastfeeding, or if you have kidney disease, liver disease, diabetes, heart disease, heart failure, high blood pressure, an infection, a stomach ulcer, or protein C deficiency  Also tell your doctor if you had recent surgery or an injury, or a history of stroke, anemia, severe bleeding or bruising, or problems caused by heparin use  · This medicine may cause the following problems:   ¨ Bleeding, which may be life-threatening  ¨ Gangrene (skin or tissue damage)  ¨ Calciphylaxis or calcium uremic arteriolopathy  ¨ Purple toes syndrome  · You must have a PT/INR blood test while you use this medicine to check how well your blood is clotting  Your doctor will use the test results to make sure the medicine is working properly  Keep all appointments for the PT/INR blood tests  · You may bleed or bruise more easily with warfarin  To prevent injury or cuts, do not play rough sports, be careful with sharp objects, and brush and floss your teeth gently  Jag Dura your nose gently, and do not pick your nose  · Carry an ID card or wear a medical alert bracelet to let emergency caregivers know that you use warfarin  · Tell any doctor or dentist who treats you that you are using this medicine  You may need to stop using this medicine several days before you have surgery or medical tests  · Keep all medicine out of the reach of children  Never share your medicine with anyone    Possible Side Effects While Using This Medicine:   Call your doctor right away if you notice any of these side effects:  · Allergic reaction: Itching or hives, swelling in your face or hands, swelling or tingling in your mouth or throat, chest tightness, trouble breathing  · Bleeding from your gums or nose, coughing up blood, heavy monthly periods  · Bleeding that does not stop, bruising, or weakness  · Dizziness, fainting, lightheadedness  · Pain, brown or black skin, or skin that is cool to the touch  · Purple toes or feet, or new pain in your leg, foot, or toes  · Purplish red, net-like, blotchy spots on the skin  · Red or dark brown urine, or red or black, tarry stools  · Vomiting blood or material that looks like coffee grounds  If you notice other side effects that you think are caused by this medicine, tell your doctor  Call your doctor for medical advice about side effects  You may report side effects to FDA at 6-432-BBN-8401  © 2017 [address] [employer] Information is for End User's use only and may not be sold, redistributed or otherwise used for commercial purposes  The above information is an  only  It is not intended as medical advice for individual conditions or treatments  Talk to your doctor, nurse or pharmacist before following any medical regimen to see if it is safe and effective for you  Blood Thinners   WHAT YOU NEED TO KNOW:   Blood thinners are medicines that prevent blood clots from forming in an artery, vein, or the heart  These medicines may also prevent a blood clot from getting bigger  Blood clots prevent the flow of blood to organs and tissues such as the heart or a leg  The two main types of blood thinners are antiplatelet medicine and anticoagulant medicine  Antiplatelet medicine prevents platelets from sticking together and forming a clot  Anticoagulant medicine prevents the blood from clotting too much  DISCHARGE INSTRUCTIONS:   Call 911 for any of the following:   · You have any of the following signs of a heart attack:      ¨ Squeezing, pressure, or pain in your chest that lasts longer than 5 minutes or returns    ¨ Discomfort or pain in your back, neck, jaw, stomach, or arm     ¨ Trouble breathing    ¨ Nausea or vomiting    ¨ Lightheadedness or a sudden cold sweat, especially with chest pain or trouble breathing    · You have any of the following signs of a stroke:      ¨ Numbness or drooping on one side of your face     ¨ Weakness in an arm or leg    ¨ Confusion or difficulty speaking    ¨ Dizziness, a severe headache, or vision loss    · You feel lightheaded, short of breath, and have chest pain       · You cough up blood  · You have trouble breathing  · You are bleeding from a wound or skin injury and it won't stop after holding pressure for 10 minutes  · You have a fall or injury to the head  Seek care immediately if:   · You have a bad stomachache or headache that does not go away  · You feel weak, faint, or dizzy  · You vomit blood  · You have a fall or injury to any part of your body other than your head  · You are pregnant or think you may be pregnant  Contact your healthcare provider if:   · Your urine is red or dark brown  · Your bowel movements are red, dark brown, or black  · You bleed more than usual during your period  · You find bruises or blood blisters on your skin  · Your skin is itchy, swollen, or you have a rash  · You have questions or concerns about your condition or care  Foods and medicines to avoid:  Do not start any new medicines, vitamins, or herbal supplements before you talk to your healthcare provider  Do not make changes to your diet without talking to your healthcare provider  There are many medicines, vitamins, and herbal supplements that may prevent blood thinners from working correctly  Ask your healthcare provider for a full list of foods and medicines that can prevent anticoagulants from working correctly  The following may cause severe bleeding, or prevent anticoagulants from working correctly:  · Alcohol  may increase your risk for bleeding when taken with anticoagulants  Do not drink alcohol unless your healthcare provider says it is okay  · Changes in your regular vitamin K intake  can prevent anticoagulants, such as warfarin, from working correctly  Anticoagulants work best if you eat the same amount of vitamin K every day  Some foods that contain vitamin K include spinach, kale, broccoli, catherine lettuce, aishwarya greens, and kiwi   Ask your healthcare provider for more information about foods that contain vitamin K      · NSAIDs may increase your risk for bleeding  Examples include ibuprofen, aspirin, and naproxen  Do not take these medicines unless your healthcare provider says it is okay  · Some antibiotics  may increase your risk for bleeding  Talk to your healthcare provider before you take antibiotics  · Alternative medicines  such as ginkgo biloba, garlic, chamomile, and [place]'s wort, should not be taken with anticoagulants  Some may prevent anticoagulants from working, and others may increase your risk for bleeding  Self-care:   · Do not play contact sports  This may increase your risk for bleeding if you get injured or hit  Walk, swim, or do yoga to get exercise  Ask your healthcare provider about other exercises that are safe  · Use an electric razor to shave  This may prevent cuts that could bleed  · Use a soft bristled tooth brush and wax floss  This may prevent bleeding from your gums  · Prevent falls in and out of your home  Wear non-slip shoes or slippers when you are out of bed  Keep walkways clear  Remove throw rugs and other objects that can cause you to trip and fall  Use assistive devices as directed  · Be careful with sharp objects  Wear gloves when you work outside with sharp tools or in the garden  · Use contraception to prevent a pregnancy  Anticoagulants can cause problems with your baby, and dangerous bleeding during pregnancy  · Carry your medicine with you when you travel  This will prevent you from missing a dose of medicine if your bag gets lost  Talk to your healthcare provider before you travel  · Wear or carry a medical alert bracelet or necklace at all times  This will alert healthcare providers that you take an anticoagulant if you are unconscious or need emergency medical treatment  Other important information:   · Tell all of your healthcare providers and dentist that you take a blood thinner  This will help them plan for procedures and surgeries   It will also prevent them from giving you medicine that may interact with your blood thinner  · You should not take antiplatelet medicine  if you have liver or kidney disease, a peptic ulcer, or gastrointestinal disease  You should also not take this medicine if you have a bleeding disorder, uncontrolled asthma, or uncontrolled high blood pressure  These conditions may increase your risk for bleeding  · Take anticoagulants exactly as prescribed  Do not double up on a dose if you have missed a dose  Some anticoagulants should be taken at the same time every day  · Keep appointments for blood tests  if you are taking warfarin  Blood tests will help your healthcare provider make changes to your dose of anticoagulants so that they work correctly  The blood test will measure the amount of time it takes for your blood to clot  This is called the international normalized ratio (INR)  If your INR is too high, you may be at an increased risk for bleeding  If your INR is too low, you may be at an increased risk for blood clots  Your healthcare provider may change the dose of your anticoagulant medicine depending on the results of your blood test   Follow up with your healthcare provider as directed:  Write down your questions so you remember to ask them during your visits  © 2017 [facility] [facility] Information is for End User's use only and may not be sold, redistributed or otherwise used for commercial purposes  All illustrations and images included in CareNotes® are the copyrighted property of [employer] M [employer]  or Graememirta Michael  The above information is an  only  It is not intended as medical advice for individual conditions or treatments  Talk to your doctor, nurse or pharmacist before following any medical regimen to see if it is safe and effective for you        Safe Use of Anticoagulants   WHAT YOU NEED TO KNOW:   Anticoagulants are medicines used to treat or prevent blood clots by thinning your blood  These medicines can be used to treat conditions, such as pulmonary embolism or venous thrombosis  The medicines may be used to prevent deep vein thrombosis (DVT) or blood clots after surgery or long-term bedrest    DISCHARGE INSTRUCTIONS:   Seek care immediately if:   · You have a nose bleed that lasts longer than 10 minutes  · You cough up or vomit blood  · You have a sudden, severe headache, or you cannot move one side of your body  · You are dizzy and cannot keep your balance  · You are confused or have trouble speaking  · You have difficulty breathing or chest pain  Contact your healthcare provider if:   · You forget to take your medicine or you take too much  · You have nausea, and you are vomiting  · You bleed when you brush your teeth or blow your nose  · Your urine or bowel movements are dark in color or have blood in them  · You have excessive bruising or your legs have reddish-purple spots that look like a rash  · You have a sore throat, fever, and chills  · You become pregnant  · You have questions or concerns about your condition or care  Take your anticoagulants safely:   · Always  take your medicine exactly as prescribed by your healthcare provider  Take your medicine at the same time every day  This will help keep a steady level of medicine in your body  · Take 1 dose of medicine at a time  If you forget to take a dose, do not  take 2 doses  Too much anticoagulant in your body may increase your risk of bleeding and change medicine levels in your blood  · Do not stop taking your medicine  Follow your healthcare provider's directions  · Do not take aspirin or products containing aspirin unless prescribed by your healthcare provider  Many over-the-counter medicines contain aspirin  Aspirin may increase your risk of bleeding  · Do not start or stop taking any other medicines (prescribed or over the counter) or supplements    Ask your healthcare provider before changing any medicine or supplement  Certain medicines and supplements can cause excessive bleeding or cause your anticoagulant to not work properly  Safety measures:  Tell all of your healthcare providers that you are taking anticoagulants  Keep a current list of medicines and their dosages with you at all times  · Wear a bracelet or necklace that says you take this medicine  · You may need regular blood tests so your healthcare provider can decide how much medicine you need  The dose may need to be changed because of your test results  Write down changes to your dose of medicine  · Use a soft washcloth and a soft toothbrush  If you shave, use an electric razor  Avoid activities that can cause bruising or bleeding  · If you take warfarin, some foods can change how your blood clots  Do not make major changes to the foods you eat while you are taking warfarin  Warfarin works best when you eat about the same amount of vitamin K every day  Vitamin K is found in green leafy vegetables, broccoli, grapes, and other foods  Ask for more information about what to eat when you take warfarin  · Avoid drinking alcohol while taking anticoagulants  Alcohol can increase your risk of bleeding  © 2017 [facility] Information is for End User's use only and may not be sold, redistributed or otherwise used for commercial purposes  All illustrations and images included in CareNotes® are the copyrighted property of [employer]  or [address]  The above information is an  only  It is not intended as medical advice for individual conditions or treatments  Talk to your doctor, nurse or pharmacist before following any medical regimen to see if it is safe and effective for you

## 2018-02-09 NOTE — ASSESSMENT & PLAN NOTE
· Patient received dialysis today prior to discharge, uneventful, patient will follow up as outpatient with dialysis center on Monday    Follow-up as outpatient with Nephrology

## 2018-02-09 NOTE — PROGRESS NOTES
Patient has been ordered for telemetry monitoring, on call physician for [facility] was notified that there are currently no more telemetry monitors in house; and that patients heart rate with vital sign assessments has been maintained between 60-75 bpm with patient in no acute distress and no signs of pain or discomfort  Will pass on in report to the following nurse that if telemetry does become available patient should be placed on cardiac monitoring, and that in the morning 02/09 patient is ordered for an EKG  Will continue to assess throughout the evening

## 2018-02-09 NOTE — ASSESSMENT & PLAN NOTE
· Patient was admitted with sepsis, and this was found to be secondary to urinary tract infection  · On culture, Pseudomonas isolated with multiple drug resistance  · For this reason patient was started on meropenem that he has completed on February 8, total of 7 days   · Sepsis has resolved at time of discharge, patient remained hemodynamically stable and ready to be discharged home  · He declined inpatient rehabilitation, he will be discharged with visiting nurse and home PT and OT

## 2018-02-09 NOTE — ASSESSMENT & PLAN NOTE
· Patient had an episode of hypoglycemia while in the hospital, resolved the for now greater than 48 hours, patient will resume his home dose of insulin upon discharge and follow-up as outpatient with primary care physician

## 2018-02-09 NOTE — PROGRESS NOTES
Progress Note - Nephrology   Hernan Jalloh 74 y o  male MRN: [id number]  [facility] Encounter: [id number]      Assessment / Plan:    1  End-stage renal disease  · Hemodialysis on Monday Wednesday Friday   Patient was seen on hemodialysis at 9:45 a m  He was hemodynamically stable  Access blood flow was acceptable  Does have increased amount of weight on any was Re counseled regarding this  He does have a long history of excessive weight gains  2  Volume overload  · Volume removal with dialysis as tolerated by blood pressure  3  Anemia -last dose of Micera was 100 mcg and is given on every 4 week schedule   Last dose was on 02/05  · Trend hemoglobin for now, stable overall  · Continue Venofer on dialysis  4  Sepsis  · On meropenem by primary service for Pseudomonas UTI  · Failed outpatient Keflex and Cipro   Has a history of confusion with quinolones  · Blood cultures negative  5  Hyponatremia  · Chronic in nature  · Likely in part secondary to volume overload  · Continue fluid restriction  · Recheck sodium level on 2/10 after volume removal on dialysis 2/9   · Note that Celexa can cause hyponatremia as well and if the sodium level does not significantly improve or normalize, may need to rethink use of this medicine   However, will defer to his primary nephrology team, Dr Haylie Groves now, will decrease dose of Celexa from 10 to 5 mg   The patient states he has not been depressed and does not quite sure why he is on this  6  Hypertension  · Acceptable to low at times  7  The patient's heart rate was running in the 50s    Metoprolol dose was decreased on 02/02 and heart rate has improved and now in the 60s  8  C diff infection  · On Vancomycin suppressive therapy  8  Diabetes mellitus  9  Neuropathy  10  Cardiomyopathy with history of systolic and diastolic congestive heart failure -chronic and compensated  11  Atrial fibrillation  12   Confusion/myoclonic jerking- this could be a manifestation of Neurontin toxicity   Neurontin dose has been decreased from 300-200 mg as of   Amy Mittal had been on ciprofloxacin prior to admission and this too can cause confusion in dialysis patients but this was discontinued 7 days prior to admission   Of course, sepsis can cause confusion as well    Overall, mental status has improved but myoclonic jerking continues   Neurontin level is 11 3 with normal 4-16, however, this was drawn on a decreased dose of Neurontin  Will check URR  Myoclonic jerking has not changed  Yesterday, the patient thought it had improved somewhat  If the myoclonic jerking does not improve over the next 2-3 weeks, would suggest neurologic follow-up per the discretion of Dr Helder Ramey  13  Hyperphosphatemia  -Phosphate binder initiated  -follow-up phosphorus level has worsened  -will increase dose of phosphate all     14   Prolonged QT interval  -Celexa with amiodarone can cause this  Cardiology, it is noted  -Celexa dose has been decreased to 5 mg  Further tapering per Dr Helder Ramey  -further management per primary team           Subjective: The patient was seen examined on hemodialysis at 9:45 a m  He was hemodynamically stable  He denied any shortness of breath, chest pain or pressure, abdominal pain, vomiting  Objective:     Vitals: Blood pressure 90/53, pulse 64, temperature (!) 96 9 °F (36 1 °C), temperature source Tympanic, resp  rate 20, height 5' 6" (1 676 m), weight 66 2 kg (145 lb 14 4 oz), SpO2 90 %  ,Body mass index is 23 55 kg/m²  Temp (24hrs), Av 8 °F (36 6 °C), Min:96 9 °F (36 1 °C), Max:98 5 °F (36 9 °C)      Weight (last 2 days)     Date/Time   Weight    18 0600  66 2 (145 9)    18 0600  67 4 (148 59)                     Intake/Output Summary (Last 24 hours) at 18 1158  Last data filed at 18 0812   Gross per 24 hour   Intake             1250 ml   Output                0 ml   Net             1250 ml     I/O last 24 hours:   In: 3460 [P O :1320; I V :240; IV Piggyback:50]  Out: 0         Physical Exam    Gen -awake, alert and in no apparent distress  Cardiovascular -S1-S2, no pericardial friction rub respiratory were appreciated  Respiratory -clear to auscultation percussion, adequate inspiratory effort, no rales/rhonchi or wheezing was noted  GI/Abdomen-soft, nontender, no rebound or guarding was noted, normoactive bowel sounds        Invasive Devices     Peripheral Intravenous Line            Peripheral IV 02/07/18 Left Forearm 1 day          Line            Hemodialysis AV Fistula  Right Forearm -- days    Hemodialysis AV Fistula Right Forearm -- days          Drain            Gastrostomy/Enterostomy -- days    Gastrostomy/Enterostomy Percutaneous endoscopic gastrostomy (PEG) 20 Fr   days                Medications:    Scheduled Meds:  Current Facility-Administered Medications:  acetaminophen 650 mg Oral Q6H PRN Sweetie Pellet, CRNP    b complex-vitamin C-folic acid 1 capsule Oral Daily Sweetie Pellet, CRNP    cholestyramine sugar free 4 g Oral BID Sweetie Pellet, CRNP    cinacalcet 30 mg Oral HS Sweetie Pellet, CRNP    citalopram 5 mg Oral Daily Uziel Correa MD    diphenoxylate-atropine 2 tablet Oral 4x Daily Ismael Lundberg MD    gabapentin 200 mg Oral HS Uziel Correa MD    insulin detemir 5 Units Subcutaneous Q12H [address] Franne Nyhan, MD    insulin lispro 1-5 Units Subcutaneous HS Sweetie Pellet, CRNP    insulin lispro 1-6 Units Subcutaneous TID AC Prema Gómez, ALIE    insulin lispro 3 Units Subcutaneous TID AC Franne Nyhan, MD    iron sucrose 100 mg Intravenous After Dialysis Uziel Correa MD Last Rate: Stopped (02/07/18 9628)   lanthanum 500 mg Oral TID With Meals Uziel Correa MD    levothyroxine 137 mcg Oral Early Morning Sweetie Pellet, SUKUMARNP    melatonin 3 mg Oral HS Ismael Lundberg MD    meropenem 500 mg Intravenous Q24H Ismael Lundberg MD Last Rate: Stopped (02/08/18 1340)   metoprolol tartrate 12 5 mg Oral Q12H [address] Glenn Mcduffie MD    neomycin-bacitracin-polymyxin b 1 small application Topical BID China Hernandes MD    nystatin  Topical BID ALIE Culp    traMADol 50 mg Oral Q6H PRN ALIE Culp    vancomycin 125 mg Oral Daily ALIE [facility]    warfarin 10 mg Oral Daily (warfarin) Chnia Hernandes MD        PRN Meds:   acetaminophen    iron sucrose    traMADol    Continuous Infusions:         LAB RESULTS:        Results from last 7 days  Lab Units 02/09/18  0515 02/06/18  0543 02/04/18  0513 02/03/18  0543   WBC Thousand/uL  --  9 81 7 79 8 51   HEMOGLOBIN g/dL  --  9 5* 10 3* 10 5*   HEMATOCRIT %  --  29 3* 31 7* 31 6*   PLATELETS Thousands/uL  --  204 197 199   NEUTROS PCT %  --   --   --  70   LYMPHS PCT %  --   --   --  13*   MONOS PCT %  --   --   --  10   EOS PCT %  --   --   --  6   SODIUM mmol/L 126* 127* 130* 128*   POTASSIUM mmol/L 4 7 4 5 5 0 4 5   CHLORIDE mmol/L 90* 91* 94* 93*   CO2 mmol/L 24 27 24 25   BUN mg/dL 51* 36* 58* 39*   CREATININE mg/dL 6 14* 4 96* 6 02* 4 45*   CALCIUM mg/dL 7 7* 7 8* 7 6* 7 4*   GLUCOSE RANDOM mg/dL 142* 90 89 193*   PHOSPHORUS mg/dL 6 8* 5 7*  --   --        CUTURES:  Lab Results   Component Value Date    BLOODCX No Growth After 5 Days  01/30/2018    BLOODCX No Growth After 5 Days  01/30/2018    BLOODCX No Growth After 5 Days  11/29/2017    BLOODCX No Growth After 5 Days  11/29/2017    BLOODCX No Growth After 5 Days  09/17/2017    BLOODCX No Growth After 5 Days  09/17/2017    BLOODCX No Growth After 5 Days   08/03/2017    URINECX >100,000 cfu/ml Pseudomonas fluorescens/putida (A) 01/30/2018    URINECX >100,000 cfu/ml Pseudomonas aeruginosa (A) 11/27/2017    URINECX >100,000 cfu/ml Pseudomonas aeruginosa 09/17/2017    URINECX  09/17/2017     2127-0853 cfu/ml Oxidase Positive gram negative jose alfredo    URINECX >100,000 cfu/ml Pseudomonas aeruginosa 06/16/2017    URINECX No Growth <1000 cfu/mL 09/10/2016                 Portions of the record may have been created with voice recognition software  Occasional wrong word or "sound a like" substitutions may have occurred due to the inherent limitations of voice recognition software  Read the chart carefully and recognize, using context, where substitutions have occurred  If you have any questions, please contact the dictating provider

## 2018-02-28 ENCOUNTER — HOSPITAL ENCOUNTER (INPATIENT)
Facility: HOSPITAL | Age: 68
LOS: 4 days | Discharge: HOME | DRG: 689 | End: 2018-03-04
Attending: HOSPITALIST | Admitting: HOSPITALIST
Payer: COMMERCIAL

## 2018-02-28 ENCOUNTER — APPOINTMENT (EMERGENCY)
Dept: CT IMAGING | Facility: HOSPITAL | Age: 68
DRG: 689 | End: 2018-02-28
Payer: COMMERCIAL

## 2018-02-28 DIAGNOSIS — N39.0 URINARY TRACT INFECTION: Primary | ICD-10-CM

## 2018-02-28 DIAGNOSIS — R82.90 ABNORMAL URINALYSIS: ICD-10-CM

## 2018-02-28 DIAGNOSIS — I48.0 PAROXYSMAL ATRIAL FIBRILLATION (HCC): ICD-10-CM

## 2018-02-28 DIAGNOSIS — N18.6 ESRD (END STAGE RENAL DISEASE) ON DIALYSIS (HCC): Chronic | ICD-10-CM

## 2018-02-28 DIAGNOSIS — K80.50 CHOLEDOCHOLITHIASIS: ICD-10-CM

## 2018-02-28 DIAGNOSIS — N18.6 STAGE 5 CHRONIC KIDNEY DISEASE ON CHRONIC DIALYSIS (HCC): ICD-10-CM

## 2018-02-28 DIAGNOSIS — K83.8 PNEUMOBILIA: ICD-10-CM

## 2018-02-28 DIAGNOSIS — E10.21 TYPE 1 DIABETES MELLITUS WITH NEPHROPATHY (HCC): Chronic | ICD-10-CM

## 2018-02-28 DIAGNOSIS — Z99.2 ESRD (END STAGE RENAL DISEASE) ON DIALYSIS (HCC): Chronic | ICD-10-CM

## 2018-02-28 DIAGNOSIS — E87.1 HYPONATREMIA: ICD-10-CM

## 2018-02-28 DIAGNOSIS — Z99.2 STAGE 5 CHRONIC KIDNEY DISEASE ON CHRONIC DIALYSIS (HCC): ICD-10-CM

## 2018-02-28 PROBLEM — R94.31 PROLONGED Q-T INTERVAL ON ECG: Status: RESOLVED | Noted: 2018-02-08 | Resolved: 2018-02-28

## 2018-02-28 PROBLEM — B96.5 PSEUDOMONAS URINARY TRACT INFECTION: Status: RESOLVED | Noted: 2017-06-19 | Resolved: 2018-02-28

## 2018-02-28 LAB
ALBUMIN SERPL BCP-MCNC: 2.1 G/DL (ref 3.5–5)
ALP SERPL-CCNC: 171 U/L (ref 46–116)
ALT SERPL W P-5'-P-CCNC: 19 U/L (ref 12–78)
ANION GAP SERPL CALCULATED.3IONS-SCNC: 13 MMOL/L (ref 4–13)
ANISOCYTOSIS BLD QL SMEAR: PRESENT
APTT PPP: 66 SECONDS (ref 23–35)
AST SERPL W P-5'-P-CCNC: 16 U/L (ref 5–45)
BACTERIA UR QL AUTO: ABNORMAL /HPF
BASOPHILS # BLD MANUAL: 0.34 THOUSAND/UL (ref 0–0.1)
BASOPHILS NFR MAR MANUAL: 4 % (ref 0–1)
BILIRUB SERPL-MCNC: 0.3 MG/DL (ref 0.2–1)
BILIRUB UR QL STRIP: NEGATIVE
BUN SERPL-MCNC: 52 MG/DL (ref 5–25)
CALCIUM SERPL-MCNC: 7.6 MG/DL (ref 8.3–10.1)
CHLORIDE SERPL-SCNC: 92 MMOL/L (ref 100–108)
CLARITY UR: ABNORMAL
CO2 SERPL-SCNC: 24 MMOL/L (ref 21–32)
COLOR UR: ABNORMAL
CREAT SERPL-MCNC: 6.58 MG/DL (ref 0.6–1.3)
EOSINOPHIL # BLD MANUAL: 0.59 THOUSAND/UL (ref 0–0.4)
EOSINOPHIL NFR BLD MANUAL: 7 % (ref 0–6)
ERYTHROCYTE [DISTWIDTH] IN BLOOD BY AUTOMATED COUNT: 15.3 % (ref 11.6–15.1)
GFR SERPL CREATININE-BSD FRML MDRD: 8 ML/MIN/1.73SQ M
GLUCOSE SERPL-MCNC: 208 MG/DL (ref 65–140)
GLUCOSE SERPL-MCNC: 264 MG/DL (ref 65–140)
GLUCOSE UR STRIP-MCNC: ABNORMAL MG/DL
HCT VFR BLD AUTO: 32.1 % (ref 36.5–49.3)
HGB BLD-MCNC: 10.1 G/DL (ref 12–17)
HGB UR QL STRIP.AUTO: ABNORMAL
INR PPP: 2.75 (ref 0.86–1.16)
KETONES UR STRIP-MCNC: NEGATIVE MG/DL
LACTATE SERPL-SCNC: 1.3 MMOL/L (ref 0.5–2)
LEUKOCYTE ESTERASE UR QL STRIP: ABNORMAL
LIPASE SERPL-CCNC: 173 U/L (ref 73–393)
LYMPHOCYTES # BLD AUTO: 1.19 THOUSAND/UL (ref 0.6–4.47)
LYMPHOCYTES # BLD AUTO: 14 % (ref 14–44)
MCH RBC QN AUTO: 28.8 PG (ref 26.8–34.3)
MCHC RBC AUTO-ENTMCNC: 31.5 G/DL (ref 31.4–37.4)
MCV RBC AUTO: 92 FL (ref 82–98)
MONOCYTES # BLD AUTO: 1.02 THOUSAND/UL (ref 0–1.22)
MONOCYTES NFR BLD: 12 % (ref 4–12)
MYELOCYTES NFR BLD MANUAL: 1 % (ref 0–1)
NEUTROPHILS # BLD MANUAL: 5.18 THOUSAND/UL (ref 1.85–7.62)
NEUTS SEG NFR BLD AUTO: 61 % (ref 43–75)
NITRITE UR QL STRIP: POSITIVE
NON-SQ EPI CELLS URNS QL MICRO: ABNORMAL /HPF
OTHER STN SPEC: ABNORMAL
PH UR STRIP.AUTO: 7 [PH] (ref 4.5–8)
PLATELET # BLD AUTO: 293 THOUSANDS/UL (ref 149–390)
PLATELET BLD QL SMEAR: ADEQUATE
PMV BLD AUTO: 8.5 FL (ref 8.9–12.7)
POLYCHROMASIA BLD QL SMEAR: PRESENT
POTASSIUM SERPL-SCNC: 5.5 MMOL/L (ref 3.5–5.3)
PROT SERPL-MCNC: 7.6 G/DL (ref 6.4–8.2)
PROT UR STRIP-MCNC: >=300 MG/DL
PROTHROMBIN TIME: 30.1 SECONDS (ref 12.1–14.4)
RBC # BLD AUTO: 3.51 MILLION/UL (ref 3.88–5.62)
RBC #/AREA URNS AUTO: ABNORMAL /HPF
SODIUM SERPL-SCNC: 129 MMOL/L (ref 136–145)
SP GR UR STRIP.AUTO: 1.02 (ref 1–1.03)
TOTAL CELLS COUNTED SPEC: 100
TROPONIN I SERPL-MCNC: <0.02 NG/ML
UROBILINOGEN UR QL STRIP.AUTO: 0.2 E.U./DL
VARIANT LYMPHS # BLD AUTO: 1 %
WBC # BLD AUTO: 8.49 THOUSAND/UL (ref 4.31–10.16)
WBC #/AREA URNS AUTO: ABNORMAL /HPF

## 2018-02-28 PROCEDURE — 83690 ASSAY OF LIPASE: CPT | Performed by: PHYSICIAN ASSISTANT

## 2018-02-28 PROCEDURE — 36415 COLL VENOUS BLD VENIPUNCTURE: CPT | Performed by: PHYSICIAN ASSISTANT

## 2018-02-28 PROCEDURE — 85610 PROTHROMBIN TIME: CPT | Performed by: PHYSICIAN ASSISTANT

## 2018-02-28 PROCEDURE — 85007 BL SMEAR W/DIFF WBC COUNT: CPT | Performed by: PHYSICIAN ASSISTANT

## 2018-02-28 PROCEDURE — 87086 URINE CULTURE/COLONY COUNT: CPT

## 2018-02-28 PROCEDURE — 83605 ASSAY OF LACTIC ACID: CPT | Performed by: PHYSICIAN ASSISTANT

## 2018-02-28 PROCEDURE — 87077 CULTURE AEROBIC IDENTIFY: CPT

## 2018-02-28 PROCEDURE — 87186 SC STD MICRODIL/AGAR DIL: CPT

## 2018-02-28 PROCEDURE — 81001 URINALYSIS AUTO W/SCOPE: CPT

## 2018-02-28 PROCEDURE — 85027 COMPLETE CBC AUTOMATED: CPT | Performed by: PHYSICIAN ASSISTANT

## 2018-02-28 PROCEDURE — 99223 1ST HOSP IP/OBS HIGH 75: CPT | Performed by: PHYSICIAN ASSISTANT

## 2018-02-28 PROCEDURE — 85730 THROMBOPLASTIN TIME PARTIAL: CPT | Performed by: PHYSICIAN ASSISTANT

## 2018-02-28 PROCEDURE — 84484 ASSAY OF TROPONIN QUANT: CPT | Performed by: PHYSICIAN ASSISTANT

## 2018-02-28 PROCEDURE — 80053 COMPREHEN METABOLIC PANEL: CPT | Performed by: PHYSICIAN ASSISTANT

## 2018-02-28 PROCEDURE — 93005 ELECTROCARDIOGRAM TRACING: CPT

## 2018-02-28 PROCEDURE — 99285 EMERGENCY DEPT VISIT HI MDM: CPT

## 2018-02-28 PROCEDURE — 82948 REAGENT STRIP/BLOOD GLUCOSE: CPT

## 2018-02-28 PROCEDURE — 87040 BLOOD CULTURE FOR BACTERIA: CPT | Performed by: PHYSICIAN ASSISTANT

## 2018-02-28 PROCEDURE — 74176 CT ABD & PELVIS W/O CONTRAST: CPT

## 2018-02-28 RX ORDER — ALBUTEROL SULFATE 2.5 MG/3ML
2.5 SOLUTION RESPIRATORY (INHALATION) EVERY 4 HOURS PRN
Status: DISCONTINUED | OUTPATIENT
Start: 2018-02-28 | End: 2018-03-04 | Stop reason: HOSPADM

## 2018-02-28 RX ORDER — MELATONIN
1000 DAILY
Status: DISCONTINUED | OUTPATIENT
Start: 2018-03-01 | End: 2018-03-04 | Stop reason: HOSPADM

## 2018-02-28 RX ORDER — CINACALCET 30 MG/1
30 TABLET, FILM COATED ORAL
Status: DISCONTINUED | OUTPATIENT
Start: 2018-02-28 | End: 2018-03-04 | Stop reason: HOSPADM

## 2018-02-28 RX ORDER — LANTHANUM CARBONATE 500 MG/1
1000 TABLET, CHEWABLE ORAL
Status: DISCONTINUED | OUTPATIENT
Start: 2018-03-01 | End: 2018-03-04 | Stop reason: HOSPADM

## 2018-02-28 RX ORDER — ACETAMINOPHEN 325 MG/1
650 TABLET ORAL EVERY 6 HOURS PRN
Status: DISCONTINUED | OUTPATIENT
Start: 2018-02-28 | End: 2018-03-04 | Stop reason: HOSPADM

## 2018-02-28 RX ORDER — WARFARIN SODIUM 5 MG/1
10 TABLET ORAL
Status: DISCONTINUED | OUTPATIENT
Start: 2018-02-28 | End: 2018-03-02

## 2018-02-28 RX ORDER — AMIODARONE HYDROCHLORIDE 200 MG/1
200 TABLET ORAL DAILY
Status: DISCONTINUED | OUTPATIENT
Start: 2018-03-01 | End: 2018-03-04 | Stop reason: HOSPADM

## 2018-02-28 RX ORDER — PANTOPRAZOLE SODIUM 40 MG/1
40 TABLET, DELAYED RELEASE ORAL
Status: DISCONTINUED | OUTPATIENT
Start: 2018-03-01 | End: 2018-03-04 | Stop reason: HOSPADM

## 2018-02-28 RX ORDER — NYSTATIN 100000 [USP'U]/G
POWDER TOPICAL 2 TIMES DAILY
Status: DISCONTINUED | OUTPATIENT
Start: 2018-02-28 | End: 2018-03-04 | Stop reason: HOSPADM

## 2018-02-28 RX ORDER — OXYCODONE HYDROCHLORIDE 5 MG/1
5 TABLET ORAL EVERY 4 HOURS PRN
Status: DISCONTINUED | OUTPATIENT
Start: 2018-02-28 | End: 2018-03-04 | Stop reason: HOSPADM

## 2018-02-28 RX ORDER — CITALOPRAM 10 MG/1
5 TABLET ORAL DAILY
Status: DISCONTINUED | OUTPATIENT
Start: 2018-03-01 | End: 2018-03-04 | Stop reason: HOSPADM

## 2018-02-28 RX ORDER — CALCIUM CARBONATE 500(1250)
2 TABLET ORAL
Status: DISCONTINUED | OUTPATIENT
Start: 2018-03-01 | End: 2018-03-04 | Stop reason: HOSPADM

## 2018-02-28 RX ORDER — SACCHAROMYCES BOULARDII 250 MG
250 CAPSULE ORAL 2 TIMES DAILY
Status: DISCONTINUED | OUTPATIENT
Start: 2018-02-28 | End: 2018-02-28 | Stop reason: SDUPTHER

## 2018-02-28 RX ORDER — ONDANSETRON 2 MG/ML
4 INJECTION INTRAMUSCULAR; INTRAVENOUS EVERY 6 HOURS PRN
Status: DISCONTINUED | OUTPATIENT
Start: 2018-02-28 | End: 2018-03-04 | Stop reason: HOSPADM

## 2018-02-28 RX ORDER — MIDODRINE HYDROCHLORIDE 2.5 MG/1
2.5 TABLET ORAL 2 TIMES DAILY
Status: DISCONTINUED | OUTPATIENT
Start: 2018-02-28 | End: 2018-03-04 | Stop reason: HOSPADM

## 2018-02-28 RX ORDER — LANOLIN ALCOHOL/MO/W.PET/CERES
3 CREAM (GRAM) TOPICAL
Status: DISCONTINUED | OUTPATIENT
Start: 2018-02-28 | End: 2018-03-04 | Stop reason: HOSPADM

## 2018-02-28 RX ORDER — SACCHAROMYCES BOULARDII 250 MG
250 CAPSULE ORAL 2 TIMES DAILY
Status: DISCONTINUED | OUTPATIENT
Start: 2018-02-28 | End: 2018-03-04 | Stop reason: HOSPADM

## 2018-02-28 RX ORDER — DIPHENOXYLATE HYDROCHLORIDE AND ATROPINE SULFATE 2.5; .025 MG/1; MG/1
1 TABLET ORAL 4 TIMES DAILY PRN
Status: DISCONTINUED | OUTPATIENT
Start: 2018-02-28 | End: 2018-03-04 | Stop reason: HOSPADM

## 2018-02-28 RX ORDER — TRAMADOL HYDROCHLORIDE 50 MG/1
50 TABLET ORAL EVERY 6 HOURS PRN
Status: DISCONTINUED | OUTPATIENT
Start: 2018-02-28 | End: 2018-03-04 | Stop reason: HOSPADM

## 2018-02-28 RX ORDER — CHOLECALCIFEROL (VITAMIN D3) 10 MCG
1 TABLET ORAL DAILY
Status: DISCONTINUED | OUTPATIENT
Start: 2018-03-01 | End: 2018-03-04 | Stop reason: HOSPADM

## 2018-02-28 RX ORDER — GABAPENTIN 100 MG/1
200 CAPSULE ORAL
Status: DISCONTINUED | OUTPATIENT
Start: 2018-02-28 | End: 2018-03-04 | Stop reason: HOSPADM

## 2018-02-28 RX ADMIN — NYSTATIN: 100000 POWDER TOPICAL at 23:02

## 2018-02-28 RX ADMIN — MEROPENEM 500 MG: 500 INJECTION, POWDER, FOR SOLUTION INTRAVENOUS at 23:17

## 2018-02-28 RX ADMIN — WARFARIN SODIUM 10 MG: 5 TABLET ORAL at 23:00

## 2018-02-28 RX ADMIN — CINACALCET HYDROCHLORIDE 30 MG: 30 TABLET, COATED ORAL at 23:11

## 2018-02-28 RX ADMIN — MIDODRINE HYDROCHLORIDE 2.5 MG: 2.5 TABLET ORAL at 23:00

## 2018-02-28 RX ADMIN — MELATONIN 3 MG: 3 TAB ORAL at 23:00

## 2018-02-28 RX ADMIN — PANCRELIPASE 72000 UNITS: 24000; 76000; 120000 CAPSULE, DELAYED RELEASE PELLETS ORAL at 23:10

## 2018-02-28 RX ADMIN — METOPROLOL TARTRATE 12.5 MG: 25 TABLET ORAL at 22:59

## 2018-02-28 RX ADMIN — Medication 250 MG: at 23:00

## 2018-02-28 RX ADMIN — GABAPENTIN 200 MG: 100 CAPSULE ORAL at 23:00

## 2018-02-28 RX ADMIN — Medication 3.38 G: at 19:49

## 2018-02-28 NOTE — ED PROVIDER NOTES
History  Chief Complaint   Patient presents with    Abdominal Pain     pt here with c/o lower abd pain and painful uirination  Was recently admitted for UTI  77-year-old male presents to the emergency department with complaints of abdominal discomfort  States that he has had lower abdominal pain and pain with urination over the past several days  Patient is a dialysis patient and he is seen at [address]   in [place] on M-W-F  States he did not go today because he was not feeling well  States that he does not make urine daily but that he had urinated yesterday with some blood in it  Wife reports history of previous urinary tract infection that needed to be treated with meropenem due to resistance  States that he has not had any fevers at home but has felt chilled  The notes abdominal pain seems to be more right-sided in nature  He has a history of a cholecystectomy in 1989  Also had a head to from 4 years ago that he no longer uses  States that the tube was initially placed when he was being treated for sepsis and was unable to eat due to prolonged ventilation          History provided by:  Patient and spouse   used: No    Abdominal Pain   Pain location:  RUQ and RLQ  Pain quality: cramping    Pain radiates to:  Does not radiate  Pain severity:  Mild  Onset quality:  Gradual  Duration:  1 day  Timing:  Constant  Progression:  Waxing and waning  Chronicity:  New  Context: not alcohol use, not awakening from sleep, not diet changes, not eating, not laxative use, not medication withdrawal, not previous surgeries, not recent illness, not recent sexual activity, not recent travel, not retching, not sick contacts, not suspicious food intake and not trauma    Relieved by:  Nothing  Ineffective treatments:  None tried  Associated symptoms: hematuria and nausea    Associated symptoms: no anorexia, no belching, no chest pain, no chills, no constipation, no cough, no diarrhea, no dysuria, no fatigue, no fever, no flatus, no hematemesis, no hematochezia, no melena, no shortness of breath, no sore throat, no vaginal bleeding, no vaginal discharge and no vomiting        Prior to Admission Medications   Prescriptions Last Dose Informant Patient Reported? Taking?    Cholecalciferol (VITAMIN D-3) 1000 units CAPS   Yes Yes   Sig: Take by mouth daily   Multiple Vitamin (DAILY VALUE MULTIVITAMIN) TABS   Yes Yes   Sig: Take 1 tablet by mouth daily   Probiotic Product (PRO-BIOTIC BLEND) CAPS   Yes Yes   Sig: Take 1 capsule by mouth daily   acetaminophen (TYLENOL) 325 mg tablet   Yes Yes   Sig: Take 650 mg by mouth every 6 (six) hours as needed for mild pain   albuterol (2 5 mg/3 mL) 0 083 % nebulizer solution   Yes Yes   Sig: Inhale 1 each   amiodarone 200 mg tablet   No Yes   Sig: Take 1 tablet (200 mg total) by mouth daily for 30 days   b complex-vitamin C-folic acid (RENAL) 1 mg   Yes Yes   Sig: Take 1 mg by mouth daily   calcium citrate (CALCITRATE) 950 MG tablet   Yes Yes   Sig: Take 2,400 mg by mouth 3 (three) times a day with meals   cinacalcet (SENSIPAR) 30 mg tablet   Yes Yes   Sig: Take 30 mg by mouth daily at bedtime   citalopram (CeleXA) 10 mg tablet   No Yes   Sig: Take 0 5 tablets (5 mg total) by mouth daily   diphenoxylate-atropine (LOMOTIL) 2 5-0 025 mg per tablet   Yes Yes   Sig: TAKE 1 TABLET FOUR TIMES A DAY AS NEEDED FOR DIARRHEA    gabapentin (NEURONTIN) 100 mg capsule   No Yes   Sig: Take 2 capsules (200 mg total) by mouth daily at bedtime   lanthanum (FOSRENOL) 1000 MG chewable tablet   No Yes   Sig: Chew 1 tablet (1,000 mg total) 3 (three) times a day with meals   melatonin 3 mg   No Yes   Sig: Take 1 tablet (3 mg total) by mouth daily at bedtime   menthol-zinc oxide (CALMOSEPTINE) 0 44-20 6 % OINT   Yes Yes   Sig: Apply topically   metoprolol tartrate (LOPRESSOR) 25 mg tablet   No Yes   Sig: Take 0 5 tablets (12 5 mg total) by mouth every 12 (twelve) hours   midodrine (PROAMATINE) 2 5 mg tablet   Yes Yes   Sig: Take 1 tablet by mouth 2 (two) times a day   nystatin (MYCOSTATIN) powder   No Yes   Sig: Apply topically 2 (two) times a day   oxyCODONE (OXY-IR) 5 MG capsule   Yes Yes   Sig: Take 1 capsule by mouth every 4 (four) hours as needed   pancrelipase, Lip-Prot-Amyl, (CREON) 24,000 units   Yes Yes   Sig: Take 3 capsules by mouth 3 (three) times a day   pantoprazole (PROTONIX) 40 mg tablet   Yes Yes   Sig: Take 1 tablet by mouth daily   saccharomyces boulardii (FLORASTOR) 250 mg capsule   Yes Yes   Sig: Take 1 capsule by mouth 2 (two) times a day   traMADol (ULTRAM) 50 mg tablet   Yes Yes   Sig: Take 50 mg by mouth every 6 (six) hours as needed for moderate pain   vancomycin (VANCOCIN) 50mg/mL SOLN   No Yes   Sig: Take 2 5 mL by mouth daily   warfarin (COUMADIN) 5 mg tablet   No Yes   Sig: Take 2 tablets (10 mg total) by mouth daily      Facility-Administered Medications: None       Past Medical History:   Diagnosis Date    Acquired hypothyroidism     underactive    Atrial fibrillation (HCC)     Choledocholithiasis 2/28/2018    Chronic kidney disease-mineral and bone disorder 11/27/2017    Clostridium difficile infection 04/2017    Diabetes mellitus ([facility] )     Dialysis patient ([facility] )     Diarrhea     ESRD (end stage renal disease) on dialysis ([facility] )     Hx of cardiac arrest     Hypertension     Hyponatremia 8/8/2017    Neuropathy     Right lower lobe pneumonia ([facility] ) 2/20/2017    Sepsis ([facility] ) 04/2017    Polymicrobial MRSA, VRE    Systolic and diastolic CHF, chronic (HCC)     EF 35%, Grade II DD       Past Surgical History:   Procedure Laterality Date    CATARACT EXTRACTION      CHG GI ENDOSCOPIC ULTRASOUND N/A 3/10/2016    Procedure: LINEAR ENDOSCOPIC U/S;  Surgeon: Miriam Jenkins MD;  [facility]: [facility];   Service: Gastroenterology    CHOLECYSTECTOMY      GASTROSTOMY TUBE PLACEMENT N/A 4/12/2017    Procedure: INSERTION PEG TUBE;  Surgeon: Karena Rai MD;  Location: [facility]; Service:     LEG AMPUTATION THROUGH LOWER TIBIA AND FIBULA Right 4/6/2017    Procedure: AMPUTATION BELOW KNEE (BKA); Surgeon: Gisell Carrera DO;  Location[facility];  Service:     TOE AMPUTATION  2000       Family History   Problem Relation Age of Onset    Diabetes Father     Cancer Other     Lupus Mother      I have reviewed and agree with the history as documented  Social History   Substance Use Topics    Smoking status: Former Smoker     Packs/day: 1 00     Years: 46 00     Types: Cigarettes     Start date: 1970     Quit date: 3/1/2017    Smokeless tobacco: Never Used    Alcohol use No        Review of Systems   Constitutional: Negative for activity change, appetite change, chills, fatigue and fever  HENT: Negative for congestion, dental problem, drooling, ear discharge, ear pain, mouth sores, nosebleeds, rhinorrhea, sore throat and trouble swallowing  Eyes: Negative for pain, discharge and itching  Respiratory: Negative for cough, chest tightness, shortness of breath and wheezing  Cardiovascular: Negative for chest pain and palpitations  Gastrointestinal: Positive for abdominal pain and nausea  Negative for anorexia, blood in stool, constipation, diarrhea, flatus, hematemesis, hematochezia, melena and vomiting  Endocrine: Negative for cold intolerance and heat intolerance  Genitourinary: Positive for hematuria  Negative for difficulty urinating, dysuria, flank pain, frequency, urgency, vaginal bleeding and vaginal discharge  Skin: Negative for rash and wound  Allergic/Immunologic: Negative for food allergies and immunocompromised state  Neurological: Positive for weakness  Negative for dizziness, seizures, syncope, numbness and headaches  Psychiatric/Behavioral: Negative for agitation, behavioral problems and confusion         Physical Exam  ED Triage Vitals [02/28/18 1254]   Temperature Pulse Respirations Blood Pressure SpO2   98 9 °F (37 2 °C) 72 18 143/70 96 %      Temp Source Heart Rate Source Patient Position - Orthostatic VS BP Location FiO2 (%)   Oral Monitor Sitting Left arm --      Pain Score       6           Orthostatic Vital Signs  Vitals:    03/01/18 1300 03/01/18 1330 03/01/18 1400 03/01/18 1430   BP: 113/50 122/62 138/72 140/75   Pulse: 71 77 75 74   Patient Position - Orthostatic VS:           Physical Exam   Constitutional: He is oriented to person, place, and time  He appears well-developed and well-nourished  No distress  HENT:   Head: Normocephalic and atraumatic  Right Ear: External ear normal    Left Ear: External ear normal    Mouth/Throat: Oropharynx is clear and moist  No oropharyngeal exudate  Eyes: Conjunctivae are normal    Neck: No JVD present  No tracheal deviation present  Cardiovascular: Normal rate, regular rhythm and normal heart sounds  Exam reveals no gallop and no friction rub  No murmur heard  Pulmonary/Chest: Effort normal and breath sounds normal  No respiratory distress  He has no wheezes  He has no rales  He exhibits no tenderness  Abdominal: Soft  Bowel sounds are normal  He exhibits no distension  There is tenderness in the right upper quadrant, right lower quadrant and suprapubic area  There is no guarding and no CVA tenderness  Musculoskeletal: Normal range of motion  He exhibits no edema, tenderness or deformity  Lymphadenopathy:     He has no cervical adenopathy  Neurological: He is alert and oriented to person, place, and time  Skin: Skin is warm and dry  No rash noted  He is not diaphoretic  No erythema  Psychiatric: He has a normal mood and affect  His behavior is normal    Nursing note and vitals reviewed        ED Medications  Medications   b complex-vitamin C-folic acid (NEPHROCAPS) capsule 1 capsule (0 capsules Oral Hold 3/1/18 0828)   cinacalcet (SENSIPAR) tablet 30 mg (30 mg Oral Given 2/28/18 2311)   traMADol (ULTRAM) tablet 50 mg (not administered)   calcium carbonate (OYSTER SHELL,OSCAL) 500 mg tablet 2 tablet (2 tablets Oral Not Given 3/1/18 0828)   nystatin (MYCOSTATIN) powder (0 application Topical Hold 3/1/18 0846)   vancomycin (VANCOCIN) oral solution 125 mg (125 mg Oral Given 3/1/18 0846)   albuterol inhalation solution 2 5 mg (not administered)   menthol-zinc oxide (CALMOSEPTINE) 0 44-20 6 % ointment (not administered)   pancrelipase (Lip-Prot-Amyl) (CREON) delayed release capsule 72,000 Units (0 Units Oral Hold 3/1/18 0846)   cholecalciferol (VITAMIN D3) tablet 1,000 Units (0 Units Oral Hold 3/1/18 0828)   multivitamin-minerals (CENTRUM) tablet 1 tablet (0 tablets Oral Hold 3/1/18 0827)   saccharomyces boulardii (FLORASTOR) capsule 250 mg (0 mg Oral Hold 3/1/18 0826)   midodrine (PROAMATINE) tablet 2 5 mg (2 5 mg Oral Given 3/1/18 0846)   oxyCODONE (ROXICODONE) IR tablet 5 mg (not administered)   pantoprazole (PROTONIX) EC tablet 40 mg (40 mg Oral Given 3/1/18 0526)   diphenoxylate-atropine (LOMOTIL) 2 5-0 025 mg per tablet 1 tablet (not administered)   amiodarone tablet 200 mg (0 mg Oral Hold 3/1/18 0828)   citalopram (CeleXA) tablet 5 mg (0 mg Oral Hold 3/1/18 0847)   gabapentin (NEURONTIN) capsule 200 mg (200 mg Oral Given 2/28/18 2300)   lanthanum (FOSRENOL) chewable tablet 1,000 mg (1,000 mg Oral Given 3/1/18 1354)   melatonin tablet 3 mg (3 mg Oral Given 2/28/18 2300)   metoprolol tartrate (LOPRESSOR) partial tablet 12 5 mg (0 mg Oral Hold 3/1/18 0827)   warfarin (COUMADIN) tablet 10 mg (10 mg Oral Given 2/28/18 2300)   acetaminophen (TYLENOL) tablet 650 mg (not administered)   ondansetron (ZOFRAN) injection 4 mg (not administered)   meropenem (MERREM) 500 mg in sodium chloride 0 9 % 50 mL IVPB (0 mg Intravenous Stopped 3/1/18 0000)   insulin lispro (HumaLOG) 100 units/mL subcutaneous injection 1-6 Units (3 Units Subcutaneous Given 3/1/18 1353)   insulin lispro (HumaLOG) 100 units/mL subcutaneous injection 1-5 Units (not administered)   insulin lispro (HumaLOG) 100 units/mL subcutaneous injection 1-5 Units (not administered)   insulin glargine (LANTUS) subcutaneous injection 5 Units (5 Units Subcutaneous Given 3/1/18 1354)   insulin lispro (HumaLOG) 100 units/mL subcutaneous injection 5 Units (5 Units Subcutaneous Given 3/1/18 1353)   piperacillin-tazobactam (ZOSYN) 3 375 g in sodium chloride 0 9 % 50 mL IVPB (0 g Intravenous Stopped 2/28/18 2136)       Diagnostic Studies  Results Reviewed     Procedure Component Value Units Date/Time    Urine Microscopic [[id number]]  (Abnormal) Collected:  02/28/18 1900    Lab Status:  Final result Specimen:  Urine from Urine, Other Updated:  02/28/18 1931     RBC, UA Innumerable (A) /hpf      WBC, UA Innumerable (A) /hpf      Epithelial Cells Occasional /hpf      Bacteria, UA Innumerable (A) /hpf      OTHER OBSERVATIONS Renal Epithelial Cells Present    Urine culture [[id number]] Collected:  02/28/18 1900    Lab Status: In process Specimen:  Urine from Urine, Other Updated:  02/28/18 1931    Lactic acid, plasma [[id number]]  (Normal) Collected:  02/28/18 1825    Lab Status:  Final result Specimen:  Blood from Arm, Left Updated:  02/28/18 1901     LACTIC ACID 1 3 mmol/L     Narrative:         Result may be elevated if tourniquet was used during collection      ED Urine Macroscopic [[id number]]  (Abnormal) Collected:  02/28/18 1900    Lab Status:  Final result Specimen:  Urine Updated:  02/28/18 1858     Color, UA Yuli     Clarity, UA Cloudy     pH, UA 7 0     Leukocytes, UA Large (A)     Nitrite, UA Positive (A)     Protein, UA >=300 (A) mg/dl      Glucose,  (1/4%) (A) mg/dl      Ketones, UA Negative mg/dl      Urobilinogen, UA 0 2 E U /dl      Bilirubin, UA Negative     Blood, UA Large (A)     Specific [place], UA 1 025    Narrative:       CLINITEK RESULT    Comprehensive metabolic panel [[id number]]  (Abnormal) Collected:  02/28/18 1833    Lab Status:  Final result Specimen:  Blood from Arm, Left Updated:  02/28/18 1857     Sodium 129 (L) mmol/L      Potassium 5 5 (H) mmol/L      Chloride 92 (L) mmol/L      CO2 24 mmol/L      Anion Gap 13 mmol/L      BUN 52 (H) mg/dL      Creatinine 6 58 (H) mg/dL      Glucose 264 (H) mg/dL      Calcium 7 6 (L) mg/dL      AST 16 U/L      ALT 19 U/L      Alkaline Phosphatase 171 (H) U/L      Total Protein 7 6 g/dL      Albumin 2 1 (L) g/dL      Total Bilirubin 0 30 mg/dL      eGFR 8 ml/min/1 73sq m     Narrative:         National Kidney Disease Education Program recommendations are as follows:  GFR calculation is accurate only with a steady state creatinine  Chronic Kidney disease less than 60 ml/min/1 73 sq  meters  Kidney failure less than 15 ml/min/1 73 sq  meters  CBC and differential [[id number]]  (Abnormal) Collected:  02/28/18 1526    Lab Status:  Final result Specimen:  Blood from Arm, Left Updated:  02/28/18 1627     WBC 8 49 Thousand/uL      RBC 3 51 (L) Million/uL      Hemoglobin 10 1 (L) g/dL      Hematocrit 32 1 (L) %      MCV 92 fL      MCH 28 8 pg      MCHC 31 5 g/dL      RDW 15 3 (H) %      MPV 8 5 (L) fL      Platelets 278 Thousands/uL     Narrative: This is an appended report  These results have been appended to a previously verified report  Blood culture #1 [[id number]] Collected:  02/28/18 1611    Lab Status: In process Specimen:  Blood from Arm, Left Updated:  02/28/18 1614    Protime-INR [[id number]]  (Abnormal) Collected:  02/28/18 1526    Lab Status:  Final result Specimen:  Blood from Arm, Left Updated:  02/28/18 1603     Protime 30 1 (H) seconds      INR 2 75 (H)    APTT [[id number]]  (Abnormal) Collected:  02/28/18 1526    Lab Status:  Final result Specimen:  Blood from Arm, Left Updated:  02/28/18 1603     PTT 66 (H) seconds     Narrative:          Therapeutic Heparin Range = 60-90 seconds    Lipase [[id number]]  (Normal) Collected:  02/28/18 1526    Lab Status:  Final result Specimen:  Blood from Arm, Left Updated:  02/28/18 1554     Lipase 173 u/L     Troponin I [[id number]]  (Normal) Collected:  02/28/18 1526    Lab Status:  Final result Specimen:  Blood from Arm, Left Updated:  02/28/18 1550     Troponin I <0 02 ng/mL     Narrative:         [employer] Chemistry analyzer 99% cutoff is > 0 04 ng/mL in network labs    o cTnI 99% cutoff is useful only when applied to patients in the clinical setting of myocardial ischemia  o cTnI 99% cutoff should be interpreted in the context of clinical history, ECG findings and possibly cardiac imaging to establish correct diagnosis  o cTnI 99% cutoff may be suggestive but clearly not indicative of a coronary event without the clinical setting of myocardial ischemia  Blood culture #2 [[id number]] Collected:  02/28/18 1526    Lab Status: In process Specimen:  Blood from Arm, Left Updated:  02/28/18 1529                 CT abdomen pelvis wo contrast   Final Result by Ancelmo Katz MD (02/28 5131)      Persistent right basilar airspace disease and effusion as also seen on the prior study of 11/27/2017  There may now be a rind surrounding the pleural effusion possibly indicating development of an empyema  Distal choledocholithiasis with diffuse CBD dilation  Interval development of left pneumobilia  Status post cholecystectomy      Chronic punctate gravel-like calculi in the proximal left ureter unchanged from the prior study are nonobstructive  No hydronephrosis      The study was marked in EPIC for immediate notification                 Workstation performed: [id number]                    Procedures  ECG 12 Lead Documentation  Date/Time: 2/28/2018 3:54 PM  Performed by: Celestino Klein by: Omaira Aguiar     Indications / Diagnosis:  Weakness  ECG reviewed by me, the ED Provider: yes    Patient location:  ED  Previous ECG:     Previous ECG:  Compared to current    Comparison ECG info:  2/9/18    Similarity:  Changes noted  Interpretation:     Interpretation: abnormal    Rate:     ECG rate:  72    ECG rate assessment: normal    Rhythm:     Rhythm: sinus rhythm Ectopy:     Ectopy: none    QRS:     QRS axis:  Normal    QRS intervals:  Normal  Conduction:     Conduction: abnormal      Abnormal conduction: incomplete RBBB    ST segments:     ST segments:  Normal  T waves:     T waves: normal             Phone Contacts  ED Phone Contact    ED Course  ED Course as of Mar 01 1435   Wed Feb 28, 2018   1756 Spoke with surgical resident  Will come and evaluate  [id number] Patient seen by surgery  Will follow during admission  Will likely need ERCP by GI                                   MDM  Number of Diagnoses or Management Options  Pneumobilia:   Stage 5 chronic kidney disease on chronic dialysis [facility]):   Urinary tract infection:   Diagnosis management comments: Differential diagnosis includes but not limited to:  Urinary tract infection, sepsis, chronic renal failure, other intra-abdominal infection         Amount and/or Complexity of Data Reviewed  Clinical lab tests: ordered and reviewed  Tests in the radiology section of CPT®: ordered and reviewed  Obtain history from someone other than the patient: yes  Discuss the patient with other providers: yes      CritCare Time    Disposition  Final diagnoses:   Urinary tract infection   Stage 5 chronic kidney disease on chronic dialysis ([place] [address] )   Pneumobilia     Time reflects when diagnosis was documented in both MDM as applicable and the Disposition within this note     Time User Action Codes Description Comment    2/28/2018  7:06 PM Winger Finner Add [N39 0] Urinary tract infection     2/28/2018  7:06 PM Winger Finner Add [N18 6,  Z99 2] Stage 5 chronic kidney disease on chronic dialysis ([place] [address] )     2/28/2018  7:06 PM Winger Finner Add [K83 8] Pneumobilia     2/28/2018  9:29 PM Eulas Spar MOISÉS Modify [N39 0] Urinary tract infection     2/28/2018  9:29 PM ForrestVentiva Add [N18 6,  Z99 2] ESRD (end stage renal disease) on dialysis ([facility] )     2/28/2018  9:29 PM Forrest [employer] Modify [N18 6,  Z99 2] ESRD (end stage renal disease) on dialysis ([facility] )     2/28/2018  9:29 PM Golden CURIEL Add [E87 1] Hyponatremia     2/28/2018  9:29 PM Golden CURIEL Add [K80 50] Choledocholithiasis     3/1/2018 12:09 PM Coretha Shaneka Add [R82 90] Abnormal urinalysis     3/1/2018 12:12 PM Coretha Shaneka Add [E10 21] Type 1 diabetes mellitus with nephropathy ([facility] )     3/1/2018 12:12 PM Coretha Shaneka Modify [E10 21] Type 1 diabetes mellitus with nephropathy [facility])       ED Disposition     ED Disposition Condition Comment    Admit  Case was discussed with SHAISTA and the patient's admission status was agreed to be Admission Status: inpatient status to the service of Dr Yrn Woodward           Follow-up Information    None       Current Discharge Medication List      CONTINUE these medications which have NOT CHANGED    Details   acetaminophen (TYLENOL) 325 mg tablet Take 650 mg by mouth every 6 (six) hours as needed for mild pain      albuterol (2 5 mg/3 mL) 0 083 % nebulizer solution Inhale 1 each      amiodarone 200 mg tablet Take 1 tablet (200 mg total) by mouth daily for 30 days  Qty: 30 tablet, Refills: 0    Associated Diagnoses: Chronic combined systolic and diastolic CHF (congestive heart failure) ([facility])      b complex-vitamin C-folic acid (RENAL) 1 mg Take 1 mg by mouth daily      calcium citrate (CALCITRATE) 950 MG tablet Take 2,400 mg by mouth 3 (three) times a day with meals      Cholecalciferol (VITAMIN D-3) 1000 units CAPS Take by mouth daily      cinacalcet (SENSIPAR) 30 mg tablet Take 30 mg by mouth daily at bedtime      citalopram (CeleXA) 10 mg tablet Take 0 5 tablets (5 mg total) by mouth daily  Qty: 10 tablet, Refills: 0    Associated Diagnoses: Chronic combined systolic and diastolic CHF (congestive heart failure) (HCC)      diphenoxylate-atropine (LOMOTIL) 2 5-0 025 mg per tablet TAKE 1 TABLET FOUR TIMES A DAY AS NEEDED FOR DIARRHEA       gabapentin (NEURONTIN) 100 mg capsule Take 2 capsules (200 mg total) by mouth daily at bedtime  Qty: 10 capsule, Refills: 0    Associated Diagnoses: Chronic combined systolic and diastolic CHF (congestive heart failure) ([facility])      lanthanum (FOSRENOL) 1000 MG chewable tablet Chew 1 tablet (1,000 mg total) 3 (three) times a day with meals  Qty: 30 tablet, Refills: 0    Associated Diagnoses: Chronic combined systolic and diastolic CHF (congestive heart failure) ([facility])      melatonin 3 mg Take 1 tablet (3 mg total) by mouth daily at bedtime  Qty: 15 tablet, Refills: 0    Associated Diagnoses: Chronic combined systolic and diastolic CHF (congestive heart failure) ([facility])      menthol-zinc oxide (CALMOSEPTINE) 0 44-20 6 % OINT Apply topically      metoprolol tartrate (LOPRESSOR) 25 mg tablet Take 0 5 tablets (12 5 mg total) by mouth every 12 (twelve) hours  Qty: 30 tablet, Refills: 0    Associated Diagnoses: Chronic combined systolic and diastolic CHF (congestive heart failure) ([facility])      midodrine (PROAMATINE) 2 5 mg tablet Take 1 tablet by mouth 2 (two) times a day      Multiple Vitamin (DAILY VALUE MULTIVITAMIN) TABS Take 1 tablet by mouth daily      nystatin (MYCOSTATIN) powder Apply topically 2 (two) times a day  Qty: 15 g, Refills: 0      oxyCODONE (OXY-IR) 5 MG capsule Take 1 capsule by mouth every 4 (four) hours as needed      pancrelipase, Lip-Prot-Amyl, (CREON) 24,000 units Take 3 capsules by mouth 3 (three) times a day      pantoprazole (PROTONIX) 40 mg tablet Take 1 tablet by mouth daily      Probiotic Product (PRO-BIOTIC BLEND) CAPS Take 1 capsule by mouth daily      saccharomyces boulardii (FLORASTOR) 250 mg capsule Take 1 capsule by mouth 2 (two) times a day      traMADol (ULTRAM) 50 mg tablet Take 50 mg by mouth every 6 (six) hours as needed for moderate pain      vancomycin (VANCOCIN) 50mg/mL SOLN Take 2 5 mL by mouth daily  Qty: 300 mL, Refills: 0      warfarin (COUMADIN) 5 mg tablet Take 2 tablets (10 mg total) by mouth daily  Qty: 15 tablet, Refills: 0    Associated Diagnoses: Chronic combined systolic and diastolic CHF (congestive heart failure) ([facility] )           No discharge procedures on file      ED Provider  Electronically Signed by           Onel Tarango PA-C  03/01/18 8409

## 2018-02-28 NOTE — CONSULTS
Consultation -General Surgery  Adeel Esteves 74 y o  male MRN: [id number]  Unit/Bed#: ED 09 Encounter: [id number]        Consults    ASSESSMENT/PLAN:  Problem List     Type 1 diabetes mellitus with nephropathy ([facility] ) (Chronic)    ESRD (end stage renal disease) on dialysis ([facility] ) (Chronic)    Ambulatory dysfunction    S/P percutaneous endoscopic gastrostomy (PEG) tube placement (HCC) (Chronic)    Pleural effusion on right    Chronic combined systolic and diastolic CHF (congestive heart failure) (HCC) (Chronic)    R Fibula fracture, proximal to stump    DVT, lower extremity (HCC)    Clostridium difficile infection (Chronic)    Hyponatremia    Acquired hypothyroidism (Chronic)    Overview Signed 11/27/2017  3:27 PM by Arin Abdalla PA-C     underactive         Chronic kidney disease-mineral and bone disorder (Chronic)    Paroxysmal atrial fibrillation (HCC)    Anemia in end-stage renal disease ([facility] )    Benign hypertension with end-stage renal disease ([facility] )    Myoclonic jerking        -no acute surgical intervention warranted currently  Patients abdominal pain is chronic  He is s/p lap candace  He would benefit from a GI consult for ERCP  Labs to be finalized  Medical management for a-fib, renal failure, diabetes as per primary team      Reason for Consult / Principal Problem: abdominal pain/nausea    HPI: Adeel Esteves is a 74y o  year old male who presents with abdominal pain for the last few days mostly suprapubic and RUQ  He has a complex medical history but abdominal pain has been chronic for him  He states that he was not able to complete dialysis today because of nausea  He became septic last year when he had his leg amputated by orthopedic surgery last year at [place]  He had a gtube placed during that hospital stay due to his prolonged critical period  He makes urine every few days  According to wife he gets admitted once a month for various medical problems       Review of Systems   Constitutional: Positive for appetite change  HENT: Negative  Eyes: Negative  Respiratory: Negative  Cardiovascular: Negative  Gastrointestinal: Positive for abdominal distention and nausea  Endocrine: Negative  Genitourinary: Negative  Musculoskeletal: Negative  Skin: Negative  Allergic/Immunologic: Negative  Neurological: Negative  Hematological: Negative  Psychiatric/Behavioral: Negative  Historical Information   Past Medical History:   Diagnosis Date    Acquired hypothyroidism     underactive    Atrial fibrillation ([place] [address] )     Chronic kidney disease-mineral and bone disorder 11/27/2017    Clostridium difficile infection 04/2017    Diabetes mellitus ([place] [address] )     Dialysis patient ([place] [address] )     Diarrhea     ESRD (end stage renal disease) on dialysis ([place] [address] )     Hx of cardiac arrest     Hypertension     Hyponatremia 8/8/2017    Neuropathy     Right lower lobe pneumonia ([place] [address] ) 2/20/2017    Sepsis ([place] [address] ) 04/2017    Polymicrobial MRSA, VRE    Systolic and diastolic CHF, chronic (HCC)     EF 35%, Grade II DD     Past Surgical History:   Procedure Laterality Date    CATARACT EXTRACTION      CHG GI ENDOSCOPIC ULTRASOUND N/A 3/10/2016    Procedure: LINEAR ENDOSCOPIC U/S;  Surgeon: Katya Sanchez MD;  [facility]; Service: Gastroenterology    CHOLECYSTECTOMY      GASTROSTOMY TUBE PLACEMENT N/A 4/12/2017    Procedure: INSERTION PEG TUBE;  Surgeon: Vance Clemente MD;  [facility];  Service:    [facility] LEG AMPUTATION THROUGH LOWER TIBIA AND FIBULA Right 4/6/2017    Procedure: AMPUTATION BELOW KNEE (BKA);   Surgeon: Phoenix Lawler DO;  [facility];  Service:     TOE AMPUTATION  2000     Social History   History   Alcohol Use No     History   Drug Use No     History   Smoking Status    Former Smoker    Packs/day: 1 00    Years: 46 00    Types: Cigarettes    Start date: 5    Quit date: 3/1/2017   Smokeless Tobacco    Never Used     Family History   Problem Relation Age of Onset  Diabetes Father     Cancer Other     Lupus Mother        Meds/Allergies       (Not in a hospital admission)  Current Facility-Administered Medications   Medication Dose Route Frequency    piperacillin-tazobactam (ZOSYN) 3 375 g in sodium chloride 0 9 % 50 mL IVPB  3 375 g Intravenous Once       No Known Allergies    Objective     Blood pressure 144/77, pulse 70, temperature 98 9 °F (37 2 °C), temperature source Oral, resp  rate 18, SpO2 92 %  No intake or output data in the 24 hours ending 02/28/18 1855    PHYSICAL EXAM  General appearance: alert and oriented, in no acute distress and alert  Skin: Skin color, texture, turgor normal  No rashes or lesions  Head: Normocephalic, without obvious abnormality, atraumatic  Heart: regular rate and rhythm, S1, S2 normal, no murmur, click, rub or gallop  Lungs: clear to auscultation bilaterally  Abdomen: ruq pain and suprapubic pain to palpation  Abdomen soft, non distended  Gtube in place    Back: negative  Rectal: deferred  Neurological: normal without focal findings, mental status, speech normal, alert and oriented x3, PAO and reflexes normal and symmetric    Lab Results:   Admission on 02/28/2018   Component Date Value    WBC 02/28/2018 8 49     RBC 02/28/2018 3 51*    Hemoglobin 02/28/2018 10 1*    Hematocrit 02/28/2018 32 1*    MCV 02/28/2018 92     MCH 02/28/2018 28 8     MCHC 02/28/2018 31 5     RDW 02/28/2018 15 3*    MPV 02/28/2018 8 5*    Platelets 34/98/5117 293     Lipase 02/28/2018 173     Protime 02/28/2018 30 1*    INR 02/28/2018 2 75*    PTT 02/28/2018 66*    Troponin I 02/28/2018 <0 02     Segmented % 02/28/2018 61     Lymphocytes % 02/28/2018 14     Monocytes % 02/28/2018 12     Eosinophils % 02/28/2018 7*    Basophils % 02/28/2018 4*    Myelocytes % 02/28/2018 1     Atypical Lymphocytes % 02/28/2018 1*    Absolute Neutrophils 02/28/2018 5 18     Lymphocytes Absolute 02/28/2018 1 19     Monocytes Absolute 02/28/2018 1 02  Eosinophils Absolute 02/28/2018 0 59*    Basophils Absolute 02/28/2018 0 34*    Total Counted 02/28/2018 100     Anisocytosis 02/28/2018 Present     Polychromasia 02/28/2018 Present     Platelet Estimate 71/66/9257 Adequate      Imaging Studies: I have personally reviewed pertinent reports  Persistent right basilar airspace disease and effusion as also seen on the prior study of 11/27/2017  There may now be a rind surrounding the pleural effusion possibly indicating development of an empyema      Distal choledocholithiasis with diffuse CBD dilation  Interval development of left pneumobilia  Status post cholecystectomy     Chronic punctate gravel-like calculi in the proximal left ureter unchanged from the prior study are nonobstructive  No hydronephrosis     The study was marked in EPIC for immediate notification  Counseling / Coordination of Care  Total time spent today  30 minutes  Greater than 50% of total time was spent with the patient and / or family counseling and / or coordination of care

## 2018-03-01 ENCOUNTER — APPOINTMENT (INPATIENT)
Dept: DIALYSIS | Facility: HOSPITAL | Age: 68
DRG: 689 | End: 2018-03-01
Payer: COMMERCIAL

## 2018-03-01 PROBLEM — G89.29 CHRONIC ABDOMINAL PAIN: Status: ACTIVE | Noted: 2018-03-01

## 2018-03-01 PROBLEM — R10.9 CHRONIC ABDOMINAL PAIN: Status: ACTIVE | Noted: 2018-03-01

## 2018-03-01 PROBLEM — R82.90 ABNORMAL URINALYSIS: Status: ACTIVE | Noted: 2018-03-01

## 2018-03-01 LAB
ACETONE SERPL-MCNC: NEGATIVE MG/DL
ALBUMIN SERPL BCP-MCNC: 2 G/DL (ref 3.5–5)
ALP SERPL-CCNC: 145 U/L (ref 46–116)
ALT SERPL W P-5'-P-CCNC: 20 U/L (ref 12–78)
ANION GAP SERPL CALCULATED.3IONS-SCNC: 16 MMOL/L (ref 4–13)
ANION GAP SERPL CALCULATED.3IONS-SCNC: 17 MMOL/L (ref 4–13)
AST SERPL W P-5'-P-CCNC: 17 U/L (ref 5–45)
ATRIAL RATE: 72 BPM
BILIRUB SERPL-MCNC: 0.3 MG/DL (ref 0.2–1)
BUN SERPL-MCNC: 61 MG/DL (ref 5–25)
BUN SERPL-MCNC: 68 MG/DL (ref 5–25)
CALCIUM SERPL-MCNC: 7.5 MG/DL (ref 8.3–10.1)
CALCIUM SERPL-MCNC: 7.8 MG/DL (ref 8.3–10.1)
CHLORIDE SERPL-SCNC: 90 MMOL/L (ref 100–108)
CHLORIDE SERPL-SCNC: 94 MMOL/L (ref 100–108)
CO2 SERPL-SCNC: 17 MMOL/L (ref 21–32)
CO2 SERPL-SCNC: 21 MMOL/L (ref 21–32)
CREAT SERPL-MCNC: 7.46 MG/DL (ref 0.6–1.3)
CREAT SERPL-MCNC: 7.88 MG/DL (ref 0.6–1.3)
ERYTHROCYTE [DISTWIDTH] IN BLOOD BY AUTOMATED COUNT: 15.5 % (ref 11.6–15.1)
GFR SERPL CREATININE-BSD FRML MDRD: 6 ML/MIN/1.73SQ M
GFR SERPL CREATININE-BSD FRML MDRD: 7 ML/MIN/1.73SQ M
GLUCOSE SERPL-MCNC: 119 MG/DL (ref 65–140)
GLUCOSE SERPL-MCNC: 173 MG/DL (ref 65–140)
GLUCOSE SERPL-MCNC: 209 MG/DL (ref 65–140)
GLUCOSE SERPL-MCNC: 240 MG/DL (ref 65–140)
GLUCOSE SERPL-MCNC: 320 MG/DL (ref 65–140)
GLUCOSE SERPL-MCNC: 342 MG/DL (ref 65–140)
GLUCOSE SERPL-MCNC: 381 MG/DL (ref 65–140)
GLUCOSE SERPL-MCNC: 493 MG/DL (ref 65–140)
HCT VFR BLD AUTO: 31.5 % (ref 36.5–49.3)
HGB BLD-MCNC: 10 G/DL (ref 12–17)
INR PPP: 2.95 (ref 0.86–1.16)
LACTATE SERPL-SCNC: 0.5 MMOL/L (ref 0.5–2)
MCH RBC QN AUTO: 29.4 PG (ref 26.8–34.3)
MCHC RBC AUTO-ENTMCNC: 31.7 G/DL (ref 31.4–37.4)
MCV RBC AUTO: 93 FL (ref 82–98)
P AXIS: 90 DEGREES
PLATELET # BLD AUTO: 243 THOUSANDS/UL (ref 149–390)
PMV BLD AUTO: 8.7 FL (ref 8.9–12.7)
POTASSIUM SERPL-SCNC: 5.6 MMOL/L (ref 3.5–5.3)
POTASSIUM SERPL-SCNC: 5.7 MMOL/L (ref 3.5–5.3)
POTASSIUM SERPL-SCNC: 6.6 MMOL/L (ref 3.5–5.3)
PR INTERVAL: 208 MS
PROT SERPL-MCNC: 7.5 G/DL (ref 6.4–8.2)
PROTHROMBIN TIME: 31.9 SECONDS (ref 12.1–14.4)
QRS AXIS: 175 DEGREES
QRSD INTERVAL: 114 MS
QT INTERVAL: 370 MS
QTC INTERVAL: 405 MS
RBC # BLD AUTO: 3.4 MILLION/UL (ref 3.88–5.62)
SODIUM SERPL-SCNC: 124 MMOL/L (ref 136–145)
SODIUM SERPL-SCNC: 131 MMOL/L (ref 136–145)
T WAVE AXIS: 0 DEGREES
VENTRICULAR RATE: 72 BPM
WBC # BLD AUTO: 7.62 THOUSAND/UL (ref 4.31–10.16)

## 2018-03-01 PROCEDURE — 85610 PROTHROMBIN TIME: CPT | Performed by: PHYSICIAN ASSISTANT

## 2018-03-01 PROCEDURE — 99223 1ST HOSP IP/OBS HIGH 75: CPT | Performed by: INTERNAL MEDICINE

## 2018-03-01 PROCEDURE — 85027 COMPLETE CBC AUTOMATED: CPT | Performed by: PHYSICIAN ASSISTANT

## 2018-03-01 PROCEDURE — 80053 COMPREHEN METABOLIC PANEL: CPT | Performed by: INTERNAL MEDICINE

## 2018-03-01 PROCEDURE — 80048 BASIC METABOLIC PNL TOTAL CA: CPT | Performed by: PHYSICIAN ASSISTANT

## 2018-03-01 PROCEDURE — 82948 REAGENT STRIP/BLOOD GLUCOSE: CPT

## 2018-03-01 PROCEDURE — 99232 SBSQ HOSP IP/OBS MODERATE 35: CPT | Performed by: INTERNAL MEDICINE

## 2018-03-01 PROCEDURE — 84132 ASSAY OF SERUM POTASSIUM: CPT | Performed by: PHYSICIAN ASSISTANT

## 2018-03-01 PROCEDURE — 99222 1ST HOSP IP/OBS MODERATE 55: CPT | Performed by: INTERNAL MEDICINE

## 2018-03-01 PROCEDURE — 93010 ELECTROCARDIOGRAM REPORT: CPT | Performed by: INTERNAL MEDICINE

## 2018-03-01 PROCEDURE — 83605 ASSAY OF LACTIC ACID: CPT | Performed by: INTERNAL MEDICINE

## 2018-03-01 PROCEDURE — 82009 KETONE BODYS QUAL: CPT | Performed by: INTERNAL MEDICINE

## 2018-03-01 RX ORDER — INSULIN GLARGINE 100 [IU]/ML
5 INJECTION, SOLUTION SUBCUTANEOUS EVERY MORNING
Status: DISCONTINUED | OUTPATIENT
Start: 2018-03-01 | End: 2018-03-01

## 2018-03-01 RX ADMIN — GABAPENTIN 200 MG: 100 CAPSULE ORAL at 22:14

## 2018-03-01 RX ADMIN — PANCRELIPASE 72000 UNITS: 24000; 76000; 120000 CAPSULE, DELAYED RELEASE PELLETS ORAL at 22:00

## 2018-03-01 RX ADMIN — Medication 250 MG: at 18:37

## 2018-03-01 RX ADMIN — INSULIN DETEMIR 5 UNITS: 100 INJECTION, SOLUTION SUBCUTANEOUS at 23:00

## 2018-03-01 RX ADMIN — VANCOMYCIN 125 MG: KIT at 08:46

## 2018-03-01 RX ADMIN — MIDODRINE HYDROCHLORIDE 2.5 MG: 2.5 TABLET ORAL at 18:37

## 2018-03-01 RX ADMIN — INSULIN LISPRO 3 UNITS: 100 INJECTION, SOLUTION INTRAVENOUS; SUBCUTANEOUS at 13:53

## 2018-03-01 RX ADMIN — PANTOPRAZOLE SODIUM 40 MG: 40 TABLET, DELAYED RELEASE ORAL at 05:26

## 2018-03-01 RX ADMIN — NYSTATIN: 100000 POWDER TOPICAL at 18:37

## 2018-03-01 RX ADMIN — INSULIN GLARGINE 5 UNITS: 100 INJECTION, SOLUTION SUBCUTANEOUS at 13:54

## 2018-03-01 RX ADMIN — INSULIN LISPRO 5 UNITS: 100 INJECTION, SOLUTION INTRAVENOUS; SUBCUTANEOUS at 13:53

## 2018-03-01 RX ADMIN — METOPROLOL TARTRATE 12.5 MG: 25 TABLET ORAL at 22:00

## 2018-03-01 RX ADMIN — PANCRELIPASE 72000 UNITS: 24000; 76000; 120000 CAPSULE, DELAYED RELEASE PELLETS ORAL at 17:55

## 2018-03-01 RX ADMIN — INSULIN LISPRO 1 UNITS: 100 INJECTION, SOLUTION INTRAVENOUS; SUBCUTANEOUS at 22:19

## 2018-03-01 RX ADMIN — INSULIN LISPRO 4 UNITS: 100 INJECTION, SOLUTION INTRAVENOUS; SUBCUTANEOUS at 03:27

## 2018-03-01 RX ADMIN — LANTHANUM CARBONATE 1000 MG: 500 TABLET, CHEWABLE ORAL at 13:54

## 2018-03-01 RX ADMIN — CINACALCET HYDROCHLORIDE 30 MG: 30 TABLET, COATED ORAL at 22:18

## 2018-03-01 RX ADMIN — LANTHANUM CARBONATE 1000 MG: 500 TABLET, CHEWABLE ORAL at 17:52

## 2018-03-01 RX ADMIN — MIDODRINE HYDROCHLORIDE 2.5 MG: 2.5 TABLET ORAL at 08:46

## 2018-03-01 RX ADMIN — INSULIN LISPRO 5 UNITS: 100 INJECTION, SOLUTION INTRAVENOUS; SUBCUTANEOUS at 09:14

## 2018-03-01 RX ADMIN — MELATONIN 3 MG: 3 TAB ORAL at 22:15

## 2018-03-01 NOTE — PROGRESS NOTES
Progress Note - Maria Isabel Kumar 1950, 74 y o  male MRN: [id number]    Unit/Bed#: -01 Encounter: [id number]    Primary Care Provider: Franco Dunlap DO   Date and time admitted to hospital: 2/28/2018  2:43 PM        Chronic abdominal pain   Assessment & Plan    With chronic choledocholithiasis  Patient is status post cholecystectomy  No surgical intervention  GI consulted for further management          Abnormal urinalysis   Assessment & Plan    With dysuria,   Previous UTI secondary to Pseudomonas  Consult ID for further management  Follow on urine culture          Benign hypertension with end-stage renal disease ([place] [address] )   Assessment & Plan      · Continue home medications  Anemia in end-stage renal disease (HCC)   Assessment & Plan      · Baseline appears to be 9   · Monitor on CBC  Paroxysmal atrial fibrillation ([facility])   Assessment & Plan      · Continue home medications  Metoprolol, amiodarone and Coumadin          Acquired hypothyroidism   Assessment & Plan    · Continue synthroid  Clostridium difficile infection   Assessment & Plan    · Hx of C diff with chronic diarrhea  No change in stools to suggest acute infection  · On chronic Vanco and probiotics- continue  Chronic combined systolic and diastolic CHF (congestive heart failure) ([facility])   Assessment & Plan    · EF 35%  ·  Appears stable  · Daily weights, I&Os  · Fluid restriction  · Continue home medications  ESRD (end stage renal disease) on dialysis [facility])   Assessment & Plan    With hyperkalemia and hyponatremia  · Plan for hemodialysis today        Type 1 diabetes mellitus with nephropathy ([place] [address] )   Assessment & Plan    · And hyperglycemia, A1c 9 4  · Continue  SSI coverage    · Restart home insulin Lantus 5 units in a m  and Humalog 5 units t i d   · Consult endocrine for further management                       VTE Pharmacologic Prophylaxis:   Pharmacologic: Warfarin (Coumadin)  Mechanical VTE Prophylaxis in Place: Yes    Patient Centered Rounds: I have performed bedside rounds with nursing staff today  Discussions with Specialists or Other Care Team Provider:     Education and Discussions with Family / Patient:  Patient and his wife    Time Spent for Care: 30 minutes  More than 50% of total time spent on counseling and coordination of care as described above  Current Length of Stay: 1 day(s)    Current Patient Status: Inpatient   Certification Statement: The patient will continue to require additional inpatient hospital stay due to Management of abdominal pain    Discharge Plan: not ready yet    Code Status: Level 1 - Full Code      Subjective:   Patient seen and examined  Comfortable in bed  Denied abdominal pain  Continue to have dysuria and chronic diarrhea    Objective:     Vitals:   Temp (24hrs), Av 1 °F (36 7 °C), Min:97 7 °F (36 5 °C), Max:98 9 °F (37 2 °C)    HR:  [67-72] 68  Resp:  [18-20] 20  BP: (122-144)/(56-77) 122/56  SpO2:  [84 %-96 %] 94 %  Body mass index is 23 98 kg/m²  Input and Output Summary (last 24 hours):        Intake/Output Summary (Last 24 hours) at 18 1214  Last data filed at 18 0900   Gross per 24 hour   Intake              290 ml   Output                0 ml   Net              290 ml       Physical Exam:     Physical Exam  Patient is awake alert in no acute distress  Lung clear to auscultation bilateral  Heart positive S1-S2 no murmur  Abdomen soft nontender positive bowel sounds  Right BKA no edema      Additional Data:     Labs:      Results from last 7 days  Lab Units 18  04418  1526   WBC Thousand/uL 7 62 8 49   HEMOGLOBIN g/dL 10 0* 10 1*   HEMATOCRIT % 31 5* 32 1*   PLATELETS Thousands/uL 243 293   LYMPHO PCT %  --  14   MONO PCT MAN %  --  12   EOSINO PCT MANUAL %  --  7*       Results from last 7 days  Lab Units 18  0634 18  04418  1833   SODIUM mmol/L  --  124* 129*   POTASSIUM mmol/L 5 7* 6 6* 5 5*   CHLORIDE mmol/L  -- 90* 92*   CO2 mmol/L  --  17* 24   BUN mg/dL  --  61* 52*   CREATININE mg/dL  --  7 46* 6 58*   CALCIUM mg/dL  --  7 8* 7 6*   TOTAL PROTEIN g/dL  --   --  7 6   BILIRUBIN TOTAL mg/dL  --   --  0 30   ALK PHOS U/L  --   --  171*   ALT U/L  --   --  19   AST U/L  --   --  16   GLUCOSE RANDOM mg/dL  --  493* 264*       Results from last 7 days  Lab Units 03/01/18  0534   INR  2 95*       * I Have Reviewed All Lab Data Listed Above  * Additional Pertinent Lab Tests Reviewed:  [facility] Admission Reviewed    Imaging:    Imaging Reports Reviewed Today Include:   Imaging Personally Reviewed by Myself Includes:     Recent Cultures (last 7 days):           Last 24 Hours Medication List:     Current Facility-Administered Medications:  acetaminophen 650 mg Oral Q6H PRN Lamount Fent, PA-C    albuterol 2 5 mg Nebulization Q4H PRN Lamount Fent, PA-C    amiodarone 200 mg Oral Daily Lamount Fent, PA-C    b complex-vitamin C-folic acid 1 capsule Oral Daily Lamount Fent, PA-C    calcium carbonate 2 tablet Oral Daily With Breakfast Lamount Fent, PA-C    cholecalciferol 1,000 Units Oral Daily Lamount Fent, PA-C    cinacalcet 30 mg Oral HS Lamount Fent, PA-C    citalopram 5 mg Oral Daily Lamount Fent, PA-C    diphenoxylate-atropine 1 tablet Oral 4x Daily PRN Lamount Fent, PA-C    gabapentin 200 mg Oral HS Lamount Fent, PA-C    insulin glargine 5 Units Subcutaneous QAM Carine Gordillo DO    insulin lispro 1-5 Units Subcutaneous HS Jerardo Burch PA-C    [START ON 3/2/2018] insulin lispro 1-5 Units Subcutaneous 0200 Silvina Kelly PA-C    insulin lispro 1-6 Units Subcutaneous TID AC Silvina Kelly PA-C    insulin lispro 5 Units Subcutaneous TID With Meals Carine Gordillo DO    lanthanum 1,000 mg Oral TID With Meals Lamount NORMAN Oquendo    melatonin 3 mg Oral HS Lamount Azra, NORMAN    menthol-zinc oxide  Topical Q4H PRN Trinity Gonzalez NORMAN Schwartz    meropenem 500 mg Intravenous Q12H [facility], PAMANDY Last Rate: Stopped (03/01/18 0000)   metoprolol tartrate 12 5 mg Oral Q12H [facility], PA-HUMBERTO    midodrine 2 5 mg Oral BID [facility], PA-C    multivitamin-minerals 1 tablet Oral Daily [facility], PA-C    nystatin  Topical BID [facility], PA-C    ondansetron 4 mg Intravenous Q6H PRN [facility], PA-C    oxyCODONE 5 mg Oral Q4H PRN [facility], PA-C    pancrelipase (Lip-Prot-Amyl) 72,000 Units Oral TID [facility], PA-HUMBERTO    pantoprazole 40 mg Oral Early Morning [facility], PA-HUMBERTO    saccharomyces boulardii 250 mg Oral BID [facility], PA-C    traMADol 50 mg Oral Q6H PRN [facility], PA-C    vancomycin 125 mg Oral Daily [facility], PA-C    warfarin 10 mg Oral Daily (warfarin) [facility], NORMAN         Today, Patient Was Seen By: Lee Griffith DO    ** Please Note: Dictation voice to text software may have been used in the creation of this document   **

## 2018-03-01 NOTE — ASSESSMENT & PLAN NOTE
· Rate controlled at 70  · Continue home medications  · Anticoagulated on Coumadin  INR 2 75  · Continue Coumadin and monitor INR daily

## 2018-03-01 NOTE — ASSESSMENT & PLAN NOTE
· Na 129  · Appears chronic  · Fluid restriction, as previously recommended by Nephrology  · Monitor BMP  · Appreciate Nephrology input

## 2018-03-01 NOTE — CONSULTS
Consultation - Infectious Disease   Madelaine Andino 74 y o  male MRN: [id number]  Unit/Bed#: -01 Encounter: [id number]      IMPRESSION & RECOMMENDATIONS:   1  Possible UTI-it would be very unusual for UTI to cause dysuria for 4 years  Therefore I think it is more likely symptoms related to something else  The patient does not have a fever or leukocytosis remains hemodynamically stable  It would be best to avoid all other systemic antibiotics unless they are absolutely required in the face of a patient with persistent diarrhea and a history of recurrent C difficile colitis  -DC meropenem  -monitor off all antibiotics  -follow-up urine culture  -monitor symptomatology  -treat symptoms  -may need urology evaluation    2  Recurrent C difficile colitis-the patient continues to have diarrhea although I am not convinced that the diarrhea is due to C difficile  He has undergone fecal transplantation, and his most recent stool for C diff back in November was negative  His CT of the abdomen and pelvis does not reveal any colitis  -continue oral vancomycin suppression for now  -will recheck stool for C diff  -monitor symptomatology  -if the C diff remains positive, the patient may benefit from a repeat fecal transplant  -treat symptomatically if the C diff comes back negative  -GI follow-up    3  Pneumobilia-status post lap choly  He was seen by surgery today   -consider GI evaluation for ERCP  -serial abdominal exams    4  End-stage renal disease-on hemodialysis Q Tuesday Thursday and Saturday  HISTORY OF PRESENT ILLNESS:  Reason for Consult:  Possible UTI  HPI: Madelaine Andino is a 74y o  year old male with a 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 as well as end-stage renal disease who I am asked to evaluate for dysuria and abnormal urinalysis    Patient has had a history of recurrent C difficile colitis in the past   He had initially been receiving his care [facility] but transition over to [facility]  He had been admitted in September of this year with a recurrent bout of C difficile colitis and was treated with a 2 week course of oral vancomycin, followed by a gradual taper down to indefinite vancomycin once daily  He has continued to have intermittent bouts of diarrhea  He underwent a fecal transplant through the [facility] system, but has continued to have loose stool up to 4 times per day  Back in November he had a repeat stool for C diff which came back as negative and therefore GI felt that this was more likely 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  He has continued to suffer from chronic abdominal pain which seems to have worsened recently and therefore the patient came to the ER for further evaluation  He underwent CT of the abdomen and pelvis that was nondiagnostic for any source and did not reveal any colitis  He denies any fever chills or sweats, denies any nausea vomiting, denies any cough or shortness of breath  The patient was noted to have an abnormal urinalysis  He states that he has horrific burning every time he urinates but this is been going on continuously for 4 years  REVIEW OF SYSTEMS:  A complete 12 point system-based review of systems is otherwise negative      PAST MEDICAL HISTORY:  Past Medical History:   Diagnosis Date    Acquired hypothyroidism     underactive    Atrial fibrillation ([place] [address] )     Choledocholithiasis 2/28/2018    Chronic kidney disease-mineral and bone disorder 11/27/2017    Clostridium difficile infection 04/2017    Diabetes mellitus ([place] [address] )     Dialysis patient ([place] [address] )     Diarrhea     ESRD (end stage renal disease) on dialysis ([place] [address] )     Hx of cardiac arrest     Hypertension     Hyponatremia 8/8/2017    Neuropathy     [facility] lower lobe pneumonia ([facility] ) 2/20/2017    Sepsis ([facility] ) 04/2017    Polymicrobial MRSA, VRE    Systolic and diastolic CHF, chronic (HCC)     EF 35%, Grade II DD     Past Surgical History:   Procedure Laterality Date    CATARACT EXTRACTION      CHG GI ENDOSCOPIC ULTRASOUND N/A 3/10/2016    Procedure: LINEAR ENDOSCOPIC U/S;  Surgeon: Radha Gibbons MD;  [facility]; Service: Gastroenterology    CHOLECYSTECTOMY      GASTROSTOMY TUBE PLACEMENT N/A 2017    Procedure: INSERTION PEG TUBE;  Surgeon: Deborah Rneee MD;  Location: [facility];  Service:    [facility] LEG AMPUTATION THROUGH LOWER TIBIA AND FIBULA Right 2017    Procedure: AMPUTATION BELOW KNEE (BKA); Surgeon: Joesph Vogt DO;  [facility];  Service:     TOE AMPUTATION         FAMILY HISTORY:  Non-contributory    SOCIAL HISTORY:  Social History   History   Alcohol Use No     History   Drug Use No     History   Smoking Status    Former Smoker    Packs/day: 1 00    Years: 46 00    Types: Cigarettes    Start date:     Quit date: 3/1/2017   Smokeless Tobacco    Never Used       ALLERGIES:  No Known Allergies    MEDICATIONS:  All current active medications have been reviewed    Antibiotics:  Oral vancomycin suppression once daily    PHYSICAL EXAM:  HR:  [66-72] 71  Resp:  [18-20] 20  BP: (113-144)/(50-77) 113/50  SpO2:  [84 %-94 %] 94 %  Temp (24hrs), Av 8 °F (36 6 °C), Min:97 5 °F (36 4 °C), Max:98 1 °F (36 7 °C)  Current: Temperature: 97 5 °F (36 4 °C)    Intake/Output Summary (Last 24 hours) at 18 1330  Last data filed at 18 1230   Gross per 24 hour   Intake              490 ml   Output                0 ml   Net              490 ml       General Appearance:  Chronically ill, nontoxic, and in no distress   Head:  Normocephalic, without obvious abnormality, atraumatic   Eyes:  Conjunctiva pale and sclera anicteric, both eyes   Nose: Nares normal, mucosa normal, no drainage   Throat: Oropharynx moist without lesions   Neck: Supple, symmetrical, no adenopathy, no tenderness/mass/nodules   Back:   Symmetric, no curvature, ROM normal, no CVA tenderness   Lungs: Clear to auscultation bilaterally, respirations unlabored   Chest Wall:  No tenderness or deformity   Heart:  RRR; no murmur, rub or gallop   Abdomen:   Soft, mild diffuse tenderness, non-distended, positive bowel sounds    Extremities: No cyanosis, clubbing or edema   Skin: No rashes or lesions  No draining wounds noted  Lymph nodes: Cervical, supraclavicular nodes normal   Neurologic: Alert and oriented times 3, extremity strength 5/5 and symmetric       LABS, IMAGING, & OTHER STUDIES:  Lab Results:  I have personally reviewed pertinent labs  Results from last 7 days  Lab Units 03/01/18  0441 02/28/18  1526   WBC Thousand/uL 7 62 8 49   HEMOGLOBIN g/dL 10 0* 10 1*   PLATELETS Thousands/uL 243 293       Results from last 7 days  Lab Units 03/01/18  1234  03/01/18  0441 02/28/18  1833   SODIUM mmol/L 131*  --  124* 129*   POTASSIUM mmol/L 5 6*  < > 6 6* 5 5*   CHLORIDE mmol/L 94*  --  90* 92*   CO2 mmol/L 21  --  17* 24   ANION GAP mmol/L 16*  --  17* 13   BUN mg/dL 68*  --  61* 52*   CREATININE mg/dL 7 88*  --  7 46* 6 58*   EGFR ml/min/1 73sq m 6  --  7 8   GLUCOSE RANDOM mg/dL 209*  --  493* 264*   CALCIUM mg/dL 7 5*  --  7 8* 7 6*   AST U/L 17  --   --  16   ALT U/L 20  --   --  19   ALK PHOS U/L 145*  --   --  171*   TOTAL PROTEIN g/dL 7 5  --   --  7 6   BILIRUBIN TOTAL mg/dL 0 30  --   --  0 30   < > = values in this interval not displayed  Imaging Studies:   I have personally reviewed pertinent imaging study reports and images in PACS  CT abdomen and pelvis-Persistent right basilar airspace disease and effusion as also seen on the prior study of 11/27/2017  Carlos Eliza may now be a rind surrounding the pleural effusion possibly indicating development of an empyema      Distal choledocholithiasis with diffuse CBD dilation   Interval development of left pneumobilia   Status post cholecystectomy    Chronic punctate gravel-like calculi in the proximal left ureter unchanged from the prior study are nonobstructive   No hydronephrosis

## 2018-03-01 NOTE — CONSULTS
Consultation - [facility]) [facility]  Britta Dominguez 74 y o  male MRN: [id number]  Unit/Bed#: -01 Encounter: [id number]         Reason for Consult / Principal Problem:  Choledocholithiasis on CT    HPI: Magda Chadwick is a 72-year-old male with recurring choledocholithiasis with several ERCPs in the past complicated by pancreatitis, status post cholecystectomy, chronic diarrhea on vancomycin chronically, end-stage renal disease requiring hemodialysis 3 times a week, hypothyroidism, and paroxysmal AFib on Coumadin  Patient is a somewhat unreliable historian, and portions of his HPI were obtained by the chart  Patient presented to the ED yesterday for lower abdominal pain and pain with urination  Patient reports that he has been having lower abdominal pain for the past month, but decided to present to the hospital when he developed nausea and vomiting yesterday  CT of the abdomen revealed left pneumobilia with multiple high density structures in the distal CBD consistent with choledocholithiasis  AST and ALT within normal limits  Alk-phos 171, total bilirubin within normal limits  Fortunately the patient does not have an elevated white count or fevers  Per conversation with Domingo Rosario, they report last ERCP was in September 2017 with a double pigtail plastic stent was placed  Patient was seen at the bedside this morning  He continues to complain of right lower quadrant pain  He also is complaining of diarrhea  Patient reports that he is being treated on an antibiotic for the past year for the diarrhea  Per EHR, patient has history of a positive C diff  He is on liquid Vanco 125 mg daily  Patient was previously following with Dr Ke Kar[facility]  Patient reports that this is a chronic issue for him  Patient reports to liquidy bowel movements thus far today  He denies fevers or chills  He denies melena or hematochezia  Review of Systems:    CONSTITUTIONAL: Denies any fever, chills, or rigors   Good appetite, and no recent weight loss  HEENT: No earache or tinnitus  Denies hearing loss or visual disturbances  CARDIOVASCULAR: No chest pain or palpitations  RESPIRATORY: Denies any cough, hemoptysis, shortness of breath or dyspnea on exertion  GASTROINTESTINAL: As noted in the History of Present Illness  GENITOURINARY: Positive for problems with urination  Positive for dysuria  NEUROLOGIC: No dizziness or vertigo, denies headaches  MUSCULOSKELETAL: Denies any muscle or joint pain  SKIN: Denies skin rashes or itching  ENDOCRINE: Denies excessive thirst  Denies intolerance to heat or cold  PSYCHOSOCIAL: Denies depression or anxiety  Denies any recent memory loss  Historical Information   Past Medical History:   Diagnosis Date    Acquired hypothyroidism     underactive    Atrial fibrillation ([place] [address] )     Choledocholithiasis 2/28/2018    Chronic kidney disease-mineral and bone disorder 11/27/2017    Clostridium difficile infection 04/2017    Diabetes mellitus ([place] [address] )     Dialysis patient ([place] [address] )     Diarrhea     ESRD (end stage renal disease) on dialysis ([place] [address] )     Hx of cardiac arrest     Hypertension     Hyponatremia 8/8/2017    Neuropathy     Right lower lobe pneumonia ([place] [address] ) 2/20/2017    Sepsis ([place] [address] ) 04/2017    Polymicrobial MRSA, VRE    Systolic and diastolic CHF, chronic (HCC)     EF 35%, Grade II DD     Past Surgical History:   Procedure Laterality Date    CATARACT EXTRACTION      CHG GI ENDOSCOPIC ULTRASOUND N/A 3/10/2016    Procedure: LINEAR ENDOSCOPIC U/S;  Surgeon: Ez Gray MD;  Location: [facility]; Service: Gastroenterology    CHOLECYSTECTOMY      GASTROSTOMY TUBE PLACEMENT N/A 4/12/2017    Procedure: INSERTION PEG TUBE;  Surgeon: Maggie Spatz, MD;  Location: [facility];  Service:    Todd Polio LEG AMPUTATION THROUGH LOWER TIBIA AND FIBULA Right 4/6/2017    Procedure: AMPUTATION BELOW KNEE (BKA);   Surgeon: Irish Pedro DO;  Location: [facility];  Service:     TOE AMPUTATION 2000     Social History   History   Alcohol Use No     History   Drug Use No     History   Smoking Status    Former Smoker    Packs/day: 1 00    Years: 46 00    Types: Cigarettes    Start date: 5    Quit date: 3/1/2017   Smokeless Tobacco    Never Used     Family History   Problem Relation Age of Onset    Diabetes Father     Cancer Other     Lupus Mother         Meds/Allergies     Current Facility-Administered Medications   Medication Dose Route Frequency    acetaminophen (TYLENOL) tablet 650 mg  650 mg Oral Q6H PRN    albuterol inhalation solution 2 5 mg  2 5 mg Nebulization Q4H PRN    amiodarone tablet 200 mg  200 mg Oral Daily    b complex-vitamin C-folic acid (NEPHROCAPS) capsule 1 capsule  1 capsule Oral Daily    calcium carbonate (OYSTER SHELL,OSCAL) 500 mg tablet 2 tablet  2 tablet Oral Daily With Breakfast    cholecalciferol (VITAMIN D3) tablet 1,000 Units  1,000 Units Oral Daily    cinacalcet (SENSIPAR) tablet 30 mg  30 mg Oral HS    citalopram (CeleXA) tablet 5 mg  5 mg Oral Daily    diphenoxylate-atropine (LOMOTIL) 2 5-0 025 mg per tablet 1 tablet  1 tablet Oral 4x Daily PRN    gabapentin (NEURONTIN) capsule 200 mg  200 mg Oral HS    insulin glargine (LANTUS) subcutaneous injection 5 Units  5 Units Subcutaneous QAM    insulin lispro (HumaLOG) 100 units/mL subcutaneous injection 1-5 Units  1-5 Units Subcutaneous HS    [START ON 3/2/2018] insulin lispro (HumaLOG) 100 units/mL subcutaneous injection 1-5 Units  1-5 Units Subcutaneous 0200    insulin lispro (HumaLOG) 100 units/mL subcutaneous injection 1-6 Units  1-6 Units Subcutaneous TID AC    insulin lispro (HumaLOG) 100 units/mL subcutaneous injection 5 Units  5 Units Subcutaneous TID With Meals    lanthanum (FOSRENOL) chewable tablet 1,000 mg  1,000 mg Oral TID With Meals    melatonin tablet 3 mg  3 mg Oral HS    menthol-zinc oxide (CALMOSEPTINE) 0 44-20 6 % ointment   Topical Q4H PRN    meropenem (MERREM) 500 mg in sodium chloride 0 9 % 50 mL IVPB  500 mg Intravenous Q12H    metoprolol tartrate (LOPRESSOR) partial tablet 12 5 mg  12 5 mg Oral Q12H [address]    midodrine (PROAMATINE) tablet 2 5 mg  2 5 mg Oral BID    multivitamin-minerals (CENTRUM) tablet 1 tablet  1 tablet Oral Daily    nystatin (MYCOSTATIN) powder   Topical BID    ondansetron (ZOFRAN) injection 4 mg  4 mg Intravenous Q6H PRN    oxyCODONE (ROXICODONE) IR tablet 5 mg  5 mg Oral Q4H PRN    pancrelipase (Lip-Prot-Amyl) (CREON) delayed release capsule 72,000 Units  72,000 Units Oral TID    pantoprazole (PROTONIX) EC tablet 40 mg  40 mg Oral Early Morning    saccharomyces boulardii (FLORASTOR) capsule 250 mg  250 mg Oral BID    traMADol (ULTRAM) tablet 50 mg  50 mg Oral Q6H PRN    vancomycin (VANCOCIN) oral solution 125 mg  125 mg Oral Daily    warfarin (COUMADIN) tablet 10 mg  10 mg Oral Daily (warfarin)       No Known Allergies      Objective     Blood pressure 119/61, pulse 66, temperature 97 5 °F (36 4 °C), temperature source Oral, resp  rate 20, height 5' 6" (1 676 m), weight 67 4 kg (148 lb 9 4 oz), SpO2 94 %  Intake/Output Summary (Last 24 hours) at 03/01/18 1307  Last data filed at 03/01/18 1230   Gross per 24 hour   Intake              490 ml   Output                0 ml   Net              490 ml         PHYSICAL EXAM:      General Appearance:   Alert and oriented x 3   Cooperative, and in no respiratory distress   HEENT:   Normocephalic, atraumatic, anicteric      Neck:  Supple, symmetrical, trachea midline   Lungs:   Clear to auscultation bilaterally   Heart[de-identified]   Regular rate and rhythm    Abdomen:   Soft, tenderness in the RLQ without guarding or rigidity, non-distended; normal bowel sounds   Genitalia:   Deferred    Rectal:   Deferred    Extremities:  No cyanosis, clubbing or edema    Pulses:  2+ and symmetric all extremities    Skin:  Skin color, texture, turgor normal, no rashes or lesions    Lymph nodes:  No palpable cervical or supraclavicular lymphadenopathy        Lab Results:     Results from last 7 days  Lab Units 03/01/18  0441 02/28/18  1526   WBC Thousand/uL 7 62 8 49   HEMOGLOBIN g/dL 10 0* 10 1*   HEMATOCRIT % 31 5* 32 1*   PLATELETS Thousands/uL 243 293   LYMPHO PCT %  --  14   MONO PCT MAN %  --  12   EOSINO PCT MANUAL %  --  7*       Results from last 7 days  Lab Units 03/01/18  0634 03/01/18  0441 02/28/18  1833   SODIUM mmol/L  --  124* 129*   POTASSIUM mmol/L 5 7* 6 6* 5 5*   CHLORIDE mmol/L  --  90* 92*   CO2 mmol/L  --  17* 24   BUN mg/dL  --  61* 52*   CREATININE mg/dL  --  7 46* 6 58*   CALCIUM mg/dL  --  7 8* 7 6*   TOTAL PROTEIN g/dL  --   --  7 6   BILIRUBIN TOTAL mg/dL  --   --  0 30   ALK PHOS U/L  --   --  171*   ALT U/L  --   --  19   AST U/L  --   --  16   GLUCOSE RANDOM mg/dL  --  493* 264*       Results from last 7 days  Lab Units 03/01/18  0534   INR  2 95*       Results from last 7 days  Lab Units 02/28/18  1526   LIPASE u/L 173       Imaging Studies: I have personally reviewed pertinent imaging studies  Ct Abdomen Pelvis Wo Contrast    Result Date: 2/28/2018  Impression: Persistent right basilar airspace disease and effusion as also seen on the prior study of 11/27/2017  There may now be a rind surrounding the pleural effusion possibly indicating development of an empyema  Distal choledocholithiasis with diffuse CBD dilation  Interval development of left pneumobilia  Status post cholecystectomy Chronic punctate gravel-like calculi in the proximal left ureter unchanged from the prior study are nonobstructive  No hydronephrosis The study was marked in EPIC for immediate notification   Workstation performed: [id number]       ASSESSMENT and PLAN:    Principal Problem:    Urinary tract infection  Active Problems:    Type 1 diabetes mellitus with nephropathy (HCC)    ESRD (end stage renal disease) on dialysis (HCC)    Chronic combined systolic and diastolic CHF (congestive heart failure) ([facility])    Clostridium difficile infection    Hyponatremia    Acquired hypothyroidism    Paroxysmal atrial fibrillation (HCC)    Anemia in end-stage renal disease (HCC)    Benign hypertension with end-stage renal disease (HCC)    Choledocholithiasis    Chronic abdominal pain    Abnormal urinalysis    1) Choledocholithiasis per CT scan on admission - Patient is status post cholecystectomy and multiple ERCPs requiring pancreatic enzymes per patient as he had post ERCP pancreatitis multiple times  He was following with [facility]  Fortunately, patient is afebrile and does not have leukocytosis  No associated elevation in bilirubin  - Because the patient was admitted with hyperkalemia and metabolic acidosis related to end-stage renal disease, it is imperative he receives dialysis  - Patient's INR is also elevated 2 5, he is on Coumadin     - Because patient has biliary stent with no elevation in bilirubin, he will likely follow-up with [facility]  We will consider ERCP early next week if patient's clinical status changes  - Continue to monitor for fever and leukocytosis very closely  - Continue to monitor LFTs daily     2) Chronic diarrhea with history of C diff - Patient [facility] vanco chronically [facility]  Appreciate ID recs  - Follow-up on C diff PCR  - Ordered stool enteric panel  - Serial abdominal exams  - Monitor frequency and consistency of stools  - Would benefit from FMT if this is indeed recurrent C diff infection       The patient was seen and examined by Jose Alberto Rene, all key medical decisions were made with Dr Sandi Leone  Thank you for allowing us to participate in the care of this pleasant patient  We will follow up with you closely

## 2018-03-01 NOTE — H&P
H&P- Dl Teddy 1950, 74 y o  male MRN: [id number]    Unit/Bed#: -01 Encounter: [id number]    Primary Care Provider: Natalia Banda DO   Date and time admitted to hospital: 2/28/2018  2:43 PM    * Urinary tract infection   Assessment & Plan    · POA- Lower abdominal pain and painful urination  · Makes small amount of urine with ESRD every few days  · UA- Positive nitrite  Innumerable RBC, WBC, Bacteria  · Afebrile without leukocytosis, lactic acidosis  · Previous urine culture 1/30/18 grew Pseudomonas susceptible to Meropenem  · IV Meropenem  · Urine culture, blood cultures pending  Choledocholithiasis   Assessment & Plan    · CT Ab/pelvis- Persistent right basilar airspace disease and effusion as also seen on the prior study of 11/27/2017  There may now be a rind surrounding the pleural effusion possibly indicating development of an empyema  Distal choledocholithiasis with diffuse CBD dilation  Interval development of left pneumobilia  Status post cholecystectomy  Chronic punctate gravel-like calculi in the proximal left ureter unchanged from the prior study are nonobstructive  No hydronephrosis  · Surgery evaluated in ED and recommended ERCP as there is not acute surgical process and is s/p cholecystectomy  · Consult GI  Hyponatremia   Assessment & Plan    · Na 129  · Appears chronic  · Fluid restriction, as previously recommended by Nephrology  · Monitor BMP  · Appreciate Nephrology input  ESRD (end stage renal disease) on dialysis (HCC)   Assessment & Plan    · Cr 6 58   · K 5 5  · Dialysis Monday, Wednesday, Friday  Missed dialysis today  · Consult Nephrology  Chronic combined systolic and diastolic CHF (congestive heart failure) ([facility])   Assessment & Plan    · Appears stable  · Daily weights, I&Os  · Fluid restriction  · Continue home medications  Paroxysmal atrial fibrillation (HCC)   Assessment & Plan    · Rate controlled at 70    · Continue home medications  · Anticoagulated on Coumadin  INR 2 75  · Continue Coumadin and monitor INR daily  Type 1 diabetes mellitus with nephropathy (HCC)   Assessment & Plan    · Glucose 264  · Continue home insulin regimen with SSI coverage  Benign hypertension with end-stage renal disease (HCC)   Assessment & Plan    · /68  · Continue home medications  Anemia in end-stage renal disease (HCC)   Assessment & Plan    · Hgb 10 1  · Baseline appears to be 9   · Monitor on CBC  Acquired hypothyroidism   Assessment & Plan    · Continue synthroid  Clostridium difficile infection   Assessment & Plan    · Hx of C diff with chronic diarrhea  No change in stools to suggest acute infection  · On chronic Vanco and probiotics- continue  VTE Prophylaxis: Warfarin (Coumadin)  / sequential compression device   Code Status: Full Code- Discussed with patient  POLST: POLST form is not discussed and not completed at this time  Discussion with family: Discussed with patient, wife at bedside  Anticipated Length of Stay:  Patient will be admitted on an Inpatient basis with an anticipated length of stay of  Greater than 2 midnights  Justification for Hospital Stay: UTI, Choledocholithiasis  Total Time for Visit, including Counseling / Coordination of Care: 45 minutes  Greater than 50% of this total time spent on direct patient counseling and coordination of care  Chief Complaint:   Abdominal pain  History of Present Illness:    Dasia Carlin is a 74 y o  male who presents with abdominal pain x a few weeks, worsening over last few days  Patient reports right side lower abdominal pain that is a 6/10 in severity, gnawing in nature that comes and goes  He reports that the pain increases to a 10/10 with palpation  Over the last few days the pain has been worse and he had had several episodes of vomiting, approximately 1-2 hours after eating   He states that he still has an appetite and has been eating and drinking normally but will vomit shortly after  His wife states that he will start coughing as if he is choking on his food/drink and will then vomit  He reports that he has chronic diarrhea, and has not noticed any change in his stools  Denies hematochezia  Additionally he has had dysuria and noticed that his urine is much darker and thicker than usual  He reports that he makes a very small amount of urine every 4-5 days  Patient has dialysis every Monday, Wednesday, Friday  However, he missed his dialysis today secondary to being in the ED  He denies fever, chills, diaphoresis, SOB, cough, CP, palpitations, dizziness, HA, lightheadedness, LE edema  Review of Systems:    Review of Systems   Constitutional: Negative for appetite change, chills, diaphoresis and fever  Respiratory: Negative for shortness of breath  Cardiovascular: Negative for chest pain and palpitations  Gastrointestinal: Positive for abdominal pain, diarrhea, nausea and vomiting  Negative for blood in stool and constipation  Genitourinary: Positive for dysuria  Negative for frequency, hematuria and urgency  Neurological: Negative for dizziness, light-headedness and headaches  All other systems reviewed and are negative        Past Medical and Surgical History:     Past Medical History:   Diagnosis Date    Acquired hypothyroidism     underactive    Atrial fibrillation ([place] [address] )     Choledocholithiasis 2/28/2018    Chronic kidney disease-mineral and bone disorder 11/27/2017    Clostridium difficile infection 04/2017    Diabetes mellitus ([place] [address] )     Dialysis patient ([place] [address] )     Diarrhea     ESRD (end stage renal disease) on dialysis ([place] [address] )     Hx of cardiac arrest     Hypertension     Hyponatremia 8/8/2017    Neuropathy     Right lower lobe pneumonia ([place] [address] ) 2/20/2017    Sepsis ([place] [address] ) 04/2017    Polymicrobial MRSA, VRE    Systolic and diastolic CHF, chronic (HCC)     EF 35%, Grade II DD Past Surgical History:   Procedure Laterality Date    CATARACT EXTRACTION      CHG GI ENDOSCOPIC ULTRASOUND N/A 3/10/2016    Procedure: LINEAR ENDOSCOPIC U/S;  Surgeon: Robert Mireles MD;  Location: [facility]; Service: Gastroenterology    CHOLECYSTECTOMY      GASTROSTOMY TUBE PLACEMENT N/A 4/12/2017    Procedure: INSERTION PEG TUBE;  Surgeon: Clyde Lisa MD;  Location: [facility];  Service:    Geovanna Maray LEG AMPUTATION THROUGH LOWER TIBIA AND FIBULA Right 4/6/2017    Procedure: AMPUTATION BELOW KNEE (BKA); Surgeon: Robby Pennington DO;  Location: [facility];  Service:     TOE AMPUTATION  2000       Meds/Allergies:     Prior to Admission medications    Medication Sig Start Date End Date Taking?  Authorizing Provider   acetaminophen (TYLENOL) 325 mg tablet Take 650 mg by mouth every 6 (six) hours as needed for mild pain   Yes Historical Provider, MD   albuterol (2 5 mg/3 mL) 0 083 % nebulizer solution Inhale 1 each   Yes Historical Provider, MD   amiodarone 200 mg tablet Take 1 tablet (200 mg total) by mouth daily for 30 days 2/9/18 3/11/18 Yes Tea Quiñonez MD   b complex-vitamin C-folic acid (RENAL) 1 mg Take 1 mg by mouth daily   Yes Historical Provider, MD   calcium citrate (CALCITRATE) 950 MG tablet Take 2,400 mg by mouth 3 (three) times a day with meals   Yes Historical Provider, MD   Cholecalciferol (VITAMIN D-3) 1000 units CAPS Take by mouth daily   Yes Historical Provider, MD   cinacalcet (SENSIPAR) 30 mg tablet Take 30 mg by mouth daily at bedtime   Yes Historical Provider, MD   citalopram (CeleXA) 10 mg tablet Take 0 5 tablets (5 mg total) by mouth daily 2/9/18  Yes Tea Quiñonez MD   diphenoxylate-atropine (LOMOTIL) 2 5-0 025 mg per tablet TAKE 1 TABLET FOUR TIMES A DAY AS NEEDED FOR DIARRHEA  12/22/17  Yes Historical Provider, MD   gabapentin (NEURONTIN) 100 mg capsule Take 2 capsules (200 mg total) by mouth daily at bedtime 2/9/18  Yes Tea Quiñonez MD   lanthanum (FOSRENOL) 1000 MG chewable tablet Chew 1 tablet (1,000 mg total) 3 (three) times a day with meals 2/9/18  Yes Fozia Westfall MD   melatonin 3 mg Take 1 tablet (3 mg total) by mouth daily at bedtime 2/9/18  Yes Fozia Westfall MD   menthol-zinc oxide (CALMOSEPTINE) 0 44-20 6 % OINT Apply topically   Yes Historical Provider, MD   metoprolol tartrate (LOPRESSOR) 25 mg tablet Take 0 5 tablets (12 5 mg total) by mouth every 12 (twelve) hours 2/9/18  Yes Fozia Westfall MD   midodrine (PROAMATINE) 2 5 mg tablet Take 1 tablet by mouth 2 (two) times a day 1/15/18  Yes Historical Provider, MD   Multiple Vitamin (DAILY VALUE MULTIVITAMIN) TABS Take 1 tablet by mouth daily   Yes Historical Provider, MD   nystatin (MYCOSTATIN) powder Apply topically 2 (two) times a day 12/7/17  Yes Leandra Saucedo MD   oxyCODONE (OXY-IR) 5 MG capsule Take 1 capsule by mouth every 4 (four) hours as needed   Yes Historical Provider, MD   pancrelipase, Lip-Prot-Amyl, (CREON) 24,000 units Take 3 capsules by mouth 3 (three) times a day   Yes Historical Provider, MD   pantoprazole (PROTONIX) 40 mg tablet Take 1 tablet by mouth daily 10/27/15  Yes Historical Provider, MD   Probiotic Product (PRO-BIOTIC BLEND) CAPS Take 1 capsule by mouth daily   Yes Historical Provider, MD   saccharomyces boulardii (FLORASTOR) 250 mg capsule Take 1 capsule by mouth 2 (two) times a day 7/30/15  Yes Historical Provider, MD   traMADol (ULTRAM) 50 mg tablet Take 50 mg by mouth every 6 (six) hours as needed for moderate pain   Yes Historical Provider, MD   vancomycin (VANCOCIN) 50mg/mL SOLN Take 2 5 mL by mouth daily 12/7/17  Yes Leandra Saucedo MD   warfarin (COUMADIN) 5 mg tablet Take 2 tablets (10 mg total) by mouth daily 2/9/18  Yes Fozia Westfall MD   cholestyramine sugar free (QUESTRAN LIGHT) 4 g packet Take 1 packet by mouth 2 (two) times a day for 30 days 12/7/17 2/28/18  Leandra Saucedo MD   insulin detemir (LEVEMIR) 100 units/mL subcutaneous injection Inject 5 Units under the skin 2 (two) times a day for 30 days 12/7/17 2/28/18  Nitin Michael MD   insulin lispro (HumaLOG) 100 units/mL injection Inject 2 Units under the skin 3 (three) times a day before meals for 30 days  Patient taking differently: Inject 2 Units under the skin 3 (three) times a day before meals Sliding scale plus 3 units at meals  12/7/17 2/28/18  Nitin Michael MD   levothyroxine 137 mcg tablet Take 1 tablet by mouth daily in the early morning for 30 days  Patient taking differently: Take 150 mcg by mouth daily in the early morning   12/8/17 2/28/18  Nitin Michael MD     I have reviewed home medications with patient personally  Allergies: No Known Allergies    Social History:     Marital Status: /Civil Union   Occupation: Unknown  Patient Pre-hospital Living Situation: Home  Patient Pre-hospital Level of Mobility: Limited  Uses walker  Patient Pre-hospital Diet Restrictions: None  Substance Use History:   History   Alcohol Use No     History   Smoking Status    Former Smoker    Packs/day: 1 00    Years: 46 00    Types: Cigarettes    Start date: 5    Quit date: 3/1/2017   Smokeless Tobacco    Never Used     History   Drug Use No       Family History:    non-contributory    Physical Exam:     Vitals:   Blood Pressure: 135/72 (02/28/18 2042)  Pulse: 71 (02/28/18 2042)  Temperature: 97 7 °F (36 5 °C) (02/28/18 2042)  Temp Source: Oral (02/28/18 2042)  Respirations: 20 (02/28/18 2042)  Height: 5' 6" (167 6 cm) (02/28/18 2042)  Weight - Scale: 68 kg (150 lb) (02/28/18 2042)  SpO2: 94 % (02/28/18 2042)    Physical Exam   Constitutional: He is oriented to person, place, and time  He appears well-developed and well-nourished  He is cooperative  He does not appear ill  No distress  HENT:   Head: Normocephalic and atraumatic  Eyes: Conjunctivae, EOM and lids are normal  Pupils are equal, round, and reactive to light  Cardiovascular: Regular rhythm and normal pulses  Right BKA  No LLE edema  Pulmonary/Chest: Effort normal and breath sounds normal  He has no wheezes  He has no rhonchi  He has no rales  Abdominal: Soft  Normal appearance and bowel sounds are normal  There is no tenderness  Musculoskeletal: Normal range of motion  Neurological: He is alert and oriented to person, place, and time  He has normal strength  He is not disoriented  No sensory deficit  Skin: Skin is warm, dry and intact  He is not diaphoretic  Psychiatric: He has a normal mood and affect  His speech is normal and behavior is normal  Cognition and memory are normal    Vitals reviewed  Additional Data:     Lab Results: I have personally reviewed pertinent reports  Results from last 7 days  Lab Units 02/28/18  1526   WBC Thousand/uL 8 49   HEMOGLOBIN g/dL 10 1*   HEMATOCRIT % 32 1*   PLATELETS Thousands/uL 293   LYMPHO PCT % 14   MONO PCT MAN % 12   EOSINO PCT MANUAL % 7*       Results from last 7 days  Lab Units 02/28/18  1833   SODIUM mmol/L 129*   POTASSIUM mmol/L 5 5*   CHLORIDE mmol/L 92*   CO2 mmol/L 24   BUN mg/dL 52*   CREATININE mg/dL 6 58*   CALCIUM mg/dL 7 6*   TOTAL PROTEIN g/dL 7 6   BILIRUBIN TOTAL mg/dL 0 30   ALK PHOS U/L 171*   ALT U/L 19   AST U/L 16   GLUCOSE RANDOM mg/dL 264*       Results from last 7 days  Lab Units 02/28/18  1526   INR  2 75*       Imaging: I have personally reviewed pertinent reports  CT abdomen pelvis wo contrast   Final Result by Elina Marinelli MD (02/28 1711)      Persistent right basilar airspace disease and effusion as also seen on the prior study of 11/27/2017  There may now be a rind surrounding the pleural effusion possibly indicating development of an empyema  Distal choledocholithiasis with diffuse CBD dilation  Interval development of left pneumobilia  Status post cholecystectomy      Chronic punctate gravel-like calculi in the proximal left ureter unchanged from the prior study are nonobstructive    No hydronephrosis      The study was marked in EPIC for immediate notification  Workstation performed: [id number]             EKG, Pathology, and Other Studies Reviewed on Admission:   · EKG: NSR, rate 72  Incomplete RBBB  Allscripts / Epic Records Reviewed: Yes     ** Please Note: This note has been constructed using a voice recognition system   **

## 2018-03-01 NOTE — ASSESSMENT & PLAN NOTE
· Hx of C diff with chronic diarrhea  No change in stools to suggest acute infection  · On chronic Vanco and probiotics- continue

## 2018-03-01 NOTE — ASSESSMENT & PLAN NOTE
· CT Ab/pelvis- Persistent right basilar airspace disease and effusion as also seen on the prior study of 11/27/2017  There may now be a rind surrounding the pleural effusion possibly indicating development of an empyema  Distal choledocholithiasis with diffuse CBD dilation  Interval development of left pneumobilia  Status post cholecystectomy  Chronic punctate gravel-like calculi in the proximal left ureter unchanged from the prior study are nonobstructive  No hydronephrosis  · Surgery evaluated in ED and recommended ERCP as there is not acute surgical process and is s/p cholecystectomy  · Consult GI

## 2018-03-01 NOTE — CASE MANAGEMENT
[address] [facility] in the [facility] by Graeme Michael for 2017  Network Utilization Review Department  Phone: [phone]; Fax [phone]  ATTENTION: The Network Utilization Review Department is now centralized for our 7 Facilities  Please call with any questions or concerns to [phone] and carefully follow the prompts so that you are directed to the right person  All voicemails are confidential  Fax any determinations, approvals, denials, and requests for initial or continue stay review clinical to [phone]  Due to HIGH CALL volume, it would be easier if you could please send faxed requests to expedite your requests and in part, help us provide discharge notifications faster   /////////////////////////////////////////////////////////////////////////////////////////////////////////////////    Initial Clinical Review    Admission: Date/Time/Statement: 2/28/18 @ [address] [place] [address] This Encounter   Procedures    Inpatient Admission (expected length of stay for this patient is greater than two midnights)     Standing Status:   Standing     Number of Occurrences:   1     Order Specific Question:   Admitting Physician     Answer:   Kim Plata [[id number]]     Order Specific Question:   Level of Care     Answer:   Med Surg [[id number]]     Order Specific Question:   Bed request comments     Answer:   dialysis     Order Specific Question:   Estimated length of stay     Answer:   More than 2 Midnights     Order Specific Question:   Certification     Answer:   I certify that inpatient services are medically necessary for this patient for a duration of greater than two midnights  See H&P and MD Progress Notes for additional information about the patient's course of treatment           ED: Date/Time/Mode of Arrival:   ED Arrival Information     Expected Arrival Acuity Means of Arrival Escorted By Service Admission Type    - 2/28/2018 12:48 Urgent Wheelchair Spouse General Medicine Urgent    Arrival Complaint    abd pain, n/v/d, painful urination          Chief Complaint:   Chief Complaint   Patient presents with    Abdominal Pain     pt here with c/o lower abd pain and painful uirination  Was recently admitted for UTI  History of Illness:  74 y o  male who presents with abdominal pain x a few weeks, worsening over last few days  Patient reports right side lower abdominal pain   Cate Ana Perez Over the last few days the pain has been worse and he had had several episodes of vomiting, approximately 1-2 hours after eating  He states that he still has an appetite and has been eating and drinking normally but will vomit shortly after  He reports that he has chronic diarrhea, and has not noticed any change in his stools  Dialysis PT, wife reports "he only produces urine ever 3 or 4 days  He just went yesterday and it was very painful and dark" Was able to get 2cc via straight cath  Patient has dialysis every Monday, Wednesday, Friday  However, he missed his dialysis today secondary to being in the ED    ED Vital Signs:   ED Triage Vitals [02/28/18 1254]   Temperature Pulse Respirations Blood Pressure SpO2   98 9 °F (37 2 °C) 72 18 143/70 96 %      Temp Source Heart Rate Source Patient Position - Orthostatic VS BP Location FiO2 (%)   Oral Monitor Sitting Left arm --      Pain Score       6        Wt Readings from Last 1 Encounters:   03/01/18 67 4 kg (148 lb 9 4 oz)     Abnormal Labs/Diagnostic Test Results:   Ua:   Glucose 250 ,  Pos nitrite, large leuks, innum wbc and bacteria    Na 129  >  124  K  5 5  >  6 6  >  5 7  co2 =  24  >  17  An gap  13  >  17  Bun  52  >  61  crt  6 58  >  7 46  Glucose  264  >  493  >  342  >  320  Ca  7 6  >  7 8      ED Treatment:   Medication Administration from 02/28/2018 1248 to 02/28/2018 2006       Date/Time Order Dose Route Action Action by Comments     02/28/2018 1949 piperacillin-tazobactam (ZOSYN) 3 375 g in sodium chloride 0 9 % 50 mL IVPB 3 375 g Intravenous New Bag April Gee Roy RN           Past Medical/Surgical History:    Active Ambulatory Problems     Diagnosis Date Noted    Type 1 diabetes mellitus with nephropathy ([facility] )     ESRD (end stage renal disease) on dialysis ([facility] )     Ambulatory dysfunction 12/12/2016    S/P percutaneous endoscopic gastrostomy (PEG) tube placement ([facility] ) 06/16/2017    Pleural effusion on right 06/16/2017    Chronic combined systolic and diastolic CHF (congestive heart failure) ([facility] ) 06/16/2017    R Fibula fracture, proximal to stump 06/17/2017    Urinary tract infection 06/19/2017    DVT, lower extremity ([facility] ) 08/08/2017    Clostridium difficile infection 09/18/2017    Hyponatremia 09/24/2017    Acquired hypothyroidism     Chronic kidney disease-mineral and bone disorder 11/27/2017    Paroxysmal atrial fibrillation ([facility] ) 12/29/2017    Anemia in end-stage renal disease ([facility] ) 01/31/2018    Benign hypertension with end-stage renal disease ([facility] ) 01/31/2018    Myoclonic jerking 02/05/2018     Resolved Ambulatory Problems     Diagnosis Date Noted    Hypertension     Hypothyroidism     Neuropathy     Sepsis ([facility] ) 09/10/2016    Encephalopathy 09/10/2016    H/O Clostridium difficile infection 09/13/2016    Cellulitis of left thigh 11/10/2016    Cellulitis of right lower extremity 12/11/2016    Hyperglycemia 12/11/2016    Leg pain 12/11/2016    Stage 2 skin ulcer of sacral region 12/12/2016    Hyponatremia 12/12/2016    Tobacco abuse 12/12/2016    Diabetic ulcer of right foot associated with type 1 diabetes mellitus ([facility] ) 12/12/2016    Hemoptysis 12/13/2016    Acute respiratory failure with hypoxia ([facility] ) 12/13/2016    Bursitis of left elbow 12/30/2016    Hypomagnesemia 12/30/2016    Lactic acidosis 12/30/2016    Encephalopathy acute 02/18/2017    Lethargy 02/18/2017    Cellulitis of right leg 02/18/2017    Rapid atrial fibrillation ([facility] ) 02/18/2017    Foot ulcer ([facility] ) 02/18/2017    HCAP (healthcare-associated pneumonia) 02/20/2017    Recurrent colitis due to Clostridium difficile 02/22/2017    Bacteremia 04/02/2017    Cardiac arrest ([facility] ) 04/02/2017    Dysphagia 06/16/2017    Wound of right leg on R BKA stump 06/16/2017    Transaminitis 06/16/2017    Syncope 07/07/2017    Epigastric pain 07/11/2017    acute Encephalopathy, suspected metabolic 91/92/9101    HTN (hypertension) 08/08/2017    Goals of care, counseling/discussion 08/08/2017    Hyponatremia 08/08/2017    Acute encephalopathy 08/10/2017    DKA (diabetic ketoacidoses) ([place] [address] ) 63/98/0502    Metabolic acidosis, increased anion gap 09/17/2017    Hypoglycemia 09/20/2017    Anemia 09/20/2017    Thrombocytopenia (HCC) 09/22/2017    Elevated INR 11/28/2017    Hyperkalemia 11/28/2017    Hypotension 11/29/2017    Hyponatremia with excess extracellular fluid volume 01/31/2018    Volume overload 02/01/2018    Prolonged Q-T interval on ECG 02/08/2018     Past Medical History:   Diagnosis Date    Acquired hypothyroidism     Atrial fibrillation ([place] [address] )     Choledocholithiasis 2/28/2018    Chronic kidney disease-mineral and bone disorder 11/27/2017    Clostridium difficile infection 04/2017    Diabetes mellitus ([place] [address] )     Dialysis patient ([place] [address] )     Diarrhea     ESRD (end stage renal disease) on dialysis ([place] [address] )     Hx of cardiac arrest     Hypertension     Hyponatremia 8/8/2017    Neuropathy     Right lower lobe pneumonia ([place] [address] ) 2/20/2017    Sepsis ([place] [address] ) 39/1185    Systolic and diastolic CHF, chronic (HCC)        Admitting Diagnosis: Urinary tract infection [N39 0]  Abdominal pain [R10 9]  Pneumobilia [K83 8]  Stage 5 chronic kidney disease on chronic dialysis ([place] [address] ) [N18 6, Z99 2]     Assessment/Plan:   Urinary tract infection   Assessment & Plan     · POA- Lower abdominal pain and painful urination  · Makes small amount of urine with ESRD every few days  · UA- Positive nitrite   Innumerable RBC, WBC, Bacteria  · Afebrile without leukocytosis, lactic acidosis  · Previous urine culture 1/30/18 grew Pseudomonas susceptible to Meropenem  · IV Meropenem  · Urine culture, blood cultures pending           Choledocholithiasis   Assessment & Plan     · CT Ab/pelvis- Persistent right basilar airspace disease and effusion as also seen on the prior study of 11/27/2017  Levon Oglesby may now be a rind surrounding the pleural effusion possibly indicating development of an empyema  Distal choledocholithiasis with diffuse CBD dilation   Interval development of left pneumobilia   Status post cholecystectomy  Chronic punctate gravel-like calculi in the proximal left ureter unchanged from the prior study are nonobstructive   No hydronephrosis  · Surgery evaluated in ED and recommended ERCP as there is not acute surgical process and is s/p cholecystectomy  · Consult GI           Hyponatremia   Assessment & Plan     · Na 129  · Appears chronic  · Fluid restriction, as previously recommended by Nephrology  · Monitor BMP  · Appreciate Nephrology input           ESRD (end stage renal disease) on dialysis ([facility] )   Assessment & Plan     · Cr 6 58   · K 5 5  · Dialysis Monday, Wednesday, Friday  Missed dialysis today  · Consult Nephrology           Chronic combined systolic and diastolic CHF (congestive heart failure) (HCC)   Assessment & Plan     · Appears stable  · Daily weights, I&Os  · Fluid restriction  · Continue home medications           Paroxysmal atrial fibrillation (HCC)   Assessment & Plan     · Rate controlled at 70  · Continue home medications  · Anticoagulated on Coumadin  INR 2 75  · Continue Coumadin and monitor INR daily           Type 1 diabetes mellitus with nephropathy (HCC)   Assessment & Plan     · Glucose 264  · Continue home insulin regimen with SSI coverage           Benign hypertension with end-stage renal disease ([facility] )   Assessment & Plan     · /68    · Continue home medications           Anemia in end-stage renal disease (HCC)   Assessment & Plan     · Hgb 10 1  · Baseline appears to be 9   · Monitor on CBC          Acquired hypothyroidism   Assessment & Plan     · Continue synthroid           Clostridium difficile infection   Assessment & Plan     · Hx of C diff with chronic diarrhea  No change in stools to suggest acute infection  · On chronic Vanco and probiotics- continue                 VTE Prophylaxis: Warfarin (Coumadin)  / sequential compression device   Anticipated Length of Stay:  Patient will be admitted on an Inpatient basis with an anticipated length of stay of  Greater than 2 midnights     Justification for Hospital Stay: UTI, Choledocholithiasis          Admission Orders:  Scheduled Meds:   Current Facility-Administered Medications:  acetaminophen 650 mg Oral Q6H PRN Patel Scales, PA-C    albuterol 2 5 mg Nebulization Q4H PRN Patel Scales, PA-C    amiodarone 200 mg Oral Daily Patel Scales, PA-C    b complex-vitamin C-folic acid 1 capsule Oral Daily Patel Scales, PA-C    calcium carbonate 2 tablet Oral Daily With Breakfast Patel Scales, PA-C    cholecalciferol 1,000 Units Oral Daily Patel Scales, PA-C    cinacalcet 30 mg Oral HS Patel Scales, PA-C    citalopram 5 mg Oral Daily Patel Scales, PA-C    diphenoxylate-atropine 1 tablet Oral 4x Daily PRN Patel Scales, PA-C    gabapentin 200 mg Oral HS Patel Scales, PA-C    insulin lispro 1-5 Units Subcutaneous HS Duayne Michoacano, PA-C    [START ON 3/2/2018] insulin lispro 1-5 Units Subcutaneous 0200 Duayne Michoacano, PA-C    insulin lispro 1-6 Units Subcutaneous TID AC TERESA Allison-C    lanthanum 1,000 mg Oral TID With Meals Patel Scales, PA-C    melatonin 3 mg Oral HS Patel Scales, PA-C    menthol-zinc oxide  Topical Q4H PRN Patel Scales, PA-C    meropenem 500 mg Intravenous Q12H Patel Scales, PA-C Last Rate: Stopped (03/01/18 0000) metoprolol tartrate 12 5 mg Oral Q12H [address] Bahman Calin, PA-C    midodrine 2 5 mg Oral BID Bahman Calin, PA-C    multivitamin-minerals 1 tablet Oral Daily Bahman Calin, PA-C    nystatin  Topical BID Bahman Calin, PA-C    ondansetron 4 mg Intravenous Q6H PRN Bahman Calin, PA-C    oxyCODONE 5 mg Oral Q4H PRN Bahman Calin, PA-C    pancrelipase (Lip-Prot-Amyl) 72,000 Units Oral TID Bahman Calin, PA-C    pantoprazole 40 mg Oral Early Morning Bahman Calin, PA-C    saccharomyces boulardii 250 mg Oral BID Bahman Calin, PA-C    traMADol 50 mg Oral Q6H PRN Bahman Calin, PA-C    vancomycin 125 mg Oral Daily Bahman Calin, PA-C    warfarin 10 mg Oral Daily (warfarin) Bahman Calin, PA-C        3/1  SURGERY   45-year-old male with choledocholithiasis status post cholecystectomy, multiple medical problems  -patient was made NPO this a m  -appreciate GI input, possible ERCP  -pain control  -DVT prophylaxis       3/1  NEPHROLOGY  1   End-stage renal disease on hemodialysis:  Plan for dialysis today and then tomorrow to get him back on his regular schedule  2   Anemia due to ESRD on HD:  Hemoglobin is stable at 10 1 will find out the last time he received his dose of Mircera as an outpatient if he is on it    3   Hyponatremia:  His sodium was 124 however his glucose is 493 correcting that is approximately 130-131 sodium level  Glucose management per primary team and patient is for dialysis today    4   Hyperkalemia:  Potassium is 6 6 may be secondary to hyperglycemia/solvent drag as well as end-stage renal disease missing dialysis  Repeat potassium is 5 7 this morning plan for dialysis today    5   Metabolic acidosis:  Anion gap is 17  This is a gapped metabolic acidosis Lactic acid yesterday was only 1 3  The bicarb had decreased from 24-17 over 9 hours  Recheck BMP again on dialysis, recheck lactic acid level, check serum acetone    Less likely but diabetic ketoacidosis also differential diagnosis; this could also be contributed to by end-stage renal disease on dialysis with missed dialysis  Plan:   as above, recheck labs, for dialysis today and tomorrow  Patient is currently in NAD

## 2018-03-01 NOTE — PROGRESS NOTES
Progress Note -Surgery TERESA [place] 74 y o  male MRN: [id number]  Unit/Bed#: -01 Encounter: [id number]      Subjective/Objective     Subjective:  Denying abdominal pain  States he does have burning with urination  Unsure when he is to have dialysis      Objective:     /56 (BP Location: Left arm)   Pulse 68   Temp 97 8 °F (36 6 °C) (Oral)   Resp 20   Ht 5' 6" (1 676 m)   Wt 67 4 kg (148 lb 9 4 oz)   SpO2 94%   BMI 23 98 kg/m²       Intake/Output Summary (Last 24 hours) at 03/01/18 0753  Last data filed at 03/01/18 8181   Gross per 24 hour   Intake              290 ml   Output                0 ml   Net              290 ml       Invasive Devices     Peripheral Intravenous Line            Peripheral IV 02/28/18 Left Forearm less than 1 day          Line            Hemodialysis AV Fistula  Right Forearm -- days    Hemodialysis AV Fistula Right Forearm -- days          Drain            Gastrostomy/Enterostomy -- days    Gastrostomy/Enterostomy Percutaneous endoscopic gastrostomy (PEG) 20 Fr   days                Physical Exam:  General appearance: alert and oriented, in no acute distress  Lungs: clear to auscultation bilaterally  Heart: regular rate and rhythm, S1, S2 normal, no murmur, click, rub or gallop  Abdomen: soft, non-tender; bowel sounds normal; no masses,  no organomegaly      Current Facility-Administered Medications:     acetaminophen (TYLENOL) tablet 650 mg, 650 mg, Oral, Q6H PRN, Schuyler Brand PA-C    albuterol inhalation solution 2 5 mg, 2 5 mg, Nebulization, Q4H PRN, Schuyler Brand PA-C    amiodarone tablet 200 mg, 200 mg, Oral, Daily, Rekha Schwartz PA-C    b complex-vitamin C-folic acid (NEPHROCAPS) capsule 1 capsule, 1 capsule, Oral, Daily, Schuyler Brand PA-C    calcium carbonate (OYSTER SHELL,OSCAL) 500 mg tablet 2 tablet, 2 tablet, Oral, Daily With Breakfast, Schuyler Brand PA-C    cholecalciferol (VITAMIN D3) tablet 1,000 Units, 1,000 Units, Oral, Daily, Evelyn Van PA-C    cinacalcet (SENSIPAR) tablet 30 mg, 30 mg, Oral, HS, Evelyn Van PA-C, 30 mg at 02/28/18 2311    citalopram (CeleXA) tablet 5 mg, 5 mg, Oral, Daily, Rekha Schwartz PA-C    diphenoxylate-atropine (LOMOTIL) 2 5-0 025 mg per tablet 1 tablet, 1 tablet, Oral, 4x Daily PRN, Evelyn Van PA-C    gabapentin (NEURONTIN) capsule 200 mg, 200 mg, Oral, HS, Rekha Schwartz PA-C, 200 mg at 02/28/18 2300    insulin lispro (HumaLOG) 100 units/mL subcutaneous injection 1-5 Units, 1-5 Units, Subcutaneous, HS, Steve Nicholas PA-C    [START ON 3/2/2018] insulin lispro (HumaLOG) 100 units/mL subcutaneous injection 1-5 Units, 1-5 Units, Subcutaneous, 0200, Silvina Kelly PA-C    insulin lispro (HumaLOG) 100 units/mL subcutaneous injection 1-6 Units, 1-6 Units, Subcutaneous, TID AC **AND** Fingerstick Glucose (POCT), , , TID AC, Silvina Kelly PA-C    lanthanum (FOSRENOL) chewable tablet 1,000 mg, 1,000 mg, Oral, TID With Meals, Evelyn Van PA-C    melatonin tablet 3 mg, 3 mg, Oral, HS, Evelyn Van PA-C, 3 mg at 02/28/18 2300    menthol-zinc oxide (CALMOSEPTINE) 0 44-20 6 % ointment, , Topical, Q4H PRN, Evelyn Van PA-C    meropenem (MERREM) 500 mg in sodium chloride 0 9 % 50 mL IVPB, 500 mg, Intravenous, Q12H, Evelyn Van PA-C, Stopped at 03/01/18 0000    metoprolol tartrate (LOPRESSOR) partial tablet 12 5 mg, 12 5 mg, Oral, Q12H [address], Evelyn Van PA-C, 12 5 mg at 02/28/18 2259    midodrine (PROAMATINE) tablet 2 5 mg, 2 5 mg, Oral, BID, Evelyn Van PA-C, 2 5 mg at 02/28/18 2300    multivitamin-minerals (CENTRUM) tablet 1 tablet, 1 tablet, Oral, Daily, Evelyn Van PA-C    nystatin (MYCOSTATIN) powder, , Topical, BID, Rekha Schwartz PA-C    ondansetron (ZOFRAN) injection 4 mg, 4 mg, Intravenous, Q6H PRN, Evelyn Van PA-C    oxyCODONE (ROXICODONE) IR tablet 5 mg, 5 mg, Oral, Q4H PRN, TERESA Barrientos-C    pancrelipase (Lip-Prot-Amyl) (CREON) delayed release capsule 72,000 Units, 72,000 Units, Oral, TID, Markus Brooks PA-C, 72,000 Units at 02/28/18 2310    pantoprazole (PROTONIX) EC tablet 40 mg, 40 mg, Oral, Early Morning, TERESA Barrientos-C, 40 mg at 03/01/18 5974    saccharomyces boulardii (FLORASTOR) capsule 250 mg, 250 mg, Oral, BID, TERESA Barrientos-C, 250 mg at 02/28/18 2300    traMADol (ULTRAM) tablet 50 mg, 50 mg, Oral, Q6H PRN, Markus Brooks PA-C    vancomycin (VANCOCIN) oral solution 125 mg, 125 mg, Oral, Daily, REBEKA BarrientosC    warfarin (COUMADIN) tablet 10 mg, 10 mg, Oral, Daily (warfarin), TERESA Barrientos-HUMBERTO, 10 mg at 02/28/18 2300              Lab, Imaging and other studies:  I have personally reviewed pertinent lab results  , CBC:   Lab Results   Component Value Date    WBC 7 62 03/01/2018    HGB 10 0 (L) 03/01/2018    HCT 31 5 (L) 03/01/2018    MCV 93 03/01/2018     03/01/2018    MCH 29 4 03/01/2018    MCHC 31 7 03/01/2018    RDW 15 5 (H) 03/01/2018    MPV 8 7 (L) 03/01/2018   , CMP:   Lab Results   Component Value Date     (L) 03/01/2018    K 5 7 (H) 03/01/2018    CL 90 (L) 03/01/2018    CO2 17 (L) 03/01/2018    ANIONGAP 17 (H) 03/01/2018    BUN 61 (H) 03/01/2018    CREATININE 7 46 (H) 03/01/2018    GLUCOSE 493 (H) 03/01/2018    CALCIUM 7 8 (L) 03/01/2018    AST 16 02/28/2018    ALT 19 02/28/2018    ALKPHOS 171 (H) 02/28/2018    PROT 7 6 02/28/2018    BILITOT 0 30 02/28/2018    EGFR 7 03/01/2018     Labs in chart were reviewed    Lab Results   Component Value Date    WBC 7 62 03/01/2018    WBC 7 09 10/19/2015    HGB 10 0 (L) 03/01/2018    HGB 10 9 (L) 10/19/2015    HCT 31 5 (L) 03/01/2018    HCT 32 6 (L) 10/19/2015     03/01/2018     10/19/2015     Lab Results   Component Value Date     (L) 03/01/2018     (L) 10/19/2015    K 5 7 (H) 03/01/2018    K 5 5 (H) 10/19/2015    CL 90 (L) 03/01/2018    CL 97 (L) 10/19/2015    CO2 17 (L) 03/01/2018    CO2 31 10/19/2015    BUN 61 (H) 03/01/2018    BUN 27 (H) 10/19/2015    CREATININE 7 46 (H) 03/01/2018    CREATININE 4 45 (H) 10/19/2015    GLUCOSE 493 (H) 03/01/2018    GLUCOSE 209 (H) 12/28/2017    GLUCOSE 358 (H) 10/19/2015       VTE Pharmacologic Prophylaxis: Warfarin (Coumadin)  VTE Mechanical Prophylaxis: sequential compression device    Assessment/plan:    82-year-old male with choledocholithiasis status post cholecystectomy, multiple medical problems  -patient was made NPO this a m  -appreciate GI input, possible ERCP  -pain control  -DVT prophylaxis      Patient Active Problem List   Diagnosis    Type 1 diabetes mellitus with nephropathy ([facility]    ESRD (end stage renal disease) on dialysis ([facility] )    Ambulatory dysfunction    S/P percutaneous endoscopic gastrostomy (PEG) tube placement (HCC)    Pleural effusion on right    Chronic combined systolic and diastolic CHF (congestive heart failure) ([address] [facility] )    R Fibula fracture, proximal to stump    Urinary tract infection    DVT, lower extremity (HCC)    Clostridium difficile infection    Hyponatremia    Acquired hypothyroidism    Chronic kidney disease-mineral and bone disorder    Paroxysmal atrial fibrillation (HCC)    Anemia in end-stage renal disease ([address] [facility] )    Benign hypertension with end-stage renal disease ([facility] [facility] )    Myoclonic jerking    Choledocholithiasis          This text is generated with voice recognition software  There may be translation, syntax,  or grammatical errors  If you have any questions, please contact the dictating provider

## 2018-03-01 NOTE — ASSESSMENT & PLAN NOTE
With dysuria,   Previous UTI secondary to Pseudomonas  Consult ID for further management  Follow on urine culture

## 2018-03-01 NOTE — ASSESSMENT & PLAN NOTE
· Cr 6 58   · K 5 5  · Dialysis Monday, Wednesday, Friday  Missed dialysis today  · Consult Nephrology

## 2018-03-01 NOTE — CONSULTS
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:  Hemoglobin is stable at 10 1 will find out the last time he received his dose of Mircera as an outpatient if he is on it  3   Hyponatremia:  His sodium was 124 however his glucose is 493 correcting that is approximately 130-131 sodium level  Glucose management per primary team and patient is for dialysis today  4   Hyperkalemia:  Potassium is 6 6 may be secondary to hyperglycemia/solvent drag as well as end-stage renal disease missing dialysis  Repeat potassium is 5 7 this morning plan for dialysis today  5   Metabolic acidosis:  Anion gap is 17  This is a gapped metabolic acidosis Lactic acid yesterday was only 1 3  The bicarb had decreased from 24-17 over 9 hours  Recheck BMP again on dialysis, recheck lactic acid level, check serum acetone  Less likely but diabetic ketoacidosis also differential diagnosis; this could also be contributed to by end-stage renal disease on dialysis with missed dialysis  Plan:   as above, recheck labs, for dialysis today and tomorrow  Patient is currently in NAD  History of Present Illness   Physician Requesting Consult: Roger Victor DO  Reason for Consult / Principal Problem:  End-stage renal disease on hemodialysis  Hx and PE limited by:   HPI: Josee Dave is a 74y o  year old male who presents with worsening abdominal pain  Patient has a history of end-stage renal disease on hemodialysis at [facility] on Monday Wednesday Friday  He had his last dialysis on Monday    Was seen here by surgery in the emergency room  We are asked to see the patient with regards to hemodialysis  Currently at the time of the initial interview this morning, the patient is resting comfortably in no acute distress denies any chest pressure shortness of breath or abdominal discomfort  Consults    General:  Mild nausea mild decrease in appetite  Cardiovascular:  No chest pressure or shortness of Breath or dizziness  Respiratory:  No cough epistaxis or hemoptysis reported  Gastrointestinal:  Mild nausea yesterday; abdominal pain yesterday none this morning  Genitourinary:  No urinary issues  Outside of the above review of systems, all others are essentially negative    Historical Information   Past Medical History:   Diagnosis Date    Acquired hypothyroidism     underactive    Atrial fibrillation ([place] [address] )     Choledocholithiasis 2/28/2018    Chronic kidney disease-mineral and bone disorder 11/27/2017    Clostridium difficile infection 04/2017    Diabetes mellitus ([place] [address] )     Dialysis patient ([place] [address] )     Diarrhea     ESRD (end stage renal disease) on dialysis ([place] [address] )     Hx of cardiac arrest     Hypertension     Hyponatremia 8/8/2017    Neuropathy     Right lower lobe pneumonia ([place] [address] ) 2/20/2017    Sepsis ([place] [address] ) 04/2017    Polymicrobial MRSA, VRE    Systolic and diastolic CHF, chronic (HCC)     EF 35%, Grade II DD     Past Surgical History:   Procedure Laterality Date    CATARACT EXTRACTION      CHG GI ENDOSCOPIC ULTRASOUND N/A 3/10/2016    Procedure: LINEAR ENDOSCOPIC U/S;  Surgeon: Jocelyn Mar MD;  [facility]; Service: Gastroenterology    CHOLECYSTECTOMY      GASTROSTOMY TUBE PLACEMENT N/A 4/12/2017    Procedure: INSERTION PEG TUBE;  Surgeon: Joann Posadas MD;  [facility];  Service:    Arde Needs LEG AMPUTATION THROUGH LOWER TIBIA AND FIBULA Right 4/6/2017    Procedure: AMPUTATION BELOW KNEE (BKA);   Surgeon: Allison Zhu DO;  [facility];  Service:     TOE AMPUTATION 2000     Social History   History   Alcohol Use No     History   Drug Use No     History   Smoking Status    Former Smoker    Packs/day: 1 00    Years: 46 00    Types: Cigarettes    Start date: 5    Quit date: 3/1/2017   Smokeless Tobacco    Never Used     Family History   Problem Relation Age of Onset    Diabetes Father     Cancer Other     Lupus Mother        Meds/Allergies   all current active meds have been reviewed, current meds: Current Facility-Administered Medications   Medication Dose Route Frequency    acetaminophen (TYLENOL) tablet 650 mg  650 mg Oral Q6H PRN    albuterol inhalation solution 2 5 mg  2 5 mg Nebulization Q4H PRN    amiodarone tablet 200 mg  200 mg Oral Daily    b complex-vitamin C-folic acid (NEPHROCAPS) capsule 1 capsule  1 capsule Oral Daily    calcium carbonate (OYSTER SHELL,OSCAL) 500 mg tablet 2 tablet  2 tablet Oral Daily With Breakfast    cholecalciferol (VITAMIN D3) tablet 1,000 Units  1,000 Units Oral Daily    cinacalcet (SENSIPAR) tablet 30 mg  30 mg Oral HS    citalopram (CeleXA) tablet 5 mg  5 mg Oral Daily    diphenoxylate-atropine (LOMOTIL) 2 5-0 025 mg per tablet 1 tablet  1 tablet Oral 4x Daily PRN    gabapentin (NEURONTIN) capsule 200 mg  200 mg Oral HS    insulin lispro (HumaLOG) 100 units/mL subcutaneous injection 1-5 Units  1-5 Units Subcutaneous HS    [START ON 3/2/2018] insulin lispro (HumaLOG) 100 units/mL subcutaneous injection 1-5 Units  1-5 Units Subcutaneous 0200    insulin lispro (HumaLOG) 100 units/mL subcutaneous injection 1-6 Units  1-6 Units Subcutaneous TID AC    lanthanum (FOSRENOL) chewable tablet 1,000 mg  1,000 mg Oral TID With Meals    melatonin tablet 3 mg  3 mg Oral HS    menthol-zinc oxide (CALMOSEPTINE) 0 44-20 6 % ointment   Topical Q4H PRN    meropenem (MERREM) 500 mg in sodium chloride 0 9 % 50 mL IVPB  500 mg Intravenous Q12H    metoprolol tartrate (LOPRESSOR) partial tablet 12 5 mg  12 5 mg Oral Q12H [address]    midodrine (PROAMATINE) tablet 2 5 mg  2 5 mg Oral BID    multivitamin-minerals (CENTRUM) tablet 1 tablet  1 tablet Oral Daily    nystatin (MYCOSTATIN) powder   Topical BID    ondansetron (ZOFRAN) injection 4 mg  4 mg Intravenous Q6H PRN    oxyCODONE (ROXICODONE) IR tablet 5 mg  5 mg Oral Q4H PRN    pancrelipase (Lip-Prot-Amyl) (CREON) delayed release capsule 72,000 Units  72,000 Units Oral TID    pantoprazole (PROTONIX) EC tablet 40 mg  40 mg Oral Early Morning    saccharomyces boulardii (FLORASTOR) capsule 250 mg  250 mg Oral BID    traMADol (ULTRAM) tablet 50 mg  50 mg Oral Q6H PRN    vancomycin (VANCOCIN) oral solution 125 mg  125 mg Oral Daily    warfarin (COUMADIN) tablet 10 mg  10 mg Oral Daily (warfarin)    and PTA meds:  Prescriptions Prior to Admission   Medication    acetaminophen (TYLENOL) 325 mg tablet    albuterol (2 5 mg/3 mL) 0 083 % nebulizer solution    amiodarone 200 mg tablet    b complex-vitamin C-folic acid (RENAL) 1 mg    calcium citrate (CALCITRATE) 950 MG tablet    Cholecalciferol (VITAMIN D-3) 1000 units CAPS    cinacalcet (SENSIPAR) 30 mg tablet    citalopram (CeleXA) 10 mg tablet    diphenoxylate-atropine (LOMOTIL) 2 5-0 025 mg per tablet    gabapentin (NEURONTIN) 100 mg capsule    lanthanum (FOSRENOL) 1000 MG chewable tablet    melatonin 3 mg    menthol-zinc oxide (CALMOSEPTINE) 0 44-20 6 % OINT    metoprolol tartrate (LOPRESSOR) 25 mg tablet    midodrine (PROAMATINE) 2 5 mg tablet    Multiple Vitamin (DAILY VALUE MULTIVITAMIN) TABS    nystatin (MYCOSTATIN) powder    oxyCODONE (OXY-IR) 5 MG capsule    pancrelipase, Lip-Prot-Amyl, (CREON) 24,000 units    pantoprazole (PROTONIX) 40 mg tablet    Probiotic Product (PRO-BIOTIC BLEND) CAPS    saccharomyces boulardii (FLORASTOR) 250 mg capsule    traMADol (ULTRAM) 50 mg tablet    vancomycin (VANCOCIN) 50mg/mL SOLN    warfarin (COUMADIN) 5 mg tablet       No Known Allergies    Objective     Intake/Output Summary (Last 24 hours) at 03/01/18 0804  Last data filed at 03/01/18 4743   Gross per 24 hour   Intake              290 ml   Output                0 ml   Net              290 ml       Invasive Devices:        General: patient in NAD  Skin:  No new rash  Eyes:  No scleral icterus  Neck:  Supple no adenopathy   Chest:  Coarse breath sounds  CVS:  S1-S2  Abdomen:  Abdomen is soft there is mild tenderness in the right lower quadrant and suprapubic area  Extremities:  No significant edema, he has a good bruit and thrill in his right upper extremity; he has a right BKA  Neuro:  Nonfocal  Psych: Answers questions appropriately    Current Weight: Weight - Scale: 67 4 kg (148 lb 9 4 oz)  First Weight: Weight - Scale: 68 kg (150 lb)    Lab Results:  I have personally reviewed pertinent labs    CBC: Lab Results   Component Value Date    WBC 7 62 03/01/2018    WBC 7 09 10/19/2015    RBC 3 40 (L) 03/01/2018    RBC 3 60 (L) 10/19/2015     CMP: Lab Results   Component Value Date     (L) 03/01/2018     (L) 10/19/2015    CL 90 (L) 03/01/2018    CL 97 (L) 10/19/2015    CO2 17 (L) 03/01/2018    CO2 31 10/19/2015    ANIONGAP 17 (H) 03/01/2018    ANIONGAP 6 10/19/2015    BUN 61 (H) 03/01/2018    BUN 27 (H) 10/19/2015    CREATININE 7 46 (H) 03/01/2018    CREATININE 4 45 (H) 10/19/2015    GLUCOSE 493 (H) 03/01/2018    GLUCOSE 209 (H) 12/28/2017    GLUCOSE 358 (H) 10/19/2015    CALCIUM 7 8 (L) 03/01/2018    CALCIUM 8 2 (L) 10/19/2015    AST 16 02/28/2018    AST 31 07/28/2015    ALT 19 02/28/2018    ALT 18 07/28/2015    ALKPHOS 171 (H) 02/28/2018    ALKPHOS 160 (H) 07/28/2015    PROT 7 6 02/28/2018    PROT 7 5 07/28/2015    BILITOT 0 30 02/28/2018    BILITOT 0 25 07/28/2015    EGFR 7 03/01/2018    EGFR 11 12/28/2017     Phosphorus:   Lab Results   Component Value Date    PHOS 6 8 (H) 02/09/2018    PHOS 2 5 07/24/2015     Magnesium:   Lab Results   Component Value Date    MG 1 8 12/29/2017    MG 2 0 07/24/2015     Urinalysis: Lab Results   Component Value Date    COLORU Yuli 02/28/2018    COLORU Yellow 07/02/2015    CLARITYU Cloudy 02/28/2018    CLARITYU Clear 07/02/2015    SPECGRAV 1 025 02/28/2018    SPECGRAV 1 012 07/02/2015    PHUR 7 0 02/28/2018    PHUR 6 5 07/02/2015    LEUKOCYTESUR Large (A) 02/28/2018    LEUKOCYTESUR Negative 07/02/2015    NITRITE Positive (A) 02/28/2018    NITRITE Negative 07/02/2015    PROTEINUA >=300 (A) 02/28/2018    PROTEINUA 100 (2+) (A) 07/02/2015    GLUCOSEU 250 (1/4%) (A) 02/28/2018    GLUCOSEU 250 (1/4%) (A) 07/02/2015    KETONESU Negative 02/28/2018    KETONESU Negative 07/02/2015    BILIRUBINUR Negative 02/28/2018    BILIRUBINUR Negative 07/02/2015    BLOODU Large (A) 02/28/2018    BLOODU Trace (A) 07/02/2015     BMP: Lab Results   Component Value Date    GLUCOSE 493 (H) 03/01/2018    GLUCOSE 209 (H) 12/28/2017    GLUCOSE 358 (H) 10/19/2015    CO2 17 (L) 03/01/2018    CO2 31 10/19/2015    BUN 61 (H) 03/01/2018    BUN 27 (H) 10/19/2015    CREATININE 7 46 (H) 03/01/2018    CREATININE 4 45 (H) 10/19/2015    CALCIUM 7 8 (L) 03/01/2018    CALCIUM 8 2 (L) 10/19/2015

## 2018-03-01 NOTE — ASSESSMENT & PLAN NOTE
With chronic choledocholithiasis  Patient is status post cholecystectomy  No surgical intervention  GI consulted for further management

## 2018-03-01 NOTE — ASSESSMENT & PLAN NOTE
· EF 35%  ·  Appears stable  · Daily weights, I&Os  · Fluid restriction  · Continue home medications

## 2018-03-01 NOTE — CONSULTS
Consultation - Yoshi Headley 74 y o  male MRN: [id number]    Unit/Bed#: -01 Encounter: [id number]      Assessment/Plan     Assessment: This is a 74y o -year-old male with type 1 diabetes with hyperglycemia, end-stage renal disease on hemodialysis, hypoglycemia unawareness and choledocholelithiasis    Plan:  Patient is a very brittle diabetic with history of hypoglycemia unawareness- tight control not an option  For now switch basal insulin to Levemir 5 units twice daily, decrease pre meal insulin to 3 units before meals  Decrease the coverage scale and discontinue the coverage scale at 2:00 a m  Monitor blood sugars before meals, bedtime and 2:00 a m  However no coverage to be given at 2:00 a m  As oral intake increases he will need adjustment in his insulin dose  End-stage renal disease-patient on hemodialysis    Choledocholithiasis- GI on board-    CC: Diabetes Consult    History of Present Illness     HPI: Yoshi Headley is a 74y o  year old male with type 1 diabetes, end-stage renal disease on hemodialysis, hypoglycemia unawareness who presented to the hospital with complaints of abdominal pain  Endocrine consult is requested for management of diabetes  Patient has had type 1 diabetes for more than 30 years, complicated by retinopathy, he is legally blind, nephropathy-he is on hemodialysis for the past few years  Toe amputation, right BKA, hypoglycemia unawareness  He is on basal bolus regimen with Levemir and Humalog and follows up with Dr Noe Tate as outpatient  He takes Levemir 5 units twice daily and Humalog anywhere between 5 to 6 units before meals  He states at home his sugars are usually 200-300 range with hypoglycemia a few times a week  He has hypoglycemia unawareness and has had admissions for severe hypoglycemic episodes  Since admission he was initially NPO however is currently on clear liquids  Will be NPO after midnight for ERCP in the morning    He is currently on basal bolus insulin and sugars since admission have ranged anywhere between 200 to 400s  Consults    Review of Systems   Constitutional: Positive for fatigue  Negative for fever and unexpected weight change  Eyes: Positive for visual disturbance  Respiratory: Negative for cough and shortness of breath  Cardiovascular: Negative for palpitations and leg swelling  Gastrointestinal: Positive for abdominal pain and diarrhea  Negative for nausea and vomiting  Musculoskeletal: Positive for gait problem  Skin: Negative for color change, pallor and rash  Neurological: Positive for weakness  Psychiatric/Behavioral: Negative for agitation, behavioral problems and confusion  All other systems reviewed and are negative  Historical Information   Past Medical History:   Diagnosis Date    Acquired hypothyroidism     underactive    Atrial fibrillation ([place] [address] )     Choledocholithiasis 2/28/2018    Chronic kidney disease-mineral and bone disorder 11/27/2017    Clostridium difficile infection 04/2017    Diabetes mellitus ([place] [address] )     Dialysis patient ([place] [address] )     Diarrhea     ESRD (end stage renal disease) on dialysis ([place] [address] )     Hx of cardiac arrest     Hypertension     Hyponatremia 8/8/2017    Neuropathy     Right lower lobe pneumonia ([place] [address] ) 2/20/2017    Sepsis ([place] [address] ) 04/2017    Polymicrobial MRSA, VRE    Systolic and diastolic CHF, chronic (HCC)     EF 35%, Grade II DD     Past Surgical History:   Procedure Laterality Date    CATARACT EXTRACTION      CHG GI ENDOSCOPIC ULTRASOUND N/A 3/10/2016    Procedure: LINEAR ENDOSCOPIC U/S;  Surgeon: Minerva Caraballo MD;  [facility]; Service: Gastroenterology    CHOLECYSTECTOMY      GASTROSTOMY TUBE PLACEMENT N/A 4/12/2017    Procedure: INSERTION PEG TUBE;  Surgeon: Nicholas Mercer MD;  [facility];  Service:    Damari Gurrola LEG AMPUTATION THROUGH LOWER TIBIA AND FIBULA Right 4/6/2017    Procedure: AMPUTATION BELOW KNEE (BKA);   Surgeon: Jimmy Chow DO;  [facility];  Service:     TOE AMPUTATION  2000     Social History   History   Alcohol Use No     History   Drug Use No     History   Smoking Status    Former Smoker    Packs/day: 1 00    Years: 46 00    Types: Cigarettes    Start date: 5    Quit date: 3/1/2017   Smokeless Tobacco    Never Used     Family History:   Family History   Problem Relation Age of Onset    Diabetes Father     Cancer Other     Lupus Mother        Meds/Allergies   Current Facility-Administered Medications   Medication Dose Route Frequency Provider Last Rate Last Dose    acetaminophen (TYLENOL) tablet 650 mg  650 mg Oral Q6H PRN Schuyler Brand PA-C        albuterol inhalation solution 2 5 mg  2 5 mg Nebulization Q4H PRN Schuyler Brand PA-C        amiodarone tablet 200 mg  200 mg Oral Daily Schuyler Brand PA-C   Stopped at 03/01/18 2376    b complex-vitamin C-folic acid (NEPHROCAPS) capsule 1 capsule  1 capsule Oral Daily Schuyler Brand PA-C   Stopped at 03/01/18 6835    calcium carbonate (OYSTER SHELL,OSCAL) 500 mg tablet 2 tablet  2 tablet Oral Daily With Breakfast Schuyler Brand PA-C        cholecalciferol (VITAMIN D3) tablet 1,000 Units  1,000 Units Oral Daily Schuyler Brand PA-C   Stopped at 03/01/18 9254    cinacalcet (SENSIPAR) tablet 30 mg  30 mg Oral HS Schuyler Brand PA-C   30 mg at 02/28/18 2311    citalopram (CeleXA) tablet 5 mg  5 mg Oral Daily Schuyler Brand PA-C   Stopped at 03/01/18 1983    diphenoxylate-atropine (LOMOTIL) 2 5-0 025 mg per tablet 1 tablet  1 tablet Oral 4x Daily PRN Schuyler Brand PA-C        gabapentin (NEURONTIN) capsule 200 mg  200 mg Oral HS Schuyler Brand PA-C   200 mg at 02/28/18 2300    insulin glargine (LANTUS) subcutaneous injection 5 Units  5 Units Subcutaneous JENNY Iverson DO   5 Units at 03/01/18 1354    insulin lispro (HumaLOG) 100 units/mL subcutaneous injection 1-5 Units  1-5 Units Subcutaneous MAT PERAE Kain Sumner PA-C        [START ON 3/2/2018] insulin lispro (HumaLOG) 100 units/mL subcutaneous injection 1-5 Units  1-5 Units Subcutaneous 0200 Arlin Conley PA-C        insulin lispro (HumaLOG) 100 units/mL subcutaneous injection 1-6 Units  1-6 Units Subcutaneous TID AC Arlin Conley PA-C   3 Units at 03/01/18 1353    insulin lispro (HumaLOG) 100 units/mL subcutaneous injection 5 Units  5 Units Subcutaneous TID With Meals Doristine Area, DO   5 Units at 03/01/18 1353    lanthanum (FOSRENOL) chewable tablet 1,000 mg  1,000 mg Oral TID With Meals Venessa Pat PA-C   1,000 mg at 03/01/18 1354    melatonin tablet 3 mg  3 mg Oral HS Venessa Pat PA-C   3 mg at 02/28/18 2300    menthol-zinc oxide (CALMOSEPTINE) 0 44-20 6 % ointment   Topical Q4H PRN Venessa Pat PA-C        metoprolol tartrate (LOPRESSOR) partial tablet 12 5 mg  12 5 mg Oral Q12H [address] Venessa Pat PA-C   Stopped at 03/01/18 0827    midodrine (PROAMATINE) tablet 2 5 mg  2 5 mg Oral BID Venessa Pat PA-C   2 5 mg at 03/01/18 5193    multivitamin-minerals (CENTRUM) tablet 1 tablet  1 tablet Oral Daily Venessa Pat PA-C   Stopped at 03/01/18 0827    nystatin (MYCOSTATIN) powder   Topical BID Venessa Pat PA-C   Stopped at 03/01/18 0846    ondansetron (ZOFRAN) injection 4 mg  4 mg Intravenous Q6H PRN Venessa Pat PA-C        oxyCODONE (ROXICODONE) IR tablet 5 mg  5 mg Oral Q4H PRN Venessa Pat PA-C        pancrelipase (Lip-Prot-Amyl) (CREON) delayed release capsule 72,000 Units  72,000 Units Oral TID Venessa Pat PA-C   Stopped at 03/01/18 0846    pantoprazole (PROTONIX) EC tablet 40 mg  40 mg Oral Early Morning Venessa Pat PA-C   40 mg at 03/01/18 0491    saccharomyces boulardii (FLORASTOR) capsule 250 mg  250 mg Oral BID Venessa Pat PA-C   Stopped at 03/01/18 8569    traMADol (ULTRAM) tablet 50 mg  50 mg Oral Q6H PRN Venessa Pat, PA-C        vancomycin (VANCOCIN) oral solution 125 mg  125 mg Oral Daily Angela Lopez PA-C   125 mg at 03/01/18 2664    warfarin (COUMADIN) tablet 10 mg  10 mg Oral Daily (warfarin) TERESA Snowden-C   10 mg at 02/28/18 2300     No Known Allergies    Objective   Vitals: Blood pressure 122/68, pulse 73, temperature 97 8 °F (36 6 °C), temperature source Oral, resp  rate 14, height 5' 6" (1 676 m), weight 67 4 kg (148 lb 9 4 oz), SpO2 99 %  Intake/Output Summary (Last 24 hours) at 03/01/18 1544  Last data filed at 03/01/18 1230   Gross per 24 hour   Intake              490 ml   Output                0 ml   Net              490 ml     Invasive Devices     Peripheral Intravenous Line            Peripheral IV 02/28/18 Left Forearm less than 1 day          Line            Hemodialysis AV Fistula  Right Forearm -- days    Hemodialysis AV Fistula Right Forearm -- days          Drain            Gastrostomy/Enterostomy -- days    Gastrostomy/Enterostomy Percutaneous endoscopic gastrostomy (PEG) 20 Fr   days                Physical Exam   Constitutional: He is oriented to person, place, and time  He appears well-developed and well-nourished  No distress  HENT:   Head: Normocephalic and atraumatic  Mouth/Throat: No oropharyngeal exudate  Eyes: Conjunctivae and EOM are normal  No scleral icterus  Neck: Normal range of motion  Neck supple  No thyromegaly present  Cardiovascular: Normal rate, regular rhythm and normal heart sounds  No murmur heard  Pulmonary/Chest: Effort normal and breath sounds normal  No respiratory distress  He has no wheezes  He has no rales  Abdominal: Soft  Bowel sounds are normal  He exhibits no distension  There is tenderness  There is no rebound and no guarding  Musculoskeletal: He exhibits no edema  Right below-knee amputation-left big toe amputation   Neurological: He is alert and oriented to person, place, and time  Skin: Skin is warm and dry   No rash noted  No erythema  No pallor  Psychiatric: He has a normal mood and affect  His behavior is normal  Thought content normal        The history was obtained from the review of the chart, patient  Lab Results:       Lab Results   Component Value Date    WBC 7 62 03/01/2018    HGB 10 0 (L) 03/01/2018    HCT 31 5 (L) 03/01/2018    MCV 93 03/01/2018     03/01/2018     Lab Results   Component Value Date/Time    BUN 68 (H) 03/01/2018 12:34 PM    BUN 27 (H) 10/19/2015 05:49 PM     (L) 03/01/2018 12:34 PM     (L) 10/19/2015 05:49 PM    K 5 6 (H) 03/01/2018 12:34 PM    K 5 5 (H) 10/19/2015 05:49 PM    CL 94 (L) 03/01/2018 12:34 PM    CL 97 (L) 10/19/2015 05:49 PM    CO2 21 03/01/2018 12:34 PM    CO2 31 10/19/2015 05:49 PM    CREATININE 7 88 (H) 03/01/2018 12:34 PM    CREATININE 4 45 (H) 10/19/2015 05:49 PM    AST 17 03/01/2018 12:34 PM    AST 31 07/28/2015 02:58 PM    ALT 20 03/01/2018 12:34 PM    ALT 18 07/28/2015 02:58 PM    ALB 2 0 (L) 03/01/2018 12:34 PM    ALB 2 3 (L) 07/28/2015 02:58 PM     No results for input(s): CHOL, HDL, LDL, TRIG, VLDL in the last 72 hours  No results found for: [facility]  POC Glucose   Date Value   03/01/2018 240 mg/dl (H)   03/01/2018 320 mg/dl (H)   03/01/2018 342 mg/dl (H)   03/01/2018 381 mg/dl (H)   02/28/2018 208 mg/dl (H)   02/09/2018 102 mg/dl   02/09/2018 148 mg/dl (H)   02/08/2018 420 mg/dl (H)   02/08/2018 274 mg/dl (H)   02/08/2018 138 mg/dl   10/16/2015 217 mg/dL (H)   07/01/2015 323 mg/dL (H)       Imaging Studies: I have personally reviewed pertinent reports  CT abdomen pelvis wo contrast   Impression:       Persistent right basilar airspace disease and effusion as also seen on the prior study of 11/27/2017  Jonny Alvarado may now be a rind surrounding the pleural effusion possibly indicating development of an empyema      Distal choledocholithiasis with diffuse CBD dilation   Interval development of left pneumobilia   Status post cholecystectomy    Chronic punctate gravel-like calculi in the proximal left ureter unchanged from the prior study are nonobstructive   No hydronephrosis    The study was marked in EPIC for immediate notification  Portions of the record may have been created with voice recognition software

## 2018-03-01 NOTE — ASSESSMENT & PLAN NOTE
· POA- Lower abdominal pain and painful urination  · Makes small amount of urine with ESRD every few days  · UA- Positive nitrite  Innumerable RBC, WBC, Bacteria  · Afebrile without leukocytosis, lactic acidosis  · Previous urine culture 1/30/18 grew Pseudomonas susceptible to Meropenem  · IV Meropenem  · Urine culture, blood cultures pending

## 2018-03-01 NOTE — ED NOTES
Dialysis PT, wife reports "he only produces urine ever 3 or 4 days  He just went yesterday and it was very painful and dark" Was able to get 2cc via straight cath       Raine Bledsoe RN  02/28/18 2784

## 2018-03-01 NOTE — ASSESSMENT & PLAN NOTE
· And hyperglycemia, A1c 9 4  · Continue  SSI coverage    · Restart home insulin Lantus 5 units in a m  and Humalog 5 units t i d   · Consult endocrine for further management

## 2018-03-02 ENCOUNTER — APPOINTMENT (INPATIENT)
Dept: DIALYSIS | Facility: HOSPITAL | Age: 68
DRG: 689 | End: 2018-03-02
Attending: INTERNAL MEDICINE
Payer: COMMERCIAL

## 2018-03-02 LAB
GLUCOSE SERPL-MCNC: 103 MG/DL (ref 65–140)
GLUCOSE SERPL-MCNC: 114 MG/DL (ref 65–140)
GLUCOSE SERPL-MCNC: 400 MG/DL (ref 65–140)
GLUCOSE SERPL-MCNC: 436 MG/DL (ref 65–140)
GLUCOSE SERPL-MCNC: 62 MG/DL (ref 65–140)
GLUCOSE SERPL-MCNC: 83 MG/DL (ref 65–140)
INR PPP: 3.44 (ref 0.86–1.16)
PROTHROMBIN TIME: 36 SECONDS (ref 12.1–14.4)

## 2018-03-02 PROCEDURE — 5A1D70Z PERFORMANCE OF URINARY FILTRATION, INTERMITTENT, LESS THAN 6 HOURS PER DAY: ICD-10-PCS | Performed by: INTERNAL MEDICINE

## 2018-03-02 PROCEDURE — 99232 SBSQ HOSP IP/OBS MODERATE 35: CPT | Performed by: INTERNAL MEDICINE

## 2018-03-02 PROCEDURE — 85610 PROTHROMBIN TIME: CPT | Performed by: INTERNAL MEDICINE

## 2018-03-02 PROCEDURE — 82948 REAGENT STRIP/BLOOD GLUCOSE: CPT

## 2018-03-02 RX ORDER — ALPRAZOLAM 0.25 MG/1
0.25 TABLET ORAL
Status: DISCONTINUED | OUTPATIENT
Start: 2018-03-02 | End: 2018-03-04 | Stop reason: HOSPADM

## 2018-03-02 RX ADMIN — Medication 1 TABLET: at 13:14

## 2018-03-02 RX ADMIN — CINACALCET HYDROCHLORIDE 30 MG: 30 TABLET, COATED ORAL at 21:42

## 2018-03-02 RX ADMIN — Medication 250 MG: at 08:34

## 2018-03-02 RX ADMIN — INSULIN LISPRO 5 UNITS: 100 INJECTION, SOLUTION INTRAVENOUS; SUBCUTANEOUS at 21:44

## 2018-03-02 RX ADMIN — AMIODARONE HYDROCHLORIDE 200 MG: 200 TABLET ORAL at 13:15

## 2018-03-02 RX ADMIN — Medication 250 MG: at 17:50

## 2018-03-02 RX ADMIN — MELATONIN 3 MG: 3 TAB ORAL at 21:41

## 2018-03-02 RX ADMIN — PANCRELIPASE 72000 UNITS: 24000; 76000; 120000 CAPSULE, DELAYED RELEASE PELLETS ORAL at 16:45

## 2018-03-02 RX ADMIN — INSULIN DETEMIR 5 UNITS: 100 INJECTION, SOLUTION SUBCUTANEOUS at 21:41

## 2018-03-02 RX ADMIN — METOPROLOL TARTRATE 12.5 MG: 25 TABLET ORAL at 21:41

## 2018-03-02 RX ADMIN — PANCRELIPASE 72000 UNITS: 24000; 76000; 120000 CAPSULE, DELAYED RELEASE PELLETS ORAL at 08:32

## 2018-03-02 RX ADMIN — NYSTATIN: 100000 POWDER TOPICAL at 08:36

## 2018-03-02 RX ADMIN — ALPRAZOLAM 0.25 MG: 0.25 TABLET ORAL at 20:27

## 2018-03-02 RX ADMIN — INSULIN LISPRO 4 UNITS: 100 INJECTION, SOLUTION INTRAVENOUS; SUBCUTANEOUS at 16:42

## 2018-03-02 RX ADMIN — LANTHANUM CARBONATE 1000 MG: 500 TABLET, CHEWABLE ORAL at 16:46

## 2018-03-02 RX ADMIN — PANTOPRAZOLE SODIUM 40 MG: 40 TABLET, DELAYED RELEASE ORAL at 06:17

## 2018-03-02 RX ADMIN — MIDODRINE HYDROCHLORIDE 2.5 MG: 2.5 TABLET ORAL at 08:34

## 2018-03-02 RX ADMIN — NYSTATIN 1 APPLICATION: 100000 POWDER TOPICAL at 17:51

## 2018-03-02 RX ADMIN — Medication 1 CAPSULE: at 13:15

## 2018-03-02 RX ADMIN — GABAPENTIN 200 MG: 100 CAPSULE ORAL at 21:41

## 2018-03-02 RX ADMIN — VITAMIN D, TAB 1000IU (100/BT) 1000 UNITS: 25 TAB at 13:13

## 2018-03-02 RX ADMIN — VANCOMYCIN 125 MG: KIT at 14:07

## 2018-03-02 RX ADMIN — PANCRELIPASE 72000 UNITS: 24000; 76000; 120000 CAPSULE, DELAYED RELEASE PELLETS ORAL at 21:42

## 2018-03-02 RX ADMIN — Medication 2 TABLET: at 13:15

## 2018-03-02 RX ADMIN — LANTHANUM CARBONATE 1000 MG: 500 TABLET, CHEWABLE ORAL at 08:33

## 2018-03-02 RX ADMIN — LANTHANUM CARBONATE 1000 MG: 500 TABLET, CHEWABLE ORAL at 13:14

## 2018-03-02 RX ADMIN — CITALOPRAM HYDROBROMIDE 5 MG: 10 TABLET ORAL at 13:16

## 2018-03-02 RX ADMIN — MIDODRINE HYDROCHLORIDE 2.5 MG: 2.5 TABLET ORAL at 17:50

## 2018-03-02 NOTE — PROGRESS NOTES
Progress Note - Nephrology   Delmi Ramey 74 y o  male MRN: [id number]  Unit/Bed#: -[facility] Encounter: [id number]    Assessment:  1  End-stage renal disease on hemodialysis:  Patient dialyzes at [facility] Monday Wednesday Friday  Patient had dialysis yesterday plan for dialysis today  I called dialysis unit yesterday for continuity care as well as to get his dialysis prescription  2   Anemia secondary to end-stage renal disease on hemodialysis:  Hemoglobin is 10 0  Stable    3  Hyperkalemia:  Potassium was 6 6 yesterday repeat was 5 6  Patient had dialysis yesterday plan for dialysis today  If hyperkalemia persists may need to recheck recirculation studies  The hyperkalemia was likely secondary to missed dialysis treatment    4  Hyponatremia: It improved her repeat sodium was approximately 133 with correction with Glucose on March 1st 5   Fluid management:  Patient is not examined to be fluid overloaded last blood pressure is 138/65    Plan: For dialysis today  Subjective:   Patient is seen in follow-up with regards to end-stage renal disease on hemodialysis  He is resting comfortably this morning denies any chest pressure shortness of breath  Denies any problems with dialysis yesterday  He states he has not had any diarrhea since admission  Objective:     Vitals: Blood pressure 138/65, pulse 72, temperature 98 1 °F (36 7 °C), temperature source Oral, resp  rate 18, height 5' 6" (1 676 m), weight 67 4 kg (148 lb 9 4 oz), SpO2 98 %  ,Body mass index is 23 98 kg/m²      Weight (last 2 days)     Date/Time   Weight    03/01/18 0534  67 4 (148 59)    02/28/18 2134  67 9 (149 69)    02/28/18 2042  68 (150)                Intake/Output Summary (Last 24 hours) at 03/02/18 0606  Last data filed at 03/01/18 1630   Gross per 24 hour   Intake              740 ml   Output             3989 ml   Net            -3249 ml            Physical Exam: General: patient is in NAD  Skin:  No new rash  Eyes:  No scleral icterus  Neck:  Supple  No  adenopathy  Chest:  Lungs are clear  CVS:  S1-S2  Abdomen:  Positive bowel sounds  Extremities:  No Significant edema; patient has right BKA he has a good bruit and thrill in his right upper extremity  Neuro:  Nonfocal  Psych:  Patient answers questions appropriately              Prescriptions Prior to Admission   Medication    acetaminophen (TYLENOL) 325 mg tablet    albuterol (2 5 mg/3 mL) 0 083 % nebulizer solution    amiodarone 200 mg tablet    b complex-vitamin C-folic acid (RENAL) 1 mg    calcium citrate (CALCITRATE) 950 MG tablet    Cholecalciferol (VITAMIN D-3) 1000 units CAPS    cinacalcet (SENSIPAR) 30 mg tablet    citalopram (CeleXA) 10 mg tablet    diphenoxylate-atropine (LOMOTIL) 2 5-0 025 mg per tablet    gabapentin (NEURONTIN) 100 mg capsule    lanthanum (FOSRENOL) 1000 MG chewable tablet    melatonin 3 mg    menthol-zinc oxide (CALMOSEPTINE) 0 44-20 6 % OINT    metoprolol tartrate (LOPRESSOR) 25 mg tablet    midodrine (PROAMATINE) 2 5 mg tablet    Multiple Vitamin (DAILY VALUE MULTIVITAMIN) TABS    nystatin (MYCOSTATIN) powder    oxyCODONE (OXY-IR) 5 MG capsule    pancrelipase, Lip-Prot-Amyl, (CREON) 24,000 units    pantoprazole (PROTONIX) 40 mg tablet    Probiotic Product (PRO-BIOTIC BLEND) CAPS    saccharomyces boulardii (FLORASTOR) 250 mg capsule    traMADol (ULTRAM) 50 mg tablet    vancomycin (VANCOCIN) 50mg/mL SOLN    warfarin (COUMADIN) 5 mg tablet       Current Facility-Administered Medications   Medication Dose Route Frequency    acetaminophen (TYLENOL) tablet 650 mg  650 mg Oral Q6H PRN    albuterol inhalation solution 2 5 mg  2 5 mg Nebulization Q4H PRN    amiodarone tablet 200 mg  200 mg Oral Daily    b complex-vitamin C-folic acid (NEPHROCAPS) capsule 1 capsule  1 capsule Oral Daily    calcium carbonate (OYSTER SHELL,OSCAL) 500 mg tablet 2 tablet  2 tablet Oral Daily With Breakfast    cholecalciferol (VITAMIN D3) tablet 1,000 Units  1,000 Units Oral Daily    cinacalcet (SENSIPAR) tablet 30 mg  30 mg Oral HS    citalopram (CeleXA) tablet 5 mg  5 mg Oral Daily    diphenoxylate-atropine (LOMOTIL) 2 5-0 025 mg per tablet 1 tablet  1 tablet Oral 4x Daily PRN    gabapentin (NEURONTIN) capsule 200 mg  200 mg Oral HS    insulin detemir (LEVEMIR) subcutaneous injection 5 Units  5 Units Subcutaneous Q12H [address]    insulin lispro (HumaLOG) 100 units/mL subcutaneous injection 1-5 Units  1-5 Units Subcutaneous HS    insulin lispro (HumaLOG) 100 units/mL subcutaneous injection 1-5 Units  1-5 Units Subcutaneous TID AC    insulin lispro (HumaLOG) 100 units/mL subcutaneous injection 3 Units  3 Units Subcutaneous TID With Meals    lanthanum (FOSRENOL) chewable tablet 1,000 mg  1,000 mg Oral TID With Meals    melatonin tablet 3 mg  3 mg Oral HS    menthol-zinc oxide (CALMOSEPTINE) 0 44-20 6 % ointment   Topical Q4H PRN    metoprolol tartrate (LOPRESSOR) partial tablet 12 5 mg  12 5 mg Oral Q12H CELIA    midodrine (PROAMATINE) tablet 2 5 mg  2 5 mg Oral BID    multivitamin-minerals (CENTRUM) tablet 1 tablet  1 tablet Oral Daily    nystatin (MYCOSTATIN) powder   Topical BID    ondansetron (ZOFRAN) injection 4 mg  4 mg Intravenous Q6H PRN    oxyCODONE (ROXICODONE) IR tablet 5 mg  5 mg Oral Q4H PRN    pancrelipase (Lip-Prot-Amyl) (CREON) delayed release capsule 72,000 Units  72,000 Units Oral TID    pantoprazole (PROTONIX) EC tablet 40 mg  40 mg Oral Early Morning    saccharomyces boulardii (FLORASTOR) capsule 250 mg  250 mg Oral BID    traMADol (ULTRAM) tablet 50 mg  50 mg Oral Q6H PRN    vancomycin (VANCOCIN) oral solution 125 mg  125 mg Oral Daily    warfarin (COUMADIN) tablet 10 mg  10 mg Oral Daily (warfarin)        Lab, Imaging and other studies: I have personally reviewed pertinent labs    CBC: Lab Results   Component Value Date    WBC 7 62 03/01/2018    WBC 7 09 10/19/2015    RBC 3 40 (L) 03/01/2018    RBC 3 60 (L) 10/19/2015     CMP: Lab Results   Component Value Date     (L) 03/01/2018     (L) 10/19/2015    CL 94 (L) 03/01/2018    CL 97 (L) 10/19/2015    CO2 21 03/01/2018    CO2 31 10/19/2015    ANIONGAP 16 (H) 03/01/2018    ANIONGAP 6 10/19/2015    BUN 68 (H) 03/01/2018    BUN 27 (H) 10/19/2015    CREATININE 7 88 (H) 03/01/2018    CREATININE 4 45 (H) 10/19/2015    GLUCOSE 209 (H) 03/01/2018    GLUCOSE 209 (H) 12/28/2017    GLUCOSE 358 (H) 10/19/2015    CALCIUM 7 5 (L) 03/01/2018    CALCIUM 8 2 (L) 10/19/2015    AST 17 03/01/2018    AST 31 07/28/2015    ALT 20 03/01/2018    ALT 18 07/28/2015    ALKPHOS 145 (H) 03/01/2018    ALKPHOS 160 (H) 07/28/2015    PROT 7 5 03/01/2018    PROT 7 5 07/28/2015    BILITOT 0 30 03/01/2018    BILITOT 0 25 07/28/2015    EGFR 6 03/01/2018    EGFR 11 12/28/2017     Phosphorus:   Lab Results   Component Value Date    PHOS 6 8 (H) 02/09/2018    PHOS 2 5 07/24/2015     Magnesium:   Lab Results   Component Value Date    MG 1 8 12/29/2017    MG 2 0 07/24/2015     Urinalysis: Lab Results   Component Value Date    COLORU Y[facility] 02/28/2018    COLORU Yellow 07/02/2015    CLARITYU Cloudy 02/28/2018    CLARITYU Clear 07/02/2015    SPECGRAV 1 025 02/28/2018    SPECGRAV 1 012 07/02/2015    PHUR 7 0 02/28/2018    PHUR 6 5 07/02/2015    LEUKOCYTESUR Large (A) 02/28/2018    LEUKOCYTESUR Negative 07/02/2015    NITRITE Positive (A) 02/28/2018    NITRITE Negative 07/02/2015    PROTEINUA >=300 (A) 02/28/2018    PROTEINUA 100 (2+) (A) 07/02/2015    GLUCOSEU 250 (1/4%) (A) 02/28/2018    GLUCOSEU 250 (1/4%) (A) 07/02/2015    KETONESU Negative 02/28/2018    KETONESU Negative 07/02/2015    BILIRUBINUR Negative 02/28/2018    BILIRUBINUR Negative 07/02/2015    BLOODU Large (A) 02/28/2018    BLOODU Trace (A) 07/02/2015     BMP: Lab Results   Component Value Date    GLUCOSE 209 (H) 03/01/2018    GLUCOSE 209 (H) 12/28/2017    GLUCOSE 358 (H) 10/19/2015    CO2 21 03/01/2018    CO2 31 10/19/2015    BUN 68 (H) 03/01/2018    BUN 27 (H) 10/19/2015    CREATININE 7 88 (H) 03/01/2018    CREATININE 4 45 (H) 10/19/2015    CALCIUM 7 5 (L) 03/01/2018    CALCIUM 8 2 (L) 10/19/2015

## 2018-03-02 NOTE — ASSESSMENT & PLAN NOTE
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

## 2018-03-02 NOTE — PROGRESS NOTES
Progress Note - Eilleen Alert 1950, 74 y o  male MRN: [id number]    Unit/Bed#: -[facility] Encounter: [id number]    Primary Care Provider: Holly Ibrahim DO   Date and time admitted to hospital: 2/28/2018  2:43 PM        Chronic abdominal pain   Assessment & Plan    With chronic choledocholithiasis  Patient is status post cholecystectomy  No surgical intervention  Per GI no plan for ERCP   Outpatient follow-up with his GI doctor  PT OT eval            Abnormal urinalysis   Assessment & Plan    With chronic dysuria  ID  input appreciated  Observe off antibiotic            Benign hypertension with end-stage renal disease ([facility] )   Assessment & Plan      · Acceptable blood pressure        Anemia in end-stage renal disease (HCC)   Assessment & Plan      · Stable monitor        Paroxysmal atrial fibrillation ([facility])   Assessment & Plan      · Continue   Metoprolol, amiodarone and Coumadin          Acquired hypothyroidism   Assessment & Plan    · Continue synthroid  Clostridium difficile infection   Assessment & Plan    · Recurrent C diff colitis  · Per ID,  follow on C diff toxin  · Continue oral vancomycin        Chronic combined systolic and diastolic CHF (congestive heart failure) ([facility])   Assessment & Plan    · EF 35%  · Appears stable  · Monitor        ESRD (end stage renal disease) on dialysis [facility])   Assessment & Plan    With hyperkalemia and hyponatremia  Nephrology is following  On hemodialysis        Type 1 diabetes mellitus with nephropathy ([facility] )   Assessment & Plan    ·  A1c 9 4  · Endocrine input appreciated   · 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  · Endocrine is following          VTE Pharmacologic Prophylaxis:   Pharmacologic: Warfarin (Coumadin)  Mechanical VTE Prophylaxis in Place: Yes    Patient Centered Rounds: I have performed bedside rounds with nursing staff today      Discussions with Specialists or Other Care Team Provider:     Education and Discussions with Family / Patient:  Patient and his wife    Time Spent for Care: 30 minutes  More than 50% of total time spent on counseling and coordination of care as described above  Current Length of Stay: 2 day(s)    Current Patient Status: Inpatient   Certification Statement: The patient will continue to require additional inpatient hospital stay due to Management of abdominal pain    Discharge Plan: not ready yet    Code Status: Level 1 - Full Code      Subjective:   Patient seen and examined  Comfortable in bed  Hypoglycemia this morning  Denied abdominal pain      Objective:     Vitals:   Temp (24hrs), Av 8 °F (36 6 °C), Min:97 5 °F (36 4 °C), Max:98 1 °F (36 7 °C)    HR:  [66-77] 71  Resp:  [14-18] 18  BP: (113-150)/(50-75) 129/66  SpO2:  [90 %-99 %] 90 %  Body mass index is 22 49 kg/m²  Input and Output Summary (last 24 hours):        Intake/Output Summary (Last 24 hours) at 18 1112  Last data filed at 18 0930   Gross per 24 hour   Intake              700 ml   Output             3989 ml   Net            -3289 ml       Physical Exam:     Physical Exam  Patient is awake alert in no acute distress  Lung clear to auscultation bilateral  Heart positive S1-S2 no murmur  Abdomen soft nontender positive bowel sounds  Right BKA no edema    Additional Data:     Labs:      Results from last 7 days  Lab Units 18  0441 18  1526   WBC Thousand/uL 7 62 8 49   HEMOGLOBIN g/dL 10 0* 10 1*   HEMATOCRIT % 31 5* 32 1*   PLATELETS Thousands/uL 243 293   LYMPHO PCT %  --  14   MONO PCT MAN %  --  12   EOSINO PCT MANUAL %  --  7*       Results from last 7 days  Lab Units 18  1234   SODIUM mmol/L 131*   POTASSIUM mmol/L 5 6*   CHLORIDE mmol/L 94*   CO2 mmol/L 21   BUN mg/dL 68*   CREATININE mg/dL 7 88*   CALCIUM mg/dL 7 5*   TOTAL PROTEIN g/dL 7 5   BILIRUBIN TOTAL mg/dL 0 30   ALK PHOS U/L 145*   ALT U/L 20   AST U/L 17   GLUCOSE RANDOM mg/dL 209*       Results from last 7 days  Lab Units 03/01/18  0534   INR  2 95*       * I Have Reviewed All Lab Data Listed Above  * Additional Pertinent Lab Tests Reviewed: [facility] Admission Reviewed    Imaging:    Imaging Reports Reviewed Today Include:   Imaging Personally Reviewed by Myself Includes:      Recent Cultures (last 7 days):       Results from last 7 days  Lab Units 02/28/18  1900 02/28/18  1611 02/28/18  1526   BLOOD CULTURE   --  No Growth at 24 hrs  No Growth at 24 hrs  URINE CULTURE  Culture results to follow    --   --        Last 24 Hours Medication List:     Current Facility-Administered Medications:  acetaminophen 650 mg Oral Q6H PRN Mary Ambrocio PA-C   albuterol 2 5 mg Nebulization Q4H PRN TERESA Patricio-HUMBERTO   amiodarone 200 mg Oral Daily Mary Ambrocio PA-C   b complex-vitamin C-folic acid 1 capsule Oral Daily Mary Ambrocio PA-C   calcium carbonate 2 tablet Oral Daily With Breakfast Mary Ambrocio PA-C   cholecalciferol 1,000 Units Oral Daily Mary Ambrocio PA-C   cinacalcet 30 mg Oral HS Mary Ambrocio PA-C   citalopram 5 mg Oral Daily Mary Ambrocio PA-C   diphenoxylate-atropine 1 tablet Oral 4x Daily PRN Mary Ambrocio PA-C   gabapentin 200 mg Oral HS Mary Ambrocio PA-C   insulin detemir 5 Units Subcutaneous Q12H [address] Juan Ward MD   insulin lispro 1-5 Units Subcutaneous HS Nestora Cogan, PA-C   insulin lispro 1-5 Units Subcutaneous TID [facility] [place] Juan Ward MD   insulin lispro 3 Units Subcutaneous TID With Meals Juan Ward MD   lanthanum 1,000 mg Oral TID With Meals Mary Ambrocio PA-C   melatonin 3 mg Oral HS Mary Ambrocoi PA-C   menthol-zinc oxide  Topical Q4H PRN Mary Ambrocio PA-C   metoprolol tartrate 12 5 mg Oral Q12H [address] Mary Ambrocio PA-C   midodrine 2 5 mg Oral BID Mary Ambrocio PA-C   multivitamin-minerals 1 tablet Oral Daily Mary Ambrocio PA-C   nystatin  Topical BID Mary Ambrocio PA-C ondansetron 4 mg Intravenous Q6H PRN Venessa Pat, PA-C   oxyCODONE 5 mg Oral Q4H PRN Venessa Pat, PA-C   pancrelipase (Lip-Prot-Amyl) 72,000 Units Oral TID Venessa Pat, PA-HUMBERTO   pantoprazole 40 mg Oral Early Morning Venessa Pat, PA-HUMBERTO   saccharomyces boulardii 250 mg Oral BID Venessa Pat, PA-HUMBERTO   traMADol 50 mg Oral Q6H PRN Venessa Pat, PA-HUMBERTO   vancomycin 125 mg Oral Daily Venessa Pat, PA-HUMBERTO   warfarin 10 mg Oral Daily (warfarin) Venessa Pat PA-C        Today, Patient Was Seen By: Padmaja Griffin DO    ** Please Note: Dictation voice to text software may have been used in the creation of this document   **

## 2018-03-02 NOTE — PROGRESS NOTES
Progress Note - Augusto Foster 74 y o  male MRN: [id number]    Unit/Bed#: -[facility] Encounter: [id number]        Subjective:   Patient reports feeling relatively well, he denies any abdominal pain at this time  He said he has diarrhea about 4 times a day, which has not changed recently  Denies any blood or mucus in the stools  Tolerating diet  Objective:     Vitals: Blood pressure 111/62, pulse 68, temperature 98 °F (36 7 °C), temperature source Oral, resp  rate 18, height 5' 6" (1 676 m), weight 63 2 kg (139 lb 5 3 oz), SpO2 90 %  ,Body mass index is 22 49 kg/m²  Intake/Output Summary (Last 24 hours) at 03/02/18 1234  Last data filed at 03/02/18 1000   Gross per 24 hour   Intake              960 ml   Output             3989 ml   Net            -3029 ml       Physical Exam:   General appearance: alert, currently receiving dialysis, appears stated age and cooperative  Lungs: clear to auscultation bilaterally, no labored breathing/accessory muscle use  Heart: regular rate and rhythm, S1, S2 normal, no murmur, click, rub or gallop  Abdomen: soft, non-tender; bowel sounds normal; no masses,  no organomegaly  Extremities: no edema    Invasive Devices     Peripheral Intravenous Line            Peripheral IV 02/28/18 Left Forearm 1 day          Line            Hemodialysis AV Fistula  Right Forearm -- days    Hemodialysis AV Fistula Right Forearm -- days          Drain            Gastrostomy/Enterostomy -- days    Gastrostomy/Enterostomy Percutaneous endoscopic gastrostomy (PEG) 20 Fr   days                Lab, Imaging and other studies: I have personally reviewed pertinent reports      Admission on 02/28/2018   Component Date Value    WBC 02/28/2018 8 49     RBC 02/28/2018 3 51*    Hemoglobin 02/28/2018 10 1*    Hematocrit 02/28/2018 32 1*    MCV 02/28/2018 92     MCH 02/28/2018 28 8     MCHC 02/28/2018 31 5     RDW 02/28/2018 15 3*    MPV 02/28/2018 8 5*    Platelets 59/29/3735 293     Sodium 02/28/2018 129*    Potassium 02/28/2018 5 5*    Chloride 02/28/2018 92*    CO2 02/28/2018 24     Anion Gap 02/28/2018 13     BUN 02/28/2018 52*    Creatinine 02/28/2018 6 58*    Glucose 02/28/2018 264*    Calcium 02/28/2018 7 6*    AST 02/28/2018 16     ALT 02/28/2018 19     Alkaline Phosphatase 02/28/2018 171*    Total Protein 02/28/2018 7 6     Albumin 02/28/2018 2 1*    Total Bilirubin 02/28/2018 0 30     eGFR 02/28/2018 8     Lipase 02/28/2018 173     Protime 02/28/2018 30 1*    INR 02/28/2018 2 75*    PTT 02/28/2018 66*    Blood Culture 02/28/2018 No Growth at 24 hrs   Blood Culture 02/28/2018 No Growth at 24 hrs       Troponin I 02/28/2018 <0 02     Segmented % 02/28/2018 61     Lymphocytes % 02/28/2018 14     Monocytes % 02/28/2018 12     Eosinophils % 02/28/2018 7*    Basophils % 02/28/2018 4*    Myelocytes % 02/28/2018 1     Atypical Lymphocytes % 02/28/2018 1*    Absolute Neutrophils 02/28/2018 5 18     Lymphocytes Absolute 02/28/2018 1 19     Monocytes Absolute 02/28/2018 1 02     Eosinophils Absolute 02/28/2018 0 59*    Basophils Absolute 02/28/2018 0 34*    Total Counted 02/28/2018 100     Anisocytosis 02/28/2018 Present     Polychromasia 02/28/2018 Present     Platelet Estimate 31/74/9712 Adequate     LACTIC ACID 02/28/2018 1 3     Color, UA 02/28/2018 Yuli     Clarity, UA 02/28/2018 Cloudy     pH, UA 02/28/2018 7 0     Leukocytes, UA 02/28/2018 Large*    Nitrite, UA 02/28/2018 Positive*    Protein, UA 02/28/2018 >=300*    Glucose, UA 02/28/2018 250 (1/4%)*    Ketones, UA 02/28/2018 Negative     Urobilinogen, UA 02/28/2018 0 2     Bilirubin, UA 02/28/2018 Negative     Blood, UA 02/28/2018 Large*    Specific [place], UA 02/28/2018 1 025     RBC, UA 02/28/2018 Innumerable*    WBC, UA 02/28/2018 Innumerable*    Epithelial Cells 02/28/2018 Occasional     Bacteria, UA 02/28/2018 Innumerable*    OTHER OBSERVATIONS 02/28/2018 Renal Epithelial Cells Present     Urine Culture 02/28/2018 Culture results to follow       POC Glucose 02/28/2018 208*    POC Glucose 03/01/2018 381*    Sodium 03/01/2018 124*    Potassium 03/01/2018 6 6*    Chloride 03/01/2018 90*    CO2 03/01/2018 17*    Anion Gap 03/01/2018 17*    BUN 03/01/2018 61*    Creatinine 03/01/2018 7 46*    Glucose 03/01/2018 493*    Calcium 03/01/2018 7 8*    eGFR 03/01/2018 7     WBC 03/01/2018 7 62     RBC 03/01/2018 3 40*    Hemoglobin 03/01/2018 10 0*    Hematocrit 03/01/2018 31 5*    MCV 03/01/2018 93     MCH 03/01/2018 29 4     MCHC 03/01/2018 31 7     RDW 03/01/2018 15 5*    Platelets 56/75/6061 243     MPV 03/01/2018 8 7*    Protime 03/01/2018 31 9*    INR 03/01/2018 2 95*    Potassium 03/01/2018 5 7*    POC Glucose 03/01/2018 342*    POC Glucose 03/01/2018 320*    Sodium 03/01/2018 131*    Potassium 03/01/2018 5 6*    Chloride 03/01/2018 94*    CO2 03/01/2018 21     Anion Gap 03/01/2018 16*    BUN 03/01/2018 68*    Creatinine 03/01/2018 7 88*    Glucose 03/01/2018 209*    Calcium 03/01/2018 7 5*    AST 03/01/2018 17     ALT 03/01/2018 20     Alkaline Phosphatase 03/01/2018 145*    Total Protein 03/01/2018 7 5     Albumin 03/01/2018 2 0*    Total Bilirubin 03/01/2018 0 30     eGFR 03/01/2018 6     LACTIC ACID 03/01/2018 0 5     Acetone, Bld 03/01/2018 Negative     Ventricular Rate 02/28/2018 72     Atrial Rate 02/28/2018 72     RI Interval 02/28/2018 208     QRSD Interval 02/28/2018 114     QT Interval 02/28/2018 370     QTC Interval 02/28/2018 405     P Axis 02/28/2018 90     QRS [place] 02/28/2018 175     T Wave Axis 02/28/2018 0     POC Glucose 03/01/2018 240*    POC Glucose 03/01/2018 119     POC Glucose 03/01/2018 173*    POC Glucose 03/02/2018 114     POC Glucose 03/02/2018 62*    POC Glucose 03/02/2018 83     Protime 03/02/2018 36 0*    INR 03/02/2018 3 44*    POC Glucose 03/02/2018 103      Results for Verl Lat (MRN [id number]) as of 3/2/2018 12:34   Ref  Range 3/1/2018 15:38 3/1/2018 21:27 3/2/2018 03:17 3/2/2018 08:10 3/2/2018 08:40 3/2/2018 11:13 3/2/2018 11:15   POC Glucose Latest Ref Range: 65 - 140 mg/dl 119 173 (H) 114 62 (L) 83 103    Protime Latest Ref Range: 12 1 - 14 4 seconds       36 0 (H)   INR Latest Ref Range: 0 86 - 1 16        3 44 (H)       Assessment/Plan:    1  Choledocholithiasis, incidentally noted and does not appear to be causing obstruction or cholangitis    - monitor liver enzymes, check again tomorrow  - may consider ERCP if patient develops jaundice or other signs of obstruction, but otherwise he should follow up with his GI physician as outpatient  He would be at high risk for ERCP at this time anyway due to his need for anticoagulation and his elevated INR  - renal/diabetic diet as tolerated      2  History of recurrent C diff infections, chronic diarrhea, 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

## 2018-03-02 NOTE — HEMODIALYSIS
Tx dc'd 1 5 hr early  Hospital power went out 3x unable to complete tx, system clotted  I was able to hand crank the dialysis machine to reinfuse half of the patient's blood  Dr Tex Bueno and Mag Wang aware    Will continue to monitor the patient

## 2018-03-02 NOTE — PROGRESS NOTES
Progress Note - Infectious Disease   Dasia Carlin 74 y o  male MRN: [id number]  Unit/Bed#: -01 Encounter: [id number]      Impression/Plan:  1  Possible UTI-it would be very unusual for UTI to cause dysuria for 4 years  Therefore I think it is more likely symptoms related to something else  The patient does not have a fever or leukocytosis remains hemodynamically stable  It would be best to avoid all other systemic antibiotics unless they are absolutely required in the face of a patient with persistent diarrhea and a history of recurrent C difficile colitis  -monitor off all antibiotics  -follow-up urine culture  -monitor symptomatology  -treat symptoms  -may need urology evaluation  -if need to treat the patient based upon the urine cultures, would increase the vancomycin dose to 125 mg p  o  q 12 hours until 4 days after completion of any antibiotics for UTI      2   Recurrent C difficile colitis-the patient continues to have diarrhea although I am not convinced that the diarrhea is due to C difficile  He has undergone fecal transplantation, and his most recent stool for C diff back in November was negative  His CT of the abdomen and pelvis does not reveal any colitis  -continue oral vancomycin suppression for now  -will recheck stool for C diff  -monitor symptomatology  -if the C diff remains positive, the patient may benefit from a repeat fecal transplant  -treat symptomatically if the C diff comes back negative  -GI follow-up     3  Pneumobilia-status post lap choly  Patient has had ERCP in the past with stent placement  -GI follow-up  -serial abdominal exams     4  End-stage renal disease-on hemodialysis Q Tuesday Thursday and Saturday  We will see the patient again 3/5/2018  Please call if questions  Antibiotics:  Oral vancomycin suppression    Subjective:  Patient has no fever, chills, sweats; no nausea, vomiting, still with loose stool; no cough, shortness of breath; no pain   No new symptoms  Objective:  Vitals:  HR:  [68-77] 68  Resp:  [14-18] 18  BP: (111-150)/(50-75) 111/62  SpO2:  [90 %-99 %] 90 %  Temp (24hrs), Av 9 °F (36 6 °C), Min:97 5 °F (36 4 °C), Max:98 1 °F (36 7 °C)  Current: Temperature: 98 °F (36 7 °C)    Physical Exam:   General Appearance:  Somnolent, arousable   Throat: Oropharynx moist without lesions  Lungs:   Clear to auscultation bilaterally; no wheezes, rhonchi or rales; respirations unlabored   Heart:  RRR; no murmur, rub or gallop   Abdomen:   Soft, non-tender, non-distended, positive bowel sounds  Extremities: No clubbing, cyanosis or edema   Skin: No new rashes or lesions  No draining wounds noted  Labs, Imaging, & Other studies:   All pertinent labs and imaging studies were personally reviewed    Results from last 7 days  Lab Units 18  0441 18  1526   WBC Thousand/uL 7 62 8 49   HEMOGLOBIN g/dL 10 0* 10 1*   PLATELETS Thousands/uL 243 293       Results from last 7 days  Lab Units 18  1234  18  0441 18  1833   SODIUM mmol/L 131*  --  124* 129*   POTASSIUM mmol/L 5 6*  < > 6 6* 5 5*   CHLORIDE mmol/L 94*  --  90* 92*   CO2 mmol/L 21  --  17* 24   ANION GAP mmol/L 16*  --  17* 13   BUN mg/dL 68*  --  61* 52*   CREATININE mg/dL 7 88*  --  7 46* 6 58*   EGFR ml/min/1 73sq m 6  --  7 8   GLUCOSE RANDOM mg/dL 209*  --  493* 264*   CALCIUM mg/dL 7 5*  --  7 8* 7 6*   AST U/L 17  --   --  16   ALT U/L 20  --   --  19   ALK PHOS U/L 145*  --   --  171*   TOTAL PROTEIN g/dL 7 5  --   --  7 6   BILIRUBIN TOTAL mg/dL 0 30  --   --  0 30   < > = values in this interval not displayed  Results from last 7 days  Lab Units 18  1900 18  1611 18  1526   BLOOD CULTURE   --  No Growth at 24 hrs  No Growth at 24 hrs  URINE CULTURE  Culture results to follow    --   --

## 2018-03-02 NOTE — ASSESSMENT & PLAN NOTE
With chronic choledocholithiasis  Patient is status post cholecystectomy  No surgical intervention  Per GI no plan for ERCP   Outpatient follow-up with his GI doctor

## 2018-03-02 NOTE — CASE MANAGEMENT
Continued Stay Review    Date: 3/2/2018    Vital Signs: /62   Pulse 68   Temp 98 °F (36 7 °C) (Oral)   Resp 18   Ht 5' 6" (1 676 m)   Wt 63 2 kg (139 lb 5 3 oz)   SpO2 90%   BMI 22 49 kg/m²     Medications:   Scheduled Meds:   Current Facility-Administered Medications:  acetaminophen 650 mg Oral Q6H PRN Carlee Rosales PA-C   albuterol 2 5 mg Nebulization Q4H PRN Carlee Rosales PA-C   amiodarone 200 mg Oral Daily Carlee Rosales PA-C   b complex-vitamin C-folic acid 1 capsule Oral Daily Carlee Rosales PA-C   calcium carbonate 2 tablet Oral Daily With Breakfast Carlee Rosales PA-C   cholecalciferol 1,000 Units Oral Daily Carlee Rosales PA-C   cinacalcet 30 mg Oral HS Carlee Rosales PA-C   citalopram 5 mg Oral Daily Carlee Rosales PA-C   diphenoxylate-atropine 1 tablet Oral 4x Daily PRN Carlee Rosales PA-C   gabapentin 200 mg Oral HS Carlee Rosales PA-C   insulin detemir 5 Units Subcutaneous Q12H 3503 [facility], MD   insulin lispro 1-5 Units Subcutaneous HS Citlali Mendoza PA-C   insulin lispro 1-5 Units Subcutaneous TID [facility] Kit Rueda MD   insulin lispro 3 Units Subcutaneous TID With Meals Kit Reuda MD   lanthanum 1,000 mg Oral TID With Meals Carlee Rosales PA-C   melatonin 3 mg Oral HS Carlee Rosales PA-C   menthol-zinc oxide  Topical Q4H PRN Carlee NORMAN Rosales   metoprolol tartrate 12 5 mg Oral Q12H [address] Carlee Rosales PA-C   midodrine 2 5 mg Oral BID Carlee Rosales PA-C   multivitamin-minerals 1 tablet Oral Daily Carlee NORMAN Rosales   nystatin  Topical BID Carlee TERESA Rosales-HUMBERTO   ondansetron 4 mg Intravenous Q6H PRN Carlee NORMAN Rosales   oxyCODONE 5 mg Oral Q4H PRN Carlee Connie PA-C   pancrelipase (Lip-Prot-Amyl) 72,000 Units Oral TID Carlee Rosales PA-C   pantoprazole 40 mg Oral Early Morning Carlee Rosales PA-C   saccharomyces boulardii 250 mg Oral BID Rekha CURIEL Kaley Fernandez PA-C   traMADol 50 mg Oral Q6H PRN Felipe Ruffin PA-C   vancomycin 125 mg Oral Daily Felipe Ruffin PA-C   warfarin 10 mg Oral Daily (warfarin) Felipe Ruffin PA-C     Continuous Infusions:    PRN Meds: not used:    acetaminophen    albuterol    diphenoxylate-atropine    menthol-zinc oxide    ondansetron    oxyCODONE    traMADol    Abnormal Labs/Diagnostic Results:   INR 3 44    0810 - glucose 62    Age/Sex: 74 y o  male     Assessment/Plan:   Chronic abdominal pain   Assessment & Plan     With chronic choledocholithiasis  Patient is status post cholecystectomy  No surgical intervention  Per GI no plan for ERCP   Outpatient follow-up with his GI doctor  PT JAEL bowling                Abnormal urinalysis   Assessment & Plan     With chronic dysuria  ID  input appreciated  Observe off antibiotic                Benign hypertension with end-stage renal disease (HCC)   Assessment & Plan        · Acceptable blood pressure          Anemia in end-stage renal disease (HCC)   Assessment & Plan        · Stable monitor          Paroxysmal atrial fibrillation (HCC)   Assessment & Plan        · Continue   Metoprolol, amiodarone and Coumadin             Acquired hypothyroidism   Assessment & Plan     · Continue synthroid           Clostridium difficile infection   Assessment & Plan     · Recurrent C diff colitis  · Per ID,  follow on C diff toxin  · Continue oral vancomycin          Chronic combined systolic and diastolic CHF (congestive heart failure) ([facility])   Assessment & Plan     · EF 35%  · Appears stable    · Monitor          ESRD (end stage renal disease) on dialysis [facility])   Assessment & Plan     With hyperkalemia and hyponatremia  Nephrology is following  On hemodialysis          Type 1 diabetes mellitus with nephropathy ([facility])   Assessment & Plan     ·  A1c 9 4  · Endocrine input appreciated   · 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  · Endocrine is following Per endocrine - This is a 74y o -year-old male with type 1 diabetes with hyperglycemia, end-stage renal disease on hemodialysis, hypoglycemia unawareness and choledocholelithiasis     Plan:  Patient is a very brittle diabetic with history of hypoglycemia unawareness- tight control not an option  For now switch basal insulin to Levemir 5 units twice daily, decrease pre meal insulin to 3 units before meals  Decrease the coverage scale and discontinue the coverage scale at 2:00 a m  Monitor blood sugars before meals, bedtime and 2:00 a m  However no coverage to be given at 2:00 a m  As oral intake increases he will need adjustment in his insulin dose  End-stage renal disease-patient on hemodialysis  Choledocholithiasis- GI on board-    Per ID  -  Possible UTI-it would be very unusual for UTI to cause dysuria for 4 years  Therefore I think it is more likely symptoms related to something else  The patient does not have a fever or leukocytosis remains hemodynamically stable  It would be best to avoid all other systemic antibiotics unless they are absolutely required in the face of a patient with persistent diarrhea and a history of recurrent C difficile colitis  -DC meropenem  -monitor off all antibiotics  -follow-up urine culture  -monitor symptomatology  -treat symptoms  -may need urology evaluation     2  Recurrent C difficile colitis-the patient continues to have diarrhea although I am not convinced that the diarrhea is due to C difficile  He has undergone fecal transplantation, and his most recent stool for C diff back in November was negative  His CT of the abdomen and pelvis does not reveal any colitis  -continue oral vancomycin suppression for now  -will recheck stool for C diff  -monitor symptomatology  -if the C diff remains positive, the patient may benefit from a repeat fecal transplant  -treat symptomatically if the C diff comes back negative  -GI follow-up     3    Pneumobilia-status post tom mayo  He was seen by surgery today   -consider GI evaluation for ERCP  -serial abdominal exams     4  End-stage renal disease-on hemodialysis Q Tuesday Thursday and Saturday    Per GI 61-year-old male with history of end-stage renal disease on hemodialysis, choledocholithiasis status post few ERCPs by [facility] at [facility], recurrent C diff infection requiring fecal transplant was admitted because of lower abdominal pain  He is being treated for urinary tract infection  CT scan was personally reviewed which showed common bile duct stones      We found out that patient had ERCP with pigtail stent placement back in September 2017 but did not go back for follow-up  His liver enzymes have been normal and there is no evidence of any biliary obstruction  Patient may follow up with his private GI for repeat ERCP with stent removal and stone extraction      History of diarrhea and C diff infection status post fecal transplant  He has been on maintenance dose of oral vancomycin and being followed by ID  Discharge Plan: To be determined  Thank you,  [address] [place] [address] in the [facility] by Graeme Michael for 2017  Network Utilization Review Department  Phone: [phone]; Fax [phone]  ATTENTION: The Network Utilization Review Department is now centralized for our 7 Facilities  Make a note that we have a new phone and fax numbers for our Department  Please call with any questions or concerns to [phone] and carefully follow the prompts so that you are directed to the right person  All voicemails are confidential  Fax any determinations, approvals, denials, and requests for initial or continue stay review clinical to [phone]  Due to HIGH CALL volume, it would be easier if you could please send faxed requests to expedite your requests and in part, help us provide discharge notifications faster

## 2018-03-02 NOTE — PLAN OF CARE
Problem: Potential for Falls  Goal: Patient will remain free of falls  INTERVENTIONS:  - Assess patient frequently for physical needs  -  Identify cognitive and physical deficits and behaviors that affect risk of falls    -  [facility] fall precautions as indicated by assessment   - Educate patient/family on patient safety including physical limitations  - Instruct patient to call for assistance with activity based on assessment  - Modify environment to reduce risk of injury  - Consider OT/PT consult to assist with strengthening/mobility   Outcome: Progressing      Problem: Prexisting or High Potential for Compromised Skin Integrity  Goal: Skin integrity is maintained or improved  INTERVENTIONS:  - Identify patients at risk for skin breakdown  - Assess and monitor skin integrity  - Assess and monitor nutrition and hydration status  - Monitor labs (i e  albumin)  - Assess for incontinence   - Turn and reposition patient  - Assist with mobility/ambulation  - Relieve pressure over bony prominences  - Avoid friction and shearing  - Provide appropriate hygiene as needed including keeping skin clean and dry  - Evaluate need for skin moisturizer/barrier cream  - Collaborate with interdisciplinary team (i e  Nutrition, Rehabilitation, etc )   - Patient/family teaching   Outcome: Progressing      Problem: GASTROINTESTINAL - ADULT  Goal: Minimal or absence of nausea and/or vomiting  INTERVENTIONS:  - Administer IV fluids as ordered to ensure adequate hydration  - Maintain NPO status until nausea and vomiting are resolved  - Nasogastric tube as ordered  - Administer ordered antiemetic medications as needed  - Provide nonpharmacologic comfort measures as appropriate  - Advance diet as tolerated, if ordered  - Nutrition services referral to assist patient with adequate nutrition and appropriate food choices   Outcome: Progressing    Goal: Maintains or returns to baseline bowel function  INTERVENTIONS:  - Assess bowel function  - Encourage oral fluids to ensure adequate hydration  - Administer IV fluids as ordered to ensure adequate hydration  - Administer ordered medications as needed  - Encourage mobilization and activity  - Nutrition services referral to assist patient with appropriate food choices   Outcome: Progressing    Goal: Maintains adequate nutritional intake  INTERVENTIONS:  - Monitor percentage of each meal consumed  - Identify factors contributing to decreased intake, treat as appropriate  - Assist with meals as needed  - Monitor I&O, WT and lab values  - Obtain nutrition services referral as needed   Outcome: Progressing      Problem: METABOLIC, FLUID AND ELECTROLYTES - ADULT  Goal: Electrolytes maintained within normal limits  INTERVENTIONS:  - Monitor labs and assess patient for signs and symptoms of electrolyte imbalances  - Administer electrolyte replacement as ordered  - Monitor response to electrolyte replacements, including repeat lab results as appropriate  - Instruct patient on fluid and nutrition as appropriate   Outcome: Progressing    Goal: Fluid balance maintained  INTERVENTIONS:  - Monitor labs and assess for signs and symptoms of volume excess or deficit  - Monitor I/O and WT  - Instruct patient on fluid and nutrition as appropriate   Outcome: Progressing      Problem: HEMATOLOGIC - ADULT  Goal: Maintains hematologic stability  INTERVENTIONS  - Assess for signs and symptoms of bleeding or hemorrhage  - Monitor labs  - Administer supportive blood products/factors as ordered and appropriate   Outcome: Progressing      Problem: MUSCULOSKELETAL - ADULT  Goal: Maintain or return mobility to safest level of function  INTERVENTIONS:  - Assess patient's ability to carry out ADLs; assess patient's baseline for ADL function and identify physical deficits which impact ability to perform ADLs (bathing, care of mouth/teeth, toileting, grooming, dressing, etc )  - Assess/evaluate cause of self-care deficits   - Assess range of motion  - Assess patient's mobility; develop plan if impaired  - Assess patient's need for assistive devices and provide as appropriate  - Encourage maximum independence but intervene and supervise when necessary  - Involve family in performance of ADLs  - Assess for home care needs following discharge   - Request OT consult to assist with ADL evaluation and planning for discharge  - Provide patient education as appropriate   Outcome: Progressing    Goal: Maintain proper alignment of affected body part  INTERVENTIONS:  - Support, maintain and protect limb and body alignment  - Provide pt/fam with appropriate education   Outcome: Progressing      Problem: Nutrition/Hydration-ADULT  Goal: Nutrient/Hydration intake appropriate for improving, restoring or maintaining nutritional needs  Monitor and assess patient's nutrition/hydration status for malnutrition (ex- brittle hair, bruises, dry skin, pale skin and conjunctiva, muscle wasting, smooth red tongue, and disorientation)  Collaborate with interdisciplinary team and initiate plan and interventions as ordered  Monitor patient's weight and dietary intake as ordered or per policy  Utilize nutrition screening tool and intervene per policy  Determine patient's food preferences and provide high-protein, high-caloric foods as appropriate       INTERVENTIONS:  - Monitor oral intake, urinary output, labs, and treatment plans  - Assess nutrition and hydration status and recommend course of action  - Evaluate amount of meals eaten  - Assist patient with eating if necessary   - Allow adequate time for meals  - Recommend/ encourage appropriate diets, oral nutritional supplements, and vitamin/mineral supplements  - Order, calculate, and assess calorie counts as needed  - Recommend, monitor, and adjust tube feedings and TPN/PPN based on assessed needs  - Assess need for intravenous fluids  - Provide specific nutrition/hydration education as appropriate  - Include patient/family/caregiver in decisions related to nutrition   Outcome: Progressing

## 2018-03-03 ENCOUNTER — APPOINTMENT (INPATIENT)
Dept: DIALYSIS | Facility: HOSPITAL | Age: 68
DRG: 689 | End: 2018-03-03
Payer: COMMERCIAL

## 2018-03-03 LAB
ALBUMIN SERPL BCP-MCNC: 2.1 G/DL (ref 3.5–5)
ALP SERPL-CCNC: 172 U/L (ref 46–116)
ALT SERPL W P-5'-P-CCNC: 23 U/L (ref 12–78)
ANION GAP SERPL CALCULATED.3IONS-SCNC: 11 MMOL/L (ref 4–13)
AST SERPL W P-5'-P-CCNC: 13 U/L (ref 5–45)
BILIRUB DIRECT SERPL-MCNC: 0.12 MG/DL (ref 0–0.2)
BILIRUB SERPL-MCNC: 0.3 MG/DL (ref 0.2–1)
BUN SERPL-MCNC: 27 MG/DL (ref 5–25)
C DIFF TOX GENS STL QL NAA+PROBE: NORMAL
CALCIUM SERPL-MCNC: 7.6 MG/DL (ref 8.3–10.1)
CAMPYLOBACTER DNA SPEC NAA+PROBE: NORMAL
CHLORIDE SERPL-SCNC: 91 MMOL/L (ref 100–108)
CO2 SERPL-SCNC: 27 MMOL/L (ref 21–32)
CREAT SERPL-MCNC: 4.71 MG/DL (ref 0.6–1.3)
GFR SERPL CREATININE-BSD FRML MDRD: 12 ML/MIN/1.73SQ M
GLUCOSE SERPL-MCNC: 114 MG/DL (ref 65–140)
GLUCOSE SERPL-MCNC: 117 MG/DL (ref 65–140)
GLUCOSE SERPL-MCNC: 184 MG/DL (ref 65–140)
GLUCOSE SERPL-MCNC: 223 MG/DL (ref 65–140)
GLUCOSE SERPL-MCNC: 235 MG/DL (ref 65–140)
GLUCOSE SERPL-MCNC: 255 MG/DL (ref 65–140)
GLUCOSE SERPL-MCNC: 278 MG/DL (ref 65–140)
INR PPP: 3.24 (ref 0.86–1.16)
POTASSIUM SERPL-SCNC: 4.3 MMOL/L (ref 3.5–5.3)
PROT SERPL-MCNC: 7.6 G/DL (ref 6.4–8.2)
PROTHROMBIN TIME: 34.3 SECONDS (ref 12.1–14.4)
SALMONELLA DNA SPEC QL NAA+PROBE: NORMAL
SHIGA TOXIN STX GENE SPEC NAA+PROBE: NORMAL
SHIGELLA DNA SPEC QL NAA+PROBE: NORMAL
SODIUM SERPL-SCNC: 129 MMOL/L (ref 136–145)

## 2018-03-03 PROCEDURE — 99232 SBSQ HOSP IP/OBS MODERATE 35: CPT | Performed by: INTERNAL MEDICINE

## 2018-03-03 PROCEDURE — 80048 BASIC METABOLIC PNL TOTAL CA: CPT | Performed by: INTERNAL MEDICINE

## 2018-03-03 PROCEDURE — 80076 HEPATIC FUNCTION PANEL: CPT | Performed by: PHYSICIAN ASSISTANT

## 2018-03-03 PROCEDURE — 82948 REAGENT STRIP/BLOOD GLUCOSE: CPT

## 2018-03-03 PROCEDURE — 85610 PROTHROMBIN TIME: CPT | Performed by: INTERNAL MEDICINE

## 2018-03-03 PROCEDURE — 87505 NFCT AGENT DETECTION GI: CPT | Performed by: PHYSICIAN ASSISTANT

## 2018-03-03 PROCEDURE — 94760 N-INVAS EAR/PLS OXIMETRY 1: CPT

## 2018-03-03 PROCEDURE — 87493 C DIFF AMPLIFIED PROBE: CPT | Performed by: INTERNAL MEDICINE

## 2018-03-03 RX ADMIN — INSULIN LISPRO 3 UNITS: 100 INJECTION, SOLUTION INTRAVENOUS; SUBCUTANEOUS at 22:34

## 2018-03-03 RX ADMIN — MELATONIN 3 MG: 3 TAB ORAL at 22:31

## 2018-03-03 RX ADMIN — NYSTATIN: 100000 POWDER TOPICAL at 18:00

## 2018-03-03 RX ADMIN — Medication 250 MG: at 08:02

## 2018-03-03 RX ADMIN — INSULIN LISPRO 1 UNITS: 100 INJECTION, SOLUTION INTRAVENOUS; SUBCUTANEOUS at 08:10

## 2018-03-03 RX ADMIN — Medication 1 TABLET: at 08:02

## 2018-03-03 RX ADMIN — PANCRELIPASE 72000 UNITS: 24000; 76000; 120000 CAPSULE, DELAYED RELEASE PELLETS ORAL at 18:00

## 2018-03-03 RX ADMIN — CINACALCET HYDROCHLORIDE 30 MG: 30 TABLET, COATED ORAL at 22:32

## 2018-03-03 RX ADMIN — PANCRELIPASE 72000 UNITS: 24000; 76000; 120000 CAPSULE, DELAYED RELEASE PELLETS ORAL at 08:00

## 2018-03-03 RX ADMIN — ALPRAZOLAM 0.25 MG: 0.25 TABLET ORAL at 18:53

## 2018-03-03 RX ADMIN — VANCOMYCIN 125 MG: KIT at 08:17

## 2018-03-03 RX ADMIN — Medication 1 CAPSULE: at 08:03

## 2018-03-03 RX ADMIN — LANTHANUM CARBONATE 1000 MG: 500 TABLET, CHEWABLE ORAL at 12:46

## 2018-03-03 RX ADMIN — METOPROLOL TARTRATE 12.5 MG: 25 TABLET ORAL at 22:31

## 2018-03-03 RX ADMIN — PANCRELIPASE 72000 UNITS: 24000; 76000; 120000 CAPSULE, DELAYED RELEASE PELLETS ORAL at 22:33

## 2018-03-03 RX ADMIN — AMIODARONE HYDROCHLORIDE 200 MG: 200 TABLET ORAL at 08:03

## 2018-03-03 RX ADMIN — Medication 250 MG: at 17:58

## 2018-03-03 RX ADMIN — VITAMIN D, TAB 1000IU (100/BT) 1000 UNITS: 25 TAB at 08:02

## 2018-03-03 RX ADMIN — GABAPENTIN 200 MG: 100 CAPSULE ORAL at 22:31

## 2018-03-03 RX ADMIN — INSULIN DETEMIR 5 UNITS: 100 INJECTION, SOLUTION SUBCUTANEOUS at 08:17

## 2018-03-03 RX ADMIN — CITALOPRAM HYDROBROMIDE 5 MG: 10 TABLET ORAL at 08:19

## 2018-03-03 RX ADMIN — LANTHANUM CARBONATE 1000 MG: 500 TABLET, CHEWABLE ORAL at 08:01

## 2018-03-03 RX ADMIN — PANTOPRAZOLE SODIUM 40 MG: 40 TABLET, DELAYED RELEASE ORAL at 05:27

## 2018-03-03 RX ADMIN — NYSTATIN 1 APPLICATION: 100000 POWDER TOPICAL at 08:19

## 2018-03-03 RX ADMIN — METOPROLOL TARTRATE 12.5 MG: 25 TABLET ORAL at 08:02

## 2018-03-03 RX ADMIN — Medication 2 TABLET: at 07:59

## 2018-03-03 RX ADMIN — MIDODRINE HYDROCHLORIDE 2.5 MG: 2.5 TABLET ORAL at 17:58

## 2018-03-03 RX ADMIN — LANTHANUM CARBONATE 1000 MG: 500 TABLET, CHEWABLE ORAL at 17:59

## 2018-03-03 RX ADMIN — INSULIN DETEMIR 5 UNITS: 100 INJECTION, SOLUTION SUBCUTANEOUS at 22:42

## 2018-03-03 RX ADMIN — MIDODRINE HYDROCHLORIDE 2.5 MG: 2.5 TABLET ORAL at 08:02

## 2018-03-03 NOTE — ASSESSMENT & PLAN NOTE
chronic choledocholithiasis  status post cholecystectomy  No surgical intervention  Per GI, no plan for ERCP   Outpatient follow-up with his GI doctor

## 2018-03-03 NOTE — PROGRESS NOTES
Progress Note - Kim Ceja 74 y o  male MRN: [id number]    Unit/Bed#: -01 Encounter: [id number]      CC: diabetes f/u    Subjective:   Kim Ceja is a 74y o  year old male with type 1 diabetes  Feels well  No complaints  He did have hypoglycemia yesterday  Just finishing hemodialysis this morning  Objective:     Vitals: Blood pressure 134/70, pulse 69, temperature 98 3 °F (36 8 °C), temperature source Oral, resp  rate 16, height 5' 6" (1 676 m), weight 63 9 kg (140 lb 14 oz), SpO2 97 %  ,Body mass index is 22 74 kg/m²  Intake/Output Summary (Last 24 hours) at 03/03/18 1209  Last data filed at 03/03/18 0901   Gross per 24 hour   Intake              818 ml   Output             2083 ml   Net            -1265 ml       Physical Exam:  General Appearance: awake, appears stated age and cooperative  Head: Normocephalic, without obvious abnormality, atraumatic  Extremities: moves all extremities  Skin: Skin color and temperature normal    Pulm: no labored breathing    Lab, Imaging and other studies: I have personally reviewed pertinent reports  POC Glucose   Date Value   03/03/2018 114 mg/dl   03/03/2018 223 mg/dl (H)   03/03/2018 235 mg/dl (H)   03/03/2018 184 mg/dl (H)   03/02/2018 400 mg/dl (H)   03/02/2018 436 mg/dl (H)   03/02/2018 103 mg/dl   03/02/2018 83 mg/dl   03/02/2018 62 mg/dl (L)   03/02/2018 114 mg/dl   10/16/2015 217 mg/dL (H)   07/01/2015 323 mg/dL (H)       Assessment:  diabetes with hyperglycemia, hypoglycemia and ESRD on HD    Plan:  1  Brittle type 1 diabetes with hypo and hyperglycemia-today, he has not had severe hyperglycemia or hypoglycemia  Continue current regimen  2   End-stage renal disease-he is on hemodialysis  This is being managed by Nephrology  Portions of the record may have been created with voice recognition software

## 2018-03-03 NOTE — PLAN OF CARE
GASTROINTESTINAL - ADULT     Minimal or absence of nausea and/or vomiting Progressing     Maintains or returns to baseline bowel function Progressing     Maintains adequate nutritional intake Progressing        HEMATOLOGIC - ADULT     Maintains hematologic stability Progressing        METABOLIC, FLUID AND ELECTROLYTES - ADULT     Electrolytes maintained within normal limits Progressing     Fluid balance maintained Progressing     Glucose maintained within target range Progressing        MUSCULOSKELETAL - ADULT     Maintain or return mobility to safest level of function Progressing     Maintain proper alignment of affected body part Progressing        Nutrition/Hydration-ADULT     Nutrient/Hydration intake appropriate for improving, restoring or maintaining nutritional needs Progressing        Potential for Falls     Patient will remain free of falls Progressing        Prexisting or High Potential for Compromised Skin Integrity     Skin integrity is maintained or improved Progressing

## 2018-03-03 NOTE — ASSESSMENT & PLAN NOTE
· Recurrent C diff colitis, with chronic diarrhea  · Per ID,  follow on C diff toxin  · Continue oral vancomycin

## 2018-03-03 NOTE — PROGRESS NOTES
Progress Note - Seema Moffett 1950, 74 y o  male MRN: [id number]    Unit/Bed#: -01 Encounter: [id number]    Primary Care Provider: Dhiraj Cerda DO   Date and time admitted to hospital: 2/28/2018  2:43 PM        Chronic abdominal pain   Assessment & Plan    chronic choledocholithiasis  status post cholecystectomy  No surgical intervention  Per GI, no plan for ERCP   Outpatient follow-up with his GI doctor            Abnormal urinalysis   Assessment & Plan    With chronic dysuria  ID  input appreciated  Observe off antibiotic            Paroxysmal atrial fibrillation (HCC)   Assessment & Plan      · Continue   Metoprolol, amiodarone   · Coumadin hold due to supratherapeutic INR  · Monitor          Clostridium difficile infection   Assessment & Plan    · Recurrent C diff colitis, with chronic diarrhea  · Per ID,  follow on C diff toxin  · Continue oral vancomycin        Chronic combined systolic and diastolic CHF (congestive heart failure) ([facility])   Assessment & Plan    · EF 35%  · Appears stable  · Monitor        ESRD (end stage renal disease) on dialysis [facility])   Assessment & Plan    Nephrology is following  Possible short course dialysis today        Type 1 diabetes mellitus with nephropathy ([facility])   Assessment & Plan    ·  A1c 9 4  · Endocrine input appreciated   · 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  · Endocrine is following            VTE Pharmacologic Prophylaxis:   Pharmacologic: Warfarin (Coumadin)  Mechanical VTE Prophylaxis in Place: Yes    Patient Centered Rounds: I have performed bedside rounds with nursing staff today  Discussions with Specialists or Other Care Team Provider:     Education and Discussions with Family / Patient:  Patient and his wife    Time Spent for Care: 30 minutes  More than 50% of total time spent on counseling and coordination of care as described above      Current Length of Stay: 3 day(s)    Current Patient Status: Inpatient Certification Statement: The patient will continue to require additional inpatient hospital stay due to Management of abdominal pain    Discharge Plan:  Home tomorrow    Code Status: Level 1 - Full Code      Subjective:   Patient seen and examined  Comfortable in bed  Less diarrhea    Objective:     Vitals:   Temp (24hrs), Av °F (36 7 °C), Min:97 8 °F (36 6 °C), Max:98 3 °F (36 8 °C)    HR:  [68-80] 69  Resp:  [16-18] 16  BP: (107-140)/(49-73) 134/70  SpO2:  [91 %-97 %] 97 %  Body mass index is 22 74 kg/m²  Input and Output Summary (last 24 hours): Intake/Output Summary (Last 24 hours) at 18 1039  Last data filed at 18 0901   Gross per 24 hour   Intake              818 ml   Output             2083 ml   Net            -1265 ml       Physical Exam:     Physical Exam  Patient is awake alert in no acute distress  Lung clear to auscultation bilateral  Heart positive S1-S2 no murmur  Abdomen soft nontender positive bowel sounds  Right BKA no edema    Additional Data:     Labs:      Results from last 7 days  Lab Units 18  0441 18  1526   WBC Thousand/uL 7 62 8 49   HEMOGLOBIN g/dL 10 0* 10 1*   HEMATOCRIT % 31 5* 32 1*   PLATELETS Thousands/uL 243 293   LYMPHO PCT %  --  14   MONO PCT MAN %  --  12   EOSINO PCT MANUAL %  --  7*       Results from last 7 days  Lab Units 18  0443   SODIUM mmol/L 129*   POTASSIUM mmol/L 4 3   CHLORIDE mmol/L 91*   CO2 mmol/L 27   BUN mg/dL 27*   CREATININE mg/dL 4 71*   CALCIUM mg/dL 7 6*   TOTAL PROTEIN g/dL 7 6   BILIRUBIN TOTAL mg/dL 0 30   ALK PHOS U/L 172*   ALT U/L 23   AST U/L 13   GLUCOSE RANDOM mg/dL 255*       Results from last 7 days  Lab Units 18  0448   INR  3 24*       * I Have Reviewed All Lab Data Listed Above  * Additional Pertinent Lab Tests Reviewed:  All Labs For Current Hospital Admission Reviewed    Imaging:    Imaging Reports Reviewed Today Include:   Imaging Personally Reviewed by Myself Includes:     Recent Cultures (last 7 days):       Results from last 7 days  Lab Units 02/28/18  1900 02/28/18  1611 02/28/18  1526   BLOOD CULTURE   --  No Growth at 48 hrs  No Growth at 48 hrs     URINE CULTURE  60,000-69,000 cfu/ml Oxidase Positive gram negative jose alfredo*  --   --        Last 24 Hours Medication List:     Current Facility-Administered Medications:  acetaminophen 650 mg Oral Q6H PRN Jannet Dry, PA-C   albuterol 2 5 mg Nebulization Q4H PRN Jannet Dry, PA-C   ALPRAZolam 0 25 mg Oral HS PRN Rekha JUAN Turner, PA-C   amiodarone 200 mg Oral Daily Jannet Dry, PA-C   b complex-vitamin C-folic acid 1 capsule Oral Daily Jannet Dry, PA-C   calcium carbonate 2 tablet Oral Daily With Breakfast Jannet Dry, PA-C   cholecalciferol 1,000 Units Oral Daily Jannet Dry, PA-C   cinacalcet 30 mg Oral HS Jannet Dry, PA-C   citalopram 5 mg Oral Daily Jannet Dry, PA-C   diphenoxylate-atropine 1 tablet Oral 4x Daily PRN Jannet Dry, PA-C   gabapentin 200 mg Oral HS Jannet Dry, PA-C   insulin detemir 5 Units Subcutaneous Q12H [address], MD   insulin lispro 1-5 Units Subcutaneous HS Myriam Areas, PA-C   insulin lispro 1-5 Units Subcutaneous TID [facility] Lorelei Humphrey MD   insulin lispro 3 Units Subcutaneous TID With Meals Lorelei Humphrey MD   lanthanum 1,000 mg Oral TID With Meals Jannet Dry, PA-C   melatonin 3 mg Oral HS Jannet Dry, PA-C   menthol-zinc oxide  Topical Q4H PRN Jannet Dry, PA-C   metoprolol tartrate 12 5 mg Oral Q12H [address] Jannet Dry, PA-C   midodrine 2 5 mg Oral BID Jannet Dry, PA-C   multivitamin-minerals 1 tablet Oral Daily Jannet Dry, PA-C   nystatin  Topical BID Jannet Dry, PA-C   ondansetron 4 mg Intravenous Q6H PRN Jannet Dry, PA-C   oxyCODONE 5 mg Oral Q4H PRN Jannet Dry, PA-C   pancrelipase (Lip-Prot-Amyl) 72,000 Units Oral TID Jannet Jacobo PA-C pantoprazole 40 mg Oral Early Morning Jannet Dry, PA-C   saccharomyces boulardii 250 mg Oral BID Jannet Dry, PA-C   traMADol 50 mg Oral Q6H PRN Jannet Dry, PA-C   vancomycin 125 mg Oral Daily Jannet Dry, NORMAN        Today, Patient Was Seen By: Chong Saucedo DO    ** Please Note: Dictation voice to text software may have been used in the creation of this document   **

## 2018-03-03 NOTE — PROGRESS NOTES
NEPHROLOGY PROGRESS NOTE   Maria Isabel Kumar 74 y o  male MRN: [id number]  Unit/Bed#: -Yuli Encounter: [id number]  Reason for Consult: ESRD MWF    ASSESSMENT and PLAN:    79year-old male with a past medical history of ESRD on Monday Wednesday Friday, Diabetes type 1, HTN, laparoscopic coli, hypothyroid, who presents on 02/28 with abdominal pain  there was initial concern for urinary tract infection  Started on antibiotics  There is also initial concern for choledocholithiasis and pneumobilia  Surgery team has evaluated the patient  GI team is also been brought on board  nephrology is consulted for ESRD  1) ESRD MWF [facility]     - HD on 3/1 and then restart regular schedule   - access - RUE AVF  - 2 hour treatment today as yesterday treatment was shortened due to power outages (ended 1 5 hours early)    2) abd pain     - chronic choledocolithiasis  - will need outpatient continued f/u with GI  - observing off antibiotics for UTI  - on oral antibiotics for c diff    3) hyponatremia - monitor with HD treatment today   Has had chronic hyponatremia    4) hyperkalemia - Possibly in the setting of missing dialysis but also hyperglycemia may be contributing     - improved today    5) met acidosis -  Monitor with HD today    6) 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    7) anemia - following CBC    8) hypotension - on chronic midodrine      SUBJECTIVE / INTERVAL HISTORY:    Dialysis treatment yesterday was cut short due to power outage is  UF 1 5 L yest      OBJECTIVE:  Current Weight: Weight - Scale: 63 9 kg (140 lb 14 oz)  Vitals:    03/02/18 2141 03/02/18 2300 03/03/18 0600 03/03/18 0755   BP: 140/68 138/66  134/70   BP Location:  Left arm  Left arm   Pulse: 70 74  69   Resp:  16  16   Temp:  98 3 °F (36 8 °C)     TempSrc:  Oral     SpO2:  95%  97%   Weight:   63 9 kg (140 lb 14 oz)    Height:           Intake/Output Summary (Last 24 hours) at 03/03/18 1114  Last data filed at 03/03/18 0901   Gross per 24 hour   Intake              818 ml   Output 2083 ml   Net            -1265 ml     General:  No acute distress  Skin:  No rash  Eyes:  Anicteric sclera  ENT:  Moist mucous membranes  Neck:  Supple  Chest:  CTA bilaterally  CVS:  S1, S2  Abdomen:  Soft, nontender  Extremities:  R upper extremity AVF with good thrill and bruit  No edema  Right BKA    :  No Vanegas  Neuro:  AAO x3  Psych:  Normal affect    Medications:    Current Facility-Administered Medications:     acetaminophen (TYLENOL) tablet 650 mg, 650 mg, Oral, Q6H PRN, Bahman Calin, PA-C    albuterol inhalation solution 2 5 mg, 2 5 mg, Nebulization, Q4H PRN, Bahman Calin, PA-C    ALPRAZolam Yvone Americo) tablet 0 25 mg, 0 25 mg, Oral, HS PRN, Rekha Turner, PA-C, 0 25 mg at 03/02/18 2027    amiodarone tablet 200 mg, 200 mg, Oral, Daily, Bahman Calin, PA-C, 200 mg at 03/03/18 0803    b complex-vitamin C-folic acid (NEPHROCAPS) capsule 1 capsule, 1 capsule, Oral, Daily, Bamhan Calin, PA-C, 1 capsule at 03/03/18 0803    calcium carbonate (OYSTER SHELL,OSCAL) 500 mg tablet 2 tablet, 2 tablet, Oral, Daily With Breakfast, Bahman Calin, PA-C, 2 tablet at 03/03/18 0759    cholecalciferol (VITAMIN D3) tablet 1,000 Units, 1,000 Units, Oral, Daily, Bahman Calin, PA-C, 1,000 Units at 03/03/18 0802    cinacalcet (SENSIPAR) tablet 30 mg, 30 mg, Oral, HS, Bahman Calin, PA-C, 30 mg at 03/02/18 2142    citalopram (CeleXA) tablet 5 mg, 5 mg, Oral, Daily, Bahman Calin, PA-C, 5 mg at 03/03/18 1083    diphenoxylate-atropine (LOMOTIL) 2 5-0 025 mg per tablet 1 tablet, 1 tablet, Oral, 4x Daily PRN, Bahman Calin, PA-C    gabapentin (NEURONTIN) capsule 200 mg, 200 mg, Oral, HS, Bahman Calin, PA-C, 200 mg at 03/02/18 2141    insulin detemir (LEVEMIR) subcutaneous injection 5 Units, 5 Units, Subcutaneous, Q12H [address], Earnest Carter MD, 5 Units at 03/03/18 0817    insulin lispro (HumaLOG) 100 units/mL subcutaneous injection 1-5 Units, 1-5 Units, Subcutaneous, HS, Seven Christy PA-C, 5 Units at 03/02/18 2144    insulin lispro (HumaLOG) 100 units/mL subcutaneous injection 1-5 Units, 1-5 Units, Subcutaneous, TID AC, 1 Units at 03/03/18 0810 **AND** Fingerstick Glucose (POCT), , , TID AC, Constantin Garcia MD    insulin lispro (HumaLOG) 100 units/mL subcutaneous injection 3 Units, 3 Units, Subcutaneous, TID With Meals, Constantin Garcia MD, 3 Units at 03/03/18 0810    lanthanum (FOSRENOL) chewable tablet 1,000 mg, 1,000 mg, Oral, TID With Meals, Prerna Peters PA-C, 1,000 mg at 03/03/18 0801    melatonin tablet 3 mg, 3 mg, Oral, HS, Prerna Peters PA-C, 3 mg at 03/02/18 2141    menthol-zinc oxide (CALMOSEPTINE) 0 44-20 6 % ointment, , Topical, Q4H PRN, Prerna Peters PA-C    metoprolol tartrate (LOPRESSOR) partial tablet 12 5 mg, 12 5 mg, Oral, Q12H [address], Prerna Peters PA-C, 12 5 mg at 03/03/18 0802    midodrine (PROAMATINE) tablet 2 5 mg, 2 5 mg, Oral, BID, Prerna Peters PA-C, 2 5 mg at 03/03/18 0802    multivitamin-minerals (CENTRUM) tablet 1 tablet, 1 tablet, Oral, Daily, Prerna Peters PA-C, 1 tablet at 03/03/18 0802    nystatin (MYCOSTATIN) powder, , Topical, BID, Prerna Peters PA-C, 1 application at 00/45/96 0819    ondansetron (ZOFRAN) injection 4 mg, 4 mg, Intravenous, Q6H PRN, Prerna Peters PA-C    oxyCODONE (ROXICODONE) IR tablet 5 mg, 5 mg, Oral, Q4H PRN, Prenra Peters PA-C    pancrelipase (Lip-Prot-Amyl) (CREON) delayed release capsule 72,000 Units, 72,000 Units, Oral, TID, Prerna Peters PA-C, 72,000 Units at 03/03/18 0800    pantoprazole (PROTONIX) EC tablet 40 mg, 40 mg, Oral, Early Morning, Prerna Peters PA-C, 40 mg at 03/03/18 3717    saccharomyces boulardii (FLORASTOR) capsule 250 mg, 250 mg, Oral, BID, Prerna Peters PA-C, 250 mg at 03/03/18 0802    traMADol (ULTRAM) tablet 50 mg, 50 mg, Oral, Q6H PRN, Prerna Peters PA-C    vancomycin [facility]) oral solution 125 mg, 125 mg, Oral, Daily, Anuradha Durbin PA-C, 125 mg at 03/03/18 1452    Laboratory Results:    Results from last 7 days  Lab Units 03/03/18  0443 03/01/18  1234 03/01/18  0634 03/01/18  0441 02/28/18  1833 02/28/18  1526   WBC Thousand/uL  --   --   --  7 62  --  8 49   HEMOGLOBIN g/dL  --   --   --  10 0*  --  10 1*   HEMATOCRIT %  --   --   --  31 5*  --  32 1*   PLATELETS Thousands/uL  --   --   --  243  --  293   SODIUM mmol/L 129* 131*  --  124* 129*  --    POTASSIUM mmol/L 4 3 5 6* 5 7* 6 6* 5 5*  --    CHLORIDE mmol/L 91* 94*  --  90* 92*  --    CO2 mmol/L 27 21  --  17* 24  --    BUN mg/dL 27* 68*  --  61* 52*  --    CREATININE mg/dL 4 71* 7 88*  --  7 46* 6 58*  --    CALCIUM mg/dL 7 6* 7 5*  --  7 8* 7 6*  --    TOTAL PROTEIN g/dL 7 6 7 5  --   --  7 6  --    GLUCOSE RANDOM mg/dL 255* 209*  --  493* 264*  --

## 2018-03-04 VITALS
WEIGHT: 136.24 LBS | TEMPERATURE: 98.2 F | HEIGHT: 66 IN | DIASTOLIC BLOOD PRESSURE: 60 MMHG | RESPIRATION RATE: 18 BRPM | BODY MASS INDEX: 21.9 KG/M2 | HEART RATE: 72 BPM | OXYGEN SATURATION: 94 % | SYSTOLIC BLOOD PRESSURE: 120 MMHG

## 2018-03-04 PROBLEM — N39.0 URINARY TRACT INFECTION: Status: RESOLVED | Noted: 2017-06-19 | Resolved: 2018-03-04

## 2018-03-04 LAB
ANION GAP SERPL CALCULATED.3IONS-SCNC: 14 MMOL/L (ref 4–13)
BUN SERPL-MCNC: 40 MG/DL (ref 5–25)
CALCIUM SERPL-MCNC: 8 MG/DL (ref 8.3–10.1)
CHLORIDE SERPL-SCNC: 91 MMOL/L (ref 100–108)
CO2 SERPL-SCNC: 25 MMOL/L (ref 21–32)
CREAT SERPL-MCNC: 5.44 MG/DL (ref 0.6–1.3)
GFR SERPL CREATININE-BSD FRML MDRD: 10 ML/MIN/1.73SQ M
GLUCOSE SERPL-MCNC: 268 MG/DL (ref 65–140)
GLUCOSE SERPL-MCNC: 295 MG/DL (ref 65–140)
GLUCOSE SERPL-MCNC: 355 MG/DL (ref 65–140)
GLUCOSE SERPL-MCNC: 381 MG/DL (ref 65–140)
INR PPP: 1.68 (ref 0.86–1.16)
POTASSIUM SERPL-SCNC: 5.3 MMOL/L (ref 3.5–5.3)
PROTHROMBIN TIME: 20.3 SECONDS (ref 12.1–14.4)
SODIUM SERPL-SCNC: 130 MMOL/L (ref 136–145)

## 2018-03-04 PROCEDURE — 99239 HOSP IP/OBS DSCHRG MGMT >30: CPT | Performed by: INTERNAL MEDICINE

## 2018-03-04 PROCEDURE — 82948 REAGENT STRIP/BLOOD GLUCOSE: CPT

## 2018-03-04 PROCEDURE — 80048 BASIC METABOLIC PNL TOTAL CA: CPT | Performed by: INTERNAL MEDICINE

## 2018-03-04 PROCEDURE — 85610 PROTHROMBIN TIME: CPT | Performed by: INTERNAL MEDICINE

## 2018-03-04 RX ADMIN — MIDODRINE HYDROCHLORIDE 2.5 MG: 2.5 TABLET ORAL at 08:43

## 2018-03-04 RX ADMIN — INSULIN LISPRO 2 UNITS: 100 INJECTION, SOLUTION INTRAVENOUS; SUBCUTANEOUS at 08:54

## 2018-03-04 RX ADMIN — Medication 250 MG: at 08:43

## 2018-03-04 RX ADMIN — VITAMIN D, TAB 1000IU (100/BT) 1000 UNITS: 25 TAB at 08:43

## 2018-03-04 RX ADMIN — INSULIN DETEMIR 5 UNITS: 100 INJECTION, SOLUTION SUBCUTANEOUS at 08:44

## 2018-03-04 RX ADMIN — LANTHANUM CARBONATE 1000 MG: 500 TABLET, CHEWABLE ORAL at 08:46

## 2018-03-04 RX ADMIN — PANTOPRAZOLE SODIUM 40 MG: 40 TABLET, DELAYED RELEASE ORAL at 05:47

## 2018-03-04 RX ADMIN — Medication 1 CAPSULE: at 08:43

## 2018-03-04 RX ADMIN — Medication 2 TABLET: at 08:43

## 2018-03-04 RX ADMIN — METOPROLOL TARTRATE 12.5 MG: 25 TABLET ORAL at 08:43

## 2018-03-04 RX ADMIN — Medication 1 TABLET: at 08:43

## 2018-03-04 RX ADMIN — CITALOPRAM HYDROBROMIDE 5 MG: 10 TABLET ORAL at 08:45

## 2018-03-04 RX ADMIN — VANCOMYCIN 125 MG: KIT at 08:44

## 2018-03-04 RX ADMIN — AMIODARONE HYDROCHLORIDE 200 MG: 200 TABLET ORAL at 08:42

## 2018-03-04 RX ADMIN — PANCRELIPASE 72000 UNITS: 24000; 76000; 120000 CAPSULE, DELAYED RELEASE PELLETS ORAL at 08:46

## 2018-03-04 RX ADMIN — NYSTATIN 1 APPLICATION: 100000 POWDER TOPICAL at 08:47

## 2018-03-04 NOTE — DISCHARGE SUMMARY
Discharge Summary - [facility] Internal Medicine    Patient Information: Britta Dominguez 74 y o  male MRN: [id number]  Unit/Bed#: -01 Encounter: [id number]    Discharging Physician / Practitioner: Aba Boyer DO  PCP: Phil Resendez DO  Admission Date: 2/28/2018  Discharge Date: 03/04/18    Disposition:     Home    Reason for Admission:   Chronic abdominal pain  Chronic diarrhea  Chronic dysuria no UTI  Discharge Diagnoses:     Principal Problem (Resolved):    Urinary tract infection  Active Problems:    Chronic abdominal pain    Abnormal urinalysis    Type 1 diabetes mellitus with nephropathy (HCC)    ESRD (end stage renal disease) on dialysis (HCC)    Chronic combined systolic and diastolic CHF (congestive heart failure) ([facility])    Clostridium difficile infection    Hyponatremia    Acquired hypothyroidism    Paroxysmal atrial fibrillation ([facility] )    Anemia in end-stage renal disease ([facility] )    Benign hypertension with end-stage renal disease ([facility] )    Choledocholithiasis      Consultations During Hospital Stay:  · Nephrology  · GI  · General surgery  · Endocrine  · Infectious Disease    Procedures Performed:     · Abdominal pelvis CT scan    Persistent right basilar airspace disease and effusion as also seen on the prior study of 11/27/2017  Jacob Leaf may now be a rind surrounding the pleural effusion possibly indicating development of an empyema      Distal choledocholithiasis with diffuse CBD dilation   Interval development of left pneumobilia   Status post cholecystectomy    Chronic punctate gravel-like calculi in the proximal left ureter unchanged from the prior study are nonobstructive   No hydronephrosis  Significant Findings / Test Results:     · As above    Incidental Findings:   · none    Test Results Pending at Discharge (will require follow up):   · none     Outpatient Tests Requested:  · none    Complications:  none    Hospital Course:     Britta Dominguez is a 74 y o  male patient who originally presented to the hospital on 2/28/2018 due to worsening chronic abdominal pain over the past few days  Patient also was complaining of dysuria with dark urine and thicker than usual  Evaluation IN THE emergency room, patient was afebrile, without leukocytosis, found to have abnormal urinalysis and above CT scan finding, he was admitted to the hospital  ID  consulted,  no sign of UTI, patient with chronic dysuria, he was observed off antibiotic  Patient with underlying recurrent C diff colitis status post fecal transplant, he continued to have loose stool up to 4 times a day, repeat stool study was negative for C diff, plan to continue with oral vancomycin for suppression     GI consulted regarding abdominal pain and choledocholithiasis, GI think this choledocholithiasis asymptomatic, he had pigtail stent placement and [facility] few months ago, recommendation for patient to follow up with his GI as an outpatient if he need to repeat ERCP, no surgical intervention recommended by surgery    Nephrology consulted, regarding end-stage renal disease on hemodialysis, patient was kept on dialysis during his hospital stay  Endocrine consulted regarding brittle  diabetic type 1 with history of hypoglycemia unawareness, he was kept on Levemir and Humalog    Currently patient in stable condition, he will be discharged home today, no change in his medication regimen, instructed to follow with his family doctor in 1 week and with his GI doctor as an outpatient    Condition at Discharge: stable     Discharge Day Visit / Exam:     Subjective:    Patient seen and examined  Comfortable in bed  Denied chest pain or shortness of breath  No abdominal pain or diarrhea today  Vitals: Blood Pressure: 120/60 (03/04/18 0700)  Pulse: 72 (03/04/18 0700)  Temperature: 98 2 °F (36 8 °C) (03/04/18 0700)  Temp Source: Oral (03/04/18 0700)  Respirations: 18 (03/04/18 0700)  Height: 5' 6" (167 6 cm) (02/28/18 2042)  Weight - Scale: 61 8 kg (136 lb 3 9 oz) (03/04/18 0600)  SpO2: 94 % (03/04/18 0050)  Exam:   Physical Exam  Patient is awake alert in no acute distress  Lung clear to auscultation bilateral  Heart positive S1-S2 no murmur  Abdomen soft nontender positive bowel sounds  Right BKA no edema  Discussion with Family: wife    Discharge instructions/Information to patient and family:   See after visit summary for information provided to patient and family  Provisions for Follow-Up Care:  See after visit summary for information related to follow-up care and any pertinent home health orders  Planned Readmission: no     Discharge Statement:  I spent 38  minutes discharging the patient  This time was spent on the day of discharge  I had direct contact with the patient on the day of discharge  Greater than 50% of the total time was spent examining patient, answering all patient questions, arranging and discussing plan of care with patient as well as directly providing post-discharge instructions  Additional time then spent on discharge activities  Discharge Medications:  See after visit summary for reconciled discharge medications provided to patient and family        ** Please Note: This note has been constructed using a voice recognition system **

## 2018-03-05 LAB
BACTERIA BLD CULT: NORMAL
BACTERIA BLD CULT: NORMAL
BACTERIA UR CULT: ABNORMAL

## 2018-03-05 NOTE — CASE MANAGEMENT
Notification of Discharge  This is a Notification of Discharge from our facility [address]  Please be advised that this patient has been discharge from our facility  Below you will find the admission and discharge date and time including the patients disposition  PRESENTATION DATE: 2/28/2018  2:43 PM  IP ADMISSION DATE: 2/28/18 1909  DISCHARGE DATE: 3/4/2018  2:08 PM  DISPOSITION: Home/Self Care    [address] [facility] in the [facility] by Graeme Michael for 2017  Network Utilization Review Department  Phone: [phone]; Fax [phone]  ATTENTION: The Network Utilization Review Department is now centralized for our 7 Facilities  Make a note that we have a new phone and fax numbers for our Department  Please call with any questions or concerns to [phone] and carefully follow the prompts so that you are directed to the right person  All voicemails are confidential  Fax any determinations, approvals, denials, and requests for initial or continue stay review clinical to [phone]  Due to HIGH CALL volume, it would be easier if you could please send faxed requests to expedite your requests and in part, help us provide discharge notifications faster        Reference#[id number]

## 2018-03-26 PROBLEM — I42.0 DILATED CARDIOMYOPATHY (HCC): Status: ACTIVE | Noted: 2018-03-26
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
## 2018-03-27 ENCOUNTER — OFFICE VISIT (OUTPATIENT)
Dept: CARDIOLOGY CLINIC | Facility: CLINIC | Age: 68
End: 2018-03-27
Payer: COMMERCIAL

## 2018-03-27 VITALS — HEIGHT: 66 IN | HEART RATE: 88 BPM | SYSTOLIC BLOOD PRESSURE: 146 MMHG | DIASTOLIC BLOOD PRESSURE: 68 MMHG

## 2018-03-27 DIAGNOSIS — I48.0 PAROXYSMAL ATRIAL FIBRILLATION (HCC): ICD-10-CM

## 2018-03-27 DIAGNOSIS — Z99.2 ESRD (END STAGE RENAL DISEASE) ON DIALYSIS (HCC): Chronic | ICD-10-CM

## 2018-03-27 DIAGNOSIS — E10.21 TYPE 1 DIABETES MELLITUS WITH NEPHROPATHY (HCC): Primary | Chronic | ICD-10-CM

## 2018-03-27 DIAGNOSIS — I42.0 DILATED CARDIOMYOPATHY (HCC): ICD-10-CM

## 2018-03-27 DIAGNOSIS — I50.42 CHRONIC COMBINED SYSTOLIC AND DIASTOLIC CHF (CONGESTIVE HEART FAILURE) (HCC): Chronic | ICD-10-CM

## 2018-03-27 DIAGNOSIS — N18.6 ESRD (END STAGE RENAL DISEASE) ON DIALYSIS (HCC): Chronic | ICD-10-CM

## 2018-03-27 PROCEDURE — 93000 ELECTROCARDIOGRAM COMPLETE: CPT | Performed by: INTERNAL MEDICINE

## 2018-03-27 PROCEDURE — 99215 OFFICE O/P EST HI 40 MIN: CPT | Performed by: INTERNAL MEDICINE

## 2018-03-27 RX ORDER — LEVOTHYROXINE SODIUM 0.15 MG/1
TABLET ORAL DAILY
Refills: 1 | COMMUNITY
Start: 2018-03-13

## 2018-03-27 RX ORDER — AMIODARONE HYDROCHLORIDE 200 MG/1
200 TABLET ORAL DAILY
Qty: 90 TABLET | Refills: 3 | Status: SHIPPED | OUTPATIENT
Start: 2018-03-27

## 2018-03-27 RX ORDER — ASCORBIC ACID, THIAMINE MONONITRATE,RIBOFLAVIN, NIACINAMIDE, PYRIDOXINE HYDROCHLORIDE, FOLIC ACID, CYANOCOBALAMIN, BIOTIN, CALCIUM PANTOTHENATE, 100; 1.5; 1.7; 20; 10; 1; 6000; 150000; 5 MG/1; MG/1; MG/1; MG/1; MG/1; MG/1; UG/1; UG/1; MG/1
CAPSULE, LIQUID FILLED ORAL
COMMUNITY
End: 2018-04-20

## 2018-03-27 RX ORDER — ONDANSETRON HYDROCHLORIDE 8 MG/1
8 TABLET, FILM COATED ORAL EVERY 8 HOURS
COMMUNITY
Start: 2018-03-20

## 2018-03-29 ENCOUNTER — HOSPITAL ENCOUNTER (OUTPATIENT)
Dept: NUCLEAR MEDICINE | Facility: HOSPITAL | Age: 68
Discharge: HOME | End: 2018-03-29
Payer: COMMERCIAL

## 2018-03-29 ENCOUNTER — HOSPITAL ENCOUNTER (OUTPATIENT)
Dept: NON INVASIVE DIAGNOSTICS | Facility: HOSPITAL | Age: 68
Discharge: HOME | End: 2018-03-29
Payer: COMMERCIAL

## 2018-03-29 ENCOUNTER — HOSPITAL ENCOUNTER (OUTPATIENT)
Dept: RADIOLOGY | Facility: HOSPITAL | Age: 68
Discharge: HOME | End: 2018-03-29
Attending: INTERNAL MEDICINE

## 2018-03-29 DIAGNOSIS — T14.8XXA: ICD-10-CM

## 2018-03-29 DIAGNOSIS — I42.0 DILATED CARDIOMYOPATHY (HCC): ICD-10-CM

## 2018-03-29 LAB
MAX DIASTOLIC BP: 79 MMHG
MAX HEART RATE: 90 BPM
MAX PREDICTED HEART RATE: 155 BPM
MAX. SYSTOLIC BP: 177 MMHG
PROTOCOL NAME: NORMAL
REASON FOR TERMINATION: NORMAL
TARGET HR FORMULA: NORMAL
TEST INDICATION: NORMAL
TIME IN EXERCISE PHASE: NORMAL

## 2018-03-29 PROCEDURE — A9502 TC99M TETROFOSMIN: HCPCS

## 2018-03-29 PROCEDURE — 93017 CV STRESS TEST TRACING ONLY: CPT

## 2018-03-29 PROCEDURE — 78452 HT MUSCLE IMAGE SPECT MULT: CPT

## 2018-03-29 RX ADMIN — REGADENOSON 0.4 MG: 0.08 INJECTION, SOLUTION INTRAVENOUS at 10:37

## 2018-04-10 PROBLEM — N39.0 RECURRENT UTI: Status: ACTIVE | Noted: 2018-04-10

## 2018-04-12 ENCOUNTER — OFFICE VISIT (OUTPATIENT)
Dept: UROLOGY | Facility: CLINIC | Age: 68
End: 2018-04-12
Payer: COMMERCIAL

## 2018-04-12 VITALS — DIASTOLIC BLOOD PRESSURE: 80 MMHG | SYSTOLIC BLOOD PRESSURE: 136 MMHG | HEART RATE: 78 BPM

## 2018-04-12 DIAGNOSIS — R10.9 FLANK PAIN: ICD-10-CM

## 2018-04-12 DIAGNOSIS — N39.0 RECURRENT UTI: ICD-10-CM

## 2018-04-12 DIAGNOSIS — N20.0 NEPHROLITHIASIS: Primary | ICD-10-CM

## 2018-04-12 PROCEDURE — 99204 OFFICE O/P NEW MOD 45 MIN: CPT | Performed by: UROLOGY

## 2018-04-12 PROCEDURE — 51798 US URINE CAPACITY MEASURE: CPT | Performed by: UROLOGY

## 2018-04-12 RX ORDER — CHOLESTYRAMINE
POWDER (GRAM) MISCELLANEOUS
COMMUNITY

## 2018-04-12 NOTE — ASSESSMENT & PLAN NOTE
Patient with complaints of urination when he urinates every 2-3 days, patient is on dialysis  I believe that his recurrent urinary tract infections and hospitalizations for this reason are due to colonization of stones within his upper tract on the left side  I did discuss with him and with his wife ureteroscopy with laser lithotripsy for stone removal and they wish to proceed with this after appropriate medical optimization and clearance

## 2018-04-12 NOTE — ASSESSMENT & PLAN NOTE
Patient with left-sided and abdominal flank pain  Patient has calcific debris versus stone fragments within the left collecting system  Has never had stone surgery before  I believe that the stone fragments may be causing him discomfort and also may be contributing to his recurrent urinary tract infections

## 2018-04-12 NOTE — PROGRESS NOTES
Problem List Items Addressed This Visit     Recurrent UTI     Patient with complaints of urination when he urinates every 2-3 days, patient is on dialysis  I believe that his recurrent urinary tract infections and hospitalizations for this reason are due to colonization of stones within his upper tract on the left side  I did discuss with him and with his wife ureteroscopy with laser lithotripsy for stone removal and they wish to proceed with this after appropriate medical optimization and clearance  Flank pain     Patient with left-sided and abdominal flank pain  Patient has calcific debris versus stone fragments within the left collecting system  Has never had stone surgery before  I believe that the stone fragments may be causing him discomfort and also may be contributing to his recurrent urinary tract infections  Nephrolithiasis - Primary     Patient with left-sided, multiple small calculi  Discussed with him cystoscopy, ureteroscopy, laser lithotripsy with stone basketing in attempts to remove the nidus for recurrent infection  The patient is medically comorbid and I told him that if he is not cleared for surgery that we will not be able to proceed with stone removal   I also told them that as far as urologic surgery is concerned this is a relatively low risk procedure  He will need cardiac and anesthesia clearance prior to surgery will also need a hemoglobin A1c in preparation for clearance for anesthesia  The patient is wife also understand that removing the stone fragments may not guarantee that he will never have another urinary tract infection or hospitalization for UTI  Plan:  Proceed to left ureteroscopy with laser lithotripsy stone basketing and stent placement, the patient is aware that he has an increased risk for needing only stent placement with subsequent ureteroscopy given his comorbidities                     Assessment and plan:       Please see problem oriented charting for the assessment plan of today's urological complaints    Alice Tong MD      Chief Complaint     Chief Complaint   Patient presents with    Urinary Tract Infection    Difficulty Urinating         History of Present Illness     Krishna Peoples is a 74 y o  man with multiple medical comorbidities including end-stage renal disease on hemodialysis  His urologic complaints today include dysuria, pain in his pelvis which is intermittent, and left-sided flank pain and abdominal discomfort  He has not seen a urologist recently  Today he denies nocturia, he does have occasional dysuria, occasionally has some urinary incontinence, some hesitancy of urination and he has seen hematuria previously  He has some urgency of urination as well  He states that he only urinates every 2-3 days as he is on dialysis but does feel that he most empties his bladder  His postvoid residual urine volume today is 27 milliliters indicating complete bladder emptying  He is unsure what his most recent hemoglobin A1c was, but 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 with a blood sugar of 561, this morning  His review of systems is otherwise significant for decreased vision, peripheral neuropathy, and also for chronic wounds due to peripheral vascular disease from diabetes  Detailed Urologic History     - please refer to HPI    Review of Systems     Review of Systems   Constitutional: Positive for fatigue  HENT: Negative  Eyes: Negative  Respiratory: Negative  Cardiovascular: Negative  Gastrointestinal: Negative  Endocrine: Negative  Genitourinary:        As per HPI   Musculoskeletal: Negative  Pain in extremities, neuropathy, history of amputations   Skin: Negative  Allergic/Immunologic: Negative  Neurological: Negative  Hematological: Negative  Psychiatric/Behavioral: Negative                Allergies     No Known Allergies    Physical Exam     Physical Exam Constitutional: He is oriented to person, place, and time  He appears well-developed and well-nourished  No distress  HENT:   Head: Normocephalic and atraumatic  Right Ear: External ear normal    Left Ear: External ear normal    Nose: Nose normal    Mouth/Throat: Oropharynx is clear and moist  No oropharyngeal exudate  Eyes: EOM are normal  Pupils are equal, round, and reactive to light  Right eye exhibits no discharge  Left eye exhibits no discharge  No scleral icterus  Neck: Normal range of motion  Neck supple  No tracheal deviation present  No thyromegaly present  Cardiovascular: Normal rate, regular rhythm and intact distal pulses  Pulmonary/Chest: Effort normal and breath sounds normal  No stridor  No respiratory distress  He has no wheezes  He has no rales  He exhibits no tenderness  Abdominal: Soft  Bowel sounds are normal  He exhibits no distension and no mass  There is no tenderness  There is no rebound and no guarding  No hernia  Hernia confirmed negative in the right inguinal area and confirmed negative in the left inguinal area  Genitourinary: Rectal exam shows no external hemorrhoid, no internal hemorrhoid, no fissure, no mass, no tenderness and anal tone normal  Prostate is not enlarged (20-30 grams, smooth, no nodules) and not tender  Musculoskeletal: Normal range of motion  He exhibits no edema, tenderness or deformity  Lymphadenopathy:     He has no cervical adenopathy  No inguinal adenopathy noted on the right or left side  Neurological: He is alert and oriented to person, place, and time  No cranial nerve deficit or sensory deficit  He exhibits normal muscle tone  Coordination normal    Skin: Skin is warm and dry  No rash noted  He is not diaphoretic  No erythema  No pallor  Psychiatric: He has a normal mood and affect  His behavior is normal  Judgment and thought content normal    Nursing note and vitals reviewed            Vital Signs  Vitals:    04/12/18 1035   BP: 136/80   Pulse: 78         Current Medications       Current Outpatient Prescriptions:     acetaminophen (TYLENOL) 325 mg tablet, Take 650 mg by mouth every 6 (six) hours as needed for mild pain, Disp: , Rfl:     amiodarone 200 mg tablet, Take 1 tablet (200 mg total) by mouth daily, Disp: 90 tablet, Rfl: 3    b complex-vitamin C-folic acid (RENAL) 1 mg, Take 1 mg by mouth daily, Disp: , Rfl:     b complex-vitamin C-folic acid (RENAL) 1 mg, Take by mouth, Disp: , Rfl:     calcium citrate (CALCITRATE) 950 MG tablet, Take 2,400 mg by mouth 3 (three) times a day with meals, Disp: , Rfl:     Cholecalciferol (VITAMIN D-3) 1000 units CAPS, Take by mouth daily, Disp: , Rfl:     Cholestyramine POWD, by Does not apply route, Disp: , Rfl:     citalopram (CeleXA) 10 mg tablet, Take 0 5 tablets (5 mg total) by mouth daily (Patient taking differently: Take 10 mg by mouth  ), Disp: 10 tablet, Rfl: 0    diphenoxylate-atropine (LOMOTIL) 2 5-0 025 mg per tablet, TAKE 1 TABLET FOUR TIMES A DAY AS NEEDED FOR DIARRHEA , Disp: , Rfl:     gabapentin (NEURONTIN) 100 mg capsule, Take 2 capsules (200 mg total) by mouth daily at bedtime, Disp: 10 capsule, Rfl: 0    insulin detemir (LEVEMIR) 100 units/mL subcutaneous injection, Inject 5 Units under the skin every 12 (twelve) hours, Disp: , Rfl: 0    insulin lispro (HumaLOG) 100 units/mL injection, Inject 3 Units under the skin 3 (three) times a day with meals, Disp: , Rfl: 0    lanthanum (FOSRENOL) 1000 MG chewable tablet, Chew 1 tablet (1,000 mg total) 3 (three) times a day with meals, Disp: 30 tablet, Rfl: 0    levothyroxine 150 mcg tablet, daily, Disp: , Rfl: 1    midodrine (PROAMATINE) 2 5 mg tablet, Take 1 tablet by mouth 2 (two) times a day, Disp: , Rfl:     Multiple Vitamin (DAILY VALUE MULTIVITAMIN) TABS, Take 1 tablet by mouth daily, Disp: , Rfl:     nystatin (MYCOSTATIN) powder, Apply topically 2 (two) times a day, Disp: 15 g, Rfl: 0    ondansetron (ZOFRAN) 8 mg tablet, Take 8 mg by mouth every 8 (eight) hours, Disp: , Rfl:     oxyCODONE (OXY-IR) 5 MG capsule, Take 1 capsule by mouth every 4 (four) hours as needed, Disp: , Rfl:     pantoprazole (PROTONIX) 40 mg tablet, Take 1 tablet by mouth daily, Disp: , Rfl:     Probiotic Product (PRO-BIOTIC BLEND) CAPS, Take 1 capsule by mouth daily, Disp: , Rfl:     saccharomyces boulardii (FLORASTOR) 250 mg capsule, Take 1 capsule by mouth 2 (two) times a day, Disp: , Rfl:     traMADol (ULTRAM) 50 mg tablet, Take 50 mg by mouth every 6 (six) hours as needed for moderate pain, Disp: , Rfl:     vancomycin (VANCOCIN) 50mg/mL SOLN, Take 2 5 mL by mouth daily, Disp: 300 mL, Rfl: 0    warfarin (COUMADIN) 5 mg tablet, Take 2 tablets (10 mg total) by mouth daily, Disp: 15 tablet, Rfl: 0      Active Problems     Patient Active Problem List   Diagnosis    Type 1 diabetes mellitus with nephropathy ([facility] )    ESRD (end stage renal disease) on dialysis ([facility]    Ambulatory dysfunction    S/P percutaneous endoscopic gastrostomy (PEG) tube placement ([facility] )    Pleural effusion on right    Chronic combined systolic and diastolic CHF (congestive heart failure) ([facility])    R Fibula fracture, proximal to stump    DVT, lower extremity ([facility])    Clostridium difficile infection    Hyponatremia    Acquired hypothyroidism    Chronic kidney disease-mineral and bone disorder    Hyperkalemia    Paroxysmal atrial fibrillation ([facility])    Anemia in end-stage renal disease [facility] )    Benign hypertension with end-stage renal disease ([facility])    Myoclonic jerking    Choledocholithiasis    Chronic abdominal pain    Abnormal urinalysis    Dilated cardiomyopathy ([facility] )    Recurrent UTI    Flank pain    Nephrolithiasis         Past Medical History     Past Medical History:   Diagnosis Date    Acquired hypothyroidism     underactive    Atrial fibrillation ([facility] )     Cellulitis of foot     right    Choledocholithiasis 2/28/2018    Chronic kidney disease-mineral and bone disorder 11/27/2017    Clostridium difficile infection 04/2017    Diabetes mellitus ([facility] )     Dialysis patient ([place] [address] )     Diarrhea     ESRD (end stage renal disease) on dialysis ([facility] )     Hx of cardiac arrest     Hypertension     Hyponatremia 8/8/2017    Neuropathy     Right lower lobe pneumonia ([facility] ) 2/20/2017    Sepsis ([place] [address] ) 04/2017    Polymicrobial MRSA, VRE    Systolic and diastolic CHF, chronic (HCC)     EF 35%, Grade II DD         Surgical History     Past Surgical History:   Procedure Laterality Date    CATARACT EXTRACTION      CHG GI ENDOSCOPIC ULTRASOUND N/A 3/10/2016    Procedure: LINEAR ENDOSCOPIC U/S;  Surgeon: Carina Key MD;  [facility]: [facility]; Service: Gastroenterology    CHOLECYSTECTOMY      GASTROSTOMY TUBE PLACEMENT N/A 4/12/2017    Procedure: INSERTION PEG TUBE;  Surgeon: Shanda Bhakta MD;  [facility];  Service:    [facility] [place] LEG AMPUTATION THROUGH LOWER TIBIA AND FIBULA Right 4/6/2017    Procedure: AMPUTATION BELOW KNEE (BKA); Surgeon: Ranjan Brizuela DO;  [facility];  Service:     TOE AMPUTATION  2000         Family History     Family History   Problem Relation Age of Onset    Diabetes Father     Pancreatic cancer Father     Kidney disease Father     Cancer Other      bladder    Lupus Mother          Social History     Social History     History   Smoking Status    Former Smoker    Packs/day: 1 00    Years: 46 00    Types: Cigarettes    Start date: 1970    Quit date: 3/1/2017   Smokeless Tobacco    Never Used         Pertinent Lab Values     Lab Results   Component Value Date    CREATININE 5 44 (H) 03/04/2018       No results found for: PSA          Pertinent Imaging       The patient's images were reviewed by me personally and also in real time with them in the examination room using our PACS imaging system    The imaging findings are significant for sludge and multiple stone fragments within the left collecting system and ureter, likely representing a nidus for recurrent infections  Candice Shereen

## 2018-04-12 NOTE — ASSESSMENT & PLAN NOTE
Patient with left-sided, multiple small calculi  Discussed with him cystoscopy, ureteroscopy, laser lithotripsy with stone basketing in attempts to remove the nidus for recurrent infection  The patient is medically comorbid and I told him that if he is not cleared for surgery that we will not be able to proceed with stone removal   I also told them that as far as urologic surgery is concerned this is a relatively low risk procedure  He will need cardiac and anesthesia clearance prior to surgery will also need a hemoglobin A1c in preparation for clearance for anesthesia  The patient is wife also understand that removing the stone fragments may not guarantee that he will never have another urinary tract infection or hospitalization for UTI  Plan:  Proceed to left ureteroscopy with laser lithotripsy stone basketing and stent placement, the patient is aware that he has an increased risk for needing only stent placement with subsequent ureteroscopy given his comorbidities

## 2018-04-12 NOTE — PATIENT INSTRUCTIONS
Ureteroscopy   WHAT YOU NEED TO KNOW:   A ureteroscopy is a procedure to examine in the inside of your urinary tract, which includes your urethra, bladder, ureters, and kidneys  A ureteroscope is a small, thin tube with a light and camera on the end  Ureteroscopy can help your healthcare provider diagnose and treat problems in your urinary tract, such as kidney stones  HOW TO PREPARE:   The week before your procedure:   · Write down the correct date, time, and location of your procedure  · Ask your caregiver if you need to stop using aspirin or any other prescribed or over-the-counter medicine before your procedure or surgery  · Bring your medicine bottles or a list of your medicines when you see your caregiver  Tell your caregiver if you are allergic to any medicine  Tell your caregiver if you use any herbs, food supplements, or over-the-counter medicine  · Arrange a ride home  Ask a family member or friend to drive you home after your surgery or procedure  Do not drive yourself home  · You may need blood or urine tests before your procedure  You may also need an EKG  Ask your healthcare provider for more information about these and other tests that you may need  Write down the date, time, and location of each test   The night before your procedure:  Ask caregivers about directions for eating and drinking  The day of your procedure:   · Ask your caregiver before taking any medicine on the day of your procedure  These medicines include insulin, diabetic pills, high blood pressure pills, or heart pills  Bring a list of all the medicines you take, or your pill bottles, with you to the hospital     · You or a close family member will be asked to sign a legal document called a consent form  It gives caregivers permission to do the procedure or surgery  It also explains the problems that may happen, and your choices  Make sure all your questions are answered before you sign this form      · An anesthesiologist will talk to you before your surgery  You may need medicine to keep you asleep or numb an area of your body during surgery  Tell caregivers if you or anyone in your family has had a problem with anesthesia in the past     · Caregivers may insert an intravenous tube (IV) into your vein  A vein in the arm is usually chosen  Through the IV tube, you may be given liquids and medicine  WHAT WILL HAPPEN:   What will happen: Your healthcare provider will place the ureteroscope into your urethra  He will pass it through your bladder and into your ureters and kidneys  Your healthcare provider may place tools through the scope that will help him remove tissue or stones  The tools may also help him place stents or sheaths to help keep your ureters open  After your procedure: You will be taken to a room to rest until you are fully awake  Healthcare providers will monitor you closely for any problems  Do not get out of bed until your healthcare provider says it is okay  When your healthcare provider sees that you are okay, you will be able to go home or be taken to your hospital room  CONTACT YOUR HEALTHCARE PROVIDER IF:   · You cannot make it to your procedure  · You have a fever  · You get a cold or the flu  · You have questions or concerns about your procedure  SEEK CARE IMMEDIATELY IF:   · Your symptoms get worse  RISKS:   You may bleed more than expected or get an infection  One of your ureters may be injured  You may have a blockage in one of your ureters  You may need another procedure or surgery  CARE AGREEMENT:   You have the right to help plan your care  Learn about your health condition and how it may be treated  Discuss treatment options with your caregivers to decide what care you want to receive  You always have the right to refuse treatment     © 2017 [employer] [employer] Information is for End User's use only and may not be sold, redistributed or otherwise used for commercial purposes  All illustrations and images included in CareNotes® are the copyrighted property of A [employer]  or [employer]eme Michael  The above information is an  only  It is not intended as medical advice for individual conditions or treatments  Talk to your doctor, nurse or pharmacist before following any medical regimen to see if it is safe and effective for you

## 2018-04-16 LAB — INR PPP: 3.6 (ref 0.86–1.16)

## 2018-04-17 ENCOUNTER — ANTICOAG VISIT (OUTPATIENT)
Dept: CARDIOLOGY CLINIC | Facility: CLINIC | Age: 68
End: 2018-04-17

## 2018-04-17 DIAGNOSIS — I48.0 PAROXYSMAL ATRIAL FIBRILLATION (HCC): ICD-10-CM

## 2018-04-18 ENCOUNTER — PREP FOR PROCEDURE (OUTPATIENT)
Dept: UROLOGY | Facility: HOSPITAL | Age: 68
End: 2018-04-18

## 2018-04-18 DIAGNOSIS — N20.0 NEPHROLITHIASIS: Primary | ICD-10-CM

## 2018-04-18 RX ORDER — SODIUM CHLORIDE, SODIUM LACTATE, POTASSIUM CHLORIDE, CALCIUM CHLORIDE 600; 310; 30; 20 MG/100ML; MG/100ML; MG/100ML; MG/100ML
50 INJECTION, SOLUTION INTRAVENOUS CONTINUOUS
Status: CANCELLED | OUTPATIENT
Start: 2018-05-07

## 2018-04-18 RX ORDER — CHLORHEXIDINE GLUCONATE 4 G/100ML
SOLUTION TOPICAL DAILY PRN
Status: CANCELLED | OUTPATIENT
Start: 2018-05-07

## 2018-04-20 NOTE — PRE-PROCEDURE INSTRUCTIONS
Pre-Surgery Instructions:   Medication Instructions    acetaminophen (TYLENOL) 325 mg tablet Instructed patient per Anesthesia Guidelines   amiodarone 200 mg tablet Instructed patient per Anesthesia Guidelines   b complex-vitamin C-folic acid (RENAL) 1 mg Instructed patient per Anesthesia Guidelines   calcium citrate (CALCITRATE) 950 MG tablet Patient was instructed by Physician and understands   Cholecalciferol (VITAMIN D-3) 1000 units CAPS Patient was instructed by Physician and understands   Cholestyramine POWD Instructed patient per Anesthesia Guidelines   citalopram (CeleXA) 10 mg tablet Instructed patient per Anesthesia Guidelines   diphenoxylate-atropine (LOMOTIL) 2 5-0 025 mg per tablet Instructed patient per Anesthesia Guidelines   gabapentin (NEURONTIN) 100 mg capsule Instructed patient per Anesthesia Guidelines   insulin detemir (LEVEMIR) 100 units/mL subcutaneous injection Patient was instructed by Physician and understands   insulin lispro (HumaLOG) 100 units/mL injection Patient was instructed by Physician and understands   lanthanum (FOSRENOL) 1000 MG chewable tablet Patient was instructed by Physician and understands   levothyroxine 150 mcg tablet Instructed patient per Anesthesia Guidelines   midodrine (PROAMATINE) 2 5 mg tablet Instructed patient per Anesthesia Guidelines   Multiple Vitamin (DAILY VALUE MULTIVITAMIN) TABS Patient was instructed by Physician and understands   nystatin (MYCOSTATIN) powder Instructed patient per Anesthesia Guidelines   ondansetron (ZOFRAN) 8 mg tablet Instructed patient per Anesthesia Guidelines   oxyCODONE (OXY-IR) 5 MG capsule Instructed patient per Anesthesia Guidelines   pantoprazole (PROTONIX) 40 mg tablet Instructed patient per Anesthesia Guidelines   Probiotic Product (PRO-BIOTIC BLEND) CAPS Patient was instructed by Physician and understands      vancomycin (VANCOCIN) 50mg/mL SOLN Instructed patient per Anesthesia Guidelines   warfarin (COUMADIN) 5 mg tablet Instructed patient per Anesthesia Guidelines  Pre op and bathing instructions reviewed as per Dr Cameron Murray  Will consult cardiologist and endocrinologist Re:   When to hold Coumadin and insulin

## 2018-04-24 LAB — INR PPP: 5.61 (ref 0.86–1.16)

## 2018-04-26 ENCOUNTER — TRANSCRIBE ORDERS (OUTPATIENT)
Dept: LAB | Facility: CLINIC | Age: 68
End: 2018-04-26

## 2018-04-26 ENCOUNTER — HOSPITAL ENCOUNTER (OUTPATIENT)
Dept: RADIOLOGY | Facility: HOSPITAL | Age: 68
Discharge: HOME | End: 2018-04-26
Attending: UROLOGY
Payer: COMMERCIAL

## 2018-04-26 ENCOUNTER — TELEPHONE (OUTPATIENT)
Dept: UROLOGY | Facility: AMBULATORY SURGERY CENTER | Age: 68
End: 2018-04-26

## 2018-04-26 ENCOUNTER — ANTICOAG VISIT (OUTPATIENT)
Dept: CARDIOLOGY CLINIC | Facility: CLINIC | Age: 68
End: 2018-04-26

## 2018-04-26 DIAGNOSIS — N20.0 NEPHROLITHIASIS: ICD-10-CM

## 2018-04-26 DIAGNOSIS — I48.0 PAROXYSMAL ATRIAL FIBRILLATION (HCC): ICD-10-CM

## 2018-04-26 PROCEDURE — 71046 X-RAY EXAM CHEST 2 VIEWS: CPT

## 2018-04-26 NOTE — TELEPHONE ENCOUNTER
Patients wife called and said her  is scheduled for surgery Monday May 7 and that [facility] aren't a good day because he has dialysis on Mondays  She said Weds would work best  She would like to speak to someone about possibly changing the surgery date  [phone]

## 2018-04-27 ENCOUNTER — TELEPHONE (OUTPATIENT)
Dept: UROLOGY | Facility: AMBULATORY SURGERY CENTER | Age: 68
End: 2018-04-27

## 2018-04-27 LAB
APTT PPP: 73 SEC (ref 22–34)
BASOPHILS # BLD AUTO: 92 CELLS/UL (ref 0–200)
BASOPHILS NFR BLD AUTO: 1.3 %
BUN SERPL-MCNC: 35 MG/DL (ref 7–25)
BUN/CREAT SERPL: 7 (CALC) (ref 6–22)
CALCIUM SERPL-MCNC: 9.5 MG/DL (ref 8.6–10.3)
CHLORIDE SERPL-SCNC: 87 MMOL/L (ref 98–110)
CO2 SERPL-SCNC: 30 MMOL/L (ref 20–31)
CREAT SERPL-MCNC: 5.14 MG/DL (ref 0.7–1.25)
EOSINOPHIL # BLD AUTO: 419 CELLS/UL (ref 15–500)
EOSINOPHIL NFR BLD AUTO: 5.9 %
ERYTHROCYTE [DISTWIDTH] IN BLOOD BY AUTOMATED COUNT: 13.6 % (ref 11–15)
EST. AVERAGE GLUCOSE BLD GHB EST-MCNC: 220 (CALC)
EST. AVERAGE GLUCOSE BLD GHB EST-SCNC: 12.2 (CALC)
GLUCOSE SERPL-MCNC: 464 MG/DL (ref 65–99)
HBA1C MFR BLD: 9.3 % OF TOTAL HGB
HCT VFR BLD AUTO: 37.8 % (ref 38.5–50)
HGB BLD-MCNC: 11.9 G/DL (ref 13.2–17.1)
INR PPP: 5.8
LYMPHOCYTES # BLD AUTO: 504 CELLS/UL (ref 850–3900)
LYMPHOCYTES NFR BLD AUTO: 7.1 %
MCH RBC QN AUTO: 29.4 PG (ref 27–33)
MCHC RBC AUTO-ENTMCNC: 31.5 G/DL (ref 32–36)
MCV RBC AUTO: 93.3 FL (ref 80–100)
MONOCYTES # BLD AUTO: 888 CELLS/UL (ref 200–950)
MONOCYTES NFR BLD AUTO: 12.5 %
NEUTROPHILS # BLD AUTO: 5197 CELLS/UL (ref 1500–7800)
NEUTROPHILS NFR BLD AUTO: 73.2 %
PLATELET # BLD AUTO: 250 THOUSAND/UL (ref 140–400)
PMV BLD REES-ECKER: 9.6 FL (ref 7.5–12.5)
POTASSIUM SERPL-SCNC: 4.6 MMOL/L (ref 3.5–5.3)
PROTHROMBIN TIME: 59 SEC (ref 9–11.5)
RBC # BLD AUTO: 4.05 MILLION/UL (ref 4.2–5.8)
SL AMB EGFR AFRICAN AMERICAN: 13 ML/MIN/1.73M2
SL AMB EGFR NON AFRICAN AMERICAN: 11 ML/MIN/1.73M2
SODIUM SERPL-SCNC: 129 MMOL/L (ref 135–146)
WBC # BLD AUTO: 7.1 THOUSAND/UL (ref 3.8–10.8)

## 2018-04-27 NOTE — TELEPHONE ENCOUNTER
[facility] called and left message informing of critical lab result from 4/26/18  Results available in Epic  Glucose 464

## 2018-04-30 PROBLEM — N18.6 BENIGN HYPERTENSION WITH END-STAGE RENAL DISEASE (HCC): Status: RESOLVED | Noted: 2018-01-31 | Resolved: 2018-04-30

## 2018-04-30 PROBLEM — I12.0 BENIGN HYPERTENSION WITH END-STAGE RENAL DISEASE (HCC): Status: RESOLVED | Noted: 2018-01-31 | Resolved: 2018-04-30

## 2018-04-30 LAB — INR PPP: 2.5 (ref 0.86–1.16)
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
## 2018-05-01 ENCOUNTER — OFFICE VISIT (OUTPATIENT)
Dept: CARDIOLOGY CLINIC | Facility: CLINIC | Age: 68
End: 2018-05-01
Payer: COMMERCIAL

## 2018-05-01 VITALS
HEART RATE: 73 BPM | HEIGHT: 66 IN | SYSTOLIC BLOOD PRESSURE: 138 MMHG | WEIGHT: 145 LBS | BODY MASS INDEX: 23.3 KG/M2 | DIASTOLIC BLOOD PRESSURE: 60 MMHG

## 2018-05-01 DIAGNOSIS — I48.0 PAROXYSMAL ATRIAL FIBRILLATION (HCC): ICD-10-CM

## 2018-05-01 DIAGNOSIS — I42.0 DILATED CARDIOMYOPATHY (HCC): ICD-10-CM

## 2018-05-01 DIAGNOSIS — E10.21 TYPE 1 DIABETES MELLITUS WITH NEPHROPATHY (HCC): Primary | Chronic | ICD-10-CM

## 2018-05-01 DIAGNOSIS — N18.6 ESRD (END STAGE RENAL DISEASE) ON DIALYSIS (HCC): Chronic | ICD-10-CM

## 2018-05-01 DIAGNOSIS — I50.42 CHRONIC COMBINED SYSTOLIC AND DIASTOLIC CHF (CONGESTIVE HEART FAILURE) (HCC): Chronic | ICD-10-CM

## 2018-05-01 DIAGNOSIS — Z99.2 ESRD (END STAGE RENAL DISEASE) ON DIALYSIS (HCC): Chronic | ICD-10-CM

## 2018-05-01 DIAGNOSIS — N18.6 BENIGN HYPERTENSION WITH END-STAGE RENAL DISEASE (HCC): ICD-10-CM

## 2018-05-01 DIAGNOSIS — I12.0 BENIGN HYPERTENSION WITH END-STAGE RENAL DISEASE (HCC): ICD-10-CM

## 2018-05-01 DIAGNOSIS — Z01.818 PREOP EXAMINATION: ICD-10-CM

## 2018-05-01 PROCEDURE — 99214 OFFICE O/P EST MOD 30 MIN: CPT | Performed by: INTERNAL MEDICINE

## 2018-05-01 PROCEDURE — 4040F PNEUMOC VAC/ADMIN/RCVD: CPT | Performed by: INTERNAL MEDICINE

## 2018-05-01 PROCEDURE — 93000 ELECTROCARDIOGRAM COMPLETE: CPT | Performed by: INTERNAL MEDICINE

## 2018-05-01 RX ORDER — MIDODRINE HYDROCHLORIDE 2.5 MG/1
2.5 TABLET ORAL 2 TIMES DAILY
COMMUNITY

## 2018-05-01 RX ORDER — QUETIAPINE FUMARATE 25 MG/1
25 TABLET, FILM COATED ORAL
COMMUNITY

## 2018-05-02 ENCOUNTER — ANTICOAG VISIT (OUTPATIENT)
Dept: CARDIOLOGY CLINIC | Facility: CLINIC | Age: 68
End: 2018-05-02

## 2018-05-02 DIAGNOSIS — I48.0 PAROXYSMAL ATRIAL FIBRILLATION (HCC): ICD-10-CM

## 2018-05-04 ENCOUNTER — TELEPHONE (OUTPATIENT)
Dept: UROLOGY | Facility: CLINIC | Age: 68
End: 2018-05-04

## 2018-05-04 NOTE — TELEPHONE ENCOUNTER
This patient's preoperative urine culture is positive for Pseudomonas aeruginosa sensitive to ciprofloxacin  As he has end-stage renal disease, he will take this on dialysis days, after dialysis, 500 milligrams  We will start this medication 1 week prior to his scheduled surgery on the 17th of May 2018

## 2018-05-04 NOTE — TELEPHONE ENCOUNTER
Called to give medication instruction and get pharmacy  Left message for Osiris Feliz or his wife to give the office a call back  Office number was left in the message

## 2018-05-07 ENCOUNTER — TELEPHONE (OUTPATIENT)
Dept: OTHER | Facility: HOSPITAL | Age: 68
End: 2018-05-07

## 2018-05-07 DIAGNOSIS — R82.71 BACTERIURIA: Primary | ICD-10-CM

## 2018-05-07 RX ORDER — CIPROFLOXACIN 500 MG/1
500 TABLET, FILM COATED ORAL
Qty: 7 TABLET | Refills: 0 | Status: SHIPPED | OUTPATIENT
Start: 2018-05-07 | End: 2018-05-14

## 2018-05-07 NOTE — TELEPHONE ENCOUNTER
Called and spoke to patient wife Caridad Mathew  Explained to [facility] instructions for antibiotic  Patient wrote down and repeated instructions  Caridad Mathew verbalizes understanding of these instructions  Liz Lindsay denies any further questions  Caridad Mathew states that they use [facility] [address]  Pharmacy is listed in patients chart

## 2018-05-07 NOTE — TELEPHONE ENCOUNTER
I have sent this medication to [facility], he should take 500 milligrams of Cipro, after dialysis on dialysis days starting 1 week prior to his scheduled procedure

## 2018-05-08 ENCOUNTER — ANTICOAG VISIT (OUTPATIENT)
Dept: CARDIOLOGY CLINIC | Facility: CLINIC | Age: 68
End: 2018-05-08

## 2018-05-08 DIAGNOSIS — I48.0 PAROXYSMAL ATRIAL FIBRILLATION (HCC): ICD-10-CM

## 2018-05-08 LAB — INR PPP: 2.22 (ref 0.86–1.16)

## 2018-05-16 ENCOUNTER — ANESTHESIA EVENT (OUTPATIENT)
Dept: PERIOP | Facility: HOSPITAL | Age: 68
End: 2018-05-16
Payer: COMMERCIAL

## 2018-05-17 ENCOUNTER — TELEPHONE (OUTPATIENT)
Dept: OTHER | Facility: HOSPITAL | Age: 68
End: 2018-05-17

## 2018-05-17 ENCOUNTER — APPOINTMENT (OUTPATIENT)
Dept: RADIOLOGY | Facility: HOSPITAL | Age: 68
End: 2018-05-17
Payer: COMMERCIAL

## 2018-05-17 ENCOUNTER — HOSPITAL ENCOUNTER (OUTPATIENT)
Facility: HOSPITAL | Age: 68
Setting detail: OUTPATIENT SURGERY
Discharge: HOME | End: 2018-05-17
Attending: UROLOGY | Admitting: UROLOGY
Payer: COMMERCIAL

## 2018-05-17 ENCOUNTER — ANESTHESIA (OUTPATIENT)
Dept: PERIOP | Facility: HOSPITAL | Age: 68
End: 2018-05-17
Payer: COMMERCIAL

## 2018-05-17 VITALS
TEMPERATURE: 98.1 F | DIASTOLIC BLOOD PRESSURE: 71 MMHG | RESPIRATION RATE: 17 BRPM | BODY MASS INDEX: 24.11 KG/M2 | SYSTOLIC BLOOD PRESSURE: 141 MMHG | HEART RATE: 78 BPM | WEIGHT: 150 LBS | OXYGEN SATURATION: 94 % | HEIGHT: 66 IN

## 2018-05-17 DIAGNOSIS — N20.0 NEPHROLITHIASIS: Primary | ICD-10-CM

## 2018-05-17 LAB
GLUCOSE SERPL-MCNC: 267 MG/DL (ref 65–140)
GLUCOSE SERPL-MCNC: 389 MG/DL (ref 65–140)

## 2018-05-17 PROCEDURE — 52332 CYSTOSCOPY AND TREATMENT: CPT | Performed by: UROLOGY

## 2018-05-17 PROCEDURE — 82948 REAGENT STRIP/BLOOD GLUCOSE: CPT

## 2018-05-17 PROCEDURE — 74420 UROGRAPHY RTRGR +-KUB: CPT

## 2018-05-17 PROCEDURE — C1769 GUIDE WIRE: HCPCS | Performed by: UROLOGY

## 2018-05-17 PROCEDURE — C2617 STENT, NON-COR, TEM W/O DEL: HCPCS | Performed by: UROLOGY

## 2018-05-17 DEVICE — INLAY OPTIMA URETERAL STENT W/O GUIDEWIRE
Type: IMPLANTABLE DEVICE | Site: URETER | Status: FUNCTIONAL
Brand: BARD® INLAY OPTIMA® URETERAL STENT

## 2018-05-17 RX ORDER — CIPROFLOXACIN 250 MG/1
250 TABLET, FILM COATED ORAL EVERY OTHER DAY
Qty: 2 TABLET | Refills: 0 | Status: SHIPPED | OUTPATIENT
Start: 2018-05-17 | End: 2018-05-21

## 2018-05-17 RX ORDER — OXYBUTYNIN CHLORIDE 5 MG/1
5 TABLET ORAL 3 TIMES DAILY PRN
Status: CANCELLED | OUTPATIENT
Start: 2018-05-17

## 2018-05-17 RX ORDER — OXYCODONE HYDROCHLORIDE AND ACETAMINOPHEN 5; 325 MG/1; MG/1
2 TABLET ORAL EVERY 6 HOURS PRN
Qty: 20 TABLET | Refills: 0 | Status: SHIPPED | OUTPATIENT
Start: 2018-05-17 | End: 2018-05-27

## 2018-05-17 RX ORDER — DOCUSATE SODIUM 100 MG/1
100 CAPSULE, LIQUID FILLED ORAL 3 TIMES DAILY
Qty: 90 CAPSULE | Refills: 0 | Status: SHIPPED | OUTPATIENT
Start: 2018-05-17 | End: 2018-06-04 | Stop reason: ALTCHOICE

## 2018-05-17 RX ORDER — SODIUM CHLORIDE, SODIUM LACTATE, POTASSIUM CHLORIDE, CALCIUM CHLORIDE 600; 310; 30; 20 MG/100ML; MG/100ML; MG/100ML; MG/100ML
50 INJECTION, SOLUTION INTRAVENOUS CONTINUOUS
Status: DISCONTINUED | OUTPATIENT
Start: 2018-05-17 | End: 2018-05-17 | Stop reason: HOSPADM

## 2018-05-17 RX ORDER — CHLORHEXIDINE GLUCONATE 4 G/100ML
SOLUTION TOPICAL DAILY PRN
Status: DISCONTINUED | OUTPATIENT
Start: 2018-05-17 | End: 2018-05-17 | Stop reason: HOSPADM

## 2018-05-17 RX ORDER — PROPOFOL 10 MG/ML
INJECTION, EMULSION INTRAVENOUS AS NEEDED
Status: DISCONTINUED | OUTPATIENT
Start: 2018-05-17 | End: 2018-05-17 | Stop reason: SURG

## 2018-05-17 RX ORDER — FENTANYL CITRATE/PF 50 MCG/ML
12.5 SYRINGE (ML) INJECTION
Status: DISCONTINUED | OUTPATIENT
Start: 2018-05-17 | End: 2018-05-17 | Stop reason: HOSPADM

## 2018-05-17 RX ORDER — LIDOCAINE HYDROCHLORIDE 10 MG/ML
INJECTION, SOLUTION INFILTRATION; PERINEURAL AS NEEDED
Status: DISCONTINUED | OUTPATIENT
Start: 2018-05-17 | End: 2018-05-17 | Stop reason: SURG

## 2018-05-17 RX ORDER — ONDANSETRON 2 MG/ML
4 INJECTION INTRAMUSCULAR; INTRAVENOUS EVERY 6 HOURS PRN
Status: CANCELLED | OUTPATIENT
Start: 2018-05-17

## 2018-05-17 RX ORDER — MAGNESIUM HYDROXIDE 1200 MG/15ML
LIQUID ORAL AS NEEDED
Status: DISCONTINUED | OUTPATIENT
Start: 2018-05-17 | End: 2018-05-17 | Stop reason: HOSPADM

## 2018-05-17 RX ORDER — OXYCODONE HYDROCHLORIDE AND ACETAMINOPHEN 5; 325 MG/1; MG/1
2 TABLET ORAL EVERY 6 HOURS PRN
Status: CANCELLED | OUTPATIENT
Start: 2018-05-17

## 2018-05-17 RX ORDER — ONDANSETRON 2 MG/ML
INJECTION INTRAMUSCULAR; INTRAVENOUS AS NEEDED
Status: DISCONTINUED | OUTPATIENT
Start: 2018-05-17 | End: 2018-05-17 | Stop reason: SURG

## 2018-05-17 RX ORDER — SODIUM CHLORIDE 9 MG/ML
50 INJECTION, SOLUTION INTRAVENOUS CONTINUOUS
Status: DISCONTINUED | OUTPATIENT
Start: 2018-05-17 | End: 2018-05-17 | Stop reason: HOSPADM

## 2018-05-17 RX ADMIN — ONDANSETRON 4 MG: 2 INJECTION INTRAMUSCULAR; INTRAVENOUS at 13:36

## 2018-05-17 RX ADMIN — DEXAMETHASONE SODIUM PHOSPHATE 10 MG: 10 INJECTION INTRAMUSCULAR; INTRAVENOUS at 13:36

## 2018-05-17 RX ADMIN — PROPOFOL 130 MG: 10 INJECTION, EMULSION INTRAVENOUS at 13:23

## 2018-05-17 RX ADMIN — SODIUM CHLORIDE 50 ML/HR: 0.9 INJECTION, SOLUTION INTRAVENOUS at 12:13

## 2018-05-17 RX ADMIN — LIDOCAINE HYDROCHLORIDE 50 MG: 10 INJECTION, SOLUTION INFILTRATION; PERINEURAL at 13:23

## 2018-05-17 RX ADMIN — SODIUM CHLORIDE, SODIUM LACTATE, POTASSIUM CHLORIDE, AND CALCIUM CHLORIDE 50 ML/HR: .6; .31; .03; .02 INJECTION, SOLUTION INTRAVENOUS at 12:03

## 2018-05-17 RX ADMIN — CEFAZOLIN SODIUM 1000 MG: 1 SOLUTION INTRAVENOUS at 13:21

## 2018-05-17 NOTE — ANESTHESIA POSTPROCEDURE EVALUATION
Post-Op Assessment Note      CV Status:  Stable    Mental Status:  Lethargic    Hydration Status:  Stable    PONV Controlled:  None    Airway Patency:  Patent    Post Op Vitals Reviewed: Yes          Staff: AnesthesiologistMALU           /65 (05/17/18 1355)    Temp 98 3 °F (36 8 °C) (Simultaneous filing  User may not have seen previous data ) (05/17/18 1355)    Pulse 69 (Simultaneous filing  User may not have seen previous data ) (05/17/18 1355)   Resp 14 (05/17/18 1355)    SpO2 100 % (Simultaneous filing   User may not have seen previous data ) (05/17/18 1355)

## 2018-05-17 NOTE — ANESTHESIA PREPROCEDURE EVALUATION
Review of Systems/Medical History  Patient summary reviewed        Cardiovascular  EKG reviewed, Hypertension , Dysrhythmias , atrial fibrillation, CHF ,   Comment: LEFT VENTRICLE:  Systolic function was moderately reduced  Ejection fraction was estimated to be 35 %  There was moderate diffuse hypokinesis with more severe hypokinesis of anterior, anteroseptal and apical walls  Wall thickness was increased      RIGHT VENTRICLE:  The ventricle was dilated  Systolic function was reduced      MITRAL VALVE:  There was mild to moderate regurgitation      AORTIC VALVE:  There was trace regurgitation      PERICARDIUM:  A trace pericardial effusion was identified anterior to the heart,  Pulmonary  Pneumonia,        GI/Hepatic    GERD , Liver disease ,   Comment: /P percutaneous endoscopic gastrostomy (PEG) tube placement (HCC)     Kidney stones, Chronic kidney disease stage 4, Dialysis hemodialysis ,        Endo/Other  Diabetes , History of thyroid disease , hypothyroidism,      GYN  Negative gynecology ROS          Hematology  Anemia ,     Musculoskeletal  Negative musculoskeletal ROS        Neurology  Negative neurology ROS      Psychology   Anxiety,              Physical Exam    Airway    Mallampati score: II  TM Distance: >3 FB  Neck ROM: full     Dental       Cardiovascular  Cardiovascular exam normal    Pulmonary  Pulmonary exam normal     Other Findings        Anesthesia Plan  ASA Score- 4     Anesthesia Type- general with ASA Monitors  Additional Monitors:   Airway Plan:         Plan Factors-    Induction- intravenous  Postoperative Plan-     Informed Consent- Anesthetic plan and risks discussed with patient  I personally reviewed this patient with the CRNA  Discussed and agreed on the Anesthesia Plan with the CRNA  Rosella Goldmann

## 2018-05-17 NOTE — OP NOTE
OPERATIVE REPORT  PATIENT NAME: Kaley Ventura    :  1950  MRN: [id number]  Pt Location: [facility] OR ROOM 04    SURGERY DATE: 2018    Surgeon(s) and Role:     * Alli Bloom MD - Primary    Preop Diagnosis:  Nephrolithiasis [N20 0]    Post-Op Diagnosis Codes:     * Nephrolithiasis [N20 0]    Procedure(s) (LRB):  CYSTOSCOPY,  DILATION OF URETERAL STRICTURE, RETROGRADE PYELOGRAM AND INSERTION STENT URETERAL (Left)    Specimen(s):  * No specimens in log *    Estimated Blood Loss:   Minimal    Drains:  Gastrostomy/Enterostomy Percutaneous endoscopic gastrostomy (PEG) 20 Fr  LUQ (Active)   Number of days: 400       Gastrostomy/Enterostomy (Active)   Number of days:        Anesthesia Type:   General    Operative Indications:  Nephrolithiasis [N20 0]  Recurrent urinary tract infections  Left flank pain    Operative Findings: The bladder was filled with debris consistent with pyocystis, there was a proximal membranous urethral stricture which was dilated over a wire, and irritated bladder mucosa was seen with atrophic ureteral orifices  The left ureteral orifice with barely accommodate a solo wire and with great difficulty a open-ended catheter was placed and retrograde was performed  There did appear to be debris and air within the collecting system and as such the decision was made to place a ureteral stent, 6 German by 24 centimeter left  The plan will be for a return to the operating room in approximately 3-4 weeks for completion ureteroscopy after passive dilation of the left ureter and drainage via ureteral stenting  Complications:   None    Procedure and Technique:      PROCEDURES PERFORMED:  1) Cystoscopy  2) Left retrograde pyelography with fluoroscopic interpretation  3) left ureteral stent placement  4    Ureteral stricture dilation    SURGEON:  Alli Bloom MD    ASSISTANTS:  None    NOTE:  There were no qualified teaching residents to assist with this case    ANESTHESIA: General COMPLICATIONS:   None    ANTIBIOTICS:  Ancef    INTRAOPERATIVE THROMBOEMBOLISM PROPHYLAXIS:  Pneumatic compression stockings         FINDINGS:    1  The Left calculus was not radiopaque on plain fluoroscopy  2  Retrograde pyelogram was performed on the Left side using a 5 Fr open ended catheter  12 milliliters of 50% dilute Isovue was injected  3  The following findings were noted: Moderate hydroureteronephrosis, small filling defect, bubbles of air were also noted exiting from the ureter      INDICATIONS FOR PROCEDURE:  Chino Warner is an 74 y o  old male with Left renal calculus and recurrent urinary tract infections  After discussing the options for treatment, including medical expulsive therapy, extracorporeal shockwave lithotripsy, and ureteroscopy, the patient elected to undergo ureteroscopy and ureteral stent placement  We discussed the procedure in detail, the alternatives, and the risks, and they signed informed consent to proceed (these are outlined in the surgical consent form)  PROCEDURE IN DETAIL:     The patient was identified by name, date of birth, and MRN  and brought to the OR  Antibiotic prophylaxis and DVT prophylaxis were administered as per the guidelines  They were placed in the dorsal lithotomy position with care to pad all pressure points  They were prepped and draped in the usual sterile fashion  A surgical time out was performed with all in the room in agreement with the correct patient, procedure, indications, and laterality  A 21-Belgian rigid cystoscope was used to enter the bladder  As this was performed, a proximal membranous urethral stricture was noted and was dilated over a solo wire with use of the scope and gentle pressure  The bladder was inspected in its entirety and there were signs of pyocystis and cystitis and the bladder was lavaged with copious irrigant and drained prior to any further manipulation    The ureteral orifices were identified in their normal orthotopic positions but were seen to be markedly abnormal and atrophic  The Left ureteral orifice was identified and a 5 Fr open ended catheter was placed into the ureteral orifice only after great difficulty given significant atrophy of the left ureteral orifice      A retrograde pyelogram was performed with the findings as described above  A Solo wire was advanced up to the kidney under fluoroscopic guidance       Given the difficult nature of placement of even a 5 [place] open-ended catheter and the findings of air exiting the collecting system with concern for emphysematous pyelitis and difficulty passing the ureteral scope the decision was made to proceed with stent placement  A 6 [place] by 24 centimeter left JJ stent was then passed up the wire  under fluoroscopic guidance into the kidney with a good curl noted in the kidney and in the bladder  The stent string was removed  The bladder was drained  All instrument counts and sponge counts were correct  The patient was placed back into the supine position, awakened from general anesthesia and brought to recovery room in stable condition  ESTIMATED BLOOD LOSS:  Minimal      DRAINS:   Gastrostomy/Enterostomy Percutaneous endoscopic gastrostomy (PEG) 20 Fr  LUQ (Active)       Gastrostomy/Enterostomy (Active)       SPECIMENS:   * No orders in the log *     IMPLANTS:     Implant Name Type Inv  Item Serial No   Lot No  LRB No  Used   URETERAL STENT 6 FR X 24 CM OPTIMA INLAY - [id number]   URETERAL STENT 6 FR X 24 CM OPTIMA INLAY   [employer] [id number] Left 1        COMPLICATIONS:  None    DISPOSITION: PACU     PLAN:  The patient will return to the operating room in approximately 3-4 weeks for left ureteroscopy, and stone basketing after appropriate passive dilation via ureteral stenting       I was present for the entire procedure and A qualified resident physician was not available    Patient Disposition:  PACU     SIGNATURE: Ashley Cade MD  DATE: May 17, 2018  TIME: 1:46 PM

## 2018-05-17 NOTE — H&P
H&P Exam - Urology   Jamin Garcia 74 y o  male MRN: [id number]  Unit/Bed#: OR [place] Encounter: [id number]    Assessment/Plan     Assessment:  Left nephrolithiasis, recurrent urinary tract infections  Multiple medical comorbidities  His overall state of health this poor, surgery is indicated to attempt to make him stone free given his recurrent urinary tract infections and likelihood that his stones are causing a stone nidus affect  Informed consent was obtained, both he and his wife realize that he has at increased risk for surgical complications given his severe diabetes and medical comorbidities  Plan:  Proceed to left ureteroscopy with laser lithotripsy, I did tell the patient that we may place only a stent to drain any stagnant urine in the left kidney if this appears to be questionable upon wire placement    History of Present Illness   HPI:  Jamin Garcia is a 74 y o  male who presents with history of multiple medical problems including chronic kidney disease, nephrolithiasis, and recurrent urinary tract infections  He is suffering with left-sided flank pain and would like to have his stones treated both as asymptomatic measure as well as to decrease his chance for future urinary tract infection  On an outpatient basis we reviewed the risks of infection, bleeding, pain, damage to surrounding structures, need for additional procedures, risk of failure of the procedure, risk of anesthesia, risk of positioning complications including neurapraxia, chronic pain, and paralysis as well as risk of rhabdomyolysis and compartment syndrome  Risk of deep venous thrombosis and venous thromboembolism as well as pneumonia and other potential unpredictable complications were also discussed with the patient  We also reviewed the risks of ureteral stricture and ureteral damage  Informed consent was signed today, he was appropriately marked on his left hand side, he wishes to proceed with surgery       Review of Systems   Constitutional: Positive for activity change  HENT: Negative  Eyes: Negative  Respiratory: Negative  Cardiovascular: Negative  Gastrointestinal: Positive for abdominal pain  Endocrine: Negative  Genitourinary: Positive for flank pain  Musculoskeletal: Positive for back pain  Skin: Negative  Allergic/Immunologic: Negative  Neurological: Positive for numbness (Patient with peripheral neuropathy)  Hematological: Negative  Psychiatric/Behavioral: Negative  Historical Information   Past Medical History:   Diagnosis Date    Acquired hypothyroidism     underactive    Anemia     Anxiety     Atrial fibrillation ([place] [address] )     Cellulitis of foot     right    Choledocholithiasis 2/28/2018    Chronic kidney disease-mineral and bone disorder 11/27/2017    Clostridium difficile infection 04/2017    Diabetes mellitus ([place] [address] )     IDDM    Dialysis patient ([place] [address] )     Diarrhea     ESRD (end stage renal disease) on dialysis ([place] [address] )     GERD (gastroesophageal reflux disease)     History of transfusion 2017    Hx of cardiac arrest     Hypertension     Hyponatremia 8/8/2017    Irregular heart beat     HX of AFIB now NSR    Liver disease     elevated liver enzymes    Muscle weakness     Neuropathy     Right lower lobe pneumonia (HCC) 2/20/2017    Sepsis ([place] [address] ) 04/2017    Polymicrobial MRSA, VRE    Systolic and diastolic CHF, chronic (HCC)     EF 35%, Grade II DD     Past Surgical History:   Procedure Laterality Date    CATARACT EXTRACTION      CHG GI ENDOSCOPIC ULTRASOUND N/A 3/10/2016    Procedure: LINEAR ENDOSCOPIC U/S;  Surgeon: Sanket Keith MD;  [facility]: [facility]; Service: Gastroenterology    CHOLECYSTECTOMY      GASTROSTOMY TUBE PLACEMENT N/A 4/12/2017    Procedure: INSERTION PEG TUBE;  Surgeon: Darin Norris MD;  [facility];  Service:    Eleni Sherman LEG AMPUTATION THROUGH LOWER TIBIA AND FIBULA Right 4/6/2017    Procedure: AMPUTATION BELOW KNEE (BKA);   Surgeon: Leela Bonner DO;  [facility];  Service:     TOE AMPUTATION  2000    TONSILLECTOMY       Social History   History   Alcohol Use No     History   Drug Use No     History   Smoking Status    Former Smoker    Packs/day: 1 00    Years: 46 00    Types: Cigarettes    Start date: 5    Quit date: 3/1/2017   Smokeless Tobacco    Never Used     Family History:   Family History   Problem Relation Age of Onset    Diabetes Father     Pancreatic cancer Father     Kidney disease Father     Cancer Other      bladder    Lupus Mother        Meds/Allergies   PTA meds:   Prior to Admission Medications   Prescriptions Last Dose Informant Patient Reported? Taking?    Cholecalciferol (VITAMIN D-3) 1000 units CAPS 5/16/2018 at Unknown time Spouse/Significant Other Yes Yes   Sig: Take by mouth daily   Cholestyramine POWD 5/16/2018 at Unknown time Spouse/Significant Other Yes Yes   Sig: by Does not apply route   Multiple Vitamin (DAILY VALUE MULTIVITAMIN) TABS 5/16/2018 at Unknown time Spouse/Significant Other Yes Yes   Sig: Take 1 tablet by mouth daily   Probiotic Product (PRO-BIOTIC BLEND) CAPS 5/16/2018 at Unknown time Spouse/Significant Other Yes Yes   Sig: Take 1 capsule by mouth daily   QUEtiapine (SEROQUEL) 25 mg tablet 5/15/2018 Spouse/Significant Other Yes No   Sig: Take 25 mg by mouth daily at bedtime   amiodarone 200 mg tablet 5/17/2018 at Unknown time Spouse/Significant Other No Yes   Sig: Take 1 tablet (200 mg total) by mouth daily   b complex-vitamin C-folic acid (RENAL) 1 mg 5/16/2018 at Unknown time Spouse/Significant Other Yes Yes   Sig: Take 1 mg by mouth daily   calcium citrate (CALCITRATE) 950 MG tablet 5/16/2018 at Unknown time Spouse/Significant Other Yes Yes   Sig: Take 2,400 mg by mouth 3 (three) times a day with meals   citalopram (CeleXA) 10 mg tablet 5/16/2018 at Unknown time Spouse/Significant Other No Yes   Sig: Take 0 5 tablets (5 mg total) by mouth daily   Patient taking differently: Take 10 mg by mouth     diphenoxylate-atropine (LOMOTIL) 2 5-0 025 mg per tablet 5/16/2018 at Unknown time Spouse/Significant Other Yes Yes   Sig: TAKE 1 TABLET FOUR TIMES A DAY AS NEEDED FOR DIARRHEA    gabapentin (NEURONTIN) 100 mg capsule 5/16/2018 at Unknown time Spouse/Significant Other No Yes   Sig: Take 2 capsules (200 mg total) by mouth daily at bedtime   insulin detemir (LEVEMIR) 100 units/mL subcutaneous injection 5/17/2018 at 0845 Spouse/Significant Other No Yes   Sig: Inject 5 Units under the skin every 12 (twelve) hours   insulin lispro (HumaLOG) 100 units/mL injection 5/16/2018 at 2200 Spouse/Significant Other No Yes   Sig: Inject 3 Units under the skin 3 (three) times a day with meals   lanthanum (FOSRENOL) 1000 MG chewable tablet 5/16/2018 at Unknown time Spouse/Significant Other No Yes   Sig: Chew 1 tablet (1,000 mg total) 3 (three) times a day with meals   levothyroxine 150 mcg tablet 5/17/2018 at Unknown time Spouse/Significant Other Yes Yes   Sig: daily   menthol-zinc oxide (CALMOSEPTINE) 0 44-20 6 % OINT Past Month at Unknown time Spouse/Significant Other Yes Yes   Sig: Apply topically   midodrine (PROAMATINE) 2 5 mg tablet 5/17/2018 at Unknown time Spouse/Significant Other Yes Yes   Sig: Take 2 5 mg by mouth 2 (two) times a day   nystatin (MYCOSTATIN) powder Past Week at Unknown time Spouse/Significant Other No Yes   Sig: Apply topically 2 (two) times a day   ondansetron (ZOFRAN) 8 mg tablet 5/16/2018 at Unknown time Spouse/Significant Other Yes Yes   Sig: Take 8 mg by mouth every 8 (eight) hours   oxyCODONE (OXY-IR) 5 MG capsule Past Month at Unknown time Spouse/Significant Other Yes Yes   Sig: Take 1 capsule by mouth every 4 (four) hours as needed   pantoprazole (PROTONIX) 40 mg tablet 5/16/2018 at Unknown time Spouse/Significant Other Yes Yes   Sig: Take 1 tablet by mouth daily   warfarin (COUMADIN) 5 mg tablet 5/12/2018 Spouse/Significant Other No Yes   Sig: Take 2 tablets (10 mg total) by mouth daily   Patient taking differently: Take 10 mg by mouth daily S-T-TH-Sat 3mg  M-W-F 6mg       Facility-Administered Medications: None     No Known Allergies    Objective   Vitals: Blood pressure 161/74, pulse 81, temperature 97 6 °F (36 4 °C), temperature source Oral, resp  rate 15, height 5' 6" (1 676 m), weight 68 kg (150 lb), SpO2 92 %  No intake/output data recorded  Invasive Devices     Peripheral Intravenous Line            Peripheral IV 05/17/18 Left Hand less than 1 day          Line            Hemodialysis AV Fistula  Right Forearm -- days    Hemodialysis AV Fistula Right Forearm -- days          Drain            Gastrostomy/Enterostomy -- days    Gastrostomy/Enterostomy Percutaneous endoscopic gastrostomy (PEG) 20 Fr   days                Physical Exam    Lab Results: I have personally reviewed pertinent reports  Imaging: I have personally reviewed pertinent reports  EKG, Pathology, and Other Studies: I have personally reviewed pertinent reports  VTE Prophylaxis: Sequential compression device Padmini Denny)     Code Status: Prior  Advance Directive and Living Will:      Power of :    POLST:      Counseling / Coordination of Care  Total floor / unit time spent today 15 minutes  Greater than 50% of total time was spent with the patient and / or family counseling and / or coordination of care  A description of the counseling / coordination of care:  Reviewing the pre, silvana, and postoperative care for this procedure

## 2018-05-17 NOTE — DISCHARGE INSTRUCTIONS
Ureteroscopy   WHAT YOU NEED TO KNOW:     Dorothea Gray,    Today you had a cystoscopy, dilation of urethral stricture, and placement of a left ureteral stent  We found a narrowed area of your urethra which we dilated with the cystoscope, we evacuated debris from your bladder, and had difficulty passing even a small wire into your left kidney  In these cases, it is impossible to perform ureteroscopy, but we were able to place a stent  You will need another procedure in approximately 3-4 weeks for removal of your kidney stones  My office will arrange this in the future  It will be normal for you to have blood in your urine, pain and urinate, and discomfort in your left side when you urinate  The should resolve with time and also after we treat your stone  If you have questions or concerns going forward, please do not hesitate to contact my office  Take care and be well,  Dr Abbie Truong    A ureteroscopy is a procedure to examine in the inside of your urinary tract, which includes your urethra, bladder, ureters, and kidneys  A ureteroscope is a small, thin tube with a light and camera on the end  Ureteroscopy can help your healthcare provider diagnose and treat problems in your urinary tract, such as kidney stones  DISCHARGE INSTRUCTIONS:   Medicine:   · Antibiotics  may be given to treat or prevent an infection  · Take your medicine as directed  Contact your healthcare provider if you think your medicine is not helping or if you have side effects  Tell him or her if you are allergic to any medicine  Keep a list of the medicines, vitamins, and herbs you take  Include the amounts, and when and why you take them  Bring the list or the pill bottles to follow-up visits  Carry your medicine list with you in case of an emergency  Follow up with your healthcare provider as directed:  Write down your questions so you remember to ask them during your visits  Drink liquids as directed    Liquids can help prevent kidney stones and urinary tract infections  Drink water and limit the amount of caffeine you drink  Caffeine may be found in coffee, tea, soda, sports drinks, and foods  Ask your healthcare provider how much liquid to drink each day  Contact your healthcare provider if:   · You have a fever  · You cannot urinate  · You have blood in your urine  · You are vomiting  · You have pain in your abdomen or side  · You have questions or concerns about your condition or care  © 2017 [facility] Information is for End User's use only and may not be sold, redistributed or otherwise used for commercial purposes  All illustrations and images included in CareNotes® are the copyrighted property of A D A M [employer]  or [address]  The above information is an  only  It is not intended as medical advice for individual conditions or treatments  Talk to your doctor, nurse or pharmacist before following any medical regimen to see if it is safe and effective for you

## 2018-05-18 ENCOUNTER — TELEPHONE (OUTPATIENT)
Dept: UROLOGY | Facility: CLINIC | Age: 68
End: 2018-05-18

## 2018-05-18 NOTE — TELEPHONE ENCOUNTER
Patients wife Marylee called  She was reporting that patient had blood in his urine and wanted to know if this is normal  Patient is s/p cysto, dilation of ureteral stricture, stent insertion on 5/17/18  Called and spoke to [facility]  She is aware that  Patient has stent placement and that it is normal to have intermittent hematuria while he has a stent  Marylee verbalizes understanding and denies any other questions at this time

## 2018-05-21 LAB — INR PPP: 1.86 (ref 0.86–1.17)

## 2018-05-22 ENCOUNTER — ANTICOAG VISIT (OUTPATIENT)
Dept: CARDIOLOGY CLINIC | Facility: CLINIC | Age: 68
End: 2018-05-22

## 2018-05-22 DIAGNOSIS — I48.0 PAROXYSMAL ATRIAL FIBRILLATION (HCC): ICD-10-CM

## 2018-05-30 ENCOUNTER — ANTICOAG VISIT (OUTPATIENT)
Dept: CARDIOLOGY CLINIC | Facility: CLINIC | Age: 68
End: 2018-05-30

## 2018-05-30 DIAGNOSIS — I48.0 PAROXYSMAL ATRIAL FIBRILLATION (HCC): ICD-10-CM

## 2018-05-30 LAB — INR PPP: 1.97 (ref 0.86–1.17)

## 2018-05-31 ENCOUNTER — TELEPHONE (OUTPATIENT)
Dept: UROLOGY | Facility: CLINIC | Age: 68
End: 2018-05-31

## 2018-05-31 NOTE — TELEPHONE ENCOUNTER
Patient managed by Cara Ni at the [facility] office  Patients wife called stating that patient is having discomfort while urinating because of the stent  Patients wife was also inquiring when his surgery is going to be  Called and left message for patients wife to call back  Office number was left in the message

## 2018-06-01 NOTE — TELEPHONE ENCOUNTER
Called and spoke to Giorgi Perez patients wife  Giorgi Perez is aware that it is normal for patient to have pressure in kidney while voiding  Explained to Giorgi Perez that stent works both ways so the pressure from voiding goes up through the kidney  Giorgi Perez verbalizes understanding  Giorgi Perez is also questioning when patient is going to be scheduled for surgery  Please call Giorgi Perez at [phone] to schedule patients surgery

## 2018-06-04 ENCOUNTER — APPOINTMENT (EMERGENCY)
Dept: RADIOLOGY | Facility: HOSPITAL | Age: 68
DRG: 689 | End: 2018-06-04
Payer: COMMERCIAL

## 2018-06-04 ENCOUNTER — HOSPITAL ENCOUNTER (INPATIENT)
Facility: HOSPITAL | Age: 68
LOS: 3 days | Discharge: HOME HEALTH | DRG: 689 | End: 2018-06-07
Attending: EMERGENCY MEDICINE | Admitting: INTERNAL MEDICINE
Payer: COMMERCIAL

## 2018-06-04 ENCOUNTER — APPOINTMENT (INPATIENT)
Dept: DIALYSIS | Facility: HOSPITAL | Age: 68
DRG: 689 | End: 2018-06-04
Payer: COMMERCIAL

## 2018-06-04 ENCOUNTER — APPOINTMENT (EMERGENCY)
Dept: CT IMAGING | Facility: HOSPITAL | Age: 68
DRG: 689 | End: 2018-06-04
Payer: COMMERCIAL

## 2018-06-04 DIAGNOSIS — R26.2 AMBULATORY DYSFUNCTION: ICD-10-CM

## 2018-06-04 DIAGNOSIS — Z99.2 ESRD (END STAGE RENAL DISEASE) ON DIALYSIS (HCC): Primary | Chronic | ICD-10-CM

## 2018-06-04 DIAGNOSIS — N18.6 ESRD (END STAGE RENAL DISEASE) ON DIALYSIS (HCC): Primary | Chronic | ICD-10-CM

## 2018-06-04 DIAGNOSIS — E10.21 TYPE 1 DIABETES MELLITUS WITH NEPHROPATHY (HCC): Chronic | ICD-10-CM

## 2018-06-04 DIAGNOSIS — A41.9 SEPSIS, DUE TO UNSPECIFIED ORGANISM: ICD-10-CM

## 2018-06-04 DIAGNOSIS — J15.5: ICD-10-CM

## 2018-06-04 DIAGNOSIS — A41.52 SEPSIS DUE TO PSEUDOMONAS SPECIES (HCC): ICD-10-CM

## 2018-06-04 LAB
ALBUMIN SERPL BCP-MCNC: 2.6 G/DL (ref 3.5–5)
ALP SERPL-CCNC: 190 U/L (ref 46–116)
ALT SERPL W P-5'-P-CCNC: 21 U/L (ref 12–78)
ANION GAP SERPL CALCULATED.3IONS-SCNC: 15 MMOL/L (ref 4–13)
APTT PPP: 39 SECONDS (ref 24–36)
AST SERPL W P-5'-P-CCNC: 16 U/L (ref 5–45)
ATRIAL RATE: 90 BPM
BACTERIA UR QL AUTO: ABNORMAL /HPF
BASE EX.OXY STD BLDV CALC-SCNC: 83.6 % (ref 60–80)
BASE EXCESS BLDV CALC-SCNC: -4.3 MMOL/L
BASOPHILS # BLD AUTO: 0.07 THOUSANDS/ΜL (ref 0–0.1)
BASOPHILS NFR BLD AUTO: 1 % (ref 0–1)
BILIRUB DIRECT SERPL-MCNC: 0.13 MG/DL (ref 0–0.2)
BILIRUB SERPL-MCNC: 0.4 MG/DL (ref 0.2–1)
BILIRUB UR QL STRIP: NEGATIVE
BUN SERPL-MCNC: 56 MG/DL (ref 5–25)
CALCIUM SERPL-MCNC: 9.4 MG/DL (ref 8.3–10.1)
CHLORIDE SERPL-SCNC: 95 MMOL/L (ref 100–108)
CLARITY UR: ABNORMAL
CO2 SERPL-SCNC: 26 MMOL/L (ref 21–32)
COLOR UR: YELLOW
CREAT SERPL-MCNC: 7.68 MG/DL (ref 0.6–1.3)
EOSINOPHIL # BLD AUTO: 0.22 THOUSAND/ΜL (ref 0–0.61)
EOSINOPHIL NFR BLD AUTO: 2 % (ref 0–6)
ERYTHROCYTE [DISTWIDTH] IN BLOOD BY AUTOMATED COUNT: 14.9 % (ref 11.6–15.1)
GFR SERPL CREATININE-BSD FRML MDRD: 7 ML/MIN/1.73SQ M
GLUCOSE SERPL-MCNC: 188 MG/DL (ref 65–140)
GLUCOSE SERPL-MCNC: 198 MG/DL (ref 65–140)
GLUCOSE SERPL-MCNC: 265 MG/DL (ref 65–140)
GLUCOSE UR STRIP-MCNC: NEGATIVE MG/DL
HCO3 BLDV-SCNC: 20.2 MMOL/L (ref 24–30)
HCT VFR BLD AUTO: 35.7 % (ref 36.5–49.3)
HGB BLD-MCNC: 11.3 G/DL (ref 12–17)
HGB UR QL STRIP.AUTO: NEGATIVE
INR PPP: 1.51 (ref 0.86–1.17)
KETONES UR STRIP-MCNC: NEGATIVE MG/DL
LACTATE SERPL-SCNC: 1.8 MMOL/L (ref 0.5–2)
LEUKOCYTE ESTERASE UR QL STRIP: ABNORMAL
LYMPHOCYTES # BLD AUTO: 0.65 THOUSANDS/ΜL (ref 0.6–4.47)
LYMPHOCYTES NFR BLD AUTO: 5 % (ref 14–44)
MCH RBC QN AUTO: 28.1 PG (ref 26.8–34.3)
MCHC RBC AUTO-ENTMCNC: 31.7 G/DL (ref 31.4–37.4)
MCV RBC AUTO: 89 FL (ref 82–98)
MONOCYTES # BLD AUTO: 0.75 THOUSAND/ΜL (ref 0.17–1.22)
MONOCYTES NFR BLD AUTO: 5 % (ref 4–12)
NEUTROPHILS # BLD AUTO: 12.65 THOUSANDS/ΜL (ref 1.85–7.62)
NEUTS SEG NFR BLD AUTO: 88 % (ref 43–75)
NITRITE UR QL STRIP: NEGATIVE
NON-SQ EPI CELLS URNS QL MICRO: ABNORMAL /HPF
O2 CT BLDV-SCNC: 13.2 ML/DL
P AXIS: 71 DEGREES
PCO2 BLDV: 34.9 MM HG (ref 42–50)
PH BLDV: 7.38 [PH] (ref 7.3–7.4)
PH UR STRIP.AUTO: 6 [PH] (ref 4.5–8)
PLATELET # BLD AUTO: 337 THOUSANDS/UL (ref 149–390)
PMV BLD AUTO: 8.7 FL (ref 8.9–12.7)
PO2 BLDV: 56.2 MM HG (ref 35–45)
POTASSIUM SERPL-SCNC: 5.5 MMOL/L (ref 3.5–5.3)
PR INTERVAL: 164 MS
PROT SERPL-MCNC: 8.7 G/DL (ref 6.4–8.2)
PROT UR STRIP-MCNC: NEGATIVE MG/DL
PROTHROMBIN TIME: 17.8 SECONDS (ref 11.8–14.2)
QRS AXIS: -29 DEGREES
QRSD INTERVAL: 110 MS
QT INTERVAL: 372 MS
QTC INTERVAL: 455 MS
RBC # BLD AUTO: 4.02 MILLION/UL (ref 3.88–5.62)
RBC #/AREA URNS AUTO: ABNORMAL /HPF
SODIUM SERPL-SCNC: 136 MMOL/L (ref 136–145)
SP GR UR STRIP.AUTO: 1.02 (ref 1–1.03)
T WAVE AXIS: 105 DEGREES
UROBILINOGEN UR QL STRIP.AUTO: 0.2 E.U./DL
VENTRICULAR RATE: 90 BPM
WBC # BLD AUTO: 14.34 THOUSAND/UL (ref 4.31–10.16)
WBC #/AREA URNS AUTO: ABNORMAL /HPF

## 2018-06-04 PROCEDURE — 87086 URINE CULTURE/COLONY COUNT: CPT | Performed by: EMERGENCY MEDICINE

## 2018-06-04 PROCEDURE — 80076 HEPATIC FUNCTION PANEL: CPT | Performed by: EMERGENCY MEDICINE

## 2018-06-04 PROCEDURE — 87186 SC STD MICRODIL/AGAR DIL: CPT | Performed by: EMERGENCY MEDICINE

## 2018-06-04 PROCEDURE — 96365 THER/PROPH/DIAG IV INF INIT: CPT

## 2018-06-04 PROCEDURE — 74177 CT ABD & PELVIS W/CONTRAST: CPT

## 2018-06-04 PROCEDURE — 87040 BLOOD CULTURE FOR BACTERIA: CPT | Performed by: EMERGENCY MEDICINE

## 2018-06-04 PROCEDURE — 93005 ELECTROCARDIOGRAM TRACING: CPT

## 2018-06-04 PROCEDURE — 82805 BLOOD GASES W/O2 SATURATION: CPT | Performed by: EMERGENCY MEDICINE

## 2018-06-04 PROCEDURE — 71046 X-RAY EXAM CHEST 2 VIEWS: CPT

## 2018-06-04 PROCEDURE — 99285 EMERGENCY DEPT VISIT HI MDM: CPT

## 2018-06-04 PROCEDURE — 85025 COMPLETE CBC W/AUTO DIFF WBC: CPT | Performed by: EMERGENCY MEDICINE

## 2018-06-04 PROCEDURE — 96367 TX/PROPH/DG ADDL SEQ IV INF: CPT

## 2018-06-04 PROCEDURE — 99222 1ST HOSP IP/OBS MODERATE 55: CPT | Performed by: INTERNAL MEDICINE

## 2018-06-04 PROCEDURE — 82948 REAGENT STRIP/BLOOD GLUCOSE: CPT

## 2018-06-04 PROCEDURE — 80048 BASIC METABOLIC PNL TOTAL CA: CPT | Performed by: EMERGENCY MEDICINE

## 2018-06-04 PROCEDURE — 99223 1ST HOSP IP/OBS HIGH 75: CPT | Performed by: INTERNAL MEDICINE

## 2018-06-04 PROCEDURE — 93010 ELECTROCARDIOGRAM REPORT: CPT | Performed by: INTERNAL MEDICINE

## 2018-06-04 PROCEDURE — 83605 ASSAY OF LACTIC ACID: CPT | Performed by: EMERGENCY MEDICINE

## 2018-06-04 PROCEDURE — 90935 HEMODIALYSIS ONE EVALUATION: CPT | Performed by: INTERNAL MEDICINE

## 2018-06-04 PROCEDURE — 85730 THROMBOPLASTIN TIME PARTIAL: CPT | Performed by: EMERGENCY MEDICINE

## 2018-06-04 PROCEDURE — 81001 URINALYSIS AUTO W/SCOPE: CPT | Performed by: EMERGENCY MEDICINE

## 2018-06-04 PROCEDURE — 85610 PROTHROMBIN TIME: CPT | Performed by: EMERGENCY MEDICINE

## 2018-06-04 PROCEDURE — 36415 COLL VENOUS BLD VENIPUNCTURE: CPT | Performed by: EMERGENCY MEDICINE

## 2018-06-04 PROCEDURE — 87077 CULTURE AEROBIC IDENTIFY: CPT | Performed by: EMERGENCY MEDICINE

## 2018-06-04 RX ORDER — SACCHAROMYCES BOULARDII 250 MG
250 CAPSULE ORAL 2 TIMES DAILY
Status: DISCONTINUED | OUTPATIENT
Start: 2018-06-04 | End: 2018-06-07 | Stop reason: HOSPADM

## 2018-06-04 RX ORDER — CHOLESTYRAMINE LIGHT 4 G/5.7G
4 POWDER, FOR SUSPENSION ORAL DAILY
Status: DISCONTINUED | OUTPATIENT
Start: 2018-06-05 | End: 2018-06-07 | Stop reason: HOSPADM

## 2018-06-04 RX ORDER — DIPHENOXYLATE HYDROCHLORIDE AND ATROPINE SULFATE 2.5; .025 MG/1; MG/1
1 TABLET ORAL 4 TIMES DAILY PRN
Status: DISCONTINUED | OUTPATIENT
Start: 2018-06-04 | End: 2018-06-07 | Stop reason: HOSPADM

## 2018-06-04 RX ORDER — GABAPENTIN 100 MG/1
200 CAPSULE ORAL
Status: DISCONTINUED | OUTPATIENT
Start: 2018-06-04 | End: 2018-06-07 | Stop reason: HOSPADM

## 2018-06-04 RX ORDER — ASCORBIC ACID, THIAMINE MONONITRATE,RIBOFLAVIN, NIACINAMIDE, PYRIDOXINE HYDROCHLORIDE, FOLIC ACID, CYANOCOBALAMIN, BIOTIN, CALCIUM PANTOTHENATE, 100; 1.5; 1.7; 20; 10; 1; 6000; 150000; 5 MG/1; MG/1; MG/1; MG/1; MG/1; MG/1; UG/1; UG/1; MG/1
1 CAPSULE, LIQUID FILLED ORAL DAILY
Status: DISCONTINUED | OUTPATIENT
Start: 2018-06-05 | End: 2018-06-07 | Stop reason: HOSPADM

## 2018-06-04 RX ORDER — PANTOPRAZOLE SODIUM 40 MG/1
40 TABLET, DELAYED RELEASE ORAL DAILY
Status: DISCONTINUED | OUTPATIENT
Start: 2018-06-05 | End: 2018-06-07 | Stop reason: HOSPADM

## 2018-06-04 RX ORDER — NYSTATIN 100000 [USP'U]/G
POWDER TOPICAL 2 TIMES DAILY
Status: DISCONTINUED | OUTPATIENT
Start: 2018-06-04 | End: 2018-06-07 | Stop reason: HOSPADM

## 2018-06-04 RX ORDER — LEVOTHYROXINE SODIUM 0.15 MG/1
150 TABLET ORAL
Status: DISCONTINUED | OUTPATIENT
Start: 2018-06-05 | End: 2018-06-07 | Stop reason: HOSPADM

## 2018-06-04 RX ORDER — CITALOPRAM 10 MG/1
5 TABLET ORAL DAILY
Status: DISCONTINUED | OUTPATIENT
Start: 2018-06-05 | End: 2018-06-07 | Stop reason: HOSPADM

## 2018-06-04 RX ORDER — QUETIAPINE FUMARATE 25 MG/1
25 TABLET, FILM COATED ORAL
Status: DISCONTINUED | OUTPATIENT
Start: 2018-06-04 | End: 2018-06-07 | Stop reason: HOSPADM

## 2018-06-04 RX ORDER — OXYCODONE HYDROCHLORIDE 5 MG/1
5 TABLET ORAL EVERY 6 HOURS PRN
Status: DISCONTINUED | OUTPATIENT
Start: 2018-06-04 | End: 2018-06-06

## 2018-06-04 RX ORDER — WARFARIN SODIUM 5 MG/1
10 TABLET ORAL
Status: DISCONTINUED | OUTPATIENT
Start: 2018-06-04 | End: 2018-06-07 | Stop reason: HOSPADM

## 2018-06-04 RX ORDER — MAGNESIUM HYDROXIDE/ALUMINUM HYDROXICE/SIMETHICONE 120; 1200; 1200 MG/30ML; MG/30ML; MG/30ML
15 SUSPENSION ORAL EVERY 6 HOURS PRN
Status: DISCONTINUED | OUTPATIENT
Start: 2018-06-04 | End: 2018-06-07 | Stop reason: HOSPADM

## 2018-06-04 RX ORDER — CALCIUM CARBONATE 500(1250)
1 TABLET ORAL 2 TIMES DAILY WITH MEALS
Status: DISCONTINUED | OUTPATIENT
Start: 2018-06-04 | End: 2018-06-07 | Stop reason: HOSPADM

## 2018-06-04 RX ORDER — LANTHANUM CARBONATE 500 MG/1
1000 TABLET, CHEWABLE ORAL
Status: DISCONTINUED | OUTPATIENT
Start: 2018-06-04 | End: 2018-06-07 | Stop reason: HOSPADM

## 2018-06-04 RX ORDER — MIDODRINE HYDROCHLORIDE 2.5 MG/1
2.5 TABLET ORAL 2 TIMES DAILY
Status: DISCONTINUED | OUTPATIENT
Start: 2018-06-04 | End: 2018-06-07 | Stop reason: HOSPADM

## 2018-06-04 RX ORDER — MELATONIN
1000 DAILY
Status: DISCONTINUED | OUTPATIENT
Start: 2018-06-05 | End: 2018-06-07 | Stop reason: HOSPADM

## 2018-06-04 RX ORDER — ONDANSETRON 2 MG/ML
4 INJECTION INTRAMUSCULAR; INTRAVENOUS EVERY 6 HOURS PRN
Status: DISCONTINUED | OUTPATIENT
Start: 2018-06-04 | End: 2018-06-07 | Stop reason: HOSPADM

## 2018-06-04 RX ORDER — AMIODARONE HYDROCHLORIDE 200 MG/1
200 TABLET ORAL DAILY
Status: DISCONTINUED | OUTPATIENT
Start: 2018-06-05 | End: 2018-06-07 | Stop reason: HOSPADM

## 2018-06-04 RX ORDER — VANCOMYCIN HYDROCHLORIDE 1 G/200ML
15 INJECTION, SOLUTION INTRAVENOUS ONCE
Status: COMPLETED | OUTPATIENT
Start: 2018-06-04 | End: 2018-06-04

## 2018-06-04 RX ORDER — DOCUSATE SODIUM 100 MG/1
100 CAPSULE, LIQUID FILLED ORAL 2 TIMES DAILY
Status: DISCONTINUED | OUTPATIENT
Start: 2018-06-04 | End: 2018-06-04

## 2018-06-04 RX ADMIN — Medication 250 MG: at 20:24

## 2018-06-04 RX ADMIN — Medication 1 TABLET: at 20:24

## 2018-06-04 RX ADMIN — CEFEPIME HYDROCHLORIDE 2000 MG: 2 INJECTION, POWDER, FOR SOLUTION INTRAVENOUS at 11:42

## 2018-06-04 RX ADMIN — QUETIAPINE FUMARATE 25 MG: 25 TABLET ORAL at 21:36

## 2018-06-04 RX ADMIN — VANCOMYCIN HYDROCHLORIDE 125 MG: 1 INJECTION, POWDER, LYOPHILIZED, FOR SOLUTION INTRAVENOUS at 20:24

## 2018-06-04 RX ADMIN — INSULIN DETEMIR 5 UNITS: 100 INJECTION, SOLUTION SUBCUTANEOUS at 21:36

## 2018-06-04 RX ADMIN — ANORECTAL OINTMENT: 15.7; .44; 24; 20.6 OINTMENT TOPICAL at 20:23

## 2018-06-04 RX ADMIN — WARFARIN SODIUM 10 MG: 5 TABLET ORAL at 20:24

## 2018-06-04 RX ADMIN — VANCOMYCIN HYDROCHLORIDE 1000 MG: 1 INJECTION, SOLUTION INTRAVENOUS at 12:35

## 2018-06-04 RX ADMIN — MIDODRINE HYDROCHLORIDE 2.5 MG: 2.5 TABLET ORAL at 18:22

## 2018-06-04 RX ADMIN — GABAPENTIN 200 MG: 100 CAPSULE ORAL at 21:36

## 2018-06-04 RX ADMIN — NYSTATIN: 100000 POWDER TOPICAL at 20:23

## 2018-06-04 RX ADMIN — IOHEXOL 90 ML: 350 INJECTION, SOLUTION INTRAVENOUS at 12:57

## 2018-06-04 RX ADMIN — INSULIN LISPRO 1 UNITS: 100 INJECTION, SOLUTION INTRAVENOUS; SUBCUTANEOUS at 20:26

## 2018-06-04 RX ADMIN — LANTHANUM CARBONATE 1000 MG: 500 TABLET, CHEWABLE ORAL at 20:25

## 2018-06-04 NOTE — ED NOTES
Pt  Provided pericare for incontinent urine episode  Pt  Repositioned and provided with fresh linen   Wife at bedside no new c/o at this time      Yuri Thacker RN  06/04/18 [id number]

## 2018-06-04 NOTE — CONSULTS
Inpatient Consultation - Nephrology   Gloryele Foster 74 y o  male MRN: [id number]  Unit/Bed#: -01 Encounter: [id number]    ASSESSMENT and PLAN:  1  ESRD on HD ([email or web address]):   -hemodialysis today which is his usual treatment today  -patient seen on treatment:  Blood pressure 124/74, Qb 450 mL, Qd standard, sodium 138, bicarbonate 35, 2K    UF 2 5 L    - 4 L above estimated dry weight  Dietary indiscretion/does not follow fluid restriction according to wife  2  Access: Av fistula right arm  No flow issues  3  Hypertension:  Blood pressure elevated pre treatment now decreasing with ultrafiltration  Continue to monitor  -on midodrine 2 5 mg b i d   Patient takes the morning dose a half an hour before dialysis  4  Anemia:  Hemoglobin slightly above goal  - on micera  5  Mineral and bone disease:  -continue binder when patient taking p o   -last phosphorus elevated 6 5 on 05/16/2018  -last PTH at goal  6  Chronic combined CHF, Dilated cardiomyopathy-volume overload, history of aortic valve endocarditis:  Ejection fraction 35%  -small to moderate pericardial effusion-increased in size  Previous echocardiogram 11/17 showed trace pericardial effusion  -vascular congestion on chest x-ray with bilateral pleural effusions right greater than left  -previous thoracentesis right pleural effusion  - recently seen by Dr Sherman James in the beginning of May  7  Encephalopathy:  Likely multifactorial concern for HCAP/? Aspiration (previously had PEG tube), UTI  8  Anion gap acidosis:    -Lactic acid 1 8, bicarbonate 26  -venous blood gas:  PH 7 38  -urine negative for ketones  9  Nausea, vomiting:  CT of the abdomen  10  History of nephrolithiasis:  Recent pyelogram   Filling defect left renal sinus  11  PAF:  Currently in normal sinus rhythm  12  Hyperkalemia:  Adjust potassium bath  13    Abnormal urinalysis: innumerable WBCs, field obscured by bacteria  -awaiting culture    SUMMARY OF RECOMMENDATIONS:  · Increase in pericardial effusion now small to moderate  May need daily dialysis  · Hemodialysis today  · Low-potassium bath  · Fluid restriction  · Renal diet  · Continue midodrine twice a day  Give a m  dose a half an hour before dialysis on treatment days    HISTORY OF PRESENT ILLNESS:  Requesting Physician: Wendy Dunlap MD  Reason for Consult: ESRD on HD    Matthew Lubin is a 74 y o  male with end-stage renal disease on hemodialysis, diabetes mellitus type 1, hypertension, hypothyroidism, chronic combined CHF, PAD status post right BKA who was admitted to [facility] after presenting with change in mental status, increasing lethargy, disorientation, poor appetite/vomiting  Physical therapy came to see Adela Putnam today at home and noted that his heart rate was elevated with an oxygen saturation of 85% therefore his wife brought him to the hospital   Adela Putnam remains extremely lethargic, but arousable to voice  His wife is providing recent history  Blood sugars were also running high the last few days  Last dialysis treatment was Friday  A renal consultation is requested today for assistance in the management of ESRD  Matthew Lubin is a known ESRD patient who undergoes maintenance hemodialysis at [facility] on MWF      PAST MEDICAL HISTORY:  Past Medical History:   Diagnosis Date    Acquired hypothyroidism     underactive    Anemia     Anxiety     Atrial fibrillation ([facility]     Cellulitis of foot     right    Choledocholithiasis 2/28/2018    Chronic kidney disease-mineral and bone disorder 11/27/2017    Clostridium difficile infection 04/2017    Diabetes mellitus ([facility] )     IDDM    Dialysis patient ([facility]     Diarrhea     [facility]     GERD (gastroesophageal reflux disease)     History of transfusion 2017    Hx of cardiac arrest     Hypertension     Hyponatremia 8/8/2017    Irregular heart beat     HX of AFIB now NSR    Liver disease     elevated liver enzymes    Muscle weakness     Neuropathy     Right lower lobe pneumonia (HCC) 2/20/2017    Sepsis ([address] ) 04/2017    Polymicrobial MRSA, VRE    Systolic and diastolic CHF, chronic (HCC)     EF 35%, Grade II DD       PAST SURGICAL HISTORY:  Past Surgical History:   Procedure Laterality Date    CATARACT EXTRACTION      CHG GI ENDOSCOPIC ULTRASOUND N/A 3/10/2016    Procedure: LINEAR ENDOSCOPIC U/S;  Surgeon: Marianna Morillo MD;  Location: [facility]; Service: Gastroenterology    CHOLECYSTECTOMY      GASTROSTOMY TUBE PLACEMENT N/A 4/12/2017    Procedure: INSERTION PEG TUBE;  Surgeon: Zabrina Reynoso MD;  [facility];  Service:    Casey Burnett LEG AMPUTATION THROUGH LOWER TIBIA AND FIBULA Right 4/6/2017    Procedure: AMPUTATION BELOW KNEE (BKA); Surgeon: Kerrie Chandler DO;  [facility];  Service:     AZ CYSTO/URETERO W/LITHOTRIPSY &INDWELL STENT INSRT Left 5/17/2018    Procedure: CYSTOSCOPY,  DILATION OF URETERAL STRICTURE, RETROGRADE PYELOGRAM AND INSERTION STENT URETERAL;  Surgeon: Oneil Morrow MD;  Location: [facility];  Service: Urology    TOE AMPUTATION  2000    TONSILLECTOMY         ALLERGIES:  No Known Allergies    SOCIAL HISTORY:  History   Alcohol Use No     History   Drug Use No     History   Smoking Status    Former Smoker    Packs/day: 1 00    Years: 46 00    Types: Cigarettes    Start date: 5    Quit date: 3/1/2017   Smokeless Tobacco    Never Used       FAMILY HISTORY:  Family History   Problem Relation Age of Onset    Diabetes Father     Pancreatic cancer Father     Kidney disease Father     Cancer Other      bladder    Lupus Mother        MEDICATIONS:  No current facility-administered medications for this encounter  REVIEW OF SYSTEMS:  General: No fevers, chills  Cardiovascular: No chest pain, shortness of breath, palpitations, leg edema  Respiratory: No cough, sputum production, shortness of breath  Gastrointestinal: No nausea, vomiting, abdominal pain, diarrhea    Genitourinary: No hematuria  PHYSICAL EXAM:  Current Weight: Weight - Scale: 69 kg (152 lb 1 9 oz)  First Weight: Weight - Scale: 70 3 kg (154 lb 15 7 oz)  Vitals:    06/04/18 1533 06/04/18 1610 06/04/18 1615 06/04/18 1630   BP: 138/52 120/58 132/72 124/74   BP Location: Left arm      Pulse: 77 73 73 75   Resp: 18      Temp: 99 °F (37 2 °C)      TempSrc: Oral      SpO2: 95%      Weight: 69 kg (152 lb 1 9 oz)      Height: 5' 6" (1 676 m)          Intake/Output Summary (Last 24 hours) at 06/04/18 1648  Last data filed at 06/04/18 1610   Gross per 24 hour   Intake              500 ml   Output               30 ml   Net              470 ml     Physical Exam   Constitutional:   Chronically ill-appearing gentleman who looks older than his stated age in no acute distress  HENT:   Head: Normocephalic and atraumatic  Mouth/Throat: Oropharynx is clear and moist  No oropharyngeal exudate  Face appears puffy   Eyes: Conjunctivae are normal  Right eye exhibits no discharge  Left eye exhibits no discharge  Neck: Neck supple  No JVD present  Cardiovascular: Normal rate and regular rhythm  Exam reveals no gallop and no friction rub  No murmur heard  Pulmonary/Chest: Effort normal  No respiratory distress  He has no wheezes  He has no rales  Abdominal: Soft  Bowel sounds are normal  He exhibits no distension  There is no tenderness  There is no rebound and no guarding  Musculoskeletal: He exhibits edema (Trace edema)  Neurological:   Lethargic  Confused as to day, time   Skin: Skin is warm and dry  No rash noted  No erythema     Psychiatric:   Extremely lethargic       Invasive Devices:      Lab Results:     Results from last 7 days  Lab Units 06/04/18  1123   WBC Thousand/uL 14 34*   HEMOGLOBIN g/dL 11 3*   HEMATOCRIT % 35 7*   PLATELETS Thousands/uL 337   SODIUM mmol/L 136   POTASSIUM mmol/L 5 5*   CHLORIDE mmol/L 95*   CO2 mmol/L 26   BUN mg/dL 56*   CREATININE mg/dL 7 68*   CALCIUM mg/dL 9 4   TOTAL PROTEIN g/dL 8 7* BILIRUBIN TOTAL mg/dL 0 40   ALK PHOS U/L 190*   ALT U/L 21   AST U/L 16   GLUCOSE RANDOM mg/dL 188*     Lab Results   Component Value Date    CALCIUM 9 4 06/04/2018    CAION 1 04 (L) 12/04/2017    PHOS 6 8 (H) 02/09/2018     Other Studies:

## 2018-06-04 NOTE — ED PROVIDER NOTES
History  Chief Complaint   Patient presents with    Altered Mental Status     pts wife reports pt has been confused with c/p this morning  states SpO2 85% at home this morning  no home O2 but hx of COPD  scheduled for dialysis today  History provided by:  Patient  Altered Mental Status   Presenting symptoms: behavior changes, confusion and disorientation    Severity:  Moderate  Most recent episode: Today  Episode history:  Continuous  Duration:  1 day  Timing:  Constant  Progression:  Worsening  Chronicity:  Recurrent  Context comment: Through recurrent infections, urinary tract infections as well as pneumonia, which has presented similarly  , history of severe COPD, was hypoxic this morning with O2 saturation of 85%  Associated symptoms: weakness    Associated symptoms: no abdominal pain, no fever, no headaches, no light-headedness, no nausea, no palpitations, no rash and no vomiting        Prior to Admission Medications   Prescriptions Last Dose Informant Patient Reported? Taking?    Cholecalciferol (VITAMIN D-3) 1000 units CAPS 6/3/2018 at Unknown time Spouse/Significant Other Yes Yes   Sig: Take by mouth daily   Cholestyramine POWD 6/3/2018 at Unknown time Spouse/Significant Other Yes Yes   Sig: by Does not apply route   Multiple Vitamin (DAILY VALUE MULTIVITAMIN) TABS 6/3/2018 at Unknown time Spouse/Significant Other Yes Yes   Sig: Take 1 tablet by mouth daily   Probiotic Product (PRO-BIOTIC BLEND) CAPS 6/3/2018 at Unknown time Spouse/Significant Other Yes Yes   Sig: Take 1 capsule by mouth daily   QUEtiapine (SEROQUEL) 25 mg tablet 6/3/2018 at Unknown time Spouse/Significant Other Yes Yes   Sig: Take 25 mg by mouth daily at bedtime   amiodarone 200 mg tablet 6/3/2018 at Unknown time Spouse/Significant Other No Yes   Sig: Take 1 tablet (200 mg total) by mouth daily   b complex-vitamin C-folic acid (RENAL) 1 mg 6/3/2018 at Unknown time Spouse/Significant Other Yes Yes   Sig: Take 1 mg by mouth daily calcium citrate (CALCITRATE) 950 MG tablet 6/3/2018 at Unknown time Spouse/Significant Other Yes Yes   Sig: Take 2,400 mg by mouth 3 (three) times a day with meals   citalopram (CeleXA) 10 mg tablet 6/3/2018 at Unknown time Spouse/Significant Other No Yes   Sig: Take 0 5 tablets (5 mg total) by mouth daily   Patient taking differently: Take 10 mg by mouth     diphenoxylate-atropine (LOMOTIL) 2 5-0 025 mg per tablet 6/3/2018 at Unknown time Spouse/Significant Other Yes Yes   Sig: TAKE 1 TABLET FOUR TIMES A DAY AS NEEDED FOR DIARRHEA    gabapentin (NEURONTIN) 100 mg capsule 6/3/2018 at Unknown time Spouse/Significant Other No Yes   Sig: Take 2 capsules (200 mg total) by mouth daily at bedtime   insulin detemir (LEVEMIR) 100 units/mL subcutaneous injection 6/3/2018 at Unknown time Spouse/Significant Other No Yes   Sig: Inject 5 Units under the skin every 12 (twelve) hours   insulin lispro (HumaLOG) 100 units/mL injection 6/3/2018 at Unknown time Spouse/Significant Other No Yes   Sig: Inject 3 Units under the skin 3 (three) times a day with meals   lanthanum (FOSRENOL) 1000 MG chewable tablet 6/3/2018 at Unknown time Spouse/Significant Other No Yes   Sig: Chew 1 tablet (1,000 mg total) 3 (three) times a day with meals   levothyroxine 150 mcg tablet 6/3/2018 at Unknown time Spouse/Significant Other Yes Yes   Sig: daily   menthol-zinc oxide (CALMOSEPTINE) 0 44-20 6 % OINT More than a month at Unknown time Spouse/Significant Other Yes No   Sig: Apply topically   midodrine (PROAMATINE) 2 5 mg tablet 6/3/2018 at Unknown time Spouse/Significant Other Yes Yes   Sig: Take 2 5 mg by mouth 2 (two) times a day   nystatin (MYCOSTATIN) powder Past Month at Unknown time Spouse/Significant Other No Yes   Sig: Apply topically 2 (two) times a day   ondansetron (ZOFRAN) 8 mg tablet 6/3/2018 at Unknown time Spouse/Significant Other Yes Yes   Sig: Take 8 mg by mouth every 8 (eight) hours   oxyCODONE (OXY-IR) 5 MG capsule Past Week at Unknown time Spouse/Significant Other Yes Yes   Sig: Take 1 capsule by mouth every 4 (four) hours as needed   pantoprazole (PROTONIX) 40 mg tablet 6/3/2018 at Unknown time Spouse/Significant Other Yes Yes   Sig: Take 1 tablet by mouth daily   warfarin (COUMADIN) 5 mg tablet 6/3/2018 at Unknown time Spouse/Significant Other No Yes   Sig: Take 2 tablets (10 mg total) by mouth daily   Patient taking differently: Take 10 mg by mouth daily S-T-TH-Sat 3mg  M-W-F 6mg       Facility-Administered Medications: None       Past Medical History:   Diagnosis Date    Acquired hypothyroidism     underactive    Anemia     Anxiety     Atrial fibrillation ([place] [address] )     Cellulitis of foot     right    Choledocholithiasis 2/28/2018    Chronic kidney disease-mineral and bone disorder 11/27/2017    Clostridium difficile infection 04/2017    Diabetes mellitus ([place] [address] )     IDDM    Dialysis patient ([place] [address] )     Diarrhea     ESRD (end stage renal disease) on dialysis ([place] [address] )     GERD (gastroesophageal reflux disease)     History of transfusion 2017    Hx of cardiac arrest     Hypertension     Hyponatremia 8/8/2017    Irregular heart beat     HX of AFIB now NSR    Liver disease     elevated liver enzymes    Muscle weakness     Neuropathy     Right lower lobe pneumonia ([place] [address] ) 2/20/2017    Sepsis ([place] [address] ) 04/2017    Polymicrobial MRSA, VRE    Systolic and diastolic CHF, chronic (HCC)     EF 35%, Grade II DD       Past Surgical History:   Procedure Laterality Date    CATARACT EXTRACTION      CHG GI ENDOSCOPIC ULTRASOUND N/A 3/10/2016    Procedure: LINEAR ENDOSCOPIC U/S;  Surgeon: Althea Flores MD;  [facility]: [facility]; Service: Gastroenterology    CHOLECYSTECTOMY      GASTROSTOMY TUBE PLACEMENT N/A 4/12/2017    Procedure: INSERTION PEG TUBE;  Surgeon: Naeem Clifton MD;  [facility];  Service:    Ean Martineze LEG AMPUTATION THROUGH LOWER TIBIA AND FIBULA Right 4/6/2017    Procedure: AMPUTATION BELOW KNEE (BKA);   Surgeon: Fuentes Anguiano DO;  [facility];  Service:     HI CYSTO/URETERO W/LITHOTRIPSY &INDWELL STENT INSRT Left 5/17/2018    Procedure: CYSTOSCOPY,  DILATION OF URETERAL STRICTURE, RETROGRADE PYELOGRAM AND INSERTION STENT URETERAL;  Surgeon: Katharine Chun MD;  [facility];  Service: Urology    TOE AMPUTATION  2000    TONSILLECTOMY         Family History   Problem Relation Age of Onset    Diabetes Father     Pancreatic cancer Father     Kidney disease Father     Cancer Other      bladder    Lupus Mother      I have reviewed and agree with the history as documented  Social History   Substance Use Topics    Smoking status: Former Smoker     Packs/day: 1 00     Years: 46 00     Types: Cigarettes     Start date: 1970     Quit date: 3/1/2017    Smokeless tobacco: Never Used    Alcohol use No        Review of Systems   Constitutional: Negative for activity change, chills, diaphoresis and fever  HENT: Negative for congestion, sinus pressure and sore throat  Eyes: Negative for pain and visual disturbance  Respiratory: Negative for cough, chest tightness, shortness of breath, wheezing and stridor  Cardiovascular: Negative for chest pain and palpitations  Gastrointestinal: Negative for abdominal distention, abdominal pain, constipation, diarrhea, nausea and vomiting  Genitourinary: Negative for dysuria and frequency  Musculoskeletal: Negative for neck pain and neck stiffness  Skin: Negative for rash  Neurological: Positive for dizziness and weakness  Negative for speech difficulty, light-headedness, numbness and headaches  Psychiatric/Behavioral: Positive for confusion  Physical Exam  Physical Exam   Constitutional: He appears well-developed  No distress  HENT:   Head: Normocephalic and atraumatic  Eyes: Pupils are equal, round, and reactive to light  Neck: Normal range of motion  Neck supple  No tracheal deviation present     Cardiovascular: Normal rate, regular rhythm, normal heart sounds and intact distal pulses  No murmur heard  Pulmonary/Chest: Effort normal and breath sounds normal  No stridor  No respiratory distress  Abdominal: Soft  He exhibits no distension  There is no tenderness  There is no rebound and no guarding  Musculoskeletal: Normal range of motion  Neurological: He is disoriented  GCS eye subscore is 3  GCS verbal subscore is 5  GCS motor subscore is 6  Very drowsy, will wake up to have a conversation, but conversational be nonsensical   Skin: Skin is warm and dry  He is not diaphoretic  No erythema  No pallor  Psychiatric: He has a normal mood and affect  Vitals reviewed        Vital Signs  ED Triage Vitals   Temperature Pulse Respirations Blood Pressure SpO2   06/04/18 1031 06/04/18 1031 06/04/18 1031 06/04/18 1031 06/04/18 1031   99 7 °F (37 6 °C) 91 16 131/65 (!) 89 %      Temp Source Heart Rate Source Patient Position - Orthostatic VS BP Location FiO2 (%)   06/04/18 1031 06/04/18 1031 06/04/18 1236 06/04/18 1236 --   Oral Monitor Lying Left arm       Pain Score       06/04/18 1031       9           Vitals:    06/04/18 1215 06/04/18 1236 06/04/18 1245 06/04/18 1330   BP:  139/60 139/60 167/62   Pulse: 84 80 80 86   Patient Position - Orthostatic VS:  Lying         Visual Acuity      ED Medications  Medications   cefepime (MAXIPIME) 2 g/50 mL dextrose IVPB (0 mg Intravenous Stopped 6/4/18 1235)   vancomycin (VANCOCIN) IVPB (premix) 1,000 mg (1,000 mg Intravenous New Bag 6/4/18 1235)   iohexol (OMNIPAQUE) 350 MG/ML injection (MULTI-DOSE) 90 mL (90 mL Intravenous Given 6/4/18 1257)       Diagnostic Studies  Results Reviewed     Procedure Component Value Units Date/Time    UA w Reflex to Microscopic [[id number]]  (Abnormal) Collected:  06/04/18 1308    Lab Status:  Final result Specimen:  Urine from Urine, Straight Cath Updated:  06/04/18 1316     Color, UA Yellow     Clarity, UA Cloudy     Specific [place], UA 1 020     pH, UA 6 0     Leukocytes, UA Large (A)     Nitrite, UA Negative     Protein, UA Negative mg/dl      Glucose, UA Negative mg/dl      Ketones, UA Negative mg/dl      Urobilinogen, UA 0 2 E U /dl      Bilirubin, UA Negative     Blood, UA Negative    Urine Microscopic [[id number]] Collected:  06/04/18 1308    Lab Status: In process Specimen:  Urine from Urine, Straight Cath Updated:  06/04/18 1316    Lactic acid, plasma [[id number]]  (Normal) Collected:  06/04/18 1141    Lab Status:  Final result Specimen:  Blood from Arm, Left Updated:  06/04/18 1212     LACTIC ACID 1 8 mmol/L     Narrative:         Result may be elevated if tourniquet was used during collection  Urine culture [[id number]] Collected:  06/04/18 1208    Lab Status: In process Specimen:  Urine from Urine, Straight Cath Updated:  06/04/18 1210    Blood culture #1 [[id number]] Collected:  06/04/18 1159    Lab Status:   In process Specimen:  Blood from Hand, Left Updated:  06/04/18 1202    Blood gas, venous [[id number]]  (Abnormal) Collected:  06/04/18 1141    Lab Status:  Final result Specimen:  Blood from Arm, Left Updated:  06/04/18 1202     pH, Neil 7 380     pCO2, Neil 34 9 (L) mm Hg      pO2, Neil 56 2 (H) mm Hg      HCO3, Neil 20 2 (L) mmol/L      Base Excess, Neil -4 3 mmol/L      O2 Content, Neil 13 2 ml/dL      O2 HGB, VENOUS 83 6 (H) %     Hepatic function panel [[id number]]  (Abnormal) Collected:  06/04/18 1123    Lab Status:  Final result Specimen:  Blood from Arm, Right Updated:  06/04/18 1146     Total Bilirubin 0 40 mg/dL      Bilirubin, Direct 0 13 mg/dL      Alkaline Phosphatase 190 (H) U/L      AST 16 U/L      ALT 21 U/L      Total Protein 8 7 (H) g/dL      Albumin 2 6 (L) g/dL     Basic metabolic panel [[id number]]  (Abnormal) Collected:  06/04/18 1123    Lab Status:  Final result Specimen:  Blood from Arm, Right Updated:  06/04/18 1145     Sodium 136 mmol/L      Potassium 5 5 (H) mmol/L      Chloride 95 (L) mmol/L      CO2 26 mmol/L      Anion Gap 15 (H) mmol/L      BUN 56 (H) mg/dL Creatinine 7 68 (H) mg/dL      Glucose 188 (H) mg/dL      Calcium 9 4 mg/dL      eGFR 7 ml/min/1 73sq m     Narrative:         National Kidney Disease Education Program recommendations are as follows:  GFR calculation is accurate only with a steady state creatinine  Chronic Kidney disease less than 60 ml/min/1 73 sq  meters  Kidney failure less than 15 ml/min/1 73 sq  meters  Protime-INR [[id number]]  (Abnormal) Collected:  06/04/18 1123    Lab Status:  Final result Specimen:  Blood from Arm, Right Updated:  06/04/18 1140     Protime 17 8 (H) seconds      INR 1 51 (H)    APTT [[id number]]  (Abnormal) Collected:  06/04/18 1123    Lab Status:  Final result Specimen:  Blood from Arm, Right Updated:  06/04/18 1140     PTT 39 (H) seconds     CBC and differential [[id number]]  (Abnormal) Collected:  06/04/18 1123    Lab Status:  Final result Specimen:  Blood from Arm, Right Updated:  06/04/18 1129     WBC 14 34 (H) Thousand/uL      RBC 4 02 Million/uL      Hemoglobin 11 3 (L) g/dL      Hematocrit 35 7 (L) %      MCV 89 fL      MCH 28 1 pg      MCHC 31 7 g/dL      RDW 14 9 %      MPV 8 7 (L) fL      Platelets 092 Thousands/uL      Neutrophils Relative 88 (H) %      Lymphocytes Relative 5 (L) %      Monocytes Relative 5 %      Eosinophils Relative 2 %      Basophils Relative 1 %      Neutrophils Absolute 12 65 (H) Thousands/µL      Lymphocytes Absolute 0 65 Thousands/µL      Monocytes Absolute 0 75 Thousand/µL      Eosinophils Absolute 0 22 Thousand/µL      Basophils Absolute 0 07 Thousands/µL     Blood culture #2 [[id number]] Collected:  06/04/18 1123    Lab Status: In process Specimen:  Blood from Arm, Right Updated:  06/04/18 1126                 CT abdomen pelvis with contrast   Final Result by Pillo Leal MD (06/04 1327)   1  Small loculated appearing right pleural effusion partially subpulmonic, decreased  Again empyema is not excluded  2   Stable patchy consolidation at the right lung base    Patchy opacity in the left lower lobe may be slightly denser may be related to atelectasis or pneumonia  3  Small to moderate-sized pericardial effusion, increased  4   Mild to moderate biliary ductal dilatation, stable  1 7 cm sludge ball or stone at the distal CBD, appears more prominent compared to the prior exam   Additional sludge ball or stones identified in the distal CBD  Workstation performed: [id number]         XR chest 2 views   Final Result by Willy Real DO (06/04 1302)   1  Markedly enlarged cardiac silhouette with globular configuration  Correlate for underlying pericardial effusion  2   Mild vascular congestion with small bilateral pleural effusions, right side greater than left  The study was marked in [facility] for immediate notification  Workstation performed: [id number]                    Procedures  Procedures       Phone Contacts  ED Phone Contact    ED Course                         Initial Sepsis Screening     [address] Name 06/04/18 1355                Is the patient's history suggestive of a new or worsening infection? (!)  Yes (Proceed)  -DA        Suspected source of infection suspect infection, source unknown  -DA        Are two or more of the following signs & symptoms of infection both present and new to the patient? (!)  Yes (Proceed)  -DA        Indicate SIRS criteria Leukocytosis (WBC > 59301 IJL); Altered mental status  -DA        If the answer is yes to both questions, suspicion of sepsis is present          If severe sepsis is present AND tissue hypoperfusion perists in the hour after fluid resuscitation or lactate > 4, the patient meets criteria for SEPTIC SHOCK          Are any of the following organ dysfunction criteria present within 6 hours of suspected infection and SIRS criteria that are NOT considered to be chronic conditions?  No  -DA        Organ dysfunction          Date of presentation of severe sepsis          Time of presentation of severe sepsis   Tissue hypoperfusion persists in the hour after crystalloid fluid administration, evidenced, by either:          Was hypotension present within one hour of the conclusion of crystalloid fluid administration?         Date of presentation of septic shock          Time of presentation of septic shock            User Key  (r) = Recorded By, (t) = Taken By, (c) = Cosigned By    [address] Name Provider Type    NANCY Cota Cheung, DO Physician                  MDM  Number of Diagnoses or Management Options  Bilateral lower lobe pneumonia due to Escherichia coli [facility]): new and requires workup  ESRD (end stage renal disease) on dialysis [facility]): new and requires workup  Sepsis, due to unspecified organism [facility]): new and requires workup  Diagnosis management comments:       Initial ED assessment:  77-year-old male history of multiple medical problems presents with altered mental status  Initial DDx includes but is not limited to:   Recurrent urinary tract infection, pneumonia, intra-abdominal infection, hypercarbia, hypoxia    Initial ED plan:   Will follow subsequent protocol, IV antibiotics, will hold off on fluids as patient is a dialysis patient scheduled for dialysis today, urinalysis CT imaging of abdomen, chest x-ray        Final ED summary/disposition:   After evaluation and workup in the emergency department, found to be sepsis secondary to pneumonia, started on antibiotics admitted to hospital spoke with Nephrology who will dialyze him today        Amount and/or Complexity of Data Reviewed  Clinical lab tests: reviewed and ordered  Tests in the radiology section of CPT®: ordered and reviewed  Decide to obtain previous medical records or to obtain history from someone other than the patient: yes  Obtain history from someone other than the patient: yes  Review and summarize past medical records: yes  Discuss the patient with other providers: yes  Independent visualization of images, tracings, or specimens: yes      CritCare Time    Disposition  Final diagnoses:   ESRD (end stage renal disease) on dialysis ([facility] )   Sepsis, due to unspecified organism ([place] [address] )   Bilateral lower lobe pneumonia due to Escherichia coli [facility])     Time reflects when diagnosis was documented in both MDM as applicable and the Disposition within this note     Time User Action Codes Description Comment    6/4/2018  1:10 PM Ardia Reus Add [N18 6,  Z99 2] ESRD (end stage renal disease) on dialysis ([facility] )     6/4/2018  1:10 PM Ardia Reus Modify [N18 6,  Z99 2] ESRD (end stage renal disease) on dialysis ([facility] )     6/4/2018  1:10 PM Ardia Reus Modify [N18 6,  Z99 2] ESRD (end stage renal disease) on dialysis ([facility] )     6/4/2018  1:10 PM Ardia Reus Modify [N18 6,  Z99 2] ESRD (end stage renal disease) on dialysis ([place] [address] )     6/4/2018  1:49 PM Aline Smith, [facility] [A41 9] Sepsis, due to unspecified organism ([place] [address] )     6/4/2018  1:49 PM Shereen Friend [J15 5] Bilateral lower lobe pneumonia due to Escherichia coli [facility])       ED Disposition     ED Disposition Condition Comment    Admit  Case was discussed with Dr Sanya Ji and the patient's admission status was agreed to be Admission Status: inpatient status to the service of Dr Sanya Ji   Follow-up Information    None         Patient's Medications   Discharge Prescriptions    No medications on file     No discharge procedures on file      ED Provider  Electronically Signed by           Hilary Llamas DO  06/04/18 7721

## 2018-06-04 NOTE — CONSULTS
NEPHROLOGY HD NOTE   Hernan Jalloh 74 y o  male MRN: [id number]  Unit/Bed#: -01 Encounter: [id number]    · See full consult note done by Piter Newsome and attested by me  · The patient was seen and examined on hemodialysis      Time: 3 hours  Sodium: 138 Blood flow: 450   Dialyzer: F180 Potassium: 2 Dialysate flow: 700   Access: R arm AVF Bicarbonate: 35 Ultrafiltration goal: 2 5 kg   Medications on HD: none

## 2018-06-04 NOTE — H&P
History and Physical - [facility]    Patient Information: Brodie Villa 74 y o  male MRN: [id number]  Unit/Bed#: -01 Encounter: [id number]  Admitting Physician: Lorraine Mccrya MD  PCP: Luis Lpoez DO  Date of Admission:  06/04/18    Assessment/Plan:    Hospital Problem List:     Principal Problem:    acute Encephalopathy, suspected metabolic  Active Problems:    Type 1 diabetes mellitus with nephropathy ([facility] )    ESRD (end stage renal disease) on dialysis ([place] [address] )    Sepsis ([place] [address] )    S/P percutaneous endoscopic gastrostomy (PEG) tube placement ([place] [address] )    Chronic combined systolic and diastolic CHF (congestive heart failure) ([place] [address] )    Clostridium difficile infection    Acquired hypothyroidism    Paroxysmal atrial fibrillation ([place] [address] )    Dilated cardiomyopathy ([place] [address] )    Recurrent UTI    Nephrolithiasis      Plan for the Primary Problem(s):    · Encephalopathy likely metabolic versus infection related monitor patient closely, aggressive optimization of metabolic abnormalities  · Likely sepsis follow-up on cultures sent from the ED  · Possible HCAP check procalcitonin continue IV cefepime follow up on blood cultures sent from the ED, follow up on sputum cultures, urine Legionella strep antigen  · Possible urinary tract infection he does report urinary symptoms continue IV cefepime follow up on urine culture sensitivities      Plan for Additional Problems:   · End-stage renal disease on hemodialysis on dialysis now nephrology consultation  · Diabetes mellitus type 1 uncontrolled A1c 9 3 on 04/26/2018 now with hyperglycemia continue insulin monitor Accu-Cheks, will request endocrinology input if blood sugars are labile  · Paroxysmal atrial fibrillation on Coumadin monitor INR  · History of C diff infection will have him on p   O  vancomycin prophylaxis while is on antibiotics  · Chronic combined systolic and diastolic congestive heart failure monitor  · Out of bed as able    Discussed with patient and his wife at bedside in detail, questions answered  They are agreeable with ongoing management including consult requested    VTE Prophylaxis: Warfarin (Coumadin)  / sequential compression device   Code Status:  Full code discussed with the wife at bedside  POLST: There is no POLST form on file for this patient (pre-hospital)    Anticipated Length of Stay:  Patient will be admitted on an Inpatient basis with an anticipated length of stay of  More than 2 midnights  Justification for Hospital Stay:  Encephalopathy, pneumonia urinary tract infection requiring close monitoring IV medications as outlined    Chief Complaint:       Confusion  Nausea  Not feeling well generalized weakness    History of Present Illness:    Madelaine Andino is a 74 y o  male who presents with confusion, nausea and vomiting  He patient is presently confused and drowsy, he is minimally participating in the history taking, history is obtained wall from his wife at bedside  She reports for the last 2 3 days he has been feeling poorly with poor appetite, he tried to eat yesterday he was nauseated and vomited he may have aspirated as well  She also reports patient is having urinary symptoms in the form of dysuria, increased frequency, hematuria  Patient also points towards his left flank and reports pain  He has a cough and brings up mucoid to mucopurulent sputum  He was noted to have a fever of 100 9 at home yesterday  He has also noted to be lethargic feeling poorly  For the last couple of days his glucose has been running high in the 300s  Review of Systems:    Review of Systems   All other systems reviewed and are negative        Past Medical and Surgical History:     Past Medical History:   Diagnosis Date    Acquired hypothyroidism     underactive    Anemia     Anxiety     Atrial fibrillation ([facility] )     Cellulitis of foot     right    Choledocholithiasis 2/28/2018    Chronic kidney disease-mineral and bone disorder 11/27/2017  Clostridium difficile infection 04/2017    Diabetes mellitus ([facility] )     IDDM    Dialysis patient ([facility] )     Diarrhea     ESRD (end stage renal disease) on dialysis ([facility] )     GERD (gastroesophageal reflux disease)     History of transfusion 2017    Hx of cardiac arrest     Hypertension     Hyponatremia 8/8/2017    Irregular heart beat     HX of AFIB now NSR    Liver disease     elevated liver enzymes    Muscle weakness     Neuropathy     Right lower lobe pneumonia (HCC) 2/20/2017    Sepsis ([facility] ) 04/2017    Polymicrobial MRSA, VRE    Systolic and diastolic CHF, chronic (HCC)     EF 35%, Grade II DD       Past Surgical History:   Procedure Laterality Date    CATARACT EXTRACTION      CHG GI ENDOSCOPIC ULTRASOUND N/A 3/10/2016    Procedure: LINEAR ENDOSCOPIC U/S;  Surgeon: Gabby Barton MD;  Location: [facility]; Service: Gastroenterology    CHOLECYSTECTOMY      GASTROSTOMY TUBE PLACEMENT N/A 4/12/2017    Procedure: INSERTION PEG TUBE;  Surgeon: Elie Neal MD;  Location:  MAIN OR;  Service:    Erica Nine LEG AMPUTATION THROUGH LOWER TIBIA AND FIBULA Right 4/6/2017    Procedure: AMPUTATION BELOW KNEE (BKA); Surgeon: Leonard Vital DO;  Location: [facility];  Service:     RI CYSTO/URETERO W/LITHOTRIPSY &INDWELL STENT INSRT Left 5/17/2018    Procedure: CYSTOSCOPY,  DILATION OF URETERAL STRICTURE, RETROGRADE PYELOGRAM AND INSERTION STENT URETERAL;  Surgeon: Glory Bartlett MD;  Location: [facility];  Service: Urology    TOE AMPUTATION  2000    TONSILLECTOMY         Meds/Allergies:    Prior to Admission medications    Medication Sig Start Date End Date Taking?  Authorizing Provider   amiodarone 200 mg tablet Take 1 tablet (200 mg total) by mouth daily 3/27/18  Yes Kenny Corral MD   b complex-vitamin C-folic acid (RENAL) 1 mg Take 1 mg by mouth daily   Yes Historical Provider, MD   calcium citrate (CALCITRATE) 950 MG tablet Take 2,400 mg by mouth 3 (three) times a day with meals   Yes Historical Provider, MD   Cholecalciferol (VITAMIN D-3) 1000 units CAPS Take by mouth daily   Yes Historical Provider, MD   Cholestyramine POWD by Does not apply route   Yes Historical Provider, MD   citalopram (CeleXA) 10 mg tablet Take 0 5 tablets (5 mg total) by mouth daily  Patient taking differently: Take 10 mg by mouth   2/9/18  Yes China Hernandes MD   diphenoxylate-atropine (LOMOTIL) 2 5-0 025 mg per tablet TAKE 1 TABLET FOUR TIMES A DAY AS NEEDED FOR DIARRHEA  12/22/17  Yes Historical Provider, MD   gabapentin (NEURONTIN) 100 mg capsule Take 2 capsules (200 mg total) by mouth daily at bedtime 2/9/18  Yes China Hernandes MD   insulin detemir (LEVEMIR) 100 units/mL subcutaneous injection Inject 5 Units under the skin every 12 (twelve) hours 3/4/18  Yes Jose West DO   insulin lispro (HumaLOG) 100 units/mL injection Inject 3 Units under the skin 3 (three) times a day with meals 3/4/18  Yes Jose West DO   lanthanum (FOSRENOL) 1000 MG chewable tablet Chew 1 tablet (1,000 mg total) 3 (three) times a day with meals 2/9/18  Yes China Hernandes MD   levothyroxine 150 mcg tablet daily 3/13/18  Yes Historical Provider, MD   midodrine (PROAMATINE) 2 5 mg tablet Take 2 5 mg by mouth 2 (two) times a day   Yes Historical Provider, MD   Multiple Vitamin (DAILY VALUE MULTIVITAMIN) TABS Take 1 tablet by mouth daily   Yes Historical Provider, MD   nystatin (MYCOSTATIN) powder Apply topically 2 (two) times a day 12/7/17  Yes Janie Sheridan MD   ondansetron (ZOFRAN) 8 mg tablet Take 8 mg by mouth every 8 (eight) hours 3/20/18  Yes Historical Provider, MD   oxyCODONE (OXY-IR) 5 MG capsule Take 1 capsule by mouth every 4 (four) hours as needed   Yes Historical Provider, MD   pantoprazole (PROTONIX) 40 mg tablet Take 1 tablet by mouth daily 10/27/15  Yes Historical Provider, MD   Probiotic Product (PRO-BIOTIC BLEND) CAPS Take 1 capsule by mouth daily   Yes Historical Provider, MD   QUEtiapine (SEROQUEL) 25 mg tablet Take 25 mg by mouth daily at bedtime   Yes Historical Provider, MD   warfarin (COUMADIN) 5 mg tablet Take 2 tablets (10 mg total) by mouth daily  Patient taking differently: Take 10 mg by mouth daily S-T-TH-Sat 3mg  M-W-F 6mg  2/9/18  Yes Deidre Adames MD   menthol-zinc oxide (CALMOSEPTINE) 0 44-20 6 % OINT Apply topically 4/26/18   Historical Provider, MD   docusate sodium (COLACE) 100 mg capsule Take 1 capsule (100 mg total) by mouth 3 (three) times a day for 30 days 5/17/18 6/4/18  Yelena Becker MD     I have reviewed home medications with patient family member  Allergies: No Known Allergies    Social History:     Marital Status: /Civil Union   Occupation:   Patient Pre-hospital Living Situation: home  Patient Pre-hospital Level of Mobility:  Ambulatory dysfunction  Patient Pre-hospital Diet Restrictions: no  Substance Use History:   History   Alcohol Use No     History   Smoking Status    Former Smoker    Packs/day: 1 00    Years: 46 00    Types: Cigarettes    Start date: 5    Quit date: 3/1/2017   Smokeless Tobacco    Never Used     History   Drug Use No       Family History:    Family History   Problem Relation Age of Onset    Diabetes Father     Pancreatic cancer Father     Kidney disease Father     Cancer Other      bladder    Lupus Mother        Physical Exam:     Vitals:   Blood Pressure: 124/74 (06/04/18 1630)  Pulse: 75 (06/04/18 1630)  Temperature: 99 °F (37 2 °C) (06/04/18 1533)  Temp Source: Oral (06/04/18 1533)  Respirations: 18 (06/04/18 1533)  Height: 5' 6" (167 6 cm) (06/04/18 1533)  Weight - Scale: 69 kg (152 lb 1 9 oz) (06/04/18 1533)  SpO2: 95 % (06/04/18 1533)    Physical Exam    Comfortably lying in bed lethargic drowsy but arousable  Neck supple  Lungs diminished breath sounds bilaterally, occasional crackles  Heart sounds S1 and S2 noted  Abdomen soft  Pulses present  Encephalopathy noted  No rash    Additional Data:     Lab Results: I have personally reviewed pertinent reports  Results from last 7 days  Lab Units 06/04/18  1123   WBC Thousand/uL 14 34*   HEMOGLOBIN g/dL 11 3*   HEMATOCRIT % 35 7*   PLATELETS Thousands/uL 337   NEUTROS PCT % 88*   LYMPHS PCT % 5*   MONOS PCT % 5   EOS PCT % 2       Results from last 7 days  Lab Units 06/04/18  1123   SODIUM mmol/L 136   POTASSIUM mmol/L 5 5*   CHLORIDE mmol/L 95*   CO2 mmol/L 26   BUN mg/dL 56*   CREATININE mg/dL 7 68*   CALCIUM mg/dL 9 4   TOTAL PROTEIN g/dL 8 7*   BILIRUBIN TOTAL mg/dL 0 40   ALK PHOS U/L 190*   ALT U/L 21   AST U/L 16   GLUCOSE RANDOM mg/dL 188*       Results from last 7 days  Lab Units 06/04/18  1123   INR  1 51*       Imaging: I have personally reviewed pertinent reports  Xr Chest 2 Views    Result Date: 6/4/2018  Narrative: CHEST INDICATION:   hypoxia, altered mental status  Increased heart rate  Low oxygen  COMPARISON:  None EXAM PERFORMED/VIEWS:  XR CHEST PA & LATERAL Images: 2 FINDINGS: The heart is markedly enlarged  There is a globular configuration of the heart  This is new from the prior study  Lung volumes diminished  Small bilateral pleural effusions, right side greater than left  Rosa and pulmonary vessels are prominent  Osseous structures appear within normal limits for patient age  Impression: 1  Markedly enlarged cardiac silhouette with globular configuration  Correlate for underlying pericardial effusion  2   Mild vascular congestion with small bilateral pleural effusions, right side greater than left  The study was marked in [facility] for immediate notification  Workstation performed: [id number]     Fl Retrograde Pyelogram    Result Date: 5/24/2018  Narrative: C-ARM - INDICATION: Nephrolithiasis   Procedure guidance  COMPARISON:  None TECHNIQUE: FLUOROSCOPY TIME:   28 5 SECONDS 5 FLUOROSCOPIC IMAGES FINDINGS: Fluoroscopic assistance for left retrograde urogram Filling defect noted within the left renal sinus Osseous and soft tissue detail limited by technique       Impression: Fluoroscopic guidance provided for left retrograde urogram  Please refer to the separate procedure notes for additional details  Workstation performed: [id number]     Ct Abdomen Pelvis With Contrast    Result Date: 6/4/2018  Narrative: CT ABDOMEN AND PELVIS WITH IV CONTRAST INDICATION:   Abdominal pain, altered mental status, on hemodialysis  COMPARISON: CT from 2/28/2018 TECHNIQUE:  CT examination of the abdomen and pelvis was performed  Axial, sagittal, and coronal 2D reformatted images were created from the source data and submitted for interpretation  Radiation dose length product (DLP) for this visit:  396 mGy-cm   This examination, like all CT scans performed in the [facility], was performed utilizing techniques to minimize radiation dose exposure, including the use of iterative reconstruction and automated exposure control  IV Contrast:  90 mL of iohexol (OMNIPAQUE) Enteric Contrast:  Enteric contrast was not administered  FINDINGS: ABDOMEN LOWER CHEST:  The visualized heart is enlarged  There is a small to moderate-sized pericardial effusion, increased  There are small bilateral pleural effusions  There is a small loculated appearing right pleural effusion partially subpulmonic, decreased  Stable patchy consolidation noted at the right lung base  Patchy opacity in the left lower lobe is slightly denser may be related to atelectasis or pneumonia  LIVER/BILIARY TREE:  Pneumobilia again present, decreased  There is mild to moderate biliary ductal dilatation, stable  There is a 1 7 cm sludge ball or stone at the distal CBD, appears more prominent compared to the prior exam   Additional sludge ball  or stones identified in the distal CBD  GALLBLADDER:  Gallbladder is surgically absent  SPLEEN:  Unremarkable  PANCREAS:  Slight fullness of the central pancreatic duct, stable  ADRENAL GLANDS:  Unremarkable  KIDNEYS/URETERS:  No hydronephrosis    Left-sided ureteral stent has been placed, appears in satisfactory position  Clustered 1-2 mm calculi in the left kidney lower pole  Possible mild gravel in the left renal pelvis  STOMACH AND BOWEL:  Limited evaluation without enteric contrast   Distal stomach is moderately distended with debris  No bowel obstruction  APPENDIX:  No findings to suggest appendicitis  ABDOMINOPELVIC CAVITY:  Mild pelvic ascites  No free air  No significant lymphadenopathy  VESSELS:  Unremarkable for patient's age  PELVIS REPRODUCTIVE ORGANS:  Unremarkable for patient's age  URINARY BLADDER:  Underdistended limiting evaluation  ABDOMINAL WALL/INGUINAL REGIONS:  Unremarkable  OSSEOUS STRUCTURES:  No acute fracture or destructive osseous lesion  Impression: 1  Small loculated appearing right pleural effusion partially subpulmonic, decreased  Again empyema is not excluded  2   Stable patchy consolidation at the right lung base  Patchy opacity in the left lower lobe may be slightly denser may be related to atelectasis or pneumonia  3  Small to moderate-sized pericardial effusion, increased  4   Mild to moderate biliary ductal dilatation, stable  1 7 cm sludge ball or stone at the distal CBD, appears more prominent compared to the prior exam   Additional sludge ball or stones identified in the distal CBD  Workstation performed: [id number]       [employer]scripts / [facility] Records Reviewed: Yes     ** Please Note: This note has been constructed using a voice recognition system   **

## 2018-06-05 PROBLEM — Z86.19 HISTORY OF CLOSTRIDIUM DIFFICILE COLITIS: Status: ACTIVE | Noted: 2017-09-18

## 2018-06-05 LAB
ANION GAP SERPL CALCULATED.3IONS-SCNC: 10 MMOL/L (ref 4–13)
BASOPHILS # BLD AUTO: 0.05 THOUSANDS/ΜL (ref 0–0.1)
BASOPHILS NFR BLD AUTO: 1 % (ref 0–1)
BUN SERPL-MCNC: 35 MG/DL (ref 5–25)
CALCIUM SERPL-MCNC: 9.3 MG/DL (ref 8.3–10.1)
CHLORIDE SERPL-SCNC: 92 MMOL/L (ref 100–108)
CO2 SERPL-SCNC: 29 MMOL/L (ref 21–32)
CREAT SERPL-MCNC: 5.11 MG/DL (ref 0.6–1.3)
EOSINOPHIL # BLD AUTO: 0.27 THOUSAND/ΜL (ref 0–0.61)
EOSINOPHIL NFR BLD AUTO: 3 % (ref 0–6)
ERYTHROCYTE [DISTWIDTH] IN BLOOD BY AUTOMATED COUNT: 15 % (ref 11.6–15.1)
GFR SERPL CREATININE-BSD FRML MDRD: 11 ML/MIN/1.73SQ M
GLUCOSE SERPL-MCNC: 175 MG/DL (ref 65–140)
GLUCOSE SERPL-MCNC: 226 MG/DL (ref 65–140)
GLUCOSE SERPL-MCNC: 233 MG/DL (ref 65–140)
GLUCOSE SERPL-MCNC: 400 MG/DL (ref 65–140)
GLUCOSE SERPL-MCNC: 401 MG/DL (ref 65–140)
HCT VFR BLD AUTO: 31.4 % (ref 36.5–49.3)
HGB BLD-MCNC: 10 G/DL (ref 12–17)
INR PPP: 1.59 (ref 0.86–1.17)
LYMPHOCYTES # BLD AUTO: 0.76 THOUSANDS/ΜL (ref 0.6–4.47)
LYMPHOCYTES NFR BLD AUTO: 8 % (ref 14–44)
MCH RBC QN AUTO: 28.4 PG (ref 26.8–34.3)
MCHC RBC AUTO-ENTMCNC: 31.8 G/DL (ref 31.4–37.4)
MCV RBC AUTO: 89 FL (ref 82–98)
MONOCYTES # BLD AUTO: 0.97 THOUSAND/ΜL (ref 0.17–1.22)
MONOCYTES NFR BLD AUTO: 10 % (ref 4–12)
NEUTROPHILS # BLD AUTO: 7.77 THOUSANDS/ΜL (ref 1.85–7.62)
NEUTS SEG NFR BLD AUTO: 79 % (ref 43–75)
PHOSPHATE SERPL-MCNC: 6.1 MG/DL (ref 2.3–4.1)
PLATELET # BLD AUTO: 252 THOUSANDS/UL (ref 149–390)
PMV BLD AUTO: 8.8 FL (ref 8.9–12.7)
POTASSIUM SERPL-SCNC: 4.7 MMOL/L (ref 3.5–5.3)
PROCALCITONIN SERPL-MCNC: 13.88 NG/ML
PROTHROMBIN TIME: 18.5 SECONDS (ref 11.8–14.2)
RBC # BLD AUTO: 3.52 MILLION/UL (ref 3.88–5.62)
SODIUM SERPL-SCNC: 131 MMOL/L (ref 136–145)
WBC # BLD AUTO: 9.82 THOUSAND/UL (ref 4.31–10.16)

## 2018-06-05 PROCEDURE — 5A1D70Z PERFORMANCE OF URINARY FILTRATION, INTERMITTENT, LESS THAN 6 HOURS PER DAY: ICD-10-PCS | Performed by: HOSPITALIST

## 2018-06-05 PROCEDURE — 99232 SBSQ HOSP IP/OBS MODERATE 35: CPT | Performed by: INTERNAL MEDICINE

## 2018-06-05 PROCEDURE — 82948 REAGENT STRIP/BLOOD GLUCOSE: CPT

## 2018-06-05 PROCEDURE — 99232 SBSQ HOSP IP/OBS MODERATE 35: CPT | Performed by: PHYSICIAN ASSISTANT

## 2018-06-05 PROCEDURE — 85025 COMPLETE CBC W/AUTO DIFF WBC: CPT | Performed by: INTERNAL MEDICINE

## 2018-06-05 PROCEDURE — 99222 1ST HOSP IP/OBS MODERATE 55: CPT | Performed by: INTERNAL MEDICINE

## 2018-06-05 PROCEDURE — 85610 PROTHROMBIN TIME: CPT | Performed by: INTERNAL MEDICINE

## 2018-06-05 PROCEDURE — 80048 BASIC METABOLIC PNL TOTAL CA: CPT | Performed by: INTERNAL MEDICINE

## 2018-06-05 PROCEDURE — 84100 ASSAY OF PHOSPHORUS: CPT | Performed by: INTERNAL MEDICINE

## 2018-06-05 PROCEDURE — 84145 PROCALCITONIN (PCT): CPT | Performed by: INTERNAL MEDICINE

## 2018-06-05 RX ADMIN — LANTHANUM CARBONATE 1000 MG: 500 TABLET, CHEWABLE ORAL at 08:53

## 2018-06-05 RX ADMIN — VANCOMYCIN HYDROCHLORIDE 125 MG: 1 INJECTION, POWDER, LYOPHILIZED, FOR SOLUTION INTRAVENOUS at 09:01

## 2018-06-05 RX ADMIN — Medication 1 TABLET: at 08:50

## 2018-06-05 RX ADMIN — AMIODARONE HYDROCHLORIDE 200 MG: 200 TABLET ORAL at 08:51

## 2018-06-05 RX ADMIN — PANTOPRAZOLE SODIUM 40 MG: 40 TABLET, DELAYED RELEASE ORAL at 09:03

## 2018-06-05 RX ADMIN — CITALOPRAM HYDROBROMIDE 5 MG: 10 TABLET ORAL at 08:52

## 2018-06-05 RX ADMIN — INSULIN LISPRO 5 UNITS: 100 INJECTION, SOLUTION INTRAVENOUS; SUBCUTANEOUS at 21:39

## 2018-06-05 RX ADMIN — ANORECTAL OINTMENT: 15.7; .44; 24; 20.6 OINTMENT TOPICAL at 08:53

## 2018-06-05 RX ADMIN — CEFEPIME HYDROCHLORIDE 1000 MG: 1 INJECTION, SOLUTION INTRAVENOUS at 12:19

## 2018-06-05 RX ADMIN — Medication 250 MG: at 08:50

## 2018-06-05 RX ADMIN — LANTHANUM CARBONATE 1000 MG: 500 TABLET, CHEWABLE ORAL at 11:55

## 2018-06-05 RX ADMIN — QUETIAPINE FUMARATE 25 MG: 25 TABLET ORAL at 21:38

## 2018-06-05 RX ADMIN — INSULIN LISPRO 3 UNITS: 100 INJECTION, SOLUTION INTRAVENOUS; SUBCUTANEOUS at 11:54

## 2018-06-05 RX ADMIN — NEPHROCAP 1 MG: 1 CAP ORAL at 08:53

## 2018-06-05 RX ADMIN — CHOLESTYRAMINE 4 G: 4 POWDER, FOR SUSPENSION ORAL at 08:50

## 2018-06-05 RX ADMIN — NYSTATIN 1 APPLICATION: 100000 POWDER TOPICAL at 17:42

## 2018-06-05 RX ADMIN — INSULIN LISPRO 1 UNITS: 100 INJECTION, SOLUTION INTRAVENOUS; SUBCUTANEOUS at 11:54

## 2018-06-05 RX ADMIN — VANCOMYCIN HYDROCHLORIDE 125 MG: 1 INJECTION, POWDER, LYOPHILIZED, FOR SOLUTION INTRAVENOUS at 21:38

## 2018-06-05 RX ADMIN — ANORECTAL OINTMENT 1 APPLICATION: 15.7; .44; 24; 20.6 OINTMENT TOPICAL at 17:42

## 2018-06-05 RX ADMIN — Medication 250 MG: at 17:41

## 2018-06-05 RX ADMIN — INSULIN LISPRO 3 UNITS: 100 INJECTION, SOLUTION INTRAVENOUS; SUBCUTANEOUS at 08:51

## 2018-06-05 RX ADMIN — CHOLECALCIFEROL TAB 25 MCG (1000 UNIT) 1000 UNITS: 25 TAB at 08:50

## 2018-06-05 RX ADMIN — MIDODRINE HYDROCHLORIDE 2.5 MG: 2.5 TABLET ORAL at 17:41

## 2018-06-05 RX ADMIN — Medication 1 TABLET: at 16:07

## 2018-06-05 RX ADMIN — GABAPENTIN 200 MG: 100 CAPSULE ORAL at 21:38

## 2018-06-05 RX ADMIN — CEFTAZIDIME 1000 MG: 1 INJECTION, POWDER, FOR SOLUTION INTRAMUSCULAR; INTRAVENOUS at 16:07

## 2018-06-05 RX ADMIN — MIDODRINE HYDROCHLORIDE 2.5 MG: 2.5 TABLET ORAL at 08:50

## 2018-06-05 RX ADMIN — INSULIN DETEMIR 5 UNITS: 100 INJECTION, SOLUTION SUBCUTANEOUS at 21:38

## 2018-06-05 RX ADMIN — INSULIN LISPRO 5 UNITS: 100 INJECTION, SOLUTION INTRAVENOUS; SUBCUTANEOUS at 16:07

## 2018-06-05 RX ADMIN — LANTHANUM CARBONATE 1000 MG: 500 TABLET, CHEWABLE ORAL at 16:09

## 2018-06-05 RX ADMIN — INSULIN LISPRO 2 UNITS: 100 INJECTION, SOLUTION INTRAVENOUS; SUBCUTANEOUS at 08:51

## 2018-06-05 RX ADMIN — INSULIN LISPRO 3 UNITS: 100 INJECTION, SOLUTION INTRAVENOUS; SUBCUTANEOUS at 16:07

## 2018-06-05 RX ADMIN — NYSTATIN: 100000 POWDER TOPICAL at 08:53

## 2018-06-05 RX ADMIN — WARFARIN SODIUM 10 MG: 5 TABLET ORAL at 17:41

## 2018-06-05 RX ADMIN — INSULIN DETEMIR 5 UNITS: 100 INJECTION, SOLUTION SUBCUTANEOUS at 08:50

## 2018-06-05 RX ADMIN — LEVOTHYROXINE SODIUM 150 MCG: 150 TABLET ORAL at 06:13

## 2018-06-05 NOTE — PLAN OF CARE
Problem: DISCHARGE PLANNING - CARE MANAGEMENT  Goal: Discharge to post-acute care or home with appropriate resources  INTERVENTIONS:  - Conduct assessment to determine patient/family and health care team treatment goals, and need for post-acute services based on payer coverage, community resources, and patient preferences, and barriers to discharge  - Address psychosocial, clinical, and financial barriers to discharge as identified in assessment in conjunction with the patient/family and health care team  - Arrange appropriate level of post-acute services according to patients   needs and preference and payer coverage in collaboration with the physician and health care team  - Communicate with and update the patient/family, physician, and health care team regarding progress on the discharge plan  - Arrange appropriate transportation to post-acute venues  Outcome: Progressing  LOS- 1 [address]  PATIENT IS NOT A BUNDLE  PATIENT IS NOT A READMIT  CM met with patient at bedside  Patient lives with his wife, daughter, and son in law, in a 2 story house located in [place]  Patient states he uses a walker, cane, wheelchair, and needs minimum to moderate assistant with ADLS  Patient states he has an aid from [employer] [employer] come to his house 2x a week, and also says he receives Home PT  Per patient he had inpt rehab 1 year ago but can't remember the facility he was at  Patient uses [facility] and can afford all meds  Patient receives Dialysis M,W,F from 12-4  at [facility] in [place]  Patient does not have a POA and does not want any information  Patient receives SSI and relies on his wife for transportation  CM sent a referral to [facility] for [facility] through [facility]  CM dept will continue to follow until d/c

## 2018-06-05 NOTE — PLAN OF CARE
Problem: Potential for Falls  Goal: Patient will remain free of falls  INTERVENTIONS:  - Assess patient frequently for physical needs  -  Identify cognitive and physical deficits and behaviors that affect risk of falls    -  [place] fall precautions as indicated by assessment   - Educate patient/family on patient safety including physical limitations  - Instruct patient to call for assistance with activity based on assessment  - Modify environment to reduce risk of injury  - Consider OT/PT consult to assist with strengthening/mobility   Outcome: Progressing      Problem: INFECTION - ADULT  Goal: Absence or prevention of progression during hospitalization  INTERVENTIONS:  - Assess and monitor for signs and symptoms of infection  - Monitor lab/diagnostic results  - Monitor all insertion sites, i e  indwelling lines, tubes, and drains  - Monitor endotracheal (as able) and nasal secretions for changes in amount and color  - [place] appropriate cooling/warming therapies per order  - Administer medications as ordered  - Instruct and encourage patient and family to use good hand hygiene technique  - Identify and instruct in appropriate isolation precautions for identified infection/condition   Outcome: Progressing      Problem: Prexisting or High Potential for Compromised Skin Integrity  Goal: Skin integrity is maintained or improved  INTERVENTIONS:  - Identify patients at risk for skin breakdown  - Assess and monitor skin integrity  - Assess and monitor nutrition and hydration status  - Monitor labs (i e  albumin)  - Assess for incontinence   - Turn and reposition patient  - Assist with mobility/ambulation  - Relieve pressure over bony prominences  - Avoid friction and shearing  - Provide appropriate hygiene as needed including keeping skin clean and dry  - Evaluate need for skin moisturizer/barrier cream  - Collaborate with interdisciplinary team (i e  Nutrition, Rehabilitation, etc )   - Patient/family teaching Outcome: Progressing      Problem: Nutrition/Hydration-ADULT  Goal: Nutrient/Hydration intake appropriate for improving, restoring or maintaining nutritional needs  Monitor and assess patient's nutrition/hydration status for malnutrition (ex- brittle hair, bruises, dry skin, pale skin and conjunctiva, muscle wasting, smooth red tongue, and disorientation)  Collaborate with interdisciplinary team and initiate plan and interventions as ordered  Monitor patient's weight and dietary intake as ordered or per policy  Utilize nutrition screening tool and intervene per policy  Determine patient's food preferences and provide high-protein, high-caloric foods as appropriate       INTERVENTIONS:  - Monitor oral intake, urinary output, labs, and treatment plans  - Assess nutrition and hydration status and recommend course of action  - Evaluate amount of meals eaten  - Assist patient with eating if necessary   - Allow adequate time for meals  - Recommend/ encourage appropriate diets, oral nutritional supplements, and vitamin/mineral supplements  - Order, calculate, and assess calorie counts as needed  - Recommend, monitor, and adjust tube feedings and TPN/PPN based on assessed needs  - Assess need for intravenous fluids  - Provide specific nutrition/hydration education as appropriate  - Include patient/family/caregiver in decisions related to nutrition   Outcome: Progressing

## 2018-06-05 NOTE — CONSULTS
Consultation - Infectious Disease   Lia Dye 74 y o  male MRN: [id number]  Unit/Bed#: -01 Encounter: [id number]      IMPRESSION & RECOMMENDATIONS:   Impression/Recommendations:  1  Leukocytosis  Consider UTI given urinary complaints and recent procedure  No cough to suggest pneumonia and CT more consistent with atelectasis  Blood cultures negative  CT A/P suggests CBD stone with chronic ductal dilatation but normal LFTs and absence of abdominal pain makes cholangitis unlikely  Clinically stable and nontoxic  Rec:  · Change antibiotics as below for empirical treatment of UTI  · Follow up final blood cultures from admission  · Follow temperatures closely  · Check CBC in a m  · Continue supportive care as per the primary service    2  Possible UTI  Related to recent urological instrumentation/stent  Recent history of cefepime-intermediate Pseudomonas  Rec:  · Change cefepime to ceftazidime  · Follow up urine culture and tailor antibiotics as indicated  · Follow  symptoms closely    3  History of C  diff  No current symptoms to suggest active infection  High risk for recurrence given current broad-spectrum antibiotics  Rec:  · Continue p o  vancomycin prophylaxis    4  ESRD on HD via AVF    Discussed with primary service    Antibiotics:  Cefepime # 2    Thank you for this consultation  We will follow along with you  HISTORY OF PRESENT ILLNESS:  Reason for Consult:  Sepsis    HPI: Lia Dye is a 74 y o  male with multiple medical problems as listed below  He recently underwent cystoscopy, dilation of ureteral stricture, and stent insertion by Urology on 05/17  Since that time he has been having pain along his left flank with urination with intermittent hematuria  Of note review of his prior urine culture shows that he is chronically colonized with Pseudomonas with the most recent culture intermediately susceptible to cefepime    He has a history of chronic left foot wounds followed at the wound Center at [facility] last seen on 05/22 and felt to be doing well with the wound almost healed  He presented to the emergency department yesterday after his family noticed him to be confused with hypoxia  He was also noted to have recent elevations in his BGs  Upon presentation he had a low-grade fever and leukocytosis  His UA showed TNTC WBC  A chest x-ray showed vascular congestion with bilateral pleural effusions  A CT A/P showed small right pleural effusion and stable patchy bibasilar consolidation  He was started on cefepime  Since admission he has remained afebrile and his leukocytosis is improving  We are asked to comment on further evaluation and management  REVIEW OF SYSTEMS:  Patient states he was vomiting all day and all night yesterday  Denies cough  States he always has diarrhea  A complete system-based review of systems is otherwise negative      PAST MEDICAL HISTORY:  Past Medical History:   Diagnosis Date    Acquired hypothyroidism     underactive    Anemia     Anxiety     Atrial fibrillation ([facility] )     Cellulitis of foot     right    Choledocholithiasis 2/28/2018    Chronic kidney disease-mineral and bone disorder 11/27/2017    Clostridium difficile infection 04/2017    Diabetes mellitus ([facility] )     IDDM    Dialysis patient ([facility] )     Diarrhea     ESRD (end stage renal disease) on dialysis ([place] [id number] )     GERD (gastroesophageal reflux disease)     History of transfusion 2017    Hx of cardiac arrest     Hypertension     Hyponatremia 8/8/2017    Irregular heart beat     HX of AFIB now NSR    Liver disease     elevated liver enzymes    Muscle weakness     Neuropathy     Right lower lobe pneumonia (HCC) 2/20/2017    Sepsis ([facility] ) 04/2017    Polymicrobial MRSA, VRE    Systolic and diastolic CHF, chronic (HCC)     EF 35%, Grade II DD     Past Surgical History:   Procedure Laterality Date    CATARACT EXTRACTION      CHG GI ENDOSCOPIC ULTRASOUND N/A 3/10/2016    Procedure: LINEAR ENDOSCOPIC U/S;  Surgeon: Jen Barakat MD;  [facility]: [facility]; Service: Gastroenterology    CHOLECYSTECTOMY      GASTROSTOMY TUBE PLACEMENT N/A 2017    Procedure: INSERTION PEG TUBE;  Surgeon: Anais Childers MD;  [facility];  Service:    [place] LEG AMPUTATION THROUGH LOWER TIBIA AND FIBULA Right 2017    Procedure: AMPUTATION BELOW KNEE (BKA); Surgeon: Mo Kwan DO;  Location[facility];  Service:     NH CYSTO/URETERO W/LITHOTRIPSY &INDWELL STENT INSRT Left 2018    Procedure: CYSTOSCOPY,  DILATION OF URETERAL STRICTURE, RETROGRADE PYELOGRAM AND INSERTION STENT URETERAL;  Surgeon: Naa Herrera MD;  Location: [facility];  Service: Urology    TOE AMPUTATION  2000    TONSILLECTOMY         FAMILY HISTORY:  Non-contributory    SOCIAL HISTORY:  History   Alcohol Use No     History   Drug Use No     History   Smoking Status    Former Smoker    Packs/day: 1 00    Years: 46 00    Types: Cigarettes    Start date: 5    Quit date: 3/1/2017   Smokeless Tobacco    Never Used       ALLERGIES:  No Known Allergies    MEDICATIONS:  All current active medications have been reviewed      PHYSICAL EXAM:  Vitals:  HR:  [73-86] 81  Resp:  [18] 18  BP: ()/(52-74) 123/61  SpO2:  [90 %-98 %] 90 %  Temp (24hrs), Av 7 °F (37 1 °C), Min:98 3 °F (36 8 °C), Max:99 °F (37 2 °C)  Current: Temperature: 98 3 °F (36 8 °C)     Physical Exam:  General:  Chronically ill-appearing male holding urinal in the bed, in no acute distress  Eyes:  Conjunctive clear with no hemorrhages or effusions  Oropharynx:  No ulcers, no lesions  Neck:  Supple, no lymphadenopathy  Lungs:  Clear to auscultation bilaterally, no accessory muscle use  Cardiac:  Regular rate and rhythm, no murmurs  Abdomen:  Soft, non-tender, non-distended  Extremities:  No peripheral cyanosis, clubbing, or edema, right AKA status post  Skin:  No rashes, no ulcers  Neurological:  Moves all four extremities spontaneously, sensation grossly intact  Somewhat lethargic but able to provide reasonable history  Left foot:  Clean based callused ulceration over lateral foot without purulence, malodor, or cellulitis  LABS, IMAGING, & OTHER STUDIES:  Lab Results:  I have personally reviewed pertinent labs  Results from last 7 days  Lab Units 06/05/18  0620 06/04/18  1123   SODIUM mmol/L 131* 136   POTASSIUM mmol/L 4 7 5 5*   CHLORIDE mmol/L 92* 95*   CO2 mmol/L 29 26   ANION GAP mmol/L 10 15*   BUN mg/dL 35* 56*   CREATININE mg/dL 5 11* 7 68*   EGFR ml/min/1 73sq m 11 7   GLUCOSE RANDOM mg/dL 226* 188*   CALCIUM mg/dL 9 3 9 4   AST U/L  --  16   ALT U/L  --  21   ALK PHOS U/L  --  190*   TOTAL PROTEIN g/dL  --  8 7*   BILIRUBIN TOTAL mg/dL  --  0 40       Results from last 7 days  Lab Units 06/05/18  0620 06/04/18  1123   WBC Thousand/uL 9 82 14 34*   HEMOGLOBIN g/dL 10 0* 11 3*   PLATELETS Thousands/uL 252 337       Results from last 7 days  Lab Units 06/04/18  1208 06/04/18  1123   BLOOD CULTURE   --  No Growth at 24 hrs  URINE CULTURE  Culture results to follow  --        Imaging Studies:   I have personally reviewed pertinent imaging study reports and images in PACS  CTA/P left greater than right streaky bibasilar atelectasis, stable common bile duct stone with biliary ductal dilatation, chronic loculated small right pleural effusion  Chest x-ray 6/4 large globular cardiac silhouette suggesting pericardial effusion    EKG, Pathology, and Other Studies:   I have personally reviewed pertinent reports

## 2018-06-05 NOTE — PROGRESS NOTES
[address] [facility] NOTE   Dustin Pat 74 y o  male MRN: [id number]  Unit/Bed#: -01 Encounter: [id number]  Reason for Consult: ESRD/HD    ASSESSMENT and PLAN:  1  [email or web address]   -hemodialysis yesterday  Improvement in symptoms such as edema in shortness of breath today  -plan on treatment tomorrow  2  Access:  AV fistula right arm  No flow issues- good bruit and thrill  3  Hypertension:  -blood pressure elevated yesterday likely volume mediated  Acceptable today  -on midodrine 2 5 mg b i d   Patient takes the morning dose a half an hour before dialysis  4  Anemia:  Hemoglobin at goal  - on micera  5  Mineral and bone disease:  -phosphorus 6 1-above goal, renal diet, continue binder  -last PTH at goal  6  Chronic combined CHF, Dilated cardiomyopathy-volume overload, history of aortic valve endocarditis:  Ejection fraction 35%  -small to moderate pericardial effusion-increased in size (seen on CT)  Previous echocardiogram 11/17 showed trace pericardial effusion    -vascular congestion on chest x-ray with bilateral pleural effusions right greater than left  -previous thoracentesis right pleural effusion  - recently seen by Dr Dc Rome in the beginning of May  7  Encephalopathy:   improved  Patient seems to be back to baseline today  Will need to assess outpatient clearances   8  Anion gap acidosis:    -Lactic acid 1 8, bicarbonate 26  -venous blood gas:  PH 7 38  -urine negative for ketones  -improved with dialysis  9  Nausea, vomiting:  CT of the abdomen shows mild to moderate biliary ductal dilation  10  History of nephrolithiasis:  Recent pyelogram   Filling defect left renal sinus  11  PAF:   remains in normal sinus rhythm  12  Hyperkalemia:   resolved with hemodialysis  13  Abnormal urinalysis: innumerable WBCs, field obscured by bacteria  -awaiting culture  14    Sepsis:  Awaiting cultures, procalcitonin level 13 88       SUMMARY OF RECOMMENDATIONS:  · Hemodialysis tomorrow    SUBJECTIVE / INTERVAL HISTORY:  Feeling better today  Less short of breath  Remains fatigued    OBJECTIVE:  Current Weight: Weight - Scale: 69 kg (152 lb 1 9 oz)  Vitals:    06/04/18 1900 06/04/18 1915 06/04/18 2245 06/05/18 0700   BP: 97/61 120/66 127/61 123/61   BP Location:   Left arm Left arm   Pulse: 81 81 78 81   Resp:   18 18   Temp:   98 9 °F (37 2 °C) 98 3 °F (36 8 °C)   TempSrc:   Oral Oral   SpO2:   98% 90%   Weight:       Height:           Intake/Output Summary (Last 24 hours) at 06/05/18 0933  Last data filed at 06/04/18 1915   Gross per 24 hour   Intake             1000 ml   Output             3030 ml   Net            -2030 ml     General:  Quietly lying in bed  Awakened from sleep  No acute distress  Skin:  Warm and dry, no rash  Eyes:  Sclera anicteric  ENT:  Oropharynx moist  Neck:  No JVD  Chest:  Fairly clear, fine crackles left base, decreased right lower lobe  CVS:  Regular rate and rhythm, no murmur, rub, gallop  Abdomen:  Soft nondistended, nontender, bowel sounds present  Extremities:  No edema  :  No Vanegas  Neuro:  No acute deficits  Psych:   Withdrawn  Medications:    Current Facility-Administered Medications:     aluminum-magnesium hydroxide-simethicone (MYLANTA) 200-200-20 mg/5 mL oral suspension 15 mL, 15 mL, Oral, Q6H PRN, Mariah Castaneda MD    amiodarone tablet 200 mg, 200 mg, Oral, Daily, Mariah Castaneda MD, 200 mg at 06/05/18 0851    b complex-vitamin C-folic acid (RENAL) capsule 1 mg, 1 mg, Oral, Daily, Mariah Castaneda MD, 1 mg at 06/05/18 0853    calcium carbonate (OYSTER SHELL,OSCAL) 500 mg tablet 1 tablet, 1 tablet, Oral, BID With Meals, Mariah Castaneda MD, 1 tablet at 06/05/18 0850    cefepime (MAXIPIME) IVPB (premix) 1,000 mg, 1,000 mg, Intravenous, Q24H, Mariah Castaneda MD    cholecalciferol (VITAMIN D3) tablet 1,000 Units, 1,000 Units, Oral, Daily, Mariah Castaneda MD, 1,000 Units at 06/05/18 0850    cholestyramine sugar free (QUESTRAN LIGHT) packet 4 g, 4 g, Oral, Daily, Eli Carty MD, 4 g at 06/05/18 0850    citalopram (CeleXA) tablet 5 mg, 5 mg, Oral, Daily, Eli Carty MD, 5 mg at 06/05/18 0852    diphenoxylate-atropine (LOMOTIL) 2 5-0 025 mg per tablet 1 tablet, 1 tablet, Oral, 4x Daily PRN, Eli Carty MD    gabapentin (NEURONTIN) capsule 200 mg, 200 mg, Oral, HS, Eli Carty MD, 200 mg at 06/04/18 2136    insulin detemir (LEVEMIR) subcutaneous injection 5 Units, 5 Units, Subcutaneous, Q12H [address], Eli Carty MD, 5 Units at 06/05/18 0850    insulin lispro (HumaLOG) 100 units/mL subcutaneous injection 1-5 Units, 1-5 Units, Subcutaneous, TID AC, 2 Units at 06/05/18 0851 **AND** Fingerstick Glucose (POCT), , , TID AC, Eli Carty MD    insulin lispro (HumaLOG) 100 units/mL subcutaneous injection 3 Units, 3 Units, Subcutaneous, TID With Meals, Eli Carty MD, 3 Units at 06/05/18 0924    lanthanum (FOSRENOL) chewable tablet 1,000 mg, 1,000 mg, Oral, TID With Meals, Eli Carty MD, 1,000 mg at 06/05/18 3415    levothyroxine tablet 150 mcg, 150 mcg, Oral, Daily Before Breakfast, Eli Carty MD, 150 mcg at 06/05/18 2055    menthol-zinc oxide (CALMOSEPTINE) 0 44-20 6 % ointment, , Topical, BID, Eli Carty MD    midodrine (PROAMATINE) tablet 2 5 mg, 2 5 mg, Oral, BID, Savanah Ache, CRNP, 2 5 mg at 06/05/18 0850    nystatin (MYCOSTATIN) powder, , Topical, BID, Eli Carty MD    ondansetron [facility]) injection 4 mg, 4 mg, Intravenous, Q6H PRN, Eli Carty MD    oxyCODONE (ROXICODONE) IR tablet 5 mg, 5 mg, Oral, Q6H PRN, Eli Carty MD    pantoprazole (PROTONIX) EC tablet 40 mg, 40 mg, Oral, Daily, Eli Carty MD, 40 mg at 06/05/18 4068    QUEtiapine (SEROquel) tablet 25 mg, 25 mg, Oral, HS, Eli Carty MD, 25 mg at 06/04/18 2139    saccharomyces boulardii (FLORASTOR) capsule 250 mg, 250 mg, Oral, BID, Jennifer Diaz MD, 250 mg at 06/05/18 0850    vancomycin (VANCOCIN) oral solution 125 mg, 125 mg, Oral, Q12H [address], Jennifer Diaz MD, 125 mg at 06/05/18 0901    warfarin (COUMADIN) tablet 10 mg, 10 mg, Oral, Daily (warfarin), Jennifer Diaz MD, 10 mg at 06/04/18 2024    Laboratory Results:    Results from last 7 days  Lab Units 06/05/18  0620 06/04/18  1123   WBC Thousand/uL 9 82 14 34*   HEMOGLOBIN g/dL 10 0* 11 3*   HEMATOCRIT % 31 4* 35 7*   PLATELETS Thousands/uL 252 337   SODIUM mmol/L 131* 136   POTASSIUM mmol/L 4 7 5 5*   CHLORIDE mmol/L 92* 95*   CO2 mmol/L 29 26   BUN mg/dL 35* 56*   CREATININE mg/dL 5 11* 7 68*   CALCIUM mg/dL 9 3 9 4   PHOSPHORUS mg/dL 6 1*  --    TOTAL PROTEIN g/dL  --  8 7*   GLUCOSE RANDOM mg/dL 226* 188*     Previous work up:

## 2018-06-05 NOTE — ASSESSMENT & PLAN NOTE
· Patient with sepsis criteria present on admission including leukocytosis, fever prior to arrival, altered mental status and also noted to have significantly elevated procalcitonin level (although again this can be falsely elevated in dialysis patients)  · Unfortunately the patient has multiple possible sources of infection:  He was reporting dysuria and also back pain with urination which suggests urinary source  He had just recently had lithotripsy and stents placed by Urology  Also had N/V ?gastroenteritis    In addition he has abnormal CT scan imaging concerning for possible pneumonia/empyema and apparently had cough prior to arrival   Patient also has history of C diff colitis and multiple hospitalizations   · Seems to have clinically improved on IV cefepime but would appreciate guidance as far as tapering antibiotics therefore will ask for Infectious Disease input

## 2018-06-05 NOTE — ASSESSMENT & PLAN NOTE
· Multifactorial acute encephalopathy, likely related to infection/toxic metabolic  · Seems to be clinically much improved overnight, wife at bedside agrees

## 2018-06-05 NOTE — SOCIAL WORK
LOS- 1 DAY  PATIENT IS NOT A BUNDLE  PATIENT IS NOT A READMIT  CM met with patient at bedside  Patient lives with his wife, daughter, and son in law, in a 2 story house located in [place]  Patient states he uses a walker, cane, wheelchair, and needs minimum to moderate assistant with ADLS  Patient states he has an aid from [employer] come to his house 2x a week, and also says he receives Home PT  Per patient he had inpt rehab 1 year ago but can't remember the facility he was at  Patient uses [facility] and can afford all meds  Patient receives Dialysis M,W,F from 12-4  at [facility] in [place]  Patient does not have a POA and does not want any information  Patient receives SSI and relies on his wife for transportation  CM sent a referral to [facility] for [facility] through [facility]  CM dept will continue to follow until d/c

## 2018-06-05 NOTE — PROGRESS NOTES
Progress Note - Donovan Lopez 1950, 74 y o  male MRN: [id number]    Unit/Bed#: -01 Encounter: [id number]    Primary Care Provider: Kacie Carbajal DO   Date and time admitted to hospital: 6/4/2018 10:25 AM    Sepsis [facility])   Assessment & Plan    · Patient with sepsis criteria present on admission including leukocytosis, fever prior to arrival, altered mental status and also noted to have significantly elevated procalcitonin level (although again this can be falsely elevated in dialysis patients)  · Unfortunately the patient has multiple possible sources of infection:  He was reporting dysuria and also back pain with urination which suggests urinary source  He had just recently had lithotripsy and stents placed by Urology  Also had N/V ?gastroenteritis    In addition he has abnormal CT scan imaging concerning for possible pneumonia/empyema and apparently had cough prior to arrival   Patient also has history of C diff colitis and multiple hospitalizations   · Seems to have clinically improved on IV cefepime but would appreciate guidance as far as tapering antibiotics therefore will ask for Infectious Disease input        * acute Encephalopathy, suspected metabolic   Assessment & Plan    · Multifactorial acute encephalopathy, likely related to infection/toxic metabolic  · Seems to be clinically much improved overnight, wife at bedside agrees        History of Clostridium difficile colitis   Assessment & Plan    · Must continue oral vancomycin while on IV cefepime        ESRD (end stage renal disease) on dialysis [facility])   Assessment & Plan    · Appreciate renal input-- continue HD  · Monitor electrolyes  · Midodrine for low BP        Chronic combined systolic and diastolic CHF (congestive heart failure) ([facility])   Assessment & Plan    · Continue volume management through dialysis        Paroxysmal atrial fibrillation (HCC)   Assessment & Plan    · Continue amiodarone  · INR subtherapeutic, continue 10 mg dose and recheck INR in a m  Type 1 diabetes mellitus with nephropathy (HCC)   Assessment & Plan    · BS relatively stable currently, continue low dose levemir with SSI          VTE Pharmacologic Prophylaxis:   Pharmacologic: Warfarin (Coumadin)  Mechanical VTE Prophylaxis in Place: No    Patient Centered Rounds: I have performed bedside rounds with nursing staff today  Discussions with Specialists or Other Care Team Provider:  Case management, Infectious Disease    Education and Discussions with Family / Patient:  Wife at bedside    Time Spent for Care: 30 minutes  More than 50% of total time spent on counseling and coordination of care as described above  Current Length of Stay: 1 day(s)    Current Patient Status: Inpatient   Certification Statement: The patient will continue to require additional inpatient hospital stay due to Sepsis, unknown source    Discharge Plan: home when stable, not ready    Code Status: Level 1 - Full Code    Subjective: Wife at bedside reports that patient had been complaining of back pain when he urinated so she called the urologist and they recommended giving him pain medicine and that it could be from the stent  When he started having fevers, nausea, vomiting, and altered mental status however she was concerned and decided to bring him in  She feels he is doing better today  Patient of self also states he is feeling much better  He denies chest pain, shortness of breath, cough, fever, chills, abdominal pain, diarrhea, dysuria  He ate a full meal     Objective:     Vitals:   Temp (24hrs), Av 7 °F (37 1 °C), Min:98 3 °F (36 8 °C), Max:99 °F (37 2 °C)    HR:  [73-86] 81  Resp:  [18] 18  BP: ()/(52-74) 123/61  SpO2:  [90 %-100 %] 90 %  Body mass index is 24 55 kg/m²  Input and Output Summary (last 24 hours):        Intake/Output Summary (Last 24 hours) at 18 1300  Last data filed at 18 0900   Gross per 24 hour   Intake             1180 ml   Output 3000 ml   Net            -1820 ml       Physical Exam:     Physical Exam   Constitutional: No distress  Sitting up in bed eating, nontoxic appearing  HENT:   Head: Normocephalic and atraumatic  Eyes: Conjunctivae are normal  Right eye exhibits no discharge  Left eye exhibits no discharge  No scleral icterus  Neck: Neck supple  Cardiovascular: Normal rate, regular rhythm and normal heart sounds  No murmur heard  Pulmonary/Chest: Effort normal  No respiratory distress  He has no wheezes  Very mild crackles right lung base  No cough, no dyspnea   Abdominal: Soft  Bowel sounds are normal  He exhibits no distension  There is no tenderness  There is no rebound  Musculoskeletal: He exhibits deformity (BKA)  He exhibits no edema  Neurological: He is alert  Awake alert interactive pleasant and cooperative  No evidence of tremors  Oriented to current location  Knew that it was 2018 but was insisting that it was December  Skin: Skin is warm and dry  No rash noted  He is not diaphoretic  No erythema  No pallor  Psychiatric: He has a normal mood and affect  Nursing note and vitals reviewed        Additional Data:     Labs:      Results from last 7 days  Lab Units 06/05/18  0620   WBC Thousand/uL 9 82   HEMOGLOBIN g/dL 10 0*   HEMATOCRIT % 31 4*   PLATELETS Thousands/uL 252   NEUTROS PCT % 79*   LYMPHS PCT % 8*   MONOS PCT % 10   EOS PCT % 3       Results from last 7 days  Lab Units 06/05/18  0620 06/04/18  1123   SODIUM mmol/L 131* 136   POTASSIUM mmol/L 4 7 5 5*   CHLORIDE mmol/L 92* 95*   CO2 mmol/L 29 26   BUN mg/dL 35* 56*   CREATININE mg/dL 5 11* 7 68*   CALCIUM mg/dL 9 3 9 4   TOTAL PROTEIN g/dL  --  8 7*   BILIRUBIN TOTAL mg/dL  --  0 40   ALK PHOS U/L  --  190*   ALT U/L  --  21   AST U/L  --  16   GLUCOSE RANDOM mg/dL 226* 188*       Results from last 7 days  Lab Units 06/05/18  0039   INR  1 59*       Results from last 7 days  Lab Units 06/05/18  1119 06/05/18  0749 06/04/18 2057 06/04/18  1706   POC GLUCOSE mg/dl 175* 233* 265* 198*           * I Have Reviewed All Lab Data Listed Above  * Additional Pertinent Lab Tests Reviewed: Gaudencio 68 Admission Reviewed    Imaging:    Imaging Reports Reviewed Today Include: CT scan  Imaging Personally Reviewed by Myself Includes:      Recent Cultures (last 7 days):       Results from last 7 days  Lab Units 06/04/18  1208   URINE CULTURE  Culture results to follow         Last 24 Hours Medication List:     Current Facility-Administered Medications:  aluminum-magnesium hydroxide-simethicone 15 mL Oral Q6H PRN Arlyn Koyanagi, MD    amiodarone 200 mg Oral Daily Arlyn Koyanagi, MD    b complex-vitamin C-folic acid 1 mg Oral Daily Arlyn Koyanagi, MD    calcium carbonate 1 tablet Oral BID With Meals Arlyn Koyanagi, MD    cefepime 1,000 mg Intravenous Q24H Arlyn Koyanagi, MD Last Rate: 1,000 mg (06/05/18 1219)   cholecalciferol 1,000 Units Oral Daily Arlyn Koyanagi, MD    cholestyramine sugar free 4 g Oral Daily Arlyn Koyanagi, MD    citalopram 5 mg Oral Daily Arlyn Koyanagi, MD    diphenoxylate-atropine 1 tablet Oral 4x Daily PRN Arlyn Koyanagi, MD    gabapentin 200 mg Oral HS Arlyn Koyanagi, MD    insulin detemir 5 Units Subcutaneous Q12H [facility] Arlyn Koyanagi, MD    insulin lispro 1-5 Units Subcutaneous TID AC Arlyn Koyanagi, MD    insulin lispro 3 Units Subcutaneous TID With Meals Arlyn Koyanagi, MD    lanthanum 1,000 mg Oral TID With Meals Arlyn Koyanagi, MD    levothyroxine 150 mcg Oral Daily Before Breakfast Arlyn Koyanagi, MD    menthol-zinc oxide  Topical BID Arlyn Koyanagi, MD    midodrine 2 5 mg Oral BID Rufino Holiday, CRNP    nystatin  Topical BID Arlyn Koyanagi, MD    ondansetron 4 mg Intravenous Q6H PRN Arlyn Koyanagi, MD    oxyCODONE 5 mg Oral Q6H PRN Arlyn Koyanagi, MD    pantoprazole 40 mg Oral Daily Shlomo Ramachandran MD    QUEtiapine 25 mg Oral HS Shlomo Ramachandran MD    saccharomyces boulardii 250 mg Oral BID Shlomo Ramachandran MD    vancomycin 125 mg Oral Q12H Deja [address] MD    warfarin 10 mg Oral Daily (warfarin) Shlomo Ramachandran MD         Today, Patient Was Seen By: Danyelle Rivera PA-C    ** Please Note: Dictation voice to text software may have been used in the creation of this document   **

## 2018-06-06 ENCOUNTER — APPOINTMENT (INPATIENT)
Dept: DIALYSIS | Facility: HOSPITAL | Age: 68
DRG: 689 | End: 2018-06-06
Payer: COMMERCIAL

## 2018-06-06 LAB
BACTERIA UR CULT: ABNORMAL
BACTERIA UR CULT: ABNORMAL
GLUCOSE SERPL-MCNC: 119 MG/DL (ref 65–140)
GLUCOSE SERPL-MCNC: 172 MG/DL (ref 65–140)
GLUCOSE SERPL-MCNC: 214 MG/DL (ref 65–140)
GLUCOSE SERPL-MCNC: 321 MG/DL (ref 65–140)
GLUCOSE SERPL-MCNC: 88 MG/DL (ref 65–140)

## 2018-06-06 PROCEDURE — 99232 SBSQ HOSP IP/OBS MODERATE 35: CPT | Performed by: INTERNAL MEDICINE

## 2018-06-06 PROCEDURE — 99232 SBSQ HOSP IP/OBS MODERATE 35: CPT | Performed by: PHYSICIAN ASSISTANT

## 2018-06-06 PROCEDURE — 82948 REAGENT STRIP/BLOOD GLUCOSE: CPT

## 2018-06-06 PROCEDURE — 90935 HEMODIALYSIS ONE EVALUATION: CPT | Performed by: INTERNAL MEDICINE

## 2018-06-06 RX ORDER — OXYCODONE HYDROCHLORIDE 5 MG/1
2.5 TABLET ORAL EVERY 6 HOURS PRN
Status: DISCONTINUED | OUTPATIENT
Start: 2018-06-06 | End: 2018-06-07 | Stop reason: HOSPADM

## 2018-06-06 RX ADMIN — INSULIN LISPRO 3 UNITS: 100 INJECTION, SOLUTION INTRAVENOUS; SUBCUTANEOUS at 17:18

## 2018-06-06 RX ADMIN — ANORECTAL OINTMENT: 15.7; .44; 24; 20.6 OINTMENT TOPICAL at 09:34

## 2018-06-06 RX ADMIN — NYSTATIN: 100000 POWDER TOPICAL at 17:17

## 2018-06-06 RX ADMIN — VANCOMYCIN HYDROCHLORIDE 125 MG: 1 INJECTION, POWDER, LYOPHILIZED, FOR SOLUTION INTRAVENOUS at 22:00

## 2018-06-06 RX ADMIN — INSULIN LISPRO 3 UNITS: 100 INJECTION, SOLUTION INTRAVENOUS; SUBCUTANEOUS at 09:32

## 2018-06-06 RX ADMIN — LANTHANUM CARBONATE 1000 MG: 500 TABLET, CHEWABLE ORAL at 13:33

## 2018-06-06 RX ADMIN — WARFARIN SODIUM 10 MG: 5 TABLET ORAL at 17:17

## 2018-06-06 RX ADMIN — Medication 1 TABLET: at 13:34

## 2018-06-06 RX ADMIN — PANTOPRAZOLE SODIUM 40 MG: 40 TABLET, DELAYED RELEASE ORAL at 13:35

## 2018-06-06 RX ADMIN — GABAPENTIN 200 MG: 100 CAPSULE ORAL at 21:38

## 2018-06-06 RX ADMIN — CHOLECALCIFEROL TAB 25 MCG (1000 UNIT) 1000 UNITS: 25 TAB at 13:34

## 2018-06-06 RX ADMIN — Medication 250 MG: at 09:35

## 2018-06-06 RX ADMIN — NEPHROCAP 1 MG: 1 CAP ORAL at 13:34

## 2018-06-06 RX ADMIN — CEFTAZIDIME 1000 MG: 1 INJECTION, POWDER, FOR SOLUTION INTRAMUSCULAR; INTRAVENOUS at 14:45

## 2018-06-06 RX ADMIN — VANCOMYCIN HYDROCHLORIDE 125 MG: 1 INJECTION, POWDER, LYOPHILIZED, FOR SOLUTION INTRAVENOUS at 09:39

## 2018-06-06 RX ADMIN — QUETIAPINE FUMARATE 25 MG: 25 TABLET ORAL at 21:38

## 2018-06-06 RX ADMIN — LEVOTHYROXINE SODIUM 150 MCG: 150 TABLET ORAL at 05:50

## 2018-06-06 RX ADMIN — INSULIN DETEMIR 5 UNITS: 100 INJECTION, SOLUTION SUBCUTANEOUS at 09:33

## 2018-06-06 RX ADMIN — INSULIN LISPRO 2 UNITS: 100 INJECTION, SOLUTION INTRAVENOUS; SUBCUTANEOUS at 09:35

## 2018-06-06 RX ADMIN — Medication 1 TABLET: at 17:17

## 2018-06-06 RX ADMIN — LANTHANUM CARBONATE 1000 MG: 500 TABLET, CHEWABLE ORAL at 17:18

## 2018-06-06 RX ADMIN — MIDODRINE HYDROCHLORIDE 2.5 MG: 2.5 TABLET ORAL at 09:29

## 2018-06-06 RX ADMIN — Medication 250 MG: at 17:17

## 2018-06-06 RX ADMIN — INSULIN LISPRO 3 UNITS: 100 INJECTION, SOLUTION INTRAVENOUS; SUBCUTANEOUS at 13:36

## 2018-06-06 RX ADMIN — NYSTATIN: 100000 POWDER TOPICAL at 09:34

## 2018-06-06 RX ADMIN — INSULIN DETEMIR 5 UNITS: 100 INJECTION, SOLUTION SUBCUTANEOUS at 21:38

## 2018-06-06 RX ADMIN — MIDODRINE HYDROCHLORIDE 2.5 MG: 2.5 TABLET ORAL at 17:17

## 2018-06-06 RX ADMIN — AMIODARONE HYDROCHLORIDE 200 MG: 200 TABLET ORAL at 13:33

## 2018-06-06 RX ADMIN — ANORECTAL OINTMENT: 15.7; .44; 24; 20.6 OINTMENT TOPICAL at 17:18

## 2018-06-06 RX ADMIN — CITALOPRAM HYDROBROMIDE 5 MG: 10 TABLET ORAL at 13:35

## 2018-06-06 NOTE — ASSESSMENT & PLAN NOTE
· Appreciate renal input-- continue HD management at their discretion  · Monitor electrolyes  · Midodrine for low BP

## 2018-06-06 NOTE — PROGRESS NOTES
Progress Note - Infectious Disease   Britta Dominguez 74 y o  male MRN: [id number]  Unit/Bed#: -01 Encounter: [id number]      Impression/Plan:  1  Leukocytosis  Consider UTI given urinary complaints and recent procedure  Urine culture now positive for pseudomonas  No cough to suggest pneumonia and CT more consistent with atelectasis  Blood cultures negative after 24 hours  CT A/P suggests CBD stone with chronic ductal dilatation but normal LFTs and absence of abdominal pain makes cholangitis unlikely  Clinically stable and nontoxic  Rec:  ? Antibiotics as below  ? Follow up final blood cultures from admission  ? Follow temperatures closely  ? Check CBC in a m   ? Continue supportive care as per the primary service     2  Pseudomonas UTI  Possibly related to recent urological instrumentation/stent  Rec:  ? Continue ceftazidime  ? Follow  symptoms closely     3  History of C  diff  No current symptoms to suggest active infection  High risk for recurrence given current broad-spectrum antibiotics  Rec:  ? Continue p o  vancomycin prophylaxis     4  ESRD on HD via AVF  Dialysis today    Antibiotics:  Ceftazidime 2  PO Vancomycin 3  Antibiotics 3    Subjective:  Patient has no fever, chills, sweats; mild intermittent nausea but patient did not vomit overnight; no diarrhea yet today; no cough, shortness of breath; no pain  Is having dialysis now  No new symptoms  Objective:  Vitals:  HR:  [70-85] 73  Resp:  [18] 18  BP: (108-130)/(57-73) 108/59  SpO2:  [90 %-91 %] 90 %  Temp (24hrs), Av 1 °F (37 3 °C), Min:97 9 °F (36 6 °C), Max:100 2 °F (37 9 °C)  Current: Temperature: 97 9 °F (36 6 °C)    Physical Exam:   General Appearance:  Fatigued and lethargic but interactive, nontoxic, no acute distress  Throat: Oropharynx moist without lesions      Lungs:   Decreased to auscultation bilaterally - only listened anteriorly due to dialysis; no wheezes, rhonchi or rales; respirations unlabored   Heart:  RRR; no murmur, rub or gallop   Abdomen:   Soft, non-tender, non-distended, positive bowel sounds  Extremities: No clubbing or cyanosis, no edema; patient with R AKA   Skin: No new rashes or lesions  No draining wounds noted  L foot callus intact without drainage     Labs, Imaging, & Other studies:   All pertinent labs and imaging studies were personally reviewed    Results from last 7 days  Lab Units 06/05/18  0620 06/04/18  1123   WBC Thousand/uL 9 82 14 34*   HEMOGLOBIN g/dL 10 0* 11 3*   PLATELETS Thousands/uL 252 337       Results from last 7 days  Lab Units 06/05/18  0620 06/04/18  1123   SODIUM mmol/L 131* 136   POTASSIUM mmol/L 4 7 5 5*   CHLORIDE mmol/L 92* 95*   CO2 mmol/L 29 26   ANION GAP mmol/L 10 15*   BUN mg/dL 35* 56*   CREATININE mg/dL 5 11* 7 68*   EGFR ml/min/1 73sq m 11 7   GLUCOSE RANDOM mg/dL 226* 188*   CALCIUM mg/dL 9 3 9 4   AST U/L  --  16   ALT U/L  --  21   ALK PHOS U/L  --  190*   TOTAL PROTEIN g/dL  --  8 7*   BILIRUBIN TOTAL mg/dL  --  0 40       Results from last 7 days  Lab Units 06/04/18  1208 06/04/18  1159 06/04/18  1123   BLOOD CULTURE   --  No Growth at 24 hrs  No Growth at 24 hrs  URINE CULTURE  Culture results to follow    >100,000 cfu/ml Pseudomonas aeruginosa*  --   --

## 2018-06-06 NOTE — WOUND OSTOMY CARE
Progress Note - Wound   Hildred Tanvir 74 y o  male MRN: [id number]  Unit/Bed#: -01 Encounter: [id number]      Assessment:  Wound care to see patient for nursing documentation of wounds on admission  Patient seen in bed after dialysis, wife at bedside - both agreeable with assessment  Incontinent of bowel and bladder  Quin-care rendered at time of assessment  Patient is assist of one with care, dependent for care  R stump (BKA) with healed incision and anterior knee with healed scarring  No redness noted posteriorly  L heel is intact with blanchable redness or wounds  Recommend offloading of pressure and hydraguard cream  Note L foot hallux amputation  Dry scabbing noted to bridge of nose, per wife this is from a fall at home  No open areas or drainage  Sacrum / buttocks / inner thighs with redness / fungal yeast rash  Patient states it itches him  This is from moisture / stool incontinence  No open areas noted  Skin is fragile appearing  Wife states that at home she dusts his skin with nystatin powder and applies calmoseptine cream  Both orders in place already  Recommend to continued the same  Educated on the importance of offloading of pressure via turning repositioning and weight shifting  Patient follows at [facility] wound care center for L lateral plantar diabetic wound  Wife does wound care at home, states they have been using hydrogel and dry dressing  Concern for hydrogel to be absorbing into the gauze rather than the wound  Wound bed is 100% pink and dry - partial thickness  Edges calloused dry flat attached  No drainage noted on old dressing  Quin-wound is intact with dry calloused skin  No redness, induration or fluctuance noted  Recommend to apply dermagran gauze with dry dressing every other day  Rationale of treatment reviewed with wife and patient, both agreed and verbalized understanding of plan of care  New dressing applied  Wound care will follow along weekly   See flow sheets for more detailed assessment findings  Primary nurse aware of plan of care  Plan:   1  Cleanse L lateral plantar foot wound with NSS, pat dry, apply dermagran to wound bed and cover with dry dressing every other day and PRN  2  Apply hydraguard to L heels and R stump BID and PRN for prevention and protection  3  Cleanse b/l buttocks, sacrum, inner thighs with soap and water, pat dry, dust lightly with nystatin powder and apply thin layer of calmoseptine TID and PRN   4  Apply skin nourishing cream to the entire skin daily for moisture   5  Turn and reposition patient every 2 hours   6  Soft care cushion to chair when OOB   7  Elevate heels off of bed with pillows to offload   8  Wound care will follow along with patient weekly, please call with questions and concerns [phone]  9  Follow up with [facility] wound care center as outpatient  Objective:    Vitals: Blood pressure 119/63, pulse 74, temperature 97 9 °F (36 6 °C), temperature source Oral, resp  rate 18, height 5' 6" (1 676 m), weight 69 kg (152 lb 1 9 oz), SpO2 90 %  ,Body mass index is 24 55 kg/m²  Other Ulcers 02/18/17 Foot Left plantar  (Active)   Wound Description Dry;Pink 6/6/2018  1:00 PM   Quin-wound Assessment Dry; Intact;Fragile 6/6/2018  1:00 PM   Shape round 6/6/2018  1:00 PM   Size small 6/6/2018  1:00 PM   Wound Length (cm) 0 5 cm 6/6/2018  1:00 PM   Wound Width (cm) 0 5 cm 6/6/2018  1:00 PM   Wound Depth (cm) 0 1 6/6/2018  1:00 PM   Calculated Wound Volume (cm^3) 0 02 cm^3 6/6/2018  1:00 PM   Change in Wound Size % 91 3 6/6/2018  1:00 PM   Tunneling 0 cm 6/6/2018  1:00 PM   Undermining 0 6/6/2018  1:00 PM   Drainage Amount None 6/6/2018  1:00 PM   Treatment Cleansed; Offload;Elevated with pillow(s) 6/6/2018  1:00 PM   Dressings Vaseline gauze;Gauze 6/6/2018  1:00 PM   Wound packed?  No 6/6/2018  1:00 PM   Packing- # removed 0 6/6/2018  1:00 PM   Packing- # inserted 0 6/6/2018  1:00 PM   Dressing Changed New dressing applied 6/6/2018  1:00 PM   Patient Tolerance Tolerated well 6/6/2018  1:00 PM   Dressing Status Clean;Dry; Intact 6/6/2018  1:00 PM     Recommendations written as orders  AVS updated    Dhiraj Guerrero RN BSN

## 2018-06-06 NOTE — PROGRESS NOTES
[address] [facility] NOTE   Heidi Chandler 74 y o  male MRN: [id number]  Unit/Bed#: -01 Encounter: [id number]  Reason for Consult: ESRD/HD    ASSESSMENT and PLAN:  1  ESRD on [email or web address]):   -seen on hemodialysis  -at dry weight at the beginning of treatment  Going for 2 L to challenge in light of pleural effusion/pericardial effusion  2  Access:  AV fistula right arm   No flow issues on hemodialysis  3  Hypertension:  -blood pressure acceptable  -on midodrine 2 5 mg b i d  give a m  dose a half an hour before dialysis  4  Anemia:  Hemoglobin at goal  - on micera  5  Mineral and bone disease:  -phosphorus 6 1-above goal, renal diet, continue binder  -last PTH at goal  6   Chronic combined CHF, Dilated cardiomyopathy-volume overload, history of aortic valve endocarditis:  Ejection fraction 35%  -small to moderate pericardial effusion-increased in size (seen on CT)  Previous echocardiogram 11/17 showed trace pericardial effusion    -vascular congestion on chest x-ray with bilateral pleural effusions right greater than left  -previous thoracentesis right pleural effusion  - recently seen by Dr Dali Villalpando the beginning of May  7  Encephalopathy:   improved- remains lethargic    8   Anion gap acidosis:  Resolved with dialysis  9   Nausea, vomiting:  CT of the abdomen shows mild to moderate biliary ductal dilation/asymptomatic  10   History of nephrolithiasis:  Recent pyelogram/insertion of left ureteral stent  11   PAF:   remains in normal sinus rhythm  12   Hyperkalemia:   resolved with hemodialysis  13   Abnormal urinalysis: innumerable WBCs, field obscured by bacteria  -awaiting culture  14  Sepsis:   ID following   - ?   Urinary source due to symptoms/recent stent placement May 17, 2018     - urine culture positive for Pseudomonas  - procalcitonin level 13 88 which can be skewed in end-stage renal disease  - blood cultures no growth  - on ceftazidime  - oral vancomycin     HEMODIALYSIS PROCEDURE NOTE  The patient was seen and examined on hemodialysis  Time:  4 hours  Sodium: 138 Blood flow: 400   Dialyzer: F180 Potassium: 2 Dialysate flow: 600   Access: avf Bicarbonate: 35 Ultrafiltration goal: 2L   /70     SUMMARY OF RECOMMENDATIONS:  · Continue to challenge on hemodialysis as tolerated  · Next hemodialysis treatment Friday    SUBJECTIVE / INTERVAL HISTORY:  No complaints  Sleepy    OBJECTIVE:  Current Weight: Weight - Scale: 69 kg (152 lb 1 9 oz)  Vitals:    06/06/18 0229 06/06/18 0809 06/06/18 0845 06/06/18 0900   BP:  125/60 129/73 128/70   BP Location:  Left arm     Pulse:  73 70 75   Resp:  18     Temp: 98 4 °F (36 9 °C) 97 9 °F (36 6 °C)     TempSrc: Oral Oral     SpO2:  90%     Weight:       Height:           Intake/Output Summary (Last 24 hours) at 06/06/18 0935  Last data filed at 06/06/18 0845   Gross per 24 hour   Intake              560 ml   Output                0 ml   Net              560 ml     General:  No acute distress  Lying in bed on hemodialysis  Skin:  Warm and dry, no rash  Eyes:  Sclera clear  ENT:  Oropharynx moist, external exam normal  Neck:  Supple no JVD  Chest:  Clear anteriorly, few crackles left base    No wheezes or rhonchi  CVS:  Regular rate and rhythm S1-S2, no S3, no murmur  Abdomen:  Soft, nondistended, nontender, bowel sounds present  Extremities:  No edema  Neuro:  No acute deficits  Psych:  Quiet, generally withdrawn  Medications:    Current Facility-Administered Medications:     aluminum-magnesium hydroxide-simethicone (MYLANTA) 200-200-20 mg/5 mL oral suspension 15 mL, 15 mL, Oral, Q6H PRN, Cha Isaac MD    amiodarone tablet 200 mg, 200 mg, Oral, Daily, Cha Isaac MD, Stopped at 06/06/18 0932    b complex-vitamin C-folic acid (RENAL) capsule 1 mg, 1 mg, Oral, Daily, Cha Isaac MD, Stopped at 06/06/18 0930    calcium carbonate (OYSTER SHELL,OSCAL) 500 mg tablet 1 tablet, 1 tablet, Oral, BID With Meals, [employer] Irene Ma MD, 1 tablet at 06/05/18 1607    cefTAZidime (FORTAZ) 1,000 mg in sodium chloride 0 9 % 50 mL IVPB, 1,000 mg, Intravenous, After Dialysis, Lenin Muñoz MD    cholecalciferol (VITAMIN D3) tablet 1,000 Units, 1,000 Units, Oral, Daily, Norberto Dang MD, Stopped at 06/06/18 0930    cholestyramine sugar free (QUESTRAN LIGHT) packet 4 g, 4 g, Oral, Daily, Norberto Dang MD, 4 g at 06/05/18 0850    citalopram (CeleXA) tablet 5 mg, 5 mg, Oral, Daily, Norberto Dang MD, Stopped at 06/06/18 0932    diphenoxylate-atropine (LOMOTIL) 2 5-0 025 mg per tablet 1 tablet, 1 tablet, Oral, 4x Daily PRN, Norberto Dang MD    gabapentin (NEURONTIN) capsule 200 mg, 200 mg, Oral, HS, Norberto Dang MD, 200 mg at 06/05/18 2138    insulin detemir (LEVEMIR) subcutaneous injection 5 Units, 5 Units, Subcutaneous, Q12H [address], Norberto Dang MD, 5 Units at 06/06/18 0933    insulin lispro (HumaLOG) 100 units/mL subcutaneous injection 1-5 Units, 1-5 Units, Subcutaneous, TID AC, 2 Units at 06/06/18 0935 **AND** Fingerstick Glucose (POCT), , , TID AC, Norberto Dang MD    insulin lispro (HumaLOG) 100 units/mL subcutaneous injection 1-5 Units, 1-5 Units, Subcutaneous, HS, Chemo Arias PA-C, 5 Units at 06/05/18 2139    insulin lispro (HumaLOG) 100 units/mL subcutaneous injection 3 Units, 3 Units, Subcutaneous, TID With Meals, Norberto Dang MD, 3 Units at 06/05/18 1607    lanthanum (FOSRENOL) chewable tablet 1,000 mg, 1,000 mg, Oral, TID With Meals, Norberto Dang MD, 1,000 mg at 06/05/18 1609    levothyroxine tablet 150 mcg, 150 mcg, Oral, Daily Before Breakfast, Norberto Dang MD, 150 mcg at 06/06/18 0550    menthol-zinc oxide (CALMOSEPTINE) 0 44-20 6 % ointment, , Topical, BID, Norberto Dang MD    midodrine (PROAMATINE) tablet 2 5 mg, 2 5 mg, Oral, BID, ALIE Stahl, 2 5 mg at 06/06/18 0929    nystatin (MYCOSTATIN) powder, , Topical, BID, Cj Pride MD    ondansetron [facility]) injection 4 mg, 4 mg, Intravenous, Q6H PRN, Cj Pride MD    oxyCODONE (ROXICODONE) IR tablet 5 mg, 5 mg, Oral, Q6H PRN, Cj Pride MD    pantoprazole (PROTONIX) EC tablet 40 mg, 40 mg, Oral, Daily, Cj Pride MD, Stopped at 06/06/18 0931    QUEtiapine (SEROquel) tablet 25 mg, 25 mg, Oral, HS, Cj Pride MD, 25 mg at 06/05/18 2138    saccharomyces boulardii (FLORASTOR) capsule 250 mg, 250 mg, Oral, BID, Cj Pride MD, 250 mg at 06/05/18 1741    vancomycin (VANCOCIN) oral solution 125 mg, 125 mg, Oral, Q12H [address], Cj Pride MD, 125 mg at 06/05/18 2138    warfarin (COUMADIN) tablet 10 mg, 10 mg, Oral, Daily (warfarin), Cj Pride MD, 10 mg at 06/05/18 1741    Laboratory Results:    Results from last 7 days  Lab Units 06/05/18  0620 06/04/18  1123   WBC Thousand/uL 9 82 14 34*   HEMOGLOBIN g/dL 10 0* 11 3*   HEMATOCRIT % 31 4* 35 7*   PLATELETS Thousands/uL 252 337   SODIUM mmol/L 131* 136   POTASSIUM mmol/L 4 7 5 5*   CHLORIDE mmol/L 92* 95*   CO2 mmol/L 29 26   BUN mg/dL 35* 56*   CREATININE mg/dL 5 11* 7 68*   CALCIUM mg/dL 9 3 9 4   PHOSPHORUS mg/dL 6 1*  --    TOTAL PROTEIN g/dL  --  8 7*   GLUCOSE RANDOM mg/dL 226* 188*     Previous work up:

## 2018-06-06 NOTE — ASSESSMENT & PLAN NOTE
· BS widely variable which appears to be based partially on patient's dietary indiscretion with drinking regular sodas    · Continue Levemir 5 units q 12 hours with sliding scale coverage; encouraged dietary compliance

## 2018-06-06 NOTE — ASSESSMENT & PLAN NOTE
· Multifactorial acute encephalopathy, likely related to infection/toxic metabolic  · Seems to be clinically improved

## 2018-06-06 NOTE — DISCHARGE INSTR - OTHER ORDERS
Wound care recommendations:  1  Cleanse b/l buttocks, sacrum, inner thighs with soap and water, pat dry, dust lightly with nystatin powder and apply thin layer of calmoseptine three times per day and PRN   2   Cleanse L lateral plantar foot wound with NSS, pat dry, apply dermagran to wound bed and cover with dry dressing every other day and PRN

## 2018-06-06 NOTE — PROGRESS NOTES
Progress Note - Britta Dominguez 1950, 74 y o  male MRN: [id number]    Unit/Bed#: -01 Encounter: [id number]    Primary Care Provider: Phil Resendez DO   Date and time admitted to hospital: 6/4/2018 10:25 AM  Sepsis [facility])   Assessment & Plan    · Patient with some sepsis criteria present on admission including leukocytosis, fever prior to arrival, altered mental status and also noted to have significantly elevated procalcitonin level (although again this can be falsely elevated in dialysis patients)  · Unfortunately the patient has multiple possible sources of infection overall:  He was reporting dysuria and also back pain with urination which suggests urinary source  He had just recently had lithotripsy and stents placed by Urology  Also had N/V/soft frequent stools ? gastroenteritis  In addition he has abnormal CT scan imaging concerning for possible pneumonia/empyema and apparently had cough prior to arrival   Patient also has history of C diff colitis and multiple hospitalizations  Clinically it seems most likely that he has acute pseudomonal UTI based on urine culture and overall presentation   · Seems to have clinically improved clinically--cont   IV ceftazidime day #2  · Appreciate ID input/follow up        * acute Encephalopathy, suspected metabolic   Assessment & Plan    · Multifactorial acute encephalopathy, likely related to infection/toxic metabolic  · Seems to be clinically improved        History of Clostridium difficile colitis   Assessment & Plan    · Must continue oral vancomycin prophylaxis  · The patient having soft stools not technically diarrhea therefore will not send for C diff testing per my discussion with Infectious Disease        ESRD (end stage renal disease) on dialysis [facility])   Assessment & Plan    · Appreciate renal input-- continue HD management at their discretion  · Monitor electrolyes  · Midodrine for low BP        Chronic combined systolic and diastolic CHF (congestive heart failure) ([facility])   Assessment & Plan    · Continue volume management through dialysis        Paroxysmal atrial fibrillation ([facility])   Assessment & Plan    · Continue amiodarone  · INR subtherapeutic, continue 10 mg dose and recheck INR in a m  Type 1 diabetes mellitus with nephropathy ([facility])   Assessment & Plan    · BS widely variable which appears to be based partially on patient's dietary indiscretion with drinking regular sodas  · Continue Levemir 5 units q 12 hours with sliding scale coverage; encouraged dietary compliance          VTE Pharmacologic Prophylaxis:   Pharmacologic: Warfarin (Coumadin)  Mechanical VTE Prophylaxis in Place: No    Patient Centered Rounds: I have performed bedside rounds with nursing staff today  Discussions with Specialists or Other Care Team Provider:  Case management, Infectious Disease    Education and Discussions with Family / Patient: called wife with update  She feels he still is not at his baseline mentally  Time Spent for Care: 25 min  More than 50% of total time spent on counseling and coordination of care as described above  Current Length of Stay: 2 day(s)    Current Patient Status: Inpatient   Certification Statement: The patient will continue to require additional inpatient hospital stay due to Awaiting results of finalized blood cultures    Discharge Plan:  Home versus rehab depending on therapy evaluations, possibility of discharge tomorrow versus an additional 24 hours  Code Status: Level 1 - Full Code    Subjective:   Patient is not reporting any abdominal pain or flank pain nor is he reporting any dysuria at this time but is upset because he is having large amounts of soft looser stools  Nursing reports to me that patient has significant dietary indiscretions at time and drinks regular sodas  Regarding his mental status it seems he was looking better in the morning when his wife was here but now is a bit drowsy   Blood sugars were getting really high at home despite poor oral intake  Objective:     Vitals:   Temp (24hrs), Av 1 °F (37 3 °C), Min:97 9 °F (36 6 °C), Max:100 2 °F (37 9 °C)    HR:  [70-85] 74  Resp:  [18] 18  BP: (108-130)/(57-73) 119/63  SpO2:  [90 %-91 %] 90 %  Body mass index is 24 55 kg/m²  Input and Output Summary (last 24 hours): Intake/Output Summary (Last 24 hours) at 18 1330  Last data filed at 18 0845   Gross per 24 hour   Intake              440 ml   Output                0 ml   Net              440 ml     Physical Exam:     Physical Exam   Constitutional: No distress  HENT:   Head: Normocephalic and atraumatic  Eyes: Conjunctivae are normal  Right eye exhibits no discharge  Left eye exhibits no discharge  No scleral icterus  Neck: Neck supple  Cardiovascular: Normal rate and regular rhythm  Murmur heard  Pulmonary/Chest: Effort normal  No stridor  No respiratory distress  He has no wheezes  Abdominal: Soft  He exhibits no distension  Musculoskeletal: He exhibits deformity (BKA)  Neurological:   Seems sleepy, able to answer questions and follow commands briefly  Oriented to current location and date   Skin: Skin is warm and dry  No rash noted  He is not diaphoretic  No erythema  No pallor  Psychiatric:   cooperative   Vitals reviewed        Additional Data:     Labs:      Results from last 7 days  Lab Units 18  0620   WBC Thousand/uL 9 82   HEMOGLOBIN g/dL 10 0*   HEMATOCRIT % 31 4*   PLATELETS Thousands/uL 252   NEUTROS PCT % 79*   LYMPHS PCT % 8*   MONOS PCT % 10   EOS PCT % 3       Results from last 7 days  Lab Units 18  0620 18  1123   SODIUM mmol/L 131* 136   POTASSIUM mmol/L 4 7 5 5*   CHLORIDE mmol/L 92* 95*   CO2 mmol/L 29 26   BUN mg/dL 35* 56*   CREATININE mg/dL 5 11* 7 68*   CALCIUM mg/dL 9 3 9 4   TOTAL PROTEIN g/dL  --  8 7*   BILIRUBIN TOTAL mg/dL  --  0 40   ALK PHOS U/L  --  190*   ALT U/L  --  21   AST U/L  --  16   GLUCOSE RANDOM mg/dL 226* 188* Results from last 7 days  Lab Units 06/05/18  0039   INR  1 59*       Results from last 7 days  Lab Units 06/06/18  1101 06/06/18  0815 06/06/18  0227 06/05/18  2111 06/05/18  1555 06/05/18  1119 06/05/18  0749 06/04/18  2057 06/04/18  1706   POC GLUCOSE mg/dl 119 214* 172* 401* 400* 175* 233* 265* 198*         * I Have Reviewed All Lab Data Listed Above  * Additional Pertinent Lab Tests Reviewed: [address] Admission Reviewed    Imaging:    Imaging Reports Reviewed Today Include:   Imaging Personally Reviewed by Myself Includes:      Recent Cultures (last 7 days):       Results from last 7 days  Lab Units 06/04/18  1208 06/04/18  1159 06/04/18  1123   BLOOD CULTURE   --  No Growth at 24 hrs  No Growth at 24 hrs  URINE CULTURE  Culture results to follow    >100,000 cfu/ml Pseudomonas aeruginosa*  --   --        Last 24 Hours Medication List:     Current Facility-Administered Medications:  aluminum-magnesium hydroxide-simethicone 15 mL Oral Q6H PRN Caryle Figures, MD   amiodarone 200 mg Oral Daily Caryle Figures, MD   b complex-vitamin C-folic acid 1 mg Oral Daily Caryle Figures, MD   calcium carbonate 1 tablet Oral BID With Meals Caryle Figures, MD   cefTAZidime 1,000 mg Intravenous After Dialysis Judith Delay, MD   cholecalciferol 1,000 Units Oral Daily Caryle Figures, MD   cholestyramine sugar free 4 g Oral Daily Caryle Figures, MD   citalopram 5 mg Oral Daily Caryle Figures, MD   diphenoxylate-atropine 1 tablet Oral 4x Daily PRN Caryle Figures, MD   gabapentin 200 mg Oral HS Caryle Figures, MD   insulin detemir 5 Units Subcutaneous Q12H [facility] Maribel Caryle Figures, MD   insulin lispro 1-5 Units Subcutaneous TID AC Caryle Figures, MD   insulin lispro 1-5 Units Subcutaneous HS Chemo Arias PA-C   insulin lispro 3 Units Subcutaneous TID With Meals Caryle Figures, MD   lanthanum 1,000 mg Oral TID With Meals Galina Karrie Aguilar MD   levothyroxine 150 mcg Oral Daily Before Breakfast Carmela White MD   menthol-zinc oxide  Topical BID Carmela White MD   midodrine 2 5 mg Oral BID ALIE Duarte   nystatin  Topical BID Carmela White MD   ondansetron 4 mg Intravenous Q6H PRN Carmela White MD   oxyCODONE 2 5 mg Oral Q6H PRN Alisha Moreno PA-C   pantoprazole 40 mg Oral Daily Carmela White MD   QUEtiapine 25 mg Oral HS Carmela White MD   saccharomyces boulardii 250 mg Oral BID Carmela White MD   vancomycin 125 mg Oral Q12H Julio Montgomery MD   warfarin 10 mg Oral Daily (warfarin) Carmela White MD        Today, Patient Was Seen By: Balaji García PA-C    ** Please Note: Dictation voice to text software may have been used in the creation of this document   **

## 2018-06-06 NOTE — PLAN OF CARE
Problem: Potential for Falls  Goal: Patient will remain free of falls  INTERVENTIONS:  - Assess patient frequently for physical needs  -  Identify cognitive and physical deficits and behaviors that affect risk of falls    -  [place] fall precautions as indicated by assessment   - Educate patient/family on patient safety including physical limitations  - Instruct patient to call for assistance with activity based on assessment  - Modify environment to reduce risk of injury  - Consider OT/PT consult to assist with strengthening/mobility   Outcome: Progressing

## 2018-06-06 NOTE — ASSESSMENT & PLAN NOTE
· Patient with some sepsis criteria present on admission including leukocytosis, fever prior to arrival, altered mental status and also noted to have significantly elevated procalcitonin level (although again this can be falsely elevated in dialysis patients)  · Unfortunately the patient has multiple possible sources of infection overall:  He was reporting dysuria and also back pain with urination which suggests urinary source  He had just recently had lithotripsy and stents placed by Urology  Also had N/V/soft frequent stools ? gastroenteritis  In addition he has abnormal CT scan imaging concerning for possible pneumonia/empyema and apparently had cough prior to arrival   Patient also has history of C diff colitis and multiple hospitalizations  Clinically it seems most likely that he has acute pseudomonal UTI based on urine culture and overall presentation   · Seems to have clinically improved clinically--cont   IV ceftazidime day #2  · Appreciate ID input/follow up

## 2018-06-07 VITALS
HEART RATE: 70 BPM | DIASTOLIC BLOOD PRESSURE: 55 MMHG | HEIGHT: 66 IN | SYSTOLIC BLOOD PRESSURE: 120 MMHG | OXYGEN SATURATION: 90 % | RESPIRATION RATE: 16 BRPM | BODY MASS INDEX: 24.45 KG/M2 | TEMPERATURE: 98 F | WEIGHT: 152.12 LBS

## 2018-06-07 LAB
GLUCOSE SERPL-MCNC: 118 MG/DL (ref 65–140)
GLUCOSE SERPL-MCNC: 99 MG/DL (ref 65–140)
INR PPP: 2.37 (ref 0.86–1.17)
PROTHROMBIN TIME: 25.2 SECONDS (ref 11.8–14.2)

## 2018-06-07 PROCEDURE — 99232 SBSQ HOSP IP/OBS MODERATE 35: CPT | Performed by: INTERNAL MEDICINE

## 2018-06-07 PROCEDURE — 82948 REAGENT STRIP/BLOOD GLUCOSE: CPT

## 2018-06-07 PROCEDURE — 99239 HOSP IP/OBS DSCHRG MGMT >30: CPT | Performed by: PHYSICIAN ASSISTANT

## 2018-06-07 PROCEDURE — 5A1D70Z PERFORMANCE OF URINARY FILTRATION, INTERMITTENT, LESS THAN 6 HOURS PER DAY: ICD-10-PCS | Performed by: HOSPITALIST

## 2018-06-07 PROCEDURE — 85610 PROTHROMBIN TIME: CPT | Performed by: PHYSICIAN ASSISTANT

## 2018-06-07 RX ADMIN — MIDODRINE HYDROCHLORIDE 2.5 MG: 2.5 TABLET ORAL at 08:40

## 2018-06-07 RX ADMIN — LANTHANUM CARBONATE 1000 MG: 500 TABLET, CHEWABLE ORAL at 12:37

## 2018-06-07 RX ADMIN — CHOLESTYRAMINE 4 G: 4 POWDER, FOR SUSPENSION ORAL at 08:42

## 2018-06-07 RX ADMIN — Medication 250 MG: at 08:40

## 2018-06-07 RX ADMIN — INSULIN LISPRO 3 UNITS: 100 INJECTION, SOLUTION INTRAVENOUS; SUBCUTANEOUS at 12:37

## 2018-06-07 RX ADMIN — PANTOPRAZOLE SODIUM 40 MG: 40 TABLET, DELAYED RELEASE ORAL at 08:40

## 2018-06-07 RX ADMIN — INSULIN DETEMIR 5 UNITS: 100 INJECTION, SOLUTION SUBCUTANEOUS at 08:39

## 2018-06-07 RX ADMIN — NYSTATIN: 100000 POWDER TOPICAL at 08:40

## 2018-06-07 RX ADMIN — CHOLECALCIFEROL TAB 25 MCG (1000 UNIT) 1000 UNITS: 25 TAB at 08:40

## 2018-06-07 RX ADMIN — INSULIN LISPRO 3 UNITS: 100 INJECTION, SOLUTION INTRAVENOUS; SUBCUTANEOUS at 08:40

## 2018-06-07 RX ADMIN — VANCOMYCIN HYDROCHLORIDE 125 MG: 1 INJECTION, POWDER, LYOPHILIZED, FOR SOLUTION INTRAVENOUS at 08:51

## 2018-06-07 RX ADMIN — LANTHANUM CARBONATE 1000 MG: 500 TABLET, CHEWABLE ORAL at 08:41

## 2018-06-07 RX ADMIN — NEPHROCAP 1 MG: 1 CAP ORAL at 08:41

## 2018-06-07 RX ADMIN — ANORECTAL OINTMENT: 15.7; .44; 24; 20.6 OINTMENT TOPICAL at 08:52

## 2018-06-07 RX ADMIN — Medication 1 TABLET: at 08:40

## 2018-06-07 RX ADMIN — LEVOTHYROXINE SODIUM 150 MCG: 150 TABLET ORAL at 06:20

## 2018-06-07 RX ADMIN — CITALOPRAM HYDROBROMIDE 5 MG: 10 TABLET ORAL at 08:41

## 2018-06-07 RX ADMIN — AMIODARONE HYDROCHLORIDE 200 MG: 200 TABLET ORAL at 08:40

## 2018-06-07 NOTE — PROGRESS NOTES
Progress Note - Infectious Disease   Yaima Gregory 74 y o  male MRN: [id number]  Unit/Bed#: -01 Encounter: [id number]      Impression/Plan:  1   Leukocytosis  Consider UTI given urinary complaints and recent procedure  Urine culture now positive for pseudomonas  No cough to suggest pneumonia and CT more consistent with atelectasis  Blood cultures negative after 48 hours  CT A/P suggests CBD stone with chronic ductal dilatation but normal LFTs and absence of abdominal pain makes cholangitis unlikely  Clinically stable and nontoxic  Rec:  ? Antibiotics as below  ? Follow up final blood cultures from admission  ? Follow temperatures closely  ? Check CBCD in a m   ? Continue supportive care as per the primary service     2   Pseudomonas UTI  Possibly related to recent urological instrumentation/stent  Urine culture showing psuedomonas  Rec:  ? Continue ceftazidime through 18 for a total of 7 days of treatment, will need to be coordinated with dialysis for discharge  ? Follow  symptoms closely     3   History of C  diff  No current symptoms to suggest active infection  High risk for recurrence given current broad-spectrum antibiotics  Rec:  ? Continue p o  vancomycin prophylaxis through 18    Antibiotics:  Ceftazidime 3  PO Vancomycin 4  Antibiotics 4    Subjective:  Patient has no fever, chills, sweats; no nausea, vomiting, diarrhea; no cough, shortness of breath; no pain  Reports he's feeling well today, is hoping to go home  No new symptoms  Objective:  Vitals:  HR:  [70-80] 70  Resp:  [16-18] 16  BP: (108-140)/(55-77) 120/55  SpO2:  [90 %-92 %] 90 %  Temp (24hrs), Av 3 °F (36 8 °C), Min:97 5 °F (36 4 °C), Max:99 4 °F (37 4 °C)  Current: Temperature: 98 °F (36 7 °C)    Physical Exam:   General Appearance:  Alert, interactive, nontoxic, no acute distress  Throat: Oropharynx moist without lesions      Lungs:   Clear to auscultation bilaterally; no wheezes, rhonchi or rales; respirations unlabored Heart:  RRR; no murmur, rub or gallop   Abdomen:   Soft, non-tender, non-distended, positive bowel sounds  Extremities: No clubbing or cyanosis, no edema; R AKA   Skin: No new rashes or lesions  No draining wounds noted  Labs, Imaging, & Other studies:   All pertinent labs and imaging studies were personally reviewed    Results from last 7 days  Lab Units 06/05/18  0620 06/04/18  1123   WBC Thousand/uL 9 82 14 34*   HEMOGLOBIN g/dL 10 0* 11 3*   PLATELETS Thousands/uL 252 337       Results from last 7 days  Lab Units 06/05/18  0620 06/04/18  1123   SODIUM mmol/L 131* 136   POTASSIUM mmol/L 4 7 5 5*   CHLORIDE mmol/L 92* 95*   CO2 mmol/L 29 26   ANION GAP mmol/L 10 15*   BUN mg/dL 35* 56*   CREATININE mg/dL 5 11* 7 68*   EGFR ml/min/1 73sq m 11 7   GLUCOSE RANDOM mg/dL 226* 188*   CALCIUM mg/dL 9 3 9 4   AST U/L  --  16   ALT U/L  --  21   ALK PHOS U/L  --  190*   TOTAL PROTEIN g/dL  --  8 7*   BILIRUBIN TOTAL mg/dL  --  0 40       Results from last 7 days  Lab Units 06/04/18  1208 06/04/18  1159 06/04/18  1123   BLOOD CULTURE   --  No Growth at 48 hrs  No Growth at 48 hrs  URINE CULTURE  Culture results to follow    >100,000 cfu/ml Pseudomonas aeruginosa*  --   --

## 2018-06-07 NOTE — SOCIAL WORK
Patient is medically stable to discharge home today with Marissa Alexander 80 in place for [facility] for SN and home PT  CM notified them of his discharge  Patient's wife will be providing transportation home  CM discussed with patient's wife CM setting up a follow up appointment and she declined reporting she would do this on her own  CM reviewed the availability of treatment team to discuss questions or concerns patient and/or family may have regarding  understanding medications and recognizing signs and symptoms once discharged  CM also encouraged patient to follow up with all recommended appointments after discharge  Patient advised of importance for patient and family to participate in managing patients medical well being

## 2018-06-07 NOTE — ASSESSMENT & PLAN NOTE
· Must continue oral vancomycin prophylaxis until 6/14 - scripts given  · The patient having soft stools not technically diarrhea therefore will not send for C diff testing per my discussion with Infectious Disease

## 2018-06-07 NOTE — DISCHARGE SUMMARY
[facility]  Discharge- Johnson Starr 1950, 74 y o  male MRN: [id number]    Unit/Bed#: -01 Encounter: [id number]    Primary Care Provider: Bethany Rubio DO   Date and time admitted to hospital: 6/4/2018 10:25 AM        * acute Encephalopathy, suspected metabolic   Assessment & Plan    · Multifactorial acute encephalopathy, likely related to infection/toxic metabolic  · Resolved, stable for discharge         Sepsis [facility])   Assessment & Plan    · Patient with some sepsis criteria present on admission including leukocytosis, fever prior to arrival, altered mental status and also noted to have significantly elevated procalcitonin level (although again this can be falsely elevated in dialysis patients)  · Unfortunately the patient has multiple possible sources of infection overall:  He was reporting dysuria and also back pain with urination which suggests urinary source  He had just recently had lithotripsy and stents placed by Urology  Also had N/V/soft frequent stools ? gastroenteritis  In addition he has abnormal CT scan imaging concerning for possible pneumonia/empyema and apparently had cough prior to arrival   Patient also has history of C diff colitis and multiple hospitalizations  Clinically it seems most likely that he has acute pseudomonal UTI based on urine culture and overall presentation   · Seems to have clinically improved clinically--cont  IV ceftazidime day #2  · Appreciate ID input/follow up  · He will be discharged on 2 more doses of Ceftazidime to be given after HD tomorrow and Monday to complete course         ESRD (end stage renal disease) on dialysis [facility])   Assessment & Plan    · Stable   · Continue with HD MWF  · Ceftazidime with HD after dialysis        Type 1 diabetes mellitus with nephropathy ([facility] [address] )   Assessment & Plan    · BS widely variable which appears to be based partially on patient's dietary indiscretion with drinking regular sodas    · Continue Levemir 5 units q 12 hours with sliding scale coverage; encouraged dietary compliance        Chronic combined systolic and diastolic CHF (congestive heart failure) ([facility])   Assessment & Plan    · Continue volume management through dialysis  · EDW will be challenged to 63 kg as an outpatient  · Discussed with them         Paroxysmal atrial fibrillation ([facility])   Assessment & Plan    · Continue amiodarone  · INR therapeutic today - continue prior home dosage        History of Clostridium difficile colitis   Assessment & Plan    · Must continue oral vancomycin prophylaxis until 6/14 - scripts given  · The patient having soft stools not technically diarrhea therefore will not send for C diff testing per my discussion with Infectious Disease          Discharging Physician / Practitioner: Morteza Wells PA-C  PCP: Parker Ceballos DO  Admission Date:   Admission Orders     Ordered        06/04/18 1350  Inpatient Admission (expected length of stay for this patient is greater than two midnights)  Once             Discharge Date: 06/07/18    Resolved Problems  Date Reviewed: 6/7/2018    None          Consultations During Hospital Stay:  · Nephrology - Dr Tatum Perez  · Infectious Diseases - Dr Velasquez Rios     Procedures Performed:     · CXR PA and Lateral   · CT abdomen pelvis with contrast     Significant Findings / Test Results:     · CXR PA and Lateral - Markedly enlarged cardiac silhouette with globular configuration  Correlate for underlying pericardial effusion  Mild vascular congestion with small bilateral pleural effusions, right > left   · CT abdomen pelvis with contrast - Small loculated appearing right pleural effusion partially subpulmonic, decreased  Again empyema not excluded  Stable patchy consolidation in the right lung base  Patchy opacity in the left lower lobe may be slightly denser or may be related to atelectasis or pneumonia  Small to moderate size pericardial effusion, increased  Mild to moderate biliary ductal dilatation  1 7 cm sludge ball or stone at the distal CBD, appears more prominent compared to the prior exam      Incidental Findings:   · As above      Test Results Pending at Discharge (will require follow up): · None     Outpatient Tests Requested:  · None    Complications:  None    Reason for Admission: Acute metabolic encephalopathy, Sepsis 2/2 pseudomonal UTI     Hospital Course:     Maria Isabel Kumar is a 74 y o  male patient who originally presented to the hospital on 6/4/2018 due to altered mental status  On admission the patient was found to meet sepsis criteria, however after the workup performed there could be multiple sources of infection most notably in the lungs versus the urine  The patient was admitted and placed on IV antibiotics  A nephrology consultation was requested given the patient's end-stage renal disease and hemodialysis needs  From a nephrological standpoint he was stable throughout the hospital stay  Given the patient's complicated medical history and multiple sources for infection and Infectious Disease consultation was requested  After further workup it was found the patient's likely source of infection was a pseudomonal urinary tract infection which was found to be sensitive to ceftazidime  This antibiotic was chosen due to the patient's possibility for QT prolongation with other agents given his already current medication list   The patient responded well to this treatment and improved to medical stability on 06/07 and was able to be discharged home  He was instructed to take 2 more doses of ceftazidime after hemodialysis sessions tomorrow as well as Monday  He was also instructed to continue prophylactic vancomycin orally daily until June 14th  Please see above list of diagnoses and related plan for additional information  Condition at Discharge: stable     Discharge Day Visit / Exam:     Subjective:  Patient reports he is feeling well today  Denies fevers or chills    Denies any pain   Denies any dysuria symptoms  States that he is still having soft stool, but does not state that his diarrhea  Denies any chest pain or difficulty breathing  Vitals: Blood Pressure: 120/55 (06/07/18 0745)  Pulse: 70 (06/07/18 0745)  Temperature: 98 °F (36 7 °C) (06/07/18 0745)  Temp Source: Oral (06/07/18 0745)  Respirations: 16 (06/07/18 0745)  Height: 5' 6" (167 6 cm) (06/04/18 1533)  Weight - Scale: 69 kg (152 lb 1 9 oz) (06/04/18 1533)  SpO2: 90 % (06/07/18 0745)  Exam:   Physical Exam   Constitutional: He is oriented to person, place, and time  Vital signs are normal  He appears well-developed and well-nourished  Non-toxic appearance  No distress  HENT:   Head: Normocephalic and atraumatic  Eyes: Conjunctivae and EOM are normal  Pupils are equal, round, and reactive to light  Neck: Neck supple  Cardiovascular: Normal rate, regular rhythm, S1 normal and intact distal pulses  Exam reveals no S3 and no S4  Murmur (Not new) heard  Systolic murmur is present with a grade of 2/6   Pulmonary/Chest: Effort normal and breath sounds normal  No accessory muscle usage  No respiratory distress  He has no decreased breath sounds  He has no wheezes  He has no rhonchi  He has no rales  He exhibits no tenderness  Abdominal: Soft  Bowel sounds are normal  He exhibits no distension and no mass  There is no tenderness  There is no rigidity, no rebound and no guarding  Neurological: He is alert and oriented to person, place, and time  He is not disoriented  GCS eye subscore is 4  GCS verbal subscore is 5  GCS motor subscore is 6  Skin: Skin is warm and dry  Discussion with Family: Discussed with wife at bedside     Discharge instructions/Information to patient and family:   See after visit summary for information provided to patient and family  Provisions for Follow-Up Care:  See after visit summary for information related to follow-up care and any pertinent home health orders  Disposition:     Home with [facility]NA Services (Reminder: Complete face to face encounter)    For Discharges to [facility] SNF:   · Not Applicable to this Patient - Not Applicable to this Patient    Planned Readmission: None     Discharge Statement:  I spent 45 minutes discharging the patient  This time was spent on the day of discharge  I had direct contact with the patient on the day of discharge  Greater than 50% of the total time was spent examining patient, answering all patient questions, arranging and discussing plan of care with patient as well as directly providing post-discharge instructions  Additional time then spent on discharge activities  Discharge Medications:  See after visit summary for reconciled discharge medications provided to patient and family        ** Please Note: This note has been constructed using a voice recognition system **

## 2018-06-07 NOTE — PLAN OF CARE
Problem: DISCHARGE PLANNING - CARE MANAGEMENT  Goal: Discharge to post-acute care or home with appropriate resources  INTERVENTIONS:  - Conduct assessment to determine patient/family and health care team treatment goals, and need for post-acute services based on payer coverage, community resources, and patient preferences, and barriers to discharge  - Address psychosocial, clinical, and financial barriers to discharge as identified in assessment in conjunction with the patient/family and health care team  - Arrange appropriate level of post-acute services according to patients   needs and preference and payer coverage in collaboration with the physician and health care team  - Communicate with and update the patient/family, physician, and health care team regarding progress on the discharge plan  - Arrange appropriate transportation to post-acute venues   Outcome: Completed Date Met: 06/07/18  Patient is medically stable to discharge home today with [facility] in place for [facility] for SN and home PT  CM notified them of his discharge  Patient's wife will be providing transportation home  CM discussed with patient's wife CM setting up a follow up appointment and she declined reporting she would do this on her own  CM reviewed the availability of treatment team to discuss questions or concerns patient and/or family may have regarding  understanding medications and recognizing signs and symptoms once discharged  CM also encouraged patient to follow up with all recommended appointments after discharge  Patient advised of importance for patient and family to participate in managing patients medical well being

## 2018-06-07 NOTE — CASE MANAGEMENT
Notification of Discharge  This is a Notification of Discharge from our facility [address]  Please be advised that this patient has been discharge from our facility  Below you will find the admission and discharge date and time including the patients disposition  PRESENTATION DATE: 6/4/2018 10:25 AM  IP ADMISSION DATE: 6/4/18 1350  DISCHARGE DATE: 6/7/2018  1:05 PM  DISPOSITION: Home with [address] [place] [address] in the [facility] by Graeme Michael for 2017  Network Utilization Review Department  Phone: [phone]; Fax [phone]  ATTENTION: The Network Utilization Review Department is now centralized for our 7 Facilities  Make a note that we have a new phone and fax numbers for our Department  Please call with any questions or concerns to [phone] and carefully follow the prompts so that you are directed to the right person  All voicemails are confidential  Fax any determinations, approvals, denials, and requests for initial or continue stay review clinical to [phone]  Due to HIGH CALL volume, it would be easier if you could please send faxed requests to expedite your requests and in part, help us provide discharge notifications faster        Reference #[id number]

## 2018-06-07 NOTE — PLAN OF CARE
Problem: Potential for Falls  Goal: Patient will remain free of falls  INTERVENTIONS:  - Assess patient frequently for physical needs  -  Identify cognitive and physical deficits and behaviors that affect risk of falls    -  [place] fall precautions as indicated by assessment   - Educate patient/family on patient safety including physical limitations  - Instruct patient to call for assistance with activity based on assessment  - Modify environment to reduce risk of injury  - Consider OT/PT consult to assist with strengthening/mobility   Outcome: Progressing

## 2018-06-07 NOTE — PROGRESS NOTES
[address] [facility] NOTE   Madelaine Andino 74 y o  male MRN: [id number]  Unit/Bed#: -01 Encounter: [id number]  Reason for Consult: ESRD on HD    ASSESSMENT and PLAN:  1  ESRD on HD ([address] [facility]): For HD tomorrow  2  Access: R arm AVF  3  Intradialytic hypotension: Continue Midodrine  BP stable overall  4  Anemia: Hgb at goal    5  Mineral and bone disease: Continue oral phos binder  6  CHF, dilated cardiomyopathy, pericardial effusion  7  Encephalopathy: felt to be due to UTI  8  Pseudomonas UTI: For treatment with Ceftazidime after HD until 6/11/18  9  Hx of Cdiff: On oral Vancomycin  SUMMARY OF RECOMMENDATIONS:  · Next HD is tomorrow at [address]  · Will call over orders for Ceftazidime to [address] [place] [address]  Addendum:  - Discussed with [address] [place] [address] charge nurse - Roby Devine    - They have Ceftazidime and instructions were given for administration on 6/8 and 6/11  SUBJECTIVE / INTERVAL HISTORY:  Feels well  Mental status has normalized - per wife  Did well with HD yesterday  OBJECTIVE:  Current Weight: Weight - Scale: 69 kg (152 lb 1 9 oz)  Vitals:    06/06/18 1245 06/06/18 1607 06/06/18 2219 06/07/18 0745   BP: 123/77 140/67 111/55 120/55   BP Location:  Left arm Left arm Left arm   Pulse: 78 80 78 70   Resp:  18 16 16   Temp:  99 4 °F (37 4 °C) 97 5 °F (36 4 °C) 98 °F (36 7 °C)   TempSrc:  Axillary Oral Oral   SpO2:  90% 92% 90%   Weight:       Height:           Intake/Output Summary (Last 24 hours) at 06/07/18 1038  Last data filed at 06/06/18 2219   Gross per 24 hour   Intake              900 ml   Output             2579 ml   Net            -1679 ml     General: conscious, coherent, cooperative, no distress  Chest/Lungs: decreased breath sounds in bases     CVS: distinct heart sounds, normal rate  Abdomen: soft  Extremities: no edema  (+) R BKA    Medications:    Current Facility-Administered Medications:     aluminum-magnesium hydroxide-simethicone (MYLANTA) 200-200-20 mg/5 mL oral suspension 15 mL, 15 mL, Oral, Q6H PRN, Norberto Dang MD    amiodarone tablet 200 mg, 200 mg, Oral, Daily, Norberto Dang MD, 200 mg at 06/07/18 0840    b complex-vitamin C-folic acid (RENAL) capsule 1 mg, 1 mg, Oral, Daily, Norberto Dang MD, 1 mg at 06/07/18 0841    calcium carbonate (OYSTER SHELL,OSCAL) 500 mg tablet 1 tablet, 1 tablet, Oral, BID With Meals, Norberto Dang MD, 1 tablet at 06/07/18 0840    cefTAZidime (FORTAZ) 1,000 mg in sodium chloride 0 9 % 50 mL IVPB, 1,000 mg, Intravenous, After Dialysis, Lenin Muñoz MD, Last Rate: 100 mL/hr at 06/06/18 1445, 1,000 mg at 06/06/18 1445    cholecalciferol (VITAMIN D3) tablet 1,000 Units, 1,000 Units, Oral, Daily, Norberto Dang MD, 1,000 Units at 06/07/18 0840    cholestyramine sugar free (QUESTRAN LIGHT) packet 4 g, 4 g, Oral, Daily, Norberto Dang MD, 4 g at 06/07/18 9760    citalopram (CeleXA) tablet 5 mg, 5 mg, Oral, Daily, Norberto Dang MD, 5 mg at 06/07/18 0841    diphenoxylate-atropine (LOMOTIL) 2 5-0 025 mg per tablet 1 tablet, 1 tablet, Oral, 4x Daily PRN, Norberto Dang MD    gabapentin (NEURONTIN) capsule 200 mg, 200 mg, Oral, HS, Norberto Dang MD, 200 mg at 06/06/18 2138    insulin detemir (LEVEMIR) subcutaneous injection 5 Units, 5 Units, Subcutaneous, Q12H [address], Norberto Dang MD, 5 Units at 06/07/18 0839    insulin lispro (HumaLOG) 100 units/mL subcutaneous injection 1-5 Units, 1-5 Units, Subcutaneous, TID AC, 3 Units at 06/06/18 1718 **AND** Fingerstick Glucose (POCT), , , TID AC, Norberto Dang MD    insulin lispro (HumaLOG) 100 units/mL subcutaneous injection 1-5 Units, 1-5 Units, Subcutaneous, HS, Chemo Arias PA-C, 5 Units at 06/05/18 2139    insulin lispro (HumaLOG) 100 units/mL subcutaneous injection 3 Units, 3 Units, Subcutaneous, TID With Meals, Norberto Dang MD, 3 Units at 06/07/18 0840    lanthanum (FOSRENOL) chewable tablet 1,000 mg, 1,000 mg, Oral, TID With Meals, Dion Couch MD, 1,000 mg at 06/07/18 0841    levothyroxine tablet 150 mcg, 150 mcg, Oral, Daily Before Breakfast, Dion Couch MD, 150 mcg at 06/07/18 0620    menthol-zinc oxide (CALMOSEPTINE) 0 44-20 6 % ointment, , Topical, BID, Dion Couch MD    midodrine (PROAMATINE) tablet 2 5 mg, 2 5 mg, Oral, BID, ALIE Contreras, 2 5 mg at 06/07/18 0840    nystatin (MYCOSTATIN) powder, , Topical, BID, Dion Couch MD    ondansetron [facility]) injection 4 mg, 4 mg, Intravenous, Q6H PRN, Dino Couch MD    oxyCODONE (ROXICODONE) IR tablet 2 5 mg, 2 5 mg, Oral, Q6H PRN, Alisha Moreno PA-C    pantoprazole (PROTONIX) EC tablet 40 mg, 40 mg, Oral, Daily, Dion Couch MD, 40 mg at 06/07/18 0840    QUEtiapine (SEROquel) tablet 25 mg, 25 mg, Oral, HS, Dion Couch MD, 25 mg at 06/06/18 2138    saccharomyces boulardii (FLORASTOR) capsule 250 mg, 250 mg, Oral, BID, Dion Couch MD, 250 mg at 06/07/18 0840    vancomycin (VANCOCIN) oral solution 125 mg, 125 mg, Oral, Q12H [facility], Dion Couch MD, 125 mg at 06/07/18 0851    warfarin (COUMADIN) tablet 10 mg, 10 mg, Oral, Daily (warfarin), Dion Couch MD, 10 mg at 06/06/18 1717    Laboratory Results:    Results from last 7 days  Lab Units 06/05/18  0620 06/04/18  1123   WBC Thousand/uL 9 82 14 34*   HEMOGLOBIN g/dL 10 0* 11 3*   HEMATOCRIT % 31 4* 35 7*   PLATELETS Thousands/uL 252 337   SODIUM mmol/L 131* 136   POTASSIUM mmol/L 4 7 5 5*   CHLORIDE mmol/L 92* 95*   CO2 mmol/L 29 26   BUN mg/dL 35* 56*   CREATININE mg/dL 5 11* 7 68*   CALCIUM mg/dL 9 3 9 4   PHOSPHORUS mg/dL 6 1*  --    TOTAL PROTEIN g/dL  --  8 7*   GLUCOSE RANDOM mg/dL 226* 188*     Previous work up:

## 2018-06-07 NOTE — ASSESSMENT & PLAN NOTE
· Continue volume management through dialysis  · EDW will be challenged to 63 kg as an outpatient  · Discussed with them

## 2018-06-07 NOTE — ASSESSMENT & PLAN NOTE
· Multifactorial acute encephalopathy, likely related to infection/toxic metabolic  · Resolved, stable for discharge

## 2018-06-07 NOTE — ASSESSMENT & PLAN NOTE
· Patient with some sepsis criteria present on admission including leukocytosis, fever prior to arrival, altered mental status and also noted to have significantly elevated procalcitonin level (although again this can be falsely elevated in dialysis patients)  · Unfortunately the patient has multiple possible sources of infection overall:  He was reporting dysuria and also back pain with urination which suggests urinary source  He had just recently had lithotripsy and stents placed by Urology  Also had N/V/soft frequent stools ? gastroenteritis  In addition he has abnormal CT scan imaging concerning for possible pneumonia/empyema and apparently had cough prior to arrival   Patient also has history of C diff colitis and multiple hospitalizations  Clinically it seems most likely that he has acute pseudomonal UTI based on urine culture and overall presentation   · Seems to have clinically improved clinically--cont   IV ceftazidime day #2  · Appreciate ID input/follow up  · He will be discharged on 2 more doses of Ceftazidime to be given after HD tomorrow and Monday to complete course

## 2018-06-09 LAB
BACTERIA BLD CULT: NORMAL
BACTERIA BLD CULT: NORMAL

## 2018-06-11 ENCOUNTER — ANTICOAG VISIT (OUTPATIENT)
Dept: CARDIOLOGY CLINIC | Facility: CLINIC | Age: 68
End: 2018-06-11

## 2018-06-11 DIAGNOSIS — I48.0 PAROXYSMAL ATRIAL FIBRILLATION (HCC): ICD-10-CM

## 2018-06-11 LAB — INR PPP: 3.1 (ref 0.86–1.17)

## 2018-06-14 ENCOUNTER — TELEPHONE (OUTPATIENT)
Dept: UROLOGY | Facility: CLINIC | Age: 68
End: 2018-06-14

## 2018-06-14 NOTE — TELEPHONE ENCOUNTER
I just was made aware that this patient has passed away  I called the patient's wife to expressed my condolences but I was unable to reach her  I left her a message expressing the above sentiment and told her that we are available should she have any questions or concerns or need any medical information going forward

## 2018-12-04 NOTE — CONSULTS
Consultation - Infectious Disease   Sadi Fernandez 61 y o  male MRN: [id number]  Unit/Bed#:  Encounter: [id number]      IMPRESSION & RECOMMENDATIONS:   #1  Enterococcal sepsisPOA  Suspect that this may be VRE based upon the patient's recent culture results  Consideration for the possibility of endocarditis  The feet are the most likely primary source  Doubtful that pneumonia is the primary source  Thus far the patient seems to have tolerated the antibiotics well without difficulty  At this point it seems reasonable to make sure that VRE is well covered based upon the clinical presentation, microbiology results, and recent culture results at [facility]    -No additional vancomycin for now   -High-dose daptomycin at 10 mg/kg IV q 48 hours while awaiting additional data   -Recheck blood cultures ×2 sets with dialysis tomorrow   -Monitor CBC with differential   -Check baseline CPK   -Await echocardiogram which is pending  ` -Patient may need MARIA    #2  Chronic foot osteomyelitisin the setting of chronic lower extremity ulcerations  The diagnosis is based upon the clinical findings of probing to the bone  The wound cultures have recently grown MRSA and VRE  Per podiatry the prognosis for foot salvage is poor at this time    -Antibiotics as above   -Local wound care   -Follow-up wound culture   -Close podiatry follow-up   -Consideration for BKA    #3  Possible healthcare associated pneumoniabased on clinical and radiographic findings  The patient remains relatively stable on the ventilator at this time    -Continue cefepime for now with current dose   -Follow-up bronchoscopy cultures   -Supportive care in the ventilator   -Close pulmonary follow-up    #4  End-stage renal diseaseon hemodialysis q   Monday Wednesday and Friday      HISTORY OF PRESENT ILLNESS:  Reason for Consult: Gram-positive sepsis  HPI: Sadi Fernandez is a 61y o  year old male admitted to [facility] with worsening Quality 111:Pneumonia Vaccination Status For Older Adults: Pneumococcal Vaccination Previously Received Quality 110: Preventive Care And Screening: Influenza Immunization: Influenza Immunization Administered during Influenza season shortness of breath and confusion who I am asked to assist to assist with management of positive blood cultures  The patient has multiple medical problems including end-stage renal disease and chronic foot ulcerations  He had been admitted to [address] [place] [address] at the end of December 2016 with polymicrobial sepsis felt secondary to transient bacteremia after undergoing debridement of his foot ulcerations  He was treated with broad-spectrum antibiotics with clearance of his bacteremia, and completed 2 weeks of intravenous antibiotics total  Transthoracic echocardiogram was negative for any out of her vegetation  In February of this year he was admitted for sepsis felt secondary to pneumonia and MRSA foot infection  He was once again treated with broad antibiotics and completed 7 days of intravenous antibiotics and eventually was transitioned to oral doxycycline to complete treatment for his MRSA foot infection  He was followed up in wound care apparently [facility]  He had a wound culture obtained on March 1 which revealed vancomycin resistant enterococcus as well as MRSA  He apparently was treated with local care  Earlier this week he had worsening appearance of his feet and legs, and therefore she was briefly admitted to [facility]  He apparently received a single dose of vancomycin on dialysis on Wednesday and was discharged home  On the day of admission here the patient began developing confusion and shortness of breath  He was therefore brought to the [facility] department for further evaluation  In the emergency department he was found to be febrile and confused  He was diagnosed with healthcare associated pneumonia, given a single dose of vancomycin, and started on cefepime and admitted for further management  He was seen by podiatry who felt that the wounds probe to bone and were consistent with chronic osteomyelitis without an overt localizing soft tissue infection   Last night the Detail Level: Detailed patient apparently had a PEA arrest, was resuscitated, and transferred to the intensive care and for further management  His temperatures come down and he has remained hemodynamically stable  He has now however requiring ventilatory support after being intubated  He has not been having any reported diarrhea or vomiting  Today the patient underwent bronchoscopy after being intubated with bronchoalveolar lavage done  Some bloody secretions were noted  REVIEW OF SYSTEMS:   not obtainable     PAST MEDICAL HISTORY:  Past Medical History:   Diagnosis Date    Diabetes mellitus     Diarrhea     ESRD (end stage renal disease) on dialysis     Hypertension     Hypothyroidism     Neuropathy     Right lower lobe pneumonia 2017    Thyroid disease     underactive     Past Surgical History:   Procedure Laterality Date    CATARACT EXTRACTION      CHG GI ENDOSCOPIC ULTRASOUND N/A 3/10/2016    Procedure: LINEAR ENDOSCOPIC U/S;  Surgeon: Bhakti Burt MD;  Location: [facility]; Service: Gastroenterology    CHOLECYSTECTOMY      TOE AMPUTATION         FAMILY HISTORY:  Non-contributory    SOCIAL HISTORY:  Social History   History   Alcohol Use No     History   Drug Use No     History   Smoking Status    Current Every Day Smoker    Packs/day: 1 00    Years: 46 00    Types: Cigarettes    Start date:    Smokeless Tobacco    Never Used       ALLERGIES:  No Known Allergies    MEDICATIONS:  All current active medications have been reviewed    Antibiotics: Cefepime No  3, vancomycin ×1 dose 3/31/17    PHYSICAL EXAM:  HR:  [60-95] 71  Resp:  [12-18] 12  BP: ()/(51-92) 137/76  SpO2:  [96 %-100 %] 99 %  Temp (24hrs), Av 4 °F (36 9 °C), Min:96 4 °F (35 8 °C), Max:100 6 °F (38 1 °C)  Current: Temperature: (!) 96 4 °F (35 8 °C)    Intake/Output Summary (Last 24 hours) at 17 1409  Last data filed at 17 0342   Gross per 24 hour   Intake              420 ml   Output                0 ml   Net 420 ml       General Appearance:  Appearing chronically ill, resting on the ventilator  Head:  Normocephalic, without obvious abnormality, atraumatic   Eyes:  Conjunctiva pale and sclera anicteric, both eyes   Nose: Nares normal, mucosa normal, no drainage   Throat: Oropharynx moist without lesions   Neck: Supple, symmetrical, no adenopathy, no tenderness/mass/nodules   Back:   Symmetric, no curvature, ROM normal, no CVA tenderness   Lungs:   Clear to auscultation anteriorly, no audible wheezes, rhonchi and rales, respirations unlabored   Chest Wall:  No tenderness or deformity   Heart:  RRR; no murmur, rub or gallop   Abdomen:   Soft, non-tender, non-distended, positive bowel sounds,    Extremities: No cyanosis, clubbing  Right first submetatarsal ulcer with purulent drainage and probing of the bone, left fifth submetatarsal ulceration with eschar and probing to the bone  No purulence noted  Right upper extremity AV fistula site without erythema or drainage  Skin: No rashes or lesions  No draining wounds noted  Lymph nodes: Cervical, supraclavicular nodes normal   Neurologic: Obtunded on the ventilator, does not follow commands  LABS, IMAGING, & OTHER STUDIES:  Lab Results:  I have personally reviewed pertinent labs      Results from last 7 days  Lab Units 04/02/17  0806 04/02/17  0354 04/02/17 0342 04/01/17  0535 03/31/17  2055   WBC Thousand/uL  --   --  12 59* 11 41* 10 92*   HEMOGLOBIN g/dL  --   --  10 0* 10 1* 11 8*   I STAT HEMOGLOBIN g/dl 10 2* 10 9*  --   --   --    PLATELETS Thousands/uL  --   --  190 184 226       Results from last 7 days  Lab Units 04/02/17  0806  04/02/17  0342 04/01/17  0535 03/31/17  2054   SODIUM mmol/L  --   --  137 138 137   POTASSIUM mmol/L  --   --  5 0 4 4 4 9   CHLORIDE mmol/L  --   --  104 105 100   CO2 mmol/L  --   --  21 23 25   ANION GAP mmol/L  --   --  12 10 12   BUN mg/dL  --   --  28* 19 15   CREATININE mg/dL  --   --  4 67* 3 22* 3 21*   EGFR Quality 131: Pain Assessment And Follow-Up: Pain assessment using a standardized tool is documented as negative, no follow-up plan required ml/min/1 73sq m  --   --  12 7 19 5 19 6   GLUCOSE RANDOM mg/dL  --   --  122 273* 227*   GLUCOSE, ISTAT mg/dl 131  < >  --   --   --    CALCIUM mg/dL  --   --  8 0* 7 5* 9 5   AST U/L  --   --  38 34 24   ALT U/L  --   --  18 17 19   ALK PHOS U/L  --   --  228* 216* 193*   TOTAL PROTEIN g/dL  --   --  6 5 6 1* 8 3*   ALBUMIN g/dL  --   --  1 7* 1 7* 2 4*   BILIRUBIN TOTAL mg/dL  --   --  0 60 0 60 0 70   < > = values in this interval not displayed  Results from last 7 days  Lab Units 04/01/17  2334 04/01/17  1950 04/01/17  0736 03/31/17  2105 03/31/17 2055   BLOOD CULTURE   --   --   --  Enterococcus species Enterococcus species   GRAM STAIN RESULT  Rare Polys  4+ Gram positive cocci in pairs, chains and clusters  4+ Gram variable rods 1+ Epithelial Cells  1+ Polys  4+ Budding yeast  --  Gram positive cocci in pairs and chains Gram positive cocci in pairs and chains  Gram positive cocci in clusters   INFLUENZA A PCR   --   --  None Detected  --   --    INFLUENZA B PCR   --   --  None Detected  --   --    RSV PCR   --   --  None Detected  --   --      Wound cultures from [facility] 3/1/17VRE and MRSA    Imaging Studies:   I have personally reviewed pertinent imaging study reports and images in PACS      CT had 4/2/17no acute intracranial abnormality    X-ray left foot 4/1/17postoperative changes of first toe no osseous erosion    X-ray of the right foot 4/1/17no acute osseous and reality    Chest x-ray 4/1/17increased right opacity

## 2019-03-26 NOTE — ASSESSMENT & PLAN NOTE
· Volume management through dialysis  · Continue with beta-blockers Plan: If no improvement at follow up we will add [place] Detail Level: Detailed

## 2021-09-16 NOTE — CONSULTS
Consult - Podiatry   Donovan Lopez 74 y o  male MRN: [id number]  Unit/Bed#: [facility] 627-01 Encounter: [id number]    Assessment/Plan     Assessment:  1  Stable eschar on left hallux  2  Hyperkeratotic lesions sub met 5 on left foot  3  Status post left partial hallux amputation, status post right BKA  4  Diabetes mellitus type 1 with neuropathy  5  ESRD    Plan:  - Patient seen and examined, nontoxic in appearance with vital signs stable and without leukocytosis  - stable eschar noted on left hallux  No clinical signs of infection, no drainage, no open lesions  - trimmed hyperkeratotic lesion on sub met 5 with 15 blade, no underlying open lesion noted  -applied Betadine paint to hallux  -patient stable for discharge podiatry point of view     -thank for this consultation  Patient to be discharged today, to follow up Dr Lucian Galicia as outpatient      History of Present Illness     HPI:  Donovan Lopez is a 74 y o  male who presents with eschar on left hallux  Patient states that he had the partial left hallux amputation April of/sugar as well as right BKA around the as well  Patient states he follows up with Dr fierro but has not seen him for awhile  Patient denies any pain in his left foot  Patient admitted for sepsis, likely secondary to UTI and will be likely discharged today    Denies recent nausea, vomiting, shortness of breath or chest pain  States he had fever before admission but remained afebrile while in-house    Consults  Review of Systems   Constitutional: Negative  HENT: Negative  Eyes: Negative  Respiratory: Negative  Cardiovascular: Negative  Gastrointestinal: Negative  Musculoskeletal:  Status post right BKA/left hallux amputation  Skin:  Eschar  Neurological: Negative  Psych: negative         Historical Information   Past Medical History:   Diagnosis Date    Acquired hypothyroidism     underactive    Atrial fibrillation (HCC)     Chronic kidney disease-mineral and bone disorder 11/27/2017    Clostridium difficile infection 04/2017    Diabetes mellitus ([place] [address] )     Dialysis patient ([place] [address] )     Diarrhea     ESRD (end stage renal disease) on dialysis ([place] [address] )     Hx of cardiac arrest     Hypertension     Hyponatremia 8/8/2017    Neuropathy     Right lower lobe pneumonia ([place] [address] ) 2/20/2017    Sepsis ([place] [address] ) 04/2017    Polymicrobial MRSA, VRE    Systolic and diastolic CHF, chronic (HCC)     EF 35%, Grade II DD     Past Surgical History:   Procedure Laterality Date    CATARACT EXTRACTION      CHG GI ENDOSCOPIC ULTRASOUND N/A 3/10/2016    Procedure: LINEAR ENDOSCOPIC U/S;  Surgeon: Simone French MD;  Location: [facility]; Service: Gastroenterology    CHOLECYSTECTOMY      GASTROSTOMY TUBE PLACEMENT N/A 4/12/2017    Procedure: INSERTION PEG TUBE;  Surgeon: Evans Rivera MD;  Location: [facility];  Service:    Dilip Koot LEG AMPUTATION THROUGH LOWER TIBIA AND FIBULA Right 4/6/2017    Procedure: AMPUTATION BELOW KNEE (BKA);   Surgeon: Alexander Kelly DO;  Location: [facility];  Service:     TOE AMPUTATION  2000     Social History   History   Alcohol Use No     History   Drug Use No     History   Smoking Status    Former Smoker    Packs/day: 1 00    Years: 46 00    Types: Cigarettes    Start date: 5    Quit date: 3/1/2017   Smokeless Tobacco    Never Used     Family History:   Family History   Problem Relation Age of Onset    Diabetes Father     Cancer Other     Lupus Mother        Meds/Allergies   Prescriptions Prior to Admission   Medication    b complex-vitamin C-folic acid (RENAL) 1 mg    calcium citrate (CALCITRATE) 950 MG tablet    cholestyramine sugar free (QUESTRAN LIGHT) 4 g packet    cinacalcet (SENSIPAR) 30 mg tablet    gabapentin (NEURONTIN) 300 mg capsule    insulin detemir (LEVEMIR) 100 units/mL subcutaneous injection    insulin lispro (HumaLOG) 100 units/mL injection    levothyroxine 137 mcg tablet    metoprolol tartrate (LOPRESSOR) 25 mg tablet    Probiotic Product (PRO-BIOTIC BLEND) CAPS    traMADol (ULTRAM) 50 mg tablet    vancomycin (VANCOCIN) 50mg/mL SOLN    [DISCONTINUED] amiodarone 200 mg tablet    [DISCONTINUED] citalopram (CeleXA) 10 mg tablet    [DISCONTINUED] warfarin (COUMADIN) 5 mg tablet    acetaminophen (TYLENOL) 325 mg tablet    albuterol (2 5 mg/3 mL) 0 083 % nebulizer solution    Cholecalciferol (VITAMIN D-3) 1000 units CAPS    citalopram (CELEXA) 10 mg tablet    diphenoxylate-atropine (LOMOTIL) 2 5-0 025 mg per tablet    diphenoxylate-atropine (LOMOTIL) 2 5-0 025 mg/5 mL liquid    HUMALOG KWIKPEN 100 UNIT/ML SOPN    insulin aspart (NOVOLOG) 100 units/mL injection    LEVEMIR FLEXTOUCH subcutaneous injection pen 100 units/mL    menthol-zinc oxide (CALMOSEPTINE) 0 44-20 6 % OINT    metoprolol tartrate (LOPRESSOR) 25 mg tablet    midodrine (PROAMATINE) 2 5 mg tablet    Multiple Vitamin (DAILY VALUE MULTIVITAMIN) TABS    nystatin (MYCOSTATIN) powder    oxyCODONE (OXY-IR) 5 MG capsule    pancrelipase, Lip-Prot-Amyl, (CREON) 24,000 units    pantoprazole (PROTONIX) 40 mg tablet    saccharomyces boulardii (FLORASTOR) 250 mg capsule     No Known Allergies    Objective   First Vitals:   Blood Pressure: 137/63 (01/30/18 1026)  Pulse: 89 (01/30/18 1026)  Temperature: (!) 101 8 °F (38 8 °C) (01/30/18 1026)  Temp Source: Rectal (01/30/18 1026)  Respirations: 18 (01/30/18 1026)  Height: 5' 6" (167 6 cm) (01/30/18 1026)  Weight - Scale: 74 8 kg (165 lb) (01/30/18 1026)  SpO2: (!) 83 % (placed on 3LNC at 98% afterwards) (01/30/18 1026)    Current Vitals:   Blood Pressure: 108/57 (02/09/18 1200)  Pulse: 65 (02/09/18 1200)  Temperature: 97 8 °F (36 6 °C) (02/09/18 1200)  Temp Source: Tympanic (02/09/18 1200)  Respirations: 16 (02/09/18 1200)  Height: 5' 6" (167 6 cm) (02/06/18 0721)  Weight - Scale: 66 2 kg (145 lb 14 4 oz) (02/08/18 0600)  SpO2: 90 % (02/09/18 0649)        /57   Pulse 65   Temp 97 8 °F (36 6 °C) (Tympanic)   Resp 16   Ht 5' 6" (1 676 m)   Wt 66 2 kg (145 lb 14 4 oz)   SpO2 90%   BMI 23 55 kg/m²      General Appearance:    Alert, cooperative, no distress   Head:    Normocephalic, without obvious abnormality, atraumatic   Eyes:    PERRL, conjunctiva/corneas clear, EOM's intact        Nose:   Moist mucous membranes   Neck:   Supple, symmetrical, trachea midline   Back:     Symmetric   Lungs:     Respirations unlabored   Heart:    Regular rate and rhythm, S1 and S2 normal, no murmur, rub   or gallop   Abdomen:     Soft, non-tender   Extremities:   Right BKA, left partial hallux amputation  Pulses:  Left DP 2/4, PT 1/4  Cap refill time is brisk to all digits  Temperature gradient warm to cool on left  Skin:   Stable eschar measuring approximately 0 5 x 0 5cm  No open lesions, no crepitus, no fluctuance, no drainage, no clinical signs of acute infection  Hyperkeratotic lesion noted on sub met 5  Neurologic:   Gross sensation is diminished  Protective sensation is diminished               Lab Results:   Admission on 01/30/2018   No results displayed because visit has over 200 results  Invalid input(s): LABAEARO            Imaging: I have personally reviewed pertinent films in PACS  EKG, Pathology, and Other Studies: I have personally reviewed pertinent reports        Code Status: Level 1 - Full Code  Advance Directive and Living Will:      Power of :    POLST: 67.1

## 2022-07-08 NOTE — TELEPHONE ENCOUNTER
Veronika Thorpe is status post cystoscopy, dilation of urethral stricture, and left ureteral stent placement (ureteroscopy was not able to be performed due to a tight ureter)  He has a 6 [place] by 24 centimeter left ureteral stent without a string  He will require return trip to the operating room in approximately 3-4 weeks for definitive stone management  Opzelura Counseling:  I discussed with the patient the risks of Opzelura including but not limited to nasopharngitis, bronchitis, ear infection, eosinophila, hives, diarrhea, folliculitis, tonsillitis, and rhinorrhea.  Taken orally, this medication has been linked to serious infections; higher rate of mortality; malignancy and lymphoproliferative disorders; major adverse cardiovascular events; thrombosis; thrombocytopenia, anemia, and neutropenia; and lipid elevations.

## 2023-11-24 NOTE — ED PROCEDURE NOTE
PROCEDURE  Lumbar Puncture  Date/Time: 1/30/2018 1:43 PM  Performed by: Anabell Espinoza by: Tristan Carias     Patient location:  Bedside  Other Assisting Provider: Yes (comment)    Consent:     Consent obtained:  Verbal    Consent given by:  Spouse    Risks discussed:  Bleeding, infection, headache, nerve damage, repeat procedure and pain    Alternatives discussed:  Delayed treatment and observation  Universal protocol:     Procedure explained and questions answered to patient or proxy's satisfaction: yes      Relevant documents present and verified: yes      Test results available and properly labeled: yes      Imaging studies available: yes      Required blood products, implants, devices, and special equipment available: yes      Immediately prior to procedure a time out was called: yes      Site/side marked: yes      Patient identity confirmed:  Verbally with patient and arm band  Pre-procedure details:     Preparation: Patient was prepped and draped in usual sterile fashion    Indications:     Indications: evaluation for infection and evaluation for altered mental status    Anesthesia (see MAR for exact dosages): Anesthesia method:  Local infiltration    Local anesthetic:  Lidocaine 1% w/o epi  Procedure details:     Lumbar space:  L3-L4 interspace    Patient position:  R lateral decubitus    Needle gauge:  20G x 2 5in    Ultrasound guidance: no      Number of attempts:  2    Fluid appearance:  Blood-tinged then clearing    Tubes of fluid:  4    Total volume (ml):  6  Post-procedure:     Puncture site:  Adhesive bandage applied    Patient tolerance of procedure:   Tolerated with difficulty    Patient instructed to lie flat for one(1) hour post lumbar puncture : Yes           Nayana Henriquez MD  01/30/18 2661
n/a

## 2025-06-17 NOTE — ASSESSMENT & PLAN NOTE
· Must continue oral vancomycin prophylaxis  · The patient having soft stools not technically diarrhea therefore will not send for C diff testing per my discussion with Infectious Disease No

## 2077-11-29 ENCOUNTER — GENERIC CONVERSION - ENCOUNTER (OUTPATIENT)
Dept: OTHER | Facility: OTHER | Age: OVER 89
End: 2077-11-29

## (undated) DEVICE — GLOVE INDICATOR PI UNDERGLOVE SZ 8 BLUE

## (undated) DEVICE — PLUMEPEN PRO 10FT

## (undated) DEVICE — SUT VICRYL PLUS 0 CTB-1 27 IN VCPB260H

## (undated) DEVICE — ABDOMINAL PAD: Brand: DERMACEA

## (undated) DEVICE — GLOVE INDICATOR PI UNDERGLOVE SZ 7 BLUE

## (undated) DEVICE — SPONGE STICK WITH PVP-I: Brand: KENDALL

## (undated) DEVICE — 3000CC GUARDIAN II: Brand: GUARDIAN

## (undated) DEVICE — UROCATCH BAG

## (undated) DEVICE — SUT VICRYL 2-0 CTB-1 36 IN JB945

## (undated) DEVICE — PACK MAJOR ORTHO W/SPLITS PBDS

## (undated) DEVICE — PACK TUR

## (undated) DEVICE — SUT VICRYL 2-0 REEL 54 IN J286G

## (undated) DEVICE — SPONGE LAP 18 X 18 IN

## (undated) DEVICE — GLOVE SRG BIOGEL ECLIPSE 7.5

## (undated) DEVICE — GUIDEWIRE STRGHT TIP 0.035 IN  SOLO PLUS

## (undated) DEVICE — CATH URETERAL 5FR X 70 CM FLEX TIP POLYUR BARD

## (undated) DEVICE — CHLORAPREP HI-LITE 26ML ORANGE

## (undated) DEVICE — COBAN 4 IN STERILE

## (undated) DEVICE — SCD SEQUENTIAL COMPRESSION COMFORT SLEEVE MEDIUM KNEE LENGTH: Brand: KENDALL SCD

## (undated) DEVICE — LUBRICANT SURGILUBE TUBE 4 OZ  FLIP TOP

## (undated) DEVICE — 2108 SERIES SAGITTAL BLADE (18.6 X 0.64 X 61.1MM)

## (undated) DEVICE — INVIEW CLEAR LEGGINGS: Brand: CONVERTORS

## (undated) DEVICE — ACE WRAP 6 IN UNSTERILE

## (undated) DEVICE — SUPER SPONGES,MEDIUM: Brand: KERLIX

## (undated) DEVICE — DRAPE SHEET THREE QUARTER

## (undated) DEVICE — GLOVE SRG BIOGEL ECLIPSE 7

## (undated) DEVICE — GLOVE SRG BIOGEL 8

## (undated) DEVICE — SUT SILK 0 30 IN A306H

## (undated) DEVICE — INTENDED FOR TISSUE SEPARATION, AND OTHER PROCEDURES THAT REQUIRE A SHARP SURGICAL BLADE TO PUNCTURE OR CUT.: Brand: BARD-PARKER SAFETY BLADES SIZE 10, STERILE

## (undated) DEVICE — DRESSING XEROFORM 5 X 9

## (undated) DEVICE — SUT SILK 0 SH 30 IN K834H

## (undated) DEVICE — STOCKINETTE REGULAR

## (undated) DEVICE — TUBING SUCTION 5MM X 12 FT

## (undated) DEVICE — GAUZE SPONGES,16 PLY: Brand: CURITY

## (undated) DEVICE — PERCUTANEOUS ENDOSCOPIC GASTROSTOMY KIT: Brand: ENDOVIVE SAFETY PEG KIT